# Patient Record
Sex: FEMALE | Race: OTHER | HISPANIC OR LATINO | Employment: OTHER | URBAN - METROPOLITAN AREA
[De-identification: names, ages, dates, MRNs, and addresses within clinical notes are randomized per-mention and may not be internally consistent; named-entity substitution may affect disease eponyms.]

---

## 2018-02-15 ENCOUNTER — OFFICE VISIT (OUTPATIENT)
Dept: FAMILY MEDICINE CLINIC | Facility: CLINIC | Age: 78
End: 2018-02-15
Payer: COMMERCIAL

## 2018-02-15 ENCOUNTER — APPOINTMENT (OUTPATIENT)
Dept: RADIOLOGY | Facility: CLINIC | Age: 78
End: 2018-02-15
Payer: COMMERCIAL

## 2018-02-15 VITALS
DIASTOLIC BLOOD PRESSURE: 64 MMHG | WEIGHT: 133.2 LBS | RESPIRATION RATE: 16 BRPM | OXYGEN SATURATION: 97 % | BODY MASS INDEX: 24.51 KG/M2 | HEART RATE: 74 BPM | SYSTOLIC BLOOD PRESSURE: 122 MMHG | HEIGHT: 62 IN | TEMPERATURE: 97.5 F

## 2018-02-15 DIAGNOSIS — Z79.4 TYPE 2 DIABETES MELLITUS WITH COMPLICATION, WITH LONG-TERM CURRENT USE OF INSULIN (HCC): Primary | ICD-10-CM

## 2018-02-15 DIAGNOSIS — G89.29 CHRONIC LEFT SHOULDER PAIN: ICD-10-CM

## 2018-02-15 DIAGNOSIS — F03.91 DEMENTIA WITH BEHAVIORAL DISTURBANCE, UNSPECIFIED DEMENTIA TYPE (HCC): ICD-10-CM

## 2018-02-15 DIAGNOSIS — M25.512 CHRONIC LEFT SHOULDER PAIN: ICD-10-CM

## 2018-02-15 DIAGNOSIS — Z13.220 NEED FOR LIPID SCREENING: ICD-10-CM

## 2018-02-15 DIAGNOSIS — E11.8 TYPE 2 DIABETES MELLITUS WITH COMPLICATION, WITH LONG-TERM CURRENT USE OF INSULIN (HCC): Primary | ICD-10-CM

## 2018-02-15 DIAGNOSIS — E03.9 HYPOTHYROIDISM, UNSPECIFIED TYPE: ICD-10-CM

## 2018-02-15 DIAGNOSIS — M25.512 PAIN IN JOINT OF LEFT SHOULDER: ICD-10-CM

## 2018-02-15 PROBLEM — F03.918 DEMENTIA WITH BEHAVIORAL DISTURBANCE: Status: ACTIVE | Noted: 2018-02-15

## 2018-02-15 PROCEDURE — 73030 X-RAY EXAM OF SHOULDER: CPT

## 2018-02-15 PROCEDURE — 99204 OFFICE O/P NEW MOD 45 MIN: CPT | Performed by: FAMILY MEDICINE

## 2018-02-15 RX ORDER — IBUPROFEN 600 MG/1
600 TABLET ORAL EVERY 8 HOURS PRN
Qty: 30 TABLET | Refills: 1 | Status: SHIPPED | OUTPATIENT
Start: 2018-02-15 | End: 2019-08-01 | Stop reason: HOSPADM

## 2018-02-15 RX ORDER — DONEPEZIL HYDROCHLORIDE 5 MG/1
5 TABLET, FILM COATED ORAL
COMMUNITY
End: 2018-03-19 | Stop reason: DRUGHIGH

## 2018-02-15 RX ORDER — LEVOTHYROXINE SODIUM 0.07 MG/1
75 TABLET ORAL DAILY
COMMUNITY
End: 2019-03-07 | Stop reason: SDUPTHER

## 2018-02-15 RX ORDER — LISINOPRIL 20 MG/1
20 TABLET ORAL DAILY
COMMUNITY
End: 2019-01-28 | Stop reason: SDUPTHER

## 2018-02-15 RX ORDER — INSULIN GLARGINE 100 [IU]/ML
INJECTION, SOLUTION SUBCUTANEOUS
COMMUNITY
End: 2018-02-22 | Stop reason: SDUPTHER

## 2018-02-15 RX ORDER — IBUPROFEN 400 MG/1
TABLET ORAL EVERY 6 HOURS PRN
COMMUNITY
End: 2018-02-22 | Stop reason: SDUPTHER

## 2018-02-15 RX ORDER — SIMVASTATIN 20 MG
20 TABLET ORAL
COMMUNITY
End: 2019-11-19 | Stop reason: SDUPTHER

## 2018-02-15 RX ORDER — FENOFIBRATE 160 MG/1
160 TABLET ORAL DAILY
COMMUNITY
End: 2019-08-30 | Stop reason: SDUPTHER

## 2018-02-15 RX ORDER — ECHINACEA PURPUREA EXTRACT 125 MG
1 TABLET ORAL AS NEEDED
COMMUNITY
End: 2019-06-26

## 2018-02-15 RX ORDER — LIDOCAINE 50 MG/G
1 PATCH TOPICAL DAILY
Qty: 30 PATCH | Refills: 0 | Status: SHIPPED | OUTPATIENT
Start: 2018-02-15 | End: 2019-06-26

## 2018-02-15 RX ORDER — MEMANTINE HYDROCHLORIDE 5 MG/1
5 TABLET ORAL DAILY
Qty: 30 TABLET | Refills: 2 | Status: SHIPPED | OUTPATIENT
Start: 2018-02-15 | End: 2018-05-03 | Stop reason: ALTCHOICE

## 2018-02-15 NOTE — PROGRESS NOTES
Assessment/Plan:    No problem-specific Assessment & Plan notes found for this encounter  Diagnoses and all orders for this visit:    Type 2 diabetes mellitus with complication, with long-term current use of insulin ([address] )  -     Comprehensive metabolic panel; Future  -     Hemoglobin A1c; Future  -     Lipid panel; Future  -     Microalbumin / creatinine urine ratio  -     Ambulatory referral to Endocrinology; Future    Dementia with behavioral disturbance, unspecified dementia type  -     TSH baseline; Future  -     Vitamin B12; Future  -     memantine (NAMENDA) 5 mg tablet; Take 1 tablet (5 mg total) by mouth daily  -     Ambulatory referral to Neurology; Future    Chronic left shoulder pain  -     Ambulatory referral to Physical Therapy; Future  -     XR shoulder 2+ vw left; Future  -     ibuprofen (MOTRIN) 600 mg tablet; Take 1 tablet (600 mg total) by mouth every 8 (eight) hours as needed for mild pain  -     lidocaine (LIDODERM) 5 %; Place 1 patch on the skin daily Remove & Discard patch within 12 hours or as directed by MD    Need for lipid screening    Other orders  -     insulin glargine (LANTUS) 100 units/mL subcutaneous injection; Inject under the skin daily at bedtime  -     Liraglutide (VICTOZA SC); Inject under the skin  -     Canagliflozin (INVOKANA) 300 MG TABS; Take 300 mg by mouth daily  -     donepezil (ARICEPT) 5 mg tablet; Take 5 mg by mouth daily at bedtime  -     fenofibrate (TRIGLIDE) 160 MG tablet; Take 160 mg by mouth daily  -     simvastatin (ZOCOR) 20 mg tablet; Take 20 mg by mouth daily at bedtime  -     lisinopril (ZESTRIL) 20 mg tablet; Take 20 mg by mouth daily  -     levothyroxine 75 mcg tablet; Take 75 mcg by mouth daily  -     Insulin Pen Needle (BD ULTRA-FINE PEN NEEDLES) 29G X 12 7MM MISC; by Does not apply route  -     ibuprofen (MOTRIN) 400 mg tablet;  Take by mouth every 6 (six) hours as needed for mild pain  -     sodium chloride (OCEAN) 0 65 % nasal spray; 1 spray into each nostril as needed for congestion  -     Insulin Pen Needle (BD PEN NEEDLE TOPHER U/F) 32G X 4 MM MISC; by Does not apply route  -     insulin glargine (LANTUS SOLOSTAR) injection pen 100 units/mL; Inject under the skin daily        Follow up in 1 month to address her medical problems  Subjective:      Patient ID: Sultana Garcia is a 69 y o  female  Dementia  She is here for evaluation and treatment of cognitive problems  She is accompanied by patient and granddughater  Primary caregiver is patient and gandadaughter  The family and the patient identify problems with changes in short and long term memory  Family and patient report problems with gait trouble and balance trouble  Family and patient are concerned about  medication errors, however, they are not concerned about wandering, driving, cooking and inability to maintain adequate nutrition  Medication administration: family monitors medication usage    Functional Assessment:   Activities of Daily Living (ADLs):    She is independent in the following: ambulation, bathing and hygiene, feeding, continence, grooming, toileting and dressing  Requires assistance with the following: none    Sultana Garcia is a 69 y o  female who presents for an initial evaluation of Type 2 diabetes mellitus  Current symptoms/problems include none and have been stable  Symptoms have been present for several years  Known diabetic complications: none  Cardiovascular risk factors: advanced age (older than 54 for men, 72 for women), diabetes mellitus and hypertension  Current diabetic medications include: victoza, lantus and Invokana    Shoulder Pain  Patient complains of left shoulder pain  The symptoms began several weeks ago  Aggravating factors: no known event  Pain is located between the neck and shoulder  Discomfort is described as aching, sharp/stabbing and throbbing  Symptoms are exacerbated by repetitive movements, overhead movements and lying on the shoulder  Evaluation to date: none  Therapy to date includes: OTC analgesics which are somewhat effective  The following portions of the patient's history were reviewed and updated as appropriate:   She  has a past medical history of Dementia and Diabetes mellitus ([address] )  She  does not have any pertinent problems on file  She  has a past surgical history that includes Hysterectomy and Gallbladder surgery  Her family history includes Mental illness in her father and mother; Substance Abuse in her father and mother  She  reports that she has quit smoking  She has never used smokeless tobacco  She reports that she does not drink alcohol or use drugs    Current Outpatient Prescriptions   Medication Sig Dispense Refill    Canagliflozin (INVOKANA) 300 MG TABS Take 300 mg by mouth daily      donepezil (ARICEPT) 5 mg tablet Take 5 mg by mouth daily at bedtime      fenofibrate (TRIGLIDE) 160 MG tablet Take 160 mg by mouth daily      ibuprofen (MOTRIN) 400 mg tablet Take by mouth every 6 (six) hours as needed for mild pain      insulin glargine (LANTUS SOLOSTAR) injection pen 100 units/mL Inject under the skin daily      Insulin Pen Needle (BD PEN NEEDLE TOPHER U/F) 32G X 4 MM MISC by Does not apply route      levothyroxine 75 mcg tablet Take 75 mcg by mouth daily      Liraglutide (VICTOZA SC) Inject under the skin      lisinopril (ZESTRIL) 20 mg tablet Take 20 mg by mouth daily      simvastatin (ZOCOR) 20 mg tablet Take 20 mg by mouth daily at bedtime      sodium chloride (OCEAN) 0 65 % nasal spray 1 spray into each nostril as needed for congestion      ibuprofen (MOTRIN) 600 mg tablet Take 1 tablet (600 mg total) by mouth every 8 (eight) hours as needed for mild pain 30 tablet 1    insulin glargine (LANTUS) 100 units/mL subcutaneous injection Inject under the skin daily at bedtime      Insulin Pen Needle (BD ULTRA-FINE PEN NEEDLES) 29G X 12 7MM MISC by Does not apply route      lidocaine (LIDODERM) 5 % Place 1 patch on the skin daily Remove & Discard patch within 12 hours or as directed by MD 30 patch 0    memantine (NAMENDA) 5 mg tablet Take 1 tablet (5 mg total) by mouth daily 30 tablet 2     No current facility-administered medications for this visit  No current outpatient prescriptions on file prior to visit  No current facility-administered medications on file prior to visit  She is allergic to penicillins       Review of Systems   Constitutional: Negative for activity change, appetite change, fatigue and fever  HENT: Negative for congestion and ear discharge  Respiratory: Negative for cough and shortness of breath  Cardiovascular: Negative for chest pain and palpitations  Gastrointestinal: Negative for diarrhea and nausea  Musculoskeletal: Positive for arthralgias  Negative for back pain  Skin: Negative for color change and rash  Neurological: Negative for dizziness and headaches  Psychiatric/Behavioral: Positive for confusion  Negative for agitation and behavioral problems  Objective:    Vitals:    02/15/18 1058   BP: 122/64   Pulse: 74   Resp: 16   Temp: 97 5 °F (36 4 °C)   SpO2: 97%        Physical Exam   Constitutional: She is oriented to person, place, and time  She appears well-developed and well-nourished  No distress  HENT:   Head: Normocephalic and atraumatic  Nose: Nose normal    Mouth/Throat: Oropharynx is clear and moist    Eyes: Conjunctivae are normal  Pupils are equal, round, and reactive to light  Cardiovascular: Normal rate, regular rhythm and normal heart sounds  No murmur heard  Pulmonary/Chest: Effort normal and breath sounds normal  No respiratory distress  She has no wheezes  Neurological: She is alert and oriented to person, place, and time  Skin: Skin is warm and dry  No rash noted  She is not diaphoretic  No erythema  Psychiatric: She has a normal mood and affect

## 2018-02-16 ENCOUNTER — TRANSCRIBE ORDERS (OUTPATIENT)
Dept: ADMINISTRATIVE | Facility: HOSPITAL | Age: 78
End: 2018-02-16

## 2018-02-16 ENCOUNTER — APPOINTMENT (OUTPATIENT)
Dept: LAB | Facility: CLINIC | Age: 78
End: 2018-02-16
Payer: COMMERCIAL

## 2018-02-16 DIAGNOSIS — E03.9 HYPOTHYROIDISM, UNSPECIFIED TYPE: ICD-10-CM

## 2018-02-16 DIAGNOSIS — Z79.4 TYPE 2 DIABETES MELLITUS WITH COMPLICATION, WITH LONG-TERM CURRENT USE OF INSULIN (HCC): ICD-10-CM

## 2018-02-16 DIAGNOSIS — E11.8 TYPE 2 DIABETES MELLITUS WITH COMPLICATION, WITH LONG-TERM CURRENT USE OF INSULIN (HCC): ICD-10-CM

## 2018-02-16 DIAGNOSIS — E11.8 DIABETIC COMPLICATION (HCC): Primary | ICD-10-CM

## 2018-02-16 DIAGNOSIS — R53.83 OTHER FATIGUE: Primary | ICD-10-CM

## 2018-02-16 DIAGNOSIS — E11.00 TYPE 2 DIABETES MELLITUS WITH HYPEROSMOLARITY WITHOUT NONKETOTIC HYPERGLYCEMIC-HYPEROSMOLAR COMA (NKHHC) (HCC): Primary | ICD-10-CM

## 2018-02-16 DIAGNOSIS — M25.512 PAIN IN JOINT OF LEFT SHOULDER: ICD-10-CM

## 2018-02-16 DIAGNOSIS — F03.91 DEMENTIA WITH BEHAVIORAL DISTURBANCE, UNSPECIFIED DEMENTIA TYPE (HCC): ICD-10-CM

## 2018-02-16 LAB
ALBUMIN SERPL BCP-MCNC: 3.9 G/DL (ref 3.5–5)
ALP SERPL-CCNC: 45 U/L (ref 46–116)
ALT SERPL W P-5'-P-CCNC: 19 U/L (ref 12–78)
ANION GAP SERPL CALCULATED.3IONS-SCNC: 5 MMOL/L (ref 4–13)
AST SERPL W P-5'-P-CCNC: 13 U/L (ref 5–45)
BILIRUB SERPL-MCNC: 0.54 MG/DL (ref 0.2–1)
BUN SERPL-MCNC: 24 MG/DL (ref 5–25)
CALCIUM SERPL-MCNC: 9.5 MG/DL (ref 8.3–10.1)
CHLORIDE SERPL-SCNC: 108 MMOL/L (ref 100–108)
CHOLEST SERPL-MCNC: 131 MG/DL (ref 50–200)
CO2 SERPL-SCNC: 28 MMOL/L (ref 21–32)
CREAT SERPL-MCNC: 0.99 MG/DL (ref 0.6–1.3)
CREAT UR-MCNC: 55.2 MG/DL
EST. AVERAGE GLUCOSE BLD GHB EST-MCNC: 212 MG/DL
GFR SERPL CREATININE-BSD FRML MDRD: 56 ML/MIN/1.73SQ M
GLUCOSE P FAST SERPL-MCNC: 105 MG/DL (ref 65–99)
HBA1C MFR BLD: 9 % (ref 4.2–6.3)
HDLC SERPL-MCNC: 43 MG/DL (ref 40–60)
LDLC SERPL CALC-MCNC: 67 MG/DL (ref 0–100)
MICROALBUMIN UR-MCNC: 5.3 MG/L (ref 0–20)
MICROALBUMIN/CREAT 24H UR: 10 MG/G CREATININE (ref 0–30)
POTASSIUM SERPL-SCNC: 4.4 MMOL/L (ref 3.5–5.3)
PROT SERPL-MCNC: 7.1 G/DL (ref 6.4–8.2)
SODIUM SERPL-SCNC: 141 MMOL/L (ref 136–145)
TRIGL SERPL-MCNC: 105 MG/DL
TSH SERPL DL<=0.05 MIU/L-ACNC: 3.96 UIU/ML
VIT B12 SERPL-MCNC: 352 PG/ML (ref 100–900)

## 2018-02-16 PROCEDURE — 86431 RHEUMATOID FACTOR QUANT: CPT

## 2018-02-16 PROCEDURE — 82043 UR ALBUMIN QUANTITATIVE: CPT | Performed by: FAMILY MEDICINE

## 2018-02-16 PROCEDURE — 86430 RHEUMATOID FACTOR TEST QUAL: CPT

## 2018-02-16 PROCEDURE — 80061 LIPID PANEL: CPT

## 2018-02-16 PROCEDURE — 36415 COLL VENOUS BLD VENIPUNCTURE: CPT

## 2018-02-16 PROCEDURE — 84443 ASSAY THYROID STIM HORMONE: CPT

## 2018-02-16 PROCEDURE — 82570 ASSAY OF URINE CREATININE: CPT | Performed by: FAMILY MEDICINE

## 2018-02-16 PROCEDURE — 82607 VITAMIN B-12: CPT

## 2018-02-16 PROCEDURE — 83036 HEMOGLOBIN GLYCOSYLATED A1C: CPT

## 2018-02-16 PROCEDURE — 80053 COMPREHEN METABOLIC PANEL: CPT

## 2018-02-20 DIAGNOSIS — M05.80 POLYARTHRITIS WITH POSITIVE RHEUMATOID FACTOR (HCC): Primary | ICD-10-CM

## 2018-02-20 LAB
CRYOGLOB RF SER-ACNC: ABNORMAL [IU]/ML
RHEUMATOID FACT SER QL LA: POSITIVE

## 2018-02-22 ENCOUNTER — OFFICE VISIT (OUTPATIENT)
Dept: FAMILY MEDICINE CLINIC | Facility: CLINIC | Age: 78
End: 2018-02-22
Payer: COMMERCIAL

## 2018-02-22 VITALS
BODY MASS INDEX: 24.99 KG/M2 | HEIGHT: 62 IN | SYSTOLIC BLOOD PRESSURE: 128 MMHG | OXYGEN SATURATION: 98 % | HEART RATE: 62 BPM | WEIGHT: 135.8 LBS | DIASTOLIC BLOOD PRESSURE: 62 MMHG | RESPIRATION RATE: 16 BRPM | TEMPERATURE: 96.6 F

## 2018-02-22 DIAGNOSIS — H91.93 BILATERAL HEARING LOSS, UNSPECIFIED HEARING LOSS TYPE: ICD-10-CM

## 2018-02-22 DIAGNOSIS — M05.741 RHEUMATOID ARTHRITIS INVOLVING BOTH HANDS WITH POSITIVE RHEUMATOID FACTOR (HCC): ICD-10-CM

## 2018-02-22 DIAGNOSIS — E11.9 TYPE 2 DIABETES MELLITUS WITHOUT COMPLICATION, WITH LONG-TERM CURRENT USE OF INSULIN (HCC): Primary | ICD-10-CM

## 2018-02-22 DIAGNOSIS — M05.742 RHEUMATOID ARTHRITIS INVOLVING BOTH HANDS WITH POSITIVE RHEUMATOID FACTOR (HCC): ICD-10-CM

## 2018-02-22 DIAGNOSIS — E13.9 DIABETES 1.5, MANAGED AS TYPE 1 (HCC): Primary | ICD-10-CM

## 2018-02-22 DIAGNOSIS — Z79.4 TYPE 2 DIABETES MELLITUS WITHOUT COMPLICATION, WITH LONG-TERM CURRENT USE OF INSULIN (HCC): Primary | ICD-10-CM

## 2018-02-22 PROCEDURE — 99214 OFFICE O/P EST MOD 30 MIN: CPT | Performed by: FAMILY MEDICINE

## 2018-02-22 RX ORDER — LANCETS 28 GAUGE
EACH MISCELLANEOUS
Qty: 100 EACH | Refills: 2 | Status: SHIPPED | OUTPATIENT
Start: 2018-02-22 | End: 2018-02-22 | Stop reason: ALTCHOICE

## 2018-02-22 NOTE — PROGRESS NOTES
Assessment/Plan:    No problem-specific Assessment & Plan notes found for this encounter  Diagnoses and all orders for this visit:    Type 2 diabetes mellitus without complication, with long-term current use of insulin ([facility])  -     Lancets (FREESTYLE) lancets; Use twice daily  Morning fasting and at bedtime   -     Glucometer test strips  -     Glucometer  After discussing risks and benefits of medication along with side effects with patient Lantus will be increased to 12 unit at bedtime  Continue same dose of Victoza and Invokana  She was advised to measure her fasting glucose int he AM and at bedtime  Bilateral hearing loss, unspecified hearing loss type  -     Ambulatory Referral to Otolaryngology; Future    Rheumatoid arthritis 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 ([facility] )    She would like to do some research before being referred to Rheumatology  Advised to take ibuprofen 600 mg as needed  Follow up in 1 month          Subjective:      Patient ID: Ruth Newman is a 69 y o  female  Diabetes Mellitus  Patient presents for follow up of diabetes  Current symptoms include: none  Symptoms have stabilized and been well-controlled  Patient denies foot ulcerations, hyperglycemia, hypoglycemia , increased appetite, nausea, paresthesia of the feet, polydipsia, polyuria and visual disturbances  Evaluation to date has included: hemoglobin A1C which was 9 0 done 1 month ago  Home sugars: patient does not check sugars  Current treatment: Continued insulin, Lantus 10 unit at bedtime, Victoza 1 8 mg daily and invokana 300 mg po once daily which has been somewhat effective  Hearing Loss  Norah Castro is a 69 y o  female who I am asked to see in consultation for evaluation of hearing loss  Concern regarding hearing has been present for 3 month(s)  She has not failed a prior hearing test  [facility] reports turning up the T V , poor attention, saying "huh" or "what"   There is not a history of otitis media  There is not a history of poor vision or progressive vision loss  There is not a history of abnormalities of the outer ear in the family  Rheumatoid Arthritis  Patient complains of rheumatoid arthritis  Symptoms have been present for several years  Onset was sudden  Symptoms include joint pain, joint swelling, morning stiffness and numbness of hands and are of moderate severity  Patient denies eye symptoms  Symptoms are made worse by: overuse  Symptoms are helped by arthritis medications  Associated symptoms include arthralgia and joint pain  Patient denies associated none  Overall disease activity:  ongoing  Limitation on activities include none  The following portions of the patient's history were reviewed and updated as appropriate:   She  has a past medical history of Dementia and Diabetes mellitus ([facility] )  She   Patient Active Problem List    Diagnosis Date Noted    Type 2 diabetes mellitus without complication, with long-term current use of insulin ([facility] ) 02/22/2018    Bilateral hearing loss 02/22/2018    Rheumatoid arthritis 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 ([place] [id number] ) 02/22/2018    Type 2 diabetes mellitus with complication, with long-term current use of insulin ([place] [id number] ) 02/15/2018    Dementia with behavioral disturbance 02/15/2018    Chronic left shoulder pain 02/15/2018    Need for lipid screening 02/15/2018     She  has a past surgical history that includes Hysterectomy and Gallbladder surgery  Her family history includes Mental illness in her father and mother; Substance Abuse in her father and mother  She  reports that she has quit smoking  She has never used smokeless tobacco  She reports that she does not drink alcohol or use drugs    Current Outpatient Prescriptions   Medication Sig Dispense Refill    Canagliflozin (INVOKANA) 300 MG TABS Take 300 mg by mouth daily      donepezil (ARICEPT) 5 mg tablet Take 5 mg by mouth daily at bedtime      fenofibrate (TRIGLIDE) 160 MG tablet Take 160 mg by mouth daily      ibuprofen (MOTRIN) 400 mg tablet Take by mouth every 6 (six) hours as needed for mild pain      ibuprofen (MOTRIN) 600 mg tablet Take 1 tablet (600 mg total) by mouth every 8 (eight) hours as needed for mild pain 30 tablet 1    insulin glargine (LANTUS SOLOSTAR) injection pen 100 units/mL Inject 12 Units under the skin daily       Insulin Pen Needle (BD PEN NEEDLE TOPHER U/F) 32G X 4 MM MISC by Does not apply route      levothyroxine 75 mcg tablet Take 75 mcg by mouth daily      lidocaine (LIDODERM) 5 % Place 1 patch on the skin daily Remove & Discard patch within 12 hours or as directed by MD 30 patch 0    Liraglutide (VICTOZA SC) Inject under the skin      lisinopril (ZESTRIL) 20 mg tablet Take 20 mg by mouth daily      memantine (NAMENDA) 5 mg tablet Take 1 tablet (5 mg total) by mouth daily 30 tablet 2    simvastatin (ZOCOR) 20 mg tablet Take 20 mg by mouth daily at bedtime      sodium chloride (OCEAN) 0 65 % nasal spray 1 spray into each nostril as needed for congestion      Lancets (FREESTYLE) lancets Use twice daily  Morning fasting and at bedtime  100 each 2     No current facility-administered medications for this visit        Current Outpatient Prescriptions on File Prior to Visit   Medication Sig    Canagliflozin (INVOKANA) 300 MG TABS Take 300 mg by mouth daily    donepezil (ARICEPT) 5 mg tablet Take 5 mg by mouth daily at bedtime    fenofibrate (TRIGLIDE) 160 MG tablet Take 160 mg by mouth daily    ibuprofen (MOTRIN) 400 mg tablet Take by mouth every 6 (six) hours as needed for mild pain    ibuprofen (MOTRIN) 600 mg tablet Take 1 tablet (600 mg total) by mouth every 8 (eight) hours as needed for mild pain    insulin glargine (LANTUS SOLOSTAR) injection pen 100 units/mL Inject 12 Units under the skin daily     Insulin Pen Needle (BD PEN NEEDLE TOPHER U/F) 32G X 4 MM MISC by Does not apply route    levothyroxine 75 mcg tablet Take 75 mcg by mouth daily    lidocaine (LIDODERM) 5 % Place 1 patch on the skin daily Remove & Discard patch within 12 hours or as directed by MD    Liraglutide (VICTOZA SC) Inject under the skin    lisinopril (ZESTRIL) 20 mg tablet Take 20 mg by mouth daily    memantine (NAMENDA) 5 mg tablet Take 1 tablet (5 mg total) by mouth daily    simvastatin (ZOCOR) 20 mg tablet Take 20 mg by mouth daily at bedtime    sodium chloride (OCEAN) 0 65 % nasal spray 1 spray into each nostril as needed for congestion    [DISCONTINUED] insulin glargine (LANTUS) 100 units/mL subcutaneous injection Inject under the skin daily at bedtime    [DISCONTINUED] Insulin Pen Needle (BD ULTRA-FINE PEN NEEDLES) 29G X 12 7MM MISC by Does not apply route     No current facility-administered medications on file prior to visit  She is allergic to penicillins       Review of Systems   Constitutional: Negative for activity change, appetite change, fatigue and fever  HENT: Positive for hearing loss  Negative for congestion and ear discharge  Respiratory: Negative for cough and shortness of breath  Cardiovascular: Negative for chest pain and palpitations  Gastrointestinal: Negative for diarrhea and nausea  Musculoskeletal: Positive for arthralgias and joint swelling  Negative for back pain  Skin: Negative for color change and rash  Neurological: Negative for dizziness and headaches  Psychiatric/Behavioral: Negative for agitation and behavioral problems  Objective:      /62 (BP Location: Left arm, Patient Position: Sitting, Cuff Size: Adult)   Pulse 62   Temp (!) 96 6 °F (35 9 °C) (Tympanic)   Resp 16   Ht 5' 2" (1 575 m)   Wt 61 6 kg (135 lb 12 8 oz)   SpO2 98%   BMI 24 84 kg/m²          Physical Exam   Constitutional: She is oriented to person, place, and time  She appears well-developed and well-nourished  No distress  HENT:   Head: Normocephalic and atraumatic     Nose: Nose normal  Mouth/Throat: Oropharynx is clear and moist    Eyes: Conjunctivae are normal  Pupils are equal, round, and reactive to light  Cardiovascular: Normal rate, regular rhythm and normal heart sounds  No murmur heard  Pulmonary/Chest: Effort normal and breath sounds normal  No respiratory distress  She has no wheezes  Abdominal: Soft  Bowel sounds are normal  She exhibits no distension  There is no tenderness  Musculoskeletal: She exhibits edema, tenderness and deformity  Bilateral distal nodules noted on both hands with some ulnar deviation  Tender and swollen noted  Neurological: She is alert and oriented to person, place, and time  Skin: Skin is warm and dry  No rash noted  She is not diaphoretic  No erythema  Psychiatric: She has a normal mood and affect

## 2018-02-23 RX ORDER — LANCETS
EACH MISCELLANEOUS
Qty: 60 EACH | Refills: 2 | Status: SHIPPED | OUTPATIENT
Start: 2018-02-23 | End: 2019-06-26

## 2018-03-13 ENCOUNTER — TELEPHONE (OUTPATIENT)
Dept: NEUROLOGY | Facility: CLINIC | Age: 78
End: 2018-03-13

## 2018-03-19 ENCOUNTER — TELEPHONE (OUTPATIENT)
Dept: FAMILY MEDICINE CLINIC | Facility: CLINIC | Age: 78
End: 2018-03-19

## 2018-03-19 ENCOUNTER — OFFICE VISIT (OUTPATIENT)
Dept: FAMILY MEDICINE CLINIC | Facility: CLINIC | Age: 78
End: 2018-03-19
Payer: COMMERCIAL

## 2018-03-19 ENCOUNTER — APPOINTMENT (OUTPATIENT)
Dept: RADIOLOGY | Facility: CLINIC | Age: 78
End: 2018-03-19
Payer: COMMERCIAL

## 2018-03-19 VITALS
RESPIRATION RATE: 16 BRPM | OXYGEN SATURATION: 98 % | DIASTOLIC BLOOD PRESSURE: 64 MMHG | HEIGHT: 62 IN | WEIGHT: 136.8 LBS | BODY MASS INDEX: 25.17 KG/M2 | HEART RATE: 58 BPM | TEMPERATURE: 97.8 F | SYSTOLIC BLOOD PRESSURE: 122 MMHG

## 2018-03-19 DIAGNOSIS — Z79.4 TYPE 2 DIABETES MELLITUS WITH COMPLICATION, WITH LONG-TERM CURRENT USE OF INSULIN (HCC): ICD-10-CM

## 2018-03-19 DIAGNOSIS — R41.3 MEMORY LOSS: Primary | ICD-10-CM

## 2018-03-19 DIAGNOSIS — M21.949 DEFORMITY OF HAND, UNSPECIFIED LATERALITY: ICD-10-CM

## 2018-03-19 DIAGNOSIS — E11.8 TYPE 2 DIABETES MELLITUS WITH COMPLICATION, WITH LONG-TERM CURRENT USE OF INSULIN (HCC): ICD-10-CM

## 2018-03-19 PROCEDURE — 73130 X-RAY EXAM OF HAND: CPT

## 2018-03-19 PROCEDURE — 99214 OFFICE O/P EST MOD 30 MIN: CPT | Performed by: FAMILY MEDICINE

## 2018-03-19 RX ORDER — DONEPEZIL HYDROCHLORIDE 10 MG/1
10 TABLET, FILM COATED ORAL
Qty: 30 TABLET | Refills: 2 | Status: SHIPPED | OUTPATIENT
Start: 2018-03-19 | End: 2018-05-11 | Stop reason: SDUPTHER

## 2018-03-19 RX ORDER — MEMANTINE HYDROCHLORIDE 10 MG/1
10 TABLET ORAL DAILY
Qty: 30 TABLET | Refills: 2 | Status: SHIPPED | OUTPATIENT
Start: 2018-03-19 | End: 2018-05-03 | Stop reason: ALTCHOICE

## 2018-03-19 NOTE — PROGRESS NOTES
Assessment/Plan:    No problem-specific Assessment & Plan notes found for this encounter  Diagnoses and all orders for this visit:    Memory loss  -     donepezil (ARICEPT) 10 mg tablet; Take 1 tablet (10 mg total) by mouth daily at bedtime  -     memantine (NAMENDA) 10 mg tablet; Take 1 tablet (10 mg total) by mouth daily  -     MRI brain w wo contrast; Future  -     Ambulatory Referral to [address] [place] [address] Dewayne Cooper; Future  after discussing risks and benefits of medication along with side effects namenda and Aricept increased to 10 mg po once daily at this time  Deformity of hand, unspecified laterality  -     XR hand 3+ vw left; Future  -     XR hand 3+ vw right; Future  Recommended for her to follow up with rheumatologist     Type 2 diabetes mellitus with complication, with long-term current use of insulin (HCC)  Elevated in the morning fasting  After discussing risks and benefits of medication,medication will increase lantus to 15 units at bedtime  Advise to continue measure her blood sugar in the morning fasting  Follow up in 1 month        Subjective:      Patient ID: Yuridia Avery is a 69 y o  female  Yuridia Avery is a 69 y o  female here for evaluation of memory problems  She is accompanied by significant other  Primary caregiver is patient and niece  Patient lives with their family  The family and the patient identify problems with changes in short term memory  Family and patient report problems with memory  Family and patient are concerned about  medication errors, however, they are not concerned about wandering and driving  Medication administration: family monitors medication usage  Diabetes Mellitus  Patient presents for follow up of diabetes  Current symptoms include: none  Symptoms have been well-controlled  Patient denies foot ulcerations and nausea  Evaluation to date has included: fasting blood sugar and hemoglobin A1C    Home sugars: BGs have been labile ranging between 100 and 160 mg/dl  Current treatment: Continued insulin which has been effective  Rheumatoid Arthritis  Patient complains of rheumatoid arthritis  Symptoms have been present for several weeks  Onset was gradual  Symptoms include afternoon fatigue and morning stiffness and are of moderate severity  Patient denies afternoon fatigue and eye symptoms  Symptoms are made worse by: nothing  Symptoms are helped by OTC pain medications  Associated symptoms include arthralgia  Patient denies associated none  The following portions of the patient's history were reviewed and updated as appropriate:   She  has a past medical history of Dementia and Diabetes mellitus ([facility] )  She   Patient Active Problem List    Diagnosis Date Noted    Memory loss 03/19/2018    Deformity of hand 03/19/2018    Type 2 diabetes mellitus without complication, with long-term current use of insulin ([facility] ) 02/22/2018    Bilateral hearing loss 02/22/2018    Rheumatoid arthritis 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 ([place] [id number] ) 02/22/2018    Type 2 diabetes mellitus with complication, with long-term current use of insulin ([place] [address] ) 02/15/2018    Dementia with behavioral disturbance 02/15/2018    Chronic left shoulder pain 02/15/2018    Need for lipid screening 02/15/2018     She  has a past surgical history that includes Hysterectomy and Gallbladder surgery  Her family history includes Mental illness in her father and mother; Substance Abuse in her father and mother  She  reports that she has quit smoking  She has never used smokeless tobacco  She reports that she does not drink alcohol or use drugs    Current Outpatient Prescriptions   Medication Sig Dispense Refill    Canagliflozin (INVOKANA) 300 MG TABS Take 300 mg by mouth daily      fenofibrate (TRIGLIDE) 160 MG tablet Take 160 mg by mouth daily      glucose blood test strip Use as instructed 60 each 2    ibuprofen (MOTRIN) 600 mg tablet Take 1 tablet (600 mg total) by mouth every 8 (eight) hours as needed for mild pain 30 tablet 1    insulin glargine (LANTUS SOLOSTAR) injection pen 100 units/mL Inject 15 Units under the skin daily       Insulin Pen Needle (BD PEN NEEDLE TOPHER U/F) 32G X 4 MM MISC by Does not apply route      Lancets (ONETOUCH ULTRASOFT) lancets Use as instructed 60 each 2    levothyroxine 75 mcg tablet Take 75 mcg by mouth daily      lidocaine (LIDODERM) 5 % Place 1 patch on the skin daily Remove & Discard patch within 12 hours or as directed by MD 30 patch 0    Liraglutide (VICTOZA SC) Inject under the skin      lisinopril (ZESTRIL) 20 mg tablet Take 20 mg by mouth daily      memantine (NAMENDA) 5 mg tablet Take 1 tablet (5 mg total) by mouth daily 30 tablet 2    simvastatin (ZOCOR) 20 mg tablet Take 20 mg by mouth daily at bedtime      sodium chloride (OCEAN) 0 65 % nasal spray 1 spray into each nostril as needed for congestion      donepezil (ARICEPT) 10 mg tablet Take 1 tablet (10 mg total) by mouth daily at bedtime 30 tablet 2    memantine (NAMENDA) 10 mg tablet Take 1 tablet (10 mg total) by mouth daily 30 tablet 2     No current facility-administered medications for this visit        Current Outpatient Prescriptions on File Prior to Visit   Medication Sig    Canagliflozin (INVOKANA) 300 MG TABS Take 300 mg by mouth daily    fenofibrate (TRIGLIDE) 160 MG tablet Take 160 mg by mouth daily    glucose blood test strip Use as instructed    ibuprofen (MOTRIN) 600 mg tablet Take 1 tablet (600 mg total) by mouth every 8 (eight) hours as needed for mild pain    insulin glargine (LANTUS SOLOSTAR) injection pen 100 units/mL Inject 15 Units under the skin daily     Insulin Pen Needle (BD PEN NEEDLE TOPHER U/F) 32G X 4 MM MISC by Does not apply route    Lancets (ONETOUCH ULTRASOFT) lancets Use as instructed    levothyroxine 75 mcg tablet Take 75 mcg by mouth daily    lidocaine (LIDODERM) 5 % Place 1 patch on the skin daily Remove & Discard patch within 12 hours or as directed by MD    Liraglutide (VICTOZA SC) Inject under the skin    lisinopril (ZESTRIL) 20 mg tablet Take 20 mg by mouth daily    memantine (NAMENDA) 5 mg tablet Take 1 tablet (5 mg total) by mouth daily    simvastatin (ZOCOR) 20 mg tablet Take 20 mg by mouth daily at bedtime    sodium chloride (OCEAN) 0 65 % nasal spray 1 spray into each nostril as needed for congestion    [DISCONTINUED] donepezil (ARICEPT) 5 mg tablet Take 5 mg by mouth daily at bedtime     No current facility-administered medications on file prior to visit  She is allergic to penicillins       Review of Systems   Constitutional: Negative for activity change, appetite change, fatigue and fever  HENT: Negative for congestion and ear discharge  Respiratory: Negative for cough and shortness of breath  Cardiovascular: Negative for chest pain and palpitations  Gastrointestinal: Negative for diarrhea and nausea  Musculoskeletal: Positive for arthralgias and myalgias  Negative for back pain  Skin: Negative for color change and rash  Neurological: Negative for dizziness and headaches  Psychiatric/Behavioral: Negative for agitation and behavioral problems  Objective:      /64 (BP Location: Left arm, Patient Position: Sitting, Cuff Size: Adult)   Pulse 58   Temp 97 8 °F (36 6 °C) (Tympanic)   Resp 16   Ht 5' 2" (1 575 m)   Wt 62 1 kg (136 lb 12 8 oz)   SpO2 98%   BMI 25 02 kg/m²          Physical Exam   Constitutional: She is oriented to person, place, and time  She appears well-developed and well-nourished  No distress  HENT:   Head: Normocephalic and atraumatic  Nose: Nose normal    Mouth/Throat: Oropharynx is clear and moist    Eyes: Conjunctivae are normal  Pupils are equal, round, and reactive to light  Cardiovascular: Normal rate, regular rhythm and normal heart sounds  No murmur heard  Pulmonary/Chest: Effort normal and breath sounds normal  No respiratory distress   She has no wheezes  Abdominal: Soft  Bowel sounds are normal  She exhibits no distension  There is no tenderness  Musculoskeletal: Normal range of motion  She exhibits deformity  She exhibits no edema or tenderness  Neurological: She is alert and oriented to person, place, and time  Skin: Skin is warm and dry  No rash noted  She is not diaphoretic  No erythema  Psychiatric: She has a normal mood and affect

## 2018-03-19 NOTE — TELEPHONE ENCOUNTER
Visiting nurses called and stated they do not see patients for memory that is more of a personal care that would be out of pocket  Maybe adult

## 2018-03-20 ENCOUNTER — TELEPHONE (OUTPATIENT)
Dept: FAMILY MEDICINE CLINIC | Facility: CLINIC | Age: 78
End: 2018-03-20

## 2018-03-20 LAB
LEFT EYE DIABETIC RETINOPATHY: NORMAL
RIGHT EYE DIABETIC RETINOPATHY: NORMAL

## 2018-03-20 NOTE — TELEPHONE ENCOUNTER
Can we arrange for patient to received Physical therapy at home through home health instead of her having to go somewhere thanks  I have an ordered for PT and Home health in chart

## 2018-03-20 NOTE — TELEPHONE ENCOUNTER
Please call jillian having an issue w/out patient therapy unable to do this since you have pt assigned to home health services  needs to speak w/you [phone]

## 2018-03-21 DIAGNOSIS — R41.3 MEMORY LOSS: Primary | ICD-10-CM

## 2018-03-28 ENCOUNTER — HOSPITAL ENCOUNTER (OUTPATIENT)
Dept: MRI IMAGING | Facility: HOSPITAL | Age: 78
Discharge: HOME | End: 2018-03-28
Attending: FAMILY MEDICINE
Payer: COMMERCIAL

## 2018-03-28 DIAGNOSIS — R41.3 MEMORY LOSS: ICD-10-CM

## 2018-03-28 PROCEDURE — 70553 MRI BRAIN STEM W/O & W/DYE: CPT

## 2018-03-28 PROCEDURE — A9585 GADOBUTROL INJECTION: HCPCS | Performed by: PSYCHIATRY & NEUROLOGY

## 2018-03-28 RX ADMIN — GADOBUTROL 6 ML: 604.72 INJECTION INTRAVENOUS at 18:28

## 2018-04-09 DIAGNOSIS — E11.9 TYPE 2 DIABETES MELLITUS WITHOUT COMPLICATION, UNSPECIFIED WHETHER LONG TERM INSULIN USE (HCC): Primary | ICD-10-CM

## 2018-04-30 RX ORDER — LIDOCAINE HCL 4 %
CREAM (GRAM) TOPICAL
Refills: 5 | COMMUNITY
Start: 2018-01-24 | End: 2019-07-10

## 2018-04-30 RX ORDER — BLOOD-GLUCOSE METER
EACH MISCELLANEOUS
Refills: 0 | COMMUNITY
Start: 2018-02-22 | End: 2020-11-13 | Stop reason: SDUPTHER

## 2018-05-03 ENCOUNTER — TELEPHONE (OUTPATIENT)
Dept: FAMILY MEDICINE CLINIC | Facility: CLINIC | Age: 78
End: 2018-05-03

## 2018-05-03 ENCOUNTER — OFFICE VISIT (OUTPATIENT)
Dept: NEUROLOGY | Facility: CLINIC | Age: 78
End: 2018-05-03
Payer: COMMERCIAL

## 2018-05-03 VITALS
HEIGHT: 63 IN | WEIGHT: 136 LBS | SYSTOLIC BLOOD PRESSURE: 132 MMHG | DIASTOLIC BLOOD PRESSURE: 72 MMHG | HEART RATE: 70 BPM | BODY MASS INDEX: 24.1 KG/M2

## 2018-05-03 DIAGNOSIS — F03.91 DEMENTIA WITH BEHAVIORAL DISTURBANCE, UNSPECIFIED DEMENTIA TYPE (HCC): Primary | ICD-10-CM

## 2018-05-03 DIAGNOSIS — G62.9 POLYNEUROPATHY: ICD-10-CM

## 2018-05-03 DIAGNOSIS — Z79.4 TYPE 2 DIABETES MELLITUS TREATED WITH INSULIN (HCC): Primary | ICD-10-CM

## 2018-05-03 DIAGNOSIS — E11.9 TYPE 2 DIABETES MELLITUS TREATED WITH INSULIN (HCC): Primary | ICD-10-CM

## 2018-05-03 PROBLEM — G30.9 ALZHEIMER'S DEMENTIA (HCC): Status: ACTIVE | Noted: 2018-05-03

## 2018-05-03 PROBLEM — F02.80 ALZHEIMER'S DEMENTIA (HCC): Status: ACTIVE | Noted: 2018-05-03

## 2018-05-03 PROCEDURE — 99204 OFFICE O/P NEW MOD 45 MIN: CPT | Performed by: PSYCHIATRY & NEUROLOGY

## 2018-05-03 RX ORDER — MEMANTINE HYDROCHLORIDE 10 MG/1
10 TABLET ORAL 2 TIMES DAILY
Qty: 60 TABLET | Refills: 3 | Status: SHIPPED | OUTPATIENT
Start: 2018-05-03 | End: 2018-09-25 | Stop reason: SDUPTHER

## 2018-05-03 NOTE — PROGRESS NOTES
Consultation - Neurology   Rachael Gauthier 69 y o  female MRN: [id number]  Unit/Bed#:  Encounter: [id number]      Physician Requesting Consult: No att  providers found  69year old right handed female patient was referred by Dr Francesca Cheadle with a history of dementia  HPI:  Patient is a 69-year-old right-handed very pleasant lady accompanied with her granddaughter, predominantly speaks only Congolese, and has been detected to have dementia for the last 2 years, was evaluated by Neurology in [facility] where she lived all by herself, and was started on Aricept and memantine  Patient was experiencing some behavioral dysfunction and recently has moved to live with her granddaughter who is her main caregiver  History is mostly taken from the granddaughter who notes that she is extremely forgetful, repeats herself over and over, requires constant reminders for activities of daily living such as grooming and does get agitated when constantly reminded, tends to be somnolent through the day as well as in the night, on 1 occasion was noted to be wandering, and has been relatively stable over the last 3 months ever since she has been living with her granddaughter  She did have a workup done recently at the request of her primary physician with an MRI of the brain which showed evidence of chronic atrophy an age-related T2 white matter changes  B12 level was 334, TSH was normal  Patient denies any history of head trauma, and denies any other central neurological symptoms at this time or any motor or sensory symptoms or any gait difficulty  She does not smoke or drink  In the past the patient also has been told to have diabetic neuropathy and her blood sugars are not under adequate control  She does experience tingling numbness in both her feet but denies pain  Review of Systems:  Review of Systems   Constitutional: Positive for fatigue  HENT: Positive for tinnitus and trouble swallowing      Eyes: Positive for pain and visual disturbance  Respiratory: Positive for cough  Cardiovascular: Positive for palpitations  Gastrointestinal: Positive for abdominal pain and diarrhea  Endocrine: Positive for cold intolerance  Genitourinary: Positive for enuresis, frequency and urgency  Musculoskeletal: Positive for back pain, gait problem, joint swelling and neck pain  Skin: Negative  Allergic/Immunologic: Negative  Neurological: Positive for dizziness, syncope, numbness and headaches  Hematological: Negative  Psychiatric/Behavioral: Positive for agitation, behavioral problems, confusion and sleep disturbance  Historical Information   Past Medical History:   Diagnosis Date    Dementia     Diabetes mellitus ([facility] )      Past Surgical History:   Procedure Laterality Date    GALLBLADDER SURGERY      HYSTERECTOMY       Social History   History   Smoking Status    Former Smoker   Smokeless Tobacco    Never Used     History   Alcohol Use No     History   Drug Use No       Family History:   Family History   Problem Relation Age of Onset    Substance Abuse Mother      denied    Mental illness Mother      denied    Substance Abuse Father      denied    Mental illness Father      denied       Allergies   Allergen Reactions    Penicillins Itching and Swelling       Meds:  All current active meds have been reviewed    Scheduled Meds:  PRN Meds:     Physical Exam:   Objective   Vitals: There were no vitals filed for this visit  ,Body mass index is 24 48 kg/m²  General appearance: Cooperative in no acute distress  Head & neck head is atraumatic and normocephalic  Neck is supple with full range of motion  Cardiovascular: Carotid arteries-no carotid bruits  Neurologic:   Patient is alert awake oriented, [place] cognitive assessment scale reveals a score of 21/30 , speech is fluent  No evidence of any aphasia or dysarthria    Cranial nerve examination reveals visual fields are full to threat, pupils equal and reactive, extraocular movements intact, fundi showed sharp disc margins, sensation in the V1 V2 V3 distribution is symmetric, no obvious facial asymmetry noted, tongue is midline and gag is adequate  Motor examination reveals normal tone and bulk, no evidence of any drift to the outstretched extremities, strength is 5/5 preserved bilaterally in both upper and lower extremities, deep tendon reflexes are intact, toes are downgoing  Sensory examination to pinprick light touch proprioception and vibration is preserved bilaterally, patient does not extinguish double simultaneous stimuli  Coordination no evidence of any finger-to-nose dysmetria  Gait is normal based, patient does have a sway on Romberg testing  Assessment:  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

## 2018-05-03 NOTE — TELEPHONE ENCOUNTER
Granddaughter called  Sugar levels are 183, 207, 229 was the highest  All of which are fasting  She wants to know what to do

## 2018-05-11 DIAGNOSIS — R41.3 MEMORY LOSS: ICD-10-CM

## 2018-05-12 RX ORDER — DONEPEZIL HYDROCHLORIDE 10 MG/1
10 TABLET, FILM COATED ORAL
Qty: 30 TABLET | Refills: 0 | Status: SHIPPED | OUTPATIENT
Start: 2018-05-12 | End: 2019-08-15 | Stop reason: SDUPTHER

## 2018-05-17 ENCOUNTER — TELEPHONE (OUTPATIENT)
Dept: FAMILY MEDICINE CLINIC | Facility: CLINIC | Age: 78
End: 2018-05-17

## 2018-05-17 DIAGNOSIS — Z79.4 TYPE 2 DIABETES MELLITUS TREATED WITH INSULIN (HCC): ICD-10-CM

## 2018-05-17 DIAGNOSIS — E11.9 TYPE 2 DIABETES MELLITUS TREATED WITH INSULIN (HCC): ICD-10-CM

## 2018-05-17 NOTE — TELEPHONE ENCOUNTER
Patient's granddaughter called and stated that a few weeks ago you had increased her insulin to 20 units at bedtime - now her sugar seems to be running to low - in the 60-68's  She was wondering what she should do?

## 2018-06-07 ENCOUNTER — OFFICE VISIT (OUTPATIENT)
Dept: FAMILY MEDICINE CLINIC | Facility: CLINIC | Age: 78
End: 2018-06-07
Payer: COMMERCIAL

## 2018-06-07 VITALS
OXYGEN SATURATION: 96 % | BODY MASS INDEX: 24.41 KG/M2 | DIASTOLIC BLOOD PRESSURE: 64 MMHG | SYSTOLIC BLOOD PRESSURE: 126 MMHG | WEIGHT: 137.8 LBS | HEART RATE: 70 BPM | HEIGHT: 63 IN | TEMPERATURE: 97.1 F | RESPIRATION RATE: 16 BRPM

## 2018-06-07 DIAGNOSIS — Z79.4 TYPE 2 DIABETES MELLITUS TREATED WITH INSULIN (HCC): Primary | ICD-10-CM

## 2018-06-07 DIAGNOSIS — E11.9 TYPE 2 DIABETES MELLITUS TREATED WITH INSULIN (HCC): Primary | ICD-10-CM

## 2018-06-07 DIAGNOSIS — E13.9 DIABETES 1.5, MANAGED AS TYPE 1 (HCC): ICD-10-CM

## 2018-06-07 LAB — SL AMB POCT HEMOGLOBIN AIC: 8

## 2018-06-07 PROCEDURE — 36416 COLLJ CAPILLARY BLOOD SPEC: CPT | Performed by: FAMILY MEDICINE

## 2018-06-07 PROCEDURE — 99214 OFFICE O/P EST MOD 30 MIN: CPT | Performed by: FAMILY MEDICINE

## 2018-06-07 PROCEDURE — 83036 HEMOGLOBIN GLYCOSYLATED A1C: CPT | Performed by: FAMILY MEDICINE

## 2018-06-07 RX ORDER — LANCETS 28 GAUGE
EACH MISCELLANEOUS
Qty: 100 EACH | Refills: 0 | Status: SHIPPED | OUTPATIENT
Start: 2018-06-07 | End: 2019-06-26

## 2018-06-07 NOTE — PROGRESS NOTES
Assessment/Plan:    No problem-specific Assessment & Plan notes found for this encounter  Diagnoses and all orders for this visit:    Type 2 diabetes mellitus treated with insulin ([address] )  after discussing risks and benefits of medication along with side effects will increase lantus to 17 units at night and continue other medications same dose  -     insulin glargine (LANTUS SOLOSTAR) 100 units/mL injection pen; Inject 17 Units under the skin daily      Follow up in 1-2 months        Subjective:      Patient ID: Omar Diamond is a 69 y o  female  Diabetes Mellitus  Patient presents for follow up of diabetes  Current symptoms include: none  Symptoms have been intermittent  Patient denies foot ulcerations, increased appetite, nausea, paresthesia of the feet and polydipsia  Evaluation to date has included: fasting blood sugar and hemoglobin A1C  Home sugars: BGs have been labile ranging between 130 and 180 mg/dl  Current treatment: Continued insulin which has been somewhat effective  She also needs a form for adult  to be filled out  She deneis any communicable diseases  The following portions of the patient's history were reviewed and updated as appropriate:   She  has a past medical history of Aggression; Alzheimer's dementia; Dementia; Diabetes mellitus ([address] ); High cholesterol; Hypertension; Memory loss; Migraine; Rheumatoid arthritis ([address] ); Serum lipids high; and Thyroid trouble    She   Patient Active Problem List    Diagnosis Date Noted    Type 2 diabetes mellitus treated with insulin ([address] ) 06/07/2018    Diabetes 1 5, managed as type 1 ([address] ) 06/07/2018    Alzheimer's dementia 05/03/2018    Memory loss 03/19/2018    Deformity of hand 03/19/2018    Type 2 diabetes mellitus without complication, with long-term current use of insulin ([address] ) 02/22/2018    Bilateral hearing loss 02/22/2018    Rheumatoid arthritis 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 ([address] ) 02/22/2018    Type 2 diabetes mellitus with complication, with long-term current use of insulin ([address] ) 02/15/2018    Dementia with behavioral disturbance 02/15/2018    Chronic left shoulder pain 02/15/2018    Need for lipid screening 02/15/2018     She  has a past surgical history that includes Hysterectomy; Gallbladder surgery; and Cataract extraction  Her family history includes Arthritis in her daughter; Blindness in her brother; Diabetes in her brother; HIV in her son; Mental illness in her father and mother; Substance Abuse in her father and mother  She  reports that she quit smoking about 33 years ago  She has never used smokeless tobacco  She reports that she does not drink alcohol or use drugs    Current Outpatient Prescriptions   Medication Sig Dispense Refill    Blood Glucose Monitoring Suppl (Lesly Trevizo) w/Device KIT Use as directed  0    Canagliflozin (INVOKANA) 300 MG TABS Take 300 mg by mouth daily      [facility] PAIN RELIEF 4 % CREA APPLY EVERY 6 HOURS AS NEEDED FOR SHOULDER/NECK PAIN   5    donepezil (ARICEPT) 10 mg tablet TAKE 1 TABLET (10 MG TOTAL) BY MOUTH DAILY AT BEDTIME 30 tablet 0    fenofibrate (TRIGLIDE) 160 MG tablet Take 160 mg by mouth daily      glucose blood test strip Use as instructed 60 each 2    ibuprofen (MOTRIN) 600 mg tablet Take 1 tablet (600 mg total) by mouth every 8 (eight) hours as needed for mild pain 30 tablet 1    insulin glargine (LANTUS SOLOSTAR) 100 units/mL injection pen Inject 17 Units under the skin daily 5 pen 0    Insulin Pen Needle (BD PEN NEEDLE TOPHER U/F) 32G X 4 MM MISC by Does not apply route      Lancets (ONETOUCH ULTRASOFT) lancets Use as instructed 60 each 2    levothyroxine 75 mcg tablet Take 75 mcg by mouth daily      lidocaine (LIDODERM) 5 % Place 1 patch on the skin daily Remove & Discard patch within 12 hours or as directed by MD 30 patch 0    Liraglutide (VICTOZA) 18 MG/3ML SOPN Inject 0 3 mL under the skin daily 5 pen 0    lisinopril (ZESTRIL) 20 mg tablet Take 20 mg by mouth daily      memantine (NAMENDA) 10 mg tablet Take 1 tablet (10 mg total) by mouth 2 (two) times a day 60 tablet 3    simvastatin (ZOCOR) 20 mg tablet Take 20 mg by mouth daily at bedtime      sodium chloride (OCEAN) 0 65 % nasal spray 1 spray into each nostril as needed for congestion      Lancets (FREESTYLE) lancets Test daily with makeda machine and lancets 100 each 0     No current facility-administered medications for this visit  Current Outpatient Prescriptions on File Prior to Visit   Medication Sig    Blood Glucose Monitoring Suppl (ONETOUCH VERIO) w/Device KIT Use as directed    Canagliflozin (INVOKANA) 300 MG TABS Take 300 mg by mouth daily    [facility] PAIN RELIEF 4 % CREA APPLY EVERY 6 HOURS AS NEEDED FOR SHOULDER/NECK PAIN      donepezil (ARICEPT) 10 mg tablet TAKE 1 TABLET (10 MG TOTAL) BY MOUTH DAILY AT BEDTIME    fenofibrate (TRIGLIDE) 160 MG tablet Take 160 mg by mouth daily    glucose blood test strip Use as instructed    ibuprofen (MOTRIN) 600 mg tablet Take 1 tablet (600 mg total) by mouth every 8 (eight) hours as needed for mild pain    Insulin Pen Needle (BD PEN NEEDLE MAKEDA U/F) 32G X 4 MM MISC by Does not apply route    Lancets (ONETOUCH ULTRASOFT) lancets Use as instructed    levothyroxine 75 mcg tablet Take 75 mcg by mouth daily    lidocaine (LIDODERM) 5 % Place 1 patch on the skin daily Remove & Discard patch within 12 hours or as directed by MD    Liraglutide (VICTOZA) 18 MG/3ML SOPN Inject 0 3 mL under the skin daily    lisinopril (ZESTRIL) 20 mg tablet Take 20 mg by mouth daily    memantine (NAMENDA) 10 mg tablet Take 1 tablet (10 mg total) by mouth 2 (two) times a day    simvastatin (ZOCOR) 20 mg tablet Take 20 mg by mouth daily at bedtime    sodium chloride (OCEAN) 0 65 % nasal spray 1 spray into each nostril as needed for congestion    [DISCONTINUED] insulin glargine (LANTUS SOLOSTAR) injection pen 100 units/mL Inject 0 15 mL (15 Units total) under the skin daily     No current facility-administered medications on file prior to visit  She is allergic to penicillins       Review of Systems   Constitutional: Negative for activity change, appetite change, fatigue and fever  HENT: Negative for congestion and ear discharge  Respiratory: Negative for cough and shortness of breath  Cardiovascular: Negative for chest pain and palpitations  Gastrointestinal: Negative for diarrhea and nausea  Musculoskeletal: Negative for arthralgias and back pain  Skin: Negative for color change and rash  Neurological: Negative for dizziness and headaches  Psychiatric/Behavioral: Negative for agitation and behavioral problems  Objective:      /64 (BP Location: Left arm, Patient Position: Sitting, Cuff Size: Standard)   Pulse 70   Temp (!) 97 1 °F (36 2 °C) (Tympanic)   Resp 16   Ht 5' 2 5" (1 588 m)   Wt 62 5 kg (137 lb 12 8 oz)   SpO2 96%   BMI 24 80 kg/m²          Physical Exam   Constitutional: She is oriented to person, place, and time  She appears well-developed and well-nourished  No distress  HENT:   Head: Normocephalic and atraumatic  Nose: Nose normal    Mouth/Throat: Oropharynx is clear and moist    Eyes: Conjunctivae are normal  Pupils are equal, round, and reactive to light  Cardiovascular: Normal rate, regular rhythm and normal heart sounds  No murmur heard  Pulmonary/Chest: Effort normal and breath sounds normal  No respiratory distress  She has no wheezes  Abdominal: Soft  Bowel sounds are normal  She exhibits no distension  There is no tenderness  Neurological: She is alert and oriented to person, place, and time  Skin: Skin is warm and dry  No rash noted  She is not diaphoretic  No erythema  Psychiatric: She has a normal mood and affect

## 2018-06-11 ENCOUNTER — IMMUNIZATION (OUTPATIENT)
Dept: FAMILY MEDICINE CLINIC | Facility: CLINIC | Age: 78
End: 2018-06-11
Payer: COMMERCIAL

## 2018-06-11 DIAGNOSIS — E11.9 TYPE 2 DIABETES MELLITUS TREATED WITH INSULIN (HCC): Primary | ICD-10-CM

## 2018-06-11 DIAGNOSIS — Z79.4 TYPE 2 DIABETES MELLITUS TREATED WITH INSULIN (HCC): Primary | ICD-10-CM

## 2018-06-11 DIAGNOSIS — Z11.1 SCREENING-PULMONARY TB: Primary | ICD-10-CM

## 2018-06-11 PROCEDURE — 86580 TB INTRADERMAL TEST: CPT | Performed by: FAMILY MEDICINE

## 2018-06-13 ENCOUNTER — OFFICE VISIT (OUTPATIENT)
Dept: FAMILY MEDICINE CLINIC | Facility: CLINIC | Age: 78
End: 2018-06-13
Payer: COMMERCIAL

## 2018-06-13 VITALS
SYSTOLIC BLOOD PRESSURE: 174 MMHG | HEIGHT: 63 IN | WEIGHT: 140 LBS | TEMPERATURE: 97.4 F | RESPIRATION RATE: 18 BRPM | HEART RATE: 78 BPM | DIASTOLIC BLOOD PRESSURE: 72 MMHG | OXYGEN SATURATION: 98 % | BODY MASS INDEX: 24.8 KG/M2

## 2018-06-13 DIAGNOSIS — Z79.4 TYPE 2 DIABETES MELLITUS TREATED WITH INSULIN (HCC): Primary | ICD-10-CM

## 2018-06-13 DIAGNOSIS — L29.9 PRURITIC DERMATITIS: ICD-10-CM

## 2018-06-13 DIAGNOSIS — E11.9 TYPE 2 DIABETES MELLITUS TREATED WITH INSULIN (HCC): Primary | ICD-10-CM

## 2018-06-13 PROBLEM — L30.8 PRURITIC DERMATITIS: Status: ACTIVE | Noted: 2018-06-13

## 2018-06-13 PROCEDURE — 99214 OFFICE O/P EST MOD 30 MIN: CPT | Performed by: NURSE PRACTITIONER

## 2018-06-13 RX ORDER — HYDROCORTISONE 25 MG/ML
LOTION TOPICAL 2 TIMES DAILY
Qty: 59 ML | Refills: 1 | Status: SHIPPED | OUTPATIENT
Start: 2018-06-13 | End: 2019-06-26

## 2018-06-13 RX ORDER — DIPHENHYDRAMINE HCL 25 MG
25 TABLET ORAL EVERY 8 HOURS PRN
Qty: 21 TABLET | Refills: 0 | Status: SHIPPED | OUTPATIENT
Start: 2018-06-13 | End: 2019-07-10

## 2018-06-13 NOTE — PROGRESS NOTES
Assessment/Plan:    No problem-specific Assessment & Plan notes found for this encounter  Diagnoses and all orders for this visit:    Type 2 diabetes mellitus treated with insulin ([facility])    Pruritic dermatitis  -     diphenhydrAMINE (BENADRYL) 25 mg tablet; Take 1 tablet (25 mg total) by mouth every 8 (eight) hours as needed for itching for up to 7 days  -     hydrocortisone 2 5 % lotion; Apply topically 2 (two) times a day for 14 days          Subjective:      Patient ID: Juli El is a 69 y o  female  Pt is here today accompanied by her grand daughter who will be interpreting as pt speaks [place]  Rash started 5 days ago  Granddaughter states that pt was itchy, but no rash  Itching worsened and red bumps came out yesterday  Every day, she doesn't feel well  She feels weak and tired and shaky  She now has hand tremors  For the past three months, her glucose has been not well controlled  Granddaughter states she has very high and wide swings of her readings  She eats three times a day and balances her carbs  Fasting glucose log shows readings in the 225-296 ranges  She checks her glucose once a day in the am  She checks her glucose during the shaky periods and it is around 70 at those time periods  Reporting hypoglycemia episodes every other day, during the daytime hours  Previous to this, sugars had been well controlled for at least a year  They also report a weight gain of almost 10 pounds over past 2 months, by our office scale  The following portions of the patient's history were reviewed and updated as appropriate:   She  has a past medical history of Aggression; Alzheimer's dementia; Dementia; Diabetes mellitus ([address] ); High cholesterol; Hypertension; Memory loss; Migraine; Rheumatoid arthritis ([address] ); Serum lipids high; and Thyroid trouble    She   Patient Active Problem List    Diagnosis Date Noted    Pruritic dermatitis 06/13/2018    Type 2 diabetes mellitus treated with insulin ([address] ) 06/07/2018    Diabetes 1 5, managed as type 1 ([facility] ) 06/07/2018    Alzheimer's dementia 05/03/2018    Memory loss 03/19/2018    Deformity of hand 03/19/2018    Type 2 diabetes mellitus without complication, with long-term current use of insulin ([facility] ) 02/22/2018    Bilateral hearing loss 02/22/2018    Rheumatoid arthritis 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 ([place] [id number] ) 02/22/2018    Type 2 diabetes mellitus with complication, with long-term current use of insulin ([place] [id number] ) 02/15/2018    Dementia with behavioral disturbance 02/15/2018    Chronic left shoulder pain 02/15/2018    Need for lipid screening 02/15/2018     She  has a past surgical history that includes Hysterectomy; Gallbladder surgery; and Cataract extraction  Her family history includes Arthritis in her daughter; Blindness in her brother; Diabetes in her brother; HIV in her son; Mental illness in her father and mother; Substance Abuse in her father and mother  She  reports that she quit smoking about 33 years ago  She has never used smokeless tobacco  She reports that she does not drink alcohol or use drugs    Current Outpatient Prescriptions   Medication Sig Dispense Refill    Blood Glucose Monitoring Suppl ([facility]) w/Device KIT Use as directed  0    Canagliflozin (INVOKANA) 300 MG TABS Take 300 mg by mouth daily      [facility] PAIN RELIEF 4 % CREA APPLY EVERY 6 HOURS AS NEEDED FOR SHOULDER/NECK PAIN   5    diphenhydrAMINE (BENADRYL) 25 mg tablet Take 1 tablet (25 mg total) by mouth every 8 (eight) hours as needed for itching for up to 7 days 21 tablet 0    donepezil (ARICEPT) 10 mg tablet TAKE 1 TABLET (10 MG TOTAL) BY MOUTH DAILY AT BEDTIME 30 tablet 0    fenofibrate (TRIGLIDE) 160 MG tablet Take 160 mg by mouth daily      glucose blood test strip Use as instructed 60 each 2    hydrocortisone 2 5 % lotion Apply topically 2 (two) times a day for 14 days 59 mL 1    ibuprofen (MOTRIN) 600 mg tablet Take 1 tablet (600 mg total) by mouth every 8 (eight) hours as needed for mild pain 30 tablet 1    insulin glargine (LANTUS SOLOSTAR) 100 units/mL injection pen Inject 17 Units under the skin daily 5 pen 0    Insulin Pen Needle (BD PEN NEEDLE TOPHER U/F) 32G X 4 MM MISC by Does not apply route      Lancets (FREESTYLE) lancets Test daily with topher machine and lancets 100 each 0    Lancets (ONETOUCH ULTRASOFT) lancets Use as instructed 60 each 2    levothyroxine 75 mcg tablet Take 75 mcg by mouth daily      lidocaine (LIDODERM) 5 % Place 1 patch on the skin daily Remove & Discard patch within 12 hours or as directed by MD 30 patch 0    Liraglutide (VICTOZA) 18 MG/3ML SOPN Inject 0 3 mL under the skin daily 5 pen 0    lisinopril (ZESTRIL) 20 mg tablet Take 20 mg by mouth daily      memantine (NAMENDA) 10 mg tablet Take 1 tablet (10 mg total) by mouth 2 (two) times a day 60 tablet 3    ONETOUCH DELICA LANCETS FINE MISC by Does not apply route 2 (two) times a day as needed (as needed for high blood sugar) 100 each 3    simvastatin (ZOCOR) 20 mg tablet Take 20 mg by mouth daily at bedtime      sodium chloride (OCEAN) 0 65 % nasal spray 1 spray into each nostril as needed for congestion       No current facility-administered medications for this visit  Current Outpatient Prescriptions on File Prior to Visit   Medication Sig    Blood Glucose Monitoring Suppl (ONETOUCH VERIO) w/Device KIT Use as directed    Canagliflozin (INVOKANA) 300 MG TABS Take 300 mg by mouth daily    [facility] PAIN RELIEF 4 % CREA APPLY EVERY 6 HOURS AS NEEDED FOR SHOULDER/NECK PAIN      donepezil (ARICEPT) 10 mg tablet TAKE 1 TABLET (10 MG TOTAL) BY MOUTH DAILY AT BEDTIME    fenofibrate (TRIGLIDE) 160 MG tablet Take 160 mg by mouth daily    glucose blood test strip Use as instructed    ibuprofen (MOTRIN) 600 mg tablet Take 1 tablet (600 mg total) by mouth every 8 (eight) hours as needed for mild pain    insulin glargine (LANTUS SOLOSTAR) 100 units/mL injection pen Inject 17 Units under the skin daily    Insulin Pen Needle (BD PEN NEEDLE TOPHER U/F) 32G X 4 MM MISC by Does not apply route    Lancets (FREESTYLE) lancets Test daily with topher machine and lancets    Lancets (ONETOUCH ULTRASOFT) lancets Use as instructed    levothyroxine 75 mcg tablet Take 75 mcg by mouth daily    lidocaine (LIDODERM) 5 % Place 1 patch on the skin daily Remove & Discard patch within 12 hours or as directed by MD    Liraglutide (VICTOZA) 18 MG/3ML SOPN Inject 0 3 mL under the skin daily    lisinopril (ZESTRIL) 20 mg tablet Take 20 mg by mouth daily    memantine (NAMENDA) 10 mg tablet Take 1 tablet (10 mg total) by mouth 2 (two) times a day    ONETOUCH DELICA LANCETS FINE MISC by Does not apply route 2 (two) times a day as needed (as needed for high blood sugar)    simvastatin (ZOCOR) 20 mg tablet Take 20 mg by mouth daily at bedtime    sodium chloride (OCEAN) 0 65 % nasal spray 1 spray into each nostril as needed for congestion     No current facility-administered medications on file prior to visit  She is allergic to penicillins       Review of Systems   Constitutional: Negative for fatigue and fever  HENT: Negative  Respiratory: Negative  Cardiovascular: Negative  Skin: Positive for rash  Neurological: Positive for dizziness and tremors  Hematological: Negative for adenopathy  All other systems reviewed and are negative  Objective:      BP (!) 174/72 (BP Location: Left arm, Patient Position: Sitting)   Pulse 78   Temp (!) 97 4 °F (36 3 °C)   Resp 18   Ht 5' 2 5" (1 588 m)   Wt 63 5 kg (140 lb)   SpO2 98%   BMI 25 20 kg/m²          Physical Exam   Constitutional: She is oriented to person, place, and time  She appears well-developed and well-nourished  No distress  HENT:   Head: Normocephalic and atraumatic     Right Ear: External ear normal    Left Ear: External ear normal    Nose: Nose normal    Mouth/Throat: Oropharynx is clear and moist  No oropharyngeal exudate  Eyes: Conjunctivae and EOM are normal  Pupils are equal, round, and reactive to light  Neck: Normal range of motion  Neck supple  Cardiovascular: Normal rate, regular rhythm and normal heart sounds  Exam reveals no gallop and no friction rub  No murmur heard  Pulmonary/Chest: Effort normal and breath sounds normal  No respiratory distress  She has no wheezes  She has no rales  Abdominal: Soft  Musculoskeletal: Normal range of motion  Lymphadenopathy:     She has no cervical adenopathy  Neurological: She is alert and oriented to person, place, and time  Skin: Skin is warm and dry  Rash noted  She is not diaphoretic  Scratch marks on right posterior scapula area  Erythema without any vesicles, macules, or papules on anterior chest  Scalp is normal     Psychiatric: She has a normal mood and affect  Her behavior is normal  Judgment and thought content normal    Nursing note and vitals reviewed

## 2018-06-13 NOTE — PATIENT INSTRUCTIONS
Diabetes-Give Lantus 18 units this evening  Increase by 2 units each night until fasting glucose is less than 200  Stop Victoza--we can add this back, but using both will increase risk of hypoglycemia  Monitor sugars for one week and call office next week Wednesday with readings  Pruritis- unknown cause  Sometimes itching can occur if glucose is running higher than normal  Start Diphenhydramine for itching  Advised to call office with any change in rash  Nick next week with glucose readings

## 2018-07-12 ENCOUNTER — OFFICE VISIT (OUTPATIENT)
Dept: FAMILY MEDICINE CLINIC | Facility: CLINIC | Age: 78
End: 2018-07-12
Payer: COMMERCIAL

## 2018-07-12 VITALS
OXYGEN SATURATION: 96 % | RESPIRATION RATE: 18 BRPM | WEIGHT: 137.38 LBS | HEART RATE: 64 BPM | HEIGHT: 63 IN | BODY MASS INDEX: 24.34 KG/M2 | SYSTOLIC BLOOD PRESSURE: 132 MMHG | DIASTOLIC BLOOD PRESSURE: 68 MMHG

## 2018-07-12 DIAGNOSIS — Z79.4 TYPE 2 DIABETES MELLITUS TREATED WITH INSULIN (HCC): Primary | ICD-10-CM

## 2018-07-12 DIAGNOSIS — E11.9 TYPE 2 DIABETES MELLITUS TREATED WITH INSULIN (HCC): Primary | ICD-10-CM

## 2018-07-12 DIAGNOSIS — Z79.4 TYPE 2 DIABETES MELLITUS WITH COMPLICATION, WITH LONG-TERM CURRENT USE OF INSULIN (HCC): ICD-10-CM

## 2018-07-12 DIAGNOSIS — E11.8 TYPE 2 DIABETES MELLITUS WITH COMPLICATION, WITH LONG-TERM CURRENT USE OF INSULIN (HCC): ICD-10-CM

## 2018-07-12 PROBLEM — E13.9 DIABETES 1.5, MANAGED AS TYPE 1 (HCC): Status: RESOLVED | Noted: 2018-06-07 | Resolved: 2018-07-12

## 2018-07-12 PROCEDURE — 99214 OFFICE O/P EST MOD 30 MIN: CPT | Performed by: FAMILY MEDICINE

## 2018-07-12 NOTE — PROGRESS NOTES
Assessment/Plan:    No problem-specific Assessment & Plan notes found for this encounter  Diagnoses and all orders for this visit:    Type 2 diabetes mellitus treated with insulin ([facility])  Elevated fasting AM glucose  After discussing risks and benefit of medication along with side effects including hypoglycemia will increase lantus to 25 units at bedtime and continue Invokana 300 mg po once daily  -     insulin glargine (LANTUS SOLOSTAR) 100 units/mL injection pen; Inject 25 Units under the skin daily at bedtime for 30 days Take 25 units at bedtime    Type 2 diabetes mellitus with complication, with long-term current use of insulin ([address] )  -     Diabetic foot exam; Future      Follow up in 1 month    Subjective:      Patient ID: Jovanna Paige is a 69 y o  female  Diabetes Mellitus  Patient presents for follow up of diabetes  Current symptoms include: none  Symptoms have been well-controlled  Patient denies foot ulcerations, increased appetite, nausea, paresthesia of the feet, polydipsia, polyuria, visual disturbances and vomiting  Evaluation to date has included: fasting blood sugar and hemoglobin A1C  Home sugars: BGs have been labile ranging between 260mg/dl and 130 mg/dl  Current treatment: Continued insulin, lantus 20 units at bedtime along with Invokana 300 mg po once daily which has been somewhat effective  The following portions of the patient's history were reviewed and updated as appropriate:   She  has a past medical history of Aggression; Alzheimer's dementia; Dementia; Diabetes mellitus ([address] ); High cholesterol; Hypertension; Memory loss; Migraine; Rheumatoid arthritis ([address] ); Serum lipids high; and Thyroid trouble    She   Patient Active Problem List    Diagnosis Date Noted    Pruritic dermatitis 06/13/2018    Type 2 diabetes mellitus treated with insulin ([address] ) 06/07/2018    Alzheimer's dementia 05/03/2018    Memory loss 03/19/2018    Deformity of hand 03/19/2018    Bilateral hearing loss 02/22/2018    Rheumatoid arthritis 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 ([address] ) 02/22/2018    Dementia with behavioral disturbance 02/15/2018    Chronic left shoulder pain 02/15/2018    Need for lipid screening 02/15/2018     She  has a past surgical history that includes Hysterectomy; Gallbladder surgery; and Cataract extraction  Her family history includes Arthritis in her daughter; Blindness in her brother; Diabetes in her brother; HIV in her son; Mental illness in her father and mother; Substance Abuse in her father and mother  She  reports that she quit smoking about 33 years ago  She has never used smokeless tobacco  She reports that she does not drink alcohol or use drugs    Current Outpatient Prescriptions   Medication Sig Dispense Refill    Blood Glucose Monitoring Suppl ([employer]) w/Device KIT Use as directed  0    Canagliflozin (INVOKANA) 300 MG TABS Take 300 mg by mouth daily      [facility] PAIN RELIEF 4 % CREA APPLY EVERY 6 HOURS AS NEEDED FOR SHOULDER/NECK PAIN   5    diphenhydrAMINE (BENADRYL) 25 mg tablet Take 1 tablet (25 mg total) by mouth every 8 (eight) hours as needed for itching for up to 7 days 21 tablet 0    donepezil (ARICEPT) 10 mg tablet TAKE 1 TABLET (10 MG TOTAL) BY MOUTH DAILY AT BEDTIME 30 tablet 0    fenofibrate (TRIGLIDE) 160 MG tablet Take 160 mg by mouth daily      glucose blood test strip Use as instructed 60 each 2    hydrocortisone 2 5 % lotion Apply topically 2 (two) times a day for 14 days 59 mL 1    ibuprofen (MOTRIN) 600 mg tablet Take 1 tablet (600 mg total) by mouth every 8 (eight) hours as needed for mild pain 30 tablet 1    insulin glargine (LANTUS SOLOSTAR) 100 units/mL injection pen Inject 25 Units under the skin daily at bedtime for 30 days Take 25 units at bedtime 5 pen 2    Insulin Pen Needle (BD PEN NEEDLE TOPHER U/F) 32G X 4 MM MISC by Does not apply route      Lancets (FREESTYLE) lancets Test daily with topher machine and lancets 100 each 0    Lancets (ONETOUCH ULTRASOFT) lancets Use as instructed 60 each 2    levothyroxine 75 mcg tablet Take 75 mcg by mouth daily      lidocaine (LIDODERM) 5 % Place 1 patch on the skin daily Remove & Discard patch within 12 hours or as directed by MD 30 patch 0    lisinopril (ZESTRIL) 20 mg tablet Take 20 mg by mouth daily      memantine (NAMENDA) 10 mg tablet Take 1 tablet (10 mg total) by mouth 2 (two) times a day 60 tablet 3    ONETOUCH DELICA LANCETS FINE MISC by Does not apply route 2 (two) times a day as needed (as needed for high blood sugar) 100 each 3    simvastatin (ZOCOR) 20 mg tablet Take 20 mg by mouth daily at bedtime      sodium chloride (OCEAN) 0 65 % nasal spray 1 spray into each nostril as needed for congestion       No current facility-administered medications for this visit  Current Outpatient Prescriptions on File Prior to Visit   Medication Sig    Blood Glucose Monitoring Suppl (ONETOUCH VERIO) w/Device KIT Use as directed    Canagliflozin (INVOKANA) 300 MG TABS Take 300 mg by mouth daily    [facility] PAIN RELIEF 4 % CREA APPLY EVERY 6 HOURS AS NEEDED FOR SHOULDER/NECK PAIN      diphenhydrAMINE (BENADRYL) 25 mg tablet Take 1 tablet (25 mg total) by mouth every 8 (eight) hours as needed for itching for up to 7 days    donepezil (ARICEPT) 10 mg tablet TAKE 1 TABLET (10 MG TOTAL) BY MOUTH DAILY AT BEDTIME    fenofibrate (TRIGLIDE) 160 MG tablet Take 160 mg by mouth daily    glucose blood test strip Use as instructed    hydrocortisone 2 5 % lotion Apply topically 2 (two) times a day for 14 days    ibuprofen (MOTRIN) 600 mg tablet Take 1 tablet (600 mg total) by mouth every 8 (eight) hours as needed for mild pain    Insulin Pen Needle (BD PEN NEEDLE TOPHER U/F) 32G X 4 MM MISC by Does not apply route    Lancets (FREESTYLE) lancets Test daily with topher machine and lancets    Lancets (ONETOUCH ULTRASOFT) lancets Use as instructed    levothyroxine 75 mcg tablet Take 75 mcg by mouth daily    lidocaine (LIDODERM) 5 % Place 1 patch on the skin daily Remove & Discard patch within 12 hours or as directed by MD    lisinopril (ZESTRIL) 20 mg tablet Take 20 mg by mouth daily    memantine (NAMENDA) 10 mg tablet Take 1 tablet (10 mg total) by mouth 2 (two) times a day    ONETOUCH DELICA LANCETS FINE MISC by Does not apply route 2 (two) times a day as needed (as needed for high blood sugar)    simvastatin (ZOCOR) 20 mg tablet Take 20 mg by mouth daily at bedtime    sodium chloride (OCEAN) 0 65 % nasal spray 1 spray into each nostril as needed for congestion    [DISCONTINUED] insulin glargine (LANTUS SOLOSTAR) 100 units/mL injection pen Inject 17 Units under the skin daily    [DISCONTINUED] Liraglutide (VICTOZA) 18 MG/3ML SOPN Inject 0 3 mL under the skin daily     No current facility-administered medications on file prior to visit  She is allergic to penicillins       Review of Systems   Constitutional: Negative for activity change, appetite change, fatigue and fever  HENT: Negative for congestion and ear discharge  Respiratory: Negative for cough and shortness of breath  Cardiovascular: Negative for chest pain and palpitations  Gastrointestinal: Negative for diarrhea and nausea  Musculoskeletal: Negative for arthralgias and back pain  Skin: Negative for color change and rash  Neurological: Negative for dizziness and headaches  Psychiatric/Behavioral: Negative for agitation and behavioral problems  Objective:      /68 (BP Location: Right arm, Patient Position: Sitting)   Pulse 64   Resp 18   Ht 5' 2 5" (1 588 m)   Wt 62 3 kg (137 lb 6 oz)   SpO2 96%   BMI 24 73 kg/m²          Physical Exam   Constitutional: She is oriented to person, place, and time  She appears well-developed and well-nourished  No distress  HENT:   Head: Normocephalic and atraumatic     Nose: Nose normal    Mouth/Throat: Oropharynx is clear and moist    Eyes: Conjunctivae are normal  Pupils are equal, round, and reactive to light  Cardiovascular: Normal rate, regular rhythm and normal heart sounds  No murmur heard  Pulmonary/Chest: Effort normal and breath sounds normal  No respiratory distress  She has no wheezes  Abdominal: Soft  Bowel sounds are normal  She exhibits no distension  There is no tenderness  Neurological: She is alert and oriented to person, place, and time  Skin: Skin is warm and dry  No rash noted  She is not diaphoretic  No erythema  Psychiatric: She has a normal mood and affect

## 2018-08-03 ENCOUNTER — TELEPHONE (OUTPATIENT)
Dept: FAMILY MEDICINE CLINIC | Facility: CLINIC | Age: 78
End: 2018-08-03

## 2018-08-03 NOTE — TELEPHONE ENCOUNTER
Ian Rees stated that a few weeks ago she was talking to you about paperwork that patient needed filled out and she wasn't to sure what it was for but now she does - she needs that paperwork filled out to get supplies/home health care etc  She stated that we have the paperwork?

## 2018-08-14 ENCOUNTER — OFFICE VISIT (OUTPATIENT)
Dept: FAMILY MEDICINE CLINIC | Facility: CLINIC | Age: 78
End: 2018-08-14
Payer: COMMERCIAL

## 2018-08-14 VITALS
HEART RATE: 98 BPM | HEIGHT: 62 IN | SYSTOLIC BLOOD PRESSURE: 118 MMHG | WEIGHT: 136 LBS | RESPIRATION RATE: 19 BRPM | OXYGEN SATURATION: 99 % | TEMPERATURE: 98.6 F | BODY MASS INDEX: 25.03 KG/M2 | DIASTOLIC BLOOD PRESSURE: 72 MMHG

## 2018-08-14 DIAGNOSIS — F41.9 ANXIETY: ICD-10-CM

## 2018-08-14 DIAGNOSIS — G30.8 ALZHEIMER'S DISEASE OF OTHER ONSET WITHOUT BEHAVIORAL DISTURBANCE: ICD-10-CM

## 2018-08-14 DIAGNOSIS — F02.80 ALZHEIMER'S DISEASE OF OTHER ONSET WITHOUT BEHAVIORAL DISTURBANCE: ICD-10-CM

## 2018-08-14 DIAGNOSIS — B37.9 YEAST INFECTION: ICD-10-CM

## 2018-08-14 DIAGNOSIS — Z79.4 TYPE 2 DIABETES MELLITUS TREATED WITH INSULIN (HCC): Primary | ICD-10-CM

## 2018-08-14 DIAGNOSIS — E11.9 TYPE 2 DIABETES MELLITUS TREATED WITH INSULIN (HCC): Primary | ICD-10-CM

## 2018-08-14 PROCEDURE — 99214 OFFICE O/P EST MOD 30 MIN: CPT | Performed by: FAMILY MEDICINE

## 2018-08-14 RX ORDER — FLUCONAZOLE 150 MG/1
150 TABLET ORAL DAILY
Qty: 5 TABLET | Refills: 0 | Status: SHIPPED | OUTPATIENT
Start: 2018-08-14 | End: 2018-08-19

## 2018-08-14 RX ORDER — QUETIAPINE FUMARATE 50 MG/1
50 TABLET, FILM COATED ORAL DAILY
Qty: 30 TABLET | Refills: 1 | Status: SHIPPED | OUTPATIENT
Start: 2018-08-14 | End: 2018-09-10 | Stop reason: SDUPTHER

## 2018-08-14 NOTE — PROGRESS NOTES
Assessment/Plan:    No problem-specific Assessment & Plan notes found for this encounter  Diagnoses and all orders for this visit:    Type 2 diabetes mellitus treated with insulin ([address] )  Stable control at this time continue same medication and dose  Anxiety  After discussing risks and benefits of medication along with side effects will start Seroquel at this time  -     QUEtiapine (SEROquel) 50 mg tablet; Take 1 tablet (50 mg total) by mouth daily for 30 days    Yeast infection  I discussed risks and benefits of medication along with side effects with patient  -     fluconazole (DIFLUCAN) 150 mg tablet; Take 1 tablet (150 mg total) by mouth daily for 5 doses    Alzheimer's disease of other onset without behavioral disturbance  Currently in  program is to continue the care plan  Follow-up in 1 month          Subjective:      Patient ID: Brandon Paz is a 69 y o  female  Patient is here to follow-up for several medical problems  She has a history of diabetes mellitus under control with insulin  Her blood glucose out of the past week have been controlled under 110 mg/dL  She is currently taking Lantus 25 U at bedtime  She denies any side effects or hypoglycemic attacks  Also she has a history of Alzheimer's dementia  She has been having anxiety outbursts recently  they become more prevalent after she comes home daytime program    He is also complaining of vaginal itching associated with burning  No discharge however  The following portions of the patient's history were reviewed and updated as appropriate:   She  has a past medical history of Aggression; Alzheimer's dementia; Dementia; Diabetes mellitus ([address] ); High cholesterol; Hypertension; Memory loss; Migraine; Polyneuropathy; Rheumatoid arthritis ([address] ); Serum lipids high; and Thyroid trouble    She   Patient Active Problem List    Diagnosis Date Noted    Anxiety 08/14/2018    Yeast infection 08/14/2018    Pruritic dermatitis 06/13/2018    Type 2 diabetes mellitus treated with insulin ([facility] ) 06/07/2018    Alzheimer's dementia 05/03/2018    Memory loss 03/19/2018    Deformity of hand 03/19/2018    Bilateral hearing loss 02/22/2018    Rheumatoid arthritis 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 ([facility] [facility] ) 02/22/2018    Dementia with behavioral disturbance 02/15/2018    Chronic left shoulder pain 02/15/2018    Need for lipid screening 02/15/2018     She  has a past surgical history that includes Hysterectomy; Gallbladder surgery; and Cataract extraction  Her family history includes Arthritis in her daughter; Blindness in her brother; Diabetes in her brother; HIV in her son; Mental illness in her father and mother; Substance Abuse in her father and mother  She  reports that she quit smoking about 33 years ago  She has never used smokeless tobacco  She reports that she does not drink alcohol or use drugs    Current Outpatient Prescriptions   Medication Sig Dispense Refill    Blood Glucose Monitoring Suppl (Dave Alvarado) w/Device KIT Use as directed  0    Canagliflozin (INVOKANA) 300 MG TABS Take 300 mg by mouth daily      [facility] PAIN RELIEF 4 % CREA APPLY EVERY 6 HOURS AS NEEDED FOR SHOULDER/NECK PAIN   5    donepezil (ARICEPT) 10 mg tablet TAKE 1 TABLET (10 MG TOTAL) BY MOUTH DAILY AT BEDTIME 30 tablet 0    fenofibrate (TRIGLIDE) 160 MG tablet Take 160 mg by mouth daily      glucose blood test strip Use as instructed 60 each 2    ibuprofen (MOTRIN) 600 mg tablet Take 1 tablet (600 mg total) by mouth every 8 (eight) hours as needed for mild pain 30 tablet 1    Insulin Pen Needle (BD PEN NEEDLE TOPHER U/F) 32G X 4 MM MISC by Does not apply route      Lancets (FREESTYLE) lancets Test daily with topher machine and lancets 100 each 0    Lancets (ONETOUCH ULTRASOFT) lancets Use as instructed 60 each 2    levothyroxine 75 mcg tablet Take 75 mcg by mouth daily      lidocaine (LIDODERM) 5 % Place 1 patch on the skin daily Remove & Discard patch within 12 hours or as directed by MD 30 patch 0    lisinopril (ZESTRIL) 20 mg tablet Take 20 mg by mouth daily      memantine (NAMENDA) 10 mg tablet Take 1 tablet (10 mg total) by mouth 2 (two) times a day 60 tablet 3    [address] by Does not apply route 2 (two) times a day as needed (as needed for high blood sugar) 100 each 3    simvastatin (ZOCOR) 20 mg tablet Take 20 mg by mouth daily at bedtime      sodium chloride (OCEAN) 0 65 % nasal spray 1 spray into each nostril as needed for congestion      diphenhydrAMINE (BENADRYL) 25 mg tablet Take 1 tablet (25 mg total) by mouth every 8 (eight) hours as needed for itching for up to 7 days 21 tablet 0    fluconazole (DIFLUCAN) 150 mg tablet Take 1 tablet (150 mg total) by mouth daily for 5 doses 5 tablet 0    hydrocortisone 2 5 % lotion Apply topically 2 (two) times a day for 14 days 59 mL 1    insulin glargine (LANTUS SOLOSTAR) 100 units/mL injection pen Inject 25 Units under the skin daily at bedtime for 30 days Take 25 units at bedtime 5 pen 2    QUEtiapine (SEROquel) 50 mg tablet Take 1 tablet (50 mg total) by mouth daily for 30 days 30 tablet 1     No current facility-administered medications for this visit  Current Outpatient Prescriptions on File Prior to Visit   Medication Sig    Blood Glucose Monitoring Suppl (ONETOUCH VERIO) w/Device KIT Use as directed    Canagliflozin (INVOKANA) 300 MG TABS Take 300 mg by mouth daily    [facility] PAIN RELIEF 4 % CREA APPLY EVERY 6 HOURS AS NEEDED FOR SHOULDER/NECK PAIN      donepezil (ARICEPT) 10 mg tablet TAKE 1 TABLET (10 MG TOTAL) BY MOUTH DAILY AT BEDTIME    fenofibrate (TRIGLIDE) 160 MG tablet Take 160 mg by mouth daily    glucose blood test strip Use as instructed    ibuprofen (MOTRIN) 600 mg tablet Take 1 tablet (600 mg total) by mouth every 8 (eight) hours as needed for mild pain    Insulin Pen Needle (BD PEN NEEDLE TOPHER U/F) 32G X 4 MM MISC by Does not apply route    Lancets (FREESTYLE) lancets Test daily with [employer] machine and lancets    Lancets (ONETOUCH ULTRASOFT) lancets Use as instructed    levothyroxine 75 mcg tablet Take 75 mcg by mouth daily    lidocaine (LIDODERM) 5 % Place 1 patch on the skin daily Remove & Discard patch within 12 hours or as directed by MD    lisinopril (ZESTRIL) 20 mg tablet Take 20 mg by mouth daily    memantine (NAMENDA) 10 mg tablet Take 1 tablet (10 mg total) by mouth 2 (two) times a day    ONETOUCH DELICA LANCETS FINE MISC by Does not apply route 2 (two) times a day as needed (as needed for high blood sugar)    simvastatin (ZOCOR) 20 mg tablet Take 20 mg by mouth daily at bedtime    sodium chloride (OCEAN) 0 65 % nasal spray 1 spray into each nostril as needed for congestion    diphenhydrAMINE (BENADRYL) 25 mg tablet Take 1 tablet (25 mg total) by mouth every 8 (eight) hours as needed for itching for up to 7 days    hydrocortisone 2 5 % lotion Apply topically 2 (two) times a day for 14 days    insulin glargine (LANTUS SOLOSTAR) 100 units/mL injection pen Inject 25 Units under the skin daily at bedtime for 30 days Take 25 units at bedtime     No current facility-administered medications on file prior to visit  She is allergic to penicillins       Review of Systems   Constitutional: Negative for activity change, appetite change, fatigue and fever  HENT: Negative for congestion and ear discharge  Respiratory: Negative for cough and shortness of breath  Cardiovascular: Negative for chest pain and palpitations  Gastrointestinal: Negative for diarrhea and nausea  Genitourinary: Positive for genital sores and vaginal pain  Musculoskeletal: Negative for arthralgias and back pain  Skin: Negative for color change and rash  Neurological: Negative for dizziness and headaches  Psychiatric/Behavioral: Positive for agitation, behavioral problems and confusion           Objective:      /72   Pulse 98 Temp 98 6 °F (37 °C)   Resp 19   Ht 5' 2" (1 575 m)   Wt 61 7 kg (136 lb)   SpO2 99%   BMI 24 87 kg/m²          Physical Exam   Constitutional: She is oriented to person, place, and time  She appears well-developed and well-nourished  No distress  HENT:   Head: Normocephalic and atraumatic  Nose: Nose normal    Mouth/Throat: Oropharynx is clear and moist    Eyes: Conjunctivae are normal  Pupils are equal, round, and reactive to light  Cardiovascular: Normal rate, regular rhythm and normal heart sounds  No murmur heard  Pulmonary/Chest: Effort normal and breath sounds normal  No respiratory distress  She has no wheezes  Abdominal: Soft  Bowel sounds are normal  She exhibits no distension  There is no tenderness  Neurological: She is alert and oriented to person, place, and time  Skin: Skin is warm and dry  No rash noted  She is not diaphoretic  No erythema  Psychiatric: She has a normal mood and affect

## 2018-09-10 ENCOUNTER — OFFICE VISIT (OUTPATIENT)
Dept: FAMILY MEDICINE CLINIC | Facility: CLINIC | Age: 78
End: 2018-09-10
Payer: COMMERCIAL

## 2018-09-10 VITALS
HEIGHT: 62 IN | WEIGHT: 139.4 LBS | DIASTOLIC BLOOD PRESSURE: 70 MMHG | BODY MASS INDEX: 25.65 KG/M2 | OXYGEN SATURATION: 98 % | SYSTOLIC BLOOD PRESSURE: 124 MMHG | HEART RATE: 64 BPM | TEMPERATURE: 97.4 F

## 2018-09-10 DIAGNOSIS — F41.9 ANXIETY: ICD-10-CM

## 2018-09-10 DIAGNOSIS — H61.93 EARLOBE LESION, BILATERAL: ICD-10-CM

## 2018-09-10 DIAGNOSIS — Z23 NEED FOR INFLUENZA VACCINATION: ICD-10-CM

## 2018-09-10 DIAGNOSIS — M25.552 PAIN OF BOTH HIP JOINTS: ICD-10-CM

## 2018-09-10 DIAGNOSIS — M25.551 PAIN OF BOTH HIP JOINTS: ICD-10-CM

## 2018-09-10 DIAGNOSIS — Z79.4 TYPE 2 DIABETES MELLITUS TREATED WITH INSULIN (HCC): Primary | ICD-10-CM

## 2018-09-10 DIAGNOSIS — E11.9 TYPE 2 DIABETES MELLITUS TREATED WITH INSULIN (HCC): Primary | ICD-10-CM

## 2018-09-10 DIAGNOSIS — M54.9 BACK PAIN, UNSPECIFIED BACK LOCATION, UNSPECIFIED BACK PAIN LATERALITY, UNSPECIFIED CHRONICITY: ICD-10-CM

## 2018-09-10 LAB — SL AMB POCT HEMOGLOBIN AIC: 9.9

## 2018-09-10 PROCEDURE — 3008F BODY MASS INDEX DOCD: CPT | Performed by: FAMILY MEDICINE

## 2018-09-10 PROCEDURE — 99214 OFFICE O/P EST MOD 30 MIN: CPT | Performed by: FAMILY MEDICINE

## 2018-09-10 PROCEDURE — G0008 ADMIN INFLUENZA VIRUS VAC: HCPCS

## 2018-09-10 PROCEDURE — 36416 COLLJ CAPILLARY BLOOD SPEC: CPT | Performed by: FAMILY MEDICINE

## 2018-09-10 PROCEDURE — 90662 IIV NO PRSV INCREASED AG IM: CPT

## 2018-09-10 PROCEDURE — 83036 HEMOGLOBIN GLYCOSYLATED A1C: CPT | Performed by: FAMILY MEDICINE

## 2018-09-10 RX ORDER — QUETIAPINE FUMARATE 25 MG/1
25 TABLET, FILM COATED ORAL DAILY
Qty: 30 TABLET | Refills: 2 | Status: SHIPPED | OUTPATIENT
Start: 2018-09-10 | End: 2018-12-08 | Stop reason: SDUPTHER

## 2018-09-10 NOTE — PROGRESS NOTES
Assessment/Plan:    No problem-specific Assessment & Plan notes found for this encounter  Diagnoses and all orders for this visit:    Type 2 diabetes mellitus treated with insulin ([address] )  -     POCT hemoglobin A1c, 9 9  Uncontrolled unstable  After discussing risks and benefits of medication along with side effects  She was advised to increase Lantus to 30 units at bedtime  She was also advised to measure blood glucose daily fasting  Anxiety  After discussing risks and benefits along side with medication she was advised to decrease the dose of Seroquel to 25 mg p o  Once daily   -     QUEtiapine (SEROquel) 25 mg tablet; Take 1 tablet (25 mg total) by mouth daily for 30 days    Earlobe lesion, bilateral  -     Ambulatory referral to Plastic Surgery; Future    Pain of both hip joints  -     XR hips bilateral 2 vw w pelvis if performed; Future  -     Ambulatory referral to Physical Therapy; Future    Back pain, unspecified back location, unspecified back pain laterality, unspecified chronicity  -     Ambulatory referral to Physical Therapy; Future    Need for influenza vaccination  -     influenza vaccine, [id number], high-dose, PF 0 5 mL, for patients 65 yr+ (FLUZONE HIGH-DOSE)      follow-up in 1 month    Subjective:      Patient ID: Tutu Nguyen is a 69 y o  female  Patient is here to follow-up for several medical problems  She has a history of type 2 diabetes mellitus  She said that her blood glucose levels have been ranging anywhere from 280 mg/dL to 140 mg/dL in the morning fasting  She is currently taking Lantus 25 units at bedtime  She denies any hypoglycemic symptoms  She is also here for bilateral hip pain that has been going on intermittently on and off for the past several weeks  Deneis any recent injuries howeber had a recent fall due to unstable gait  Also she is here to follow-up for anxiety symptoms which have been much improved with medications   She said that she has sleeping much more this time  She would like to decrease her medication dose at this time  Also she has noticed bilateral ear lesions that has been going on for several months  She said that it looks like a scab that she scratches and comes back and denies any pain related to it  The following portions of the patient's history were reviewed and updated as appropriate:   She  has a past medical history of Aggression; Alzheimer's dementia; Dementia; Diabetes mellitus ([facility] ); High cholesterol; Hypertension; Memory loss; Migraine; Polyneuropathy; Rheumatoid arthritis ([facility] ); Serum lipids high; and Thyroid trouble  She   Patient Active Problem List    Diagnosis Date Noted    Earlobe lesion, bilateral 09/10/2018    Pain of both hip joints 09/10/2018    Need for influenza vaccination 09/10/2018    Back pain 09/10/2018    Anxiety 08/14/2018    Yeast infection 08/14/2018    Pruritic dermatitis 06/13/2018    Type 2 diabetes mellitus treated with insulin ([facility] [address] ) 06/07/2018    Alzheimer's dementia 05/03/2018    Memory loss 03/19/2018    Deformity of hand 03/19/2018    Bilateral hearing loss 02/22/2018    Rheumatoid arthritis 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 [facility] ) 02/22/2018    Dementia with behavioral disturbance 02/15/2018    Chronic left shoulder pain 02/15/2018    Need for lipid screening 02/15/2018     She  has a past surgical history that includes Hysterectomy; Gallbladder surgery; and Cataract extraction  Her family history includes Arthritis in her daughter; Blindness in her brother; Diabetes in her brother; HIV in her son; Mental illness in her father and mother; Substance Abuse in her father and mother  She  reports that she quit smoking about 33 years ago  She has never used smokeless tobacco  She reports that she does not drink alcohol or use drugs    Current Outpatient Prescriptions   Medication Sig Dispense Refill    Blood Glucose Monitoring Suppl ([place]) w/Device KIT Use as directed  0    Canagliflozin (INVOKANA) 300 MG TABS Take 300 mg by mouth daily      [facility]S PAIN RELIEF 4 % CREA APPLY EVERY 6 HOURS AS NEEDED FOR SHOULDER/NECK PAIN   5    donepezil (ARICEPT) 10 mg tablet TAKE 1 TABLET (10 MG TOTAL) BY MOUTH DAILY AT BEDTIME 30 tablet 0    fenofibrate (TRIGLIDE) 160 MG tablet Take 160 mg by mouth daily      glucose blood test strip Use as instructed 60 each 2    insulin glargine (LANTUS SOLOSTAR) 100 units/mL injection pen Inject 25 Units under the skin daily at bedtime for 30 days Take 25 units at bedtime 5 pen 2    Insulin Pen Needle (BD PEN NEEDLE TOPHER U/F) 32G X 4 MM MISC by Does not apply route      Lancets (FREESTYLE) lancets Test daily with topher machine and lancets 100 each 0    Lancets (ONETOUCH ULTRASOFT) lancets Use as instructed 60 each 2    levothyroxine 75 mcg tablet Take 75 mcg by mouth daily      lidocaine (LIDODERM) 5 % Place 1 patch on the skin daily Remove & Discard patch within 12 hours or as directed by MD 30 patch 0    lisinopril (ZESTRIL) 20 mg tablet Take 20 mg by mouth daily      memantine (NAMENDA) 10 mg tablet Take 1 tablet (10 mg total) by mouth 2 (two) times a day 60 tablet 3    ONETOUCH DELICA LANCETS FINE MISC by Does not apply route 2 (two) times a day as needed (as needed for high blood sugar) 100 each 3    QUEtiapine (SEROquel) 25 mg tablet Take 1 tablet (25 mg total) by mouth daily for 30 days 30 tablet 2    simvastatin (ZOCOR) 20 mg tablet Take 20 mg by mouth daily at bedtime      sodium chloride (OCEAN) 0 65 % nasal spray 1 spray into each nostril as needed for congestion      diphenhydrAMINE (BENADRYL) 25 mg tablet Take 1 tablet (25 mg total) by mouth every 8 (eight) hours as needed for itching for up to 7 days 21 tablet 0    hydrocortisone 2 5 % lotion Apply topically 2 (two) times a day for 14 days 59 mL 1    ibuprofen (MOTRIN) 600 mg tablet Take 1 tablet (600 mg total) by mouth every 8 (eight) hours as needed for mild pain 30 tablet 1     No current facility-administered medications for this visit  Current Outpatient Prescriptions on File Prior to Visit   Medication Sig    Blood Glucose Monitoring Suppl (ONETOUCH VERIO) w/Device KIT Use as directed    Canagliflozin (INVOKANA) 300 MG TABS Take 300 mg by mouth daily    [facility]S PAIN RELIEF 4 % CREA APPLY EVERY 6 HOURS AS NEEDED FOR SHOULDER/NECK PAIN      donepezil (ARICEPT) 10 mg tablet TAKE 1 TABLET (10 MG TOTAL) BY MOUTH DAILY AT BEDTIME    fenofibrate (TRIGLIDE) 160 MG tablet Take 160 mg by mouth daily    glucose blood test strip Use as instructed    insulin glargine (LANTUS SOLOSTAR) 100 units/mL injection pen Inject 25 Units under the skin daily at bedtime for 30 days Take 25 units at bedtime    Insulin Pen Needle (BD PEN NEEDLE TOPHER U/F) 32G X 4 MM MISC by Does not apply route    Lancets (FREESTYLE) lancets Test daily with topher machine and lancets    Lancets (ONETOUCH ULTRASOFT) lancets Use as instructed    levothyroxine 75 mcg tablet Take 75 mcg by mouth daily    lidocaine (LIDODERM) 5 % Place 1 patch on the skin daily Remove & Discard patch within 12 hours or as directed by MD    lisinopril (ZESTRIL) 20 mg tablet Take 20 mg by mouth daily    memantine (NAMENDA) 10 mg tablet Take 1 tablet (10 mg total) by mouth 2 (two) times a day    ONETOUCH DELICA LANCETS FINE MISC by Does not apply route 2 (two) times a day as needed (as needed for high blood sugar)    simvastatin (ZOCOR) 20 mg tablet Take 20 mg by mouth daily at bedtime    sodium chloride (OCEAN) 0 65 % nasal spray 1 spray into each nostril as needed for congestion    [DISCONTINUED] QUEtiapine (SEROquel) 50 mg tablet Take 1 tablet (50 mg total) by mouth daily for 30 days    diphenhydrAMINE (BENADRYL) 25 mg tablet Take 1 tablet (25 mg total) by mouth every 8 (eight) hours as needed for itching for up to 7 days    hydrocortisone 2 5 % lotion Apply topically 2 (two) times a day for 14 days    ibuprofen (MOTRIN) 600 mg tablet Take 1 tablet (600 mg total) by mouth every 8 (eight) hours as needed for mild pain     No current facility-administered medications on file prior to visit  She is allergic to penicillins       Review of Systems   Constitutional: Negative for activity change, appetite change, fatigue and fever  HENT: Negative for congestion and ear discharge  Respiratory: Negative for cough and shortness of breath  Cardiovascular: Negative for chest pain and palpitations  Gastrointestinal: Negative for diarrhea and nausea  Musculoskeletal: Positive for arthralgias, back pain and myalgias  Skin: Positive for color change and wound  Negative for rash  Neurological: Negative for dizziness and headaches  Psychiatric/Behavioral: Negative for agitation and behavioral problems  Objective:      /70   Pulse 64   Temp (!) 97 4 °F (36 3 °C)   Ht 5' 2" (1 575 m)   Wt 63 2 kg (139 lb 6 4 oz)   SpO2 98%   BMI 25 50 kg/m²          Physical Exam   Constitutional: She is oriented to person, place, and time  She appears well-developed and well-nourished  No distress  HENT:   Head: Normocephalic and atraumatic  Nose: Nose normal    Mouth/Throat: Oropharynx is clear and moist    Eyes: Conjunctivae are normal  Pupils are equal, round, and reactive to light  Cardiovascular: Normal rate, regular rhythm and normal heart sounds  No murmur heard  Pulmonary/Chest: Effort normal and breath sounds normal  No respiratory distress  She has no wheezes  Abdominal: Soft  Bowel sounds are normal  She exhibits no distension  There is no tenderness  Neurological: She is alert and oriented to person, place, and time  Skin: Skin is warm and dry  No rash noted  She is not diaphoretic  No erythema  Bilateral ear lesion noted on top of her ears   Psychiatric: She has a normal mood and affect

## 2018-09-11 ENCOUNTER — APPOINTMENT (OUTPATIENT)
Dept: RADIOLOGY | Facility: CLINIC | Age: 78
End: 2018-09-11
Payer: COMMERCIAL

## 2018-09-11 DIAGNOSIS — Z79.4 TYPE 2 DIABETES MELLITUS TREATED WITH INSULIN (HCC): ICD-10-CM

## 2018-09-11 DIAGNOSIS — E11.9 TYPE 2 DIABETES MELLITUS TREATED WITH INSULIN (HCC): ICD-10-CM

## 2018-09-11 DIAGNOSIS — M25.551 PAIN OF BOTH HIP JOINTS: ICD-10-CM

## 2018-09-11 DIAGNOSIS — M25.552 PAIN OF BOTH HIP JOINTS: ICD-10-CM

## 2018-09-11 PROCEDURE — 73521 X-RAY EXAM HIPS BI 2 VIEWS: CPT

## 2018-09-20 ENCOUNTER — EVALUATION (OUTPATIENT)
Dept: PHYSICAL THERAPY | Facility: CLINIC | Age: 78
End: 2018-09-20
Payer: COMMERCIAL

## 2018-09-20 DIAGNOSIS — M54.9 BACK PAIN, UNSPECIFIED BACK LOCATION, UNSPECIFIED BACK PAIN LATERALITY, UNSPECIFIED CHRONICITY: ICD-10-CM

## 2018-09-20 PROCEDURE — 97110 THERAPEUTIC EXERCISES: CPT | Performed by: PHYSICAL THERAPIST

## 2018-09-20 PROCEDURE — G8990 OTHER PT/OT CURRENT STATUS: HCPCS | Performed by: PHYSICAL THERAPIST

## 2018-09-20 PROCEDURE — 97140 MANUAL THERAPY 1/> REGIONS: CPT | Performed by: PHYSICAL THERAPIST

## 2018-09-20 PROCEDURE — G8991 OTHER PT/OT GOAL STATUS: HCPCS | Performed by: PHYSICAL THERAPIST

## 2018-09-20 PROCEDURE — 97162 PT EVAL MOD COMPLEX 30 MIN: CPT | Performed by: PHYSICAL THERAPIST

## 2018-09-20 NOTE — PROGRESS NOTES
PT Evaluation     Today's date: 2018  Patient name: Jose Frias  : 1940  MRN: [id number]  Referring provider: Rosaura Cedeno MD  Dx:   Encounter Diagnosis     ICD-10-CM    1  Back pain, unspecified back location, unspecified back pain laterality, unspecified chronicity M54 9 Ambulatory referral to Physical Therapy       Start Time: 1400  Stop Time: 1500  Total time in clinic (min): 60 minutes    Assessment  Impairments: abnormal or restricted ROM, activity intolerance, impaired physical strength, lacks appropriate home exercise program, pain with function, poor posture  and poor body mechanics    Assessment details: Jose Frias is a 69 y o  female who presents with pain, decreased strength, decreased ROM and postural  dysfunction  Due to these impairments, Patient has difficulty performing a/iadls  Patient's clinical presentation is consistent with their referring diagnosis of back pain  Patient does not speak [place] and granddaughter present and interpreted session today  Patient would benefit from skilled physical therapy to address their aforementioned impairments, improve their level of function and to improve their overall quality of life  Barriers to therapy: Non English speaking  Understanding of Dx/Px/POC: fair   Prognosis: good    Goals  ST-3 WEEKS  1  Decrease pain by 2 points on VAS at its worst   2   Increase ROM left shoulder equal to right  3   Increase left shoulder abduction >3+/5   4  No tenderness to palpate left upper back musculature  LT-6 WEEKS  1  Patient to be independent with a/iadls  2  Increase functional activities for leisure and home activities to previous LOF    3  Independent with HEP and/or fitness program     Plan  Patient would benefit from: skilled physical therapy  Planned modality interventions: cryotherapy, electrical stimulation/Russian stimulation, thermotherapy: hydrocollator packs and unattended electrical stimulation  Planned therapy interventions: activity modification, body mechanics training, aquatic therapy, flexibility, functional ROM exercises, home exercise program, IADL retraining, joint mobilization, manual therapy, neuromuscular re-education, patient education, postural training, strengthening, stretching, therapeutic activities and therapeutic exercise  Frequency: 2-3x week  Duration in weeks: 12  Plan of Care beginning date: 2018  Plan of Care expiration date: 2018  Treatment plan discussed with: patient        Subjective Evaluation    History of Present Illness  Mechanism of injury: Patient has been having pain in back for the past 2 years, spine doctor said scoliosis and nothing to be done  Patient started with bilateral hip pain 2 weeks ago  X-rays to hips and diagnosed with RA  Patient c/o pain in left shoulder and neck area  Patient c/o referred pain and numbness down both UE's into hands   Referred to [facility]  Patient's granddaughter whom she lives with translated during session as patient does not speak [place]  Pain  Current pain ratin  Location: left neck   Quality: needle-like and radiating    Social Support  Steps to enter house: yes  Stairs in house: no   Lives in: multiple-level home  Lives with: adult children    Hand dominance: right    Patient Goals  Patient goals for therapy: decreased pain, increased motion and increased strength          Objective     Static Posture     Comments  Increased thoracic kyphosis, mod anterior rounded shoulders, elevated bilateral shoulders  Tenderness     Additional Tenderness Details  Tender left upper trap and SCM, c/s paraspinals  increased girth left paraspinal on left    Tight with good muscle tone in back     Active Range of Motion   Cervical/Thoracic Spine   Cervical    Flexion: WFL  Left lateral flexion: WFL  Right lateral flexion: WFL and with pain  Left rotation: WFL  Right rotation: [facility] and with pain    Lumbar   Flexion: Active lumbar flexion: tips to ankles  Extension: Active lumbar extension: -15%   Left lateral flexion: WFL  Right lateral flexion: WFL    Passive Range of Motion   Left Shoulder   Flexion: 115 degrees with pain  Abduction: 100 (in POS) degrees with pain  External rotation 90°: 40 degrees with pain  Internal rotation 90°: 60 degrees with pain    Right Shoulder   Flexion: 145 degrees   Abduction: 135 degrees   External rotation 90°: WFL  Internal rotation 90°: WFL    Additional Passive Range of Motion Details  Tight pec muscles due to posture and tense  Unable to obtain true shoulder abduction position in supine      Strength/Myotome Testing     Left Shoulder     Planes of Motion   Flexion: 4   Abduction: 3+ (with pain)   External rotation at 0°: 4   Internal rotation at 0°: 4+     Right Shoulder     Planes of Motion   Flexion: 4   Abduction: 4   External rotation at 0°: 4   Internal rotation at 0°: 4+     Left Hip   Planes of Motion   Flexion: 4  Abduction: 4  Adduction: 4+    Right Hip   Planes of Motion   Flexion: 4  Abduction: 4  Adduction: 4+    Left Knee   Flexion: 4  Extension: 4    Right Knee   Flexion: 4  Extension: 4    Additional Strength Details  Core/abdominals: 3+/5      Flowsheet Rows      Most Recent Value   PT/OT G-Codes   Current Score  34   Projected Score  50   Assessment Type  Evaluation   G code set  Other PT/OT Primary   Other PT Primary Current Status ()  CL   Other PT Primary Goal Status ()  CK          Precautions: RA, scoliosis, HTN, DM, macular degeneration  Language Barrier    Daily Treatment Diary     Modalities  9/20            MH left shoulder  HT 10'                                        Manual  9/20            Left shoulder 5'            Neck stretches 10'                                                       Exercise Diary  9/20            Ball BUE's 20x            Cane FF NV                                      pulleys nv            UBE nv            Nustep nv

## 2018-09-25 ENCOUNTER — OFFICE VISIT (OUTPATIENT)
Dept: PHYSICAL THERAPY | Facility: CLINIC | Age: 78
End: 2018-09-25
Payer: COMMERCIAL

## 2018-09-25 DIAGNOSIS — M54.9 BACK PAIN, UNSPECIFIED BACK LOCATION, UNSPECIFIED BACK PAIN LATERALITY, UNSPECIFIED CHRONICITY: Primary | ICD-10-CM

## 2018-09-25 DIAGNOSIS — F03.91 DEMENTIA WITH BEHAVIORAL DISTURBANCE, UNSPECIFIED DEMENTIA TYPE (HCC): ICD-10-CM

## 2018-09-25 PROCEDURE — 97110 THERAPEUTIC EXERCISES: CPT | Performed by: PHYSICAL THERAPIST

## 2018-09-25 PROCEDURE — 97140 MANUAL THERAPY 1/> REGIONS: CPT | Performed by: PHYSICAL THERAPIST

## 2018-09-25 RX ORDER — MEMANTINE HYDROCHLORIDE 10 MG/1
10 TABLET ORAL 2 TIMES DAILY
Qty: 60 TABLET | Refills: 3 | Status: SHIPPED | OUTPATIENT
Start: 2018-09-25 | End: 2019-11-19 | Stop reason: SDUPTHER

## 2018-09-25 NOTE — PROGRESS NOTES
Daily Note     Today's date: 2018  Patient name: Cy Oro  : 1940  MRN: [id number]  Referring provider: Khris Smith MD  Dx:   Encounter Diagnosis     ICD-10-CM    1  Back pain, unspecified back location, unspecified back pain laterality, unspecified chronicity M54 9        Start Time: 1400  Stop Time: 5235  Total time in clinic (min): 45 minutes    Subjective: Patient reports she did ok last visit  Patient reports pain is minimal today but rates her pain 5/10  Objective: See treatment diary below  Precautions: RA, scoliosis, HTN, DM, macular degeneration  Language Barrier    Daily Treatment Diary     Modalities             MH left shoulder  HT 10' HT 10'                                       Manual             Left shoulder 5' 8'           Neck stretches 10' 8'                                                      Exercise Diary             Ball BUE's 20x 20x           Cane FF NV 10x                                     Pulleys FF nv 20x           UBE nv 4'           Nustep nv 5' L5                                                                                                                                                                                              Assessment: Tolerated treatment fair  Tightness end ranges left shoulder flexion and abduction  Pain to palpate muscles of neck and shoulder bilateral  Patient would benefit from continued PT      Plan: Continue per plan of care

## 2018-09-28 ENCOUNTER — OFFICE VISIT (OUTPATIENT)
Dept: PHYSICAL THERAPY | Facility: CLINIC | Age: 78
End: 2018-09-28
Payer: COMMERCIAL

## 2018-09-28 DIAGNOSIS — M54.9 BACK PAIN, UNSPECIFIED BACK LOCATION, UNSPECIFIED BACK PAIN LATERALITY, UNSPECIFIED CHRONICITY: Primary | ICD-10-CM

## 2018-09-28 PROCEDURE — 97110 THERAPEUTIC EXERCISES: CPT | Performed by: PHYSICAL THERAPIST

## 2018-09-28 PROCEDURE — 97140 MANUAL THERAPY 1/> REGIONS: CPT | Performed by: PHYSICAL THERAPIST

## 2018-09-28 NOTE — PROGRESS NOTES
Daily Note     Today's date: 2018  Patient name: Cristian Anthony  : 1940  MRN: [id number]  Referring provider: Herb Hill MD  Dx:   Encounter Diagnosis     ICD-10-CM    1  Back pain, unspecified back location, unspecified back pain laterality, unspecified chronicity M54 9        Start Time: 1300  Stop Time: 1350  Total time in clinic (min): 50 minutes    Subjective: Patient reports pain 4/10  granddaughter states patient does not complain as much anymore  Objective: See treatment diary below  Precautions: RA, scoliosis, HTN, DM, macular degeneration  Language Barrier    Daily Treatment Diary     Modalities             left shoulder  HT 10' HT 10' 10' HT                                      Manual            Left shoulder 5' 8' 8'          Neck stretches 10' 8' 8'                                                     Exercise Diary            Ball BUE's 20x 20x 20x          Cane FF NV 10x 20x                                    Pulleys FF nv 20x 30x          UBE nv 4' 4'          Nustep nv 5' L5 5'                                                                                                                                                                                         Assessment: Tolerated treatment well  Tight end ranges left shoulder motions  Less tenderness to palpate  Patient would benefit from continued PT      Plan: Continue per plan of care

## 2018-10-02 ENCOUNTER — OFFICE VISIT (OUTPATIENT)
Dept: PHYSICAL THERAPY | Facility: CLINIC | Age: 78
End: 2018-10-02
Payer: COMMERCIAL

## 2018-10-02 DIAGNOSIS — M54.9 BACK PAIN, UNSPECIFIED BACK LOCATION, UNSPECIFIED BACK PAIN LATERALITY, UNSPECIFIED CHRONICITY: Primary | ICD-10-CM

## 2018-10-02 PROCEDURE — 97140 MANUAL THERAPY 1/> REGIONS: CPT | Performed by: PHYSICAL THERAPIST

## 2018-10-02 PROCEDURE — 97110 THERAPEUTIC EXERCISES: CPT | Performed by: PHYSICAL THERAPIST

## 2018-10-02 NOTE — PROGRESS NOTES
Daily Note     Today's date: 10/2/2018  Patient name: Ginny De Anda  : 1940  MRN: [id number]  Referring provider: Kaz Craft MD  Dx:   Encounter Diagnosis     ICD-10-CM    1  Back pain, unspecified back location, unspecified back pain laterality, unspecified chronicity M54 9        Start Time: 1330  Stop Time: 1430  Total time in clinic (min): 60 minutes    Subjective: patient via granddaughter stated she was sore and tender after last session in her left shoulder  Today pain level is 3/10  Objective: See treatment diary below  Precautions: RA, scoliosis, HTN, DM, macular degeneration  Language Barrier    Daily Treatment Diary     Modalities   102         MH left shoulder  HT 10' HT 10' 10' HT 10' HT                                     Manual   102         Left shoulder 5' 8' 8' 8'         Neck stretches 10' 8' 8' 8'                                                    Exercise Diary   102         Ball BUE's 20x 20x 20x 20x         Cane FF NV 10x 20x 20x                                   Pulleys FF nv 20x 30x 30x         UBE nv 4' 4' 4'         Nustep nv 5' L5 5' 5'         Hip adduction      20# 20x         Hip abduction    20# 20x                                                                                                                                                            Assessment: Tolerated treatment fair  Patient would benefit from continued PT tightness at end ranges left shoulder motions  Minimal tenderness bilateral traps  Ice after exercises today  Plan: Continue per plan of care

## 2018-10-04 ENCOUNTER — OFFICE VISIT (OUTPATIENT)
Dept: PHYSICAL THERAPY | Facility: CLINIC | Age: 78
End: 2018-10-04
Payer: COMMERCIAL

## 2018-10-04 DIAGNOSIS — M54.9 BACK PAIN, UNSPECIFIED BACK LOCATION, UNSPECIFIED BACK PAIN LATERALITY, UNSPECIFIED CHRONICITY: Primary | ICD-10-CM

## 2018-10-04 PROCEDURE — 97110 THERAPEUTIC EXERCISES: CPT | Performed by: PHYSICAL THERAPIST

## 2018-10-04 PROCEDURE — 97140 MANUAL THERAPY 1/> REGIONS: CPT | Performed by: PHYSICAL THERAPIST

## 2018-10-04 NOTE — PROGRESS NOTES
Daily Note     Today's date: 10/4/2018  Patient name: Rick Hernandez  : 1940  MRN: [id number]  Referring provider: Lola Zapien MD  Dx:   Encounter Diagnosis     ICD-10-CM    1  Back pain, unspecified back location, unspecified back pain laterality, unspecified chronicity M54 9        Start Time: 1130  Stop Time: 1225  Total time in clinic (min): 55 minutes    Subjective: Patient reports she was sore after last session in her chest and arm area  Patient does state the PT is helping and she is able to do more things with less pain  Granddaughter reports patient is tired all the time  She also states patient is going to go to [address] [place] [address] for the winter  Objective: See treatment diary below  Precautions: RA, scoliosis, HTN, DM, macular degeneration  Language Barrier    Daily Treatment Diary     Modalities  9/20 9/25 9/28 10/2 10/4        MH left shoulder  HT 10' HT 10' 10' HT 10' HT 10' HT                                    Manual  9/20 9/25 9/28 10/2 10        Left shoulder 5' 8' 8' 8' 8'        Neck stretches 10' 8' 8' 8' 8'                                                   Exercise Diary  9/20 9/25 9/28 10/2 10        Ball BUE's 20x 20x 20x 20x 20x        Cane FF NV 10x 20x 20x 20x                                  Pulleys FF nv 20x 30x 30x 30x        UBE nv 4' 4' 4' 4'        Nustep nv 5' L5 5' 5' 5'        Hip adduction      20# 20x 20# 30x        Hip abduction    20# 20x 20# 30x                                                                                                                                                           Assessment: Tolerated treatment fair  Patient would benefit from continued PT  Patient tender to palpate bilateral traps, tight end range all left shoulder motions  Plan: Continue per plan of care

## 2018-10-05 ENCOUNTER — APPOINTMENT (OUTPATIENT)
Dept: PHYSICAL THERAPY | Facility: CLINIC | Age: 78
End: 2018-10-05
Payer: COMMERCIAL

## 2018-10-08 DIAGNOSIS — F41.9 ANXIETY: ICD-10-CM

## 2018-10-08 RX ORDER — QUETIAPINE FUMARATE 50 MG/1
TABLET, FILM COATED ORAL
Qty: 30 TABLET | Refills: 1 | Status: SHIPPED | OUTPATIENT
Start: 2018-10-08 | End: 2019-07-10

## 2018-10-11 ENCOUNTER — OFFICE VISIT (OUTPATIENT)
Dept: PHYSICAL THERAPY | Facility: CLINIC | Age: 78
End: 2018-10-11
Payer: COMMERCIAL

## 2018-10-11 DIAGNOSIS — M54.9 BACK PAIN, UNSPECIFIED BACK LOCATION, UNSPECIFIED BACK PAIN LATERALITY, UNSPECIFIED CHRONICITY: Primary | ICD-10-CM

## 2018-10-11 PROCEDURE — 97140 MANUAL THERAPY 1/> REGIONS: CPT | Performed by: PHYSICAL THERAPIST

## 2018-10-11 PROCEDURE — 97110 THERAPEUTIC EXERCISES: CPT | Performed by: PHYSICAL THERAPIST

## 2018-10-11 NOTE — PROGRESS NOTES
Daily Note     Today's date: 10/11/2018  Patient name: Carley Gray  : 1940  MRN: [id number]  Referring provider: Kitty Burk MD  Dx:   Encounter Diagnosis     ICD-10-CM    1  Back pain, unspecified back location, unspecified back pain laterality, unspecified chronicity M54 9        Start Time: 1345  Stop Time: 1440  Total time in clinic (min): 55 minutes    Subjective: Patient reports her left arm has been hurting the past 3 days, 4/10  Patient also c/o knee pain lately too  Objective: See treatment diary below  Precautions: RA, scoliosis, HTN, DM, macular degeneration  Language Barrier    Daily Treatment Diary     Modalities  9/20 9/25 9/28 10/2 10/4 10/11       MH left shoulder  HT 10' HT 10' 10' HT 10' HT 10' HT 10' HT                                   Manual  9/20 9/25 9/28 10/2 10/4 10/11       Left shoulder 5' 8' 8' 8' 8' 8'       Neck stretches 10' 8' 8' 8' 8' 8'                                                  Exercise Diary  9/20 9/25 9/28 10/2 10/4 10/11       Ball BUE's 20x 20x 20x 20x 20x 20x       Cane FF NV 10x 20x 20x 20x 20x                                 Pulleys FF nv 20x 30x 30x 30x 30x       UBE nv 4' 4' 4' 4' 4'       Nustep nv 5' L5 5' 5' 5' 5'       Hip adduction      20# 20x 20# 30x 20# 30x       Hip abduction    20# 20x 20# 30x 20# 30x                                                                                                                                                                Assessment: Tolerated treatment fair  Patient would benefit from continued PT Patient has pain and tenderness left trap and shoulder with ROM  Tight end feel left shoulder flexion  Plan: Continue per plan of care

## 2018-10-12 DIAGNOSIS — E11.9 TYPE 2 DIABETES MELLITUS TREATED WITH INSULIN (HCC): Primary | ICD-10-CM

## 2018-10-12 DIAGNOSIS — Z79.4 TYPE 2 DIABETES MELLITUS TREATED WITH INSULIN (HCC): Primary | ICD-10-CM

## 2018-10-16 ENCOUNTER — OFFICE VISIT (OUTPATIENT)
Dept: PHYSICAL THERAPY | Facility: CLINIC | Age: 78
End: 2018-10-16
Payer: COMMERCIAL

## 2018-10-16 DIAGNOSIS — M54.9 BACK PAIN, UNSPECIFIED BACK LOCATION, UNSPECIFIED BACK PAIN LATERALITY, UNSPECIFIED CHRONICITY: Primary | ICD-10-CM

## 2018-10-16 PROCEDURE — 97110 THERAPEUTIC EXERCISES: CPT | Performed by: PHYSICAL THERAPIST

## 2018-10-16 PROCEDURE — G8992 OTHER PT/OT  D/C STATUS: HCPCS | Performed by: PHYSICAL THERAPIST

## 2018-10-16 PROCEDURE — 97140 MANUAL THERAPY 1/> REGIONS: CPT | Performed by: PHYSICAL THERAPIST

## 2018-10-16 PROCEDURE — G8991 OTHER PT/OT GOAL STATUS: HCPCS | Performed by: PHYSICAL THERAPIST

## 2018-10-16 NOTE — PROGRESS NOTES
Daily Note/ discharge    Today's date: 10/16/2018  Patient name: Tomas Roberson  : 1940  MRN: [id number]  Referring provider: Lydia Escamilla MD  Dx:   Encounter Diagnosis     ICD-10-CM    1  Back pain, unspecified back location, unspecified back pain laterality, unspecified chronicity M54 9        Start Time: 9959  Stop Time: 1710  Total time in clinic (min): 55 minutes    Subjective: Patient reports she is feeling better, PT is helping  Patient going back to [place] Thursday morning  Objective: See treatment diary below    Precautions: RA, scoliosis, HTN, DM, macular degeneration  Language Barrier    Daily Treatment Diary     Modalities  9/20 9/25 9/28 10/2 10/4 10/11 10/16       left shoulder  HT 10' HT 10' 10' HT 10' HT 10' HT 10' HT 10' HT                                  Manual  9/20 9/25 9/28 10/2 10/4 10/11 10/16      Left shoulder 5' 8' 8' 8' 8' 8' 8'      Neck stretches 10' 8' 8' 8' 8' 8' 8'                                                 Exercise Diary  9/20 9/25 9/28 10/2 10/4 10/11 10/16      Ball BUE's 20x 20x 20x 20x 20x 20x 20x      Cane FF NV 10x 20x 20x 20x 20x 20x                                Pulleys FF nv 20x 30x 30x 30x 30x 30x      UBE nv 4' 4' 4' 4' 4' 4'      Nustep nv 5' L5 5' 5' 5' 5' 5'      Hip adduction      20# 20x 20# 30x 20# 30x 20# 30x      Hip abduction    20# 20x 20# 30x 20# 30x 20# 30x                                                                                                                                                                 Assessment: Tolerated treatment well  Patient leaving for   Reviewed HEP with patient and caregiver  Patient ROM and strength in left shoulder improved, , ER 75, IR 80 degrees  Patient strength 4-/5 left shoulder  Plan: Discharge PT to HEP

## 2018-10-29 DIAGNOSIS — E11.9 TYPE 2 DIABETES MELLITUS TREATED WITH INSULIN (HCC): ICD-10-CM

## 2018-10-29 DIAGNOSIS — Z79.4 TYPE 2 DIABETES MELLITUS TREATED WITH INSULIN (HCC): ICD-10-CM

## 2018-10-29 DIAGNOSIS — E11.9 DIABETES MELLITUS WITHOUT COMPLICATION (HCC): Primary | ICD-10-CM

## 2018-12-08 DIAGNOSIS — F41.9 ANXIETY: ICD-10-CM

## 2018-12-10 RX ORDER — QUETIAPINE FUMARATE 25 MG/1
25 TABLET, FILM COATED ORAL DAILY
Qty: 30 TABLET | Refills: 2 | Status: SHIPPED | OUTPATIENT
Start: 2018-12-10 | End: 2019-03-12 | Stop reason: SDUPTHER

## 2018-12-14 DIAGNOSIS — Z79.4 TYPE 2 DIABETES MELLITUS TREATED WITH INSULIN (HCC): ICD-10-CM

## 2018-12-14 DIAGNOSIS — E11.9 TYPE 2 DIABETES MELLITUS TREATED WITH INSULIN (HCC): ICD-10-CM

## 2018-12-14 RX ORDER — INSULIN GLARGINE 100 [IU]/ML
INJECTION, SOLUTION SUBCUTANEOUS
Qty: 4 PEN | Refills: 1 | Status: SHIPPED | OUTPATIENT
Start: 2018-12-14 | End: 2019-01-28 | Stop reason: SDUPTHER

## 2019-01-04 DIAGNOSIS — E11.9 TYPE 2 DIABETES MELLITUS TREATED WITH INSULIN (HCC): ICD-10-CM

## 2019-01-04 DIAGNOSIS — Z79.4 TYPE 2 DIABETES MELLITUS TREATED WITH INSULIN (HCC): ICD-10-CM

## 2019-01-28 DIAGNOSIS — Z79.4 TYPE 2 DIABETES MELLITUS TREATED WITH INSULIN (HCC): ICD-10-CM

## 2019-01-28 DIAGNOSIS — E11.9 TYPE 2 DIABETES MELLITUS TREATED WITH INSULIN (HCC): ICD-10-CM

## 2019-01-28 RX ORDER — LISINOPRIL 20 MG/1
20 TABLET ORAL DAILY
Qty: 90 TABLET | Refills: 3 | Status: SHIPPED | OUTPATIENT
Start: 2019-01-28 | End: 2019-07-10 | Stop reason: SDUPTHER

## 2019-03-07 DIAGNOSIS — E03.9 HYPOTHYROIDISM, UNSPECIFIED TYPE: Primary | ICD-10-CM

## 2019-03-07 RX ORDER — LEVOTHYROXINE SODIUM 0.07 MG/1
75 TABLET ORAL DAILY
Qty: 90 TABLET | Refills: 1 | Status: SHIPPED | OUTPATIENT
Start: 2019-03-07 | End: 2019-05-15 | Stop reason: ALTCHOICE

## 2019-03-12 DIAGNOSIS — F41.9 ANXIETY: ICD-10-CM

## 2019-03-12 RX ORDER — QUETIAPINE FUMARATE 25 MG/1
25 TABLET, FILM COATED ORAL DAILY
Qty: 30 TABLET | Refills: 2 | Status: SHIPPED | OUTPATIENT
Start: 2019-03-12 | End: 2019-06-26

## 2019-04-20 DIAGNOSIS — E13.9 DIABETES 1.5, MANAGED AS TYPE 1 (HCC): ICD-10-CM

## 2019-05-09 DIAGNOSIS — Z79.4 TYPE 2 DIABETES MELLITUS TREATED WITH INSULIN (HCC): ICD-10-CM

## 2019-05-09 DIAGNOSIS — E11.9 TYPE 2 DIABETES MELLITUS TREATED WITH INSULIN (HCC): ICD-10-CM

## 2019-05-13 ENCOUNTER — OFFICE VISIT (OUTPATIENT)
Dept: FAMILY MEDICINE CLINIC | Facility: CLINIC | Age: 79
End: 2019-05-13
Payer: COMMERCIAL

## 2019-05-13 ENCOUNTER — APPOINTMENT (OUTPATIENT)
Dept: RADIOLOGY | Facility: CLINIC | Age: 79
End: 2019-05-13
Payer: COMMERCIAL

## 2019-05-13 VITALS
BODY MASS INDEX: 26.43 KG/M2 | HEART RATE: 69 BPM | OXYGEN SATURATION: 98 % | WEIGHT: 143.6 LBS | DIASTOLIC BLOOD PRESSURE: 62 MMHG | HEIGHT: 62 IN | SYSTOLIC BLOOD PRESSURE: 134 MMHG | TEMPERATURE: 97.2 F

## 2019-05-13 DIAGNOSIS — E11.9 TYPE 2 DIABETES MELLITUS TREATED WITH INSULIN (HCC): Primary | ICD-10-CM

## 2019-05-13 DIAGNOSIS — M25.511 ACUTE PAIN OF RIGHT SHOULDER: ICD-10-CM

## 2019-05-13 DIAGNOSIS — I10 ESSENTIAL HYPERTENSION: ICD-10-CM

## 2019-05-13 DIAGNOSIS — E03.9 HYPOTHYROIDISM, UNSPECIFIED TYPE: ICD-10-CM

## 2019-05-13 DIAGNOSIS — I47.1 SVT (SUPRAVENTRICULAR TACHYCARDIA) (HCC): ICD-10-CM

## 2019-05-13 DIAGNOSIS — Z13.220 NEED FOR LIPID SCREENING: ICD-10-CM
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
## 2019-05-13 DIAGNOSIS — Z79.4 TYPE 2 DIABETES MELLITUS TREATED WITH INSULIN (HCC): Primary | ICD-10-CM

## 2019-05-13 PROBLEM — I47.10 SVT (SUPRAVENTRICULAR TACHYCARDIA): Status: ACTIVE | Noted: 2019-05-13

## 2019-05-13 LAB — SL AMB POCT HEMOGLOBIN AIC: 8.8 (ref ?–6.5)

## 2019-05-13 PROCEDURE — 73110 X-RAY EXAM OF WRIST: CPT

## 2019-05-13 PROCEDURE — 73030 X-RAY EXAM OF SHOULDER: CPT

## 2019-05-13 PROCEDURE — 3078F DIAST BP <80 MM HG: CPT | Performed by: FAMILY MEDICINE

## 2019-05-13 PROCEDURE — 3075F SYST BP GE 130 - 139MM HG: CPT | Performed by: FAMILY MEDICINE

## 2019-05-13 PROCEDURE — 83036 HEMOGLOBIN GLYCOSYLATED A1C: CPT | Performed by: FAMILY MEDICINE

## 2019-05-13 PROCEDURE — 1101F PT FALLS ASSESS-DOCD LE1/YR: CPT | Performed by: FAMILY MEDICINE

## 2019-05-13 PROCEDURE — 3725F SCREEN DEPRESSION PERFORMED: CPT | Performed by: FAMILY MEDICINE

## 2019-05-13 PROCEDURE — 99214 OFFICE O/P EST MOD 30 MIN: CPT | Performed by: FAMILY MEDICINE

## 2019-05-13 RX ORDER — BLOOD PRESSURE TEST KIT
KIT MISCELLANEOUS DAILY
Qty: 1 EACH | Refills: 1 | Status: SHIPPED | OUTPATIENT
Start: 2019-05-13

## 2019-05-13 RX ORDER — AMLODIPINE BESYLATE 5 MG/1
5 TABLET ORAL DAILY
Qty: 30 TABLET | Refills: 1 | Status: SHIPPED | OUTPATIENT
Start: 2019-05-13 | End: 2019-06-26 | Stop reason: SDUPTHER

## 2019-05-13 RX ORDER — NAPROXEN 500 MG/1
500 TABLET ORAL 2 TIMES DAILY WITH MEALS
Qty: 60 TABLET | Refills: 1 | Status: SHIPPED | OUTPATIENT
Start: 2019-05-13 | End: 2019-06-26

## 2019-05-14 ENCOUNTER — APPOINTMENT (OUTPATIENT)
Dept: LAB | Facility: CLINIC | Age: 79
End: 2019-05-14
Payer: COMMERCIAL

## 2019-05-14 DIAGNOSIS — Z13.220 NEED FOR LIPID SCREENING: ICD-10-CM

## 2019-05-14 DIAGNOSIS — E03.9 HYPOTHYROIDISM, UNSPECIFIED TYPE: ICD-10-CM

## 2019-05-14 DIAGNOSIS — E11.9 TYPE 2 DIABETES MELLITUS TREATED WITH INSULIN (HCC): ICD-10-CM

## 2019-05-14 DIAGNOSIS — Z79.4 TYPE 2 DIABETES MELLITUS TREATED WITH INSULIN (HCC): ICD-10-CM

## 2019-05-14 LAB
ALBUMIN SERPL BCP-MCNC: 3.9 G/DL (ref 3.5–5)
ALP SERPL-CCNC: 70 U/L (ref 46–116)
ALT SERPL W P-5'-P-CCNC: 26 U/L (ref 12–78)
ANION GAP SERPL CALCULATED.3IONS-SCNC: 4 MMOL/L (ref 4–13)
AST SERPL W P-5'-P-CCNC: 14 U/L (ref 5–45)
BILIRUB SERPL-MCNC: 0.75 MG/DL (ref 0.2–1)
BUN SERPL-MCNC: 38 MG/DL (ref 5–25)
CALCIUM SERPL-MCNC: 9.5 MG/DL (ref 8.3–10.1)
CHLORIDE SERPL-SCNC: 110 MMOL/L (ref 100–108)
CHOLEST SERPL-MCNC: 103 MG/DL (ref 50–200)
CO2 SERPL-SCNC: 29 MMOL/L (ref 21–32)
CREAT SERPL-MCNC: 1.26 MG/DL (ref 0.6–1.3)
CREAT UR-MCNC: 54.3 MG/DL
GFR SERPL CREATININE-BSD FRML MDRD: 41 ML/MIN/1.73SQ M
GLUCOSE SERPL-MCNC: 90 MG/DL (ref 65–140)
HDLC SERPL-MCNC: 32 MG/DL (ref 40–60)
LDLC SERPL CALC-MCNC: 43 MG/DL (ref 0–100)
MICROALBUMIN UR-MCNC: 31.9 MG/L (ref 0–20)
MICROALBUMIN/CREAT 24H UR: 59 MG/G CREATININE (ref 0–30)
POTASSIUM SERPL-SCNC: 4.5 MMOL/L (ref 3.5–5.3)
PROT SERPL-MCNC: 7.1 G/DL (ref 6.4–8.2)
SODIUM SERPL-SCNC: 143 MMOL/L (ref 136–145)
T4 FREE SERPL-MCNC: 1.15 NG/DL (ref 0.76–1.46)
TRIGL SERPL-MCNC: 141 MG/DL
TSH SERPL DL<=0.05 MIU/L-ACNC: 9.93 UIU/ML (ref 0.36–3.74)

## 2019-05-14 PROCEDURE — 80053 COMPREHEN METABOLIC PANEL: CPT

## 2019-05-14 PROCEDURE — 80061 LIPID PANEL: CPT

## 2019-05-14 PROCEDURE — 82570 ASSAY OF URINE CREATININE: CPT | Performed by: FAMILY MEDICINE

## 2019-05-14 PROCEDURE — 84439 ASSAY OF FREE THYROXINE: CPT

## 2019-05-14 PROCEDURE — 82043 UR ALBUMIN QUANTITATIVE: CPT | Performed by: FAMILY MEDICINE

## 2019-05-14 PROCEDURE — 84443 ASSAY THYROID STIM HORMONE: CPT

## 2019-05-14 PROCEDURE — 36415 COLL VENOUS BLD VENIPUNCTURE: CPT

## 2019-05-15 DIAGNOSIS — E03.9 HYPOTHYROIDISM, UNSPECIFIED TYPE: Primary | ICD-10-CM

## 2019-05-15 PROCEDURE — 3060F POS MICROALBUMINURIA REV: CPT | Performed by: FAMILY MEDICINE

## 2019-05-15 PROCEDURE — 3045F PR MOST RECENT HEMOGLOBIN A1C LEVEL 7.0-9.0%: CPT | Performed by: FAMILY MEDICINE

## 2019-05-15 PROCEDURE — 2022F DILAT RTA XM EVC RTNOPTHY: CPT | Performed by: FAMILY MEDICINE

## 2019-05-15 RX ORDER — LEVOTHYROXINE SODIUM 88 UG/1
88 TABLET ORAL DAILY
Qty: 30 TABLET | Refills: 1 | Status: SHIPPED | OUTPATIENT
Start: 2019-05-15 | End: 2019-07-14 | Stop reason: SDUPTHER

## 2019-06-12 ENCOUNTER — CONSULT (OUTPATIENT)
Dept: CARDIOLOGY CLINIC | Facility: CLINIC | Age: 79
End: 2019-06-12
Payer: COMMERCIAL

## 2019-06-12 ENCOUNTER — OFFICE VISIT (OUTPATIENT)
Dept: URGENT CARE | Facility: CLINIC | Age: 79
End: 2019-06-12
Payer: COMMERCIAL

## 2019-06-12 ENCOUNTER — APPOINTMENT (EMERGENCY)
Dept: RADIOLOGY | Facility: HOSPITAL | Age: 79
End: 2019-06-12
Payer: COMMERCIAL

## 2019-06-12 ENCOUNTER — HOSPITAL ENCOUNTER (EMERGENCY)
Facility: HOSPITAL | Age: 79
Discharge: HOME | End: 2019-06-12
Attending: EMERGENCY MEDICINE | Admitting: EMERGENCY MEDICINE
Payer: COMMERCIAL

## 2019-06-12 VITALS
OXYGEN SATURATION: 96 % | DIASTOLIC BLOOD PRESSURE: 66 MMHG | RESPIRATION RATE: 18 BRPM | WEIGHT: 138.8 LBS | HEIGHT: 62 IN | SYSTOLIC BLOOD PRESSURE: 160 MMHG | BODY MASS INDEX: 25.54 KG/M2 | HEART RATE: 63 BPM

## 2019-06-12 VITALS
TEMPERATURE: 97.9 F | SYSTOLIC BLOOD PRESSURE: 151 MMHG | DIASTOLIC BLOOD PRESSURE: 68 MMHG | RESPIRATION RATE: 18 BRPM | OXYGEN SATURATION: 95 % | HEART RATE: 75 BPM

## 2019-06-12 VITALS
OXYGEN SATURATION: 98 % | TEMPERATURE: 97.2 F | WEIGHT: 138 LBS | DIASTOLIC BLOOD PRESSURE: 60 MMHG | HEART RATE: 67 BPM | RESPIRATION RATE: 16 BRPM | BODY MASS INDEX: 27.09 KG/M2 | HEIGHT: 60 IN | SYSTOLIC BLOOD PRESSURE: 130 MMHG

## 2019-06-12 DIAGNOSIS — I10 ESSENTIAL HYPERTENSION: ICD-10-CM

## 2019-06-12 DIAGNOSIS — M25.552 PAIN OF BOTH HIP JOINTS: Primary | ICD-10-CM

## 2019-06-12 DIAGNOSIS — Z76.89 ENCOUNTER TO ESTABLISH CARE: Primary | ICD-10-CM

## 2019-06-12 DIAGNOSIS — R60.0 LOCALIZED EDEMA: ICD-10-CM

## 2019-06-12 DIAGNOSIS — M79.605 LEFT LEG PAIN: ICD-10-CM

## 2019-06-12 DIAGNOSIS — M25.559 HIP PAIN: ICD-10-CM

## 2019-06-12 DIAGNOSIS — I47.1 SVT (SUPRAVENTRICULAR TACHYCARDIA) (HCC): ICD-10-CM

## 2019-06-12 DIAGNOSIS — R07.89 OTHER CHEST PAIN: ICD-10-CM
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
## 2019-06-12 DIAGNOSIS — M25.551 PAIN OF BOTH HIP JOINTS: Primary | ICD-10-CM

## 2019-06-12 DIAGNOSIS — M25.511 ACUTE PAIN OF RIGHT SHOULDER: ICD-10-CM

## 2019-06-12 DIAGNOSIS — S80.00XA KNEE CONTUSION: ICD-10-CM

## 2019-06-12 DIAGNOSIS — M25.511 SHOULDER PAIN, RIGHT: ICD-10-CM

## 2019-06-12 PROCEDURE — 99283 EMERGENCY DEPT VISIT LOW MDM: CPT | Performed by: EMERGENCY MEDICINE

## 2019-06-12 PROCEDURE — 99213 OFFICE O/P EST LOW 20 MIN: CPT | Performed by: PHYSICIAN ASSISTANT

## 2019-06-12 PROCEDURE — S9088 SERVICES PROVIDED IN URGENT: HCPCS | Performed by: PHYSICIAN ASSISTANT

## 2019-06-12 PROCEDURE — 73130 X-RAY EXAM OF HAND: CPT

## 2019-06-12 PROCEDURE — 73564 X-RAY EXAM KNEE 4 OR MORE: CPT

## 2019-06-12 PROCEDURE — 72100 X-RAY EXAM L-S SPINE 2/3 VWS: CPT

## 2019-06-12 PROCEDURE — 99283 EMERGENCY DEPT VISIT LOW MDM: CPT

## 2019-06-12 PROCEDURE — 73030 X-RAY EXAM OF SHOULDER: CPT

## 2019-06-12 PROCEDURE — 73502 X-RAY EXAM HIP UNI 2-3 VIEWS: CPT

## 2019-06-12 PROCEDURE — 93000 ELECTROCARDIOGRAM COMPLETE: CPT | Performed by: INTERNAL MEDICINE

## 2019-06-12 PROCEDURE — 99204 OFFICE O/P NEW MOD 45 MIN: CPT | Performed by: INTERNAL MEDICINE

## 2019-06-12 RX ORDER — ACETAMINOPHEN 325 MG/1
650 TABLET ORAL ONCE
Status: COMPLETED | OUTPATIENT
Start: 2019-06-12 | End: 2019-06-12

## 2019-06-12 RX ORDER — MEDICAL SUPPLY, MISCELLANEOUS
EACH MISCELLANEOUS DAILY
Qty: 1 EACH | Refills: 0 | Status: SHIPPED | OUTPATIENT
Start: 2019-06-12 | End: 2019-07-10

## 2019-06-12 RX ADMIN — ACETAMINOPHEN 650 MG: 325 TABLET, FILM COATED ORAL at 16:44

## 2019-06-13 ENCOUNTER — OFFICE VISIT (OUTPATIENT)
Dept: FAMILY MEDICINE CLINIC | Facility: CLINIC | Age: 79
End: 2019-06-13
Payer: COMMERCIAL

## 2019-06-13 VITALS
HEART RATE: 62 BPM | WEIGHT: 142.2 LBS | BODY MASS INDEX: 27.92 KG/M2 | HEIGHT: 60 IN | OXYGEN SATURATION: 98 % | SYSTOLIC BLOOD PRESSURE: 122 MMHG | DIASTOLIC BLOOD PRESSURE: 60 MMHG

## 2019-06-13 DIAGNOSIS — E11.9 TYPE 2 DIABETES MELLITUS TREATED WITH INSULIN (HCC): ICD-10-CM

## 2019-06-13 DIAGNOSIS — M25.511 ACUTE PAIN OF RIGHT SHOULDER: ICD-10-CM
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
## 2019-06-13 DIAGNOSIS — Z79.4 TYPE 2 DIABETES MELLITUS TREATED WITH INSULIN (HCC): ICD-10-CM

## 2019-06-13 PROCEDURE — 99214 OFFICE O/P EST MOD 30 MIN: CPT | Performed by: FAMILY MEDICINE

## 2019-06-18 ENCOUNTER — HOSPITAL ENCOUNTER (OUTPATIENT)
Dept: NON INVASIVE DIAGNOSTICS | Facility: CLINIC | Age: 79
Discharge: HOME | End: 2019-06-18
Payer: COMMERCIAL

## 2019-06-18 DIAGNOSIS — R07.89 OTHER CHEST PAIN: ICD-10-CM

## 2019-06-18 LAB
ARRHY DURING EX: NORMAL
CHEST PAIN STATEMENT: NORMAL
MAX DIASTOLIC BP: 80 MMHG
MAX HEART RATE: 97 BPM
MAX PREDICTED HEART RATE: 142 BPM
MAX. SYSTOLIC BP: 134 MMHG
PROTOCOL NAME: NORMAL
REASON FOR TERMINATION: NORMAL
TARGET HR FORMULA: NORMAL
TEST INDICATION: NORMAL
TIME IN EXERCISE PHASE: NORMAL

## 2019-06-18 PROCEDURE — 78452 HT MUSCLE IMAGE SPECT MULT: CPT

## 2019-06-18 PROCEDURE — A9502 TC99M TETROFOSMIN: HCPCS

## 2019-06-18 PROCEDURE — 78452 HT MUSCLE IMAGE SPECT MULT: CPT | Performed by: INTERNAL MEDICINE

## 2019-06-18 PROCEDURE — 93016 CV STRESS TEST SUPVJ ONLY: CPT | Performed by: INTERNAL MEDICINE

## 2019-06-18 PROCEDURE — 93017 CV STRESS TEST TRACING ONLY: CPT

## 2019-06-18 PROCEDURE — 93018 CV STRESS TEST I&R ONLY: CPT | Performed by: INTERNAL MEDICINE

## 2019-06-18 RX ADMIN — REGADENOSON 0.4 MG: 0.08 INJECTION, SOLUTION INTRAVENOUS at 14:15

## 2019-06-19 ENCOUNTER — TELEPHONE (OUTPATIENT)
Dept: CARDIOLOGY CLINIC | Facility: CLINIC | Age: 79
End: 2019-06-19

## 2019-06-26 ENCOUNTER — CONSULT (OUTPATIENT)
Dept: OBGYN CLINIC | Facility: CLINIC | Age: 79
End: 2019-06-26
Payer: COMMERCIAL

## 2019-06-26 VITALS
BODY MASS INDEX: 28.03 KG/M2 | SYSTOLIC BLOOD PRESSURE: 166 MMHG | HEIGHT: 60 IN | WEIGHT: 142.8 LBS | DIASTOLIC BLOOD PRESSURE: 71 MMHG | HEART RATE: 68 BPM

## 2019-06-26 DIAGNOSIS — S40.011A CONTUSION OF RIGHT SHOULDER, INITIAL ENCOUNTER: ICD-10-CM

## 2019-06-26 DIAGNOSIS — M75.101 TEAR OF RIGHT ROTATOR CUFF, UNSPECIFIED TEAR EXTENT, UNSPECIFIED WHETHER TRAUMATIC: ICD-10-CM

## 2019-06-26 DIAGNOSIS — M25.511 ACUTE PAIN OF RIGHT SHOULDER: ICD-10-CM

## 2019-06-26 DIAGNOSIS — I10 ESSENTIAL HYPERTENSION: ICD-10-CM

## 2019-06-26 DIAGNOSIS — M25.511 RIGHT SHOULDER PAIN, UNSPECIFIED CHRONICITY: Primary | ICD-10-CM

## 2019-06-26 PROCEDURE — 20610 DRAIN/INJ JOINT/BURSA W/O US: CPT | Performed by: ORTHOPAEDIC SURGERY

## 2019-06-26 PROCEDURE — 99203 OFFICE O/P NEW LOW 30 MIN: CPT | Performed by: ORTHOPAEDIC SURGERY

## 2019-06-26 RX ORDER — BUPIVACAINE HYDROCHLORIDE 5 MG/ML
2 INJECTION, SOLUTION EPIDURAL; INTRACAUDAL
Status: COMPLETED | OUTPATIENT
Start: 2019-06-26 | End: 2019-06-26

## 2019-06-26 RX ORDER — LIDOCAINE HYDROCHLORIDE 10 MG/ML
2 INJECTION, SOLUTION INFILTRATION; PERINEURAL
Status: COMPLETED | OUTPATIENT
Start: 2019-06-26 | End: 2019-06-26

## 2019-06-26 RX ORDER — METHYLPREDNISOLONE ACETATE 40 MG/ML
1 INJECTION, SUSPENSION INTRA-ARTICULAR; INTRALESIONAL; INTRAMUSCULAR; SOFT TISSUE
Status: COMPLETED | OUTPATIENT
Start: 2019-06-26 | End: 2019-06-26

## 2019-06-26 RX ORDER — AMLODIPINE BESYLATE 5 MG/1
TABLET ORAL
Qty: 30 TABLET | Refills: 1 | Status: SHIPPED | OUTPATIENT
Start: 2019-06-26 | End: 2019-07-02 | Stop reason: SDUPTHER

## 2019-06-26 RX ADMIN — METHYLPREDNISOLONE ACETATE 1 ML: 40 INJECTION, SUSPENSION INTRA-ARTICULAR; INTRALESIONAL; INTRAMUSCULAR; SOFT TISSUE at 14:32

## 2019-06-26 RX ADMIN — BUPIVACAINE HYDROCHLORIDE 2 ML: 5 INJECTION, SOLUTION EPIDURAL; INTRACAUDAL at 14:32

## 2019-06-26 RX ADMIN — LIDOCAINE HYDROCHLORIDE 2 ML: 10 INJECTION, SOLUTION INFILTRATION; PERINEURAL at 14:32

## 2019-06-27 ENCOUNTER — HOSPITAL ENCOUNTER (OUTPATIENT)
Dept: NON INVASIVE DIAGNOSTICS | Facility: CLINIC | Age: 79
Discharge: HOME | End: 2019-06-27
Payer: COMMERCIAL

## 2019-06-27 DIAGNOSIS — I47.1 SVT (SUPRAVENTRICULAR TACHYCARDIA) (HCC): ICD-10-CM

## 2019-06-27 DIAGNOSIS — R07.89 OTHER CHEST PAIN: ICD-10-CM

## 2019-06-27 PROCEDURE — 93306 TTE W/DOPPLER COMPLETE: CPT

## 2019-06-27 PROCEDURE — 93225 XTRNL ECG REC<48 HRS REC: CPT

## 2019-06-27 PROCEDURE — 93226 XTRNL ECG REC<48 HR SCAN A/R: CPT

## 2019-06-28 ENCOUNTER — HOSPITAL ENCOUNTER (OUTPATIENT)
Dept: MRI IMAGING | Facility: CLINIC | Age: 79
Discharge: HOME | End: 2019-06-28
Payer: COMMERCIAL

## 2019-06-28 DIAGNOSIS — M25.511 ACUTE PAIN OF RIGHT SHOULDER: ICD-10-CM

## 2019-06-28 PROCEDURE — 73221 MRI JOINT UPR EXTREM W/O DYE: CPT

## 2019-06-30 PROCEDURE — 93227 XTRNL ECG REC<48 HR R&I: CPT | Performed by: INTERNAL MEDICINE

## 2019-07-02 ENCOUNTER — TELEPHONE (OUTPATIENT)
Dept: CARDIOLOGY CLINIC | Facility: CLINIC | Age: 79
End: 2019-07-02

## 2019-07-02 ENCOUNTER — OFFICE VISIT (OUTPATIENT)
Dept: OBGYN CLINIC | Facility: CLINIC | Age: 79
End: 2019-07-02
Payer: COMMERCIAL

## 2019-07-02 VITALS
DIASTOLIC BLOOD PRESSURE: 68 MMHG | HEIGHT: 60 IN | HEART RATE: 65 BPM | BODY MASS INDEX: 27.25 KG/M2 | SYSTOLIC BLOOD PRESSURE: 166 MMHG | WEIGHT: 138.8 LBS

## 2019-07-02 DIAGNOSIS — I10 ESSENTIAL HYPERTENSION: ICD-10-CM

## 2019-07-02 DIAGNOSIS — S46.219A BICEPS TENDON TEAR: Primary | ICD-10-CM

## 2019-07-02 DIAGNOSIS — M75.101 TEAR OF RIGHT SUPRASPINATUS TENDON: ICD-10-CM

## 2019-07-02 PROCEDURE — 99213 OFFICE O/P EST LOW 20 MIN: CPT | Performed by: ORTHOPAEDIC SURGERY

## 2019-07-02 PROCEDURE — 93306 TTE W/DOPPLER COMPLETE: CPT | Performed by: INTERNAL MEDICINE

## 2019-07-02 RX ORDER — AMLODIPINE BESYLATE 5 MG/1
10 TABLET ORAL DAILY
Qty: 30 TABLET | Refills: 0 | Status: SHIPPED | OUTPATIENT
Start: 2019-07-02 | End: 2019-07-10 | Stop reason: SDUPTHER

## 2019-07-02 RX ORDER — AMLODIPINE BESYLATE 5 MG/1
5 TABLET ORAL DAILY
Qty: 30 TABLET | Refills: 1 | Status: SHIPPED | OUTPATIENT
Start: 2019-07-02 | End: 2019-07-02 | Stop reason: SDUPTHER

## 2019-07-02 NOTE — TELEPHONE ENCOUNTER
jonnie Pt saw orthopedic dr,he will be doing rotator cuff surgery in the near future  per dr Siva Mendez

## 2019-07-02 NOTE — TELEPHONE ENCOUNTER
Please let her know that I increased the amlodipine to 10mg/day and that she can pick it up at her pharmacy  Please arrange for a nurse blood pressure check within 1 week  Please have her let us know if she has any side effects  If she has any further issues, please have her schedule an appointment with me     thanks

## 2019-07-02 NOTE — TELEPHONE ENCOUNTER
Spoke with pts granddaughter Azell Buerger, she stated her understanding  Wants you to know her bp is still running high 169/62, 168/59 are a couple readings and this is the range it's been

## 2019-07-02 NOTE — PROGRESS NOTES
SUBJECTIVE  67 y o  female presents to the office today for right shoulder MRI review  Estrella Mott was seen in the office on 06/26/19 for right shoulder pain, following a fall on 06/12/19  Estrella Mott already had her right shoulder MRI scheduled prior to seeing us in the office  Estrella Mott received a subacromial right shoulder CSI at her visit on 06/26/19  Estrella Mott states that her symptoms have improved aprox  70 percent following the CSI  Translation was provided by patients granddaughter         ROS:   General: No fever, no chills, no weight loss, no weight gain  HEENT:  No loss of hearing, no nose bleeds, no sore throat  Eyes:  No eye pain, no red eyes, no visual disturbance  Respiratory:  No cough, no shortness of breath, no wheezing  Cardiovascular:  No chest pain, no palpitations, no edema  GI: No abdominal pain, no nausea, no vomiting  Endocrine: No frequent urination, no excessive thirst  Urinary:  No dysuria, no hematuria, no incontinence  Musculoskeletal: see HPI and PE  Skin:  No rash, no wounds  Neurological:  No dizziness, no headache, no numbness  Psychiatric:  No difficulty concentrating, no depression, no suicide thoughts, no anxiety  Review of all other systems is negative    PMH:  Past Medical History:   Diagnosis Date    Aggression     Alzheimer's dementia     Arthritis     Dementia     [facility]     Diabetes mellitus [facility]     High cholesterol     Hypertension     Memory loss     Migraine     Polyneuropathy     [facility]     Serum lipids high     Stomach problems     Thyroid trouble        PSH:  Past Surgical History:   Procedure Laterality Date    CATARACT EXTRACTION      GALLBLADDER SURGERY      HYSTERECTOMY         Medications:  Current Outpatient Medications   Medication Sig Dispense Refill    amLODIPine (NORVASC) 5 mg tablet TAKE 1 TABLET BY MOUTH EVERY DAY 30 tablet 1    Blood Glucose Monitoring Suppl (ONETOUCH VERIO) w/Device KIT Use as directed  0    Blood Pressure KIT by Does not apply route daily 1 each 1    Blood Pressure Monitoring (B-D ASSURE BPM/AUTO ARM CUFF) MISC by Does not apply route daily 1 each 0    Canagliflozin (INVOKANA) 300 MG TABS Take 1 tablet (300 mg total) by mouth daily 90 tablet 3    [employer] PAIN RELIEF 4 % CREA APPLY EVERY 6 HOURS AS NEEDED FOR SHOULDER/NECK PAIN   5    donepezil (ARICEPT) 10 mg tablet TAKE 1 TABLET (10 MG TOTAL) BY MOUTH DAILY AT BEDTIME 30 tablet 0    fenofibrate (TRIGLIDE) 160 MG tablet Take 160 mg by mouth daily      glucose blood (ONETOUCH VERIO) test strip TEST TWICE A  each 2    ibuprofen (MOTRIN) 600 mg tablet Take 1 tablet (600 mg total) by mouth every 8 (eight) hours as needed for mild pain 30 tablet 1    Insulin Pen Needle (BD PEN NEEDLE TOPHER U/F) 32G X 4 MM MISC by Does not apply route 2 (two) times a day 100 each 5    lisinopril (ZESTRIL) 20 mg tablet Take 1 tablet (20 mg total) by mouth daily 90 tablet 3    memantine (NAMENDA) 10 mg tablet TAKE 1 TABLET (10 MG TOTAL) BY MOUTH 2 (TWO) TIMES A DAY 60 tablet 3    ONETOUCH DELICA LANCETS FINE MISC USE 2 TIMES A DAY AS NEEDED FOR HIGH BLOOD SUGAR 100 each 0    QUEtiapine (SEROquel) 50 mg tablet TAKE 1 TABLET BY MOUTH EVERY DAY 30 tablet 1    simvastatin (ZOCOR) 20 mg tablet Take 20 mg by mouth daily at bedtime      diphenhydrAMINE (BENADRYL) 25 mg tablet Take 1 tablet (25 mg total) by mouth every 8 (eight) hours as needed for itching for up to 7 days (Patient not taking: Reported on 6/12/2019) 21 tablet 0    insulin glargine (LANTUS SOLOSTAR) 100 units/mL injection pen Inject 30 Units under the skin daily at bedtime for 30 days 5 pen 5    levothyroxine 88 mcg tablet Take 1 tablet (88 mcg total) by mouth daily for 30 days 30 tablet 1     No current facility-administered medications for this visit  Allergies:   Allergies   Allergen Reactions    Penicillins Itching and Swelling       Family History:  Family History   Problem Relation Age of Onset    Substance Abuse Mother         denied    Mental illness Mother         denied    Substance Abuse Father         denied    Mental illness Father         denied    Diabetes Brother     Blindness Brother     Arthritis Daughter     HIV Son        Social History:  Social History     Occupational History    Not on file   Tobacco Use    Smoking status: Former Smoker     Last attempt to quit:      Years since quittin 5    Smokeless tobacco: Never Used   Substance and Sexual Activity    Alcohol use: No    Drug use: No    Sexual activity: Not on file       Physical Exam:  General :  Alert, cooperative, no distress, appears stated age  Blood pressure 166/68, pulse 65, height 5' (1 524 m), weight 63 kg (138 lb 12 8 oz)  Head:  Normocephalic, without obvious abnormality, atraumatic   Eyes:  Conjunctiva/corneas clear, EOM's intact,   Ears: Both ears normal appearance, no hearing deficits  Nose: Nares normal, septum midline, no drainage    Neck: Supple,  trachea midline, no adenopathy, no tenderness, no mass   Back:   Symmetric, no curvature, ROM normal, no tenderness   Lungs:   Respirations unlabored   Chest Wall:  No tenderness or deformity   Extremities: Extremities normal, atraumatic, no cyanosis or edema      Pulses: 2+ and symmetric   Skin: Skin color, texture, turgor normal, no rashes or lesions      Neurologic: Normal           Right Shoulder Exam     Tenderness   The patient is experiencing tenderness in the biceps tendon (mild)  Range of Motion   Active abduction: 130   Forward flexion: 160     Muscle Strength   Abduction: 4/5   Internal rotation: 5/5   External rotation: 5/5     Tests   Su test: positive  Impingement: negative    Other   Erythema: absent  Sensation: normal  Pulse: present    Comments: The following imaging studies were reviewed in office today  My findings are noted    Imaging Studies: MRI right shoulder performed on 19 demonstrates a complete tear of the long head bicep's and complete supraspinatus tear with retraction     Assessment  Encounter Diagnoses   Name Primary?  Biceps tendon tear Yes    Tear of right supraspinatus tendon          Plan:  67 y o  female with a right rotator cuff tear and biceps tendon tear  Treatment options were discussed with Jamaal Haile and her granddaughter today, being a right shoulder rotator cuff repair with biceps tenodesis vs continue subacromial CSI's and PT  Lucille's granddaughter states that she needs time to talk over Lucille's treatment options with her mother  Rotator cuff repair literature was provided today   I will see her back in 1 weeks time      Scribe Attestation    I,:   Geeta Skaggs am acting as a scribe while in the presence of the attending physician :        I,:   Delmi Toledo MD personally performed the services described in this documentation    as scribed in my presence :

## 2019-07-02 NOTE — TELEPHONE ENCOUNTER
----- Message from Lul Dent MD sent at 7/1/2019  6:46 PM EDT -----  Please let the patient know that there was no significant arrhythmias noted on their holter monitor  We can discuss it further during their next appointment

## 2019-07-02 NOTE — TELEPHONE ENCOUNTER
Spoke with pts granddaughter Kenton Spears (pt speaks [place]) to let her know of the holter results, she stated her understanding-MS

## 2019-07-02 NOTE — TELEPHONE ENCOUNTER
----- Message from Nilesh Morales MD sent at 7/2/2019  4:12 PM EDT -----  Please let the patient know that there were no significant abnormalities on their echo  We can discuss further at their next appointment  Thanks

## 2019-07-03 ENCOUNTER — TELEPHONE (OUTPATIENT)
Dept: FAMILY MEDICINE CLINIC | Facility: CLINIC | Age: 79
End: 2019-07-03

## 2019-07-03 NOTE — TELEPHONE ENCOUNTER
Spoke with Hermes Velez about her grandmother and she was concerned because Dr Ora Sandoval stated she needs surgery  I explained to her there is a process and the need to meet with us for surgical clearance  If Dr Ora Sandoval thinks she is a good candidate to have this done then we would have to clear her and she may need cardiac clearance as well  Waleska understood and was appreciative  She is willing to proceed and will contact ortho with her decision

## 2019-07-10 ENCOUNTER — CLINICAL SUPPORT (OUTPATIENT)
Dept: CARDIOLOGY CLINIC | Facility: CLINIC | Age: 79
End: 2019-07-10

## 2019-07-10 ENCOUNTER — OFFICE VISIT (OUTPATIENT)
Dept: OBGYN CLINIC | Facility: CLINIC | Age: 79
End: 2019-07-10
Payer: COMMERCIAL

## 2019-07-10 ENCOUNTER — APPOINTMENT (OUTPATIENT)
Dept: LAB | Facility: HOSPITAL | Age: 79
End: 2019-07-10
Attending: ORTHOPAEDIC SURGERY
Payer: COMMERCIAL

## 2019-07-10 VITALS
OXYGEN SATURATION: 98 % | HEART RATE: 62 BPM | BODY MASS INDEX: 27.19 KG/M2 | SYSTOLIC BLOOD PRESSURE: 150 MMHG | WEIGHT: 139.2 LBS | DIASTOLIC BLOOD PRESSURE: 68 MMHG

## 2019-07-10 VITALS
DIASTOLIC BLOOD PRESSURE: 61 MMHG | HEIGHT: 60 IN | BODY MASS INDEX: 27.29 KG/M2 | HEART RATE: 71 BPM | RESPIRATION RATE: 18 BRPM | SYSTOLIC BLOOD PRESSURE: 155 MMHG | WEIGHT: 139 LBS

## 2019-07-10 DIAGNOSIS — I10 ESSENTIAL HYPERTENSION: ICD-10-CM

## 2019-07-10 DIAGNOSIS — Z01.818 ENCOUNTER FOR PREADMISSION TESTING: ICD-10-CM

## 2019-07-10 DIAGNOSIS — S46.219A BICEPS TENDON TEAR: ICD-10-CM

## 2019-07-10 DIAGNOSIS — Z79.4 TYPE 2 DIABETES MELLITUS TREATED WITH INSULIN (HCC): ICD-10-CM

## 2019-07-10 DIAGNOSIS — Z01.818 ENCOUNTER FOR PREADMISSION TESTING: Primary | ICD-10-CM

## 2019-07-10 DIAGNOSIS — I10 HYPERTENSION, UNSPECIFIED TYPE: Primary | ICD-10-CM

## 2019-07-10 DIAGNOSIS — M75.101 TEAR OF RIGHT ROTATOR CUFF, UNSPECIFIED TEAR EXTENT, UNSPECIFIED WHETHER TRAUMATIC: Primary | ICD-10-CM

## 2019-07-10 DIAGNOSIS — E11.9 TYPE 2 DIABETES MELLITUS TREATED WITH INSULIN (HCC): ICD-10-CM

## 2019-07-10 LAB
ALBUMIN SERPL BCP-MCNC: 3.9 G/DL (ref 3.5–5)
ALP SERPL-CCNC: 48 U/L (ref 46–116)
ALT SERPL W P-5'-P-CCNC: 57 U/L (ref 12–78)
ANION GAP SERPL CALCULATED.3IONS-SCNC: 9 MMOL/L (ref 4–13)
AST SERPL W P-5'-P-CCNC: 47 U/L (ref 5–45)
BASOPHILS # BLD AUTO: 0.03 THOUSANDS/ΜL (ref 0–0.1)
BASOPHILS NFR BLD AUTO: 1 % (ref 0–1)
BILIRUB SERPL-MCNC: 0.5 MG/DL (ref 0.2–1)
BUN SERPL-MCNC: 31 MG/DL (ref 5–25)
CALCIUM SERPL-MCNC: 9.5 MG/DL (ref 8.3–10.1)
CHLORIDE SERPL-SCNC: 105 MMOL/L (ref 100–108)
CO2 SERPL-SCNC: 28 MMOL/L (ref 21–32)
CREAT SERPL-MCNC: 1.31 MG/DL (ref 0.6–1.3)
EOSINOPHIL # BLD AUTO: 0.3 THOUSAND/ΜL (ref 0–0.61)
EOSINOPHIL NFR BLD AUTO: 5 % (ref 0–6)
ERYTHROCYTE [DISTWIDTH] IN BLOOD BY AUTOMATED COUNT: 13.8 % (ref 11.6–15.1)
GFR SERPL CREATININE-BSD FRML MDRD: 39 ML/MIN/1.73SQ M
GLUCOSE SERPL-MCNC: 199 MG/DL (ref 65–140)
HCT VFR BLD AUTO: 37.3 % (ref 34.8–46.1)
HGB BLD-MCNC: 11.9 G/DL (ref 11.5–15.4)
IMM GRANULOCYTES # BLD AUTO: 0.02 THOUSAND/UL (ref 0–0.2)
IMM GRANULOCYTES NFR BLD AUTO: 0 % (ref 0–2)
LYMPHOCYTES # BLD AUTO: 1.25 THOUSANDS/ΜL (ref 0.6–4.47)
LYMPHOCYTES NFR BLD AUTO: 20 % (ref 14–44)
MCH RBC QN AUTO: 27.9 PG (ref 26.8–34.3)
MCHC RBC AUTO-ENTMCNC: 31.9 G/DL (ref 31.4–37.4)
MCV RBC AUTO: 87 FL (ref 82–98)
MONOCYTES # BLD AUTO: 0.32 THOUSAND/ΜL (ref 0.17–1.22)
MONOCYTES NFR BLD AUTO: 5 % (ref 4–12)
NEUTROPHILS # BLD AUTO: 4.32 THOUSANDS/ΜL (ref 1.85–7.62)
NEUTS SEG NFR BLD AUTO: 69 % (ref 43–75)
NRBC BLD AUTO-RTO: 0 /100 WBCS
PLATELET # BLD AUTO: 181 THOUSANDS/UL (ref 149–390)
PMV BLD AUTO: 12.5 FL (ref 8.9–12.7)
POTASSIUM SERPL-SCNC: 4.3 MMOL/L (ref 3.5–5.3)
PROT SERPL-MCNC: 7.1 G/DL (ref 6.4–8.2)
RBC # BLD AUTO: 4.27 MILLION/UL (ref 3.81–5.12)
SODIUM SERPL-SCNC: 142 MMOL/L (ref 136–145)
WBC # BLD AUTO: 6.24 THOUSAND/UL (ref 4.31–10.16)

## 2019-07-10 PROCEDURE — 93005 ELECTROCARDIOGRAM TRACING: CPT

## 2019-07-10 PROCEDURE — 85025 COMPLETE CBC W/AUTO DIFF WBC: CPT

## 2019-07-10 PROCEDURE — RECHECK: Performed by: INTERNAL MEDICINE

## 2019-07-10 PROCEDURE — 36415 COLL VENOUS BLD VENIPUNCTURE: CPT

## 2019-07-10 PROCEDURE — 99214 OFFICE O/P EST MOD 30 MIN: CPT | Performed by: ORTHOPAEDIC SURGERY

## 2019-07-10 PROCEDURE — 80053 COMPREHEN METABOLIC PANEL: CPT

## 2019-07-10 RX ORDER — AMLODIPINE BESYLATE 5 MG/1
5 TABLET ORAL DAILY
Qty: 30 TABLET | Refills: 0 | Status: SHIPPED | OUTPATIENT
Start: 2019-07-10 | End: 2020-01-14 | Stop reason: SDUPTHER

## 2019-07-10 RX ORDER — LISINOPRIL 20 MG/1
20 TABLET ORAL 2 TIMES DAILY
Qty: 90 TABLET | Refills: 6 | Status: SHIPPED | OUTPATIENT
Start: 2019-07-10 | End: 2020-01-20

## 2019-07-10 NOTE — PROGRESS NOTES
Pt in for a BP check    BP- 150/68  Pulse- 62  Weight 139 2  [id number]    Amlodipine was increased from 5mg to 10mg  Granddaughter states that pt is asymptomatic

## 2019-07-10 NOTE — PROGRESS NOTES
Chief Complaint   Patient presents with    Right Shoulder - Follow-up, Pain       SUBJECTIVE  Patient is here one week after her last visit where her MRI was reviewed  At that time she wanted to discuss surgery options more with her family  She returns today and has decided she would like to proceed with rotator cuff repair  She was injuired in fall on 6/12/2019  She had cortisone steroid injection  on 06/26/2019 with some benefit but still symptomatic  MRI scan on 06/28/2019 revealed complete tear of the supraspinatus with retraction and a long head biceps tendon tear      ROS:   General: No fever, no chills, no weight loss, no weight gain  HEENT:  No loss of hearing, no nose bleeds, no sore throat  Eyes:  No eye pain, no red eyes, no visual disturbance  Respiratory:  No cough, no shortness of breath, no wheezing  Cardiovascular:  No chest pain, no palpitations, no edema  GI: No abdominal pain, no nausea, no vomiting  Endocrine: No frequent urination, no excessive thirst  Urinary:  No dysuria, no hematuria, no incontinence  Musculoskeletal: see HPI and PE  Skin:  No rash, no wounds  Neurological:  No dizziness, no headache, no numbness  Psychiatric:  No difficulty concentrating, no depression, no suicide thoughts, no anxiety  Review of all other systems is negative    PMH:  Past Medical History:   Diagnosis Date    Aggression     Alzheimer's dementia     Arthritis     Dementia     [facility] [address]     [facility] mellitus [facility] [address] [facility]     High cholesterol     Hypertension     Memory loss     Migraine     Polyneuropathy     [facility] arthritis [facility] [address] [facility]     Serum lipids high     Stomach problems     Thyroid trouble        PSH:  Past Surgical History:   Procedure Laterality Date    CATARACT EXTRACTION      GALLBLADDER SURGERY      HYSTERECTOMY         Medications:  Current Outpatient Medications   Medication Sig Dispense Refill    amLODIPine (NORVASC) 5 mg tablet Take 1 tablet (5 mg total) by mouth daily 30 tablet 0    Blood Glucose Monitoring Suppl (Judit Bañuelos) w/Device KIT Use as directed  0    Blood Pressure KIT by Does not apply route daily 1 each 1    Canagliflozin (INVOKANA) 300 MG TABS Take 1 tablet (300 mg total) by mouth daily 90 tablet 3    donepezil (ARICEPT) 10 mg tablet TAKE 1 TABLET (10 MG TOTAL) BY MOUTH DAILY AT BEDTIME 30 tablet 0    fenofibrate (TRIGLIDE) 160 MG tablet Take 160 mg by mouth daily      glucose blood (ONETOUCH VERIO) test strip TEST TWICE A  each 2    ibuprofen (MOTRIN) 600 mg tablet Take 1 tablet (600 mg total) by mouth every 8 (eight) hours as needed for mild pain 30 tablet 1    Insulin Pen Needle (BD PEN NEEDLE TOPHER U/F) 32G X 4 MM MISC by Does not apply route 2 (two) times a day 100 each 5    lisinopril (ZESTRIL) 20 mg tablet Take 1 tablet (20 mg total) by mouth 2 (two) times a day 90 tablet 6    memantine (NAMENDA) 10 mg tablet TAKE 1 TABLET (10 MG TOTAL) BY MOUTH 2 (TWO) TIMES A DAY 60 tablet 3    ONETOUCH DELICA LANCETS FINE MISC USE 2 TIMES A DAY AS NEEDED FOR HIGH BLOOD SUGAR 100 each 0    simvastatin (ZOCOR) 20 mg tablet Take 20 mg by mouth daily at bedtime      insulin glargine (LANTUS SOLOSTAR) 100 units/mL injection pen Inject 28 Units under the skin daily at bedtime for 30 days 5 pen 5    levothyroxine 88 mcg tablet TAKE 1 TABLET (88 MCG TOTAL) BY MOUTH DAILY FOR 30 DAYS 30 tablet 1    naproxen (NAPROSYN) 500 mg tablet TAKE 1 TABLET (500 MG TOTAL) BY MOUTH 2 (TWO) TIMES A DAY WITH MEALS FOR 30 DAYS 60 tablet 1     No current facility-administered medications for this visit  Allergies:   Allergies   Allergen Reactions    Penicillins Itching and Swelling       Family History:  Family History   Problem Relation Age of Onset    Substance Abuse Mother         denied    Mental illness Mother         denied    Substance Abuse Father         denied    Mental illness Father         denied    Diabetes Brother     Blindness Brother     Arthritis Daughter     HIV Son        Social History:  Social History     Occupational History    Not on file   Tobacco Use    Smoking status: Former Smoker     Last attempt to quit:      Years since quittin 5    Smokeless tobacco: Never Used   Substance and Sexual Activity    Alcohol use: No    Drug use: No    Sexual activity: Not on file       Physical Exam:  General :  Alert, cooperative, no distress, appears stated age  Blood pressure 155/61, pulse 71, resp  rate 18, height 5' (1 524 m), weight 63 kg (139 lb)  Head:  Normocephalic, without obvious abnormality, atraumatic   Eyes:  Conjunctiva/corneas clear, EOM's intact,   Ears: Both ears normal appearance, no hearing deficits  Nose: Nares normal, septum midline, no drainage    Neck: Supple,  trachea midline, no adenopathy, no tenderness, no mass   Back:   Symmetric, no curvature, ROM normal, no tenderness   Cardiac:  Pulmonary:   Regular rate and rhythm  No respiratory distress  Lung sounds clear to auscultation  Extremities: Extremities normal, atraumatic, no cyanosis or edema      Pulses: 2+ and symmetric   Skin: Skin color, texture, turgor normal, no rashes or lesions      Neurologic: Normal           Right Shoulder Exam     Tenderness   The patient is experiencing tenderness in the biceps tendon (subacromial bursa)  Range of Motion   Active abduction: 120   Forward flexion: 150     Muscle Strength   Abduction: 4/5   Internal rotation: 5/5   External rotation: 5/5     Tests   Su test: positive  Impingement: negative    Other   Erythema: absent  Sensation: normal  Pulse: present            Assessment  Encounter Diagnoses   Name Primary?     Tear of right rotator cuff, unspecified tear extent, unspecified whether traumatic Yes    Biceps tendon tear          Plan:  After discussion of the risks vs  benefits of surgical intervention vs  Non-operative treatment, the patient wishes to proceed with right shoulder arthroscopy with rotator cuff repair and possible open biceps tenodesis  The procedure details were translated by her granddaughter       Scribe Attestation    I,:   Mike Ceja MA am acting as a scribe while in the presence of the attending physician :        I,:   Claudine Houston MD personally performed the services described in this documentation    as scribed in my presence :

## 2019-07-10 NOTE — H&P (VIEW-ONLY)
Chief Complaint   Patient presents with    Right Shoulder - Follow-up, Pain       SUBJECTIVE  Patient is here one week after her last visit where her MRI was reviewed  At that time she wanted to discuss surgery options more with her family  She returns today and has decided she would like to proceed with rotator cuff repair  She was injuired in fall on 6/12/2019  She had cortisone steroid injection  on 06/26/2019 with some benefit but still symptomatic  MRI scan on 06/28/2019 revealed complete tear of the supraspinatus with retraction and a long head biceps tendon tear      ROS:   General: No fever, no chills, no weight loss, no weight gain  HEENT:  No loss of hearing, no nose bleeds, no sore throat  Eyes:  No eye pain, no red eyes, no visual disturbance  Respiratory:  No cough, no shortness of breath, no wheezing  Cardiovascular:  No chest pain, no palpitations, no edema  GI: No abdominal pain, no nausea, no vomiting  Endocrine: No frequent urination, no excessive thirst  Urinary:  No dysuria, no hematuria, no incontinence  Musculoskeletal: see HPI and PE  Skin:  No rash, no wounds  Neurological:  No dizziness, no headache, no numbness  Psychiatric:  No difficulty concentrating, no depression, no suicide thoughts, no anxiety  Review of all other systems is negative    PMH:  Past Medical History:   Diagnosis Date    Aggression     Alzheimer's dementia     Arthritis     Dementia     [facility]us [facility] [address]     [facility] mellitus [facility] [address]     High cholesterol     Hypertension     Memory loss     Migraine     Polyneuropathy     Rhe[facility]oid arthritis ([facility] [address] [facility]     Serum lipids high     Stomach problems     Thyroid trouble        PSH:  Past Surgical History:   Procedure Laterality Date    CATARACT EXTRACTION      GALLBLADDER SURGERY      HYSTERECTOMY         Medications:  Current Outpatient Medications   Medication Sig Dispense Refill    amLODIPine (NORVASC) 5 mg tablet Take 1 tablet (5 mg total) by mouth daily 30 tablet 0    Blood Glucose Monitoring Suppl (Lesly Trevizo) w/Device KIT Use as directed  0    Blood Pressure KIT by Does not apply route daily 1 each 1    Canagliflozin (INVOKANA) 300 MG TABS Take 1 tablet (300 mg total) by mouth daily 90 tablet 3    donepezil (ARICEPT) 10 mg tablet TAKE 1 TABLET (10 MG TOTAL) BY MOUTH DAILY AT BEDTIME 30 tablet 0    fenofibrate (TRIGLIDE) 160 MG tablet Take 160 mg by mouth daily      glucose blood (ONETOUCH VERIO) test strip TEST TWICE A  each 2    ibuprofen (MOTRIN) 600 mg tablet Take 1 tablet (600 mg total) by mouth every 8 (eight) hours as needed for mild pain 30 tablet 1    Insulin Pen Needle (BD PEN NEEDLE TOPHER U/F) 32G X 4 MM MISC by Does not apply route 2 (two) times a day 100 each 5    lisinopril (ZESTRIL) 20 mg tablet Take 1 tablet (20 mg total) by mouth 2 (two) times a day 90 tablet 6    memantine (NAMENDA) 10 mg tablet TAKE 1 TABLET (10 MG TOTAL) BY MOUTH 2 (TWO) TIMES A DAY 60 tablet 3    ONETOUCH DELICA LANCETS FINE MISC USE 2 TIMES A DAY AS NEEDED FOR HIGH BLOOD SUGAR 100 each 0    simvastatin (ZOCOR) 20 mg tablet Take 20 mg by mouth daily at bedtime      insulin glargine (LANTUS SOLOSTAR) 100 units/mL injection pen Inject 28 Units under the skin daily at bedtime for 30 days 5 pen 5    levothyroxine 88 mcg tablet TAKE 1 TABLET (88 MCG TOTAL) BY MOUTH DAILY FOR 30 DAYS 30 tablet 1    naproxen (NAPROSYN) 500 mg tablet TAKE 1 TABLET (500 MG TOTAL) BY MOUTH 2 (TWO) TIMES A DAY WITH MEALS FOR 30 DAYS 60 tablet 1     No current facility-administered medications for this visit  Allergies:   Allergies   Allergen Reactions    Penicillins Itching and Swelling       Family History:  Family History   Problem Relation Age of Onset    Substance Abuse Mother         denied    Mental illness Mother         denied    Substance Abuse Father         denied    Mental illness Father         denied    Diabetes Brother     Blindness Brother     Arthritis Daughter     HIV Son        Social History:  Social History     Occupational History    Not on file   Tobacco Use    Smoking status: Former Smoker     Last attempt to quit:      Years since quittin 5    Smokeless tobacco: Never Used   Substance and Sexual Activity    Alcohol use: No    Drug use: No    Sexual activity: Not on file       Physical Exam:  General :  Alert, cooperative, no distress, appears stated age  Blood pressure 155/61, pulse 71, resp  rate 18, height 5' (1 524 m), weight 63 kg (139 lb)  Head:  Normocephalic, without obvious abnormality, atraumatic   Eyes:  Conjunctiva/corneas clear, EOM's intact,   Ears: Both ears normal appearance, no hearing deficits  Nose: Nares normal, septum midline, no drainage    Neck: Supple,  trachea midline, no adenopathy, no tenderness, no mass   Back:   Symmetric, no curvature, ROM normal, no tenderness   Cardiac:  Pulmonary:   Regular rate and rhythm  No respiratory distress  Lung sounds clear to auscultation  Extremities: Extremities normal, atraumatic, no cyanosis or edema      Pulses: 2+ and symmetric   Skin: Skin color, texture, turgor normal, no rashes or lesions      Neurologic: Normal           Right Shoulder Exam     Tenderness   The patient is experiencing tenderness in the biceps tendon (subacromial bursa)  Range of Motion   Active abduction: 120   Forward flexion: 150     Muscle Strength   Abduction: 4/5   Internal rotation: 5/5   External rotation: 5/5     Tests   Su test: positive  Impingement: negative    Other   Erythema: absent  Sensation: normal  Pulse: present            Assessment  Encounter Diagnoses   Name Primary?     Tear of right rotator cuff, unspecified tear extent, unspecified whether traumatic Yes    Biceps tendon tear          Plan:  After discussion of the risks vs  benefits of surgical intervention vs  Non-operative treatment, the patient wishes to proceed with right shoulder arthroscopy with rotator cuff repair and possible open biceps tenodesis  The procedure details were translated by her granddaughter       Scribe Attestation    I,:   Bigg Wallis MA am acting as a scribe while in the presence of the attending physician :        I,:   Tatum Bryant MD personally performed the services described in this documentation    as scribed in my presence :

## 2019-07-11 DIAGNOSIS — M25.511 ACUTE PAIN OF RIGHT SHOULDER: ICD-10-CM
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
## 2019-07-11 LAB
ATRIAL RATE: 59 BPM
P AXIS: 44 DEGREES
PR INTERVAL: 164 MS
QRS AXIS: -48 DEGREES
QRSD INTERVAL: 100 MS
QT INTERVAL: 398 MS
QTC INTERVAL: 394 MS
T WAVE AXIS: 59 DEGREES
VENTRICULAR RATE: 59 BPM

## 2019-07-11 PROCEDURE — 93010 ELECTROCARDIOGRAM REPORT: CPT | Performed by: INTERNAL MEDICINE

## 2019-07-11 RX ORDER — NAPROXEN 500 MG/1
500 TABLET ORAL 2 TIMES DAILY WITH MEALS
Qty: 60 TABLET | Refills: 1 | Status: SHIPPED | OUTPATIENT
Start: 2019-07-11 | End: 2019-09-13 | Stop reason: SDUPTHER

## 2019-07-14 DIAGNOSIS — E03.9 HYPOTHYROIDISM, UNSPECIFIED TYPE: ICD-10-CM

## 2019-07-15 RX ORDER — LEVOTHYROXINE SODIUM 88 UG/1
88 TABLET ORAL DAILY
Qty: 30 TABLET | Refills: 1 | Status: SHIPPED | OUTPATIENT
Start: 2019-07-15 | End: 2019-08-07 | Stop reason: SDUPTHER

## 2019-07-18 ENCOUNTER — TELEPHONE (OUTPATIENT)
Dept: CARDIOLOGY CLINIC | Facility: CLINIC | Age: 79
End: 2019-07-18

## 2019-07-18 ENCOUNTER — OFFICE VISIT (OUTPATIENT)
Dept: FAMILY MEDICINE CLINIC | Facility: CLINIC | Age: 79
End: 2019-07-18
Payer: COMMERCIAL

## 2019-07-18 VITALS
RESPIRATION RATE: 16 BRPM | HEIGHT: 60 IN | OXYGEN SATURATION: 98 % | DIASTOLIC BLOOD PRESSURE: 72 MMHG | WEIGHT: 141.4 LBS | BODY MASS INDEX: 27.76 KG/M2 | TEMPERATURE: 97.1 F | HEART RATE: 68 BPM | SYSTOLIC BLOOD PRESSURE: 144 MMHG

## 2019-07-18 DIAGNOSIS — E28.39 MENOPAUSE OVARIAN FAILURE: ICD-10-CM

## 2019-07-18 DIAGNOSIS — R26.2 AMBULATORY DYSFUNCTION: ICD-10-CM

## 2019-07-18 DIAGNOSIS — Z23 NEED FOR SHINGLES VACCINE: ICD-10-CM

## 2019-07-18 DIAGNOSIS — M75.101 NONTRAUMATIC TEAR OF RIGHT ROTATOR CUFF, UNSPECIFIED TEAR EXTENT: ICD-10-CM

## 2019-07-18 DIAGNOSIS — E11.9 TYPE 2 DIABETES MELLITUS TREATED WITH INSULIN (HCC): ICD-10-CM

## 2019-07-18 DIAGNOSIS — Z79.4 TYPE 2 DIABETES MELLITUS TREATED WITH INSULIN (HCC): ICD-10-CM

## 2019-07-18 DIAGNOSIS — G62.89 OTHER POLYNEUROPATHY: ICD-10-CM

## 2019-07-18 DIAGNOSIS — Z00.00 MEDICARE ANNUAL WELLNESS VISIT, SUBSEQUENT: ICD-10-CM

## 2019-07-18 DIAGNOSIS — Z01.818 PRE-OP EXAMINATION: Primary | ICD-10-CM

## 2019-07-18 PROBLEM — G62.9 PERIPHERAL NEUROPATHY: Status: ACTIVE | Noted: 2019-07-18

## 2019-07-18 PROCEDURE — 1170F FXNL STATUS ASSESSED: CPT | Performed by: FAMILY MEDICINE

## 2019-07-18 PROCEDURE — G0439 PPPS, SUBSEQ VISIT: HCPCS | Performed by: FAMILY MEDICINE

## 2019-07-18 PROCEDURE — 1125F AMNT PAIN NOTED PAIN PRSNT: CPT | Performed by: FAMILY MEDICINE

## 2019-07-18 PROCEDURE — 99213 OFFICE O/P EST LOW 20 MIN: CPT | Performed by: FAMILY MEDICINE

## 2019-07-18 PROCEDURE — 1160F RVW MEDS BY RX/DR IN RCRD: CPT | Performed by: FAMILY MEDICINE

## 2019-07-18 NOTE — TELEPHONE ENCOUNTER
Please let them know that is a reasonable blood pressure  Please have her continue to monitor and let us know if anything changes  Otherwise can cancel the nurse visit if it has been well controlled   Thanks

## 2019-07-18 NOTE — TELEPHONE ENCOUNTER
Granddaughter came to [facility]   Patient was to have a repeat BP check here this week but she saw Dr George Doing today and wanted you to know her BP was 144/72  Sherre Soulier

## 2019-07-18 NOTE — PATIENT INSTRUCTIONS
Obesity   AMBULATORY CARE:   Obesity  is when your body mass index (BMI) is greater than 30  Your healthcare provider will use your height and weight to measure your BMI  The risks of obesity include  many health problems, such as injuries or physical disability  You may need tests to check for the following:  · Diabetes     · High blood pressure or high cholesterol     · Heart disease     · Gallbladder or liver disease     · Cancer of the colon, breast, prostate, liver, or kidney     · Sleep apnea     · Arthritis or gout  Seek care immediately if:   · You have a severe headache, confusion, or difficulty speaking  · You have weakness on one side of your body  · You have chest pain, sweating, or shortness of breath  Contact your healthcare provider if:   · You have symptoms of gallbladder or liver disease, such as pain in your upper abdomen  · You have knee or hip pain and discomfort while walking  · You have symptoms of diabetes, such as intense hunger and thirst, and frequent urination  · You have symptoms of sleep apnea, such as snoring or daytime sleepiness  · You have questions or concerns about your condition or care  Treatment for obesity  focuses on helping you lose weight to improve your health  Even a small decrease in BMI can reduce the risk for many health problems  Your healthcare provider will help you set a weight-loss goal   · Lifestyle changes  are the first step in treating obesity  These include making healthy food choices and getting regular physical activity  Your healthcare provider may suggest a weight-loss program that involves coaching, education, and therapy  · Medicine  may help you lose weight when it is used with a healthy diet and physical activity  · Surgery  can help you lose weight if you are very obese and have other health problems  There are several types of weight-loss surgery  Ask your healthcare provider for more information    Be successful losing weight:   · Set small, realistic goals  An example of a small goal is to walk for 20 minutes 5 days a week  Anther goal is to lose 5% of your body weight  · Tell friends, family members, and coworkers about your goals  and ask for their support  Ask a friend to lose weight with you, or join a weight-loss support group  · Identify foods or triggers that may cause you to overeat , and find ways to avoid them  Remove tempting high-calorie foods from your home and workplace  Place a bowl of fresh fruit on your kitchen counter  If stress causes you to eat, then find other ways to cope with stress  · Keep a diary to track what you eat and drink  Also write down how many minutes of physical activity you do each day  Weigh yourself once a week and record it in your diary  Eating changes: You will need to eat 500 to 1,000 fewer calories each day than you currently eat to lose 1 to 2 pounds a week  The following changes will help you cut calories:  · Eat smaller portions  Use small plates, no larger than 9 inches in diameter  Fill your plate half full of fruits and vegetables  Measure your food using measuring cups until you know what a serving size looks like  · Eat 3 meals and 1 or 2 snacks each day  Plan your meals in advance  [place] [employer] and eat at home most of the time  Eat slowly  · Eat fruits and vegetables at every meal   They are low in calories and high in fiber, which makes you feel full  Do not add butter, margarine, or cream sauce to vegetables  Use herbs to season steamed vegetables  · Eat less fat and fewer fried foods  Eat more baked or grilled chicken and fish  These protein sources are lower in calories and fat than red meat  Limit fast food  Dress your salads with olive oil and vinegar instead of bottled dressing  · Limit the amount of sugar you eat  Do not drink sugary beverages  Limit alcohol  Activity changes:  Physical activity is good for your body in many ways   It helps you burn calories and build strong muscles  It decreases stress and depression, and improves your mood  It can also help you sleep better  Talk to your healthcare provider before you begin an exercise program   · Exercise for at least 30 minutes 5 days a week  Start slowly  Set aside time each day for physical activity that you enjoy and that is convenient for you  It is best to do both weight training and an activity that increases your heart rate, such as walking, bicycling, or swimming  · Find ways to be more active  Do yard work and housecleaning  Walk up the stairs instead of using elevators  Spend your leisure time going to events that require walking, such as outdoor festivals or fairs  This extra physical activity can help you lose weight and keep it off  Follow up with your healthcare provider as directed: You may need to meet with a dietitian  Write down your questions so you remember to ask them during your visits  © 2017 [employer] [employer] Information is for End User's use only and may not be sold, redistributed or otherwise used for commercial purposes  All illustrations and images included in CareNotes® are the copyrighted property of [employer]  or [employer]  The above information is an  only  It is not intended as medical advice for individual conditions or treatments  Talk to your doctor, nurse or pharmacist before following any medical regimen to see if it is safe and effective for you  Urinary Incontinence   WHAT YOU NEED TO KNOW:   What is urinary incontinence? Urinary incontinence (UI) is when you lose control of your bladder  What causes UI? UI occurs because your bladder cannot store or empty urine properly  The following are the most common types of UI:  · Stress incontinence  is when you leak urine due to increased bladder pressure  This may happen when you cough, sneeze, or exercise       · Urge incontinence  is when you feel the need to urinate right away and leak urine accidentally  · Mixed incontinence  is when you have both stress and urge UI  What are the signs and symptoms of UI?   · You feel like your bladder does not empty completely when you urinate  · You urinate often and need to urinate immediately  · You leak urine when you sleep, or you wake up with the urge to urinate  · You leak urine when you cough, sneeze, exercise, or laugh  How is UI diagnosed? Your healthcare provider will ask how often you leak urine and whether you have stress or urge symptoms  Tell him which medicines you take, how often you urinate, and how much liquid you drink each day  You may need any of the following tests:  · Urine tests  may show infection or kidney function  · A pelvic exam  may be done to check for blockages  A pelvic exam will also show if your bladder, uterus, or other organs have moved out of place  · An x-ray, ultrasound, or CT  may show problems with parts of your urinary system  You may be given contrast liquid to help your organs show up better in the pictures  Tell the healthcare provider if you have ever had an allergic reaction to contrast liquid  Do not enter the MRI room with anything metal  Metal can cause serious injury  Tell the healthcare provider if you have any metal in or on your body  · A bladder scan  will show how much urine is left in your bladder after you urinate  You will be asked to urinate and then healthcare providers will use a small ultrasound machine to check the urine left in your bladder  · Cystometry  is used to check the function of your urinary system  Your healthcare provider checks the pressure in your bladder while filling it with fluid  Your bladder pressure may also be tested when your bladder is full and while you urinate  How is UI treated? · Medicines  can help strengthen your bladder control      · Electrical stimulation  is used to send a small amount of electrical energy to your pelvic floor muscles  This helps control your bladder function  Electrodes may be placed outside your body or in your rectum  For women, the electrodes may be placed in the vagina  · A bulking agent  may be injected into the wall of your urethra to make it thicker  This helps keep your urethra closed and decreases urine leakage  · Devices  such as a clamp, pessary, or tampon may help stop urine leaks  Ask your healthcare provider for more information about these and other devices  · Surgery  may be needed if other treatments do not work  Several types of surgery can help improve your bladder control  Ask your healthcare provider for more information about the surgery you may need  How can I manage my symptoms? · Do pelvic muscle exercises often  Your pelvic muscles help you stop urinating  Squeeze these muscles tight for 5 seconds, then relax for 5 seconds  Gradually work up to squeezing for 10 seconds  Do 3 sets of 15 repetitions a day, or as directed  This will help strengthen your pelvic muscles and improve bladder control  · A catheter  may be used to help empty your bladder  A catheter is a tiny, plastic tube that is put into your bladder to drain your urine  Your healthcare provider may tell you to use a catheter to prevent your bladder from getting too full and leaking urine  · Keep a UI record  Write down how often you leak urine and how much you leak  Make a note of what you were doing when you leaked urine  · Train your bladder  Go to the bathroom at set times, such as every 2 hours, even if you do not feel the urge to go  You can also try to hold your urine when you feel the urge to go  For example, hold your urine for 5 minutes when you feel the urge to go  As that becomes easier, hold your urine for 10 minutes  · Drink liquids as directed  Ask your healthcare provider how much liquid to drink each day and which liquids are best for you   You may need to limit the amount of liquid you drink to help control your urine leakage  Limit or do not have drinks that contain caffeine or alcohol  Do not drink any liquid right before you go to bed  · Prevent constipation  Eat a variety of high-fiber foods  Good examples are high-fiber cereals, beans, vegetables, and whole-grain breads  Prune juice may help make your bowel movement softer  Walking is the best way to trigger your intestines to have a bowel movement  · Exercise regularly and maintain a healthy weight  Ask your healthcare provider how much you should weigh and about the best exercise plan for you  Weight loss and exercise will decrease pressure on your bladder and help you control your leakage  Ask him to help you create a weight loss plan if you are overweight  When should I seek immediate care? · You have severe pain  · You are confused or cannot think clearly  When should I contact my healthcare provider? · You have a fever  · You see blood in your urine  · You have pain when you urinate  · You have new or worse pain, even after treatment  · Your mouth feels dry or you have vision changes  · Your urine is cloudy or smells bad  · You have questions or concerns about your condition or care  CARE AGREEMENT:   You have the right to help plan your care  Learn about your health condition and how it may be treated  Discuss treatment options with your caregivers to decide what care you want to receive  You always have the right to refuse treatment  The above information is an  only  It is not intended as medical advice for individual conditions or treatments  Talk to your doctor, nurse or pharmacist before following any medical regimen to see if it is safe and effective for you  © 2017 [employer] Information is for End User's use only and may not be sold, redistributed or otherwise used for commercial purposes   All illustrations and images included in CareNotes® are the copyrighted property of [employer]  or [employer]  Cigarette Smoking and Your Health   AMBULATORY CARE:   Risks to your health if you smoke:  Nicotine and other chemicals found in tobacco damage every cell in your body  Even if you are a light smoker, you have an increased risk for cancer, heart disease, and lung disease  If you are pregnant or have diabetes, smoking increases your risk for complications  Benefits to your health if you stop smoking:   · You decrease respiratory symptoms such as coughing, wheezing, and shortness of breath  · You reduce your risk for cancers of the lung, mouth, throat, kidney, bladder, pancreas, stomach, and cervix  If you already have cancer, you increase the benefits of chemotherapy  You also reduce your risk for cancer returning or a second cancer from developing  · You reduce your risk for heart disease, blood clots, heart attack, and stroke  · You reduce your risk for lung infections, and diseases such as pneumonia, asthma, chronic bronchitis, and emphysema  · Your circulation improves  More oxygen can be delivered to your body  If you have diabetes, you lower your risk for complications, such as kidney, artery, and eye diseases  You also lower your risk for nerve damage  Nerve damage can lead to amputations, poor vision, and blindness  · You improve your body's ability to heal and to fight infections  Benefits to the health of others if you stop smoking:  Tobacco is harmful to nonsmokers who breathe in your secondhand smoke  The following are ways the health of others around you may improve when you stop smoking:  · You lower the risks for lung cancer and heart disease in nonsmoking adults  · If you are pregnant, you lower the risk for miscarriage, early delivery, low birth weight, and stillbirth  You also lower your baby's risk for SIDS, obesity, developmental delay, and neurobehavioral problems, such as ADHD  · If you have children, you lower their risk for ear infections, colds, pneumonia, bronchitis, and asthma  For more information and support to stop smoking:   · Smokefree  gov  Phone: 5- 409 - 688-1335  Web Address: www smokefree  gov  Follow up with your healthcare provider as directed:  Write down your questions so you remember to ask them during your visits  © 2017 [employer] [employer] Information is for End User's use only and may not be sold, redistributed or otherwise used for commercial purposes  All illustrations and images included in CareNotes® are the copyrighted property of [employer]  or [employer]  The above information is an  only  It is not intended as medical advice for individual conditions or treatments  Talk to your doctor, nurse or pharmacist before following any medical regimen to see if it is safe and effective for you  Fall Prevention   AMBULATORY CARE:   Fall prevention  includes ways to make your home and other areas safer  It also includes ways you can move more carefully to prevent a fall  Health conditions that cause changes in your blood pressure, vision, or muscle strength and coordination may increase your risk for falls  Medicines may also increase your risk for falls if they make you dizzy, weak, or sleepy  Call 911 or have someone else call if:   · You have fallen and are unconscious  · You have fallen and cannot move part of your body  Contact your healthcare provider if:   · You have fallen and have pain or a headache  · You have questions or concerns about your condition or care  Fall prevention tips:   · Stand or sit up slowly  This may help you keep your balance and prevent falls  · Use assistive devices as directed  Your healthcare provider may suggest that you use a cane or walker to help you keep your balance  You may need to have grab bars put in your bathroom near the toilet or in the shower      · Wear shoes that fit well and have soles that   Wear shoes both inside and outside  Use slippers with good   Do not wear shoes with high heels  · Wear a personal alarm  This is a device that allows you to call 911 if you fall and need help  Ask your healthcare provider for more information  · Stay active  Exercise can help strengthen your muscles and improve your balance  Your healthcare provider may recommend water aerobics or walking  He or she may also recommend physical therapy to improve your coordination  Never start an exercise program without talking to your healthcare provider first      · Manage your medical conditions  Keep all appointments with your healthcare providers  Visit your eye doctor as directed  Home safety tips:   · Add items to prevent falls in the bathroom  Put nonslip strips on your bath or shower floor to prevent you from slipping  Use a bath mat if you do not have carpet in the bathroom  This will prevent you from falling when you step out of the bath or shower  Use a shower seat so you do not need to stand while you shower  Sit on the toilet or a chair in your bathroom to dry yourself and put on clothing  This will prevent you from losing your balance from drying or dressing yourself while you are standing  · Keep paths clear  Remove books, shoes, and other objects from walkways and stairs  Place cords for telephones and lamps out of the way so that you do not need to walk over them  Tape them down if you cannot move them  Remove small rugs  If you cannot remove a rug, secure it with double-sided tape  This will prevent you from tripping  · Install bright lights in your home  Use night lights to help light paths to the bathroom or kitchen  Always turn on the light before you start walking  · Keep items you use often on shelves within reach  Do not use a step stool to help you reach an item  · Paint or place reflective tape on the edges of your stairs    This will help you see the stairs better  Follow up with your healthcare provider as directed:  Write down your questions so you remember to ask them during your visits  © 2017 [address] [employer] Information is for End User's use only and may not be sold, redistributed or otherwise used for commercial purposes  All illustrations and images included in CareNotes® are the copyrighted property of [employer]  or [employer]  The above information is an  only  It is not intended as medical advice for individual conditions or treatments  Talk to your doctor, nurse or pharmacist before following any medical regimen to see if it is safe and effective for you  Advance Directives   WHAT YOU NEED TO KNOW:   What are advance directives? Advance directives are legal documents that state your wishes and plans for medical care  These plans are made ahead of time in case you lose your ability to make decisions for yourself  Advance directives can apply to any medical decision, such as the treatments you want, and if you want to donate organs  What are the types of advance directives? There are many types of advance directives, and each state has rules about how to use them  You may choose a combination of any of the following:  · Living will: This is a written record of the treatment you want  You can also choose which treatments you do not want, which to limit, and which to stop at a certain time  This includes surgery, medicine, IV fluid, and tube feedings  · Durable power of  for healthcare [facility]): This is a written record that states who you want to make healthcare choices for you when you are unable to make them for yourself  This person, called a proxy, is usually a family member or a friend  You may choose more than 1 proxy  · Do not resuscitate (DNR) order:  A DNR order is used in case your heart stops beating or you stop breathing   It is a request not to have certain forms of treatment, such as CPR  A DNR order may be included in other types of advance directives  · Medical directive: This covers the care that you want if you are in a coma, near death, or unable to make decisions for yourself  You can list the treatments you want for each condition  Treatment may include pain medicine, surgery, blood transfusions, dialysis, IV or tube feedings, and a ventilator (breathing machine)  · Values history: This document has questions about your views, beliefs, and how you feel and think about life  This information can help others choose the care that you would choose  Why are advance directives important? An advance directive helps you control your care  Although spoken wishes may be used, it is better to have your wishes written down  Spoken wishes can be misunderstood, or not followed  Treatments may be given even if you do not want them  An advance directive may make it easier for your family to make difficult choices about your care  How do I decide what to put in my advance directives? · Make informed decisions:  Make sure you fully understand treatments or care you may receive  Think about the benefits and problems your decisions could cause for you or your family  Talk to healthcare providers if you have concerns or questions before you write down your wishes  You may also want to talk with your Bahai or , or a   Check your state laws to make sure that what you put in your advance directive is legal      · Sign all forms:  Sign and date your advance directive when you have finished  You may also need 2 witnesses to sign the forms  Witnesses cannot be your doctor or his staff, your spouse, heirs or beneficiaries, people you owe money to, or your chosen proxy  Talk to your family, proxy, and healthcare providers about your advance directive  Give each person a copy, and keep one for yourself in a place you can get to easily   Do not keep it hidden or locked away  · Review and revise your plans: You can revise your advance directive at any time, as long as you are able to make decisions  Review your plan every year, and when there are changes in your life, or your health  When you make changes, let your family, proxy, and healthcare providers know  Give each a new copy  Where can I find more information? · [employer]  [address] , [place] [address]  Phone: 7- [phone] - [phone]  Phone: 1- 930 - [phone]  Web Address: http://www  aafp org  · [address] [facility])  [address] [address] [facility], [address]  Phone: [phone]  Phone: [phone]  Web Address: Fe kirk  CARE AGREEMENT:   You have the right to help plan your care  To help with this plan, you must learn about your health condition and treatment options  You must also learn about advance directives and how they are used  Work with your healthcare providers to decide what care will be used to treat you  You always have the right to refuse treatment  The above information is an  only  It is not intended as medical advice for individual conditions or treatments  Talk to your doctor, nurse or pharmacist before following any medical regimen to see if it is safe and effective for you  © 2017 [address] [employer] Information is for End User's use only and may not be sold, redistributed or otherwise used for commercial purposes  All illustrations and images included in CareNotes® are the copyrighted property of [employer]  or [employer]

## 2019-07-18 NOTE — PROGRESS NOTES
Assessment and Plan:     Problem List Items Addressed This Visit     None         History of Present Illness:     Patient presents for Medicare Annual Wellness visit    Patient Care Team:  Salma Duran MD as PCP - General (Family Medicine)  Luis Antonio Arguello DPM (Podiatry)     Problem List:     Patient Active Problem List   Diagnosis    Dementia with behavioral disturbance    Chronic left shoulder pain    Need for lipid screening    Bilateral hearing loss    Rheumatoid arthritis 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 ([address] )    Memory loss    Deformity of hand    Alzheimer's dementia    Type 2 diabetes mellitus treated with insulin ([address] )    Pruritic dermatitis    Anxiety    Yeast infection    Earlobe lesion, bilateral    Pain of both hip joints    Need for influenza vaccination    Back pain    Hypothyroidism    Essential hypertension    Fall    Acute pain of right shoulder    SVT (supraventricular tachycardia) (HCC)      Past Medical and Surgical History:     Past Medical History:   Diagnosis Date    Aggression     Alzheimer's dementia     Arthritis     Dementia     Diabetes insipidus ([address] )     Diabetes mellitus ([address] )     High cholesterol     Hypertension     Memory loss     Migraine     Polyneuropathy     Rheumatoid arthritis ([address] )     Serum lipids high     Stomach problems     Thyroid trouble      Past Surgical History:   Procedure Laterality Date    CATARACT EXTRACTION      GALLBLADDER SURGERY      HYSTERECTOMY        Family History:     Family History   Problem Relation Age of Onset    Substance Abuse Mother         denied    Mental illness Mother         denied    Substance Abuse Father         denied    Mental illness Father         denied    Diabetes Brother     Blindness Brother     Arthritis Daughter     HIV Son       Social History:     Social History     Tobacco Use   Smoking Status Former Smoker    Last attempt to quit: 1985    Years since quittin 5   Smokeless Tobacco Never Used     Social History     Substance and Sexual Activity   Alcohol Use No     Social History     Substance and Sexual Activity   Drug Use No      Medications and Allergies:     Current Outpatient Medications   Medication Sig Dispense Refill    amLODIPine (NORVASC) 5 mg tablet Take 1 tablet (5 mg total) by mouth daily 30 tablet 0    Blood Glucose Monitoring Suppl (ONETOUCH VERIO) w/Device KIT Use as directed  0    Blood Pressure KIT by Does not apply route daily 1 each 1    Canagliflozin (INVOKANA) 300 MG TABS Take 1 tablet (300 mg total) by mouth daily 90 tablet 3    donepezil (ARICEPT) 10 mg tablet TAKE 1 TABLET (10 MG TOTAL) BY MOUTH DAILY AT BEDTIME 30 tablet 0    fenofibrate (TRIGLIDE) 160 MG tablet Take 160 mg by mouth daily      glucose blood (ONETOUCH VERIO) test strip TEST TWICE A  each 2    ibuprofen (MOTRIN) 600 mg tablet Take 1 tablet (600 mg total) by mouth every 8 (eight) hours as needed for mild pain 30 tablet 1    insulin glargine (LANTUS SOLOSTAR) 100 units/mL injection pen Inject 30 Units under the skin daily at bedtime for 30 days 5 pen 5    Insulin Pen Needle (BD PEN NEEDLE TOPHER U/F) 32G X 4 MM MISC by Does not apply route 2 (two) times a day 100 each 5    levothyroxine 88 mcg tablet TAKE 1 TABLET (88 MCG TOTAL) BY MOUTH DAILY FOR 30 DAYS 30 tablet 1    lisinopril (ZESTRIL) 20 mg tablet Take 1 tablet (20 mg total) by mouth 2 (two) times a day 90 tablet 6    memantine (NAMENDA) 10 mg tablet TAKE 1 TABLET (10 MG TOTAL) BY MOUTH 2 (TWO) TIMES A DAY 60 tablet 3    naproxen (NAPROSYN) 500 mg tablet TAKE 1 TABLET (500 MG TOTAL) BY MOUTH 2 (TWO) TIMES A DAY WITH MEALS FOR 30 DAYS 60 tablet 1    ONETOUCH DELICA LANCETS FINE MISC USE 2 TIMES A DAY AS NEEDED FOR HIGH BLOOD SUGAR 100 each 0    simvastatin (ZOCOR) 20 mg tablet Take 20 mg by mouth daily at bedtime       No current facility-administered medications for this visit        Allergies Allergen Reactions    Penicillins Itching and Swelling      Immunizations:     Immunization History   Administered Date(s) Administered    INFLUENZA 12/07/2016, 09/10/2018    Influenza, high dose seasonal 0 5 mL 09/10/2018    Tuberculin Skin Test-PPD Intradermal 06/11/2018      Medicare Screening Tests and Risk Assessments:     Gabbie is here for her Subsequent Wellness visit  Last Medicare Wellness visit information reviewed, patient interviewed and updates made to the record today  Health Risk Assessment:  Patient rates overall health as good  Patient feels that their physical health rating is Same  Eyesight was rated as Slightly worse  Hearing was rated as Slightly worse  Patient feels that their emotional and mental health rating is Same  Pain experienced by patient in the last 7 days has been Some  Patient's pain rating has been 7/10  Patient states that she has experienced weight loss or gain in last 6 months  (Additional comments: Right Shoulder and legs)    Emotional/Mental Health:  Patient has not been feeling nervous/anxious  PHQ-9 Depression Screening:    Frequency of the following problems over the past two weeks:      1  Little interest or pleasure in doing things: 0 - not at all      2  Feeling down, depressed, or hopeless: 0 - not at all  PHQ-2 Score: 0          Broken Bones/Falls: Fall Risk Assessment:    In the past year, patient has experienced: History of falling in past year    Number of falls: 2 or more    Injured during fall: Yes     Patient feels unsteady when standing or walking     Patient is worried about falling     Bladder/Bowel:  Patient has leaked urine accidently in the last six months  Patient reports no loss of bowel control  Immunizations:  Patient has had a flu vaccination within the last year  Patient has received a pneumonia shot  Patient has not received a shingles shot  Patient has received tetanus/diphtheria shot       Home Safety:  Patient has trouble with stairs inside or outside of their home  Patient currently reports that there are no safety hazards present in home, working smoke alarms, working carbon monoxide detectors  Preventative Screenings:   No breast cancer screening performed, colon cancer screen completed, cholesterol screen completed, glaucoma eye exam completed,     Nutrition:  Current diet: Diabetic, Low Carb, No Added Salt and Limited junk food with servings of the following:    Medications:  Patient is not currently taking any over-the-counter supplements  Patient is not able to manage medications  (Additional Comments: Family manages medications)Lifestyle Choices:  Patient reports no tobacco use  Patient has smoked or used tobacco in the past   Patient has stopped her tobacco use  Tobacco use quit date: 20 years ago  Patient reports no alcohol use  Patient does not drive a vehicle  Patient wears seat belt  Current level of exercise of physical activity described by patient as: none  Activities of Daily Living:  Can get out of bed by his or her self, able to dress self, able to make own meals, unable to do own shopping, able to bathe self, can do own laundry/housekeeping, unable to manage own money and other related tasks    Previous Hospitalizations:  Hospitalization or ED visit in past 12 months  Number of hospitalizations within the last year: 1-2  Additional Comments: For falls    Advanced Directives:  Patient has decided on a power of   Patient has spoken to designated power of   Patient has not completed advanced directive          Preventative Screening/Counseling:      Cardiovascular:      General: Screening Current          Diabetes:      General: Screening Current          Colorectal Cancer:      General: Screening Not Indicated          Breast Cancer:      General: Screening Not Indicated          Cervical Cancer:      General: Screening Not Indicated          Osteoporosis:      Due for studies: DXA Axial          AAA:      General: Screening Not Indicated          Glaucoma:      General: Risks and Benefits Discussed          HIV:      General: Screening Not Indicated          Hepatitis C:      General: Screening Not Indicated        Advanced Directives:   5 wishes given       Immunizations:      Influenza: Influenza UTD This Year      Pneumococcal: Risks & Benefits Discussed      Shingrix: Shingrix Vaccine Needed Today      Zostavax: Risks & Benefits Discussed      TD: Td Vaccine UTD      TDAP: Tdap Vaccine UTD

## 2019-07-18 NOTE — PROGRESS NOTES
Assessment/Plan:    No problem-specific Assessment & Plan notes found for this encounter  Diagnoses and all orders for this visit:    Pre-op examination  Nontraumatic tear of right rotator cuff, unspecified tear extent  Cleared for surgery  Type 2 diabetes mellitus treated with insulin ([address] )  Stable controlled  She was counseled in regards to low blood glucose levels below 60 mg/dl and symptoms of hypoglycemia  -     insulin glargine (LANTUS SOLOSTAR) 100 units/mL injection pen; Inject 28 Units under the skin daily at bedtime for 30 days    Menopause ovarian failure  -     DXA bone density spine hip and pelvis; Future    Need for shingles vaccine  -     Zoster Vac Recomb Adjuvanted 50 MCG/0 5ML SUSR; Inject 1 Dose into a muscle once for 1 dose    Other polyneuropathy  -     EMG 2 limb lower extremity; Future    Medicare annual wellness visit, subsequent  See Medicare wellness note  Follow up in 1-2 months        Subjective:      Patient ID: Loan Hardwick is a 67 y o  female  Patient is here for preop clearance for surgery to be done August 1st for right shoulder rotator cuff repair with Dr Zachariah Knight  She does have pain related to it loss of motion with that as well  She has a history of type 2 diabetes mellitus her last hemoglobin A1c was 8 8  She is currently taking Lantus 28 units at bedtime she said that her blood glucose have been ranging below 100 mg/dL consistently  She denies any hypoglycemic episodes however  She denies any symptoms such as polyuria polyphagia polydipsia  She has been having bilateral leg weakness as well as pain  She has wondered if something can be done in terms of this  She is also requesting a past up ADA approved she can walk into it given ambulatory dysfunction difficulty with walking as well as her upcoming surgery of the right shoulder which is going to limit her ambulation and functioning  She is at increased risk for falls        The following portions of the patient's history were reviewed and updated as appropriate:   She  has a past medical history of Aggression, Alzheimer's dementia, Arthritis, Dementia, Diabetes insipidus ([address] ), Diabetes mellitus ([address] ), High cholesterol, Hypertension, Memory loss, Migraine, Polyneuropathy, Rheumatoid arthritis ([address] ), Serum lipids high, Stomach problems, and Thyroid trouble  She   Patient Active Problem List    Diagnosis Date Noted    Pre-op examination 07/18/2019    Nontraumatic tear of right rotator cuff 07/18/2019    Menopause ovarian failure 07/18/2019    Need for shingles vaccine 07/18/2019    Peripheral neuropathy 07/18/2019    Medicare annual wellness visit, subsequent 07/18/2019    Hypothyroidism 05/13/2019    Essential hypertension 05/13/2019    Fall 05/13/2019    Acute pain of right shoulder 05/13/2019    SVT (supraventricular tachycardia) ([facility] ) 05/13/2019    Earlobe lesion, bilateral 09/10/2018    Pain of both hip joints 09/10/2018    Need for influenza vaccination 09/10/2018    Back pain 09/10/2018    Anxiety 08/14/2018    Yeast infection 08/14/2018    Pruritic dermatitis 06/13/2018    Type 2 diabetes mellitus treated with insulin ([address] ) 06/07/2018    Alzheimer's dementia 05/03/2018    Memory loss 03/19/2018    Deformity of hand 03/19/2018    Bilateral hearing loss 02/22/2018    Rheumatoid arthritis 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 ([facility] ) 02/22/2018    Dementia with behavioral disturbance 02/15/2018    Chronic left shoulder pain 02/15/2018    Need for lipid screening 02/15/2018     She  has a past surgical history that includes Hysterectomy; Gallbladder surgery; and Cataract extraction  Her family history includes Arthritis in her daughter; Blindness in her brother; Diabetes in her brother; HIV in her son; Mental illness in her father and mother; Substance Abuse in her father and mother  She  reports that she quit smoking about 34 years ago   She has never used smokeless tobacco  She reports that she does not drink alcohol or use drugs  Current Outpatient Medications   Medication Sig Dispense Refill    amLODIPine (NORVASC) 5 mg tablet Take 1 tablet (5 mg total) by mouth daily 30 tablet 0    Blood Glucose Monitoring Suppl (ONETOUCH VERIO) w/Device KIT Use as directed  0    Blood Pressure KIT by Does not apply route daily 1 each 1    Canagliflozin (INVOKANA) 300 MG TABS Take 1 tablet (300 mg total) by mouth daily 90 tablet 3    donepezil (ARICEPT) 10 mg tablet TAKE 1 TABLET (10 MG TOTAL) BY MOUTH DAILY AT BEDTIME 30 tablet 0    fenofibrate (TRIGLIDE) 160 MG tablet Take 160 mg by mouth daily      glucose blood (ONETOUCH VERIO) test strip TEST TWICE A  each 2    ibuprofen (MOTRIN) 600 mg tablet Take 1 tablet (600 mg total) by mouth every 8 (eight) hours as needed for mild pain 30 tablet 1    insulin glargine (LANTUS SOLOSTAR) 100 units/mL injection pen Inject 28 Units under the skin daily at bedtime for 30 days 5 pen 5    Insulin Pen Needle (BD PEN NEEDLE TOPHER U/F) 32G X 4 MM MISC by Does not apply route 2 (two) times a day 100 each 5    levothyroxine 88 mcg tablet TAKE 1 TABLET (88 MCG TOTAL) BY MOUTH DAILY FOR 30 DAYS 30 tablet 1    lisinopril (ZESTRIL) 20 mg tablet Take 1 tablet (20 mg total) by mouth 2 (two) times a day 90 tablet 6    memantine (NAMENDA) 10 mg tablet TAKE 1 TABLET (10 MG TOTAL) BY MOUTH 2 (TWO) TIMES A DAY 60 tablet 3    naproxen (NAPROSYN) 500 mg tablet TAKE 1 TABLET (500 MG TOTAL) BY MOUTH 2 (TWO) TIMES A DAY WITH MEALS FOR 30 DAYS 60 tablet 1    ONETOUCH DELICA LANCETS FINE MISC USE 2 TIMES A DAY AS NEEDED FOR HIGH BLOOD SUGAR 100 each 0    simvastatin (ZOCOR) 20 mg tablet Take 20 mg by mouth daily at bedtime      Zoster Vac Recomb Adjuvanted 50 MCG/0 5ML SUSR Inject 1 Dose into a muscle once for 1 dose 1 each 1     No current facility-administered medications for this visit        Current Outpatient Medications on File Prior to Visit Medication Sig    amLODIPine (NORVASC) 5 mg tablet Take 1 tablet (5 mg total) by mouth daily    Blood Glucose Monitoring Suppl (John Jaimes) w/Device KIT Use as directed    Blood Pressure KIT by Does not apply route daily    Canagliflozin (INVOKANA) 300 MG TABS Take 1 tablet (300 mg total) by mouth daily    donepezil (ARICEPT) 10 mg tablet TAKE 1 TABLET (10 MG TOTAL) BY MOUTH DAILY AT BEDTIME    fenofibrate (TRIGLIDE) 160 MG tablet Take 160 mg by mouth daily    glucose blood (ONETOUCH VERIO) test strip TEST TWICE A DAY    ibuprofen (MOTRIN) 600 mg tablet Take 1 tablet (600 mg total) by mouth every 8 (eight) hours as needed for mild pain    Insulin Pen Needle (BD PEN NEEDLE TOPHER U/F) 32G X 4 MM MISC by Does not apply route 2 (two) times a day    levothyroxine 88 mcg tablet TAKE 1 TABLET (88 MCG TOTAL) BY MOUTH DAILY FOR 30 DAYS    lisinopril (ZESTRIL) 20 mg tablet Take 1 tablet (20 mg total) by mouth 2 (two) times a day    memantine (NAMENDA) 10 mg tablet TAKE 1 TABLET (10 MG TOTAL) BY MOUTH 2 (TWO) TIMES A DAY    naproxen (NAPROSYN) 500 mg tablet TAKE 1 TABLET (500 MG TOTAL) BY MOUTH 2 (TWO) TIMES A DAY WITH MEALS FOR 30 DAYS    ONETOUCH DELICA LANCETS FINE MISC USE 2 TIMES A DAY AS NEEDED FOR HIGH BLOOD SUGAR    simvastatin (ZOCOR) 20 mg tablet Take 20 mg by mouth daily at bedtime    [DISCONTINUED] insulin glargine (LANTUS SOLOSTAR) 100 units/mL injection pen Inject 30 Units under the skin daily at bedtime for 30 days     No current facility-administered medications on file prior to visit  She is allergic to penicillins       Review of Systems   Constitutional: Negative for activity change, appetite change, fatigue and fever  HENT: Negative for congestion and ear discharge  Respiratory: Negative for cough and shortness of breath  Cardiovascular: Negative for chest pain and palpitations  Gastrointestinal: Negative for diarrhea and nausea     Musculoskeletal: Negative for arthralgias and back pain  Skin: Negative for color change and rash  Neurological: Positive for weakness and numbness  Negative for dizziness and headaches  Psychiatric/Behavioral: Negative for agitation and behavioral problems  Objective:      /72 (BP Location: Left arm, Patient Position: Sitting, Cuff Size: Adult)   Pulse 68   Temp (!) 97 1 °F (36 2 °C) (Tympanic)   Resp 16   Ht 5' (1 524 m)   Wt 64 1 kg (141 lb 6 4 oz)   SpO2 98%   BMI 27 62 kg/m²          Physical Exam   Constitutional: She is oriented to person, place, and time  She appears well-developed and well-nourished  No distress  HENT:   Head: Normocephalic and atraumatic  Nose: Nose normal    Mouth/Throat: Oropharynx is clear and moist    Eyes: Pupils are equal, round, and reactive to light  Conjunctivae are normal    Cardiovascular: Normal rate, regular rhythm and normal heart sounds  No murmur heard  Pulmonary/Chest: Effort normal and breath sounds normal  No respiratory distress  She has no wheezes  Abdominal: Soft  Bowel sounds are normal  She exhibits no distension  There is no tenderness  Musculoskeletal: She exhibits tenderness and deformity  She exhibits no edema  Neurological: She is alert and oriented to person, place, and time  Skin: Skin is warm and dry  No rash noted  She is not diaphoretic  No erythema  Psychiatric: She has a normal mood and affect

## 2019-07-19 ENCOUNTER — PATIENT OUTREACH (OUTPATIENT)
Dept: CASE MANAGEMENT | Facility: OTHER | Age: 79
End: 2019-07-19

## 2019-07-19 ENCOUNTER — TELEPHONE (OUTPATIENT)
Dept: OBGYN CLINIC | Facility: HOSPITAL | Age: 79
End: 2019-07-19

## 2019-07-19 DIAGNOSIS — Z71.89 COMPLEX CARE COORDINATION: Primary | ICD-10-CM

## 2019-07-19 NOTE — TELEPHONE ENCOUNTER
Roseanna Favre  [phone]    Dr Irene Gerardo [facility]  [phone] ph      Needing RX in computer for [address] for patient post surgery  Please call when ready

## 2019-07-19 NOTE — TELEPHONE ENCOUNTER
Home physical therapy was requested  Patient is at increased risk for falls, has ambulatory dysfunction, becomes dizzy when walking, and has a hard time getting in/out of the car  She would benefit from home health PT after her surgery  I do not see a date scheduled for surgery yet  Do we have a date picked out for surgery? On a recent note, it says August 1st, but I do not see that on Dr Payan Flight schedule  Please let me know  Once we have a surgery date, I will put in the order for home PT to start after surgery     Thanks

## 2019-07-19 NOTE — PROGRESS NOTES
Outpatient Care Management Note:    7/19/19-  Received message from Dr David Acosta regarding pt needing a bathtub modification  Chart review done  Complex care case  Order placed for [facility]  Outreached pts granddaughter, Dave Tovar, with whom she resides, to see if there are any other needs at this time  Introduced self and OPCM and Waleska consented to future calls  Waleska informed me that she is concerned about the bathtub in their home because it is not easy for pt to get in and out of, especially since she will be having shoulder surgery on August 1st   She would like it modified somehow so that it would be a "walk-in bathtub"  Waleska expressed that she is not able to afford this if it is an out of pocket expense  I spoke to [facility] , Terry Ramirez, regarding this and gave Dave Tovra a phone number for [employer] ([phone])  This company does home modifications and may be able to help with the current situation  I inquired about any PT the pt will be receiving after surgery  Waleska reported that it is extremely difficult to get pt out of the home and into the car  I contacted [facility] and asked if they provide home PT for pts after same day surgery  Connell that they do if the pt is homebound and that an order would need to be placed by surgeon  Contacted Orthopedics and spoke to Yonas Ayoub who will let Dr Jo Meehan know and hopefully he will agree to this  Waleska also reported that pt has a difficult time ambulating because she gets dizzy a lot and is not steady on her feet  She has a difficult time toileting because she can not see well when she cleans herself  Dave Tovar reported that pt has had and eval by [facility] in the past, approx one year ago, but since then, her conditions have worsened  Attempted to contact [facility] for re-evaluation referral   There was no answer; left voicemail with my contact info  Awaiting return call        Provided my contact info and encouraged Waleska to contact me at any time with any questions or concerns  Will f/u in a few days  7/22/19-  Received return call from [place] from [facility]  Referral completed  Will f/u with Waleska in a few days

## 2019-07-22 NOTE — TELEPHONE ENCOUNTER
Patient does have a surgery date picked, the case request for the surgery wasn't in right away  I believe that the date we picked was 8/1/19 but I'm in a different office today  Once I'm back at the [facility] office I will schedule the surgery so you will be able to see it on Dr Kassie Cohen schedule

## 2019-07-23 PROBLEM — M75.101 TEAR OF RIGHT ROTATOR CUFF: Status: ACTIVE | Noted: 2019-07-23

## 2019-07-23 PROBLEM — S46.219A BICEPS TENDON TEAR: Status: ACTIVE | Noted: 2019-07-23

## 2019-07-24 ENCOUNTER — TELEPHONE (OUTPATIENT)
Dept: FAMILY MEDICINE CLINIC | Facility: CLINIC | Age: 79
End: 2019-07-24

## 2019-07-24 DIAGNOSIS — R26.2 AMBULATORY DYSFUNCTION: Primary | ICD-10-CM

## 2019-07-24 DIAGNOSIS — R09.89 POOR CIRCULATION OF EXTREMITY: ICD-10-CM

## 2019-07-24 NOTE — TELEPHONE ENCOUNTER
Physical therapy and Arterial brachial index ordered    advise to continue same medications for her diabetes though advise Francisca

## 2019-07-24 NOTE — TELEPHONE ENCOUNTER
Lucille's granddaughter called to follow up on the request for home care after her rotator cuff surgery on August 1st   Best contact # [phone]    Thank you

## 2019-07-24 NOTE — PRE-PROCEDURE INSTRUCTIONS
Pre-Surgery Instructions:   Medication Instructions    amLODIPine (NORVASC) 5 mg tablet pt can take this morning of surgery    Canagliflozin (INVOKANA) 300 MG TABS Patient was instructed by Physician and understands   donepezil (ARICEPT) 10 mg tablet Patient was instructed by Physician and understands   fenofibrate (TRIGLIDE) 160 MG tablet Patient was instructed by Physician and understands   insulin glargine (LANTUS SOLOSTAR) 100 units/mL injection pen Patient was instructed by Physician and understands   levothyroxine 88 mcg tablet pt can take this med the morning of surgery    lisinopril (ZESTRIL) 20 mg tablet Patient was instructed by Physician and understands   memantine (NAMENDA) 10 mg tablet Patient was instructed by Physician and understands   naproxen (NAPROSYN) 500 mg tablet Patient was instructed by Physician and understands      simvastatin (ZOCOR) 20 mg tablet pt can take this med the day of surgery

## 2019-07-24 NOTE — TELEPHONE ENCOUNTER
Notified Francisca and taylor  Mailed orders and she will take care of this after her upcoming surgery

## 2019-07-24 NOTE — TELEPHONE ENCOUNTER
Francisca GORE from [facility] called      She was out to see anshul    Noted abnormal findings on feet  Discoloration of toes, thin hairless skin on legs, pain when she walks up stairs  Risk factors include  Diabetes, hypertension, high cholesterol also frequent falls    She would like her to  Evaluated for peripheral braical index and have intervention  She has also had a lot of falls and would like PT for this     2 weeks ago her blood sugars were in the 60's now they are back in the 's is there anything to do for this     Any question please call her at [phone]    Call patient and daughter with treatment plan

## 2019-07-25 ENCOUNTER — PATIENT OUTREACH (OUTPATIENT)
Dept: FAMILY MEDICINE CLINIC | Facility: CLINIC | Age: 79
End: 2019-07-25

## 2019-07-25 NOTE — TELEPHONE ENCOUNTER
Please let patient know that we can give her a prescription day of the surgery  Home care will be fine in the beginning but patient would benefit and gets most out of outpatient physical therapy    She will not be starting any physical therapy until she sees us for her 1st postop visit which is when we usually give the therapy prescriptions

## 2019-07-25 NOTE — PROGRESS NOTES
Outpatient Care Management Note:    Complex care case  Chart review done  Attempted to contact pts granddaughter, Daja Mojica, to follow up on last call  No answer;  left voicemail with contact information  Awaiting return call

## 2019-07-29 ENCOUNTER — TELEPHONE (OUTPATIENT)
Dept: FAMILY MEDICINE CLINIC | Facility: CLINIC | Age: 79
End: 2019-07-29

## 2019-07-29 DIAGNOSIS — R26.2 AMBULATORY DYSFUNCTION: Primary | ICD-10-CM

## 2019-07-29 NOTE — TELEPHONE ENCOUNTER
LMOM for patient letting her know about home care and PT after the Sx   Phone room number was provider if patient has more questions

## 2019-07-29 NOTE — TELEPHONE ENCOUNTER
Grand daughter , Ara Wilks, calls  Claudia April is having surgery on Thursday and wants to set up Home care for her  Can you order? May have to addend your OV to be able to get covered  [facility]?

## 2019-07-30 ENCOUNTER — PROCEDURE VISIT (OUTPATIENT)
Dept: NEUROLOGY | Facility: CLINIC | Age: 79
End: 2019-07-30
Payer: COMMERCIAL

## 2019-07-30 DIAGNOSIS — G62.89 OTHER POLYNEUROPATHY: ICD-10-CM

## 2019-07-30 PROCEDURE — 95886 MUSC TEST DONE W/N TEST COMP: CPT | Performed by: PHYSICAL MEDICINE & REHABILITATION

## 2019-07-30 PROCEDURE — 95911 NRV CNDJ TEST 9-10 STUDIES: CPT | Performed by: PHYSICAL MEDICINE & REHABILITATION

## 2019-07-30 NOTE — PROGRESS NOTES
EMG 2 limb lower extremity     Date/Time 7/30/2019 2:50 PM     Performed by  Nolvia Avalos MD     Authorized by Monty Bansal MD      Universal Protocol Consent: Verbal consent obtained  Risks and benefits: risks, benefits and alternatives were discussed  Consent given by: patient  Patient understanding: patient states understanding of the procedure being performed  Patient consent: the patient's understanding of the procedure matches consent given  Patient identity confirmed: verbally with patient             EMG BILATERAL LOWER EXTREMITY  42-year-old female with medical history of diabetes mellitus presents with bilateral lower extremity pain and weakness  On physical examination, motor strength is 5 /5 throughout  Sensations are diminished to light touch and pinprick in the distribution  Motor and sensory conduction studies were performed on the bilateral peroneal, tibial and sural nerves  The right peroneal motor terminal latency was normal with normal action potential amplitude and a slow conduction 38 meters /sec across the ankle but a normal conduction velocity across  the fibular head  The left peroneal motor terminal latency was normal with normal motor action potential amplitude and a slow conduction velocity of 38 meters seconds the ankle and 40 meters seconds fibular head  The right tibial motor terminal latency was prolonged at 6 0 milliseconds with a normal compound motor action potential amplitude and a slow conduction velocity of 39 meters seconds across the ankle  The tibial motor terminal latency was prolonged at 6 5 milliseconds with a low compound motor action potential amplitude of 0 9 mV and a slow conduction velocity of 33 meters seconds across the ankle  The right tibial and left peroneal F-wave was absent  The right peroneal and left tibial F-waves were normal     Bilateral sural sensory action potentials were absent    Bilateral peroneal sensory action potentials were normal     The left H reflex was prolonged at 33 7 milliseconds as compared to the right H reflex was within normal limits  Concentric needle EMG was performed in various distal and proximal muscles of the lower extremities bilaterally including EDB, tibialis anterior, gastrocnemius medius, vastus lateralis, gluteus medius and the low lumbar paraspinal regions  There was no evidence of spontaneous activity seen  Moderate decreased recruitment of giant units was noted in the bilateral EDB and the left tibialis anterior The compound motor unit potentials were of normal configuration and interference patterns were full or full for effort in the remaining muscles tested  IMPRESSION: There is electrophysiologic evidence a 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

## 2019-07-31 ENCOUNTER — ANESTHESIA EVENT (OUTPATIENT)
Dept: PERIOP | Facility: HOSPITAL | Age: 79
End: 2019-07-31
Payer: COMMERCIAL

## 2019-07-31 ENCOUNTER — TELEPHONE (OUTPATIENT)
Dept: FAMILY MEDICINE CLINIC | Facility: CLINIC | Age: 79
End: 2019-07-31

## 2019-07-31 NOTE — ANESTHESIA PREPROCEDURE EVALUATION
Review of Systems/Medical History  Patient summary reviewed  Chart reviewed  No history of anesthetic complications     Cardiovascular  Hyperlipidemia, Hypertension , Dysrhythmias , history of PSVT,   Comment: EF 60%,  Pulmonary  Negative pulmonary ROS        GI/Hepatic            Endo/Other  Diabetes type 2 Insulin, History of thyroid disease , hypothyroidism,      GYN       Hematology   Musculoskeletal  Rheumatoid arthritis ,   Arthritis     Neurology    Headaches,   Comment: Alzheimers dementia Psychology   Anxiety,              Physical Exam    Airway    Mallampati score: II  TM Distance: >3 FB  Neck ROM: full     Dental   upper dentures,     Cardiovascular  Cardiovascular exam normal    Pulmonary  Pulmonary exam normal     Other Findings        Anesthesia Plan  ASA Score- 2     Anesthesia Type- general and regional with ASA Monitors  Additional Monitors:   Airway Plan: ETT  Plan Factors-    Induction- intravenous  Postoperative Plan-   Planned trial extubation    Informed Consent- Anesthetic plan and risks discussed with patient  I personally reviewed this patient with the CRNA  Discussed and agreed on the Anesthesia Plan with the CRNA  He Laguerre

## 2019-08-01 ENCOUNTER — ANESTHESIA (OUTPATIENT)
Dept: PERIOP | Facility: HOSPITAL | Age: 79
End: 2019-08-01
Payer: COMMERCIAL

## 2019-08-01 ENCOUNTER — HOSPITAL ENCOUNTER (OUTPATIENT)
Facility: HOSPITAL | Age: 79
Setting detail: OUTPATIENT SURGERY
Discharge: HOME HEALTH | End: 2019-08-01
Attending: ORTHOPAEDIC SURGERY | Admitting: ORTHOPAEDIC SURGERY
Payer: COMMERCIAL

## 2019-08-01 VITALS
HEIGHT: 60 IN | DIASTOLIC BLOOD PRESSURE: 56 MMHG | WEIGHT: 141.09 LBS | SYSTOLIC BLOOD PRESSURE: 119 MMHG | TEMPERATURE: 96.6 F | RESPIRATION RATE: 21 BRPM | HEART RATE: 67 BPM | BODY MASS INDEX: 27.7 KG/M2 | OXYGEN SATURATION: 94 %

## 2019-08-01 DIAGNOSIS — Z98.890 STATUS POST LEFT ROTATOR CUFF REPAIR: Primary | ICD-10-CM

## 2019-08-01 DIAGNOSIS — E11.9 TYPE 2 DIABETES MELLITUS TREATED WITH INSULIN (HCC): ICD-10-CM

## 2019-08-01 DIAGNOSIS — Z79.4 TYPE 2 DIABETES MELLITUS TREATED WITH INSULIN (HCC): ICD-10-CM

## 2019-08-01 DIAGNOSIS — I10 ESSENTIAL HYPERTENSION: ICD-10-CM

## 2019-08-01 DIAGNOSIS — Z98.890 STATUS POST RIGHT ROTATOR CUFF REPAIR: ICD-10-CM

## 2019-08-01 LAB
BASE EXCESS BLDA CALC-SCNC: -1 MMOL/L (ref -2–3)
CA-I BLD-SCNC: 1.3 MMOL/L (ref 1.12–1.32)
GLUCOSE SERPL-MCNC: 116 MG/DL (ref 65–140)
GLUCOSE SERPL-MCNC: 127 MG/DL (ref 65–140)
GLUCOSE SERPL-MCNC: 85 MG/DL (ref 65–140)
HCO3 BLDA-SCNC: 24.8 MMOL/L (ref 24–30)
HCT VFR BLD CALC: 35 % (ref 34.8–46.1)
HGB BLDA-MCNC: 11.9 G/DL (ref 11.5–15.4)
PCO2 BLD: 26 MMOL/L (ref 21–32)
PCO2 BLD: 45.8 MM HG (ref 42–50)
PH BLD: 7.34 [PH] (ref 7.3–7.4)
PO2 BLD: 47 MM HG (ref 35–45)
POTASSIUM BLD-SCNC: 3.4 MMOL/L (ref 3.5–5.3)
SAO2 % BLD FROM PO2: 80 % (ref 95–98)
SODIUM BLD-SCNC: 143 MMOL/L (ref 136–145)
SPECIMEN SOURCE: ABNORMAL

## 2019-08-01 PROCEDURE — 85014 HEMATOCRIT: CPT

## 2019-08-01 PROCEDURE — 29827 SHO ARTHRS SRG RT8TR CUF RPR: CPT | Performed by: PHYSICIAN ASSISTANT

## 2019-08-01 PROCEDURE — 82947 ASSAY GLUCOSE BLOOD QUANT: CPT

## 2019-08-01 PROCEDURE — 82803 BLOOD GASES ANY COMBINATION: CPT

## 2019-08-01 PROCEDURE — C9290 INJ, BUPIVACAINE LIPOSOME: HCPCS | Performed by: ANESTHESIOLOGY

## 2019-08-01 PROCEDURE — C1713 ANCHOR/SCREW BN/BN,TIS/BN: HCPCS | Performed by: ORTHOPAEDIC SURGERY

## 2019-08-01 PROCEDURE — 82330 ASSAY OF CALCIUM: CPT

## 2019-08-01 PROCEDURE — 29827 SHO ARTHRS SRG RT8TR CUF RPR: CPT | Performed by: ORTHOPAEDIC SURGERY

## 2019-08-01 PROCEDURE — 84295 ASSAY OF SERUM SODIUM: CPT

## 2019-08-01 PROCEDURE — 84132 ASSAY OF SERUM POTASSIUM: CPT

## 2019-08-01 PROCEDURE — 82948 REAGENT STRIP/BLOOD GLUCOSE: CPT

## 2019-08-01 DEVICE — SPEEDBRG IMP SYS W/BIO-COMP SWVLK
Type: IMPLANTABLE DEVICE | Site: SHOULDER | Status: FUNCTIONAL
Brand: ARTHREX®

## 2019-08-01 RX ORDER — SULFAMETHOXAZOLE AND TRIMETHOPRIM 800; 160 MG/1; MG/1
1 TABLET ORAL EVERY 12 HOURS SCHEDULED
Qty: 2 TABLET | Refills: 0 | Status: SHIPPED | OUTPATIENT
Start: 2019-08-01 | End: 2019-08-02

## 2019-08-01 RX ORDER — DEXAMETHASONE SODIUM PHOSPHATE 4 MG/ML
INJECTION, SOLUTION INTRA-ARTICULAR; INTRALESIONAL; INTRAMUSCULAR; INTRAVENOUS; SOFT TISSUE AS NEEDED
Status: DISCONTINUED | OUTPATIENT
Start: 2019-08-01 | End: 2019-08-01 | Stop reason: SURG

## 2019-08-01 RX ORDER — PROPOFOL 10 MG/ML
INJECTION, EMULSION INTRAVENOUS AS NEEDED
Status: DISCONTINUED | OUTPATIENT
Start: 2019-08-01 | End: 2019-08-01 | Stop reason: SURG

## 2019-08-01 RX ORDER — FENTANYL CITRATE/PF 50 MCG/ML
25 SYRINGE (ML) INJECTION
Status: DISCONTINUED | OUTPATIENT
Start: 2019-08-01 | End: 2019-08-01 | Stop reason: HOSPADM

## 2019-08-01 RX ORDER — BUPIVACAINE HYDROCHLORIDE 2.5 MG/ML
INJECTION, SOLUTION INFILTRATION; PERINEURAL
Status: COMPLETED | OUTPATIENT
Start: 2019-08-01 | End: 2019-08-01

## 2019-08-01 RX ORDER — FENTANYL CITRATE 50 UG/ML
INJECTION, SOLUTION INTRAMUSCULAR; INTRAVENOUS
Status: COMPLETED | OUTPATIENT
Start: 2019-08-01 | End: 2019-08-01

## 2019-08-01 RX ORDER — CLINDAMYCIN PHOSPHATE 900 MG/50ML
900 INJECTION INTRAVENOUS ONCE
Status: COMPLETED | OUTPATIENT
Start: 2019-08-01 | End: 2019-08-01

## 2019-08-01 RX ORDER — SODIUM CHLORIDE, SODIUM LACTATE, POTASSIUM CHLORIDE, CALCIUM CHLORIDE 600; 310; 30; 20 MG/100ML; MG/100ML; MG/100ML; MG/100ML
50 INJECTION, SOLUTION INTRAVENOUS CONTINUOUS
Status: DISCONTINUED | OUTPATIENT
Start: 2019-08-01 | End: 2019-08-01 | Stop reason: HOSPADM

## 2019-08-01 RX ORDER — PROPOFOL 10 MG/ML
INJECTION, EMULSION INTRAVENOUS CONTINUOUS PRN
Status: DISCONTINUED | OUTPATIENT
Start: 2019-08-01 | End: 2019-08-01 | Stop reason: SURG

## 2019-08-01 RX ORDER — LIDOCAINE HYDROCHLORIDE 10 MG/ML
INJECTION, SOLUTION INFILTRATION; PERINEURAL AS NEEDED
Status: DISCONTINUED | OUTPATIENT
Start: 2019-08-01 | End: 2019-08-01 | Stop reason: SURG

## 2019-08-01 RX ORDER — OXYCODONE HYDROCHLORIDE 5 MG/1
5 TABLET ORAL EVERY 6 HOURS PRN
Qty: 28 TABLET | Refills: 0 | Status: SHIPPED | OUTPATIENT
Start: 2019-08-01 | End: 2019-08-08

## 2019-08-01 RX ORDER — LIDOCAINE HYDROCHLORIDE 10 MG/ML
INJECTION, SOLUTION INFILTRATION; PERINEURAL
Status: COMPLETED | OUTPATIENT
Start: 2019-08-01 | End: 2019-08-01

## 2019-08-01 RX ORDER — SODIUM CHLORIDE, SODIUM LACTATE, POTASSIUM CHLORIDE, AND CALCIUM CHLORIDE .6; .31; .03; .02 G/100ML; G/100ML; G/100ML; G/100ML
IRRIGANT IRRIGATION AS NEEDED
Status: DISCONTINUED | OUTPATIENT
Start: 2019-08-01 | End: 2019-08-01 | Stop reason: HOSPADM

## 2019-08-01 RX ORDER — OXYCODONE HYDROCHLORIDE AND ACETAMINOPHEN 5; 325 MG/1; MG/1
1 TABLET ORAL EVERY 4 HOURS PRN
Status: DISCONTINUED | OUTPATIENT
Start: 2019-08-01 | End: 2019-08-01 | Stop reason: HOSPADM

## 2019-08-01 RX ORDER — SENNOSIDES 8.6 MG
650 CAPSULE ORAL EVERY 8 HOURS PRN
Qty: 30 TABLET | Refills: 0 | Status: SHIPPED | OUTPATIENT
Start: 2019-08-01 | End: 2019-10-25

## 2019-08-01 RX ORDER — NEOSTIGMINE METHYLSULFATE 1 MG/ML
INJECTION INTRAVENOUS AS NEEDED
Status: DISCONTINUED | OUTPATIENT
Start: 2019-08-01 | End: 2019-08-01 | Stop reason: SURG

## 2019-08-01 RX ORDER — METOCLOPRAMIDE HYDROCHLORIDE 5 MG/ML
10 INJECTION INTRAMUSCULAR; INTRAVENOUS ONCE AS NEEDED
Status: DISCONTINUED | OUTPATIENT
Start: 2019-08-01 | End: 2019-08-01 | Stop reason: HOSPADM

## 2019-08-01 RX ORDER — ROCURONIUM BROMIDE 10 MG/ML
INJECTION, SOLUTION INTRAVENOUS AS NEEDED
Status: DISCONTINUED | OUTPATIENT
Start: 2019-08-01 | End: 2019-08-01 | Stop reason: SURG

## 2019-08-01 RX ORDER — GLYCOPYRROLATE 0.2 MG/ML
INJECTION INTRAMUSCULAR; INTRAVENOUS AS NEEDED
Status: DISCONTINUED | OUTPATIENT
Start: 2019-08-01 | End: 2019-08-01 | Stop reason: SURG

## 2019-08-01 RX ORDER — ONDANSETRON 2 MG/ML
4 INJECTION INTRAMUSCULAR; INTRAVENOUS ONCE AS NEEDED
Status: DISCONTINUED | OUTPATIENT
Start: 2019-08-01 | End: 2019-08-01 | Stop reason: HOSPADM

## 2019-08-01 RX ORDER — MIDAZOLAM HYDROCHLORIDE 1 MG/ML
INJECTION INTRAMUSCULAR; INTRAVENOUS
Status: COMPLETED | OUTPATIENT
Start: 2019-08-01 | End: 2019-08-01

## 2019-08-01 RX ORDER — SODIUM CHLORIDE, SODIUM LACTATE, POTASSIUM CHLORIDE, CALCIUM CHLORIDE 600; 310; 30; 20 MG/100ML; MG/100ML; MG/100ML; MG/100ML
125 INJECTION, SOLUTION INTRAVENOUS CONTINUOUS
Status: DISCONTINUED | OUTPATIENT
Start: 2019-08-01 | End: 2019-08-01

## 2019-08-01 RX ORDER — EPHEDRINE SULFATE 50 MG/ML
INJECTION INTRAVENOUS AS NEEDED
Status: DISCONTINUED | OUTPATIENT
Start: 2019-08-01 | End: 2019-08-01 | Stop reason: SURG

## 2019-08-01 RX ADMIN — GLYCOPYRROLATE 0.4 MG: 0.2 INJECTION, SOLUTION INTRAMUSCULAR; INTRAVENOUS at 15:24

## 2019-08-01 RX ADMIN — FENTANYL CITRATE 25 MCG: 50 INJECTION, SOLUTION INTRAMUSCULAR; INTRAVENOUS at 14:29

## 2019-08-01 RX ADMIN — PHENYLEPHRINE HYDROCHLORIDE 30 MCG/MIN: 10 INJECTION INTRAVENOUS at 12:48

## 2019-08-01 RX ADMIN — FENTANYL CITRATE 50 MCG: 50 INJECTION, SOLUTION INTRAMUSCULAR; INTRAVENOUS at 12:26

## 2019-08-01 RX ADMIN — ROCURONIUM BROMIDE 40 MG: 10 INJECTION INTRAVENOUS at 12:27

## 2019-08-01 RX ADMIN — LIDOCAINE HYDROCHLORIDE 3 ML: 10 INJECTION, SOLUTION INFILTRATION; PERINEURAL at 11:11

## 2019-08-01 RX ADMIN — EPHEDRINE SULFATE 5 MG: 50 INJECTION, SOLUTION INTRAVENOUS at 13:14

## 2019-08-01 RX ADMIN — BUPIVACAINE HYDROCHLORIDE 15 ML: 2.5 INJECTION, SOLUTION INFILTRATION; PERINEURAL at 11:11

## 2019-08-01 RX ADMIN — ROCURONIUM BROMIDE 10 MG: 10 INJECTION INTRAVENOUS at 14:24

## 2019-08-01 RX ADMIN — SODIUM CHLORIDE, SODIUM LACTATE, POTASSIUM CHLORIDE, AND CALCIUM CHLORIDE 125 ML/HR: .6; .31; .03; .02 INJECTION, SOLUTION INTRAVENOUS at 10:31

## 2019-08-01 RX ADMIN — MIDAZOLAM HYDROCHLORIDE 2 MG: 1 INJECTION, SOLUTION INTRAMUSCULAR; INTRAVENOUS at 11:11

## 2019-08-01 RX ADMIN — DEXAMETHASONE SODIUM PHOSPHATE 4 MG: 4 INJECTION, SOLUTION INTRAMUSCULAR; INTRAVENOUS at 12:37

## 2019-08-01 RX ADMIN — EPHEDRINE SULFATE 5 MG: 50 INJECTION, SOLUTION INTRAVENOUS at 13:11

## 2019-08-01 RX ADMIN — PROPOFOL 150 MG: 10 INJECTION, EMULSION INTRAVENOUS at 12:33

## 2019-08-01 RX ADMIN — PROPOFOL 100 MCG/KG/MIN: 10 INJECTION, EMULSION INTRAVENOUS at 12:48

## 2019-08-01 RX ADMIN — LIDOCAINE HYDROCHLORIDE 50 MG: 10 INJECTION, SOLUTION INFILTRATION; PERINEURAL at 12:26

## 2019-08-01 RX ADMIN — FENTANYL CITRATE 100 MCG: 50 INJECTION, SOLUTION INTRAMUSCULAR; INTRAVENOUS at 11:11

## 2019-08-01 RX ADMIN — FENTANYL CITRATE 25 MCG: 50 INJECTION, SOLUTION INTRAMUSCULAR; INTRAVENOUS at 13:02

## 2019-08-01 RX ADMIN — CLINDAMYCIN PHOSPHATE 900 MG: 18 INJECTION, SOLUTION INTRAMUSCULAR; INTRAVENOUS at 12:39

## 2019-08-01 RX ADMIN — NEOSTIGMINE METHYLSULFATE 3 MG: 1 INJECTION, SOLUTION INTRAVENOUS at 15:24

## 2019-08-01 NOTE — INTERVAL H&P NOTE
H&P reviewed  After examining the patient I find no changes in the patients condition since the H&P had been written    BP (!) 189/78   Pulse 70   Temp 97 7 °F (36 5 °C) (Temporal)   Resp 17   Ht 5' (1 524 m)   Wt 64 kg (141 lb 1 5 oz)   SpO2 99%   BMI 27 56 kg/m²

## 2019-08-01 NOTE — OR NURSING
Intra-op input and output notes:  Input:   (6) 3000 ml bags of LR with 1% epi   (4) 3000 ml bags of LR plain  Additional 1800 ml LR plain  Total input:  31,800 ml  Output:  1 full [place] measured at 29,000 ml  Approximate 2800 ml deficit

## 2019-08-01 NOTE — OP NOTE
OPERATIVE REPORT    Patient Name: Yakelin Martinez    :  1940  MRN: [id number]  Pt Location: [facility] OR ROOM 03    Surgery Date:   2019    Surgeon(s) and Role:  Diogenes Graff MD - Primary  Santhosh López PA-C - Assisting    Preop Diagnosis:  Tear of right rotator cuff  Biceps tendon tear    Post-Op Diagnosis:  Tear of right rotator cuff  Biceps tendon tear    Procedure(s) (LRB):  SHOULDER ARTHROSCOPIC ROTATOR CUFF REPAIR (Right)    Specimen(s):  N6ne    Estimated Blood Loss:   Minimal    Drains:  N6ne    Implants:   Implant Name  Lot No  LRB No  Used   ANCHOR SUT 4 75 X 19 1MM W/CLD EYELET Roberth Couch - [id number] [address] [id number] Right [address] W/4 75 BCMPS Roman Michel [id number] [address] [id number] Right 1       Anesthesia Type:   General w/ Interscalene Block    Operative Indications:  28-year-old female status post fall onto right shoulder developed pain and weakness status post fall and lost overhead active range of motion  This did not respond to therapy or steroid injection  MRI revealed full-thickness supraspinatus tear and biceps tendon tear  We reviewed risks and benefits of rotator cuff repair and possible biceps tenodesis again with the granddaughter and patient  Granddaughter was   Perioperative course and need for rehab discussed  All questions answered patient and granddaughter indicated that they understood and wished to proceed  Operative Findings:  Complete biceps tendon tear with tendon retracted out of the joint  Complete supraspinatus tear with retraction  Tendon very degenerative and some delamination  However tendon mobile enough to cover most of greater tuberosity  Partial tearing of superior fibers subscapularis  Osteoporotic bone    Complications:   One anchor malfunction  Bone did not hold 1 anchor and it needed to be moved to a new site    Procedure and Technique:  Right shoulder marked in the preoperative area  Procedure and risks reviewed with family member/ and patient and all questions answered  Family member and patient wished to proceed  Anesthesia performed a regional block in preop area  Patient taken to the operating room  She received prophylactic IV Cleocin  She received general anesthesia  She was placed on the operating room table in the beach chair position  Right shoulder free  Head held with a well-padded head page  All pressure points checked  The right shoulder was examined under anesthesia  There was full passive range of motion  Load shift testing was negative  Sulcus sign was negative  The right shoulder and arm were prepped and draped in the usual sterile fashion  Right upper extremity positioned  with a spider forearm page  Time-out performed  Posterior portal established and arthroscope introduced into the joint  Glenoid and humeral articular surfaces had some articular cartilage thinning  No significant chondral lesions  Anterior labrum was degenerative but intact  Superior labrum reveal absence of biceps tendon with complete tear and retraction out of the joint  We decided to except the tenotomy and not search for the torn tendon  The tear point was just lateral to the anchor  The remainder of the labrum was degenerative but intact  As was the posterior labrum  Subscapularis was largely intact although there was partial-thickness tearing of the superior fibers  There was a complete tear of the supraspinatus with retraction  This tear went into the superior fibers of the infraspinatus  But the rotator cable anchor areas were intact  An anterior portal was established and full-radius resector utilized to debride synovitis and clear the area around the subscapularis    An anterior lateral portal was established and utilized to place a FiberTape suture through the superior fibers of the subscapularis with a scorpion suture Passer as we initially intended to reinforce the subscapularis insertion in this area  The FiberTape ends were then pulled out the anterior cannula  We then used a punch through the anterior cannula to make a starter hole in the superior aspect of the lesser tuberosity  However we were concerned about the quality of the bone when we made this starter hole and as we started to place an anchor and we decided that the subscapularis insertion was solid enough without reinforcement and that it was not worth the risk of anchor pullout  The anchor we started to place was removed  The primary shoulder pathology was felt to be the complete tear of the supraspinatus and it was next addressed  Scope was placed in the subacromial bursa  Lateral and posterior lateral portals were established  Bursectomy performed with resector and arthro Wand  Jignesh Grayson was used to prepare the surface of the tuberosity and remove residual soft tissue  We used a rotator cuff grasper to determine mobility of the cuff  We were able to pull it back over the tuberosity without excess tension  Therefore we decided to proceed with speed bridge double row repair  Our anterior medial anchor was placed 1st   Punch was utilized through the anterior lateral portal   We tried to re-use the anchor that was used on the lesser tuberosity but this malfunctioned and did not seat fully and had to be removed  A speed bridge kit was opened and a 4 75 SwiveLock anchor with FiberTape was placed  The FiberTape was then passed through the more anterior aspect of the supraspinatus tendon and pulled out the lateral portal   The tail was cut and the 2 FiberTape ends were pulled out the anterolateral portal  Next a punch was utilized to make a start hole for the posterior medial 4 75 SwiveLock anchor with FiberTape    The FiberTape was then again placed through the supraspinatus tendon this time more posteriorly and the ends cut and pulled out the lateral portal   Then 1 of the posterior anchor tape ends was pulled out the anterior lateral portal and 1 of the anterior anchor tape and pulled out the lateral portal   We then proceeded with clearing an area for an anterior lateral anchor  A punch was utilized and we again had concerns about bone quality  In fact after placing the fiber tapes through a 3rd SwiveLock anchor and removing the punch we obtained no fixation with the swivel lock anchor  It pulled out of bone with little resistance  Consequently we elected to place our SwiveLock anchors further lateral   I did have some trouble with visualization in this area and elected to increase the length of the lateral portal 1 cm and this allowed placement of Starr retractors and direct visualization of the greater tuberosity  A punch was utilized to make the starting hole  The swivel lock anchor was placed under direct visualization  Tapes were tensioned under direct visualization and the anchor had good purchase as it advanced  We then slightly internally rotated the shoulder exposing the 2nd set of suture tapes  They were pulled out the lateral portal and this process was repeated  Good coverage of the tuberosity and acceptable fixation achieved  Portals were closed with Vicryl and nylon stitches  Dry sterile dressing applied  Shoulder immobilizer applied  Patient tolerated the procedure well  Minimal blood loss  She went to recovery in stable condition  Georgina Joy PA-C assisted throughout the case with arm position and exposure and use of the arthroscope  I was present for the entire case          Patient Disposition:  PACU     SIGNATURE: Tatum Bryant MD  DATE: August 1, 2019  TIME: 5:49 PM      Portions of the record may have been created with voice recognition software   Occasional wrong word or "sound a like" substitutions may have occurred due to the inherent limitations of voice recognition software   Read the chart carefully and recognize, using context, where substitutions have occurred

## 2019-08-01 NOTE — INTERVAL H&P NOTE
H&P reviewed  After examining the patient I find no changes in the patients condition since the H&P had been written  I reviewed risks and benefits of procedure with patient and daughter and answered all questions  Patient and daughter understand procedure and wish to proceed   Extremity marked      Dilma Tom MD

## 2019-08-01 NOTE — ANESTHESIA PROCEDURE NOTES
Peripheral Block    Patient location during procedure: holding area  Start time: 8/1/2019 11:10 AM  Reason for block: at surgeon's request and post-op pain management  Staffing  Anesthesiologist: Alexandra Eubanks MD  Performed: anesthesiologist   Preanesthetic Checklist  Completed: patient identified, site marked, surgical consent, pre-op evaluation, timeout performed, IV checked, risks and benefits discussed and monitors and equipment checked  Peripheral Block  Patient position: supine  Prep: ChloraPrep  Patient monitoring: continuous pulse ox, frequent blood pressure checks, heart rate and cardiac monitor  Block type: interscalene  Laterality: right  Injection technique: single-shot  Procedures: ultrasound guided, Ultrasound guidance required for the procedure to increase accuracy and safety of medication placement and decrease risk of complications   and nerve stimulator  Ultrasound permanent image savedbupivacaine (MARCAINE) 0 25 % perineural infiltration, 15 mL  midazolam (VERSED) 2 mg/2 mL IV, 2 mg  fentaNYL 50 mcg/mL IV, 100 mcg  lidocaine (XYLOCAINE) 1 % infiltration, 3 mL  Needle  Needle type: Stimuplex   Needle gauge: 22 G  Needle length: 5 cm  Needle localization: ultrasound guidance and nerve stimulator  Needle insertion depth: 2 cm  Test dose: negative  Assessment  Injection assessment: incremental injection and local visualized surrounding nerve on ultrasound  Paresthesia pain: none  Heart rate change: no  Slow fractionated injection: yes  Post-procedure:  site cleaned  patient tolerated the procedure well with no immediate complications  Additional Notes  exparel 10 cc used

## 2019-08-01 NOTE — ANESTHESIA POSTPROCEDURE EVALUATION
Post-Op Assessment Note    CV Status:  Stable  Pain Score: 0    Pain management: adequate     Mental Status:  Alert and awake   Hydration Status:  Euvolemic   PONV Controlled:  Controlled   Airway Patency:  Patent and adequate   Post Op Vitals Reviewed: Yes      Staff: CRNA           /56 (08/01/19 1545)    Temp (!) 96 6 °F (35 9 °C) (08/01/19 1545)    Pulse 89 (08/01/19 1545)   Resp 18 (08/01/19 1545)    SpO2 99 % (08/01/19 1545)

## 2019-08-01 NOTE — DISCHARGE INSTRUCTIONS
Postoperative Instructions Rotator Cuff Repair    Wound care  Please leave dressing on for 3 days  You may shower in 3 days after removal of dressing  Therapy  No physical therapy will be started at this point  We will discuss this at your first postoperative follow-up  For now no active range of motion of the shoulder except pendulum exercises (dangling the arm at your side and moving the extremity in small circles clockwise and counterclockwise) Active range of motion of elbow, wrist and hand is allowed and encouraged  Again no active range of motion of the shoulder except for pendulum exercises  Wear your shoulder Immobilizer at all times except for pendulum exercises and to shower    Weight-bearing status  No weight bearing on the right upper extremity  No lifting with right upper extremity  Medications  You may start taking your home medications as regularly directed  Narcotic medication is prescribed for you  Use the narcotic medication as needed  Do not drive while taking narcotic medications  Try to reduce your use of narcotic pain medication as soon as your comfort allows  If the pain does not require narcotic medication, you may take Tylenol or ibuprofen to help if you are allowed to take these medications    Emergency instructions  Give the office a call at [phone] if you experience any of the following:  o Fever greater than 101 5  o Increased drainage  o Increased pain    Ice  You should apply a bag of  ice to the right shoulder for 30 minutes at a time approximately 4 times a day until follow up     Follow-up  A follow-up appointment should have been made while scheduling surgery  If one was not scheduled, please call [phone] and schedule an appointment in 1 week with Dr Evita Mendez

## 2019-08-02 RX ORDER — AMLODIPINE BESYLATE 5 MG/1
TABLET ORAL
Qty: 30 TABLET | Refills: 1 | Status: SHIPPED | OUTPATIENT
Start: 2019-08-02 | End: 2019-10-23 | Stop reason: SDUPTHER

## 2019-08-02 RX ORDER — INSULIN GLARGINE 100 [IU]/ML
INJECTION, SOLUTION SUBCUTANEOUS
Qty: 5 PEN | Refills: 5 | Status: SHIPPED | OUTPATIENT
Start: 2019-08-02 | End: 2019-10-23 | Stop reason: SDUPTHER

## 2019-08-05 ENCOUNTER — TELEPHONE (OUTPATIENT)
Dept: OBGYN CLINIC | Facility: HOSPITAL | Age: 79
End: 2019-08-05

## 2019-08-05 ENCOUNTER — PATIENT OUTREACH (OUTPATIENT)
Dept: FAMILY MEDICINE CLINIC | Facility: CLINIC | Age: 79
End: 2019-08-05

## 2019-08-05 NOTE — TELEPHONE ENCOUNTER
Please be advise I spoke with Solange Ang a  with [facility] who stated the pt  Primary caregiver is her grand daughter who needs help with pt  Care   is requesting a referral for a nurse to come out to the home and for a home health aid   has concerns that pt  Will not be getting the care she needs to have a safe and full recovery from surgery   Please advise

## 2019-08-05 NOTE — PROGRESS NOTES
Outpatient Care Management Note:    8/5/19-  Contacted pts granddaughter, Sam Rivera, to f/u on multiple things we were working on  Waleska reported that [facility] did come to the home last week and that she didn't qualify for any home care  But today, they called her again to ask her if they can come on Thursday again to do another assessment  Pt had rotator cuff repair on 8/1 and Sam Rivera reported that things are going "ok" but that she has to be with pt all the time due to balance issues  [facility] has still not contacted Waleska  I contacted [facility]  Spoke to [facility] who explained that they can't begin PT until after first post op appt and they also can not provide nurse or home health aide care before starting PT  Contacted orthopedic office nurse  Explained situation to her  She is going to contact Dr Hilaria Arreaga and have them put an order in for nursing before PT begins  Waleska reported on our first call that she needed her bathtub to be converted into a walk-in shower  I gave her the name and number for [facility] who could not help her unless she would pay full price which she could not afford  I suggested a shower chair glider for pt to assist in getting her in and out of tub  Waleska expressed that she can not afford same  [address] 71  to see if this was something they cover  Spoke to [address] who let me know that they do not cover shower chair glider or regular shower chair  Leopold Class again to make her aware of all above info  Will f/u on Friday to assure all things are going smoothly  8/6/19-  Received call from Meek from [facility]  They can not put order in for [facility] for nursing services in regards to her rotator cuff surgery and said primary physician order would be best   Same already in computer  Contacted [facility] to discuss case and to see if they can at least provide PT for her gait instability for now  Spoke to intake personnel and was transferred to PT supervisor  No answer, voicemail left  Awaiting return call  8/7/19, 11:00-  Received voicemail from Hunter Benítez from [facility]  Contacted her and spoke to her regarding physical therapy beginning for gait instability before rehab for rotator cuff  Previous order for [facility] was closed due to improper verbage on referral   Messaged Dr Mona Ahmadi to put referral in again with correct verbage  Awaiting return message for confirmation  8/7/19, 4:15pm-  Received message from Dr Mona Ahmadi that [facility] referral was placed  8/9/19-  Contacted pts granddaughter, Azell Buerger, to f/u  She reported that [facility] contacted her and took some info and are going to appoint a PT to start with pt soon  They are to contact her again with further info  She also reported that [facility] were to the home yesterday and are working on trying to assist with bathroom modifications and an advanced program of some sort for pt  Azell Buerger is in the process of providing paperwork to them and they are also sending her paperwork to fill out  Will f/u next week  8/15/19-  Met with pt and granddaughter, Azni Aileenbryav, in office  Pt appeared extremely well and was pleasant  Waleska reported that [facility] was to their home yesterday to assess pt  He will be going to home 2 times weekly for 4 weeks to do PT  Nurse also found no aricept in home  Waleska reported that pt is on Memantine for her memory, not aricept  According to neuro notes, pt is supposed to be on both  Spoke to Azell Buerger about this and she wanted to keep pt on Memantine only for now, as she has been doing well on it  Did offer to contact neuro regarding this and Azell Buerger reported that she doesn't think that's necessary because pt has been doing well without the Aricept  She will think about whether or not she wants to contact them regarding this  Pt was also advised by the PT to use a quad cane for safer ambulating  PCP did give rx to Azell Buerger  Contacted [facility] to see if they supply them and they do not    Contacted [facility] and they do provide them  They need paperwork faxed to [phone] (rx, demographics, insurance info)  Same faxed  Contacted Waleska to make her aware  Will f/u next week

## 2019-08-06 NOTE — TELEPHONE ENCOUNTER
Spoke to Sofia and explained per you note  She will reach out to the PCP to see if pt  Has any nursing needs from a medical standpoint       Thanks

## 2019-08-06 NOTE — TELEPHONE ENCOUNTER
Good morning,    She had rotator cuff surgery, what does she need a home health nurse for? We don't want any therapy done for shoulder, we leave dressing on until her post-op visit  She has no restrictions on walking and can use opposite hand for all hygiene purposes  If they still want home care nurse to come out I will put a prescription in just not sure what they will be doing for her and not sure if insurance will cover it    Let me know    Juan Carlos Goodwin

## 2019-08-07 DIAGNOSIS — R26.9 GAIT DISTURBANCE: ICD-10-CM

## 2019-08-07 DIAGNOSIS — R26.2 AMBULATORY DYSFUNCTION: Primary | ICD-10-CM

## 2019-08-07 DIAGNOSIS — E03.9 HYPOTHYROIDISM, UNSPECIFIED TYPE: ICD-10-CM

## 2019-08-07 RX ORDER — LEVOTHYROXINE SODIUM 88 UG/1
88 TABLET ORAL DAILY
Qty: 90 TABLET | Refills: 0 | Status: SHIPPED | OUTPATIENT
Start: 2019-08-07 | End: 2019-11-01 | Stop reason: SDUPTHER

## 2019-08-09 ENCOUNTER — OFFICE VISIT (OUTPATIENT)
Dept: OBGYN CLINIC | Facility: CLINIC | Age: 79
End: 2019-08-09

## 2019-08-09 VITALS
WEIGHT: 140.4 LBS | DIASTOLIC BLOOD PRESSURE: 62 MMHG | RESPIRATION RATE: 18 BRPM | BODY MASS INDEX: 27.56 KG/M2 | HEIGHT: 60 IN | HEART RATE: 56 BPM | SYSTOLIC BLOOD PRESSURE: 156 MMHG

## 2019-08-09 DIAGNOSIS — Z98.890 STATUS POST RIGHT ROTATOR CUFF REPAIR: Primary | ICD-10-CM

## 2019-08-09 PROCEDURE — 99024 POSTOP FOLLOW-UP VISIT: CPT | Performed by: PHYSICIAN ASSISTANT

## 2019-08-09 NOTE — PROGRESS NOTES
CHIEF COMPLAINT:   Chief Complaint   Patient presents with    Right Shoulder - Post-op, Pain         PROCEDURE: S/P right rotator cuff repair August 1, 2019      SUBJECTIVE: Gina Brittle is a 67 y o  female is here for follow up  She enters with her granddaughter for translation  She is wearing the shoulder immobilizer with the abduction pillow  Patient having difficulty sleeping and sleeping in a recliner  Denies any radiating pain down the arms  Denies any numbness or tingling  Patient has not yet started physical therapy  Granddaughter tells me that it might be difficult for her to attend outpatient physical therapy due to transportation issues    She would rather start with home therapy if possible      ROS:  Review of systems form reviewed August 9, 2019  General: no fever, no chills  Respiratory:  No coughing, shortness of breath or wheezing  Cardiovascular:  No chest pain, no palpitations  Neurological:  No headaches, no confusion  Review of all other systems is negative    MEDS:   Current Outpatient Medications   Medication Sig Dispense Refill    acetaminophen (TYLENOL) 650 mg CR tablet Take 1 tablet (650 mg total) by mouth every 8 (eight) hours as needed for mild pain 30 tablet 0    amLODIPine (NORVASC) 5 mg tablet Take 1 tablet (5 mg total) by mouth daily (Patient taking differently: Take by mouth daily ) 30 tablet 0    amLODIPine (NORVASC) 5 mg tablet TAKE 1 TABLET BY MOUTH EVERY DAY 30 tablet 1    Blood Glucose Monitoring Suppl (ONETOUCH VERIO) w/Device KIT Use as directed  0    Blood Pressure KIT by Does not apply route daily 1 each 1    Canagliflozin (INVOKANA) 300 MG TABS Take 1 tablet (300 mg total) by mouth daily 90 tablet 3    donepezil (ARICEPT) 10 mg tablet TAKE 1 TABLET (10 MG TOTAL) BY MOUTH DAILY AT BEDTIME 30 tablet 0    fenofibrate (TRIGLIDE) 160 MG tablet Take 160 mg by mouth daily      glucose blood (ONETOUCH VERIO) test strip TEST TWICE A  each 2    insulin glargine (LANTUS SOLOSTAR) 100 units/mL injection pen Inject 28 Units under the skin daily at bedtime for 30 days 5 pen 5    Insulin Pen Needle (BD PEN NEEDLE TOPHER U/F) 32G X 4 MM MISC by Does not apply route 2 (two) times a day 100 each 5    LANTUS SOLOSTAR 100 units/mL injection pen INJECT 30 UNITS UNDER THE SKIN DAILY AT BEDTIME FOR 30 DAYS 5 pen 5    levothyroxine 88 mcg tablet Take 1 tablet (88 mcg total) by mouth daily for 90 days 90 tablet 0    lisinopril (ZESTRIL) 20 mg tablet Take 1 tablet (20 mg total) by mouth 2 (two) times a day (Patient taking differently: Take by mouth 2 (two) times a day ) 90 tablet 6    memantine (NAMENDA) 10 mg tablet TAKE 1 TABLET (10 MG TOTAL) BY MOUTH 2 (TWO) TIMES A DAY 60 tablet 3    naproxen (NAPROSYN) 500 mg tablet TAKE 1 TABLET (500 MG TOTAL) BY MOUTH 2 (TWO) TIMES A DAY WITH MEALS FOR 30 DAYS 60 tablet 1    ONETOUCH DELICA LANCETS FINE MISC USE 2 TIMES A DAY AS NEEDED FOR HIGH BLOOD SUGAR 100 each 0    simvastatin (ZOCOR) 20 mg tablet Take 20 mg by mouth daily at bedtime       No current facility-administered medications for this visit  ALLERGIES: Penicillins      Vitals:    08/09/19 1315   Resp: 18   Weight: 63 7 kg (140 lb 6 4 oz)   Height: 5' (1 524 m)       PHYSICAL EXAM:    General Appearance:  Alert, cooperative, no distress, appears stated age   Lungs:   respirations unlabored   Chest Wall:  No tenderness or deformity   Heart:  Normal Heart rate noted   Extremities: Extremities normal, atraumatic, no cyanosis or edema   Pulses: 2+ and symmetric   Neurologic: Normal     ORTHO EXAM:  Examination right shoulder shows several portal sites clean dry and intact with sutures which were removed today    Slight erythema around portal sites, no active drainage, no purulent discharge, no signs of infection  Range of motion and strength of the shoulder both deferred  Patient does have full active range of motion of the elbow wrist and hand  Sensation is intact to light touch right upper extremity    ASSESSMENT/PLAN:   S/p 8 days above procedure  There are no diagnoses linked to this encounter  There are no Patient Instructions on file for this visit  DISCUSSION SUMMARY:  Patient is S/P 8 days right rotator cuff repair  Sutures removed today Steri-Strips were applied  Patient instructed to keep incision clean and dry but she is allowed to shower  A prescription for outpatient physical therapy will be given to the patient today  I also placed home health prescription in the system so she can start with home therapy for now  I stressed again to the granddaughter that the patient should have no active motion of the right shoulder whatsoever on her own  The patient is allowed to come out of the sling while at home to shower and to work on range of motion of her elbow wrist and hand and to pendulums  Sling can also come off for physical therapy other than that she must wear she shoulder immobilizer at all times including sleeping    Patient to follow up with us in five weeks for re-evaluation and to advance her therapy protocol

## 2019-08-14 ENCOUNTER — TELEPHONE (OUTPATIENT)
Dept: OBGYN CLINIC | Facility: HOSPITAL | Age: 79
End: 2019-08-14

## 2019-08-14 NOTE — TELEPHONE ENCOUNTER
Paris Blake  [phone]    Reports started care for shoulder, pendulem passive only, ROM elbow, wrist and hands  Will be visiting 3 weeks

## 2019-08-15 ENCOUNTER — OFFICE VISIT (OUTPATIENT)
Dept: FAMILY MEDICINE CLINIC | Facility: CLINIC | Age: 79
End: 2019-08-15
Payer: COMMERCIAL

## 2019-08-15 VITALS
HEART RATE: 76 BPM | DIASTOLIC BLOOD PRESSURE: 68 MMHG | RESPIRATION RATE: 18 BRPM | SYSTOLIC BLOOD PRESSURE: 132 MMHG | HEIGHT: 60 IN | WEIGHT: 139.4 LBS | OXYGEN SATURATION: 93 % | BODY MASS INDEX: 27.37 KG/M2 | TEMPERATURE: 97.7 F

## 2019-08-15 DIAGNOSIS — IMO0002 TYPE 2 DIABETES, UNCONTROLLED, WITH NEUROPATHY: Primary | ICD-10-CM

## 2019-08-15 DIAGNOSIS — Z79.4 TYPE 2 DIABETES MELLITUS TREATED WITH INSULIN (HCC): ICD-10-CM

## 2019-08-15 DIAGNOSIS — R26.81 UNSTABLE GAIT: ICD-10-CM

## 2019-08-15 DIAGNOSIS — E11.9 TYPE 2 DIABETES MELLITUS TREATED WITH INSULIN (HCC): ICD-10-CM

## 2019-08-15 DIAGNOSIS — R41.3 MEMORY LOSS: ICD-10-CM

## 2019-08-15 LAB — SL AMB POCT HEMOGLOBIN AIC: 8.2 (ref ?–6.5)

## 2019-08-15 PROCEDURE — 99213 OFFICE O/P EST LOW 20 MIN: CPT | Performed by: FAMILY MEDICINE

## 2019-08-15 PROCEDURE — 83036 HEMOGLOBIN GLYCOSYLATED A1C: CPT | Performed by: FAMILY MEDICINE

## 2019-08-15 RX ORDER — DONEPEZIL HYDROCHLORIDE 10 MG/1
10 TABLET, FILM COATED ORAL
Qty: 90 TABLET | Refills: 3 | Status: SHIPPED | OUTPATIENT
Start: 2019-08-15 | End: 2020-04-21 | Stop reason: SDUPTHER

## 2019-08-15 NOTE — TELEPHONE ENCOUNTER
I saw this note after seeing the patient:  According to Dr Daily Bunn Neurology from office visit in 2018 visit it states she should be on both Aricept and Namenda daily  Also please advise her that amlodipine and simvastatin can lead to muscle aches however she is at the maximum dose for simvastatin 20 mg as long as we do not increase the simvastatin dose she should be ok advise pt thanks

## 2019-08-15 NOTE — TELEPHONE ENCOUNTER
Janay Faye called back to let you know there was an issue with meds  For anshul - 1  There was no aricept in the house  Family unsure how long it has been since patient last took it  Also hes showing a drug interaction between Amlodipine and Simvastatin    Please review and advise at ov today

## 2019-08-15 NOTE — PROGRESS NOTES
Patient's shoes and socks removed  Right Foot/Ankle   Right Foot Inspection  Skin Exam: skin normal, skin intact, callus and callus no dry skin, no warmth, no erythema, no maceration, no abnormal color, no pre-ulcer and no ulcer                          Toe Exam: ROM and strength within normal limitsno swelling, no tenderness, erythema and  no right toe deformity  Sensory   Vibration: diminished  Proprioception: diminished   Monofilament testing: diminished  Vascular  Capillary refills: < 3 seconds  The right DP pulse is 2+  The right PT pulse is 2+  Left Foot/Ankle  Left Foot Inspection  Skin Exam: skin normal, skin intact and callusno dry skin, no warmth, no erythema, no maceration, normal color, no pre-ulcer and no ulcer                         Toe Exam: ROM and strength within normal limitsno swelling, no tenderness, no erythema and no left toe deformity                   Sensory   Vibration: diminished  Proprioception: diminished  Monofilament: diminished  Vascular  Capillary refills: < 3 seconds  The left DP pulse is 2+  The left PT pulse is 2+  Assign Risk Category:  No deformity present; Loss of protective sensation;  No weak pulses       Risk: 1

## 2019-08-15 NOTE — PROGRESS NOTES
Assessment/Plan:    No problem-specific Assessment & Plan notes found for this encounter  Diagnoses and all orders for this visit:    Type 2 diabetes mellitus treated with insulin with peripheral Neuropathy (HCC)  -     POCT hemoglobin A1c, 8 2  Improved from her last HgbA1C continue same medications and dose  Given her decreased sensation on both feet as well as calluses she needs diabetes shoes  Unstable gait  -     Cane      Follow up in 3 months    Subjective:      Patient ID: Ros Rutherford is a 67 y o  female  Patient is here to follow-up for type 2 diabetes mellitus  She says that her blood glucose have been controlled at home  She has been taking her medications daily denies any side effects from them  She denies any symptoms such as polyuria polyphagia polydipsia  She denies any hypoglycemic symptoms at this time  She is also been having unstable gait at home she had a recent EMG study done of lower extremities with demonstrated peripheral neuropathy secondary to diabetes  She is also requesting diabetic shoes given history of neuropathy as well as calluses on both feet  The following portions of the patient's history were reviewed and updated as appropriate:   She  has a past medical history of Aggression, Alzheimer's dementia, Arthritis, Dementia, Diabetes insipidus ([address] ), Diabetes mellitus ([address] ), High cholesterol, Hypertension, Memory loss, Migraine, Polyneuropathy, Rheumatoid arthritis ([address] ), Serum lipids high, Stomach problems, and Thyroid trouble    She   Patient Active Problem List    Diagnosis Date Noted    Unstable gait 08/15/2019    Tear of right rotator cuff 07/23/2019    Biceps tendon tear 07/23/2019    Pre-op examination 07/18/2019    Nontraumatic tear of right rotator cuff 07/18/2019    Menopause ovarian failure 07/18/2019    Need for shingles vaccine 07/18/2019    Peripheral neuropathy 07/18/2019    Medicare annual wellness visit, subsequent 07/18/2019    Ambulatory dysfunction 07/18/2019    Hypothyroidism 05/13/2019    Essential hypertension 05/13/2019    Fall 05/13/2019    Acute pain of right shoulder 05/13/2019    SVT (supraventricular tachy[facility]) [facility] ) 05/13/2019    Earlobe lesion, bilateral 09/10/2018    Pain of both hip joints 09/10/2018    Need for influenza vaccination 09/10/2018    Back pain 09/10/2018    Anxiety 08/14/2018    Yeast infection 08/14/2018    Pruritic dermatitis 06/13/2018    Type 2 diabetes, uncontrolled, with neuropathy ([facility] [address] ) 06/07/2018    Alzheimer's dementia 05/03/2018    Memory loss 03/19/2018    Deformity of hand 03/19/2018    Bilateral hearing loss 02/22/2018    [facility] 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 [facility] 02/22/2018    Dementia with behavioral disturbance 02/15/2018    Chronic left shoulder pain 02/15/2018    Need for lipid screening 02/15/2018     She  has a past surgical history that includes Hysterectomy; Gallbladder surgery; Cataract extraction; and pr shldr arthroscop,surg,w/rotat cuff repr (Right, 8/1/2019)  Her family history includes Arthritis in her daughter; Blindness in her brother; Diabetes in her brother; HIV in her son; Mental illness in her father and mother; Substance Abuse in her father and mother  She  reports that she quit smoking about 34 years ago  She has never used smokeless tobacco  She reports that she does not drink alcohol or use drugs    Current Outpatient Medications   Medication Sig Dispense Refill    acetaminophen (TYLENOL) 650 mg CR tablet Take 1 tablet (650 mg total) by mouth every 8 (eight) hours as needed for mild pain 30 tablet 0    amLODIPine (NORVASC) 5 mg tablet Take 1 tablet (5 mg total) by mouth daily (Patient taking differently: Take by mouth daily ) 30 tablet 0    amLODIPine (NORVASC) 5 mg tablet TAKE 1 TABLET BY MOUTH EVERY DAY 30 tablet 1    Blood Glucose Monitoring Suppl (ONETOUCH VERIO) w/Device KIT Use as directed  0    Blood Pressure KIT by Does not apply route daily 1 each 1    Canagliflozin (INVOKANA) 300 MG TABS Take 1 tablet (300 mg total) by mouth daily 90 tablet 3    donepezil (ARICEPT) 10 mg tablet TAKE 1 TABLET (10 MG TOTAL) BY MOUTH DAILY AT BEDTIME 30 tablet 0    fenofibrate (TRIGLIDE) 160 MG tablet Take 160 mg by mouth daily      glucose blood (ONETOUCH VERIO) test strip TEST TWICE A  each 2    insulin glargine (LANTUS SOLOSTAR) 100 units/mL injection pen Inject 28 Units under the skin daily at bedtime for 30 days 5 pen 5    Insulin Pen Needle (BD PEN NEEDLE TOPHER U/F) 32G X 4 MM MISC by Does not apply route 2 (two) times a day 100 each 5    LANTUS SOLOSTAR 100 units/mL injection pen INJECT 30 UNITS UNDER THE SKIN DAILY AT BEDTIME FOR 30 DAYS 5 pen 5    levothyroxine 88 mcg tablet Take 1 tablet (88 mcg total) by mouth daily for 90 days 90 tablet 0    lisinopril (ZESTRIL) 20 mg tablet Take 1 tablet (20 mg total) by mouth 2 (two) times a day (Patient taking differently: Take by mouth 2 (two) times a day ) 90 tablet 6    memantine (NAMENDA) 10 mg tablet TAKE 1 TABLET (10 MG TOTAL) BY MOUTH 2 (TWO) TIMES A DAY 60 tablet 3    naproxen (NAPROSYN) 500 mg tablet TAKE 1 TABLET (500 MG TOTAL) BY MOUTH 2 (TWO) TIMES A DAY WITH MEALS FOR 30 DAYS 60 tablet 1    ONETOUCH DELICA LANCETS FINE MISC USE 2 TIMES A DAY AS NEEDED FOR HIGH BLOOD SUGAR 100 each 0    simvastatin (ZOCOR) 20 mg tablet Take 20 mg by mouth daily at bedtime       No current facility-administered medications for this visit        Current Outpatient Medications on File Prior to Visit   Medication Sig    acetaminophen (TYLENOL) 650 mg CR tablet Take 1 tablet (650 mg total) by mouth every 8 (eight) hours as needed for mild pain    amLODIPine (NORVASC) 5 mg tablet Take 1 tablet (5 mg total) by mouth daily (Patient taking differently: Take by mouth daily )    amLODIPine (NORVASC) 5 mg tablet TAKE 1 TABLET BY MOUTH EVERY DAY    Blood Glucose Monitoring Suppl ([place]) w/Device KIT Use as directed    Blood Pressure KIT by Does not apply route daily    Canagliflozin (INVOKANA) 300 MG TABS Take 1 tablet (300 mg total) by mouth daily    donepezil (ARICEPT) 10 mg tablet TAKE 1 TABLET (10 MG TOTAL) BY MOUTH DAILY AT BEDTIME    fenofibrate (TRIGLIDE) 160 MG tablet Take 160 mg by mouth daily    glucose blood (ONETOUCH VERIO) test strip TEST TWICE A DAY    insulin glargine (LANTUS SOLOSTAR) 100 units/mL injection pen Inject 28 Units under the skin daily at bedtime for 30 days    Insulin Pen Needle (BD PEN NEEDLE TOPHER U/F) 32G X 4 MM MISC by Does not apply route 2 (two) times a day    LANTUS SOLOSTAR 100 units/mL injection pen INJECT 30 UNITS UNDER THE SKIN DAILY AT BEDTIME FOR 30 DAYS    levothyroxine 88 mcg tablet Take 1 tablet (88 mcg total) by mouth daily for 90 days    lisinopril (ZESTRIL) 20 mg tablet Take 1 tablet (20 mg total) by mouth 2 (two) times a day (Patient taking differently: Take by mouth 2 (two) times a day )    memantine (NAMENDA) 10 mg tablet TAKE 1 TABLET (10 MG TOTAL) BY MOUTH 2 (TWO) TIMES A DAY    naproxen (NAPROSYN) 500 mg tablet TAKE 1 TABLET (500 MG TOTAL) BY MOUTH 2 (TWO) TIMES A DAY WITH MEALS FOR 30 DAYS    ONETOUCH DELICA LANCETS FINE MISC USE 2 TIMES A DAY AS NEEDED FOR HIGH BLOOD SUGAR    simvastatin (ZOCOR) 20 mg tablet Take 20 mg by mouth daily at bedtime     No current facility-administered medications on file prior to visit  She is allergic to penicillins       Review of Systems   Constitutional: Negative for activity change, appetite change, fatigue and fever  HENT: Negative for congestion and ear discharge  Respiratory: Negative for cough and shortness of breath  Cardiovascular: Negative for chest pain and palpitations  Gastrointestinal: Negative for diarrhea and nausea  Musculoskeletal: Negative for arthralgias and back pain  Skin: Negative for color change and rash     Neurological: Negative for dizziness and headaches  Psychiatric/Behavioral: Negative for agitation and behavioral problems  Objective:      /68 (BP Location: Left arm, Patient Position: Sitting, Cuff Size: Standard)   Pulse 76   Temp 97 7 °F (36 5 °C) (Tympanic)   Resp 18   Ht 5' (1 524 m)   Wt 63 2 kg (139 lb 6 4 oz)   SpO2 93%   BMI 27 22 kg/m²          Physical Exam   Constitutional: She is oriented to person, place, and time  She appears well-developed and well-nourished  No distress  HENT:   Head: Normocephalic and atraumatic  Nose: Nose normal    Mouth/Throat: Oropharynx is clear and moist    Eyes: Pupils are equal, round, and reactive to light  Conjunctivae are normal    Cardiovascular: Normal rate, regular rhythm and normal heart sounds  No murmur heard  Pulmonary/Chest: Effort normal and breath sounds normal  No respiratory distress  She has no wheezes  Abdominal: Soft  Bowel sounds are normal  She exhibits no distension  There is no tenderness  Musculoskeletal: She exhibits no edema, tenderness or deformity  Bilateral calluses noted on both feet   Neurological: She is alert and oriented to person, place, and time  A sensory deficit is present  Skin: Skin is warm and dry  No rash noted  She is not diaphoretic  No erythema  Psychiatric: She has a normal mood and affect

## 2019-08-15 NOTE — TELEPHONE ENCOUNTER
[facility] called in an stated he will see patient for a few visits however she scores really well on the testing  With he fall history he did advise a cane which should help   DORA

## 2019-08-22 ENCOUNTER — PATIENT OUTREACH (OUTPATIENT)
Dept: FAMILY MEDICINE CLINIC | Facility: CLINIC | Age: 79
End: 2019-08-22

## 2019-08-22 NOTE — PROGRESS NOTES
Outpatient Care Management Note:    Complex care case  Chart review done  Attempted to contact pts granddaughter, [address], to follow up on health and outreach for any possible needed services  No answer; left voicemail with contact information  Awaiting return call

## 2019-08-29 ENCOUNTER — PATIENT OUTREACH (OUTPATIENT)
Dept: FAMILY MEDICINE CLINIC | Facility: CLINIC | Age: 79
End: 2019-08-29

## 2019-08-29 NOTE — PROGRESS NOTES
Outpatient Care Management Note:    8/29/19-  Complex care case  Chart review done  Contacted pts granddaughter, Negar Britt, to follow up on our last encounter  Waleska reported that things are going well at this time  She did receive the cane from [employer]  However, it is not functioning properly, as the knob does not lock into place as it is supposed to  Advised Waleska to contact [employer] to replace cane and she was agreeable  PT has been coming to home and at first things were not going well, as pt was nervous about getting injured  Pt has since been more trusting with therapist and Negar Bo reported that they have established a good bathing and dressing routine with pt  She also reported that she has received the paperwork from [employer] for the waiver program and she is working on filling it out and sending it back  Encouragement for continued progress provided  Will f/u in approx 2 weeks to assure pt remains on track  8/30/19-  Pts granddaughter contacted me to call [employer] regarding broken cane  Same done  Person I spoke with said Negar Bo has to take it to [facility] and they will exchange it  Contacted Waleska to make her aware  Gave her address of [facility] ([address] )  She will go there today  Informed her to please take cane and paperwork with her  She was agreeable

## 2019-08-30 DIAGNOSIS — E78.1 HYPERTRIGLYCERIDEMIA: Primary | ICD-10-CM

## 2019-08-30 RX ORDER — FENOFIBRATE 160 MG/1
160 TABLET ORAL DAILY
Qty: 30 TABLET | Refills: 5 | Status: SHIPPED | OUTPATIENT
Start: 2019-08-30 | End: 2020-02-24

## 2019-09-13 ENCOUNTER — OFFICE VISIT (OUTPATIENT)
Dept: OBGYN CLINIC | Facility: CLINIC | Age: 79
End: 2019-09-13

## 2019-09-13 VITALS
WEIGHT: 139.4 LBS | HEART RATE: 59 BPM | DIASTOLIC BLOOD PRESSURE: 63 MMHG | HEIGHT: 60 IN | BODY MASS INDEX: 27.37 KG/M2 | SYSTOLIC BLOOD PRESSURE: 168 MMHG
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
## 2019-09-13 DIAGNOSIS — M25.511 ACUTE PAIN OF RIGHT SHOULDER: ICD-10-CM

## 2019-09-13 DIAGNOSIS — Z98.890 STATUS POST RIGHT ROTATOR CUFF REPAIR: Primary | ICD-10-CM

## 2019-09-13 PROCEDURE — 99024 POSTOP FOLLOW-UP VISIT: CPT | Performed by: PHYSICIAN ASSISTANT

## 2019-09-13 RX ORDER — NAPROXEN 500 MG/1
500 TABLET ORAL 2 TIMES DAILY WITH MEALS
Qty: 60 TABLET | Refills: 1 | Status: SHIPPED | OUTPATIENT
Start: 2019-09-13 | End: 2019-11-19 | Stop reason: ALTCHOICE

## 2019-09-13 NOTE — PROGRESS NOTES
CHIEF COMPLAINT:   Chief Complaint   Patient presents with    Right Shoulder - Post-op         PROCEDURE: S/P right rotator cuff repair August 1, 2019      SUBJECTIVE:  Patient is Vietnamese-speaking and here with her granddaughter for translation  She is following up right rotator cuff repair  They tell me they did not start physical therapy yet  Patient has some stiffness noted  Pain well controlled denies any numbness or tingling upper extremity  Patient has been wearing the shoulder immobilizer        ROS:  Review of systems form reviewed September 13, 2019  General: no fever, no chills  Respiratory:  No coughing, shortness of breath or wheezing  Cardiovascular:  No chest pain, no palpitations  Neurological:  No headaches, no confusion  Review of all other systems is negative    MEDS:   Current Outpatient Medications   Medication Sig Dispense Refill    acetaminophen (TYLENOL) 650 mg CR tablet Take 1 tablet (650 mg total) by mouth every 8 (eight) hours as needed for mild pain 30 tablet 0    amLODIPine (NORVASC) 5 mg tablet Take 1 tablet (5 mg total) by mouth daily (Patient taking differently: Take by mouth daily ) 30 tablet 0    amLODIPine (NORVASC) 5 mg tablet TAKE 1 TABLET BY MOUTH EVERY DAY 30 tablet 1    Blood Glucose Monitoring Suppl (ONETOUCH VERIO) w/Device KIT Use as directed  0    Blood Pressure KIT by Does not apply route daily 1 each 1    Canagliflozin (INVOKANA) 300 MG TABS Take 1 tablet (300 mg total) by mouth daily 90 tablet 3    donepezil (ARICEPT) 10 mg tablet Take 1 tablet (10 mg total) by mouth daily at bedtime 90 tablet 3    fenofibrate (TRIGLIDE) 160 MG tablet Take 1 tablet (160 mg total) by mouth daily 30 tablet 5    glucose blood (ONETOUCH VERIO) test strip TEST TWICE A  each 2    insulin glargine (LANTUS SOLOSTAR) 100 units/mL injection pen Inject 28 Units under the skin daily at bedtime for 30 days 5 pen 5    Insulin Pen Needle (BD PEN NEEDLE TOPHER U/F) 32G X 4 MM MISC by Does not apply route 2 (two) times a day 100 each 5    levothyroxine 88 mcg tablet Take 1 tablet (88 mcg total) by mouth daily for 90 days 90 tablet 0    lisinopril (ZESTRIL) 20 mg tablet Take 1 tablet (20 mg total) by mouth 2 (two) times a day (Patient taking differently: Take by mouth 2 (two) times a day ) 90 tablet 6    memantine (NAMENDA) 10 mg tablet TAKE 1 TABLET (10 MG TOTAL) BY MOUTH 2 (TWO) TIMES A DAY 60 tablet 3    naproxen (NAPROSYN) 500 mg tablet TAKE 1 TABLET (500 MG TOTAL) BY MOUTH 2 (TWO) TIMES A DAY WITH MEALS FOR 30 DAYS 60 tablet 1    ONETOUCH DELICA LANCETS FINE MISC USE 2 TIMES A DAY AS NEEDED FOR HIGH BLOOD SUGAR 100 each 0    simvastatin (ZOCOR) 20 mg tablet Take 20 mg by mouth daily at bedtime      LANTUS SOLOSTAR 100 units/mL injection pen INJECT 30 UNITS UNDER THE SKIN DAILY AT BEDTIME FOR 30 DAYS 5 pen 5     No current facility-administered medications for this visit  ALLERGIES: Penicillins      Vitals:    09/13/19 1108   BP: 168/63   Pulse: 59   Weight: 63 2 kg (139 lb 6 4 oz)   Height: 5' (1 524 m)       PHYSICAL EXAM:    General Appearance:  Alert, cooperative, no distress, appears stated age   Lungs:   respirations unlabored   Chest Wall:  No tenderness or deformity   Heart:  Normal Heart rate noted   Extremities: Extremities normal, atraumatic, no cyanosis or edema   Pulses: 2+ and symmetric   Neurologic: Normal     ORTHO EXAM:  Right shoulder examination with well-healed incisions  No erythema, no drainage no signs of infection  Active forward elevation to 40°, passive forward elevation to 100° with pain  Passive and active external rotation to 10° with pain  Sensation is intact light touch  Patient with full active range of motion of the elbow wrist and hand      ASSESSMENT/PLAN:   S/p 6 weeks above procedure  Marvel Castellon was seen today for post-op      Diagnoses and all orders for this visit:    Status post right rotator cuff repair  -     Ambulatory referral to Physical Therapy; Future        There are no Patient Instructions on file for this visit  DISCUSSION SUMMARY:  Patient is S/P 6 weeks right rotator cuff repair  Patient was given another physical therapy prescription today  They are to do aggressive passive range of motion and start active assist as well  No strengthening until full range of motion has been achieved  Patient developing adhesive capsulitis  It was explained to the grandmother the patient must do home exercise program as well working on range of motion    Will follow up with us in six weeks for re-evaluation

## 2019-09-16 ENCOUNTER — EVALUATION (OUTPATIENT)
Dept: PHYSICAL THERAPY | Age: 79
End: 2019-09-16
Payer: COMMERCIAL

## 2019-09-16 DIAGNOSIS — Z98.890 S/P RIGHT ROTATOR CUFF REPAIR: Primary | ICD-10-CM

## 2019-09-16 PROCEDURE — 97140 MANUAL THERAPY 1/> REGIONS: CPT | Performed by: PHYSICAL THERAPIST

## 2019-09-16 PROCEDURE — 97110 THERAPEUTIC EXERCISES: CPT | Performed by: PHYSICAL THERAPIST

## 2019-09-16 PROCEDURE — 97163 PT EVAL HIGH COMPLEX 45 MIN: CPT | Performed by: PHYSICAL THERAPIST

## 2019-09-16 NOTE — PROGRESS NOTES
PT Evaluation     Today's date: 2019  Patient name: Zee Cleveland  : 1940  MRN: [id number]  Referring provider: Brianna Guthrie PA-C  Dx:   Encounter Diagnosis     ICD-10-CM    1  S/P right rotator cuff repair Z98 890        Start Time: 78  Stop Time: 383  Total time in clinic (min): 60 minutes    Assessment  Assessment details: Zee Cleveland is a 67 y o  female who presents with right shoulder pain, decreased right UE strength, decreased A/PROM and decreased joint mobility  Due to these impairments, Patient has difficulty performing a/iadls  Patient's clinical presentation is consistent with their referring diagnosis of right rotator cuff repair  Patient would benefit from skilled physical therapy to address her aforementioned impairments, improve her level of function and to improve her overall quality of life  Impairments: abnormal or restricted ROM, activity intolerance, impaired physical strength, lacks appropriate home exercise program, pain with function, scapular dyskinesis and poor posture   Functional limitations: can not raise arm, lift/push/pull, limited adls, no iadlsBarriers to therapy: Language barrier  Pt did not have ROM to right arm for 6 weeks post surgery  Understanding of Dx/Px/POC: fair   Prognosis: fair  Prognosis details: Pt did not have PT for 6 weeks and did not understand instructions for HEP issued by physician's office  Pt has capsular tightness  Pt speaks limited English  Goals  ST-3 WEEKS  1  Decrease pain to < 5/10 at worst   2   Increase ROM by > 20° in FF, ABD, ER   3   Increase UE to 3/5 from 0-90°  LT-8 WEEKS  1  Patient to be independent with a/iadls  2  Increase functional activities for leisure and home activities to previous LOF  3  Improve FOTO score by 10 points      Plan  Patient would benefit from: skilled physical therapy  Planned modality interventions: cryotherapy, electrical stimulation/Russian stimulation, thermotherapy: hydrocollator packs and unattended electrical stimulation  Planned therapy interventions: functional ROM exercises, home exercise program, joint mobilization, manual therapy, neuromuscular re-education, patient education, postural training, strengthening, stretching, therapeutic activities and therapeutic exercise  Frequency: 2x week  Duration in weeks: 12  Plan of Care beginning date: 2019  Plan of Care expiration date: 2019  Treatment plan discussed with: patient and caregiver        Subjective Evaluation    History of Present Illness  Date of onset: 2019  Mechanism of injury: Pt fell in  and had right shoulder pain  She went to ER and xrays were negative  She had persistent pain and could not lift anything and kept dropping cups  They returned to ortho and MRI was taken and revealed torn RTC and biceps  Pt underwent RTC repair 19  She was immobilized in a sling for 6 weeks and given HEP  She is now referred for out patient PT  Her granddaughter is present to translate  She reports she really did not move the arm for 6 weeks  Pain  Current pain ratin  At best pain ratin  At worst pain ratin  Location: right shoulder and arm  Quality: dull ache and sharp  Relieving factors: rest and support    Social Support  Steps to enter house: yes  Stairs in house: no   Lives with: adult granddaughter  Hand dominance: right      Diagnostic Tests  MRI studies: abnormal  Treatments  Previous treatment: immobilization  Patient Goals  Patient goals for therapy: decreased pain, increased motion, independence with ADLs/IADLs and increased strength          Objective     Observations     Right Shoulder  Positive for incision       Additional Observation Details  Open repair is closed with tenderness over the incision, 4 portals present with posterior lateral slightly puckered    Active Range of Motion   Left Shoulder   Flexion: 145 degrees   Abduction: 140 degrees     Right Shoulder   Flexion: 40 degrees   Abduction: 30 degrees     Passive Range of Motion   Left Shoulder   Flexion: 145 degrees   Abduction: 145 degrees   External rotation 90°: 90 degrees   Internal rotation 90°: 58 degrees     Right Shoulder   Flexion: 88 degrees   Abduction: 74 degrees   External rotation 45°: 30 degrees   Internal rotation 45°: 50 degrees     Right Elbow   Normal passive range of motion    Strength/Myotome Testing     Left Shoulder   Normal muscle strength    Right Shoulder     Planes of Motion   Flexion: 2   Abduction: 2   External rotation at 0°: 2   Internal rotation at 0°: 3     Isolated Muscles   Biceps: 3   Upper trapezius: 3       Flowsheet Rows      Most Recent Value   PT/OT G-Codes   Current Score  32   Projected Score  61   FOTO information reviewed  Yes   Assessment Type  Evaluation             Precautions: DM, Fall risk, language barrier      Manual  9/16            Right shoulder, elbow 15'                                                                    Exercise Diary  9/16            Ball FF,CW, CCW 30x ea            Cane FF 10x                         UBE 4'            t band rows 30x            Pulleys FF/ABD 15x ea                                                                                                                                                                                                      Modalities  9/16             NT

## 2019-09-18 ENCOUNTER — OFFICE VISIT (OUTPATIENT)
Dept: PHYSICAL THERAPY | Age: 79
End: 2019-09-18
Payer: COMMERCIAL

## 2019-09-18 ENCOUNTER — APPOINTMENT (EMERGENCY)
Dept: CT IMAGING | Facility: HOSPITAL | Age: 79
End: 2019-09-18
Payer: COMMERCIAL

## 2019-09-18 ENCOUNTER — OFFICE VISIT (OUTPATIENT)
Dept: URGENT CARE | Facility: CLINIC | Age: 79
End: 2019-09-18
Payer: COMMERCIAL

## 2019-09-18 ENCOUNTER — HOSPITAL ENCOUNTER (EMERGENCY)
Facility: HOSPITAL | Age: 79
Discharge: HOME | End: 2019-09-18
Attending: EMERGENCY MEDICINE
Payer: COMMERCIAL

## 2019-09-18 VITALS
TEMPERATURE: 98 F | OXYGEN SATURATION: 98 % | SYSTOLIC BLOOD PRESSURE: 165 MMHG | DIASTOLIC BLOOD PRESSURE: 72 MMHG | RESPIRATION RATE: 20 BRPM | HEART RATE: 70 BPM

## 2019-09-18 VITALS
DIASTOLIC BLOOD PRESSURE: 58 MMHG | SYSTOLIC BLOOD PRESSURE: 120 MMHG | TEMPERATURE: 97 F | HEART RATE: 60 BPM | OXYGEN SATURATION: 98 % | HEIGHT: 60 IN | RESPIRATION RATE: 18 BRPM | BODY MASS INDEX: 27.48 KG/M2 | WEIGHT: 140 LBS

## 2019-09-18 DIAGNOSIS — Z98.890 S/P RIGHT ROTATOR CUFF REPAIR: Primary | ICD-10-CM

## 2019-09-18 DIAGNOSIS — K62.89 PERIRECTAL BURNING: ICD-10-CM

## 2019-09-18 DIAGNOSIS — R19.7 DIARRHEA: Primary | ICD-10-CM

## 2019-09-18 DIAGNOSIS — N89.8 VAGINAL ITCHING: ICD-10-CM

## 2019-09-18 DIAGNOSIS — R10.9 ABDOMINAL PAIN: ICD-10-CM

## 2019-09-18 DIAGNOSIS — K62.89 PERIRECTAL SKIN IRRITATION: ICD-10-CM

## 2019-09-18 DIAGNOSIS — R11.0 NAUSEA: ICD-10-CM

## 2019-09-18 DIAGNOSIS — R10.9 ABDOMINAL PAIN, UNSPECIFIED ABDOMINAL LOCATION: Primary | ICD-10-CM

## 2019-09-18 LAB
ALBUMIN SERPL BCP-MCNC: 4.2 G/DL (ref 3.5–5)
ALP SERPL-CCNC: 39 U/L (ref 46–116)
ALT SERPL W P-5'-P-CCNC: 28 U/L (ref 12–78)
ANION GAP SERPL CALCULATED.3IONS-SCNC: 10 MMOL/L (ref 4–13)
AST SERPL W P-5'-P-CCNC: 28 U/L (ref 5–45)
BACTERIA UR QL AUTO: ABNORMAL /HPF
BASOPHILS # BLD AUTO: 0.04 THOUSANDS/ΜL (ref 0–0.1)
BASOPHILS NFR BLD AUTO: 1 % (ref 0–1)
BILIRUB DIRECT SERPL-MCNC: 0.14 MG/DL (ref 0–0.2)
BILIRUB SERPL-MCNC: 0.5 MG/DL (ref 0.2–1)
BILIRUB UR QL STRIP: NEGATIVE
BUN SERPL-MCNC: 17 MG/DL (ref 5–25)
CALCIUM SERPL-MCNC: 9.7 MG/DL (ref 8.3–10.1)
CHLORIDE SERPL-SCNC: 103 MMOL/L (ref 100–108)
CLARITY UR: CLEAR
CO2 SERPL-SCNC: 26 MMOL/L (ref 21–32)
COLOR UR: ABNORMAL
CREAT SERPL-MCNC: 1.06 MG/DL (ref 0.6–1.3)
EOSINOPHIL # BLD AUTO: 0.24 THOUSAND/ΜL (ref 0–0.61)
EOSINOPHIL NFR BLD AUTO: 5 % (ref 0–6)
ERYTHROCYTE [DISTWIDTH] IN BLOOD BY AUTOMATED COUNT: 13.6 % (ref 11.6–15.1)
GFR SERPL CREATININE-BSD FRML MDRD: 50 ML/MIN/1.73SQ M
GLUCOSE SERPL-MCNC: 77 MG/DL (ref 65–140)
GLUCOSE UR STRIP-MCNC: ABNORMAL MG/DL
HCT VFR BLD AUTO: 36.8 % (ref 34.8–46.1)
HGB BLD-MCNC: 11.7 G/DL (ref 11.5–15.4)
HGB UR QL STRIP.AUTO: NEGATIVE
IMM GRANULOCYTES # BLD AUTO: 0.01 THOUSAND/UL (ref 0–0.2)
IMM GRANULOCYTES NFR BLD AUTO: 0 % (ref 0–2)
KETONES UR STRIP-MCNC: NEGATIVE MG/DL
LACTATE SERPL-SCNC: 0.7 MMOL/L (ref 0.5–2)
LEUKOCYTE ESTERASE UR QL STRIP: ABNORMAL
LIPASE SERPL-CCNC: 102 U/L (ref 73–393)
LYMPHOCYTES # BLD AUTO: 1.57 THOUSANDS/ΜL (ref 0.6–4.47)
LYMPHOCYTES NFR BLD AUTO: 30 % (ref 14–44)
MCH RBC QN AUTO: 28.1 PG (ref 26.8–34.3)
MCHC RBC AUTO-ENTMCNC: 31.8 G/DL (ref 31.4–37.4)
MCV RBC AUTO: 88 FL (ref 82–98)
MONOCYTES # BLD AUTO: 0.28 THOUSAND/ΜL (ref 0.17–1.22)
MONOCYTES NFR BLD AUTO: 5 % (ref 4–12)
MUCOUS THREADS UR QL AUTO: ABNORMAL
NEUTROPHILS # BLD AUTO: 3.1 THOUSANDS/ΜL (ref 1.85–7.62)
NEUTS SEG NFR BLD AUTO: 59 % (ref 43–75)
NITRITE UR QL STRIP: NEGATIVE
NON-SQ EPI CELLS URNS QL MICRO: ABNORMAL /HPF
NRBC BLD AUTO-RTO: 0 /100 WBCS
PH UR STRIP.AUTO: 5.5 [PH]
PLATELET # BLD AUTO: 198 THOUSANDS/UL (ref 149–390)
PMV BLD AUTO: 12.8 FL (ref 8.9–12.7)
POTASSIUM SERPL-SCNC: 3.6 MMOL/L (ref 3.5–5.3)
PROT SERPL-MCNC: 7.6 G/DL (ref 6.4–8.2)
PROT UR STRIP-MCNC: NEGATIVE MG/DL
RBC # BLD AUTO: 4.17 MILLION/UL (ref 3.81–5.12)
RBC #/AREA URNS AUTO: ABNORMAL /HPF
SODIUM SERPL-SCNC: 139 MMOL/L (ref 136–145)
SP GR UR STRIP.AUTO: 1.01 (ref 1–1.03)
TROPONIN I SERPL-MCNC: <0.02 NG/ML
TSH SERPL DL<=0.05 MIU/L-ACNC: 1.64 UIU/ML (ref 0.36–3.74)
UROBILINOGEN UR QL STRIP.AUTO: 0.2 E.U./DL
WBC # BLD AUTO: 5.24 THOUSAND/UL (ref 4.31–10.16)
WBC #/AREA URNS AUTO: ABNORMAL /HPF

## 2019-09-18 PROCEDURE — 87086 URINE CULTURE/COLONY COUNT: CPT | Performed by: EMERGENCY MEDICINE

## 2019-09-18 PROCEDURE — 85025 COMPLETE CBC W/AUTO DIFF WBC: CPT | Performed by: EMERGENCY MEDICINE

## 2019-09-18 PROCEDURE — 99284 EMERGENCY DEPT VISIT MOD MDM: CPT | Performed by: EMERGENCY MEDICINE

## 2019-09-18 PROCEDURE — 99284 EMERGENCY DEPT VISIT MOD MDM: CPT

## 2019-09-18 PROCEDURE — 74176 CT ABD & PELVIS W/O CONTRAST: CPT

## 2019-09-18 PROCEDURE — 83690 ASSAY OF LIPASE: CPT | Performed by: EMERGENCY MEDICINE

## 2019-09-18 PROCEDURE — 96361 HYDRATE IV INFUSION ADD-ON: CPT

## 2019-09-18 PROCEDURE — 97140 MANUAL THERAPY 1/> REGIONS: CPT | Performed by: PHYSICAL THERAPIST

## 2019-09-18 PROCEDURE — 83605 ASSAY OF LACTIC ACID: CPT | Performed by: EMERGENCY MEDICINE

## 2019-09-18 PROCEDURE — 96374 THER/PROPH/DIAG INJ IV PUSH: CPT

## 2019-09-18 PROCEDURE — 97110 THERAPEUTIC EXERCISES: CPT | Performed by: PHYSICAL THERAPIST

## 2019-09-18 PROCEDURE — 36415 COLL VENOUS BLD VENIPUNCTURE: CPT | Performed by: EMERGENCY MEDICINE

## 2019-09-18 PROCEDURE — 84443 ASSAY THYROID STIM HORMONE: CPT | Performed by: EMERGENCY MEDICINE

## 2019-09-18 PROCEDURE — 81001 URINALYSIS AUTO W/SCOPE: CPT | Performed by: EMERGENCY MEDICINE

## 2019-09-18 PROCEDURE — 93005 ELECTROCARDIOGRAM TRACING: CPT

## 2019-09-18 PROCEDURE — 84484 ASSAY OF TROPONIN QUANT: CPT | Performed by: EMERGENCY MEDICINE

## 2019-09-18 PROCEDURE — 80076 HEPATIC FUNCTION PANEL: CPT | Performed by: EMERGENCY MEDICINE

## 2019-09-18 PROCEDURE — 80048 BASIC METABOLIC PNL TOTAL CA: CPT | Performed by: EMERGENCY MEDICINE

## 2019-09-18 RX ORDER — ONDANSETRON 2 MG/ML
4 INJECTION INTRAMUSCULAR; INTRAVENOUS ONCE
Status: COMPLETED | OUTPATIENT
Start: 2019-09-18 | End: 2019-09-18

## 2019-09-18 RX ORDER — FLUCONAZOLE 100 MG/1
200 TABLET ORAL ONCE
Status: COMPLETED | OUTPATIENT
Start: 2019-09-18 | End: 2019-09-18

## 2019-09-18 RX ORDER — DICYCLOMINE HCL 20 MG
20 TABLET ORAL ONCE
Status: COMPLETED | OUTPATIENT
Start: 2019-09-18 | End: 2019-09-18

## 2019-09-18 RX ORDER — NYSTATIN AND TRIAMCINOLONE ACETONIDE 100000; 1 [USP'U]/G; MG/G
OINTMENT TOPICAL 2 TIMES DAILY
Status: DISCONTINUED | OUTPATIENT
Start: 2019-09-18 | End: 2019-09-18

## 2019-09-18 RX ORDER — DICYCLOMINE HCL 20 MG
TABLET ORAL
Qty: 12 TABLET | Refills: 0 | Status: SHIPPED | OUTPATIENT
Start: 2019-09-18 | End: 2019-10-25

## 2019-09-18 RX ORDER — ONDANSETRON 4 MG/1
TABLET, ORALLY DISINTEGRATING ORAL
Qty: 12 TABLET | Refills: 0 | Status: SHIPPED | OUTPATIENT
Start: 2019-09-18 | End: 2019-11-19 | Stop reason: ALTCHOICE

## 2019-09-18 RX ORDER — NYSTATIN AND TRIAMCINOLONE ACETONIDE 100000; 1 [USP'U]/G; MG/G
OINTMENT TOPICAL
Qty: 60 G | Refills: 0 | Status: SHIPPED | OUTPATIENT
Start: 2019-09-18 | End: 2019-11-19 | Stop reason: ALTCHOICE

## 2019-09-18 RX ORDER — FLUCONAZOLE 200 MG/1
TABLET ORAL
Qty: 1 TABLET | Refills: 0 | Status: SHIPPED | OUTPATIENT
Start: 2019-09-18 | End: 2019-09-25

## 2019-09-18 RX ADMIN — SODIUM CHLORIDE 1000 ML: 0.9 INJECTION, SOLUTION INTRAVENOUS at 17:40

## 2019-09-18 RX ADMIN — FLUCONAZOLE 200 MG: 100 TABLET ORAL at 20:05

## 2019-09-18 RX ADMIN — NYSTATIN AND TRIAMCINOLONE ACETONIDE: 100000; 1 CREAM TOPICAL at 20:05

## 2019-09-18 RX ADMIN — DICYCLOMINE HYDROCHLORIDE 20 MG: 20 TABLET ORAL at 19:32

## 2019-09-18 RX ADMIN — ONDANSETRON 4 MG: 2 INJECTION INTRAMUSCULAR; INTRAVENOUS at 17:40

## 2019-09-18 NOTE — PROGRESS NOTES
[facility]        NAME: Durward Sandifer is a 67 y o  female  : 1940    MRN: [id number]  DATE: 2019  TIME: 1:40 PM    Assessment and Plan   Abdominal pain, unspecified abdominal location [R10 9]  1  Abdominal pain, unspecified abdominal location  Transfer to other facility         Patient Instructions     Patient Instructions   Due to abd pain, fatigue and age/comorbidities I recommend ER eval          Follow up with PCP in 3-5 days  Proceed to  ER if symptoms worsen  Chief Complaint     Chief Complaint   Patient presents with    Diarrhea     x 2 weeks    Generalized Body Aches     x 1 week with feeling weak         History of Present Illness       58-year-old female presents with her daughter for fatigue abdominal pain diarrhea for 2 weeks  She does not have blood in her stool but she has had 13-14 bowel movements per day  She feels weak and fatigued  She has pain in her legs  No blood in her stool or fever  No meds over-the-counter for this  No recent travel  Review of Systems   Review of Systems   Constitutional: Positive for fatigue  Negative for fever  HENT: Negative  Negative for congestion  Respiratory: Negative  Gastrointestinal: Positive for abdominal distention, abdominal pain and diarrhea  Negative for blood in stool, nausea and vomiting  Musculoskeletal: Positive for arthralgias           Current Medications       Current Outpatient Medications:     amLODIPine (NORVASC) 5 mg tablet, TAKE 1 TABLET BY MOUTH EVERY DAY, Disp: 30 tablet, Rfl: 1    Blood Glucose Monitoring Suppl (Michelle Brisker) w/Device KIT, Use as directed, Disp: , Rfl: 0    Canagliflozin (INVOKANA) 300 MG TABS, Take 1 tablet (300 mg total) by mouth daily, Disp: 90 tablet, Rfl: 3    donepezil (ARICEPT) 10 mg tablet, Take 1 tablet (10 mg total) by mouth daily at bedtime, Disp: 90 tablet, Rfl: 3    fenofibrate (TRIGLIDE) 160 MG tablet, Take 1 tablet (160 mg total) by mouth daily, Disp: 30 tablet, Rfl: 5    insulin glargine (LANTUS SOLOSTAR) 100 units/mL injection pen, Inject 28 Units under the skin daily at bedtime for 30 days (Patient taking differently: Inject 30 Units under the skin daily at bedtime ), Disp: 5 pen, Rfl: 5    levothyroxine 88 mcg tablet, Take 1 tablet (88 mcg total) by mouth daily for 90 days, Disp: 90 tablet, Rfl: 0    lisinopril (ZESTRIL) 20 mg tablet, Take 1 tablet (20 mg total) by mouth 2 (two) times a day (Patient taking differently: Take by mouth 2 (two) times a day ), Disp: 90 tablet, Rfl: 6    memantine (NAMENDA) 10 mg tablet, TAKE 1 TABLET (10 MG TOTAL) BY MOUTH 2 (TWO) TIMES A DAY, Disp: 60 tablet, Rfl: 3    naproxen (NAPROSYN) 500 mg tablet, TAKE 1 TABLET (500 MG TOTAL) BY MOUTH 2 (TWO) TIMES A DAY WITH MEALS FOR 30 DAYS, Disp: 60 tablet, Rfl: 1    simvastatin (ZOCOR) 20 mg tablet, Take 20 mg by mouth daily at bedtime, Disp: , Rfl:     acetaminophen (TYLENOL) 650 mg CR tablet, Take 1 tablet (650 mg total) by mouth every 8 (eight) hours as needed for mild pain, Disp: 30 tablet, Rfl: 0    amLODIPine (NORVASC) 5 mg tablet, Take 1 tablet (5 mg total) by mouth daily (Patient taking differently: Take by mouth daily ), Disp: 30 tablet, Rfl: 0    Blood Pressure KIT, by Does not apply route daily, Disp: 1 each, Rfl: 1    glucose blood (ONETOUCH VERIO) test strip, TEST TWICE A DAY, Disp: 100 each, Rfl: 2    Insulin Pen Needle (BD PEN NEEDLE TOPHER U/F) 32G X 4 MM MISC, by Does not apply route 2 (two) times a day, Disp: 100 each, Rfl: 5    LANTUS SOLOSTAR 100 units/mL injection pen, INJECT 30 UNITS UNDER THE SKIN DAILY AT BEDTIME FOR 30 DAYS, Disp: 5 pen, Rfl: 5    ONETOUCH DELICA LANCETS FINE MISC, USE 2 TIMES A DAY AS NEEDED FOR HIGH BLOOD SUGAR, Disp: 100 each, Rfl: 0    Current Allergies     Allergies as of 09/18/2019 - Reviewed 09/18/2019   Allergen Reaction Noted    Penicillins Itching and Swelling 02/15/2018            The following portions of the patient's history were reviewed and updated as appropriate: allergies, current medications, past family history, past medical history, past social history, past surgical history and problem list      Past Medical History:   Diagnosis Date    Aggression     Alzheimer's dementia     Arthritis     Dementia     Diabetes insipidus ([address]     Diabetes mellitus ([address]     High cholesterol     Hypertension     Memory loss     Migraine     Polyneuropathy     Rheumatoid arthritis ([address]     Serum lipids high     Stomach problems     Thyroid trouble        Past Surgical History:   Procedure Laterality Date    CATARACT EXTRACTION      GALLBLADDER SURGERY      HYSTERECTOMY      MS SHLDR ARTHROSCOP,SURG,W/ROTAT CUFF REPR Right 8/1/2019    Procedure: SHOULDER ARTHROSCOPIC ROTATOR CUFF REPAIR;  Surgeon: Lori Aj MD;  Location: [facility];  Service: Orthopedics       Family History   Problem Relation Age of Onset    Substance Abuse Mother         denied    Mental illness Mother         denied    Substance Abuse Father         denied    Mental illness Father         denied    Diabetes Brother     Blindness Brother     Arthritis Daughter     HIV Son          Medications have been verified  Objective   /58 (BP Location: Left arm)   Pulse 60   Temp (!) 97 °F (36 1 °C) (Temporal)   Resp 18   Ht 5' (1 524 m)   Wt 63 5 kg (140 lb)   SpO2 98%   BMI 27 34 kg/m²        Physical Exam     Physical Exam   Constitutional: She appears well-developed and well-nourished  No distress  HENT:   Mouth/Throat: Oropharynx is clear and moist    Eyes: Pupils are equal, round, and reactive to light  Conjunctivae and EOM are normal    Cardiovascular: Normal rate and regular rhythm  Pulmonary/Chest: Effort normal and breath sounds normal    Abdominal: Soft  Bowel sounds are decreased  There is tenderness in the right lower quadrant, epigastric area and left lower quadrant   There is no rigidity, no rebound, no guarding, no CVA tenderness, no tenderness at McBurney's point and negative Nick's sign

## 2019-09-18 NOTE — PROGRESS NOTES
Daily Note     Today's date: 2019  Patient name: Patty Villarreal  : 1940  MRN: [id number]  Referring provider: Maryanne Calderon PA-C  Dx:   Encounter Diagnosis     ICD-10-CM    1  S/P right rotator cuff repair Z98 890        Start Time: 1400  Stop Time: 1450  Total time in clinic (min): 50 minutes    Subjective: Pt not feeling well today  [facility] and noted pt has had loose bowels for 2 days  Pt has not been able to do HEP as she has not been feeling well  Objective: See treatment diary below      Assessment: Tolerated treatment fair  Patient did have more PROM today but does c/o pain at end range, relieve with rest       Plan: Continue per plan of care        Precautions: DM, Fall risk, language barrier      Manual             Right shoulder, elbow 15' 15                                                                   Exercise Diary             Ball FF,CW, CCW 30x ea 30x           Cane FF 10x 20x                        UBE 4' 4'           t band rows 30x 30x           Pulleys FF/ABD 15x ea 20x                                                                                                                                                                                                     Modalities             MH NT 10'

## 2019-09-18 NOTE — ED PROVIDER NOTES
History  Chief Complaint   Patient presents with    Abdominal Pain     diarrhea, chills, lower abdominal pain and weaknessx4 days  genital itching     HPI  76-year-old  female with a chief complaint of diarrhea for the past 5 days  Patient complains that she is very weak and having difficulty 76-year-old  female with a chief complaint of diarrhea for the past 5 days  Patient states that she is very weak and having difficulty ambulating  She is also complaining of lower abdominal pain and burning on urination  Patient denies any fever  Prior to Admission Medications   Prescriptions Last Dose Informant Patient Reported? Taking?    Blood Glucose Monitoring Suppl (Leatha Spencer) w/Device KIT  Child Yes No   Sig: Use as directed   Blood Pressure KIT  Child No No   Sig: by Does not apply route daily   Canagliflozin (INVOKANA) 300 MG TABS  Child No No   Sig: Take 1 tablet (300 mg total) by mouth daily   Insulin Pen Needle (BD PEN NEEDLE TOPHER U/F) 32G X 4 MM MISC  Child No No   Sig: by Does not apply route 2 (two) times a day   LANTUS SOLOSTAR 100 units/mL injection pen  Child No No   Sig: INJECT 30 UNITS UNDER THE SKIN DAILY AT BEDTIME FOR 30 DAYS   ONETOUCH DELICA LANCETS FINE MISC  Child No No   Sig: USE 2 TIMES A DAY AS NEEDED FOR HIGH BLOOD SUGAR   acetaminophen (TYLENOL) 650 mg CR tablet  Child No No   Sig: Take 1 tablet (650 mg total) by mouth every 8 (eight) hours as needed for mild pain   amLODIPine (NORVASC) 5 mg tablet  Child No No   Sig: Take 1 tablet (5 mg total) by mouth daily   Patient taking differently: Take by mouth daily    amLODIPine (NORVASC) 5 mg tablet  Child No No   Sig: TAKE 1 TABLET BY MOUTH EVERY DAY   donepezil (ARICEPT) 10 mg tablet  Child No No   Sig: Take 1 tablet (10 mg total) by mouth daily at bedtime   fenofibrate (TRIGLIDE) 160 MG tablet  Child No No   Sig: Take 1 tablet (160 mg total) by mouth daily   glucose blood (ONETOUCH VERIO) test strip  Child No No   Sig: TEST TWICE A DAY   insulin glargine (LANTUS SOLOSTAR) 100 units/mL injection pen  Child No No   Sig: Inject 28 Units under the skin daily at bedtime for 30 days   Patient taking differently: Inject 30 Units under the skin daily at bedtime    levothyroxine 88 mcg tablet  Child No No   Sig: Take 1 tablet (88 mcg total) by mouth daily for 90 days   lisinopril (ZESTRIL) 20 mg tablet  Child No No   Sig: Take 1 tablet (20 mg total) by mouth 2 (two) times a day   Patient taking differently: Take by mouth 2 (two) times a day    memantine (NAMENDA) 10 mg tablet  Child No No   Sig: TAKE 1 TABLET (10 MG TOTAL) BY MOUTH 2 (TWO) TIMES A DAY   naproxen (NAPROSYN) 500 mg tablet  Child No No   Sig: TAKE 1 TABLET (500 MG TOTAL) BY MOUTH 2 (TWO) TIMES A DAY WITH MEALS FOR 30 DAYS   simvastatin (ZOCOR) 20 mg tablet  Child Yes No   Sig: Take 20 mg by mouth daily at bedtime      Facility-Administered Medications: None       Past Medical History:   Diagnosis Date    Aggression     Alzheimer's dementia     Arthritis     Dementia     [facility]us [facility] [address] [facility]     Diabetes mellitus [facility] [address] [facility]     High cholesterol     Hypertension     Memory loss     Migraine     Polyneuropathy     Rheumatoid arthritis (HCC)     Serum lipids high     Stomach problems     Thyroid trouble        Past Surgical History:   Procedure Laterality Date    CATARACT EXTRACTION      GALLBLADDER SURGERY      HYSTERECTOMY      NM SHLDR ARTHROSCOP,SURG,W/ROTAT CUFF REPR Right 8/1/2019    Procedure: SHOULDER ARTHROSCOPIC ROTATOR CUFF REPAIR;  Surgeon: Reginald Rushing MD;  Location: [facility] [place] OR;  Service: Orthopedics       Family History   Problem Relation Age of Onset    Substance Abuse Mother         denied    Mental illness Mother         denied    Substance Abuse Father         denied    Mental illness Father         denied    Diabetes Brother     Blindness Brother     Arthritis Daughter     HIV Son      I have reviewed and agree with the history as documented  Social History     Tobacco Use    Smoking status: Former Smoker     Last attempt to quit:      Years since quittin 7    Smokeless tobacco: Never Used   Substance Use Topics    Alcohol use: No    Drug use: No        Review of Systems   Constitutional: Negative for chills and fever  HENT: Negative for congestion and rhinorrhea  Eyes: Negative for discharge and visual disturbance  Respiratory: Negative for shortness of breath and wheezing  Cardiovascular: Negative for chest pain and palpitations  Gastrointestinal: Positive for abdominal distention, abdominal pain, diarrhea and nausea  Negative for vomiting  Endocrine: Negative for polydipsia and polyuria  Genitourinary: Negative for dysuria and hematuria  Musculoskeletal: Negative for arthralgias, gait problem and neck stiffness  Skin: Negative for rash and wound  Neurological: Positive for weakness  Negative for dizziness and headaches  Psychiatric/Behavioral: Negative for confusion and suicidal ideas  Physical Exam  Physical Exam   Constitutional: She is oriented to person, place, and time  She appears well-developed and well-nourished  79-year-old  female lying on the stretcher appears somewhat dehydrated     HENT:   Head: Normocephalic and atraumatic  Mouth/Throat: Oropharynx is clear and moist    Eyes: Pupils are equal, round, and reactive to light  EOM are normal    Neck: Normal range of motion  Neck supple  Cardiovascular: Normal rate, regular rhythm and normal heart sounds  Pulmonary/Chest: Effort normal and breath sounds normal  No stridor  No respiratory distress  She has no wheezes  She has no rales  Abdominal: Bowel sounds are normal  She exhibits distension  There is tenderness  There is guarding  Genitourinary: Vaginal discharge found  Genitourinary Comments: There is a small amount of white discharge perivaginal area      Perirectal region is raw with multiple areas of excoriation and erythema consistent with the burning that the patient complains of  Musculoskeletal: Normal range of motion  Neurological: She is alert and oriented to person, place, and time  No cranial nerve deficit  She exhibits normal muscle tone  Coordination normal    Skin: Skin is warm and dry  Psychiatric: She has a normal mood and affect  Nursing note and vitals reviewed  Vital Signs  ED Triage Vitals [09/18/19 1609]   Temperature Pulse Respirations Blood Pressure SpO2   98 °F (36 7 °C) 78 18 139/64 96 %      Temp Source Heart Rate Source Patient Position - Orthostatic VS BP Location FiO2 (%)   Oral Monitor Sitting Left arm --      Pain Score       7           Vitals:    09/18/19 1609 09/18/19 1730 09/18/19 1954   BP: 139/64 160/72 165/72   Pulse: 78 63 70   Patient Position - Orthostatic VS: Sitting Lying Sitting         Visual Acuity      ED Medications  Medications   fluconazole (DIFLUCAN) tablet 200 mg (has no administration in time range)   nystatin-triamcinolone (MYCOLOG-II) cream (has no administration in time range)   sodium chloride 0 9 % bolus 1,000 mL (0 mL Intravenous Stopped 9/18/19 1849)   ondansetron (ZOFRAN) injection 4 mg (4 mg Intravenous Given 9/18/19 1740)   dicyclomine (BENTYL) tablet 20 mg (20 mg Oral Given 9/18/19 1932)       Diagnostic Studies  Results Reviewed     Procedure Component Value Units Date/Time    Urine culture [[id number]] Collected:  09/18/19 1936    Lab Status:   In process Specimen:  Urine Updated:  09/18/19 1936    Lipase [[id number]]  (Normal) Collected:  09/18/19 1737    Lab Status:  Final result Specimen:  Blood from Arm, Left Updated:  09/18/19 1905     Lipase 102 u/L     Troponin I [[id number]]     Lab Status:  No result Specimen:  Blood     Lactic acid, plasma [[id number]]  (Normal) Collected:  09/18/19 1737    Lab Status:  Final result Specimen:  Blood from Arm, Left Updated:  09/18/19 1813     LACTIC ACID 0 7 mmol/L     Narrative:       Result may be elevated if tourniquet was used during collection  Basic metabolic panel [[id number]] Collected:  09/18/19 1737    Lab Status:  Final result Specimen:  Blood from Arm, Left Updated:  09/18/19 1808     Sodium 139 mmol/L      Potassium 3 6 mmol/L      Chloride 103 mmol/L      CO2 26 mmol/L      ANION GAP 10 mmol/L      BUN 17 mg/dL      Creatinine 1 06 mg/dL      Glucose 77 mg/dL      Calcium 9 7 mg/dL      eGFR 50 ml/min/1 73sq m     Narrative:       [place] guidelines for Chronic Kidney Disease (CKD):     Stage 1 with normal or high GFR (GFR > 90 mL/min/1 73 square meters)    Stage 2 Mild CKD (GFR = 60-89 mL/min/1 73 square meters)    Stage 3A Moderate CKD (GFR = 45-59 mL/min/1 73 square meters)    Stage 3B Moderate CKD (GFR = 30-44 mL/min/1 73 square meters)    Stage 4 Severe CKD (GFR = 15-29 mL/min/1 73 square meters)    Stage 5 End Stage CKD (GFR <15 mL/min/1 73 square meters)  Note: GFR calculation is accurate only with a steady state creatinine    Hepatic function panel [[id number]]  (Abnormal) Collected:  09/18/19 1737    Lab Status:  Final result Specimen:  Blood from Arm, Left Updated:  09/18/19 1808     Total Bilirubin 0 50 mg/dL      Bilirubin, Direct 0 14 mg/dL      Alkaline Phosphatase 39 U/L      AST 28 U/L      ALT 28 U/L      Total Protein 7 6 g/dL      Albumin 4 2 g/dL     TSH [[id number]]  (Normal) Collected:  09/18/19 1737    Lab Status:  Final result Specimen:  Blood from Arm, Left Updated:  09/18/19 1808     TSH 3RD GENERATON 1 642 uIU/mL     Narrative:       Patients undergoing fluorescein dye angiography may retain small amounts of fluorescein in the body for 48-72 hours post procedure  Samples containing fluorescein can produce falsely depressed TSH values  If the patient had this procedure,a specimen should be resubmitted post fluorescein clearance        Troponin I [[id number]]  (Normal) Collected:  09/18/19 1737    Lab Status:  Final result Specimen:  Blood from Arm, Left Updated:  09/18/19 1802     Troponin I <0 02 ng/mL     Urine Microscopic [[id number]]  (Abnormal) Collected:  09/18/19 1741    Lab Status:  Final result Specimen:  Urine, Clean Catch Updated:  09/18/19 1800     RBC, UA None Seen /hpf      WBC, UA 0-1 /hpf      Epithelial Cells Occasional /hpf      Bacteria, UA None Seen /hpf      MUCUS THREADS Occasional    UA w Reflex to Microscopic w Reflex to Culture [[id number]]  (Abnormal) Collected:  09/18/19 1741    Lab Status:  Final result Specimen:  Urine, Clean Catch Updated:  09/18/19 1748     Color, UA Light Yellow     Clarity, UA Clear     Specific Gravity, UA 1 015     pH, UA 5 5     Leukocytes, UA Elevated glucose may cause decreased leukocyte values   See urine microscopic for [facility] result/     Nitrite, UA Negative     Protein, UA Negative mg/dl      Glucose, UA >=1000 (1%) mg/dl      Ketones, UA Negative mg/dl      Urobilinogen, UA 0 2 E U /dl      Bilirubin, UA Negative     Blood, UA Negative    CBC and differential [[id number]]  (Abnormal) Collected:  09/18/19 1737    Lab Status:  Final result Specimen:  Blood from Arm, Left Updated:  09/18/19 1745     WBC 5 24 Thousand/uL      RBC 4 17 Million/uL      Hemoglobin 11 7 g/dL      Hematocrit 36 8 %      MCV 88 fL      MCH 28 1 pg      MCHC 31 8 g/dL      RDW 13 6 %      MPV 12 8 fL      Platelets 255 Thousands/uL      nRBC 0 /100 WBCs      Neutrophils Relative 59 %      Immat GRANS % 0 %      Lymphocytes Relative 30 %      Monocytes Relative 5 %      Eosinophils Relative 5 %      Basophils Relative 1 %      Neutrophils Absolute 3 10 Thousands/µL      Immature Grans Absolute 0 01 Thousand/uL      Lymphocytes Absolute 1 57 Thousands/µL      Monocytes Absolute 0 28 Thousand/µL      Eosinophils Absolute 0 24 Thousand/µL      Basophils Absolute 0 04 Thousands/µL     Aeromonas/Pleisiomonas Stool Culture [[id number]]     Lab Status:  No result Specimen:  Stool     Clostridium difficile toxin by PCR [[id number]] Lab Status:  No result Specimen:  Stool from Per Rectum                  CT abdomen pelvis wo contrast   Final Result by Kevin Youssef MD (09/18 1842)      1  Examination limited by lack of intravenous and gastrointestinal contrast    2   Nonobstructing 2 mm right renal calculus  3   Mild right hydronephrosis, which appears to be due to a mild UPJ stenosis  4   No evidence of acute abnormality in the abdomen or pelvis  Workstation performed: [id number]                    Procedures  ECG 12 Lead Documentation Only  Date/Time: 9/18/2019 7:10 PM  Performed by: Xiao Olivia DO  Authorized by: Xiao Olivia DO     ECG reviewed by me, the ED Provider: yes    Patient location:  ED  Interpretation:     Interpretation: normal    Rate:     ECG rate assessment: normal    Rhythm:     Rhythm: sinus rhythm    ST segments:     ST segments:  Normal           ED Course       7:20 PM:  I re-evaluated patient patient was feeling better  Patient is complaining of being hungry  Will give patient some crackers and some juice at this time and see if she can tolerate and then we will try to ambulate the patient around the emergency department to see if her weakness has improved  I explained to her and her granddaughter and that I would give her a prescription to bring in her stool culture and also something for abdominal cramping as well as nausea  Identification of Seniors at Risk      Most Recent Value   (ISAR) Identification of Seniors at Risk   Before the illness or injury that brought you to the Emergency, did you need someone to help you on a regular basis? 0 Filed at: 09/18/2019 1612   In the last 24 hours, have you needed more help than usual?  0 Filed at: 09/18/2019 1612   Have you been hospitalized for one or more nights during the past 6 months?   0 Filed at: 09/18/2019 1612   In general, do you see well?  0 Filed at: 09/18/2019 1612   In general, do you have serious problems with your memory?  0 Filed at: 09/18/2019 1612   Do you take more than three different medications every day? 1 Filed at: 09/18/2019 1612   ISAR Score  1 Filed at: 09/18/2019 1612                          [facility]     Differential diagnosis includes:    1  Abdominal pain  2  Diarrhea  3  Rule out diverticulitis  4  Rule out infectious diarrhea  5  Rule out C diff  6  Rule out bowel obstruction  7  Gastroenteritis  8  Dehydration  9  Rule out electrolyte imbalance    Disposition  Final diagnoses:   Diarrhea   Abdominal pain   Nausea   Vaginal itching   Perirectal burning   Perirectal skin irritation     Time reflects when diagnosis was documented in both MDM as applicable and the Disposition within this note     Time User Action Codes Description Comment    9/18/2019  7:18 PM Willadean Cassette Add [R19 7] Diarrhea     9/18/2019  7:18 PM Willadean Cassette Add [R10 9] Abdominal pain     9/18/2019  7:21 PM Willadean Cassette Add [R11 0] Nausea     9/18/2019  7:51 PM Willadean Cassette Add [N89 8] Vaginal itching     9/18/2019  7:56 PM Willadean Cassette Add [K62 89] Perirectal burning     9/18/2019  7:56 PM Willadean Cassette Add [K62 89] Perirectal skin irritation       ED Disposition     ED Disposition Condition Date/Time Comment    Discharge Good Wed Sep 18, 2019  7:19 PM Stacey Suarez discharge to home/self care  Follow-up Information     Follow up With Specialties Details Why [address], [address] In 2 days  [address]  [address]      Nilson Daniel MD Gastroenterology In 1 week  [address]   [address] [address]  [phone]            Patient's Medications   Discharge Prescriptions    DICYCLOMINE (BENTYL) 20 MG TABLET    Take 1 tablet every 6 hours for abdominal cramping       Start Date: 9/18/2019 End Date: --       Order Dose: --       Quantity: 12 tablet    Refills: 0    FLUCONAZOLE (DIFLUCAN) 200 MG TABLET    Take one tab in one week if itching still persists       Start Date: 9/18/2019 End Date: 9/25/2019       Order Dose: --       Quantity: 1 tablet    Refills: 0    NYSTATIN-TRIAMCINOLONE (MYCOLOG-II) OINTMENT    Apply twice a day to rectal area       Start Date: 9/18/2019 End Date: --       Order Dose: --       Quantity: 60 g    Refills: 0    ONDANSETRON (ZOFRAN-ODT) 4 MG DISINTEGRATING TABLET    Place 1 tab (4 mg) under your tongue every 6 hours for nausea or vomiting  Start Date: 9/18/2019 End Date: --       Order Dose: --       Quantity: 12 tablet    Refills: 0     Outpatient Discharge Orders   Aeromonas/Pleisiomonas Stool Culture   Standing Status: Future Standing Exp  Date: 10/18/19     Clostridium difficile toxin by PCR   Standing Status: Future Standing Exp   Date: 10/18/19       ED Provider  Electronically Signed by           Maddison Vivar DO  09/18/19 2003

## 2019-09-18 NOTE — DISCHARGE INSTRUCTIONS
BRAT diet:  bananas, rice, applesauce, and toast   Bentyl as needed for abdominal cramping  Zofran for nausea as needed  Bring in stool cultures as soon as possible  If your urine grows out any bacteria, or your stool culture is positive, we will call you    Drink lots of fluids  Apply mycolog ointment to rectal area  If itching persists, take another Diflucan next week  Return if any problems

## 2019-09-19 ENCOUNTER — PATIENT OUTREACH (OUTPATIENT)
Dept: FAMILY MEDICINE CLINIC | Facility: CLINIC | Age: 79
End: 2019-09-19

## 2019-09-19 ENCOUNTER — APPOINTMENT (OUTPATIENT)
Dept: LAB | Facility: CLINIC | Age: 79
End: 2019-09-19
Payer: COMMERCIAL

## 2019-09-19 DIAGNOSIS — R19.7 DIARRHEA: ICD-10-CM

## 2019-09-19 LAB
ATRIAL RATE: 60 BPM
BACTERIA UR CULT: ABNORMAL
P AXIS: 70 DEGREES
PR INTERVAL: 168 MS
QRS AXIS: -24 DEGREES
QRSD INTERVAL: 94 MS
QT INTERVAL: 434 MS
QTC INTERVAL: 434 MS
T WAVE AXIS: 62 DEGREES
VENTRICULAR RATE: 60 BPM

## 2019-09-19 PROCEDURE — 87046 STOOL CULTR AEROBIC BACT EA: CPT

## 2019-09-19 PROCEDURE — 87493 C DIFF AMPLIFIED PROBE: CPT

## 2019-09-19 PROCEDURE — 93010 ELECTROCARDIOGRAM REPORT: CPT | Performed by: INTERNAL MEDICINE

## 2019-09-19 NOTE — PROGRESS NOTES
Outpatient Care Management Note:    9/19/19-  Complex care case  Alert received that pt presented to ED yesterday evening for weakness, diarrhea, and abdominal pain  Chart review done  Pt was feeling better by end of ED visit and was d/c'd home with an rx for outpatient stool cx, Bentyl, Zofran, and was recommended to follow a BRAT diet  Contacted pts granddaughter and primary caretaker with whom pt resides to f/u on health  Waleska reported that pt is still feeling very ill  She continues to have diarrhea and is feeling weak  She is tolerating BRAT diet and denies nausea  Waleska reported that pt had told her this morning that she had shivering and woke up "all wet" last night  Waleska took her temperature this morning and reported that it was normal   Waleska just dropped off a stool sample about an hour ago at the lab  Will monitor for results  I asked Azell Buerger to check pts temperature again when she gets home and she reported that she will and will let me know what it is  Will f/u     9/20/19-  Azell Buerger did not contact me yesterday after she got home  Contacted Waleska to f/u on pts condition  She reported that pt is very dizzy, weak, legs feel heavy, still having diarrhea, woke up drenched again last night  I advised Waleska to take pt back to ED and she was agreeable  Will continue to follow

## 2019-09-20 ENCOUNTER — HOSPITAL ENCOUNTER (EMERGENCY)
Facility: HOSPITAL | Age: 79
Discharge: HOME | End: 2019-09-20
Attending: EMERGENCY MEDICINE
Payer: COMMERCIAL

## 2019-09-20 VITALS
DIASTOLIC BLOOD PRESSURE: 72 MMHG | BODY MASS INDEX: 27.48 KG/M2 | HEART RATE: 73 BPM | WEIGHT: 139.99 LBS | TEMPERATURE: 98.2 F | RESPIRATION RATE: 16 BRPM | HEIGHT: 60 IN | OXYGEN SATURATION: 99 % | SYSTOLIC BLOOD PRESSURE: 169 MMHG

## 2019-09-20 DIAGNOSIS — R19.7 DIARRHEA: Primary | ICD-10-CM

## 2019-09-20 LAB
ALBUMIN SERPL BCP-MCNC: 3.9 G/DL (ref 3.5–5)
ALP SERPL-CCNC: 38 U/L (ref 46–116)
ALT SERPL W P-5'-P-CCNC: 27 U/L (ref 12–78)
ANION GAP SERPL CALCULATED.3IONS-SCNC: 7 MMOL/L (ref 4–13)
AST SERPL W P-5'-P-CCNC: 21 U/L (ref 5–45)
BACTERIA UR QL AUTO: ABNORMAL /HPF
BASOPHILS # BLD AUTO: 0.04 THOUSANDS/ΜL (ref 0–0.1)
BASOPHILS NFR BLD AUTO: 1 % (ref 0–1)
BILIRUB SERPL-MCNC: 0.5 MG/DL (ref 0.2–1)
BILIRUB UR QL STRIP: NEGATIVE
BUN SERPL-MCNC: 17 MG/DL (ref 5–25)
C DIFF TOX GENS STL QL NAA+PROBE: NORMAL
CALCIUM SERPL-MCNC: 9.8 MG/DL (ref 8.3–10.1)
CHLORIDE SERPL-SCNC: 106 MMOL/L (ref 100–108)
CLARITY UR: CLEAR
CO2 SERPL-SCNC: 27 MMOL/L (ref 21–32)
COLOR UR: YELLOW
CREAT SERPL-MCNC: 1.16 MG/DL (ref 0.6–1.3)
EOSINOPHIL # BLD AUTO: 0.11 THOUSAND/ΜL (ref 0–0.61)
EOSINOPHIL NFR BLD AUTO: 2 % (ref 0–6)
ERYTHROCYTE [DISTWIDTH] IN BLOOD BY AUTOMATED COUNT: 13.4 % (ref 11.6–15.1)
GFR SERPL CREATININE-BSD FRML MDRD: 45 ML/MIN/1.73SQ M
GLUCOSE SERPL-MCNC: 193 MG/DL (ref 65–140)
GLUCOSE UR STRIP-MCNC: ABNORMAL MG/DL
HCT VFR BLD AUTO: 33.6 % (ref 34.8–46.1)
HGB BLD-MCNC: 10.9 G/DL (ref 11.5–15.4)
HGB UR QL STRIP.AUTO: NEGATIVE
IMM GRANULOCYTES # BLD AUTO: 0.01 THOUSAND/UL (ref 0–0.2)
IMM GRANULOCYTES NFR BLD AUTO: 0 % (ref 0–2)
KETONES UR STRIP-MCNC: NEGATIVE MG/DL
LACTATE SERPL-SCNC: 1.2 MMOL/L (ref 0.5–2)
LEUKOCYTE ESTERASE UR QL STRIP: ABNORMAL
LIPASE SERPL-CCNC: 79 U/L (ref 73–393)
LYMPHOCYTES # BLD AUTO: 1.07 THOUSANDS/ΜL (ref 0.6–4.47)
LYMPHOCYTES NFR BLD AUTO: 21 % (ref 14–44)
MAGNESIUM SERPL-MCNC: 1.9 MG/DL (ref 1.6–2.6)
MCH RBC QN AUTO: 28.3 PG (ref 26.8–34.3)
MCHC RBC AUTO-ENTMCNC: 32.4 G/DL (ref 31.4–37.4)
MCV RBC AUTO: 87 FL (ref 82–98)
MONOCYTES # BLD AUTO: 0.24 THOUSAND/ΜL (ref 0.17–1.22)
MONOCYTES NFR BLD AUTO: 5 % (ref 4–12)
NEUTROPHILS # BLD AUTO: 3.54 THOUSANDS/ΜL (ref 1.85–7.62)
NEUTS SEG NFR BLD AUTO: 71 % (ref 43–75)
NITRITE UR QL STRIP: NEGATIVE
NON-SQ EPI CELLS URNS QL MICRO: ABNORMAL /HPF
NRBC BLD AUTO-RTO: 0 /100 WBCS
PH UR STRIP.AUTO: 6.5 [PH]
PLATELET # BLD AUTO: 197 THOUSANDS/UL (ref 149–390)
PMV BLD AUTO: 12.5 FL (ref 8.9–12.7)
POTASSIUM SERPL-SCNC: 3.6 MMOL/L (ref 3.5–5.3)
PROT SERPL-MCNC: 6.9 G/DL (ref 6.4–8.2)
PROT UR STRIP-MCNC: NEGATIVE MG/DL
RBC # BLD AUTO: 3.85 MILLION/UL (ref 3.81–5.12)
RBC #/AREA URNS AUTO: ABNORMAL /HPF
SODIUM SERPL-SCNC: 140 MMOL/L (ref 136–145)
SP GR UR STRIP.AUTO: 1.01 (ref 1–1.03)
UROBILINOGEN UR QL STRIP.AUTO: 0.2 E.U./DL
WBC # BLD AUTO: 5.01 THOUSAND/UL (ref 4.31–10.16)
WBC #/AREA URNS AUTO: ABNORMAL /HPF

## 2019-09-20 PROCEDURE — 80053 COMPREHEN METABOLIC PANEL: CPT | Performed by: PHYSICIAN ASSISTANT

## 2019-09-20 PROCEDURE — 81001 URINALYSIS AUTO W/SCOPE: CPT | Performed by: PHYSICIAN ASSISTANT

## 2019-09-20 PROCEDURE — 96361 HYDRATE IV INFUSION ADD-ON: CPT

## 2019-09-20 PROCEDURE — 83735 ASSAY OF MAGNESIUM: CPT | Performed by: PHYSICIAN ASSISTANT

## 2019-09-20 PROCEDURE — 99283 EMERGENCY DEPT VISIT LOW MDM: CPT | Performed by: PHYSICIAN ASSISTANT

## 2019-09-20 PROCEDURE — 99284 EMERGENCY DEPT VISIT MOD MDM: CPT

## 2019-09-20 PROCEDURE — 85025 COMPLETE CBC W/AUTO DIFF WBC: CPT | Performed by: PHYSICIAN ASSISTANT

## 2019-09-20 PROCEDURE — 96360 HYDRATION IV INFUSION INIT: CPT

## 2019-09-20 PROCEDURE — 83690 ASSAY OF LIPASE: CPT | Performed by: PHYSICIAN ASSISTANT

## 2019-09-20 PROCEDURE — 36415 COLL VENOUS BLD VENIPUNCTURE: CPT | Performed by: PHYSICIAN ASSISTANT

## 2019-09-20 PROCEDURE — 83605 ASSAY OF LACTIC ACID: CPT | Performed by: PHYSICIAN ASSISTANT

## 2019-09-20 RX ORDER — LOPERAMIDE HYDROCHLORIDE 2 MG/1
2 CAPSULE ORAL 4 TIMES DAILY PRN
Qty: 12 CAPSULE | Refills: 0 | Status: SHIPPED | OUTPATIENT
Start: 2019-09-20 | End: 2019-10-25

## 2019-09-20 RX ORDER — ACETAMINOPHEN 325 MG/1
650 TABLET ORAL ONCE
Status: COMPLETED | OUTPATIENT
Start: 2019-09-20 | End: 2019-09-20

## 2019-09-20 RX ORDER — DICYCLOMINE HCL 20 MG
20 TABLET ORAL ONCE
Status: COMPLETED | OUTPATIENT
Start: 2019-09-20 | End: 2019-09-20

## 2019-09-20 RX ADMIN — ACETAMINOPHEN 650 MG: 325 TABLET, FILM COATED ORAL at 17:55

## 2019-09-20 RX ADMIN — DICYCLOMINE HYDROCHLORIDE 20 MG: 20 TABLET ORAL at 17:55

## 2019-09-20 RX ADMIN — SODIUM CHLORIDE 1000 ML: 0.9 INJECTION, SOLUTION INTRAVENOUS at 18:01

## 2019-09-20 NOTE — ED PROVIDER NOTES
History  Chief Complaint   Patient presents with    Weakness - Generalized     pt c/o generalized weakness, pt was here the other day for diarrhea, continues to have diarrhea  Pt also c/o pins and needles feeling all over     76-year-old female here for evaluation diarrhea, fatigue and difficulty walking  States she has had about 10 episodes of diarrhea a day for the past 2 weeks, today being the 1st day where she only had 1 episode of diarrhea  She has lower abdominal pain as well  Chills but no fever  Waking up in sweats  She still tolerating p o , has intermittent nausea but no vomiting  No sick contacts  No new or unusual foods  No recent antibiotics  No recent travels to endemic areas  Denies any hematochezia or melena  She was seen here few days ago, had CT scan labs all of which came back normal   She was recommended to begin using a brat diet  She has been compliant with his diet but continues to feel very run down  She states overall the diarrhea seems to improved, however she is still very run down, now feels like her legs are very having cannot lift them, having difficulty walking  Having tingling in bilateral upper lower extremities  History provided by:  Patient   used: No    Diarrhea   Quality:  Semi-solid  Severity:  Mild  Onset quality:  Gradual  Number of episodes:  Ten a day, only 1 today so far  Duration:  2 weeks  Timing:  Constant  Progression:  Improving  Relieved by:  Nothing  Worsened by:  Nothing  Ineffective treatments:  None tried  Associated symptoms: abdominal pain    Associated symptoms: no arthralgias, no chills, no recent cough, no diaphoresis, no fever, no headaches, no myalgias, no URI and no vomiting    Risk factors: no recent antibiotic use, no sick contacts, no suspicious food intake and no travel to endemic areas        Prior to Admission Medications   Prescriptions Last Dose Informant Patient Reported? Taking?    Blood Glucose Monitoring Suppl (Jake Jaramillo) w/Device KIT  Child Yes No   Sig: Use as directed   Blood Pressure KIT  Child No No   Sig: by Does not apply route daily   Canagliflozin (INVOKANA) 300 MG TABS  Child No No   Sig: Take 1 tablet (300 mg total) by mouth daily   Insulin Pen Needle (BD PEN NEEDLE TOPHER U/F) 32G X 4 MM MISC  Child No No   Sig: by Does not apply route 2 (two) times a day   LANTUS SOLOSTAR 100 units/mL injection pen  Child No No   Sig: INJECT 30 UNITS UNDER THE SKIN DAILY AT BEDTIME FOR 30 DAYS   ONETOUCH DELICA LANCETS FINE MISC  Child No No   Sig: USE 2 TIMES A DAY AS NEEDED FOR HIGH BLOOD SUGAR   acetaminophen (TYLENOL) 650 mg CR tablet  Child No No   Sig: Take 1 tablet (650 mg total) by mouth every 8 (eight) hours as needed for mild pain   amLODIPine (NORVASC) 5 mg tablet  Child No No   Sig: Take 1 tablet (5 mg total) by mouth daily   Patient taking differently: Take by mouth daily    amLODIPine (NORVASC) 5 mg tablet  Child No No   Sig: TAKE 1 TABLET BY MOUTH EVERY DAY   dicyclomine (BENTYL) 20 mg tablet   No No   Sig: Take 1 tablet every 6 hours for abdominal cramping   donepezil (ARICEPT) 10 mg tablet  Child No No   Sig: Take 1 tablet (10 mg total) by mouth daily at bedtime   fenofibrate (TRIGLIDE) 160 MG tablet  Child No No   Sig: Take 1 tablet (160 mg total) by mouth daily   fluconazole (DIFLUCAN) 200 mg tablet   No No   Sig: Take one tab in one week if itching still persists   glucose blood (ONETOUCH VERIO) test strip  Child No No   Sig: TEST TWICE A DAY   insulin glargine (LANTUS SOLOSTAR) 100 units/mL injection pen  Child No No   Sig: Inject 28 Units under the skin daily at bedtime for 30 days   Patient taking differently: Inject 30 Units under the skin daily at bedtime    levothyroxine 88 mcg tablet  Child No No   Sig: Take 1 tablet (88 mcg total) by mouth daily for 90 days   lisinopril (ZESTRIL) 20 mg tablet  Child No No   Sig: Take 1 tablet (20 mg total) by mouth 2 (two) times a day   Patient taking differently: Take by mouth 2 (two) times a day    memantine (NAMENDA) 10 mg tablet  Child No No   Sig: TAKE 1 TABLET (10 MG TOTAL) BY MOUTH 2 (TWO) TIMES A DAY   naproxen (NAPROSYN) 500 mg tablet  Child No No   Sig: TAKE 1 TABLET (500 MG TOTAL) BY MOUTH 2 (TWO) TIMES A DAY WITH MEALS FOR 30 DAYS   nystatin-triamcinolone (MYCOLOG-II) ointment   No No   Sig: Apply twice a day to rectal area   ondansetron (ZOFRAN-ODT) 4 mg disintegrating tablet   No No   Sig: Place 1 tab (4 mg) under your tongue every 6 hours for nausea or vomiting    simvastatin (ZOCOR) 20 mg tablet  Child Yes No   Sig: Take 20 mg by mouth daily at bedtime      Facility-Administered Medications: None       Past Medical History:   Diagnosis Date    Aggression     Alzheimer's dementia     Arthritis     Dementia     Diabetes insipidus ([address]     Diabetes mellitus ([address]     High cholesterol     Hypertension     Memory loss     Migraine     Polyneuropathy     Rheumatoid arthritis (HCC)     Serum lipids high     Stomach problems     Thyroid trouble        Past Surgical History:   Procedure Laterality Date    CATARACT EXTRACTION      GALLBLADDER SURGERY      HYSTERECTOMY      [place] SHLDR ARTHROSCOP,SURG,W/ROTAT CUFF REPR Right 2019    Procedure: SHOULDER ARTHROSCOPIC ROTATOR CUFF REPAIR;  Surgeon: Gino Villagomez MD;  Location: [facility];  Service: Orthopedics       Family History   Problem Relation Age of Onset    Substance Abuse Mother         denied    Mental illness Mother         denied    Substance Abuse Father         denied    Mental illness Father         denied    Diabetes Brother     Blindness Brother     Arthritis Daughter     HIV Son      I have reviewed and agree with the history as documented  Social History     Tobacco Use    Smoking status: Former Smoker     Last attempt to quit: 1985     Years since quittin 7    Smokeless tobacco: Never Used   Substance Use Topics    Alcohol use: No    Drug use:  No Review of Systems   Constitutional: Negative for activity change, appetite change, chills, diaphoresis, fatigue, fever and unexpected weight change  HENT: Negative for congestion, rhinorrhea, sinus pressure, sore throat and trouble swallowing  Eyes: Negative for photophobia and visual disturbance  Respiratory: Negative for apnea, cough, choking, chest tightness, shortness of breath, wheezing and stridor  Cardiovascular: Negative for chest pain, palpitations and leg swelling  Gastrointestinal: Positive for abdominal pain and diarrhea  Negative for abdominal distention, blood in stool, constipation, nausea and vomiting  Genitourinary: Negative for decreased urine volume, difficulty urinating, dysuria, enuresis, flank pain, frequency, hematuria and urgency  Musculoskeletal: Negative for arthralgias, myalgias, neck pain and neck stiffness  Skin: Negative for color change, pallor, rash and wound  Allergic/Immunologic: Negative  Neurological: Negative for dizziness, tremors, syncope, weakness, light-headedness, numbness and headaches  Hematological: Negative  Psychiatric/Behavioral: Negative  All other systems reviewed and are negative  Physical Exam  Physical Exam   Constitutional: She is oriented to person, place, and time  She appears well-developed and well-nourished  Non-toxic appearance  She does not have a sickly appearance  She does not appear ill  No distress  HENT:   Head: Normocephalic and atraumatic  Eyes: Pupils are equal, round, and reactive to light  EOM and lids are normal    Neck: Normal range of motion  Neck supple  Cardiovascular: Normal rate, regular rhythm, S1 normal, S2 normal, normal heart sounds, intact distal pulses and normal pulses  Exam reveals no gallop, no distant heart sounds, no friction rub and no decreased pulses  No murmur heard  Pulses:       Radial pulses are 2+ on the right side, and 2+ on the left side     Pulmonary/Chest: Effort normal and breath sounds normal  No accessory muscle usage  No apnea, no tachypnea and no bradypnea  No respiratory distress  She has no decreased breath sounds  She has no wheezes  She has no rhonchi  She has no rales  Abdominal: Soft  Normal appearance  She exhibits no distension  There is no tenderness  There is no rigidity, no rebound and no guarding  No abdominal tenderness   Musculoskeletal: Normal range of motion  She exhibits no edema, tenderness or deformity  Neurological: She is alert and oriented to person, place, and time  No cranial nerve deficit  GCS eye subscore is 4  GCS verbal subscore is 5  GCS motor subscore is 6  Skin: Skin is warm, dry and intact  No rash noted  She is not diaphoretic  No erythema  No pallor  Psychiatric: Her speech is normal    Nursing note and vitals reviewed        Vital Signs  ED Triage Vitals   Temperature Pulse Respirations Blood Pressure SpO2   09/20/19 1703 09/20/19 1701 09/20/19 1701 09/20/19 1701 09/20/19 1701   98 2 °F (36 8 °C) 73 16 169/72 99 %      Temp Source Heart Rate Source Patient Position - Orthostatic VS BP Location FiO2 (%)   09/20/19 1701 09/20/19 1701 09/20/19 1701 09/20/19 1701 --   Oral Monitor Lying Right arm       Pain Score       --                  Vitals:    09/20/19 1701   BP: 169/72   Pulse: 73   Patient Position - Orthostatic VS: Lying         Visual Acuity      ED Medications  Medications   sodium chloride 0 9 % bolus 1,000 mL (0 mL Intravenous Stopped 9/20/19 2011)   dicyclomine (BENTYL) tablet 20 mg (20 mg Oral Given 9/20/19 1755)   acetaminophen (TYLENOL) tablet 650 mg (650 mg Oral Given 9/20/19 1755)       Diagnostic Studies  Results Reviewed     Procedure Component Value Units Date/Time    Urine Microscopic [[id number]]  (Abnormal) Collected:  09/20/19 1828    Lab Status:  Final result Specimen:  Urine, Clean Catch Updated:  09/20/19 1856     RBC, UA None Seen /hpf      WBC, UA 1-2 /hpf      Epithelial Cells Occasional /hpf Bacteria, UA Occasional /hpf     UA w Reflex to Microscopic w Reflex to Culture [[id number]]  (Abnormal) Collected:  09/20/19 1828    Lab Status:  Final result Specimen:  Urine, Clean Catch Updated:  09/20/19 1850     Color, UA Yellow     Clarity, UA Clear     Specific Gravity, UA 1 010     pH, UA 6 5     Leukocytes, UA Trace     Nitrite, UA Negative     Protein, UA Negative mg/dl      Glucose, UA >=1000 (1%) mg/dl      Ketones, UA Negative mg/dl      Urobilinogen, UA 0 2 E U /dl      Bilirubin, UA Negative     Blood, UA Negative    Lactic acid, plasma [[id number]]  (Normal) Collected:  09/20/19 1801    Lab Status:  Final result Specimen:  Blood from Arm, Left Updated:  09/20/19 1828     LACTIC ACID 1 2 mmol/L     Narrative:       Result may be elevated if tourniquet was used during collection      Comprehensive metabolic panel [[id number]]  (Abnormal) Collected:  09/20/19 1801    Lab Status:  Final result Specimen:  Blood from Arm, Left Updated:  09/20/19 1825     Sodium 140 mmol/L      Potassium 3 6 mmol/L      Chloride 106 mmol/L      CO2 27 mmol/L      ANION GAP 7 mmol/L      BUN 17 mg/dL      Creatinine 1 16 mg/dL      Glucose 193 mg/dL      Calcium 9 8 mg/dL      AST 21 U/L      ALT 27 U/L      Alkaline Phosphatase 38 U/L      Total Protein 6 9 g/dL      Albumin 3 9 g/dL      Total Bilirubin 0 50 mg/dL      eGFR 45 ml/min/1 73sq m     Narrative:       Celia guidelines for Chronic Kidney Disease (CKD):     Stage 1 with normal or high GFR (GFR > 90 mL/min/1 73 square meters)    Stage 2 Mild CKD (GFR = 60-89 mL/min/1 73 square meters)    Stage 3A Moderate CKD (GFR = 45-59 mL/min/1 73 square meters)    Stage 3B Moderate CKD (GFR = 30-44 mL/min/1 73 square meters)    Stage 4 Severe CKD (GFR = 15-29 mL/min/1 73 square meters)    Stage 5 End Stage CKD (GFR <15 mL/min/1 73 square meters)  Note: GFR calculation is accurate only with a steady state creatinine    Lipase [[id number]] (Normal) Collected:  09/20/19 1801    Lab Status:  Final result Specimen:  Blood from Arm, Left Updated:  09/20/19 1825     Lipase 79 u/L     Magnesium [[id number]]  (Normal) Collected:  09/20/19 1801    Lab Status:  Final result Specimen:  Blood from Arm, Left Updated:  09/20/19 1825     Magnesium 1 9 mg/dL     CBC and differential [[id number]]  (Abnormal) Collected:  09/20/19 1801    Lab Status:  Final result Specimen:  Blood from Arm, Left Updated:  09/20/19 1810     WBC 5 01 Thousand/uL      RBC 3 85 Million/uL      Hemoglobin 10 9 g/dL      Hematocrit 33 6 %      MCV 87 fL      MCH 28 3 pg      MCHC 32 4 g/dL      RDW 13 4 %      MPV 12 5 fL      Platelets 308 Thousands/uL      nRBC 0 /100 WBCs      Neutrophils Relative 71 %      Immat GRANS % 0 %      Lymphocytes Relative 21 %      Monocytes Relative 5 %      Eosinophils Relative 2 %      Basophils Relative 1 %      Neutrophils Absolute 3 54 Thousands/µL      Immature Grans Absolute 0 01 Thousand/uL      Lymphocytes Absolute 1 07 Thousands/µL      Monocytes Absolute 0 24 Thousand/µL      Eosinophils Absolute 0 11 Thousand/µL      Basophils Absolute 0 04 Thousands/µL                  No orders to display              Procedures  Procedures       ED Course  ED Course as of Sep 21 0001   Fri Sep 20, 2019   1929 Labs normal                                    MDM  Number of Diagnoses or Management Options  Diarrhea: new and requires workup  Diagnosis management comments: DDx including but not limited to: food poisoning, viral illness, colitis, enteritis, metabolic abnormality, IBS, IBD, ileus, bowel obstruction, appendicitis, pancreatitis, hepatitis, mesenteric adenitis, mesenteric ischemia, toxic megacolon, cholecystitis, biliary colic, pyelonephritis, UTI, renal colic  Plan:  Labs, foot hydration    She has no tenderness and normal vitals, will defer CT scan of the significant laboratory abnormalities or significant change in exam        Amount and/or Complexity of Data Reviewed  Clinical lab tests: reviewed and ordered  Tests in the radiology section of CPT®: reviewed and ordered  Independent visualization of images, tracings, or specimens: yes    Risk of Complications, Morbidity, and/or Mortality  Presenting problems: moderate  Management options: low  General comments: 16-year-old female patient here with diarrhea, diarrhea is actually improving  She was given a L of saline and is now feeling better  She is ambulating and department without difficulty  She is also eating a full meal while here  Explained to the patient and the patient daughter that at this point seems like overall clinically she is improving and I feel comfortable discharging her home for outpatient follow-up  She can take Imodium if her diarrhea starts to worsen however at this time she has only had 2 episodes this entire day  This is improved from the previous 10 a day that she was at  We discussed return parameters  Patient and daughter understand and agree this plan  Patient Progress  Patient progress: stable      Disposition  Final diagnoses:   Diarrhea     Time reflects when diagnosis was documented in both MDM as applicable and the Disposition within this note     Time User Action Codes Description Comment    9/20/2019  8:24 PM Anny Silva Add [R19 7] Diarrhea       ED Disposition     ED Disposition Condition Date/Time Comment    Discharge Stable Fri Sep 20, 2019  8:24 PM Stephen Morrow discharge to home/self care              Follow-up Information     Follow up With Specialties Details Why [address] MD Family Medicine Call  for routine follow up [address] [place] [address] 16  [phone]            Discharge Medication List as of 9/20/2019  8:26 PM      START taking these medications    Details   loperamide (IMODIUM) 2 mg capsule Take 1 capsule (2 mg total) by mouth 4 (four) times a day as needed for diarrhea, Starting Fri 9/20/2019, Print CONTINUE these medications which have NOT CHANGED    Details   acetaminophen (TYLENOL) 650 mg CR tablet Take 1 tablet (650 mg total) by mouth every 8 (eight) hours as needed for mild pain, Starting Thu 8/1/2019, Normal      !! amLODIPine (NORVASC) 5 mg tablet Take 1 tablet (5 mg total) by mouth daily, Starting Wed 7/10/2019, Normal      !! amLODIPine (NORVASC) 5 mg tablet TAKE 1 TABLET BY MOUTH EVERY DAY, Normal      Blood Glucose Monitoring Suppl (Jose Alejandro Laguna) w/Device KIT Use as directed, Starting Thu 2/22/2018, Historical Med      Blood Pressure KIT by Does not apply route daily, Starting Mon 5/13/2019, Normal      Canagliflozin (INVOKANA) 300 MG TABS Take 1 tablet (300 mg total) by mouth daily, Starting Mon 1/28/2019, Normal      dicyclomine (BENTYL) 20 mg tablet Take 1 tablet every 6 hours for abdominal cramping, Print      donepezil (ARICEPT) 10 mg tablet Take 1 tablet (10 mg total) by mouth daily at bedtime, Starting Thu 8/15/2019, Normal      fenofibrate (TRIGLIDE) 160 MG tablet Take 1 tablet (160 mg total) by mouth daily, Starting Fri 8/30/2019, Normal      fluconazole (DIFLUCAN) 200 mg tablet Take one tab in one week if itching still persists, Print      glucose blood (ONETOUCH VERIO) test strip TEST TWICE A DAY, Normal      insulin glargine (LANTUS SOLOSTAR) 100 units/mL injection pen Inject 28 Units under the skin daily at bedtime for 30 days, Starting Thu 7/18/2019, No Print      Insulin Pen Needle (BD PEN NEEDLE TOPHER U/F) 32G X 4 MM MISC by Does not apply route 2 (two) times a day, Starting Thu 5/9/2019, Normal      levothyroxine 88 mcg tablet Take 1 tablet (88 mcg total) by mouth daily for 90 days, Starting Wed 8/7/2019, Until Tue 11/5/2019, Normal      lisinopril (ZESTRIL) 20 mg tablet Take 1 tablet (20 mg total) by mouth 2 (two) times a day, Starting Wed 7/10/2019, Normal      memantine (NAMENDA) 10 mg tablet TAKE 1 TABLET (10 MG TOTAL) BY MOUTH 2 (TWO) TIMES A DAY, Starting Tue 9/25/2018, Normal      naproxen (NAPROSYN) 500 mg tablet TAKE 1 TABLET (500 MG TOTAL) BY MOUTH 2 (TWO) TIMES A DAY WITH MEALS FOR 30 DAYS, Starting Fri 9/13/2019, Until Sun 10/13/2019, Normal      nystatin-triamcinolone (MYCOLOG-II) ointment Apply twice a day to rectal area, Print      ondansetron (ZOFRAN-ODT) 4 mg disintegrating tablet Place 1 tab (4 mg) under your tongue every 6 hours for nausea or vomiting , Print      ONETOUCH DELICA LANCETS FINE MISC USE 2 TIMES A DAY AS NEEDED FOR HIGH BLOOD SUGAR, Normal      simvastatin (ZOCOR) 20 mg tablet Take 20 mg by mouth daily at bedtime, Historical Med       !! - Potential duplicate medications found  Please discuss with provider  No discharge procedures on file      ED Provider  Electronically Signed by           Jamila Conti PA-C  09/21/19 0001

## 2019-09-20 NOTE — ED NOTES
Ambulated pt around the ED with the assistance of a cane  Pt did not complain of any lightheadedness or dizziness   Pt complained of feeling a "little weak "      Eleonora Palomino  09/20/19 1952

## 2019-09-21 LAB — AEROMONAS SPEC QL CULT: NORMAL

## 2019-09-23 ENCOUNTER — PATIENT OUTREACH (OUTPATIENT)
Dept: FAMILY MEDICINE CLINIC | Facility: CLINIC | Age: 79
End: 2019-09-23

## 2019-09-23 ENCOUNTER — APPOINTMENT (OUTPATIENT)
Dept: PHYSICAL THERAPY | Age: 79
End: 2019-09-23
Payer: COMMERCIAL

## 2019-09-23 NOTE — PROGRESS NOTES
Outpatient Care Management Note:    Complex care case  Pt was seen in ED on 9/18 and 9/20 for weakness and diarrhea  Chart review done  Pt had CT and labwork and was given fluids and was d/c'd home both times  Contacted pts granddaughter, Cherie Groves, to f/u on health and ED visits  Waleska reported that pt is feeling better but she continues to have diarrhea twice a day  Weakness has subsided  We reviewed AVS and Waleska understood in entirety  Encouraged to keep her hydrated and nourished and she verbalized understanding  She has not yet scheduled a f/u appt with PCP and I encouraged her to do so  She reported that she will if diarrhea persists  Confirmed my contact info and encouraged Waleska to contact me if any concerns arise and she was appreciative  Will f/u in approx one week

## 2019-09-25 ENCOUNTER — APPOINTMENT (OUTPATIENT)
Dept: PHYSICAL THERAPY | Age: 79
End: 2019-09-25
Payer: COMMERCIAL

## 2019-09-25 ENCOUNTER — PATIENT OUTREACH (OUTPATIENT)
Dept: FAMILY MEDICINE CLINIC | Facility: CLINIC | Age: 79
End: 2019-09-25

## 2019-09-25 ENCOUNTER — TELEPHONE (OUTPATIENT)
Dept: FAMILY MEDICINE CLINIC | Facility: CLINIC | Age: 79
End: 2019-09-25

## 2019-09-25 NOTE — PROGRESS NOTES
Outpatient Care Management Note:    9/25/19-    3:10pm-  Complex care case  Pts granddaughter, Jada Barba, contacted me and reported that she had to call 911 this morning because pt was screaming, throwing herself, flailing, hitting herself, screaming and crying  She checked blood sugar and it was 49  She also reported that last week her sugars have been in the low 60's in the morning as well  Reviewed insulin dose with Waleska  Order says 28 units of Lantus qhs  She is giving pt 30 units and reported that the dose was changed by PCP  She contacted the office earlier and moved her next f/u appt to next week  Messaged PCP regarding same to perhaps adjust dose until pt is seen  Awaiting return message  4:15pm-  Return message from PCP to decrease Lantus to 25units qhs at this time  Contacted pts granddaughter, Jada Barba, to make her aware  She verbalized understanding

## 2019-09-25 NOTE — PROGRESS NOTES
Patient's caregiver (taylor) called today  Pt had a diabetic episode today where her blood glucose was 47  She reports the patient was hallucinating and running around flailing her arms overhead  The patient now has more pain in her shoulder  Advised caregiver to call her PCP to make sure the patient is stable and also suggested she call surgeon to make sure no damage occurred  Pt has not been to PT in 1 week as she cancelled her prior appt for illness as well

## 2019-09-25 NOTE — TELEPHONE ENCOUNTER
Patient's granddaughter called regarding patients blood sugar has bee running low - lowest was 49 this morning  Granddaughter gave her OJ and PB  Told her to re-check BS in about 1/2 hour and call with results  Advised her that if patient's BS does not increase then call 911  She is currently on lantus 30 units QHS

## 2019-09-30 ENCOUNTER — OFFICE VISIT (OUTPATIENT)
Dept: PHYSICAL THERAPY | Age: 79
End: 2019-09-30
Payer: COMMERCIAL

## 2019-09-30 ENCOUNTER — PATIENT OUTREACH (OUTPATIENT)
Dept: CASE MANAGEMENT | Facility: OTHER | Age: 79
End: 2019-09-30

## 2019-09-30 DIAGNOSIS — E11.9 TYPE 2 DIABETES MELLITUS TREATED WITH INSULIN (HCC): ICD-10-CM

## 2019-09-30 DIAGNOSIS — Z98.890 S/P RIGHT ROTATOR CUFF REPAIR: Primary | ICD-10-CM

## 2019-09-30 DIAGNOSIS — Z79.4 TYPE 2 DIABETES MELLITUS TREATED WITH INSULIN (HCC): ICD-10-CM

## 2019-09-30 PROCEDURE — 97110 THERAPEUTIC EXERCISES: CPT

## 2019-09-30 PROCEDURE — 97140 MANUAL THERAPY 1/> REGIONS: CPT

## 2019-09-30 NOTE — PROGRESS NOTES
Daily Note     Today's date: 2019  Patient name: Shabnam Rutherford  : 1940  MRN: [id number]  Referring provider: Des Estrada PA-C  Dx:   Encounter Diagnosis     ICD-10-CM    1  S/P right rotator cuff repair [id number]        Start Time: 1311  Stop Time: 4438  Total time in clinic (min): 60 minutes    Subjective: Patient reports 3/10 pain in her R shoulder  Objective: See treatment diary below      Assessment: Tolerated treatment fair  Some grimacing and guarding with PROM noted, R shoulder  ROM restricted t/o  Good tolerance to exercises, cues for form and carryover required  Patient would benefit from continued PT      Plan: Continue per plan of care        Precautions: DM, Fall risk, language barrier      Manual            Right shoulder, elbow 15' 15 15                                                                  Exercise Diary            Ball FF,CW, CCW 30x ea 30x 30x          Cane FF, CP 10x 20x 20x                       UBE 4' 4' 4'          t band rows (Red) 30x 30x 30x          Pulleys FF/ABD 15x ea 20x 20x                                                                                                                                                                                                    Modalities            MH NT 10' 10' in supine          CP post session   10'

## 2019-10-01 RX ORDER — LANCETS 33 GAUGE
EACH MISCELLANEOUS
Qty: 100 EACH | Refills: 3 | Status: SHIPPED | OUTPATIENT
Start: 2019-10-01 | End: 2020-11-13

## 2019-10-01 NOTE — PROGRESS NOTES
Outpatient Care Management Note:    Complex care case  Chart review done  Contacted pts granddaughter, Dwayne Newman, to f/u on blood sugars  She reported that they were much better, ranging between   She reported that pt is still having diarrhea like once daily  She denied pt having any abdominal pain, nausea, vomiting, weakness, dizziness  Encouraged her to continue BRAT diet and hydration and she verbalized understanding  She reported that pts "bottom is sore from so many days of having diarrhea "  Advised her to use barrier cream such as desitin and she reported that she has been using cream already  Did advise her that if pt becomes extremely weak, dizzy, feverish, etc, to contact me or a member of the care team and she was agreeable  Will f/u in a few days

## 2019-10-03 ENCOUNTER — APPOINTMENT (OUTPATIENT)
Dept: RADIOLOGY | Facility: CLINIC | Age: 79
End: 2019-10-03
Payer: COMMERCIAL

## 2019-10-03 ENCOUNTER — OFFICE VISIT (OUTPATIENT)
Dept: FAMILY MEDICINE CLINIC | Facility: CLINIC | Age: 79
End: 2019-10-03
Payer: COMMERCIAL

## 2019-10-03 ENCOUNTER — APPOINTMENT (OUTPATIENT)
Dept: LAB | Facility: CLINIC | Age: 79
End: 2019-10-03
Payer: COMMERCIAL

## 2019-10-03 VITALS
OXYGEN SATURATION: 98 % | WEIGHT: 138.2 LBS | RESPIRATION RATE: 16 BRPM | TEMPERATURE: 98.8 F | HEIGHT: 60 IN | SYSTOLIC BLOOD PRESSURE: 134 MMHG | DIASTOLIC BLOOD PRESSURE: 66 MMHG | HEART RATE: 76 BPM | BODY MASS INDEX: 27.13 KG/M2

## 2019-10-03 DIAGNOSIS — R19.7 DIARRHEA, UNSPECIFIED TYPE: Primary | ICD-10-CM

## 2019-10-03 DIAGNOSIS — D64.9 ANEMIA, UNSPECIFIED TYPE: ICD-10-CM

## 2019-10-03 DIAGNOSIS — R05.9 COUGH: ICD-10-CM

## 2019-10-03 DIAGNOSIS — Z79.4 TYPE 2 DIABETES MELLITUS TREATED WITH INSULIN (HCC): ICD-10-CM

## 2019-10-03 DIAGNOSIS — E11.9 TYPE 2 DIABETES MELLITUS TREATED WITH INSULIN (HCC): ICD-10-CM

## 2019-10-03 LAB
BASOPHILS # BLD AUTO: 0.03 THOUSANDS/ΜL (ref 0–0.1)
BASOPHILS NFR BLD AUTO: 1 % (ref 0–1)
EOSINOPHIL # BLD AUTO: 0.29 THOUSAND/ΜL (ref 0–0.61)
EOSINOPHIL NFR BLD AUTO: 6 % (ref 0–6)
ERYTHROCYTE [DISTWIDTH] IN BLOOD BY AUTOMATED COUNT: 13.7 % (ref 11.6–15.1)
HCT VFR BLD AUTO: 37 % (ref 34.8–46.1)
HGB BLD-MCNC: 11.3 G/DL (ref 11.5–15.4)
IMM GRANULOCYTES # BLD AUTO: 0.01 THOUSAND/UL (ref 0–0.2)
IMM GRANULOCYTES NFR BLD AUTO: 0 % (ref 0–2)
LYMPHOCYTES # BLD AUTO: 1.48 THOUSANDS/ΜL (ref 0.6–4.47)
LYMPHOCYTES NFR BLD AUTO: 29 % (ref 14–44)
MCH RBC QN AUTO: 27.6 PG (ref 26.8–34.3)
MCHC RBC AUTO-ENTMCNC: 30.5 G/DL (ref 31.4–37.4)
MCV RBC AUTO: 90 FL (ref 82–98)
MONOCYTES # BLD AUTO: 0.4 THOUSAND/ΜL (ref 0.17–1.22)
MONOCYTES NFR BLD AUTO: 8 % (ref 4–12)
NEUTROPHILS # BLD AUTO: 2.84 THOUSANDS/ΜL (ref 1.85–7.62)
NEUTS SEG NFR BLD AUTO: 56 % (ref 43–75)
NRBC BLD AUTO-RTO: 0 /100 WBCS
PLATELET # BLD AUTO: 150 THOUSANDS/UL (ref 149–390)
PMV BLD AUTO: 13.6 FL (ref 8.9–12.7)
RBC # BLD AUTO: 4.1 MILLION/UL (ref 3.81–5.12)
WBC # BLD AUTO: 5.05 THOUSAND/UL (ref 4.31–10.16)

## 2019-10-03 PROCEDURE — 85025 COMPLETE CBC W/AUTO DIFF WBC: CPT

## 2019-10-03 PROCEDURE — 99214 OFFICE O/P EST MOD 30 MIN: CPT | Performed by: FAMILY MEDICINE

## 2019-10-03 PROCEDURE — 71046 X-RAY EXAM CHEST 2 VIEWS: CPT

## 2019-10-03 PROCEDURE — 36415 COLL VENOUS BLD VENIPUNCTURE: CPT

## 2019-10-03 RX ORDER — METRONIDAZOLE 500 MG/1
500 TABLET ORAL EVERY 8 HOURS SCHEDULED
Qty: 21 TABLET | Refills: 0 | Status: SHIPPED | OUTPATIENT
Start: 2019-10-03 | End: 2019-10-10

## 2019-10-03 RX ORDER — LEVOFLOXACIN 500 MG/1
500 TABLET, FILM COATED ORAL EVERY 24 HOURS
Qty: 7 TABLET | Refills: 0 | Status: SHIPPED | OUTPATIENT
Start: 2019-10-03 | End: 2019-10-10

## 2019-10-03 RX ORDER — DEXTROMETHORPHAN HYDROBROMIDE AND PROMETHAZINE HYDROCHLORIDE 15; 6.25 MG/5ML; MG/5ML
5 SOLUTION ORAL 4 TIMES DAILY PRN
Qty: 118 ML | Refills: 1 | Status: SHIPPED | OUTPATIENT
Start: 2019-10-03 | End: 2019-11-19 | Stop reason: ALTCHOICE

## 2019-10-03 NOTE — PROGRESS NOTES
Assessment/Plan:    No problem-specific Assessment & Plan notes found for this encounter  Diagnoses and all orders for this visit:    Diarrhea, unspecified type  After discussing risks and benefits of medication along with side effects will do a trial levofloxacin plus metronidazole at this time  Stool study culture and ova/parasites  -     levofloxacin (LEVAQUIN) 500 mg tablet; Take 1 tablet (500 mg total) by mouth every 24 hours for 7 days  -     metroNIDAZOLE (FLAGYL) 500 mg tablet; Take 1 tablet (500 mg total) by mouth every 8 (eight) hours for 7 days    Type 2 diabetes mellitus treated with insulin (HCC)  Continue lantus 28 units at bedtime   -     ONETOUCH DELICA LANCETS FINE MISC; by Device route 2 (two) times a day    Anemia, unspecified type  -     CBC and differential; Future  -     Occult blood 1-3, stool; Future  -     Stool Enteric Bacterial Panel by PCR; Future  -     Ova and parasite examination; Future    Cough  -     XR chest pa & lateral; Future  -     Promethazine-DM (PHENERGAN-DM) 6 25-15 mg/5 mL oral syrup; Take 5 mL by mouth 4 (four) times a day as needed for cough      Follow up in 1 week if symptoms worsen recommend going to the ER    Subjective:      Patient ID: Aramjohn Ba is a 67 y o  female  Patient is here because she has been having diarrhea for the past 3-4 weeks  She denies any blood in the diarrhea  Multiple episodes per day 5-6  She denies any diarrhea symptoms in other family members in the home  She does not remember eating any foods that might have given her the symptoms  She has gone to the emergency room twice for her complaints at [id number] [facility]  She was tested for C diff which was negative as well as negative for Aeromonas per stool sample  Of note her CBC showed anemia new findign  She had a CT scan of the abdomen done which showed mild diverticulosis without any evidence of diverticulitis    She also has a history of type 2 diabetes mellitus her blood glucose levels have been running low recently her Lantus was recently switched and decreased to 28 units at bedtime which has been improving her glucose levels during the day  Also associated with diarrhea she has been having a cough nonproductive in nature  Of note to offer grand kids have been diagnosed with strep throat recently  The following portions of the patient's history were reviewed and updated as appropriate:   She  has a past medical history of Aggression, Alzheimer's dementia, Arthritis, Dementia, Diabetes insipidus ([address] ), Diabetes mellitus ([address] ), High cholesterol, Hypertension, Memory loss, Migraine, Polyneuropathy, Rheumatoid arthritis ([address] ), Serum lipids high, Stomach problems, and Thyroid trouble    She   Patient Active Problem List    Diagnosis Date Noted    Diarrhea 10/03/2019    Type 2 diabetes mellitus treated with insulin ([address] ) 10/03/2019    Anemia 10/03/2019    Cough 10/03/2019    Unstable gait 08/15/2019    Tear of right rotator cuff 07/23/2019    Biceps tendon tear 07/23/2019    Pre-op examination 07/18/2019    Nontraumatic tear of right rotator cuff 07/18/2019    Menopause ovarian failure 07/18/2019    Need for shingles vaccine 07/18/2019    Peripheral neuropathy 07/18/2019    Medicare annual wellness visit, subsequent 07/18/2019    Ambulatory dysfunction 07/18/2019    Hypothyroidism 05/13/2019    Essential hypertension 05/13/2019    Fall 05/13/2019    Acute pain of right shoulder 05/13/2019    SVT (supraventricular tachycardia) ([address] ) 05/13/2019    Earlobe lesion, bilateral 09/10/2018    Pain of both hip joints 09/10/2018    Need for influenza vaccination 09/10/2018    Back pain 09/10/2018    Anxiety 08/14/2018    Yeast infection 08/14/2018    Pruritic dermatitis 06/13/2018    Type 2 diabetes, uncontrolled, with neuropathy ([address] ) 06/07/2018    Alzheimer's dementia ([address] ) 05/03/2018    Memory loss 03/19/2018    Deformity of hand 03/19/2018    Bilateral hearing loss 02/22/2018    Rheumatoid arthritis 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 ([address] ) 02/22/2018    Dementia with behavioral disturbance ([address] ) 02/15/2018    Chronic left shoulder pain 02/15/2018    Need for lipid screening 02/15/2018     She  has a past surgical history that includes Hysterectomy; Gallbladder surgery; Cataract extraction; and pr shldr arthroscop,surg,w/rotat cuff repr (Right, 8/1/2019)  Her family history includes Arthritis in her daughter; Blindness in her brother; Diabetes in her brother; HIV in her son; Mental illness in her father and mother; Substance Abuse in her father and mother  She  reports that she quit smoking about 34 years ago  She has never used smokeless tobacco  She reports that she does not drink alcohol or use drugs    Current Outpatient Medications   Medication Sig Dispense Refill    acetaminophen (TYLENOL) 650 mg CR tablet Take 1 tablet (650 mg total) by mouth every 8 (eight) hours as needed for mild pain 30 tablet 0    amLODIPine (NORVASC) 5 mg tablet Take 1 tablet (5 mg total) by mouth daily (Patient taking differently: Take by mouth daily ) 30 tablet 0    amLODIPine (NORVASC) 5 mg tablet TAKE 1 TABLET BY MOUTH EVERY DAY 30 tablet 1    Blood Glucose Monitoring Suppl (ONETOUCH VERIO) w/Device KIT Use as directed  0    Blood Pressure KIT by Does not apply route daily 1 each 1    Canagliflozin (INVOKANA) 300 MG TABS Take 1 tablet (300 mg total) by mouth daily 90 tablet 3    fenofibrate (TRIGLIDE) 160 MG tablet Take 1 tablet (160 mg total) by mouth daily 30 tablet 5    glucose blood (ONETOUCH VERIO) test strip TEST TWICE A  each 2    insulin glargine (LANTUS SOLOSTAR) 100 units/mL injection pen Inject 28 Units under the skin daily at bedtime for 30 days (Patient taking differently: Inject 30 Units under the skin daily at bedtime ) 5 pen 5    Insulin Pen Needle (BD PEN NEEDLE TOPHER U/F) 32G X 4 MM MISC by Does not apply route 2 (two) times a day 100 each 5    LANTUS SOLOSTAR 100 units/mL injection pen INJECT 30 UNITS UNDER THE SKIN DAILY AT BEDTIME FOR 30 DAYS (Patient taking differently: Inject 25 Units under the skin ) 5 pen 5    levothyroxine 88 mcg tablet Take 1 tablet (88 mcg total) by mouth daily for 90 days 90 tablet 0    lisinopril (ZESTRIL) 20 mg tablet Take 1 tablet (20 mg total) by mouth 2 (two) times a day (Patient taking differently: Take by mouth 2 (two) times a day ) 90 tablet 6    loperamide (IMODIUM) 2 mg capsule Take 1 capsule (2 mg total) by mouth 4 (four) times a day as needed for diarrhea 12 capsule 0    memantine (NAMENDA) 10 mg tablet TAKE 1 TABLET (10 MG TOTAL) BY MOUTH 2 (TWO) TIMES A DAY 60 tablet 3    nystatin-triamcinolone (MYCOLOG-II) ointment Apply twice a day to rectal area 60 g 0    ONETOUCH DELICA LANCETS 30N MISC USE 2 (TWO) TIMES A DAY AS NEEDED (AS NEEDED FOR HIGH BLOOD SUGAR) 100 each 3    ONETOUCH DELICA LANCETS FINE MISC by Device route 2 (two) times a day 100 each 4    simvastatin (ZOCOR) 20 mg tablet Take 20 mg by mouth daily at bedtime      dicyclomine (BENTYL) 20 mg tablet Take 1 tablet every 6 hours for abdominal cramping 12 tablet 0    donepezil (ARICEPT) 10 mg tablet Take 1 tablet (10 mg total) by mouth daily at bedtime (Patient not taking: Reported on 10/3/2019) 90 tablet 3    levofloxacin (LEVAQUIN) 500 mg tablet Take 1 tablet (500 mg total) by mouth every 24 hours for 7 days 7 tablet 0    metroNIDAZOLE (FLAGYL) 500 mg tablet Take 1 tablet (500 mg total) by mouth every 8 (eight) hours for 7 days 21 tablet 0    naproxen (NAPROSYN) 500 mg tablet TAKE 1 TABLET (500 MG TOTAL) BY MOUTH 2 (TWO) TIMES A DAY WITH MEALS FOR 30 DAYS (Patient not taking: Reported on 10/3/2019) 60 tablet 1    ondansetron (ZOFRAN-ODT) 4 mg disintegrating tablet Place 1 tab (4 mg) under your tongue every 6 hours for nausea or vomiting   (Patient not taking: Reported on 10/3/2019) 12 tablet 0    Promethazine-DM (PHENERGAN-DM) 6 25-15 mg/5 mL oral syrup Take 5 mL by mouth 4 (four) times a day as needed for cough 118 mL 1     No current facility-administered medications for this visit        Current Outpatient Medications on File Prior to Visit   Medication Sig    acetaminophen (TYLENOL) 650 mg CR tablet Take 1 tablet (650 mg total) by mouth every 8 (eight) hours as needed for mild pain    amLODIPine (NORVASC) 5 mg tablet Take 1 tablet (5 mg total) by mouth daily (Patient taking differently: Take by mouth daily )    amLODIPine (NORVASC) 5 mg tablet TAKE 1 TABLET BY MOUTH EVERY DAY    Blood Glucose Monitoring Suppl (Jarad García) w/Device KIT Use as directed    Blood Pressure KIT by Does not apply route daily    Canagliflozin (INVOKANA) 300 MG TABS Take 1 tablet (300 mg total) by mouth daily    fenofibrate (TRIGLIDE) 160 MG tablet Take 1 tablet (160 mg total) by mouth daily    glucose blood (ONETOUCH VERIO) test strip TEST TWICE A DAY    insulin glargine (LANTUS SOLOSTAR) 100 units/mL injection pen Inject 28 Units under the skin daily at bedtime for 30 days (Patient taking differently: Inject 30 Units under the skin daily at bedtime )    Insulin Pen Needle (BD PEN NEEDLE TOPHER U/F) 32G X 4 MM MISC by Does not apply route 2 (two) times a day    LANTUS SOLOSTAR 100 units/mL injection pen INJECT 30 UNITS UNDER THE SKIN DAILY AT BEDTIME FOR 30 DAYS (Patient taking differently: Inject 25 Units under the skin )    levothyroxine 88 mcg tablet Take 1 tablet (88 mcg total) by mouth daily for 90 days    lisinopril (ZESTRIL) 20 mg tablet Take 1 tablet (20 mg total) by mouth 2 (two) times a day (Patient taking differently: Take by mouth 2 (two) times a day )    loperamide (IMODIUM) 2 mg capsule Take 1 capsule (2 mg total) by mouth 4 (four) times a day as needed for diarrhea    memantine (NAMENDA) 10 mg tablet TAKE 1 TABLET (10 MG TOTAL) BY MOUTH 2 (TWO) TIMES A DAY    nystatin-triamcinolone (MYCOLOG-II) ointment Apply twice a day to rectal area    ONETOUCH DELICA LANCETS 19Y MISC USE 2 (TWO) TIMES A DAY AS NEEDED (AS NEEDED FOR HIGH BLOOD SUGAR)    simvastatin (ZOCOR) 20 mg tablet Take 20 mg by mouth daily at bedtime    [DISCONTINUED] ONETOUCH DELICA LANCETS FINE MISC USE 2 TIMES A DAY AS NEEDED FOR HIGH BLOOD SUGAR    dicyclomine (BENTYL) 20 mg tablet Take 1 tablet every 6 hours for abdominal cramping    donepezil (ARICEPT) 10 mg tablet Take 1 tablet (10 mg total) by mouth daily at bedtime (Patient not taking: Reported on 10/3/2019)    naproxen (NAPROSYN) 500 mg tablet TAKE 1 TABLET (500 MG TOTAL) BY MOUTH 2 (TWO) TIMES A DAY WITH MEALS FOR 30 DAYS (Patient not taking: Reported on 10/3/2019)    ondansetron (ZOFRAN-ODT) 4 mg disintegrating tablet Place 1 tab (4 mg) under your tongue every 6 hours for nausea or vomiting  (Patient not taking: Reported on 10/3/2019)     No current facility-administered medications on file prior to visit  She is allergic to penicillins       Review of Systems   Respiratory: Positive for cough  Gastrointestinal: Positive for abdominal pain and diarrhea  Negative for abdominal distention, anal bleeding, blood in stool, constipation, nausea, rectal pain and vomiting  Objective:      /66 (BP Location: Left arm, Patient Position: Sitting, Cuff Size: Adult)   Pulse 76   Temp 98 8 °F (37 1 °C) (Tympanic)   Resp 16   Ht 5' (1 524 m)   Wt 62 7 kg (138 lb 3 2 oz)   SpO2 98%   BMI 26 99 kg/m²          Physical Exam   Constitutional: She is oriented to person, place, and time  She appears well-developed and well-nourished  No distress  HENT:   Head: Normocephalic and atraumatic  Nose: Nose normal    Mouth/Throat: Oropharynx is clear and moist    Eyes: Pupils are equal, round, and reactive to light  Conjunctivae are normal    Cardiovascular: Normal rate, regular rhythm and normal heart sounds  No murmur heard    Pulmonary/Chest: Effort normal and breath sounds normal  No respiratory distress  She has no wheezes  Abdominal: Soft  Bowel sounds are normal  She exhibits no distension and no mass  There is tenderness  There is no rebound and no guarding  No hernia  Neurological: She is alert and oriented to person, place, and time  Skin: Skin is warm and dry  No rash noted  She is not diaphoretic  No erythema  Psychiatric: She has a normal mood and affect

## 2019-10-04 ENCOUNTER — OFFICE VISIT (OUTPATIENT)
Dept: PHYSICAL THERAPY | Age: 79
End: 2019-10-04
Payer: COMMERCIAL

## 2019-10-04 ENCOUNTER — APPOINTMENT (OUTPATIENT)
Dept: LAB | Facility: CLINIC | Age: 79
End: 2019-10-04
Payer: COMMERCIAL

## 2019-10-04 DIAGNOSIS — Z98.890 S/P RIGHT ROTATOR CUFF REPAIR: Primary | ICD-10-CM

## 2019-10-04 DIAGNOSIS — D64.9 ANEMIA, UNSPECIFIED TYPE: ICD-10-CM

## 2019-10-04 PROCEDURE — 97140 MANUAL THERAPY 1/> REGIONS: CPT

## 2019-10-04 PROCEDURE — 87505 NFCT AGENT DETECTION GI: CPT

## 2019-10-04 PROCEDURE — 87209 SMEAR COMPLEX STAIN: CPT

## 2019-10-04 PROCEDURE — 87177 OVA AND PARASITES SMEARS: CPT

## 2019-10-04 PROCEDURE — 97110 THERAPEUTIC EXERCISES: CPT

## 2019-10-05 LAB
CAMPYLOBACTER DNA SPEC NAA+PROBE: NORMAL
SALMONELLA DNA SPEC QL NAA+PROBE: NORMAL
SHIGA TOXIN STX GENE SPEC NAA+PROBE: NORMAL
SHIGELLA DNA SPEC QL NAA+PROBE: NORMAL

## 2019-10-07 ENCOUNTER — APPOINTMENT (OUTPATIENT)
Dept: PHYSICAL THERAPY | Age: 79
End: 2019-10-07
Payer: COMMERCIAL

## 2019-10-07 ENCOUNTER — PATIENT OUTREACH (OUTPATIENT)
Dept: FAMILY MEDICINE CLINIC | Facility: CLINIC | Age: 79
End: 2019-10-07

## 2019-10-07 ENCOUNTER — TELEPHONE (OUTPATIENT)
Dept: FAMILY MEDICINE CLINIC | Facility: CLINIC | Age: 79
End: 2019-10-07

## 2019-10-07 DIAGNOSIS — R19.7 DIARRHEA, UNSPECIFIED TYPE: Primary | ICD-10-CM

## 2019-10-07 NOTE — TELEPHONE ENCOUNTER
Still has extreme diarhhea, headache, dizziness, lethargic  fbs  Was 65 this am  Giving her gatorade  What to do?

## 2019-10-07 NOTE — TELEPHONE ENCOUNTER
Recommend to decrease lantus to 22 units at bedtime  Also I put in a referral for gastroenterology for diarrhea symptoms

## 2019-10-07 NOTE — PROGRESS NOTES
Daily Note     Today's date: 10/7/2019  Patient name: Sanjiv Serra  : 1940  MRN: [id number]  Referring provider: Rebeca Dorsey PA-C  Dx:   Encounter Diagnosis     ICD-10-CM    1  S/P right rotator cuff repair Z98 890                   Subjective: ***      Objective: See treatment diary below      Assessment: Tolerated treatment {Tolerated treatment :8219508823}   Patient {assessment:0999130313}      Plan: {PLAN:9555236117}     Precautions: DM, Fall risk, language barrier      Manual   10         Right shoulder, elbow 15' 15 15 15                                                                 Exercise Diary   10         Ball FF,CW, CCW 30x ea 30x 30x 30x         Cane FF, CP 10x 20x 20x 30x                      UBE 4' 4' 4' 6'         t band rows (Red) 30x 30x 30x 30x         Pulleys FF/ABD 15x ea 20x 20x 30x                                                                                                                                                                                                   Modalities   10/4         MH NT 10' 10' in supine 10'          CP post session   10' nt

## 2019-10-08 NOTE — PROGRESS NOTES
Outpatient Care Management Note:    Complex care case  Chart review done  Contacted pts granddaughter, Ashley Fernandez, to f/u on health  She reported that pt is still having diarrhea and she feels weak and has a HA  BG 65 this morning  She contacted the office and they let her know to decrease the Lantus to 22 units qhs  PCP also making referral to GI and will contact her with appt date and time  She is taking the antibiotics that PCP rx'd  Recommended small amounts of gatorade and dry, bland foods which Waleska is already doing  We reviewed additional s/s's of dehydration to contact care team member with and Waleska understood  Goals updated  Will continue to follow

## 2019-10-09 ENCOUNTER — OFFICE VISIT (OUTPATIENT)
Dept: PHYSICAL THERAPY | Age: 79
End: 2019-10-09
Payer: COMMERCIAL

## 2019-10-09 DIAGNOSIS — Z98.890 S/P RIGHT ROTATOR CUFF REPAIR: Primary | ICD-10-CM

## 2019-10-09 LAB — O+P STL CONC: NORMAL

## 2019-10-09 PROCEDURE — 97140 MANUAL THERAPY 1/> REGIONS: CPT | Performed by: PHYSICAL THERAPIST

## 2019-10-09 PROCEDURE — 97110 THERAPEUTIC EXERCISES: CPT | Performed by: PHYSICAL THERAPIST

## 2019-10-09 NOTE — PROGRESS NOTES
Daily Note     Today's date: 10/9/2019  Patient name: Gregorio Lao  : 1940  MRN: [id number]  Referring provider: Areli Murray PA-C  Dx:   Encounter Diagnosis     ICD-10-CM    1  S/P right rotator cuff repair [id number] [id number]        Start Time: 1100  Stop Time: 1200  Total time in clinic (min): 60 minutes    Subjective: Pt reports her pain in her shoulder as 5/10  Objective: See treatment diary below      ER @45°=55  IR @45 °= 50    Assessment: Tolerated treatment fairly well  Minimal to moderate pain with PROM  Trent Kapadia Patient Due to language barrier subjective info is difficult to assess  Increased TE with good tolerance  Pt able to follow visual instructions with tactile cuing  Plan: Continue per plan of care        Precautions: DM, Fall risk, language barrier      Manual  9/16 9/18 9/30 10/4 10/9        Right shoulder, elbow 15' 15 15 15 15                                                                Exercise Diary  9/16 9/18 9/30 10/4 10/9        Ball FF,CW, CCW 30x ea 30x 30x 30x 30x ea        Cane FF, CP 10x 20x 20x 30x 30x ea                     UBE 4' 4' 4' 6'         t band rows (Red) 30x 30x 30x 30x         Pulleys FF/ABD 15x ea 20x 20x 30x         tband rtc             bicep     15#/ 30        tricep     30#/ 30                                                                                                                                                           Modalities  9/16 9/18 9/30 10/4 10/9        MH NT 10' 10' in supine 10'  10        CP post session   10' nt

## 2019-10-10 ENCOUNTER — OFFICE VISIT (OUTPATIENT)
Dept: FAMILY MEDICINE CLINIC | Facility: CLINIC | Age: 79
End: 2019-10-10
Payer: COMMERCIAL

## 2019-10-10 VITALS
DIASTOLIC BLOOD PRESSURE: 66 MMHG | HEART RATE: 88 BPM | OXYGEN SATURATION: 96 % | WEIGHT: 140.8 LBS | HEIGHT: 60 IN | TEMPERATURE: 97 F | BODY MASS INDEX: 27.64 KG/M2 | RESPIRATION RATE: 16 BRPM | SYSTOLIC BLOOD PRESSURE: 142 MMHG

## 2019-10-10 DIAGNOSIS — Z79.4 TYPE 2 DIABETES MELLITUS TREATED WITH INSULIN (HCC): ICD-10-CM

## 2019-10-10 DIAGNOSIS — R19.7 DIARRHEA, UNSPECIFIED TYPE: Primary | ICD-10-CM

## 2019-10-10 DIAGNOSIS — Z23 NEED FOR INFLUENZA VACCINATION: Primary | ICD-10-CM

## 2019-10-10 DIAGNOSIS — E11.9 TYPE 2 DIABETES MELLITUS TREATED WITH INSULIN (HCC): ICD-10-CM

## 2019-10-10 PROCEDURE — G0008 ADMIN INFLUENZA VIRUS VAC: HCPCS | Performed by: FAMILY MEDICINE

## 2019-10-10 PROCEDURE — 90682 RIV4 VACC RECOMBINANT DNA IM: CPT | Performed by: FAMILY MEDICINE

## 2019-10-10 PROCEDURE — 99213 OFFICE O/P EST LOW 20 MIN: CPT | Performed by: FAMILY MEDICINE

## 2019-10-10 NOTE — PROGRESS NOTES
Assessment/Plan:    No problem-specific Assessment & Plan notes found for this encounter  Diagnoses and all orders for this visit:    Diarrhea, unspecified type  Improving if symptoms return advise to follow up with Gastro  Type 2 diabetes mellitus treated with insulin ([facility])  Stable controlled, continue same medication and dose  Follow up in 1 month        Subjective:      Patient ID: Karly Mejia is a 67 y o  female  Patient is here to follow-up for diarrhea symptoms  She said that her symptoms have improved somewhat since her last appointment  She is having less episodes of diarrhea at this time and more formed stools  Does far all her stool studies have come back normal without any evidence of infectious nature of her diarrhea  She is also here to follow-up for type 2 diabetes mellitus  Her fasting blood glucose in the morning have been ranging below 100 mg/dL consistently she did have 2 episodes where he was 130 mg/dL over the past 2 weeks  She denies any hypoglycemic episodes she does take insulin Lantus daily  No side effects from the medication  The following portions of the patient's history were reviewed and updated as appropriate:   She  has a past medical history of Aggression, Alzheimer's dementia, Arthritis, Dementia, Diabetes insipidus ([address] ), Diabetes mellitus ([address] ), High cholesterol, Hypertension, Memory loss, Migraine, Polyneuropathy, Rheumatoid arthritis ([address] ), Serum lipids high, Stomach problems, and Thyroid trouble    She   Patient Active Problem List    Diagnosis Date Noted    Diarrhea 10/03/2019    Type 2 diabetes mellitus treated with insulin ([facility] [address] [facility] 10/03/2019    Anemia 10/03/2019    Cough 10/03/2019    Unstable gait 08/15/2019    Tear of right rotator cuff 07/23/2019    Biceps tendon tear 07/23/2019    Pre-op examination 07/18/2019    Nontraumatic tear of right rotator cuff 07/18/2019    Menopause ovarian failure 07/18/2019    Need for shingles vaccine 07/18/2019    Peripheral neuropathy 07/18/2019    Medicare annual wellness visit, subsequent 07/18/2019    Ambulatory dysfunction 07/18/2019    Hypothyroidism 05/13/2019    Essential hypertension 05/13/2019    Fall 05/13/2019    Acute pain of right shoulder 05/13/2019    SVT (supraventricular tachycardia) ([facility] [address] ) 05/13/2019    Earlobe lesion, bilateral 09/10/2018    Pain of both hip joints 09/10/2018    Need for influenza vaccination 09/10/2018    Back pain 09/10/2018    Anxiety 08/14/2018    Yeast infection 08/14/2018    Pruritic dermatitis 06/13/2018    Type 2 diabetes, uncontrolled, with neuropathy ([facility] [address] ) 06/07/2018    Alzheimer's dementia ([facility] [address] ) 05/03/2018    Memory loss 03/19/2018    Deformity of hand 03/19/2018    Bilateral hearing loss 02/22/2018    Rheumatoid arthritis 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 ([facility] [address] ) 02/22/2018    Dementia with behavioral disturbance ([facility] [address] ) 02/15/2018    Chronic left shoulder pain 02/15/2018    Need for lipid screening 02/15/2018     She  has a past surgical history that includes Hysterectomy; Gallbladder surgery; Cataract extraction; and pr shldr arthroscop,surg,w/rotat cuff repr (Right, 8/1/2019)  Her family history includes Arthritis in her daughter; Blindness in her brother; Diabetes in her brother; HIV in her son; Mental illness in her father and mother; Substance Abuse in her father and mother  She  reports that she quit smoking about 34 years ago  She has never used smokeless tobacco  She reports that she does not drink alcohol or use drugs    Current Outpatient Medications   Medication Sig Dispense Refill    acetaminophen (TYLENOL) 650 mg CR tablet Take 1 tablet (650 mg total) by mouth every 8 (eight) hours as needed for mild pain 30 tablet 0    amLODIPine (NORVASC) 5 mg tablet Take 1 tablet (5 mg total) by mouth daily (Patient taking differently: Take by mouth daily ) 30 tablet 0    amLODIPine (NORVASC) 5 mg tablet TAKE 1 TABLET BY MOUTH EVERY DAY 30 tablet 1    Blood Glucose Monitoring Suppl ([facility]) w/Device KIT Use as directed  0    Blood Pressure KIT by Does not apply route daily 1 each 1    Canagliflozin (INVOKANA) 300 MG TABS Take 1 tablet (300 mg total) by mouth daily 90 tablet 3    dicyclomine (BENTYL) 20 mg tablet Take 1 tablet every 6 hours for abdominal cramping 12 tablet 0    fenofibrate (TRIGLIDE) 160 MG tablet Take 1 tablet (160 mg total) by mouth daily 30 tablet 5    glucose blood (ONETOUCH VERIO) test strip TEST TWICE A  each 2    insulin glargine (LANTUS SOLOSTAR) 100 units/mL injection pen Inject 28 Units under the skin daily at bedtime for 30 days (Patient taking differently: Inject 30 Units under the skin daily at bedtime ) 5 pen 5    Insulin Pen Needle (BD PEN NEEDLE TPOHER U/F) 32G X 4 MM MISC by Does not apply route 2 (two) times a day 100 each 5    LANTUS SOLOSTAR 100 units/mL injection pen INJECT 30 UNITS UNDER THE SKIN DAILY AT BEDTIME FOR 30 DAYS (Patient taking differently: Inject 25 Units under the skin ) 5 pen 5    levofloxacin (LEVAQUIN) 500 mg tablet Take 1 tablet (500 mg total) by mouth every 24 hours for 7 days 7 tablet 0    levothyroxine 88 mcg tablet Take 1 tablet (88 mcg total) by mouth daily for 90 days 90 tablet 0    lisinopril (ZESTRIL) 20 mg tablet Take 1 tablet (20 mg total) by mouth 2 (two) times a day (Patient taking differently: Take by mouth 2 (two) times a day ) 90 tablet 6    loperamide (IMODIUM) 2 mg capsule Take 1 capsule (2 mg total) by mouth 4 (four) times a day as needed for diarrhea 12 capsule 0    memantine (NAMENDA) 10 mg tablet TAKE 1 TABLET (10 MG TOTAL) BY MOUTH 2 (TWO) TIMES A DAY 60 tablet 3    metroNIDAZOLE (FLAGYL) 500 mg tablet Take 1 tablet (500 mg total) by mouth every 8 (eight) hours for 7 days 21 tablet 0    nystatin-triamcinolone (MYCOLOG-II) ointment Apply twice a day to rectal area 60 g 0    ONETOUCH DELICA LANCETS 26J MISC USE 2 (TWO) TIMES A DAY AS NEEDED (AS NEEDED FOR HIGH BLOOD SUGAR) 100 each 3    ONETOUCH DELICA LANCETS FINE MISC by Device route 2 (two) times a day 100 each 4    Promethazine-DM (PHENERGAN-DM) 6 25-15 mg/5 mL oral syrup Take 5 mL by mouth 4 (four) times a day as needed for cough 118 mL 1    simvastatin (ZOCOR) 20 mg tablet Take 20 mg by mouth daily at bedtime      donepezil (ARICEPT) 10 mg tablet Take 1 tablet (10 mg total) by mouth daily at bedtime (Patient not taking: Reported on 10/3/2019) 90 tablet 3    naproxen (NAPROSYN) 500 mg tablet TAKE 1 TABLET (500 MG TOTAL) BY MOUTH 2 (TWO) TIMES A DAY WITH MEALS FOR 30 DAYS (Patient not taking: Reported on 10/10/2019) 60 tablet 1    ondansetron (ZOFRAN-ODT) 4 mg disintegrating tablet Place 1 tab (4 mg) under your tongue every 6 hours for nausea or vomiting  (Patient not taking: Reported on 10/3/2019) 12 tablet 0     No current facility-administered medications for this visit        Current Outpatient Medications on File Prior to Visit   Medication Sig    acetaminophen (TYLENOL) 650 mg CR tablet Take 1 tablet (650 mg total) by mouth every 8 (eight) hours as needed for mild pain    amLODIPine (NORVASC) 5 mg tablet Take 1 tablet (5 mg total) by mouth daily (Patient taking differently: Take by mouth daily )    amLODIPine (NORVASC) 5 mg tablet TAKE 1 TABLET BY MOUTH EVERY DAY    Blood Glucose Monitoring Suppl ([facility]) w/Device KIT Use as directed    Blood Pressure KIT by Does not apply route daily    Canagliflozin (INVOKANA) 300 MG TABS Take 1 tablet (300 mg total) by mouth daily    dicyclomine (BENTYL) 20 mg tablet Take 1 tablet every 6 hours for abdominal cramping    fenofibrate (TRIGLIDE) 160 MG tablet Take 1 tablet (160 mg total) by mouth daily    glucose blood (ONETOUCH VERIO) test strip TEST TWICE A DAY    insulin glargine (LANTUS SOLOSTAR) 100 units/mL injection pen Inject 28 Units under the skin daily at bedtime for 30 days (Patient taking differently: Inject 30 Units under the skin daily at bedtime )    Insulin Pen Needle (BD PEN NEEDLE TOPHER U/F) 32G X 4 MM MISC by Does not apply route 2 (two) times a day    LANTUS SOLOSTAR 100 units/mL injection pen INJECT 30 UNITS UNDER THE SKIN DAILY AT BEDTIME FOR 30 DAYS (Patient taking differently: Inject 25 Units under the skin )    levofloxacin (LEVAQUIN) 500 mg tablet Take 1 tablet (500 mg total) by mouth every 24 hours for 7 days    levothyroxine 88 mcg tablet Take 1 tablet (88 mcg total) by mouth daily for 90 days    lisinopril (ZESTRIL) 20 mg tablet Take 1 tablet (20 mg total) by mouth 2 (two) times a day (Patient taking differently: Take by mouth 2 (two) times a day )    loperamide (IMODIUM) 2 mg capsule Take 1 capsule (2 mg total) by mouth 4 (four) times a day as needed for diarrhea    memantine (NAMENDA) 10 mg tablet TAKE 1 TABLET (10 MG TOTAL) BY MOUTH 2 (TWO) TIMES A DAY    metroNIDAZOLE (FLAGYL) 500 mg tablet Take 1 tablet (500 mg total) by mouth every 8 (eight) hours for 7 days    nystatin-triamcinolone (MYCOLOG-II) ointment Apply twice a day to rectal area    ONETOUCH DELICA LANCETS 66A MISC USE 2 (TWO) TIMES A DAY AS NEEDED (AS NEEDED FOR HIGH BLOOD SUGAR)    ONETOUCH DELICA LANCETS FINE MISC by Device route 2 (two) times a day    Promethazine-DM (PHENERGAN-DM) 6 25-15 mg/5 mL oral syrup Take 5 mL by mouth 4 (four) times a day as needed for cough    simvastatin (ZOCOR) 20 mg tablet Take 20 mg by mouth daily at bedtime    donepezil (ARICEPT) 10 mg tablet Take 1 tablet (10 mg total) by mouth daily at bedtime (Patient not taking: Reported on 10/3/2019)    naproxen (NAPROSYN) 500 mg tablet TAKE 1 TABLET (500 MG TOTAL) BY MOUTH 2 (TWO) TIMES A DAY WITH MEALS FOR 30 DAYS (Patient not taking: Reported on 10/10/2019)    ondansetron (ZOFRAN-ODT) 4 mg disintegrating tablet Place 1 tab (4 mg) under your tongue every 6 hours for nausea or vomiting   (Patient not taking: Reported on 10/3/2019)     No current facility-administered medications on file prior to visit  She is allergic to penicillins       Review of Systems   Constitutional: Negative for activity change, appetite change, fatigue and fever  HENT: Negative for congestion and ear discharge  Respiratory: Negative for cough and shortness of breath  Cardiovascular: Negative for chest pain and palpitations  Gastrointestinal: Negative for diarrhea and nausea  Musculoskeletal: Negative for arthralgias and back pain  Skin: Negative for color change and rash  Neurological: Negative for dizziness and headaches  Psychiatric/Behavioral: Negative for agitation and behavioral problems  Objective:      /66 (BP Location: Left arm, Patient Position: Sitting, Cuff Size: Adult)   Pulse 88   Temp (!) 97 °F (36 1 °C) (Tympanic)   Resp 16   Ht 5' (1 524 m)   Wt 63 9 kg (140 lb 12 8 oz)   SpO2 96%   BMI 27 50 kg/m²          Physical Exam   Constitutional: She is oriented to person, place, and time  She appears well-developed and well-nourished  No distress  HENT:   Head: Normocephalic and atraumatic  Nose: Nose normal    Mouth/Throat: Oropharynx is clear and moist    Eyes: Pupils are equal, round, and reactive to light  Conjunctivae are normal    Cardiovascular: Normal rate, regular rhythm and normal heart sounds  No murmur heard  Pulmonary/Chest: Effort normal and breath sounds normal  No respiratory distress  She has no wheezes  Abdominal: Soft  Bowel sounds are normal  She exhibits no distension  There is no tenderness  Neurological: She is alert and oriented to person, place, and time  Skin: Skin is warm and dry  No rash noted  She is not diaphoretic  No erythema  Psychiatric: She has a normal mood and affect

## 2019-10-11 ENCOUNTER — PATIENT OUTREACH (OUTPATIENT)
Dept: FAMILY MEDICINE CLINIC | Facility: CLINIC | Age: 79
End: 2019-10-11

## 2019-10-11 NOTE — PROGRESS NOTES
Outpatient Care Management Note:    Complex care case  Chart review done  Contacted pts granddaughter, Luis Rodriguez, to f/u on health  [place] reported that pt is "a little better", still having diarrhea but only once or twice daily now  Is not as weak as a few days ago  Waleska denied the pt having abdominal pain and dizziness at this time  She was seen at PCP again this week and a referral was made to GI and that they were supposed to contact her to schedule appt  No one has called her yet  Upon further chart review, a note was created on 10/7 on the referral by central scheduling that there were no appts available in pts area and to please contact pt to schedule  Contacted central scheduling and spoke to Oleksandr Farooq  He advised me to call [phone] to schedule this for pt  Contacted pts daughter, Luis Rodriguez, and she would prefer to schedule due to time restrictions because of her children  Number provided to Luis Rodriguez  Asked her to contact me if there were any issues  Will continue to follow

## 2019-10-14 ENCOUNTER — OFFICE VISIT (OUTPATIENT)
Dept: PHYSICAL THERAPY | Age: 79
End: 2019-10-14
Payer: COMMERCIAL

## 2019-10-14 DIAGNOSIS — Z98.890 S/P RIGHT ROTATOR CUFF REPAIR: Primary | ICD-10-CM

## 2019-10-14 PROCEDURE — 97110 THERAPEUTIC EXERCISES: CPT | Performed by: PHYSICAL THERAPIST

## 2019-10-14 PROCEDURE — 97140 MANUAL THERAPY 1/> REGIONS: CPT | Performed by: PHYSICAL THERAPIST

## 2019-10-14 NOTE — PROGRESS NOTES
Daily Note     Today's date: 10/14/2019  Patient name: Ginny Friend  : 1940  MRN: [id number]  Referring provider: Hayley Teague PA-C  Dx:   Encounter Diagnosis     ICD-10-CM    1  S/P right rotator cuff repair [id number]                   Subjective: Pt reports her right arm is sore 7/10  "stiff'  Objective: See treatment diary below    [place] [address]  ER POS 55  IR POS 60  Assessment: Tolerated treatment fair  Patient ROM slowly progressing  Pt is cooperative and putting forth good effort  Slow gains due to inconsistent attendance  Plan: Continue per plan of care        Precautions: DM, Fall risk, language barrier      Manual  9/16 9/18 9/30 10/4 10/9 10/14       Right shoulder, elbow 15' 15 15 15 15 15                                                               Exercise Diary  9/16 9/18 9/30 10/4 10/9 10/14       Ball FF,CW, CCW 30x ea 30x 30x 30x 30x ea 30xea       Cane FF, CP 10x 20x 20x 30x 30x ea 30x ea                    UBE 4' 4' 4' 6' 6' 6'       t band rows (Red) 30x 30x 30x 30x 30x 30x       Pulleys FF/ABD 15x ea 20x 20x 30x 30x 30x       tband rtc     30x ea 30x ea       bicep     15#/ 30 15#/ 30       tricep     30#/ 30 30#/ 30                                                                                                                                                          Modalities  9/16 9/18 9/30 10/4 10/9 10/14       MH NT 10' 10' in supine 10'  10        CP post session   10' nt

## 2019-10-15 ENCOUNTER — PATIENT OUTREACH (OUTPATIENT)
Dept: FAMILY MEDICINE CLINIC | Facility: CLINIC | Age: 79
End: 2019-10-15

## 2019-10-15 ENCOUNTER — APPOINTMENT (OUTPATIENT)
Dept: LAB | Facility: CLINIC | Age: 79
End: 2019-10-15
Payer: COMMERCIAL

## 2019-10-15 ENCOUNTER — OFFICE VISIT (OUTPATIENT)
Dept: GASTROENTEROLOGY | Facility: CLINIC | Age: 79
End: 2019-10-15
Payer: COMMERCIAL

## 2019-10-15 VITALS
HEART RATE: 63 BPM | SYSTOLIC BLOOD PRESSURE: 136 MMHG | WEIGHT: 139 LBS | DIASTOLIC BLOOD PRESSURE: 58 MMHG | HEIGHT: 60 IN | BODY MASS INDEX: 27.29 KG/M2

## 2019-10-15 DIAGNOSIS — R05.9 COUGH: Primary | ICD-10-CM

## 2019-10-15 DIAGNOSIS — R19.7 DIARRHEA, UNSPECIFIED TYPE: ICD-10-CM

## 2019-10-15 DIAGNOSIS — R19.7 DIARRHEA, UNSPECIFIED TYPE: Primary | ICD-10-CM

## 2019-10-15 LAB — ERYTHROCYTE [SEDIMENTATION RATE] IN BLOOD: 18 MM/HOUR (ref 0–20)

## 2019-10-15 PROCEDURE — 99203 OFFICE O/P NEW LOW 30 MIN: CPT | Performed by: PHYSICIAN ASSISTANT

## 2019-10-15 PROCEDURE — 85652 RBC SED RATE AUTOMATED: CPT

## 2019-10-15 PROCEDURE — 86255 FLUORESCENT ANTIBODY SCREEN: CPT

## 2019-10-15 PROCEDURE — 83516 IMMUNOASSAY NONANTIBODY: CPT

## 2019-10-15 PROCEDURE — 82784 ASSAY IGA/IGD/IGG/IGM EACH: CPT

## 2019-10-15 PROCEDURE — 36415 COLL VENOUS BLD VENIPUNCTURE: CPT

## 2019-10-15 RX ORDER — POLYETHYLENE GLYCOL 3350 17 G/17G
POWDER, FOR SOLUTION ORAL
Qty: 238 G | Refills: 0 | Status: SHIPPED | OUTPATIENT
Start: 2019-10-15 | End: 2019-11-19 | Stop reason: ALTCHOICE

## 2019-10-15 RX ORDER — PROMETHAZINE HYDROCHLORIDE AND CODEINE PHOSPHATE 6.25; 1 MG/5ML; MG/5ML
5 SYRUP ORAL EVERY 4 HOURS PRN
Qty: 120 ML | Refills: 0 | Status: SHIPPED | OUTPATIENT
Start: 2019-10-15 | End: 2019-11-19 | Stop reason: ALTCHOICE

## 2019-10-15 RX ORDER — ALBUTEROL SULFATE 90 UG/1
2 AEROSOL, METERED RESPIRATORY (INHALATION) EVERY 6 HOURS PRN
Qty: 1 INHALER | Refills: 1 | Status: SHIPPED | OUTPATIENT
Start: 2019-10-15 | End: 2020-01-09 | Stop reason: ALTCHOICE

## 2019-10-15 RX ORDER — MONTELUKAST SODIUM 10 MG/1
10 TABLET ORAL
Qty: 30 TABLET | Refills: 1 | Status: SHIPPED | OUTPATIENT
Start: 2019-10-15 | End: 2019-11-08 | Stop reason: SDUPTHER

## 2019-10-15 NOTE — H&P (VIEW-ONLY)
[facility] - Outpatient Consultation  Smiley Mcfadden 67 y o  female MRN: [id number]  Encounter: [id number]          ASSESSMENT AND PLAN:      1  Diarrhea, unspecified type    Patient reports of daily diarrhea for over a month  Initially, she had 10 episodes a day with other symptoms which has now lessened but she is still having 2-3 episodes a day  She had negative stool cultures, stool for c diff, and O&P  She also had a CT A/P  TSH normal and HGB 11 3  Will plan for Colonoscopy to r/o UC/Crohn's and biopsy for microscopic colitis  Will check celiac serology and ESR  Suspect initially she had a viral or bacterial gastroenteritis/colitis as symptoms were very severe and 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  Recommend to begin a Probiotic like Florastor and fiber supplement like Benefiber  Follow up in 3-4 weeks  ______________________________________________________________________    HPI:  Patient is a 78-year-old female who presents to the office for a consultation regarding chronic diarrhea  Patient reports she has been having daily diarrhea for over 1 month now  She reports that initially, she began experiencing over 10 episodes of diarrhea a day, with abdominal cramping, nausea, and chills  Over time, the diarrhea has lessened in frequency to 2 to 3 times a day but is still persisting  She also reports mild abdominal cramping with the episodes  She has occasionally seen blood in the stool  She reports initially she did lose a few lbs but has since gained them back  She reports her last colonoscopy was several years ago through [facility]  She did have negative stool cultures to include a stool enteric pathogens, C diff, and ova and parasites  She also had a CT scan of the abdomen and pelvis which showed diverticulosis and 2 mm right renal calculus  She tried Pepto-Bismol and Imodium without relief        REVIEW OF SYSTEMS:    CONSTITUTIONAL: Denies any fever, chills, rigors, and weight loss  HEENT: No earache or tinnitus  Denies hearing loss or visual disturbances  CARDIOVASCULAR: No chest pain or palpitations  RESPIRATORY: Denies any cough, hemoptysis, shortness of breath or dyspnea on exertion  GASTROINTESTINAL: As noted in the History of Present Illness  GENITOURINARY: No problems with urination  Denies any hematuria or dysuria  NEUROLOGIC: No dizziness or vertigo, denies headaches  MUSCULOSKELETAL: Denies any muscle or joint pain  SKIN: Denies skin rashes or itching  ENDOCRINE: Denies excessive thirst  Denies intolerance to heat or cold  PSYCHOSOCIAL: Denies depression or anxiety  Denies any recent memory loss         Historical Information   Past Medical History:   Diagnosis Date    Aggression     [facility]     Arthritis     Dementia ([facility]     [facility]us [facility]     Diabetes mellitus [facility]     High cholesterol     Hypertension     Memory loss     Migraine     Polyneuropathy     Rheumatoid arthritis ([facility] )     Serum lipids high     Stomach problems     Thyroid trouble      Past Surgical History:   Procedure Laterality Date    CATARACT EXTRACTION      GALLBLADDER SURGERY      HYSTERECTOMY      RI SHLDR ARTHROSCOP,SURG,W/ROTAT CUFF REPR Right 2019    Procedure: SHOULDER ARTHROSCOPIC ROTATOR CUFF REPAIR;  Surgeon: Reginaldo Pederson MD;  Location: [facility];  Service: Orthopedics    ROTATOR CUFF REPAIR Right 2019     Social History   Social History     Substance and Sexual Activity   Alcohol Use No     Social History     Substance and Sexual Activity   Drug Use No     Social History     Tobacco Use   Smoking Status Former Smoker    Last attempt to quit: Norm Delacruz Years since quittin 8   Smokeless Tobacco Never Used     Family History   Problem Relation Age of Onset    Substance Abuse Mother         denied    Mental illness Mother         denied    Substance Abuse Father         denied    Mental illness Father         denied    Diabetes Brother     Blindness Brother     Arthritis Daughter     HIV Son        Meds/Allergies       Current Outpatient Medications:     acetaminophen (TYLENOL) 650 mg CR tablet    amLODIPine (NORVASC) 5 mg tablet    amLODIPine (NORVASC) 5 mg tablet    Blood Glucose Monitoring Suppl (ONETOUCH VERIO) w/Device KIT    Blood Pressure KIT    Canagliflozin (INVOKANA) 300 MG TABS    dicyclomine (BENTYL) 20 mg tablet    fenofibrate (TRIGLIDE) 160 MG tablet    glucose blood (ONETOUCH VERIO) test strip    insulin glargine (LANTUS SOLOSTAR) 100 units/mL injection pen    Insulin Pen Needle (BD PEN NEEDLE TOPHER U/F) 32G X 4 MM MISC    levothyroxine 88 mcg tablet    lisinopril (ZESTRIL) 20 mg tablet    loperamide (IMODIUM) 2 mg capsule    memantine (NAMENDA) 10 mg tablet    nystatin-triamcinolone (MYCOLOG-II) ointment    ONETOUCH DELICA LANCETS 82L MISC    ONETOUCH DELICA LANCETS FINE MISC    Promethazine-DM (PHENERGAN-DM) 6 25-15 mg/5 mL oral syrup    simvastatin (ZOCOR) 20 mg tablet    donepezil (ARICEPT) 10 mg tablet    LANTUS SOLOSTAR 100 units/mL injection pen    naproxen (NAPROSYN) 500 mg tablet    ondansetron (ZOFRAN-ODT) 4 mg disintegrating tablet    Allergies   Allergen Reactions    Penicillins Itching and Swelling           Objective     Blood pressure 136/58, pulse 63, height 5' (1 524 m), weight 63 kg (139 lb)  Body mass index is 27 15 kg/m²  PHYSICAL EXAM:      General Appearance:   Alert, cooperative, no distress   HEENT:   Normocephalic, atraumatic, anicteric      Neck:  Supple, symmetrical, trachea midline   Lungs:   Clear to auscultation bilaterally; no rales, rhonchi or wheezing; respirations unlabored    Heart[de-identified]   Regular rate and rhythm; no murmur, rub, or gallop     Abdomen:   Soft, non-tender, non-distended; normal bowel sounds; no masses, no organomegaly    Genitalia:   Deferred    Rectal:   Deferred    Extremities:  No cyanosis, clubbing or edema    Pulses:  2+ and symmetric    Skin:  No jaundice, rashes, or lesions    Lymph nodes:  No palpable cervical lymphadenopathy        Lab Results:   No visits with results within 1 Day(s) from this visit  Latest known visit with results is:   Appointment on 10/04/2019   Component Date Value    Salmonella sp PCR 10/04/2019 None Detected     Shigella sp/Enteroinvasi* 10/04/2019 None Detected     Campylobacter sp (jejuni* 10/04/2019 None Detected     Shiga toxin 1/Shiga toxi* 10/04/2019 None Detected     Ova + Parasite Exam 10/04/2019 No ova, cysts, or parasites seen     One negative specimen does not rule out the possibility of a  parasitic infection  Radiology Results:   Ct Abdomen Pelvis Wo Contrast    Result Date: 9/18/2019  Narrative: CT ABDOMEN AND PELVIS WITHOUT IV CONTRAST INDICATION:   Abd pain, gastroenteritis or colitis suspected  COMPARISON:  None  TECHNIQUE:  CT examination of the abdomen and pelvis was performed without intravenous contrast   Axial, sagittal, and coronal 2D reformatted images were created from the source data and submitted for interpretation  Radiation dose length product (DLP) for this visit:  496 mGy-cm   This examination, like all CT scans performed in the [facility], was performed utilizing techniques to minimize radiation dose exposure, including the use of iterative reconstruction and automated exposure control  Enteric contrast was not administered  The absence of intravenous and gastrointestinal contrast limits evaluation of the abdominal and pelvic viscera and a significant abnormality could go undetected  FINDINGS: ABDOMEN LOWER CHEST:  No clinically significant abnormality identified in the visualized lower chest  LIVER/BILIARY TREE:  Unremarkable  GALLBLADDER:  Status post cholecystectomy  SPLEEN:  Unremarkable   PANCREAS: Unremarkable  ADRENAL GLANDS:  Unremarkable  KIDNEYS/URETERS:  2 mm calculus in the right upper pole  No ureteral calculi  Mild right hydronephrosis and prominent extrarenal pelvis with normal caliber ureter, consistent with a mild UPJ stenosis  No left-sided hydronephrosis and no ureterectasis  STOMACH AND BOWEL:  Stomach and small intestine unremarkable  Mild sigmoid diverticulosis without evidence of diverticulitis  APPENDIX:  No findings to suggest appendicitis  ABDOMINOPELVIC CAVITY: No lymphadenopathy or mass  No ascites  No extraluminal gas  VESSELS:  Atherosclerotic changes are present  No evidence of aneurysm  PELVIS REPRODUCTIVE ORGANS:  Status post hysterectomy  URINARY BLADDER:  Unremarkable  ABDOMINAL WALL/INGUINAL REGIONS:  Unremarkable  OSSEOUS STRUCTURES:  No acute fracture or destructive osseous lesion  Impression: 1  Examination limited by lack of intravenous and gastrointestinal contrast  2   Nonobstructing 2 mm right renal calculus  3   Mild right hydronephrosis, which appears to be due to a mild UPJ stenosis  4   No evidence of acute abnormality in the abdomen or pelvis  Workstation performed: [id number]     Xr Chest Pa & Lateral    Result Date: 10/5/2019  Narrative: CHEST INDICATION:   R05: Cough  COMPARISON:  None EXAM PERFORMED/VIEWS:  XR CHEST PA & LATERAL Images: 2 FINDINGS: Cardiomediastinal silhouette appears unremarkable  No acute consolidation or congestion  No pneumothorax or pleural effusion  A rounded density seen in relation to the anterior 1st rib representing on face projection of calcified costal cartilage Osseous structures appear within normal limits for patient age       Impression: No acute consolidation or congestion Workstation performed: [id number]

## 2019-10-15 NOTE — PROGRESS NOTES
Outpatient Care Management Note:    Complex care case  Received call from pts granddaughter, Brad Schwartz  She reported that she had GI appt this morning and that they are going to do a colonoscopy next Wednesday and advised pt to start taking a probiotic and fiber supplement  Pt still having diarrhea 2-3 times daily but does not feel weak at this time  Outpatient PT going well for her, Brad Schwartz reported that pt is making a lot of progress  [place] c/o pt still having cough from a few weeks ago, not as bad, but still coughing  She said PCP was going to rx cough med, inhaler, and allergy med  Meds and previous AVS's reviewed  Promethazine DM was rx'd for cough and pt did get that med and finished it  It seemed to help her per [place]  Brad Schwartz is asking if pt still needs allergy med and inhaler for ongoing cough  Advised her that I will send a message to PCP regarding this and will let her know what he would like to do for pt  She was appreciative and agreeable  Message sent to Dr Zachary Garcia f/u     11:50am-  Received return message from PCP  He sent in Promethazine with codeine, Singulair, and Ventolin  Contacted [place] to make her aware  Instructions given on all meds  Advised of side effects of meds and reminded her of safety precautions to prevent falls while taking Promethazine with codeine  She understood and was appreciative  Advised her to contact me with any questions or concerns

## 2019-10-15 NOTE — PROGRESS NOTES
[facility] - Outpatient Consultation  Ann Kidd 67 y o  female MRN: [id number]  Encounter: [id number]          ASSESSMENT AND PLAN:      1  Diarrhea, unspecified type    Patient reports of daily diarrhea for over a month  Initially, she had 10 episodes a day with other symptoms which has now lessened but she is still having 2-3 episodes a day  She had negative stool cultures, stool for c diff, and O&P  She also had a CT A/P  TSH normal and HGB 11 3  Will plan for Colonoscopy to r/o UC/Crohn's and biopsy for microscopic colitis  Will check celiac serology and ESR  Suspect initially she had a viral or bacterial gastroenteritis/colitis as symptoms were very severe and 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  Recommend to begin a Probiotic like Florastor and fiber supplement like Benefiber  Follow up in 3-4 weeks  ______________________________________________________________________    HPI:  Patient is a 78-year-old female who presents to the office for a consultation regarding chronic diarrhea  Patient reports she has been having daily diarrhea for over 1 month now  She reports that initially, she began experiencing over 10 episodes of diarrhea a day, with abdominal cramping, nausea, and chills  Over time, the diarrhea has lessened in frequency to 2 to 3 times a day but is still persisting  She also reports mild abdominal cramping with the episodes  She has occasionally seen blood in the stool  She reports initially she did lose a few lbs but has since gained them back  She reports her last colonoscopy was several years ago through [facility]  She did have negative stool cultures to include a stool enteric pathogens, C diff, and ova and parasites  She also had a CT scan of the abdomen and pelvis which showed diverticulosis and 2 mm right renal calculus  She tried Pepto-Bismol and Imodium without relief        REVIEW OF SYSTEMS:    CONSTITUTIONAL: Denies any fever, chills, rigors, and weight loss  HEENT: No earache or tinnitus  Denies hearing loss or visual disturbances  CARDIOVASCULAR: No chest pain or palpitations  RESPIRATORY: Denies any cough, hemoptysis, shortness of breath or dyspnea on exertion  GASTROINTESTINAL: As noted in the History of Present Illness  GENITOURINARY: No problems with urination  Denies any hematuria or dysuria  NEUROLOGIC: No dizziness or vertigo, denies headaches  MUSCULOSKELETAL: Denies any muscle or joint pain  SKIN: Denies skin rashes or itching  ENDOCRINE: Denies excessive thirst  Denies intolerance to heat or cold  PSYCHOSOCIAL: Denies depression or anxiety  Denies any recent memory loss         Historical Information   Past Medical History:   Diagnosis Date    Aggression     [facility]     Arthritis     D[facility]     Diabetes insipidus [facility]     Diabetes mellitus [facility]     High cholesterol     Hypertension     Memory loss     Migraine     Polyneuropathy     Rhe[facility]oid [facility]     Serum lipids high     Stomach problems     Thyroid trouble      Past Surgical History:   Procedure Laterality Date    CATARACT EXTRACTION      GALLBLADDER SURGERY      HYSTERECTOMY      PA SHLDR ARTHROSCOP,SURG,W/ROTAT CUFF REPR Right 2019    Procedure: SHOULDER ARTHROSCOPIC ROTATOR CUFF REPAIR;  Surgeon: Odin Carrizales MD;  Location: [facility];  Service: Orthopedics    ROTATOR CUFF REPAIR Right 2019     Social History   Social History     Substance and Sexual Activity   Alcohol Use No     Social History     Substance and Sexual Activity   Drug Use No     Social History     Tobacco Use   Smoking Status Former Smoker    Last attempt to quit: Sahra Patterson Years since quittin 8   Smokeless Tobacco Never Used     Family History   Problem Relation Age of Onset    Substance Abuse Mother         denied    Mental illness Mother         denied    Substance Abuse Father         denied    Mental illness Father         denied    Diabetes Brother     Blindness Brother     Arthritis Daughter     HIV Son        Meds/Allergies       Current Outpatient Medications:     acetaminophen (TYLENOL) 650 mg CR tablet    amLODIPine (NORVASC) 5 mg tablet    amLODIPine (NORVASC) 5 mg tablet    Blood Glucose Monitoring Suppl (ONETOUCH VERIO) w/Device KIT    Blood Pressure KIT    Canagliflozin (INVOKANA) 300 MG TABS    dicyclomine (BENTYL) 20 mg tablet    fenofibrate (TRIGLIDE) 160 MG tablet    glucose blood (ONETOUCH VERIO) test strip    insulin glargine (LANTUS SOLOSTAR) 100 units/mL injection pen    Insulin Pen Needle (BD PEN NEEDLE TOPHER U/F) 32G X 4 MM MISC    levothyroxine 88 mcg tablet    lisinopril (ZESTRIL) 20 mg tablet    loperamide (IMODIUM) 2 mg capsule    memantine (NAMENDA) 10 mg tablet    nystatin-triamcinolone (MYCOLOG-II) ointment    ONETOUCH DELICA LANCETS 15J MISC    ONETOUCH DELICA LANCETS FINE MISC    Promethazine-DM (PHENERGAN-DM) 6 25-15 mg/5 mL oral syrup    simvastatin (ZOCOR) 20 mg tablet    donepezil (ARICEPT) 10 mg tablet    LANTUS SOLOSTAR 100 units/mL injection pen    naproxen (NAPROSYN) 500 mg tablet    ondansetron (ZOFRAN-ODT) 4 mg disintegrating tablet    Allergies   Allergen Reactions    Penicillins Itching and Swelling           Objective     Blood pressure 136/58, pulse 63, height 5' (1 524 m), weight 63 kg (139 lb)  Body mass index is 27 15 kg/m²  PHYSICAL EXAM:      General Appearance:   Alert, cooperative, no distress   HEENT:   Normocephalic, atraumatic, anicteric      Neck:  Supple, symmetrical, trachea midline   Lungs:   Clear to auscultation bilaterally; no rales, rhonchi or wheezing; respirations unlabored    Heart[de-identified]   Regular rate and rhythm; no murmur, rub, or gallop     Abdomen:   Soft, non-tender, non-distended; normal bowel sounds; no masses, no organomegaly    Genitalia:   Deferred    Rectal:   Deferred    Extremities:  No cyanosis, clubbing or edema    Pulses:  2+ and symmetric    Skin:  No jaundice, rashes, or lesions    Lymph nodes:  No palpable cervical lymphadenopathy        Lab Results:   No visits with results within 1 Day(s) from this visit  Latest known visit with results is:   Appointment on 10/04/2019   Component Date Value    Salmonella sp PCR 10/04/2019 None Detected     Shigella sp/Enteroinvasi* 10/04/2019 None Detected     Campylobacter sp (jejuni* 10/04/2019 None Detected     Shiga toxin 1/Shiga toxi* 10/04/2019 None Detected     Ova + Parasite Exam 10/04/2019 No ova, cysts, or parasites seen     One negative specimen does not rule out the possibility of a  parasitic infection  Radiology Results:   Ct Abdomen Pelvis Wo Contrast    Result Date: 9/18/2019  Narrative: CT ABDOMEN AND PELVIS WITHOUT IV CONTRAST INDICATION:   Abd pain, gastroenteritis or colitis suspected  COMPARISON:  None  TECHNIQUE:  CT examination of the abdomen and pelvis was performed without intravenous contrast   Axial, sagittal, and coronal 2D reformatted images were created from the source data and submitted for interpretation  Radiation dose length product (DLP) for this visit:  496 mGy-cm   This examination, like all CT scans performed in the [facility], was performed utilizing techniques to minimize radiation dose exposure, including the use of iterative reconstruction and automated exposure control  Enteric contrast was not administered  The absence of intravenous and gastrointestinal contrast limits evaluation of the abdominal and pelvic viscera and a significant abnormality could go undetected  FINDINGS: ABDOMEN LOWER CHEST:  No clinically significant abnormality identified in the visualized lower chest  LIVER/BILIARY TREE:  Unremarkable  GALLBLADDER:  Status post cholecystectomy  SPLEEN:  Unremarkable   PANCREAS: Unremarkable  ADRENAL GLANDS:  Unremarkable  KIDNEYS/URETERS:  2 mm calculus in the right upper pole  No ureteral calculi  Mild right hydronephrosis and prominent extrarenal pelvis with normal caliber ureter, consistent with a mild UPJ stenosis  No left-sided hydronephrosis and no ureterectasis  STOMACH AND BOWEL:  Stomach and small intestine unremarkable  Mild sigmoid diverticulosis without evidence of diverticulitis  APPENDIX:  No findings to suggest appendicitis  ABDOMINOPELVIC CAVITY: No lymphadenopathy or mass  No ascites  No extraluminal gas  VESSELS:  Atherosclerotic changes are present  No evidence of aneurysm  PELVIS REPRODUCTIVE ORGANS:  Status post hysterectomy  URINARY BLADDER:  Unremarkable  ABDOMINAL WALL/INGUINAL REGIONS:  Unremarkable  OSSEOUS STRUCTURES:  No acute fracture or destructive osseous lesion  Impression: 1  Examination limited by lack of intravenous and gastrointestinal contrast  2   Nonobstructing 2 mm right renal calculus  3   Mild right hydronephrosis, which appears to be due to a mild UPJ stenosis  4   No evidence of acute abnormality in the abdomen or pelvis  Workstation performed: [id number]     Xr Chest Pa & Lateral    Result Date: 10/5/2019  Narrative: CHEST INDICATION:   R05: Cough  COMPARISON:  None EXAM PERFORMED/VIEWS:  XR CHEST PA & LATERAL Images: 2 FINDINGS: Cardiomediastinal silhouette appears unremarkable  No acute consolidation or congestion  No pneumothorax or pleural effusion  A rounded density seen in relation to the anterior 1st rib representing on face projection of calcified costal cartilage Osseous structures appear within normal limits for patient age       Impression: No acute consolidation or congestion Workstation performed: [id number]

## 2019-10-16 ENCOUNTER — OFFICE VISIT (OUTPATIENT)
Dept: PHYSICAL THERAPY | Age: 79
End: 2019-10-16
Payer: COMMERCIAL

## 2019-10-16 DIAGNOSIS — Z98.890 S/P RIGHT ROTATOR CUFF REPAIR: Primary | ICD-10-CM

## 2019-10-16 LAB
ENDOMYSIUM IGA SER QL: NEGATIVE
GLIADIN PEPTIDE IGA SER-ACNC: 3 UNITS (ref 0–19)
GLIADIN PEPTIDE IGG SER-ACNC: 2 UNITS (ref 0–19)
IGA SERPL-MCNC: 194 MG/DL (ref 64–422)
TTG IGA SER-ACNC: <2 U/ML (ref 0–3)
TTG IGG SER-ACNC: <2 U/ML (ref 0–5)

## 2019-10-16 PROCEDURE — 97110 THERAPEUTIC EXERCISES: CPT | Performed by: PHYSICAL THERAPIST

## 2019-10-16 PROCEDURE — 97140 MANUAL THERAPY 1/> REGIONS: CPT | Performed by: PHYSICAL THERAPIST

## 2019-10-16 NOTE — PROGRESS NOTES
Daily Note     Today's date: 10/16/2019  Patient name: Freda Palmer  : 1940  MRN: [id number]  Referring provider: Marchelle Kehr, PA-C  Dx:   Encounter Diagnosis     ICD-10-CM    1  S/P right rotator cuff repair [id number]        Start Time:   Stop Time: 345  Total time in clinic (min): 55 minutes    Subjective: Pt reports pain "a little" 2-3/10  Objective: See treatment diary below      Assessment: Tolerated treatment fair  Patient has pain to passive stretch with FF, ABD  Minimal pain with ER/IR in scapular plane  Pt able to raise arm to 90 FF and POS with minimal shoulder hike  Plan: Continue per plan of care        Precautions: DM, Fall risk, language barrier      Manual  9/16 9/18 9/30 10/4 10/9 10/14 10/16      Right shoulder, elbow 15' 15 15 15 15 15 15                                                              Exercise Diary  9/16 9/18 9/30 10/4 10/9 10/14 10/16      Ball FF,CW, CCW 30x ea 30x 30x 30x 30x ea 30xea 30x ea      Cane FF, CP 10x 20x 20x 30x 30x ea 30x ea 30x ea      AROM FF, POS       5x ea      UBE 4' 4' 4' 6' 6' 6' 6'      t band rows (Red) 30x 30x 30x 30x 30x 30x 30x      Pulleys FF/ABD 15x ea 20x 20x 30x 30x 30x 30x      tband rtc     30x ea 30x ea 30x ea      bicep     15#/ 30 15#/ 30 15#/ 30      tricep     30#/ 30 30#/ 30 30#/ 30                                                                                                                                                         Modalities  9/16 9/18 9/30 10/4 10/9 10/14 10/16      MH NT 10' 10' in supine 10'  10 10 10      CP post session   10' nt

## 2019-10-21 ENCOUNTER — OFFICE VISIT (OUTPATIENT)
Dept: PHYSICAL THERAPY | Age: 79
End: 2019-10-21
Payer: COMMERCIAL

## 2019-10-21 ENCOUNTER — TELEPHONE (OUTPATIENT)
Dept: GASTROENTEROLOGY | Facility: CLINIC | Age: 79
End: 2019-10-21

## 2019-10-21 DIAGNOSIS — Z98.890 S/P RIGHT ROTATOR CUFF REPAIR: Primary | ICD-10-CM

## 2019-10-21 PROCEDURE — 97140 MANUAL THERAPY 1/> REGIONS: CPT

## 2019-10-21 PROCEDURE — 97110 THERAPEUTIC EXERCISES: CPT

## 2019-10-21 NOTE — TELEPHONE ENCOUNTER
----- Message from Laura Bansal PA-C sent at 10/21/2019  3:51 PM EDT -----  Please inform patient that the testing for celiac disease was negative

## 2019-10-21 NOTE — PROGRESS NOTES
Daily Note     Today's date: 10/21/2019  Patient name: Aram Ba  : 1940  MRN: [id number]  Referring provider: Loreta Walsh PA-C  Dx:   Encounter Diagnosis     ICD-10-CM    1  S/P right rotator cuff repair Z98 890        Start Time:   Stop Time:   Total time in clinic (min): 55 minutes    Subjective: Reports 5/10 pain in her R shoulder today       Objective: See treatment diary below      Assessment: Tolerated treatment well  Pain decreased to a 4/10 post session  Some guarding with PROM initially, more relaxed after a few minutes of manual PT  Patient would benefit from continued PT      Plan: Continue per plan of care        Precautions: DM, Fall risk, language barrier      Manual  9/16 9/18 9/30 10/4 10/9 10/14 10/16 10/21     Right shoulder, elbow 15' 15 15 15 15 15 15 15                                                             Exercise Diary  9/16 9/18 9/30 10/4 10/9 10/14 10/16 10/21     Ball FF,CW, CCW 30x ea 30x 30x 30x 30x ea 30xea 30x ea 30x ea     Cane FF, CP 10x 20x 20x 30x 30x ea 30x ea 30x ea 30x     AROM FF, POS       5x ea 2x5     UBE 4' 4' 4' 6' 6' 6' 6' 6'     t band rows (Red) 30x 30x 30x 30x 30x 30x 30x 30x     Pulleys FF/ABD 15x ea 20x 20x 30x 30x 30x 30x 30x     tband rtc     30x ea 30x ea 30x ea 30x ea     bicep     15#/ 30 15#/ 30 15#/ 30 15# 30x     tricep     30#/ 30 30#/ 30 30#/ 30 30# 30x                                                                                                                                                        Modalities  9/16 9/18 9/30 10/4 10/9 10/14 10/16 10/21     MH NT 10' 10' in supine 10'  10 10 10 10     CP post session   10' nt    nt

## 2019-10-22 ENCOUNTER — ANESTHESIA EVENT (OUTPATIENT)
Dept: GASTROENTEROLOGY | Facility: HOSPITAL | Age: 79
End: 2019-10-22

## 2019-10-22 RX ORDER — SODIUM CHLORIDE, SODIUM LACTATE, POTASSIUM CHLORIDE, CALCIUM CHLORIDE 600; 310; 30; 20 MG/100ML; MG/100ML; MG/100ML; MG/100ML
125 INJECTION, SOLUTION INTRAVENOUS CONTINUOUS
Status: CANCELLED | OUTPATIENT
Start: 2019-10-22

## 2019-10-23 ENCOUNTER — HOSPITAL ENCOUNTER (OUTPATIENT)
Dept: GASTROENTEROLOGY | Facility: HOSPITAL | Age: 79
Setting detail: OUTPATIENT SURGERY
Discharge: HOME | End: 2019-10-23
Attending: INTERNAL MEDICINE | Admitting: INTERNAL MEDICINE
Payer: COMMERCIAL

## 2019-10-23 ENCOUNTER — ANESTHESIA (OUTPATIENT)
Dept: GASTROENTEROLOGY | Facility: HOSPITAL | Age: 79
End: 2019-10-23

## 2019-10-23 VITALS
OXYGEN SATURATION: 99 % | TEMPERATURE: 97 F | SYSTOLIC BLOOD PRESSURE: 155 MMHG | DIASTOLIC BLOOD PRESSURE: 66 MMHG | RESPIRATION RATE: 16 BRPM | HEART RATE: 63 BPM

## 2019-10-23 DIAGNOSIS — R19.7 DIARRHEA, UNSPECIFIED TYPE: ICD-10-CM

## 2019-10-23 DIAGNOSIS — E13.9 DIABETES 1.5, MANAGED AS TYPE 1 (HCC): ICD-10-CM

## 2019-10-23 LAB — GLUCOSE SERPL-MCNC: 81 MG/DL (ref 65–140)

## 2019-10-23 PROCEDURE — 88305 TISSUE EXAM BY PATHOLOGIST: CPT | Performed by: PATHOLOGY

## 2019-10-23 PROCEDURE — 45385 COLONOSCOPY W/LESION REMOVAL: CPT | Performed by: INTERNAL MEDICINE

## 2019-10-23 PROCEDURE — 45380 COLONOSCOPY AND BIOPSY: CPT | Performed by: INTERNAL MEDICINE

## 2019-10-23 PROCEDURE — 82948 REAGENT STRIP/BLOOD GLUCOSE: CPT

## 2019-10-23 RX ORDER — SODIUM CHLORIDE, SODIUM LACTATE, POTASSIUM CHLORIDE, CALCIUM CHLORIDE 600; 310; 30; 20 MG/100ML; MG/100ML; MG/100ML; MG/100ML
INJECTION, SOLUTION INTRAVENOUS CONTINUOUS PRN
Status: DISCONTINUED | OUTPATIENT
Start: 2019-10-23 | End: 2019-10-23 | Stop reason: SURG

## 2019-10-23 RX ORDER — PROPOFOL 10 MG/ML
INJECTION, EMULSION INTRAVENOUS AS NEEDED
Status: DISCONTINUED | OUTPATIENT
Start: 2019-10-23 | End: 2019-10-23 | Stop reason: SURG

## 2019-10-23 RX ADMIN — PROPOFOL 50 MG: 10 INJECTION, EMULSION INTRAVENOUS at 07:43

## 2019-10-23 RX ADMIN — SODIUM CHLORIDE, SODIUM LACTATE, POTASSIUM CHLORIDE, AND CALCIUM CHLORIDE: .6; .31; .03; .02 INJECTION, SOLUTION INTRAVENOUS at 07:30

## 2019-10-23 RX ADMIN — PROPOFOL 100 MG: 10 INJECTION, EMULSION INTRAVENOUS at 07:40

## 2019-10-23 RX ADMIN — PROPOFOL 20 MG: 10 INJECTION, EMULSION INTRAVENOUS at 07:53

## 2019-10-23 RX ADMIN — PROPOFOL 30 MG: 10 INJECTION, EMULSION INTRAVENOUS at 07:48

## 2019-10-23 NOTE — ANESTHESIA PREPROCEDURE EVALUATION
Review of Systems/Medical History  Patient summary reviewed  Chart reviewed  No history of anesthetic complications     Cardiovascular  EKG reviewed, Negative cardio ROS Hyperlipidemia, Hypertension , Dysrhythmias , history of PSVT,   Comment: LEFT VENTRICLE:  Systolic function was normal  Ejection fraction was estimated to be 60 %  There were no regional wall motion abnormalities      LEFT ATRIUM:  The atrium was mildly dilated      MITRAL VALVE:  There was mild annular calcification  There was trace regurgitation      TRICUSPID VALVE:  There was trace regurgitation  Pulmonary artery systolic pressure was mildly increased  Estimated peak PA pressure was 38 mmHg ,  Pulmonary  Negative pulmonary ROS        GI/Hepatic  Negative GI/hepatic ROS          Negative  ROS        Endo/Other  Negative endo/other ROS Diabetes type 2 Insulin, History of thyroid disease , hypothyroidism,      GYN  Negative gynecology ROS          Hematology  Anemia ,     Musculoskeletal  Negative musculoskeletal ROS Rheumatoid arthritis ,   Arthritis     Neurology  Negative neurology ROS   Headaches,   Comment: Alzheimers dementia Psychology   Negative psychology ROS Anxiety,              Physical Exam    Airway    Mallampati score: II  TM Distance: >3 FB  Neck ROM: full     Dental   upper dentures,     Cardiovascular  Comment: Negative ROS, Cardiovascular exam normal    Pulmonary  Pulmonary exam normal     Other Findings        Anesthesia Plan  ASA Score- 2     Anesthesia Type- IV sedation with anesthesia with ASA Monitors  Additional Monitors:   Airway Plan:         Plan Factors-    Induction- intravenous  Postoperative Plan-   Planned trial extubation    Informed Consent- Anesthetic plan and risks discussed with patient  I personally reviewed this patient with the CRNA  Discussed and agreed on the Anesthesia Plan with the CRNA  Luis Price

## 2019-10-23 NOTE — ANESTHESIA POSTPROCEDURE EVALUATION
Post-Op Assessment Note    CV Status:  Stable  Pain Score: 0    Pain management: adequate     Mental Status:  Alert and awake   Hydration Status:  Stable   PONV Controlled:  None   Airway Patency:  Adequate and patent   Post Op Vitals Reviewed: Yes      Staff: Anesthesiologist, CRNA           BP  106/52   Temp      Pulse  55   Resp   18   SpO2   99%

## 2019-10-23 NOTE — INTERVAL H&P NOTE
H&P reviewed  After examining the patient I find no changes in the patients condition since the H&P had been written      Vitals:    10/23/19 0716   BP: (!) 176/70   Pulse: 69   Resp: 17   Temp: 97 5 °F (36 4 °C)   SpO2: 99%

## 2019-10-23 NOTE — DISCHARGE INSTRUCTIONS
Colonoscopia   LO QUE NECESITA SABER:   Allyson colonoscopia es un procedimiento para examinar con un endoscopio el interior de crews colon (intestino)  Abdi allyson colonoscopia, es posible que le retiren pólipos o crecimientos de tejidos  Es normal que se sienta inflamado o que tenga molestia abdominal  Usted debería estar expulsando los gases  Si tiene hemorroides o si le removieron pólipos, usted podría presentar allyson pequeña cantidad de sangrado  INSTRUCCIONES SOBRE [facility]:   Busque atención médica de inmediato si:   · Usted presenta allyson cantidad che[place] de geoffrey neri brillante en catracho evacuaciones intestinales  · Crews abdomen está keira y firme y usted siente dolor intenso  · Usted tiene dificultad repentina para respirar  Pregúntele a crews Margorie Longest vitaminas y minerales son adecuados para usted  · Usted presenta sarpullido o urticaria  · Usted tiene fiebre dentro de las 24 horas después de crews procedimiento       · Usted no ha tenido allyson evacuación intestinal después de 3 días de crews procedimiento  · Usted tiene preguntas o inquietudes acerca de crews condición o cuidado  Actividad:   · No levante nada, no se esfuerce o corra  por 3 días después de crews procedimiento  · Descanse después de crews procedimiento  A usted le cuellar administrado medicamento para relajarse  No  maneje o tome decisiones importantes hasta el día siguiente de crews procedimiento  Regrese a catracho actividades normales según le indiquen  · Alivie los gases y la incomodidad de la inflamación  acostándose en crews costado derecho con allyson almohada térmica sobre crews abdomen  Es posible que necesite caminar un poco para ayudar a eliminar los gases  Coma comidas pequeñas hasta que se alivie de la inflamación  Si a usted le removieron pólipos:  Por 7 días después de crews procedimiento:  · No  tome aspirina  · No  realice paseos largos en roz  Acuda a catracho consultas de control con crews médico según le indicaron    Anote catracho preguntas para que se acuerde de hacerlas javier catracho visitas  © 2017 [employer]  Information is for End User's use only and may not be sold, redistributed or otherwise used for commercial purposes  All illustrations and images included in CareNotes® are the copyrighted property of [employer]  or [employer]  Esta información es sólo para uso en educación  Price intención no es darle un consejo médico sobre enfermedades o tratamientos  Colsulte con price [place] farmacéutico antes de seguir cualquier régimen médico para saber si es seguro y efectivo para usted

## 2019-10-24 ENCOUNTER — EVALUATION (OUTPATIENT)
Dept: PHYSICAL THERAPY | Age: 79
End: 2019-10-24
Payer: COMMERCIAL

## 2019-10-24 DIAGNOSIS — Z98.890 S/P RIGHT ROTATOR CUFF REPAIR: Primary | ICD-10-CM

## 2019-10-24 PROCEDURE — 97110 THERAPEUTIC EXERCISES: CPT | Performed by: PHYSICAL THERAPIST

## 2019-10-24 PROCEDURE — 97140 MANUAL THERAPY 1/> REGIONS: CPT | Performed by: PHYSICAL THERAPIST

## 2019-10-24 NOTE — PROGRESS NOTES
PT Re-Evaluation     Today's date: 10/24/2019  Patient name: Oneil Mendez  : 1940  MRN: [id number]  Referring provider: Yuki Gonzalez PA-C  Dx:   Encounter Diagnosis     ICD-10-CM    1  S/P right rotator cuff repair Z98 890        Start Time: 1400  Stop Time: 1500  Total time in clinic (min): 60 minutes    Assessment  Assessment details: Oneil Mendez has had 9 sessions of physical therapy to date for right RTC repair  She does continue to pain with A/PROM but her rest pain has diminished  Her PROM has improved compared to the initial visit but does still have residual limitations as the patient did not have therapy until 6 weeks post-op and was not performing exercises at home  There is a language barrier present that does complicate treatment at times  Her strength is slowly improving  Her functional score has improved and she is using her arm for more activities  At this time, patient does still have residual deficits in ROM and strength and would benefit form continued skilled physical therapy to achieve maximum functional potential   Impairments: abnormal or restricted ROM, activity intolerance, impaired physical strength, lacks appropriate home exercise program, pain with function, scapular dyskinesis and poor posture   Functional limitations: can not raise arm, lift/push/pull, limited adls, no iadlsBarriers to therapy: Language barrier  Pt did not have ROM to right arm for 6 weeks post surgery  Understanding of Dx/Px/POC: fair   Prognosis: fair  Prognosis details: Pt did not have PT for 6 weeks and did not understand instructions for HEP issued by physician's office  Pt has capsular tightness  Pt speaks limited English  Goals  ST-3 WEEKS  1  Decrease pain to < 5/10 at worst  Achieved  2  Increase ROM by > 20° in FF, ABD, ER  Achieved  3  Increase UE to 3/5 from 0-90°  Achieved in FF, to 85° in ABD  LT-8 WEEKS  1  Patient to be independent with a/iadls   Achieved independent adls, working towards all iadls as pt still has difficulty lifitng  2  Increase functional activities for leisure and home activities to previous LOF  Working towards  3  Improve FOTO score by 10 points  Achieved  New Goal:  PROM >145 FF, ABD  AROM>120 FF    Plan  Patient would benefit from: skilled physical therapy  Planned modality interventions: cryotherapy, electrical stimulation/Russian stimulation, thermotherapy: hydrocollator packs and unattended electrical stimulation  Planned therapy interventions: functional ROM exercises, home exercise program, joint mobilization, manual therapy, neuromuscular re-education, strengthening, stretching, therapeutic activities and therapeutic exercise  Frequency: 2x week  Duration in weeks: 6  Plan of Care beginning date: 2019  Plan of Care expiration date: 2019  Treatment plan discussed with: patient and caregiver        Subjective Evaluation    History of Present Illness  Date of onset: 2019  Mechanism of injury: Pt fell in  and had right shoulder pain  She went to ER and xrays were negative  She had persistent pain and could not lift anything and kept dropping cups  They returned to ortho and MRI was taken and revealed torn RTC and biceps  Pt underwent RTC repair 19  She was immobilized in a sling for 6 weeks and given HEP  She is now referred for out patient PT  Her granddaughter is present to translate  She reports she really did not move the arm for 6 weeks  10/24/19  Pt provides limited subjective info due to language barrier  She does note the arm is "better" and she is able to use it more  Pain  Current pain ratin  At best pain ratin  At worst pain ratin  Location: right shoulder and arm  Quality: dull ache and sharp  Relieving factors: rest and support  Progression: improved    Social Support  Steps to enter house: yes  Stairs in house: no   Lives with: adult granddaughter      Hand dominance: right      Diagnostic Tests  MRI studies: abnormal  Treatments  Previous treatment: immobilization  Patient Goals  Patient goals for therapy: decreased pain, increased motion, independence with ADLs/IADLs and increased strength          Objective     Observations     Right Shoulder  Positive for incision       Additional Observation Details  Open repair is closed with tenderness over the incision, 4 portals present with posterior lateral slightly puckered    Active Range of Motion   Left Shoulder   Flexion: 145 degrees   Abduction: 140 degrees     Right Shoulder   Flexion: 95 (improved from 40) degrees   Abduction: 80 (improved from 30) degrees     Passive Range of Motion   Left Shoulder   Flexion: 145 degrees   Abduction: 145 degrees   External rotation 90°: 90 degrees   Internal rotation 90°: 58 degrees     Right Shoulder   Flexion: 140 (improved from 88) degrees with pain  Abduction: 125 (improved from 74) degrees with pain  External rotation 90°: 74 (improved from 30 POS) degrees   Internal rotation 90°: 50 degrees     Right Elbow   Normal passive range of motion    Additional Passive Range of Motion Details  Right shoulder remains tight at all end ranges with capsular feel    Strength/Myotome Testing     Left Shoulder   Normal muscle strength    Right Shoulder     Planes of Motion   Flexion: 3-   Abduction: 3-   External rotation at 0°: 3-   Internal rotation at 0°: 4     Isolated Muscles   Biceps: 3   Upper trapezius: 3       Flowsheet Rows      Most Recent Value   PT/OT G-Codes   Current Score  56   Projected Score  61             Precautions: DM, Fall risk, language barrier      Manual  9/16 9/18 9/30 10/4 10/9 10/14 10/16 10/21 10/24    Right shoulder, elbow 15' 15 15 15 15 15 15 15 15                                                            Exercise Diary  9/16 9/18 9/30 10/4 10/9 10/14 10/16 10/21 10/24    Ball FF,CW, CCW 30x ea 30x 30x 30x 30x ea 30xea 30x ea 30x ea 30x ea    Cane FF, CP 10x 20x 20x 30x 30x ea 30x ea 30x ea 30x 30x AROM FF, POS       5x ea 2x5 2x5    UBE 4' 4' 4' 6' 6' 6' 6' 6' 6'    t band rows (Red) 30x 30x 30x 30x 30x 30x 30x 30x 30x    Pulleys FF/ABD 15x ea 20x 20x 30x 30x 30x 30x 30x 30    tband rtc     30x ea 30x ea 30x ea 30x ea 30x    bicep     15#/ 30 15#/ 30 15#/ 30 15# 30x 15#/ 30    tricep     30#/ 30 30#/ 30 30#/ 30 30# 30x 30#/ 30                                                                                                                                                       Modalities  9/16 9/18 9/30 10/4 10/9 10/14 10/16 10/21 10/24    MH NT 10' 10' in supine 10'  10 10 10 10 10    CP post session   10' nt    nt

## 2019-10-25 ENCOUNTER — OFFICE VISIT (OUTPATIENT)
Dept: OBGYN CLINIC | Facility: CLINIC | Age: 79
End: 2019-10-25

## 2019-10-25 VITALS
BODY MASS INDEX: 27.01 KG/M2 | HEART RATE: 74 BPM | HEIGHT: 60 IN | DIASTOLIC BLOOD PRESSURE: 61 MMHG | SYSTOLIC BLOOD PRESSURE: 156 MMHG | WEIGHT: 137.6 LBS

## 2019-10-25 DIAGNOSIS — Z98.890 STATUS POST RIGHT ROTATOR CUFF REPAIR: Primary | ICD-10-CM

## 2019-10-25 PROCEDURE — 99024 POSTOP FOLLOW-UP VISIT: CPT | Performed by: PHYSICIAN ASSISTANT

## 2019-10-25 NOTE — PROGRESS NOTES
CHIEF COMPLAINT:   Chief Complaint   Patient presents with    Right Shoulder - Follow-up         PROCEDURE: S/P right rotator cuff repair August 1, 2019      SUBJECTIVE:  Patient here for follow-up right shoulder  She is here with her granddaughter who was helping with translation  Patient is currently in physical therapy and making some progress  Pain is well controlled  Denies numbness tingling upper extremity      ROS:  Review of systems form reviewed October 25, 2019  General: no fever, no chills  Respiratory:  No coughing, shortness of breath or wheezing  Cardiovascular:  No chest pain, no palpitations  Neurological:  No headaches, no confusion  Review of all other systems is negative    MEDS:   Current Outpatient Medications   Medication Sig Dispense Refill    albuterol (VENTOLIN HFA) 90 mcg/act inhaler Inhale 2 puffs every 6 (six) hours as needed for wheezing 1 Inhaler 1    amLODIPine (NORVASC) 5 mg tablet Take 1 tablet (5 mg total) by mouth daily (Patient taking differently: Take by mouth daily ) 30 tablet 0    Blood Glucose Monitoring Suppl (ONETOUCH VERIO) w/Device KIT Use as directed  0    Blood Pressure KIT by Does not apply route daily 1 each 1    Canagliflozin (INVOKANA) 300 MG TABS Take 1 tablet (300 mg total) by mouth daily 90 tablet 3    donepezil (ARICEPT) 10 mg tablet Take 1 tablet (10 mg total) by mouth daily at bedtime 90 tablet 3    fenofibrate (TRIGLIDE) 160 MG tablet Take 1 tablet (160 mg total) by mouth daily 30 tablet 5    glucose blood (ONETOUCH VERIO) test strip TEST TWICE A  each 2    insulin glargine (LANTUS SOLOSTAR) 100 units/mL injection pen Inject 28 Units under the skin daily at bedtime for 30 days (Patient taking differently: Inject 30 Units under the skin daily at bedtime ) 5 pen 5    Insulin Pen Needle (BD PEN NEEDLE TOPHER U/F) 32G X 4 MM MISC by Does not apply route 2 (two) times a day 100 each 5    levothyroxine 88 mcg tablet Take 1 tablet (88 mcg total) by mouth daily for 90 days 90 tablet 0    lisinopril (ZESTRIL) 20 mg tablet Take 1 tablet (20 mg total) by mouth 2 (two) times a day (Patient taking differently: Take by mouth 2 (two) times a day ) 90 tablet 6    memantine (NAMENDA) 10 mg tablet TAKE 1 TABLET (10 MG TOTAL) BY MOUTH 2 (TWO) TIMES A DAY 60 tablet 3    montelukast (SINGULAIR) 10 mg tablet Take 1 tablet (10 mg total) by mouth daily at bedtime 30 tablet 1    nystatin-triamcinolone (MYCOLOG-II) ointment Apply twice a day to rectal area 60 g 0    ondansetron (ZOFRAN-ODT) 4 mg disintegrating tablet Place 1 tab (4 mg) under your tongue every 6 hours for nausea or vomiting  12 tablet 0    ONETOUCH DELICA LANCETS 79P MISC USE 2 (TWO) TIMES A DAY AS NEEDED (AS NEEDED FOR HIGH BLOOD SUGAR) 100 each 3    ONETOUCH DELICA LANCETS FINE MISC by Device route 2 (two) times a day 100 each 4    polyethylene glycol (GLYCOLAX) powder  g MIRALAX IN 64 OUNCES OF CLEAR LIQUID, DRINK 8 OUNCES Q 30 MINUTES TILL FINISHED 238 g 0    promethazine-codeine (PHENERGAN WITH CODEINE) 6 25-10 mg/5 mL syrup Take 5 mL by mouth every 4 (four) hours as needed for cough 120 mL 0    Promethazine-DM (PHENERGAN-DM) 6 25-15 mg/5 mL oral syrup Take 5 mL by mouth 4 (four) times a day as needed for cough 118 mL 1    simvastatin (ZOCOR) 20 mg tablet Take 20 mg by mouth daily at bedtime      naproxen (NAPROSYN) 500 mg tablet TAKE 1 TABLET (500 MG TOTAL) BY MOUTH 2 (TWO) TIMES A DAY WITH MEALS FOR 30 DAYS (Patient not taking: Reported on 10/10/2019) 60 tablet 1     No current facility-administered medications for this visit          ALLERGIES: Penicillins      Vitals:    10/25/19 1354   BP: 156/61   Pulse: 74   Weight: 62 4 kg (137 lb 9 6 oz)   Height: 5' (1 524 m)       PHYSICAL EXAM:    General Appearance:  Alert, cooperative, no distress, appears stated age   Lungs:   respirations unlabored   Chest Wall:  No tenderness or deformity   Heart:  Normal Heart rate noted   Extremities: Extremities normal, atraumatic, no cyanosis or edema   Pulses: 2+ and symmetric   Neurologic: Normal     ORTHO EXAM:  Examination right shoulder with well-healed incisions  No erythema and no signs of infection  Active forward elevation to 95° and passive to 145°  External rotation to 60°  Sensation is intact to light touch  Patient with full active range of motion of the elbow, wrist and hand    ASSESSMENT/PLAN:   S/p 12 weeks above procedure  Mehdi Tellez was seen today for follow-up  Diagnoses and all orders for this visit:    Status post right rotator cuff repair  -     Ambulatory referral to Physical Therapy; Future        There are no Patient Instructions on file for this visit  DISCUSSION SUMMARY:  Patient is S/P 3 months right rotator cuff repair  Another prescription for physical therapy was given to the patient today with aggressive range of motion and strengthening  She may also resume home exercise program   She may resume all activities with no restrictions    Follow up one last time in two months for re-evaluation right shoulder

## 2019-10-30 ENCOUNTER — OFFICE VISIT (OUTPATIENT)
Dept: PHYSICAL THERAPY | Age: 79
End: 2019-10-30
Payer: COMMERCIAL

## 2019-10-30 DIAGNOSIS — Z98.890 S/P RIGHT ROTATOR CUFF REPAIR: Primary | ICD-10-CM

## 2019-10-30 PROCEDURE — 97110 THERAPEUTIC EXERCISES: CPT | Performed by: PHYSICAL THERAPIST

## 2019-10-30 PROCEDURE — 97140 MANUAL THERAPY 1/> REGIONS: CPT | Performed by: PHYSICAL THERAPIST

## 2019-10-30 NOTE — PROGRESS NOTES
Daily Note     Today's date: 10/30/2019  Patient name: Zbigniew White  : 1940  MRN: [id number]  Referring provider: Juan Carlos Kelly PA-C  Dx:   Encounter Diagnosis     ICD-10-CM    1  S/P right rotator cuff repair Z98 890        Start Time: 1300  Stop Time: 3934  Total time in clinic (min): 55 minutes    Subjective: Pt reports she has little to no pain at rest       Objective: See treatment diary below      Assessment: Tolerated treatment fair  Patient is guarded at end ranges  Difficulty explaining treatment and expected pain due to language barrier  Plan: Continue per plan of care        Precautions: DM, Fall risk, language barrier      Manual  9/16 9/18 9/30 10/4 10/9 10/14 10/16 10/21 10/24 10/30   Right shoulder, elbow 15' 15 15 15 15 15 15 15 15 15                                                           Exercise Diary  9/16 9/18 9/30 10/4 10/9 10/14 10/16 10/21 10/24 10/30   Ball FF,CW, CCW 30x ea 30x 30x 30x 30x ea 30xea 30x ea 30x ea 30x ea 30x   Cane FF, CP 10x 20x 20x 30x 30x ea 30x ea 30x ea 30x 30x 30   AROM FF, POS       5x ea 2x5 2x5    UBE 4' 4' 4' 6' 6' 6' 6' 6' 6' 6'   t band rows (Red) 30x 30x 30x 30x 30x 30x 30x 30x 30x 30x   Pulleys FF/ABD 15x ea 20x 20x 30x 30x 30x 30x 30x 30 30x   tband rtc     30x ea 30x ea 30x ea 30x ea 30x 30x   bicep     15#/ 30 15#/ 30 15#/ 30 15# 30x 15#/ 30 15#/ 30   tricep     30#/ 30 30#/ 30 30#/ 30 30# 30x 30#/ 30 30#/ 30                                                                                                                                                      Modalities  9/16 9/18 9/30 10/4 10/9 10/14 10/16 10/21 10/24 10/30   MH NT 10' 10' in supine 10'  10 10 10 10 10 10   CP post session   10' nt    nt

## 2019-11-01 DIAGNOSIS — E03.9 HYPOTHYROIDISM, UNSPECIFIED TYPE: ICD-10-CM

## 2019-11-01 RX ORDER — LEVOTHYROXINE SODIUM 88 UG/1
88 TABLET ORAL DAILY
Qty: 90 TABLET | Refills: 0 | Status: SHIPPED | OUTPATIENT
Start: 2019-11-01 | End: 2020-01-14 | Stop reason: SDUPTHER

## 2019-11-03 DIAGNOSIS — E03.9 HYPOTHYROIDISM, UNSPECIFIED TYPE: ICD-10-CM

## 2019-11-03 RX ORDER — LEVOTHYROXINE SODIUM 88 UG/1
88 TABLET ORAL DAILY
Qty: 90 TABLET | Refills: 0 | Status: SHIPPED | OUTPATIENT
Start: 2019-11-03 | End: 2019-11-19 | Stop reason: SDUPTHER

## 2019-11-04 ENCOUNTER — OFFICE VISIT (OUTPATIENT)
Dept: PHYSICAL THERAPY | Age: 79
End: 2019-11-04
Payer: COMMERCIAL

## 2019-11-04 DIAGNOSIS — Z98.890 S/P RIGHT ROTATOR CUFF REPAIR: Primary | ICD-10-CM

## 2019-11-04 PROCEDURE — 97110 THERAPEUTIC EXERCISES: CPT

## 2019-11-04 PROCEDURE — 97140 MANUAL THERAPY 1/> REGIONS: CPT

## 2019-11-04 NOTE — PROGRESS NOTES
Daily Note     Today's date: 2019  Patient name: Toñito Ramirez  : 1940  MRN: [id number]  Referring provider: Regina García PA-C  Dx:   Encounter Diagnosis     ICD-10-CM    1  S/P right rotator cuff repair Z98 890        Start Time: 1300  Stop Time: 4474  Total time in clinic (min): 55 minutes    Subjective: Reports minimal pain today  3/10 pain at R shoulder  Objective: See treatment diary below      Assessment: Tolerated treatment well  No changes in pain post session  Cues and demonstrations needed for proper technique during tband exercises  Patient would benefit from continued PT      Plan: Continue per plan of care        Precautions: DM, Fall risk, language barrier      Manual  11/4  9/30 10/4 10/9 10/14 10/16 10/21 10/24 10/30   Right shoulder, elbow 15' 15 15 15 15 15 15 15 15 15                                                           Exercise Diary  11/4  9/30 10/4 10/9 10/14 10/16 10/21 10/24 10/30   Ball FF,CW, CCW 30x 30x 30x 30x 30x ea 30xea 30x ea 30x ea 30x ea 30x   Cane FF, CP 30x 20x 20x 30x 30x ea 30x ea 30x ea 30x 30x 30   AROM FF, POS 10x      5x ea 2x5 2x5    UBE 6' 4' 4' 6' 6' 6' 6' 6' 6' 6'   t band rows (Red) 30x 30x 30x 30x 30x 30x 30x 30x 30x 30x   Pulleys FF/ABD 30x ea 20x 20x 30x 30x 30x 30x 30x 30 30x   tband rtc 30x ea    30x ea 30x ea 30x ea 30x ea 30x 30x   bicep 15# 30x    15#/ 30 15#/ 30 15#/ 30 15# 30x 15#/ 30 15#/ 30   tricep 30# 30x    30#/ 30 30#/ 30 30#/ 30 30# 30x 30#/ 30 30#/ 30                                                                                                                                                      Modalities  11/4  9/30 10/4 10/9 10/14 10/16 10/21 10/24 10/30   MH 10' 10' 10' in supine 10'  10 10 10 10 10 10   CP post session   10' nt    nt

## 2019-11-06 ENCOUNTER — APPOINTMENT (OUTPATIENT)
Dept: PHYSICAL THERAPY | Age: 79
End: 2019-11-06
Payer: COMMERCIAL

## 2019-11-07 ENCOUNTER — OFFICE VISIT (OUTPATIENT)
Dept: GASTROENTEROLOGY | Facility: CLINIC | Age: 79
End: 2019-11-07
Payer: COMMERCIAL

## 2019-11-07 VITALS
WEIGHT: 137 LBS | HEART RATE: 54 BPM | RESPIRATION RATE: 16 BRPM | HEIGHT: 60 IN | DIASTOLIC BLOOD PRESSURE: 62 MMHG | BODY MASS INDEX: 26.9 KG/M2 | SYSTOLIC BLOOD PRESSURE: 144 MMHG

## 2019-11-07 DIAGNOSIS — R19.7 DIARRHEA, UNSPECIFIED TYPE: Primary | ICD-10-CM

## 2019-11-07 PROCEDURE — 99213 OFFICE O/P EST LOW 20 MIN: CPT | Performed by: PHYSICIAN ASSISTANT

## 2019-11-07 NOTE — PROGRESS NOTES
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  She had negative stool cultures, stool for c diff, and O&P  She also had a CT A/P  TSH normal   Celiac serology negative  ESR normal   Colonoscopy was normal except for 2 small adenomatous polyps which were removed, biopsy was negative for microscopic colitis  She began the Probiotic and fiber supplement about 2 weeks ago and is seeing some improvement - she is now down to 2 soft stools a day with no further incontinent episodes  Will continue the Probiotic and fiber supplement  I suspect she will see additional benefit with this regimen  She is to call in 3 weeks for an appointment if not continuing to improve to discuss other treatments      _______________________________________________________________    SUBJECTIVE:  Patient is a 78-year-old female who presents for follow-up  Patient has been struggling with diarrhea for a couple of months  Initially she had severe symptoms consistent with an acute viral or bacterial gastroenteritis/colitis  Her diarrhea lessened but never completely dissipated however  She was seen in the office and underwent a workup  Stool cultures were negative  Celiac serology and sedimentation rate was negative  She also underwent a colonoscopy which showed no evidence of ulcerative colitis or Crohn's disease but too small adenomatous polyps were removed; biopsies taken were negative for microscopic colitis  She started the probiotic and fiber supplement about 2 weeks ago and has seen some improvement    She reports she is having 2 soft/loose bowel movements a day now and has had no further incontinent episodes  No rectal bleeding  No nausea or vomiting  REVIEW OF SYSTEMS IS OTHERWISE NEGATIVE        Historical Information   Past Medical History:   Diagnosis Date    Aggression     [facility] [address]     Arthritis     Dementia [facility] [address] [facility]     Diabetes insipidus [facility] [address]     Diabetes mellitus [facility] [address] [facility]     High cholesterol     Hypertension     Memory loss     Migraine     Polyneuropathy     Rheumatoid arthritis (HCC)     Serum lipids high     Stomach problems     Thyroid trouble      Past Surgical History:   Procedure Laterality Date    CATARACT EXTRACTION      CHOLECYSTECTOMY      GALLBLADDER SURGERY      HYSTERECTOMY      GA SHLDR ARTHROSCOP,SURG,W/ROTAT CUFF REPR Right 2019    Procedure: SHOULDER ARTHROSCOPIC ROTATOR CUFF REPAIR;  Surgeon: Kyle Taylor MD;  Location: [facility];  Service: Orthopedics    ROTATOR CUFF REPAIR Right 2019     Social History   Social History     Substance and Sexual Activity   Alcohol Use No     Social History     Substance and Sexual Activity   Drug Use No     Social History     Tobacco Use   Smoking Status Former Smoker    Last attempt to quit:     Years since quittin 8   Smokeless Tobacco Never Used     Family History   Problem Relation Age of Onset    Substance Abuse Mother         denied    Mental illness Mother         denied    Substance Abuse Father         denied    Mental illness Father         denied    Diabetes Brother     Blindness Brother     Arthritis Daughter     HIV Son        Meds/Allergies       Current Outpatient Medications:     albuterol (VENTOLIN HFA) 90 mcg/act inhaler    amLODIPine (NORVASC) 5 mg tablet    Blood Glucose Monitoring Suppl (ONETOUCH VERIO) w/Device KIT    Blood Pressure KIT    Canagliflozin (INVOKANA) 300 MG TABS    donepezil (ARICEPT) 10 mg tablet    fenofibrate (TRIGLIDE) 160 MG tablet    glucose blood (ONETOUCH VERIO) test strip    insulin glargine (LANTUS SOLOSTAR) 100 units/mL injection pen    Insulin Pen Needle (BD PEN NEEDLE TOPHER U/F) 32G X 4 MM MISC    levothyroxine 88 mcg tablet    levothyroxine 88 mcg tablet    lisinopril (ZESTRIL) 20 mg tablet    memantine (NAMENDA) 10 mg tablet    montelukast (SINGULAIR) 10 mg tablet    nystatin-triamcinolone (MYCOLOG-II) ointment    ondansetron (ZOFRAN-ODT) 4 mg disintegrating tablet    ONETOUCH DELICA LANCETS 85D MISC    ONETOUCH DELICA LANCETS FINE MISC    promethazine-codeine (PHENERGAN WITH CODEINE) 6 25-10 mg/5 mL syrup    Promethazine-DM (PHENERGAN-DM) 6 25-15 mg/5 mL oral syrup    simvastatin (ZOCOR) 20 mg tablet    naproxen (NAPROSYN) 500 mg tablet    polyethylene glycol (GLYCOLAX) powder    Allergies   Allergen Reactions    Penicillins Itching and Swelling           Objective     Blood pressure 144/62, pulse (!) 54, resp  rate 16, height 5' (1 524 m), weight 62 1 kg (137 lb)  Body mass index is 26 76 kg/m²  PHYSICAL EXAM:      General Appearance:   Alert, cooperative, no distress   HEENT:   Normocephalic, atraumatic, anicteric     Neck:  Supple, symmetrical, trachea midline   Lungs:   Clear to auscultation bilaterally; no rales, rhonchi or wheezing; respirations unlabored    Heart[de-identified]   Regular rate and rhythm; no murmur, rub, or gallop  Abdomen:   Soft, non-tender, non-distended; normal bowel sounds; no masses, no organomegaly    Genitalia:   Deferred    Rectal:   Deferred    Extremities:  No cyanosis, clubbing or edema    Pulses:  2+ and symmetric    Skin:  No jaundice, rashes, or lesions    Lymph nodes:  No palpable cervical lymphadenopathy        Lab Results:   No visits with results within 1 Day(s) from this visit     Latest known visit with results is:   Hospital Outpatient Visit on 10/23/2019   Component Date Value    POC Glucose 10/23/2019 81     Case Report 10/23/2019                      Value:Surgical Pathology Report                         Case: [id number] Authorizing Provider:  Robert Cruz MD      Collected:           10/23/2019 0747              Ordering Location:      [facility]       Received:            10/23/2019 [facility] Endoscopy                                                             Pathologist:           Radha Gonzales MD                                                        Specimens:   A) - Polyp, Colorectal, proximal transverse                                                         B) - Polyp, Colorectal, hepatic flexure                                                             C) - Terminal Ileum                                                                                 D) - Colon, ascending                                                                               E) - Colon, descending                                                                     Final Diagnosis 10/23/2019                      Value: This result contains rich text formatting which cannot be displayed here   Note 10/23/2019                      Value: This result contains rich text formatting which cannot be displayed here   Additional Information 10/23/2019                      Value: This result contains rich text formatting which cannot be displayed here  Sammie [employer] Description 10/23/2019                      Value: This result contains rich text formatting which cannot be displayed here   Clinical Information 10/23/2019                      Value:Cold snare polypectomy         Radiology Results:   No results found

## 2019-11-08 DIAGNOSIS — R05.9 COUGH: ICD-10-CM

## 2019-11-08 RX ORDER — MONTELUKAST SODIUM 10 MG/1
TABLET ORAL
Qty: 30 TABLET | Refills: 1 | Status: SHIPPED | OUTPATIENT
Start: 2019-11-08 | End: 2019-12-06 | Stop reason: SDUPTHER

## 2019-11-11 ENCOUNTER — OFFICE VISIT (OUTPATIENT)
Dept: PHYSICAL THERAPY | Age: 79
End: 2019-11-11
Payer: COMMERCIAL

## 2019-11-11 DIAGNOSIS — Z98.890 S/P RIGHT ROTATOR CUFF REPAIR: Primary | ICD-10-CM

## 2019-11-11 PROCEDURE — 97110 THERAPEUTIC EXERCISES: CPT | Performed by: PHYSICAL THERAPIST

## 2019-11-11 PROCEDURE — 97140 MANUAL THERAPY 1/> REGIONS: CPT | Performed by: PHYSICAL THERAPIST

## 2019-11-11 NOTE — PROGRESS NOTES
Daily Note     Today's date: 2019  Patient name: Mirella Melton  : 1940  MRN: [id number]  Referring provider: Nila Herr PA-C  Dx:   Encounter Diagnosis     ICD-10-CM    1  S/P right rotator cuff repair Z98 890        Start Time: 1088  Stop Time: 5039  Total time in clinic (min): 55 minutes    Subjective: Pain reported as 3/10 prior to PT  Pt does report pain will raise to 7/10 with passive stretch  Objective: See treatment diary below      Assessment: Tolerated treatment well  Patient would benefit from continued PT as pt remains tight but is making slow progress  Pt is able to perform self care activities at home without difficulty and can reach hand to lower lumbar  Plan: Continue per plan of care        Precautions: DM, Fall risk, language barrier      Manual  11/4 11/11  10/4 10/9 10/14 10/16 10/21 10/24 10/30   Right shoulder, elbow 15' 15 15 15 15 15 15 15 15 15                                                           Exercise Diary  11/4 11/11  10/4 10/9 10/14 10/16 10/21 10/24 10/30   Ball FF,CW, CCW 30x 30x 30x 30x 30x ea 30xea 30x ea 30x ea 30x ea 30x   Cane FF, CP 30x 20x 20x 30x 30x ea 30x ea 30x ea 30x 30x 30   AROM FF, POS 10x      5x ea 2x5 2x5    UBE 6' 4' 4' 6' 6' 6' 6' 6' 6' 6'   t band rows (Red) 30x 30x 30x 30x 30x 30x 30x 30x 30x 30x   Pulleys FF/ABD 30x ea 20x 20x 30x 30x 30x 30x 30x 30 30x   tband rtc 30x ea    30x ea 30x ea 30x ea 30x ea 30x 30x   bicep 15# 30x 15#/ 30   15#/ 30 15#/ 30 15#/ 30 15# 30x 15#/ 30 15#/ 30   tricep 30# 30x 30#/ 30   30#/ 30 30#/ 30 30#/ 30 30# 30x 30#/ 30 30#/ 30                                                                                                                                                      Modalities  11/4 11/11  10/4 10/9 10/14 10/16 10/21 10/24 10/30   MH 10' 10' 10' in supine 10'  10 10 10 10 10 10   CP post session   10' nt    nt

## 2019-11-13 ENCOUNTER — OFFICE VISIT (OUTPATIENT)
Dept: PHYSICAL THERAPY | Age: 79
End: 2019-11-13
Payer: COMMERCIAL

## 2019-11-13 ENCOUNTER — PATIENT OUTREACH (OUTPATIENT)
Dept: FAMILY MEDICINE CLINIC | Facility: CLINIC | Age: 79
End: 2019-11-13

## 2019-11-13 DIAGNOSIS — Z98.890 S/P RIGHT ROTATOR CUFF REPAIR: Primary | ICD-10-CM

## 2019-11-13 PROCEDURE — 97140 MANUAL THERAPY 1/> REGIONS: CPT | Performed by: PHYSICAL THERAPIST

## 2019-11-13 PROCEDURE — 97110 THERAPEUTIC EXERCISES: CPT | Performed by: PHYSICAL THERAPIST

## 2019-11-13 NOTE — PROGRESS NOTES
Daily Note     Today's date: 2019  Patient name: Toñito Ramirez  : 1940  MRN: [id number]  Referring provider: Regina García PA-C  Dx:   Encounter Diagnosis     ICD-10-CM    1  S/P right rotator cuff repair Z98 890        Start Time: 1400  Stop Time: 0588  Total time in clinic (min): 58 minutes    Subjective: Pt c/o pain with passive motion at end range when tissue stretch appled  Objective: See treatment diary below      Assessment: Tolerated treatment fair  Patient remains guarded to PROM and self limiting progression of ROM  Language barrier inhibits proper instruction and understanding  Plan: Continue per plan of care        Precautions: DM, Fall risk, language barrier      Manual  11/4 11/11 11/13  10/9 10/14 10/16 10/21 10/24 10/30   Right shoulder, elbow 15' 15 15 15 15 15 15 15 15 15                                                           Exercise Diary  11/4 11/11 11/13  10/9 10/14 10/16 10/21 10/24 10/30   Ball FF,CW, CCW 30x 30x 30x 30x 30x ea 30xea 30x ea 30x ea 30x ea 30x   Cane FF, CP 30x 20x 30x 30x 30x ea 30x ea 30x ea 30x 30x 30   AROM FF, POS 10x 10 10    5x ea 2x5 2x5    UBE 6' 6' 6' 6' 6' 6' 6' 6' 6' 6'   t band rows (Red) 30x 30x 30x 30x 30x 30x 30x 30x 30x 30x   Pulleys FF/ABD 30x ea 20x 30x 30x 30x 30x 30x 30x 30 30x   tband rtc 30x ea  30x  30x ea 30x ea 30x ea 30x ea 30x 30x   bicep 15# 30x 15#/ 30 15#/ 30  15#/ 30 15#/ 30 15#/ 30 15# 30x 15#/ 30 15#/ 30   tricep 30# 30x 30#/ 30 30#/ 30  30#/ 30 30#/ 30 30#/ 30 30# 30x 30#/ 30 30#/ 30   [facility] stacking   4x                                                                                                                                                Modalities  11/4 11/11 11/13  10/9 10/14 10/16 10/21 10/24 10/30   MH 10' 10' 10'  10'  10 10 10 10 10 10   CP post session   10' nt    nt

## 2019-11-13 NOTE — PROGRESS NOTES
Outpatient Care Management Note:    11/13/19-  Complex care case  Chart review done  Contacted pts granddaughter and primary caretaker, Brit Peralta, to f/u on health and outreach for any needed services  Waleska reported that pt had her colonoscopy on 10/23 and 2 polyps were found, removed and sent to the lab for testing  Probiotics and fiber were recommended and she started both  Her stools are now soft and she is having bowel movements approx twice daily  She followed up with GI on 11/7 and is to continue on probiotics and fiber and contact them in 3 weeks if diarrhea starts again  She has no nausea or vomiting  Waleska denied pt having cough as she did on our last call  Waleska also reported that pt accidentally spilled hot tea onto her foot  She had a thick sock on, so it protected the area  There are no open areas, no blisters  Her left great toe is slightly erythematous  Reviewed s/s's of infection to area and asked Waleska to contact me if any were to occur  Stressed importance of monitoring area due to pts diabetes hx  She understood and was agreeable  Blood sugars doing well on 22 units of Lantus qhs  Today's result was 88mg/dL  Will f/u in a few days to check status of left great toe     11/14/19-  Contacted Waleska regarding f/u on pts toe  She reported that there is no erythema at all any more and still no blisters or open areas to entire foot  Again reviewed importance of daily foot checks with Waleska and she verbalized understanding  Will f/u in a few weeks

## 2019-11-18 ENCOUNTER — PATIENT OUTREACH (OUTPATIENT)
Dept: FAMILY MEDICINE CLINIC | Facility: CLINIC | Age: 79
End: 2019-11-18

## 2019-11-18 NOTE — PROGRESS NOTES
Outpatient Care Management Note:    11/18/19-  Complex care case  Pts granddaughter, Gerardo Humphries, contacted me and reported that she called the pharmacy today and they told her there were no more refills left on simvastatin and memantine  Let her know I will send message to PCP regarding this and get back to her  She also reported that pt started coughing yesterday and is bringing up phlegm (she doesn't know what color)  Gerardo Humphries has had same for a few weeks, so she is assuming that's why  Has appt with PCP tomorrrow and will address issue then  Message sent to PCP regarding refills  Awaiting reply  11/19/19-  Pts granddaughter, Gerardo Humphries, contacted me again stating that her namenda and simvastatin rx's weren't at the pharmacy yet  The other meds were, but not those two  Contacted office and spoke to Cristal  Apparently some of the meds had a confirmed receipt from pharmacy at [place] [id number], and those two meds didn't have confirmed receipts until 12:23pm   Contacted pharmacy and was on hold for 16 minutes before someone answered  Both rx's are there now  Contacted Waleska to let her know  She was appreciative

## 2019-11-19 ENCOUNTER — OFFICE VISIT (OUTPATIENT)
Dept: FAMILY MEDICINE CLINIC | Facility: CLINIC | Age: 79
End: 2019-11-19
Payer: COMMERCIAL

## 2019-11-19 ENCOUNTER — OFFICE VISIT (OUTPATIENT)
Dept: PHYSICAL THERAPY | Age: 79
End: 2019-11-19
Payer: COMMERCIAL

## 2019-11-19 VITALS
HEART RATE: 78 BPM | TEMPERATURE: 98.3 F | HEIGHT: 60 IN | WEIGHT: 136.4 LBS | SYSTOLIC BLOOD PRESSURE: 138 MMHG | BODY MASS INDEX: 26.78 KG/M2 | DIASTOLIC BLOOD PRESSURE: 62 MMHG | OXYGEN SATURATION: 96 %

## 2019-11-19 DIAGNOSIS — Z98.890 S/P RIGHT ROTATOR CUFF REPAIR: Primary | ICD-10-CM

## 2019-11-19 DIAGNOSIS — R05.8 PRODUCTIVE COUGH: ICD-10-CM

## 2019-11-19 DIAGNOSIS — E11.9 TYPE 2 DIABETES MELLITUS TREATED WITH INSULIN (HCC): Primary | ICD-10-CM

## 2019-11-19 DIAGNOSIS — F03.91 DEMENTIA WITH BEHAVIORAL DISTURBANCE, UNSPECIFIED DEMENTIA TYPE (HCC): ICD-10-CM

## 2019-11-19 DIAGNOSIS — Z79.4 TYPE 2 DIABETES MELLITUS TREATED WITH INSULIN (HCC): Primary | ICD-10-CM

## 2019-11-19 DIAGNOSIS — R05.9 COUGH: ICD-10-CM

## 2019-11-19 DIAGNOSIS — E78.2 MIXED HYPERLIPIDEMIA: Primary | ICD-10-CM

## 2019-11-19 PROCEDURE — 97140 MANUAL THERAPY 1/> REGIONS: CPT | Performed by: PHYSICAL THERAPIST

## 2019-11-19 PROCEDURE — 1160F RVW MEDS BY RX/DR IN RCRD: CPT | Performed by: FAMILY MEDICINE

## 2019-11-19 PROCEDURE — 97110 THERAPEUTIC EXERCISES: CPT | Performed by: PHYSICAL THERAPIST

## 2019-11-19 PROCEDURE — 99214 OFFICE O/P EST MOD 30 MIN: CPT | Performed by: FAMILY MEDICINE

## 2019-11-19 RX ORDER — DEXTROMETHORPHAN HYDROBROMIDE AND PROMETHAZINE HYDROCHLORIDE 15; 6.25 MG/5ML; MG/5ML
5 SOLUTION ORAL 4 TIMES DAILY PRN
Qty: 118 ML | Refills: 1 | Status: SHIPPED | OUTPATIENT
Start: 2019-11-19 | End: 2020-11-13

## 2019-11-19 RX ORDER — MEMANTINE HYDROCHLORIDE 10 MG/1
10 TABLET ORAL 2 TIMES DAILY
Qty: 180 TABLET | Refills: 2 | Status: SHIPPED | OUTPATIENT
Start: 2019-11-19 | End: 2020-11-13 | Stop reason: SDUPTHER

## 2019-11-19 RX ORDER — AZITHROMYCIN 250 MG/1
250 TABLET, FILM COATED ORAL EVERY 24 HOURS
Qty: 5 TABLET | Refills: 0 | Status: SHIPPED | OUTPATIENT
Start: 2019-11-19 | End: 2019-11-24

## 2019-11-19 RX ORDER — LORATADINE 10 MG/1
10 TABLET ORAL DAILY
Qty: 10 TABLET | Refills: 1 | Status: SHIPPED | OUTPATIENT
Start: 2019-11-19 | End: 2020-11-13

## 2019-11-19 RX ORDER — FLUTICASONE PROPIONATE 50 MCG
1 SPRAY, SUSPENSION (ML) NASAL DAILY
Qty: 1 BOTTLE | Refills: 1 | Status: SHIPPED | OUTPATIENT
Start: 2019-11-19 | End: 2019-12-11 | Stop reason: SDUPTHER

## 2019-11-19 RX ORDER — SIMVASTATIN 20 MG
20 TABLET ORAL
Qty: 90 TABLET | Refills: 2 | Status: SHIPPED | OUTPATIENT
Start: 2019-11-19 | End: 2020-04-21 | Stop reason: SDUPTHER

## 2019-11-19 NOTE — PROGRESS NOTES
Daily Note     Today's date: 2019  Patient name: Claudell Sayres  : 1940  MRN: [id number]  Referring provider: Blanca Hernández PA-C  Dx:   Encounter Diagnosis     ICD-10-CM    1  S/P right rotator cuff repair Z98 890        Start Time:   Stop Time:   Total time in clinic (min): 58 minutes    Subjective: Pt reports pain 2-3/10  She continues to use it for adls and light home activities  She reports only "a little" pain  Objective: See treatment diary below      Assessment: Tolerated treatment well  Patient would benefit from continued PT to maximize strength and ROM  Plan: Continue per plan of care        Precautions: DM, Fall risk, language barrier      Manual  11/4 11/11 11/13 11/19  10/14 10/16 10/21 10/24 10/30   Right shoulder, elbow 15' 15 15 15 15 15 15 15 15 15                                                           Exercise Diary  11/4 11/11 11/13 11/19  10/14 10/16 10/21 10/24 10/30   Ball FF,CW, CCW 30x 30x 30x 30x 30x ea 30xea 30x ea 30x ea 30x ea 30x   Cane FF, CP 30x 20x 30x 30x 30x ea 30x ea 30x ea 30x 30x 30   AROM FF, POS 10x 10 10 15x ea   5x ea 2x5 2x5    UBE 6' 6' 6' 6' 6' 6' 6' 6' 6' 6'   t band rows (Red) 30x 30x 30x 30x 30x 30x 30x 30x 30x 30x   Pulleys FF/ABD 30x ea 20x 30x 30x 30x 30x 30x 30x 30 30x   tband rtc 30x ea  30x 30x 30x ea 30x ea 30x ea 30x ea 30x 30x   bicep 15# 30x 15#/ 30 15#/ 30 15#/ 30 15#/ 30 15#/ 30 15#/ 30 15# 30x 15#/ 30 15#/ 30   tricep 30# 30x 30#/ 30 30#/ 30 30#/ 30 30#/ 30 30#/ 30 30#/ 30 30# 30x 30#/ 30 30#/ 30   [facility] stacking   4x 4x                                                                                                                                               Modalities  11/4 11/11 11/13 11/19  10/14 10/16 10/21 10/24 10/30   MH 10' 10' 10'  10'  10 10 10 10 10 10   CP post session   10' nt    nt

## 2019-11-19 NOTE — PROGRESS NOTES
Assessment/Plan:    No problem-specific Assessment & Plan notes found for this encounter  Diagnoses and all orders for this visit:    Type 2 diabetes mellitus treated with insulin ([address] )  Given low blood glucose levels in the morning and after discussing risks and benefits will decrease lantus to 20 units at bedtime    -     insulin glargine (LANTUS SOLOSTAR) 100 units/mL injection pen; Inject 20 Units under the skin daily at bedtime  -     Comprehensive metabolic panel; Future  -     Hemoglobin A1C; Future  -     Microalbumin / creatinine urine ratio    Productive cough  -     XR chest pa & lateral; Future  -     azithromycin (ZITHROMAX) 250 mg tablet; Take 1 tablet (250 mg total) by mouth every 24 hours for 5 days  -     fluticasone (FLONASE) 50 mcg/act nasal spray; 1 spray into each nostril daily  -     loratadine (CLARITIN) 10 mg tablet; Take 1 tablet (10 mg total) by mouth daily    Cough  -     Promethazine-DM (PHENERGAN-DM) 6 25-15 mg/5 mL oral syrup; Take 5 mL by mouth 4 (four) times a day as needed for cough      Follow up in 1 month or as needed    Subjective:      Patient ID: Loraine Hernández is a 67 y o  female  Diabetes Mellitus  Patient presents for follow up of diabetes  Current symptoms include: hypoglycemia   Symptoms have been well-controlled  Patient denies foot ulcerations, hyperglycemia, paresthesia of the feet, polydipsia, polyuria, visual disturbances and vomiting  Evaluation to date has included: fasting blood sugar and hemoglobin A1C  Home sugars: BGs consistently in an acceptable range although he has been having low blood glucose levels below 70 mg/dl, symptomatic hypoglycemia does not occur  Current treatment: Currently on insulin 22 units at bedtime which has been effective  Cough  Patient complains of productive cough  Symptoms began a few days ago  Symptoms have been unchanged since that time  The cough is productive and is aggravated by nothing   Associated symptoms include: change in voice, chest pain, chills and fever  Patient does not have a history of smoking  Patient has not had a previous chest x-ray  The following portions of the patient's history were reviewed and updated as appropriate: She  has a past medical history of Aggression, Alzheimer's dementia ([address] ), Arthritis, Dementia ([address] ), Diabetes insipidus ([address] ), Diabetes mellitus ([address] ), High cholesterol, Hypertension, Memory loss, Migraine, Polyneuropathy, Rheumatoid arthritis ([address] ), Serum lipids high, Stomach problems, and Thyroid trouble    She   Patient Active Problem List    Diagnosis Date Noted    Productive cough 11/19/2019    Diarrhea 10/03/2019    Type 2 diabetes mellitus treated with insulin ([address] ) 10/03/2019    Anemia 10/03/2019    Cough 10/03/2019    Unstable gait 08/15/2019    Tear of right rotator cuff 07/23/2019    Biceps tendon tear 07/23/2019    Pre-op examination 07/18/2019    Nontraumatic tear of right rotator cuff 07/18/2019    Menopause ovarian failure 07/18/2019    Need for shingles vaccine 07/18/2019    Peripheral neuropathy 07/18/2019    Medicare annual wellness visit, subsequent 07/18/2019    Ambulatory dysfunction 07/18/2019    Hypothyroidism 05/13/2019    Essential hypertension 05/13/2019    Fall 05/13/2019    Acute pain of right shoulder 05/13/2019    SVT (supraventricular tachycardia) ([facility] ) 05/13/2019    Earlobe lesion, bilateral 09/10/2018    Pain of both hip joints 09/10/2018    Need for influenza vaccination 09/10/2018    Back pain 09/10/2018    Anxiety 08/14/2018    Yeast infection 08/14/2018    Pruritic dermatitis 06/13/2018    Type 2 diabetes, uncontrolled, with neuropathy ([address] ) 06/07/2018    Alzheimer's dementia ([facility] ) 05/03/2018    Memory loss 03/19/2018    Deformity of hand 03/19/2018    Bilateral hearing loss 02/22/2018    Rheumatoid arthritis 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 ([address] ) 02/22/2018    Dementia with behavioral disturbance ([facility] [address] ) 02/15/2018    Chronic left shoulder pain 02/15/2018    Need for lipid screening 02/15/2018     She  has a past surgical history that includes Hysterectomy; Gallbladder surgery; Cataract extraction; pr shldr arthroscop,surg,w/rotat cuff repr (Right, 8/1/2019); Rotator cuff repair (Right, 08/01/2019); and Cholecystectomy  Her family history includes Arthritis in her daughter; Blindness in her brother; Diabetes in her brother; HIV in her son; Mental illness in her father and mother; Substance Abuse in her father and mother  She  reports that she quit smoking about 34 years ago  She has never used smokeless tobacco  She reports that she does not drink alcohol or use drugs    Current Outpatient Medications   Medication Sig Dispense Refill    amLODIPine (NORVASC) 5 mg tablet Take 1 tablet (5 mg total) by mouth daily (Patient taking differently: Take by mouth daily ) 30 tablet 0    Blood Glucose Monitoring Suppl (ONETOUCH VERIO) w/Device KIT Use as directed  0    Blood Pressure KIT by Does not apply route daily 1 each 1    Canagliflozin (INVOKANA) 300 MG TABS Take 1 tablet (300 mg total) by mouth daily 90 tablet 3    donepezil (ARICEPT) 10 mg tablet Take 1 tablet (10 mg total) by mouth daily at bedtime 90 tablet 3    fenofibrate (TRIGLIDE) 160 MG tablet Take 1 tablet (160 mg total) by mouth daily 30 tablet 5    glucose blood (ONETOUCH VERIO) test strip TEST TWICE A  each 2    insulin glargine (LANTUS SOLOSTAR) 100 units/mL injection pen Inject 20 Units under the skin daily at bedtime 5 pen 5    Insulin Pen Needle (BD PEN NEEDLE TOPHER U/F) 32G X 4 MM MISC by Does not apply route 2 (two) times a day 100 each 5    levothyroxine 88 mcg tablet TAKE 1 TABLET (88 MCG TOTAL) BY MOUTH DAILY FOR 90 DAYS 90 tablet 0    lisinopril (ZESTRIL) 20 mg tablet Take 1 tablet (20 mg total) by mouth 2 (two) times a day (Patient taking differently: Take by mouth 2 (two) times a day ) 90 tablet 6    memantine (NAMENDA) 10 mg tablet TAKE 1 TABLET (10 MG TOTAL) BY MOUTH 2 (TWO) TIMES A DAY 60 tablet 3    montelukast (SINGULAIR) 10 mg tablet TAKE 1 TABLET BY MOUTH DAILY AT BEDTIME 30 tablet 1    ONETOUCH DELICA LANCETS 24L MISC USE 2 (TWO) TIMES A DAY AS NEEDED (AS NEEDED FOR HIGH BLOOD SUGAR) 100 each 3    ONETOUCH DELICA LANCETS FINE MISC by Device route 2 (two) times a day 100 each 4    simvastatin (ZOCOR) 20 mg tablet Take 20 mg by mouth daily at bedtime      albuterol (VENTOLIN HFA) 90 mcg/act inhaler Inhale 2 puffs every 6 (six) hours as needed for wheezing (Patient not taking: Reported on 11/19/2019) 1 Inhaler 1    azithromycin (ZITHROMAX) 250 mg tablet Take 1 tablet (250 mg total) by mouth every 24 hours for 5 days 5 tablet 0    fluticasone (FLONASE) 50 mcg/act nasal spray 1 spray into each nostril daily 1 Bottle 1    loratadine (CLARITIN) 10 mg tablet Take 1 tablet (10 mg total) by mouth daily 10 tablet 1    Promethazine-DM (PHENERGAN-DM) 6 25-15 mg/5 mL oral syrup Take 5 mL by mouth 4 (four) times a day as needed for cough 118 mL 1     No current facility-administered medications for this visit        Current Outpatient Medications on File Prior to Visit   Medication Sig    amLODIPine (NORVASC) 5 mg tablet Take 1 tablet (5 mg total) by mouth daily (Patient taking differently: Take by mouth daily )    Blood Glucose Monitoring Suppl (Vineet Dahl) w/Device KIT Use as directed    Blood Pressure KIT by Does not apply route daily    Canagliflozin (INVOKANA) 300 MG TABS Take 1 tablet (300 mg total) by mouth daily    donepezil (ARICEPT) 10 mg tablet Take 1 tablet (10 mg total) by mouth daily at bedtime    fenofibrate (TRIGLIDE) 160 MG tablet Take 1 tablet (160 mg total) by mouth daily    glucose blood (ONETOUCH VERIO) test strip TEST TWICE A DAY    Insulin Pen Needle (BD PEN NEEDLE TOPHER U/F) 32G X 4 MM MISC by Does not apply route 2 (two) times a day    levothyroxine 88 mcg tablet TAKE 1 TABLET (88 MCG TOTAL) BY MOUTH DAILY FOR 90 DAYS    lisinopril (ZESTRIL) 20 mg tablet Take 1 tablet (20 mg total) by mouth 2 (two) times a day (Patient taking differently: Take by mouth 2 (two) times a day )    memantine (NAMENDA) 10 mg tablet TAKE 1 TABLET (10 MG TOTAL) BY MOUTH 2 (TWO) TIMES A DAY    montelukast (SINGULAIR) 10 mg tablet TAKE 1 TABLET BY MOUTH DAILY AT BEDTIME    ONETOUCH DELICA LANCETS 11V MISC USE 2 (TWO) TIMES A DAY AS NEEDED (AS NEEDED FOR HIGH BLOOD SUGAR)    ONETOUCH DELICA LANCETS FINE MISC by Device route 2 (two) times a day    simvastatin (ZOCOR) 20 mg tablet Take 20 mg by mouth daily at bedtime    [DISCONTINUED] insulin glargine (LANTUS SOLOSTAR) 100 units/mL injection pen Inject 28 Units under the skin daily at bedtime for 30 days (Patient taking differently: Inject 30 Units under the skin daily at bedtime )    albuterol (VENTOLIN HFA) 90 mcg/act inhaler Inhale 2 puffs every 6 (six) hours as needed for wheezing (Patient not taking: Reported on 11/19/2019)    [DISCONTINUED] levothyroxine 88 mcg tablet TAKE 1 TABLET (88 MCG TOTAL) BY MOUTH DAILY FOR 90 DAYS (Patient not taking: Reported on 11/19/2019)    [DISCONTINUED] naproxen (NAPROSYN) 500 mg tablet TAKE 1 TABLET (500 MG TOTAL) BY MOUTH 2 (TWO) TIMES A DAY WITH MEALS FOR 30 DAYS (Patient not taking: Reported on 10/10/2019)    [DISCONTINUED] nystatin-triamcinolone (MYCOLOG-II) ointment Apply twice a day to rectal area (Patient not taking: Reported on 11/19/2019)    [DISCONTINUED] ondansetron (ZOFRAN-ODT) 4 mg disintegrating tablet Place 1 tab (4 mg) under your tongue every 6 hours for nausea or vomiting   (Patient not taking: Reported on 11/19/2019)    [DISCONTINUED] polyethylene glycol (GLYCOLAX) powder  g MIRALAX IN 64 OUNCES OF CLEAR LIQUID, DRINK 8 OUNCES Q 30 MINUTES TILL FINISHED (Patient not taking: Reported on 11/7/2019)    [DISCONTINUED] promethazine-codeine (PHENERGAN WITH CODEINE) 6 25-10 mg/5 mL syrup Take 5 mL by mouth every 4 (four) hours as needed for cough (Patient not taking: Reported on 11/19/2019)    [DISCONTINUED] Promethazine-DM (PHENERGAN-DM) 6 25-15 mg/5 mL oral syrup Take 5 mL by mouth 4 (four) times a day as needed for cough (Patient not taking: Reported on 11/19/2019)     No current facility-administered medications on file prior to visit  She is allergic to penicillins       Review of Systems   Constitutional: Negative for activity change, appetite change, fatigue and fever  HENT: Negative for congestion and ear discharge  Respiratory: Positive for cough and shortness of breath  Cardiovascular: Negative for chest pain and palpitations  Gastrointestinal: Negative for diarrhea and nausea  Musculoskeletal: Negative for arthralgias and back pain  Skin: Negative for color change and rash  Neurological: Negative for dizziness and headaches  Psychiatric/Behavioral: Negative for agitation and behavioral problems  Objective:      /62   Pulse 78   Temp 98 3 °F (36 8 °C) (Tympanic)   Ht 5' (1 524 m)   Wt 61 9 kg (136 lb 6 4 oz)   SpO2 96%   BMI 26 64 kg/m²          Physical Exam   Constitutional: She is oriented to person, place, and time  She appears well-developed and well-nourished  No distress  HENT:   Head: Normocephalic and atraumatic  Nose: Nose normal    Mouth/Throat: Oropharynx is clear and moist    Eyes: Pupils are equal, round, and reactive to light  Conjunctivae are normal    Cardiovascular: Normal rate, regular rhythm and normal heart sounds  No murmur heard  Pulmonary/Chest: Effort normal and breath sounds normal  No respiratory distress  She has no wheezes  Abdominal: Soft  Bowel sounds are normal  She exhibits no distension  There is no tenderness  Neurological: She is alert and oriented to person, place, and time  Skin: Skin is warm and dry  No rash noted  She is not diaphoretic  No erythema  Psychiatric: She has a normal mood and affect

## 2019-11-27 ENCOUNTER — OFFICE VISIT (OUTPATIENT)
Dept: PHYSICAL THERAPY | Age: 79
End: 2019-11-27
Payer: COMMERCIAL

## 2019-11-27 DIAGNOSIS — Z98.890 S/P RIGHT ROTATOR CUFF REPAIR: Primary | ICD-10-CM

## 2019-11-27 PROCEDURE — 97140 MANUAL THERAPY 1/> REGIONS: CPT | Performed by: PHYSICAL THERAPIST

## 2019-11-27 PROCEDURE — 97110 THERAPEUTIC EXERCISES: CPT | Performed by: PHYSICAL THERAPIST

## 2019-11-27 NOTE — PROGRESS NOTES
Daily Note     Today's date: 2019  Patient name: Grzegorz Berry  : 1940  MRN: [id number]  Referring provider: Rahul Coon PA-C  Dx:   Encounter Diagnosis     ICD-10-CM    1  S/P right rotator cuff repair Z98 890        Start Time:   Stop Time: 46  Total time in clinic (min): 48 minutes    Subjective: Patient notes some soreness today  Objective: See treatment diary below      Assessment: Tolerated treatment well  Patient demonstrated fatigue post treatment, exhibited good technique with therapeutic exercises and would benefit from continued PT, limited ROM to left shoulder especially with flexion and internal rotation      Plan: Progress treatment as tolerated         Precautions: DM, Fall risk, language barrier      Manual  11/4 11/11 11/13 11/19 11/27 10/14 10/16 10/21 10/24 10/30   Right shoulder, elbow 15' 15 15 15 15 15 15 15 15 15                                                           Exercise Diary  11/4 11/11 11/13 11/19 11/27 10/14 10/16 10/21 10/24 10/30   Ball FF,CW, CCW 30x 30x 30x 30x 30x ea 30xea 30x ea 30x ea 30x ea 30x   Cane FF, CP 30x 20x 30x 30x 30x ea 30x ea 30x ea 30x 30x 30   AROM FF, POS 10x 10 10 15x ea 15x ea  5x ea 2x5 2x5    UBE 6' 6' 6' 6' 6' 6' 6' 6' 6' 6'   t band rows (Red) 30x 30x 30x 30x 30x 30x 30x 30x 30x 30x   Pulleys FF/ABD 30x ea 20x 30x 30x 30x 30x 30x 30x 30 30x   tband rtc 30x ea  30x 30x 30x ea 30x ea 30x ea 30x ea 30x 30x   bicep 15# 30x 15#/ 30 15#/ 30 15#/ 30 15#/ 30 15#/ 30 15#/ 30 15# 30x 15#/ 30 15#/ 30   tricep 30# 30x 30#/ 30 30#/ 30 30#/ 30 30#/ 30 30#/ 30 30#/ 30 30# 30x 30#/ 30 30#/ 30   [facility] stacking   4x 4x 4x                                                                                                                                              Modalities  11/4 11/11 11/13 11/19 11/27 10/14 10/16 10/21 10/24 10/30   MH 10' 10' 10'  10'  10 10 10 10 10 10   CP post session   10' nt    nt

## 2019-11-29 ENCOUNTER — OFFICE VISIT (OUTPATIENT)
Dept: PHYSICAL THERAPY | Age: 79
End: 2019-11-29
Payer: COMMERCIAL

## 2019-11-29 DIAGNOSIS — Z98.890 S/P RIGHT ROTATOR CUFF REPAIR: Primary | ICD-10-CM

## 2019-11-29 PROCEDURE — 97140 MANUAL THERAPY 1/> REGIONS: CPT

## 2019-11-29 PROCEDURE — 97110 THERAPEUTIC EXERCISES: CPT

## 2019-11-29 NOTE — PROGRESS NOTES
Daily Note     Today's date: 2019  Patient name: Gay Henson  : 1940  MRN: [id number]  Referring provider: Thanh Cain PA-C  Dx:   Encounter Diagnosis     ICD-10-CM    1  S/P right rotator cuff repair Z98 890        Start Time: 5818  Stop Time: 1200  Total time in clinic (min): 55 minutes    Subjective: Patient reports some soreness today, but no real pain  Objective: See treatment diary below    Assessment: Tolerated treatment well,end range tightness all planes  Fatigues with right shoulder AROM  Patient demonstrated fatigue post treatment and would benefit from continued PT      Plan: Continue per plan of care        Precautions: DM, Fall risk, language barrier      Manual  11/4 11/11 11/13 11/19 11/27 11/29  10/21 10/24 10/30   Right shoulder, elbow 15' 15 15 15 15 15 15 15 15 15                                                           Exercise Diary  11/4 11/11 11/13 11/19 11/27 11/29  10/21 10/24 10/30   Ball FF,CW, CCW 30x 30x 30x 30x 30x ea 30xea 30x ea 30x ea 30x ea 30x   Cane FF, CP 30x 20x 30x 30x 30x ea 30x ea 30x ea 30x 30x 30   AROM FF, POS 10x 10 10 15x ea 15x ea 15x ea 5x ea 2x5 2x5    UBE 6' 6' 6' 6' 6' 6' 6' 6' 6' 6'   t band rows (Red) 30x 30x 30x 30x 30x 30x 30x 30x 30x 30x   Pulleys FF/ABD 30x ea 20x 30x 30x 30x 30x 30x 30x 30 30x   tband rtc 30x ea  30x 30x 30x ea 30x ea 30x ea 30x ea 30x 30x   bicep 15# 30x 15#/ 30 15#/ 30 15#/ 30 15#/ 30 15#/ 30 15#/ 30 15# 30x 15#/ 30 15#/ 30   tricep 30# 30x 30#/ 30 30#/ 30 30#/ 30 30#/ 30 30#/ 30 30#/ 30 30# 30x 30#/ 30 30#/ 30   [facility] stacking   4x 4x 4x 4x                                                                                                                                             Modalities  11/4 11/11 11/13 11/19 11/27 11/29  10/21 10/24 10/30   MH 10' 10' 10'  10'  10 10 10 10 10 10   CP post session   10' nt    nt

## 2019-12-06 DIAGNOSIS — R05.9 COUGH: ICD-10-CM

## 2019-12-06 RX ORDER — MONTELUKAST SODIUM 10 MG/1
TABLET ORAL
Qty: 30 TABLET | Refills: 1 | Status: SHIPPED | OUTPATIENT
Start: 2019-12-06 | End: 2020-11-13

## 2019-12-06 RX ORDER — MONTELUKAST SODIUM 10 MG/1
10 TABLET ORAL
Qty: 90 TABLET | Refills: 1 | Status: SHIPPED | OUTPATIENT
Start: 2019-12-06 | End: 2020-06-15

## 2019-12-09 ENCOUNTER — APPOINTMENT (OUTPATIENT)
Dept: PHYSICAL THERAPY | Age: 79
End: 2019-12-09
Payer: COMMERCIAL

## 2019-12-11 ENCOUNTER — OFFICE VISIT (OUTPATIENT)
Dept: PHYSICAL THERAPY | Age: 79
End: 2019-12-11
Payer: COMMERCIAL

## 2019-12-11 DIAGNOSIS — R05.8 PRODUCTIVE COUGH: ICD-10-CM

## 2019-12-11 DIAGNOSIS — Z98.890 S/P RIGHT ROTATOR CUFF REPAIR: Primary | ICD-10-CM

## 2019-12-11 PROCEDURE — 97110 THERAPEUTIC EXERCISES: CPT

## 2019-12-11 PROCEDURE — 97140 MANUAL THERAPY 1/> REGIONS: CPT

## 2019-12-11 RX ORDER — FLUTICASONE PROPIONATE 50 MCG
SPRAY, SUSPENSION (ML) NASAL
Qty: 16 ML | Refills: 1 | Status: SHIPPED | OUTPATIENT
Start: 2019-12-11 | End: 2020-01-07

## 2019-12-11 NOTE — PROGRESS NOTES
Daily Note     Today's date: 2019  Patient name: Skylar Martinez  : 1940  MRN: [id number]  Referring provider: Kiara Osborne PA-C  Dx:   Encounter Diagnosis     ICD-10-CM    1  S/P right rotator cuff repair Z98 890        Start Time: 1110  Stop Time: 9711  Total time in clinic (min): 55 minutes    Subjective: Patient reports sore today in her  Right shoulder  Objective: See treatment diary below      Assessment: Tolerated treatment well, pain with end range all planes iliana ER/FF  Patient exhibited good technique with therapeutic exercises and would benefit from continued PT      Plan: Continue per plan of care        Precautions: DM, Fall risk, language barrier      Manual  11/4 11/11 11/13 11/19 11/27 11/29 12/11 10/21 10/24 10/30   Right shoulder, elbow 15' 15 15 15 15 15 15 15 15 15                                                           Exercise Diary  11/4 11/11 11/13 11/19 11/27 11/29 12/11  10/24 10/30   Ball FF,CW, CCW 30x 30x 30x 30x 30x ea 30xea 30x ea 30x ea 30x ea 30x   Cane FF, CP 30x 20x 30x 30x 30x ea 30x ea 30x ea 30x 30x 30   AROM FF, POS 10x 10 10 15x ea 15x ea 15x ea 15x ea 2x5 2x5    UBE 6' 6' 6' 6' 6' 6' 6' 6' 6' 6'   t band rows (Red) 30x 30x 30x 30x 30x 30x 30x 30x 30x 30x   Pulleys FF/ABD 30x ea 20x 30x 30x 30x 30x 30x 30x 30 30x   tband rtc 30x ea  30x 30x 30x ea 30x ea 30x ea 30x ea 30x 30x   bicep 15# 30x 15#/ 30 15#/ 30 15#/ 30 15#/ 30 15#/ 30 15#/ 30 15# 30x 15#/ 30 15#/ 30   tricep 30# 30x 30#/ 30 30#/ 30 30#/ 30 30#/ 30 30#/ 30 30#/ 30 30# 30x 30#/ 30 30#/ 30   [facility] stacking   4x 4x 4x 4x 4x                                                                                                                                            Modalities  11/4 11/11 11/13 11/19 11/27 11/29 12/11  10/24 10/30   MH 10' 10' 10'  10'  10 10 10 10 10 10   CP post session   10' nt    nt

## 2019-12-13 ENCOUNTER — OFFICE VISIT (OUTPATIENT)
Dept: PHYSICAL THERAPY | Age: 79
End: 2019-12-13
Payer: COMMERCIAL

## 2019-12-13 DIAGNOSIS — Z98.890 S/P RIGHT ROTATOR CUFF REPAIR: Primary | ICD-10-CM

## 2019-12-13 PROCEDURE — 97110 THERAPEUTIC EXERCISES: CPT

## 2019-12-13 NOTE — PROGRESS NOTES
Daily Note     Today's date: 2019  Patient name: Earnstine Boast  : 1940  MRN: [id number]  Referring provider: Sandy Singleton PA-C  Dx:   Encounter Diagnosis     ICD-10-CM    1  S/P right rotator cuff repair Z98 890        Start Time: 1430  Stop Time: 8276  Total time in clinic (min): 60 minutes    Subjective: Minimal discomfort reported at R shoulder today      Objective: See treatment diary below      Assessment: Tolerated treatment well  No changes in soreness post session  Some cues for proper technique for shoulder tband exercises  Slow, steady gains in ROM  Patient exhibited good technique with therapeutic exercises and would benefit from continued PT      Plan: Continue per plan of care        Precautions: DM, Fall risk, language barrier      Manual  11/4 11/11 11/13 11/19 11/27 11/29 12/11 12/13  10/30   Right shoulder, elbow 15' 15 15 15 15 15 15 15 15 15                                                           Exercise Diary  11/4 11/11 11/13 11/19 11/27 11/29 12/11 12/13  10/30   Ball FF,CW, CCW 30x 30x 30x 30x 30x ea 30xea 30x ea 30x ea 30x ea 30x   Cane FF, CP 30x 20x 30x 30x 30x ea 30x ea 30x ea 30x ea 30x 30   AROM FF, POS 10x 10 10 15x ea 15x ea 15x ea 15x ea 15x ea 2x5    UBE 6' 6' 6' 6' 6' 6' 6' 6' 6' 6'   t band rows (Red) 30x 30x 30x 30x 30x 30x 30x 30x 30x 30x   Pulleys FF/ABD 30x ea 20x 30x 30x 30x 30x 30x 30x 30 30x   tband rtc 30x ea  30x 30x 30x ea 30x ea 30x ea 30x ea 30x 30x   bicep 15# 30x 15#/ 30 15#/ 30 15#/ 30 15#/ 30 15#/ 30 15#/ 30 15# 30x 15#/ 30 15#/ 30   tricep 30# 30x 30#/ 30 30#/ 30 30#/ 30 30#/ 30 30#/ 30 30#/ 30 30# 30x 30#/ 30 30#/ 30   [facility] stacking   4x 4x 4x 4x 4x 4x                                                                                                                                           Modalities  11/4 11/11 11/13 11/19 11/27 11/29 12/11 12/13  10/30   MH 10' 10' 10'  10'  10 10 10 10 10 10   CP post session   10' nt

## 2019-12-18 ENCOUNTER — EVALUATION (OUTPATIENT)
Dept: PHYSICAL THERAPY | Age: 79
End: 2019-12-18
Payer: COMMERCIAL

## 2019-12-18 DIAGNOSIS — Z98.890 S/P RIGHT ROTATOR CUFF REPAIR: Primary | ICD-10-CM

## 2019-12-18 PROCEDURE — 97110 THERAPEUTIC EXERCISES: CPT | Performed by: PHYSICAL THERAPIST

## 2019-12-18 PROCEDURE — 97140 MANUAL THERAPY 1/> REGIONS: CPT | Performed by: PHYSICAL THERAPIST

## 2019-12-18 NOTE — PROGRESS NOTES
PT Re-Evaluation     Today's date: 2019  Patient name: Oneil Mendez  : 1940  MRN: [id number]  Referring provider: Yuki Gonzalez PA-C  Dx:   Encounter Diagnosis     ICD-10-CM    1  S/P right rotator cuff repair Z98 890        Start Time:   Stop Time:   Total time in clinic (min): 60 minutes    Assessment  Assessment details: Oneil Mendez has had 19 sessions of physical therapy to date for right RTC repair  She does continue to pain which has recently worsened that the pt attributes to cold weather  Her PROM has improved compared to the initial visit but has minimally changed compared to prior assessment and continues to have a tight capsular end feel  The patient's granddaughter was present today and reports her grandmother is still not using her arm  The strength of the right arm at 90° FF and POS is very good and IR/ER at side is remarkably improved and good overall  She remains limited overhead due to tightness  The patient was encouraged again today to use the arm and perform HEP stretches multiple times per day  At this time her ROM remains unchanged and is limited by tightness  We will continue skilled PT with aggressive PROM and mobs until her follow-up with the surgeon and await his recommendation  Impairments: abnormal or restricted ROM, activity intolerance, impaired physical strength, lacks appropriate home exercise program, pain with function, scapular dyskinesis and poor posture   Functional limitations: can not raise arm, lift/push/pull, limited adls, no iadlsBarriers to therapy: Language barrier  Pt did not have ROM to right arm for 6 weeks post surgery  Pt is still not using her right arm despite encouragment from PT  Understanding of Dx/Px/POC: fair   Prognosis: fair  Prognosis details: Pt did not have PT for 6 weeks and did not understand instructions for HEP issued by physician's office  Pt has capsular tightness  Pt speaks limited English  Goals  ST-3 WEEKS  1  Decrease pain to < 5/10 at worst  Achieved  2  Increase ROM by > 20° in FF, ABD, ER  Achieved  3  Increase UE to 3/5 from 0-90°  Achieved     LT-8 WEEKS  1  Patient to be independent with a/iadls  Achieved independent adls, iadls not achieved  2  Increase functional activities for leisure and home activities to previous LOF  Not met as pt still reluctant to use arm  3  Improve FOTO score by 10 points  Achieved  New Goal:  PROM >145 FF, ABD  AROM>120 FF  Pt able to use right hand to perform self cleansing    Plan  Patient would benefit from: skilled physical therapy  Planned modality interventions: cryotherapy, electrical stimulation/Russian stimulation, thermotherapy: hydrocollator packs and unattended electrical stimulation  Planned therapy interventions: functional ROM exercises, home exercise program, joint mobilization, manual therapy, neuromuscular re-education, strengthening, stretching, therapeutic activities and therapeutic exercise  Frequency: 2x week  Duration in weeks: 6  Plan of Care beginning date: 2019  Plan of Care expiration date: 2019  Treatment plan discussed with: patient and caregiver        Subjective Evaluation    History of Present Illness  Date of onset: 2019  Mechanism of injury: Pt fell in  and had right shoulder pain  She went to ER and xrays were negative  She had persistent pain and could not lift anything and kept dropping cups  They returned to ortho and MRI was taken and revealed torn RTC and biceps  Pt underwent RTC repair 19  She was immobilized in a sling for 6 weeks and given HEP  She is now referred for out patient PT  Her granddaughter is present to translate  She reports she really did not move the arm for 6 weeks  10/24/19  Pt provides limited subjective info due to language barrier  She does note the arm is "better" and she is able to use it more  19  Pt reports her arm is more sore recently   She feels it is related to the cold weather  Pt is frustrated with pain she is still having  Per her granddaughter who is present for translation, she has not been using her arm at home  Pain  Current pain ratin (pain worsened from 2/10)  At best pain ratin (worsened from 2/10)  At worst pain ratin  Location: right shoulder and arm  Quality: dull ache and sharp  Relieving factors: rest and support  Progression: improved    Social Support  Steps to enter house: yes  Stairs in house: no   Lives with: adult granddaughter  Hand dominance: right      Diagnostic Tests  MRI studies: abnormal  Treatments  Previous treatment: immobilization  Patient Goals  Patient goals for therapy: decreased pain, increased motion, independence with ADLs/IADLs and increased strength          Objective     Observations     Right Shoulder  Positive for incision       Additional Observation Details  Open repair is closed with tenderness over the incision, 4 portals present with posterior lateral slightly puckered    Active Range of Motion   Left Shoulder   Flexion: 145 degrees   Abduction: 140 degrees     Right Shoulder   Flexion: 95 (improved from 40) degrees   Abduction: 90 (improved from 30) degrees     Passive Range of Motion   Left Shoulder   Flexion: 145 degrees   Abduction: 145 degrees   External rotation 90°: 90 degrees   Internal rotation 90°: 58 degrees     Right Shoulder   Flexion: 130 (improved from 88 but declined from 140 in past month) degrees with pain  Abduction: 125 (improved from 74 but no imrpovement since last assessment) degrees with pain  External rotation 90°: 77 (improved from 30 POS) degrees   Internal rotation 90°: 60 (improved from 50) degrees     Right Elbow   Normal passive range of motion    Additional Passive Range of Motion Details  Right shoulder remains tight at all end ranges with capsular feel    Strength/Myotome Testing     Left Shoulder   Normal muscle strength    Right Shoulder     Planes of Motion   Flexion: 4+ (tested st 90)   Abduction: 4 (tested at 90)   External rotation at 0°: 4   Internal rotation at 0°: 4+     Isolated Muscles   Biceps: 4     Additional Strength Details  Pt has good strength within her available ROM but AROM >90° is limited by tightness             Precautions: DM, Fall risk, language barrier      Manual  11/4 11/11 11/13 11/19 11/27 11/29 12/11 12/13 12/18    Right shoulder, elbow 15' 15 15 15 15 15 15 15 15 15                                                           Exercise Diary  11/4 11/11 11/13 11/19 11/27 11/29 12/11 12/13 12/18    Ball FF,CW, CCW 30x 30x 30x 30x 30x ea 30xea 30x ea 30x ea 30x ea 30x   Cane FF, CP 30x 20x 30x 30x 30x ea 30x ea 30x ea 30x ea 30x 30   AROM FF, POS 10x 10 10 15x ea 15x ea 15x ea 15x ea 15x ea 2x5    UBE 6' 6' 6' 6' 6' 6' 6' 6' 6' 6'   t band rows (Red) 30x 30x 30x 30x 30x 30x 30x 30x 30x 30x   Pulleys FF/ABD 30x ea 20x 30x 30x 30x 30x 30x 30x 30 30x   tband rtc 30x ea  30x 30x 30x ea 30x ea 30x ea 30x ea 30x 30x   bicep 15# 30x 15#/ 30 15#/ 30 15#/ 30 15#/ 30 15#/ 30 15#/ 30 15# 30x 15#/ 30 15#/ 30   tricep 30# 30x 30#/ 30 30#/ 30 30#/ 30 30#/ 30 30#/ 30 30#/ 30 30# 30x 30#/ 30 30#/ 30   [facility] stacking   4x 4x 4x 4x 4x 4x                                                         HEP         review                                                                     HEP- cane FF stretch, towel IR stretch    Modalities  11/4 11/11 11/13 11/19 11/27 11/29 12/11 12/13 12/18     10' 10' 10'  10'  10 10 10 10 10 10   CP post session   10' nt

## 2019-12-19 ENCOUNTER — PATIENT OUTREACH (OUTPATIENT)
Dept: FAMILY MEDICINE CLINIC | Facility: CLINIC | Age: 79
End: 2019-12-19

## 2019-12-19 ENCOUNTER — APPOINTMENT (OUTPATIENT)
Dept: LAB | Facility: HOSPITAL | Age: 79
End: 2019-12-19
Attending: FAMILY MEDICINE
Payer: COMMERCIAL

## 2019-12-19 DIAGNOSIS — Z79.4 TYPE 2 DIABETES MELLITUS TREATED WITH INSULIN (HCC): ICD-10-CM

## 2019-12-19 DIAGNOSIS — E11.9 TYPE 2 DIABETES MELLITUS TREATED WITH INSULIN (HCC): ICD-10-CM

## 2019-12-19 LAB
ALBUMIN SERPL BCP-MCNC: 4 G/DL (ref 3.5–5)
ALP SERPL-CCNC: 51 U/L (ref 46–116)
ALT SERPL W P-5'-P-CCNC: 22 U/L (ref 12–78)
ANION GAP SERPL CALCULATED.3IONS-SCNC: 9 MMOL/L (ref 4–13)
AST SERPL W P-5'-P-CCNC: 19 U/L (ref 5–45)
BILIRUB SERPL-MCNC: 0.6 MG/DL (ref 0.2–1)
BUN SERPL-MCNC: 24 MG/DL (ref 5–25)
CALCIUM SERPL-MCNC: 9.7 MG/DL (ref 8.3–10.1)
CHLORIDE SERPL-SCNC: 107 MMOL/L (ref 100–108)
CO2 SERPL-SCNC: 28 MMOL/L (ref 21–32)
CREAT SERPL-MCNC: 0.95 MG/DL (ref 0.6–1.3)
CREAT UR-MCNC: 34 MG/DL
EST. AVERAGE GLUCOSE BLD GHB EST-MCNC: 163 MG/DL
GFR SERPL CREATININE-BSD FRML MDRD: 58 ML/MIN/1.73SQ M
GLUCOSE P FAST SERPL-MCNC: 105 MG/DL (ref 65–99)
HBA1C MFR BLD: 7.3 % (ref 4.2–6.3)
MICROALBUMIN UR-MCNC: 84.5 MG/L (ref 0–20)
MICROALBUMIN/CREAT 24H UR: 249 MG/G CREATININE (ref 0–30)
POTASSIUM SERPL-SCNC: 4.2 MMOL/L (ref 3.5–5.3)
PROT SERPL-MCNC: 7.3 G/DL (ref 6.4–8.2)
SODIUM SERPL-SCNC: 144 MMOL/L (ref 136–145)

## 2019-12-19 PROCEDURE — 36415 COLL VENOUS BLD VENIPUNCTURE: CPT

## 2019-12-19 PROCEDURE — 82043 UR ALBUMIN QUANTITATIVE: CPT | Performed by: FAMILY MEDICINE

## 2019-12-19 PROCEDURE — 82570 ASSAY OF URINE CREATININE: CPT | Performed by: FAMILY MEDICINE

## 2019-12-19 PROCEDURE — 83036 HEMOGLOBIN GLYCOSYLATED A1C: CPT

## 2019-12-19 PROCEDURE — 80053 COMPREHEN METABOLIC PANEL: CPT

## 2019-12-19 NOTE — PROGRESS NOTES
Outpatient Care Management Note:    12/19/19-  Chart review done  Attempted to contact pts granddaughter, Luca Luna, to follow up on health and outreach for any possible needed services  No answer; voicemail left with my contact information  Awaiting return call  12/20/19-  Received VM from Luca Luna  Attempted to contact her with no answer  Left VM with my contact info

## 2019-12-20 ENCOUNTER — OFFICE VISIT (OUTPATIENT)
Dept: PHYSICAL THERAPY | Age: 79
End: 2019-12-20
Payer: COMMERCIAL

## 2019-12-20 DIAGNOSIS — Z98.890 S/P RIGHT ROTATOR CUFF REPAIR: Primary | ICD-10-CM

## 2019-12-20 PROCEDURE — 97140 MANUAL THERAPY 1/> REGIONS: CPT | Performed by: PHYSICAL THERAPIST

## 2019-12-20 PROCEDURE — 97110 THERAPEUTIC EXERCISES: CPT | Performed by: PHYSICAL THERAPIST

## 2019-12-20 NOTE — PROGRESS NOTES
Daily Note     Today's date: 2019  Patient name: Hank Kaminski  : 1940  MRN: [id number]  Referring provider: Christy Clark PA-C  Dx:   Encounter Diagnosis     ICD-10-CM    1  S/P right rotator cuff repair Z98 890        Start Time: 1430  Stop Time: 1076  Total time in clinic (min): 60 minutes    Subjective: Pt reports her pain prior to PT today "a little"  Objective: See treatment diary below      Assessment: Tolerated treatment well  Patient does have pain at end ranges with capsular end feel  Completes program without difficulty  Good AROM below 90°  Plan: Continue per plan of care        Precautions: DM, Fall risk, language barrier      Manual     Right shoulder, elbow 15' 15 15 15 15 15 15 15 15 15                                                           Exercise Diary     Ball FF,CW, CCW 30x 30x 30x 30x 30x ea 30xea 30x ea 30x ea 30x ea 30x   Cane FF, CP 30x 20x 30x 30x 30x ea 30x ea 30x ea 30x ea 30x 30   AROM FF, POS 10x 10 10 15x ea 15x ea 15x ea 15x ea 15x ea 2x5 15x   UBE 6' 6' 6' 6' 6' 6' 6' 6' 6' 6'   t band rows (Red) 30x 30x 30x 30x 30x 30x 30x 30x 30x 30x   Pulleys FF/ABD 30x ea 20x 30x 30x 30x 30x 30x 30x 30 30x   tband rtc 30x ea  30x 30x 30x ea 30x ea 30x ea 30x ea 30x 30x   bicep 15# 30x 15#/ 30 15#/ 30 15#/ 30 15#/ 30 15#/ 30 15#/ 30 15# 30x 15#/ 30 15#/ 30   tricep 30# 30x 30#/ 30 30#/ 30 30#/ 30 30#/ 30 30#/ 30 30#/ 30 30# 30x 30#/ 30 30#/ 30   [facility] stacking   4x 4x 4x 4x 4x 4x  4x                                                       HEP         review                                                                     HEP- cane FF stretch, towel IR stretch    Modalities      10' 10' 10'  10'  10 10 10 10 10 10   CP post session   10' nt

## 2019-12-23 ENCOUNTER — OFFICE VISIT (OUTPATIENT)
Dept: PHYSICAL THERAPY | Age: 79
End: 2019-12-23
Payer: COMMERCIAL

## 2019-12-23 DIAGNOSIS — Z98.890 S/P RIGHT ROTATOR CUFF REPAIR: Primary | ICD-10-CM

## 2019-12-23 PROCEDURE — 97110 THERAPEUTIC EXERCISES: CPT

## 2019-12-23 PROCEDURE — 97140 MANUAL THERAPY 1/> REGIONS: CPT

## 2019-12-23 NOTE — PROGRESS NOTES
Daily Note     Today's date: 2019  Patient name: Aram Ba  : 1940  MRN: [id number]  Referring provider: Loreta Walsh PA-C  Dx:   Encounter Diagnosis     ICD-10-CM    1  S/P right rotator cuff repair Z98 890        Start Time:   Stop Time: 131  Total time in clinic (min): 60 minutes    Subjective: Patient reports the same  Objective: See treatment diary below      Assessment: Tolerated treatment well, end range tight all planes  Patient exhibited good technique with therapeutic exercises and would benefit from continued PT      Plan: Continue per plan of care        Precautions: DM, Fall risk, language barrier      Manual     Right shoulder, elbow 15' 15 15 15 15 15 15 15 15 15                                                           Exercise Diary     Ball FF,CW, CCW 30x 30x 30x 30x 30x ea 30xea 30x ea 30x ea 30x ea 30x   Cane FF, CP 30x 20x 30x 30x 30x ea 30x ea 30x ea 30x ea 30x 30   AROM FF, POS 10x 10 10 15x ea 15x ea 15x ea 15x ea 15x ea 2x5 15x   UBE 6' 6' 6' 6' 6' 6' 6' 6' 6' 6'   t band row 30x  blue 30x 30x 30x 30x 30x 30x 30x 30x 30x   Pulleys FF/ABD 30x ea 20x 30x 30x 30x 30x 30x 30x 30 30x   tband rtc 30x ea  30x 30x 30x ea 30x ea 30x ea 30x ea 30x 30x   bicep 20# 30x 15#/ 30 15#/ 30 15#/ 30 15#/ 30 15#/ 30 15#/ 30 15# 30x 15#/ 30 15#/ 30   tricep 35# 30x 30#/ 30 30#/ 30 30#/ 30 30#/ 30 30#/ 30 30#/ 30 30# 30x 30#/ 30 30#/ 30   [facility] stacking   4x 4x 4x 4x 4x 4x  4x                                                       HEP         review                                                                     HEP- cane FF stretch, towel IR stretch    Modalities     MH 10' 10' 10'  10'  10 10 10 10 10 10   CP post session 10  10' nt

## 2019-12-26 ENCOUNTER — OFFICE VISIT (OUTPATIENT)
Dept: PHYSICAL THERAPY | Age: 79
End: 2019-12-26
Payer: COMMERCIAL

## 2019-12-26 DIAGNOSIS — Z98.890 S/P RIGHT ROTATOR CUFF REPAIR: Primary | ICD-10-CM

## 2019-12-26 PROCEDURE — 97110 THERAPEUTIC EXERCISES: CPT | Performed by: PHYSICAL THERAPIST

## 2019-12-26 PROCEDURE — 97140 MANUAL THERAPY 1/> REGIONS: CPT | Performed by: PHYSICAL THERAPIST

## 2019-12-26 NOTE — PROGRESS NOTES
Daily Note     Today's date: 2019  Patient name: Grzegorz Berry  : 1940  MRN: [id number]  Referring provider: Rahul Coon PA-C  Dx:   Encounter Diagnosis     ICD-10-CM    1  S/P right rotator cuff repair Z98 890        Start Time: 1300  Stop Time: 1400  Total time in clinic (min): 60 minutes    Subjective: Patient states her shoulder is "a little better"  Difficulty lifting her arm  Pain 3/10  Patient states she is seeing the doctor tomorrow and "may need another surgery", patient unable to explain why she says that due to language barrier  Patient states she is doing cooking, cleaning, dressing, showering, at home with no problem  She can't lye on left side due to pain  Objective: See treatment diary below      Assessment: Tolerated treatment fair  Patient would benefit from continued PT      Plan: Continue per plan of care  Progress treament per protocol        Precautions: DM, Fall risk, language barrier      Manual     Right shoulder, elbow 15' 15 15 15 15 15 15 15 15 15                                                           Exercise Diary     Ball FF,CW, CCW 30x 30x 30x 30x 30x ea 30xea 30x ea 30x ea 30x ea 30x   Cane FF, CP 30x 30x 30x 30x 30x ea 30x ea 30x ea 30x ea 30x 30   AROM FF, POS 10x 10 10 15x ea 15x ea 15x ea 15x ea 15x ea 2x5 15x   UBE 6' 6' 6' 6' 6' 6' 6' 6' 6' 6'   t band row 30x  blue 30x 30x 30x 30x 30x 30x 30x 30x 30x   Pulleys FF/ABD 30x ea 20x 30x 30x 30x 30x 30x 30x 30 30x   tband rtc 30x ea  30x 30x 30x ea 30x ea 30x ea 30x ea 30x 30x   bicep 20# 30x 15#/ 30 15#/ 30 15#/ 30 15#/ 30 15#/ 30 15#/ 30 15# 30x 15#/ 30 15#/ 30   tricep 35# 30x 30#/ 30 30#/ 30 30#/ 30 30#/ 30 30#/ 30 30#/ 30 30# 30x 30#/ 30 30#/ 30   [facility] stacking  nt 4x 4x 4x 4x 4x 4x  4x                                                       HEP         review HEP- cane FF stretch, towel IR stretch    Modalities  12/23 12/26 11/19 11/27 11/29 12/11 12/13 12/18 12/20   MH 10' 10' 10'  10'  10 10 10 10 10 10   CP post session 10 nt 10' nt

## 2019-12-27 ENCOUNTER — OFFICE VISIT (OUTPATIENT)
Dept: OBGYN CLINIC | Facility: CLINIC | Age: 79
End: 2019-12-27
Payer: COMMERCIAL

## 2019-12-27 VITALS
HEIGHT: 60 IN | DIASTOLIC BLOOD PRESSURE: 76 MMHG | BODY MASS INDEX: 26.7 KG/M2 | WEIGHT: 136 LBS | SYSTOLIC BLOOD PRESSURE: 138 MMHG | HEART RATE: 59 BPM

## 2019-12-27 DIAGNOSIS — Z98.890 STATUS POST RIGHT ROTATOR CUFF REPAIR: Primary | ICD-10-CM

## 2019-12-27 PROCEDURE — 99213 OFFICE O/P EST LOW 20 MIN: CPT | Performed by: ORTHOPAEDIC SURGERY

## 2019-12-27 NOTE — PROGRESS NOTES
CHIEF COMPLAINT:   Chief Complaint   Patient presents with    Right Shoulder - Follow-up, Pain         PROCEDURE: S/P right rotator cuff repair August 1, 2019      SUBJECTIVE:  Patient here with her granddaughter for translation     She states she still has intermittent pain over the anterior aspect of her shoulder  Has slightly decreased range of motion as well  She is in physical therapy transition to home exercise program   Denies any numbness or tingling upper extremity  She states pain is well controlled      ROS:  Review of systems form reviewed December 27, 2019  General: no fever, no chills  Respiratory:  Positive for cough, shortness of breath or wheezing  Cardiovascular:  No chest pain, no palpitations  Neurological:  No headaches, no confusion  Review of all other systems is negative    MEDS:   Current Outpatient Medications   Medication Sig Dispense Refill    amLODIPine (NORVASC) 5 mg tablet Take 1 tablet (5 mg total) by mouth daily (Patient taking differently: Take by mouth daily ) 30 tablet 0    Blood Glucose Monitoring Suppl (ONETOUCH VERIO) w/Device KIT Use as directed  0    Blood Pressure KIT by Does not apply route daily 1 each 1    Canagliflozin (INVOKANA) 300 MG TABS Take 1 tablet (300 mg total) by mouth daily 90 tablet 3    donepezil (ARICEPT) 10 mg tablet Take 1 tablet (10 mg total) by mouth daily at bedtime 90 tablet 3    fenofibrate (TRIGLIDE) 160 MG tablet Take 1 tablet (160 mg total) by mouth daily 30 tablet 5    fluticasone (FLONASE) 50 mcg/act nasal spray SPRAY 1 SPRAY INTO EACH NOSTRIL EVERY DAY 16 mL 1    glucose blood (ONETOUCH VERIO) test strip TEST TWICE A  each 2    Insulin Pen Needle (BD PEN NEEDLE TOPHER U/F) 32G X 4 MM MISC by Does not apply route 2 (two) times a day 100 each 5    levothyroxine 88 mcg tablet TAKE 1 TABLET (88 MCG TOTAL) BY MOUTH DAILY FOR 90 DAYS 90 tablet 0    lisinopril (ZESTRIL) 20 mg tablet Take 1 tablet (20 mg total) by mouth 2 (two) times a day (Patient taking differently: Take by mouth 2 (two) times a day ) 90 tablet 6    loratadine (CLARITIN) 10 mg tablet Take 1 tablet (10 mg total) by mouth daily 10 tablet 1    memantine (NAMENDA) 10 mg tablet Take 1 tablet (10 mg total) by mouth 2 (two) times a day 180 tablet 2    montelukast (SINGULAIR) 10 mg tablet Take 1 tablet (10 mg total) by mouth daily at bedtime 90 tablet 1    montelukast (SINGULAIR) 10 mg tablet TAKE 1 TABLET BY MOUTH DAILY AT BEDTIME 30 tablet 1    ONETOUCH DELICA LANCETS 69D MISC USE 2 (TWO) TIMES A DAY AS NEEDED (AS NEEDED FOR HIGH BLOOD SUGAR) 100 each 3    ONETOUCH DELICA LANCETS FINE MISC by Device route 2 (two) times a day 100 each 4    Promethazine-DM (PHENERGAN-DM) 6 25-15 mg/5 mL oral syrup Take 5 mL by mouth 4 (four) times a day as needed for cough 118 mL 1    simvastatin (ZOCOR) 20 mg tablet Take 1 tablet (20 mg total) by mouth daily at bedtime 90 tablet 2    albuterol (VENTOLIN HFA) 90 mcg/act inhaler Inhale 2 puffs every 6 (six) hours as needed for wheezing (Patient not taking: Reported on 11/19/2019) 1 Inhaler 1    insulin glargine (LANTUS SOLOSTAR) 100 units/mL injection pen Inject 20 Units under the skin daily at bedtime 5 pen 5     No current facility-administered medications for this visit  ALLERGIES: Penicillins      Vitals:    12/27/19 1259   BP: 138/76   Pulse: 59   Weight: 61 7 kg (136 lb)   Height: 5' (1 524 m)       PHYSICAL EXAM:    General Appearance:  Alert, cooperative, no distress, appears stated age   Lungs:   respirations unlabored   Chest Wall:  No tenderness or deformity   Heart:  Normal Heart rate noted   Extremities: Extremities normal, atraumatic, no cyanosis or edema   Pulses: 2+ and symmetric   Neurologic: Normal     ORTHO EXAM:  Right shoulder examination shows well-healed incisions    No erythema and no signs of infection noted  Active forward elevation to 150° passively to 170  Passive and active external rotation to 60°  Sensation is intact light touch right upper extremity  Full active range of motion of the right elbow, wrist and hand    ASSESSMENT/PLAN:   S/p 5 months above procedure  Patrick Crain was seen today for follow-up and pain  Diagnoses and all orders for this visit:    Status post right rotator cuff repair        There are no Patient Instructions on file for this visit  DISCUSSION SUMMARY:  Patient is S/P 5 months right rotator cuff repair  Patient will continue with physical therapy and transition to home exercise program   She will continue working on strengthening at home  She understands she may not ever achieve full range of motion when compared to the opposite side  She may resume all activities with no restrictions    Will follow up with us as needed    Scribe Attestation    I,:   Scotty Thapa PA-C am acting as a scribe while in the presence of the attending physician :        I,:   Mick Maddox MD personally performed the services described in this documentation    as scribed in my presence :

## 2019-12-30 ENCOUNTER — APPOINTMENT (OUTPATIENT)
Dept: PHYSICAL THERAPY | Age: 79
End: 2019-12-30
Payer: COMMERCIAL

## 2019-12-30 NOTE — PROGRESS NOTES
Daily Note     Today's date: 2019  Patient name: Xuan Castillo  : 1940  MRN: [id number]  Referring provider: Carlie Kim PA-C  Dx:   Encounter Diagnosis     ICD-10-CM    1  S/P right rotator cuff repair Z98 890                   Subjective: ***      Objective: See treatment diary below      Assessment: Tolerated treatment {Tolerated treatment :1942487372}   Patient {assessment:2551198602}      Plan: {PLAN:[id number]}     Precautions: DM, Fall risk, language barrier      Manual     Right shoulder, elbow 15' 15 15 15 15 15 15 15 15 15                                                           Exercise Diary     Ball FF,CW, CCW 30x 30x 30x 30x 30x ea 30xea 30x ea 30x ea 30x ea 30x   Cane FF, CP 30x 30x 30x 30x 30x ea 30x ea 30x ea 30x ea 30x 30   AROM FF, POS 10x 10 10 15x ea 15x ea 15x ea 15x ea 15x ea 2x5 15x   UBE 6' 6' 6' 6' 6' 6' 6' 6' 6' 6'   t band row 30x  blue 30x 30x 30x 30x 30x 30x 30x 30x 30x   Pulleys FF/ABD 30x ea 20x 30x 30x 30x 30x 30x 30x 30 30x   tband rtc 30x ea  30x 30x 30x ea 30x ea 30x ea 30x ea 30x 30x   bicep 20# 30x 15#/ 30 15#/ 30 15#/ 30 15#/ 30 15#/ 30 15#/ 30 15# 30x 15#/ 30 15#/ 30   tricep 35# 30x 30#/ 30 30#/ 30 30#/ 30 30#/ 30 30#/ 30 30#/ 30 30# 30x 30#/ 30 30#/ 30   [facility] stacking  nt 4x 4x 4x 4x 4x 4x  4x                                                       HEP         review                                                                     HEP- cane FF stretch, towel IR stretch    Modalities  12/23 12/26  11   MH 10' 10' 10'  10'  10 10 10 10 10 10   CP post session 10 nt 10' nt

## 2020-01-07 DIAGNOSIS — R05.8 PRODUCTIVE COUGH: ICD-10-CM

## 2020-01-07 RX ORDER — FLUTICASONE PROPIONATE 50 MCG
SPRAY, SUSPENSION (ML) NASAL
Qty: 16 ML | Refills: 0 | Status: SHIPPED | OUTPATIENT
Start: 2020-01-07 | End: 2020-01-21 | Stop reason: ALTCHOICE

## 2020-01-09 ENCOUNTER — OFFICE VISIT (OUTPATIENT)
Dept: GASTROENTEROLOGY | Facility: CLINIC | Age: 80
End: 2020-01-09
Payer: COMMERCIAL

## 2020-01-09 VITALS
HEART RATE: 64 BPM | BODY MASS INDEX: 26.7 KG/M2 | DIASTOLIC BLOOD PRESSURE: 70 MMHG | SYSTOLIC BLOOD PRESSURE: 132 MMHG | WEIGHT: 136 LBS | RESPIRATION RATE: 16 BRPM | HEIGHT: 60 IN

## 2020-01-09 DIAGNOSIS — R19.7 DIARRHEA, UNSPECIFIED TYPE: ICD-10-CM

## 2020-01-09 DIAGNOSIS — R15.9 INCONTINENCE OF FECES, UNSPECIFIED FECAL INCONTINENCE TYPE: Primary | ICD-10-CM

## 2020-01-09 PROCEDURE — 99213 OFFICE O/P EST LOW 20 MIN: CPT | Performed by: PHYSICIAN ASSISTANT

## 2020-01-09 RX ORDER — PEN NEEDLE, DIABETIC 32GX 5/32"
1 NEEDLE, DISPOSABLE MISCELLANEOUS 4 TIMES DAILY
Qty: 120 EACH | Refills: 2 | Status: SHIPPED | OUTPATIENT
Start: 2020-01-09 | End: 2020-03-13 | Stop reason: SDUPTHER

## 2020-01-09 RX ORDER — CHOLESTYRAMINE 4 G/9G
1 POWDER, FOR SUSPENSION ORAL 2 TIMES DAILY WITH MEALS
Qty: 60 PACKET | Refills: 2 | Status: SHIPPED | OUTPATIENT
Start: 2020-01-09 | End: 2020-04-04

## 2020-01-09 NOTE — PROGRESS NOTES
[facility] - Outpatient Follow-up Note  Radha Baron 67 y o  female MRN: [id number]  Encounter: [id number]          ASSESSMENT AND PLAN:      1  Diarrhea, unspecified type    Patient initially had a viral or bacterial gastroenteritis/colitis with very severe diarrhea/abdominal pain/nausea and 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  She had negative stool cultures, stool for c diff, and O&P   She also had a CT A/P  TSH normal   Celiac serology negative  ESR normal   Colonoscopy was normal except for 2 small adenomatous polyps which were removed, biopsy was negative for microscopic colitis  She is continuing to have 3 episodes of diarrhea a day and occcasional incontinent episodes despite the probiotic and fiber supplement  No weight loss or rectal bleeding  Will add Cholestyramine 1 packet twice a day (instructed to take other meds > than 1 hour prior to this one)  If worsening symptoms, weight loss, etc develops, would consider pill cam to rule out SB Crohn's  Follow up in 3-4 weeks  ___________________________________________________________    SUBJECTIVE:  Patient is a 71-year-old female who presents for follow-up  Patient has been struggling with diarrhea for several months  Initially she had severe symptoms consistent with an acute viral or bacterial gastroenteritis/colitis  Her diarrhea lessened but never completely dissipated however  She was seen in the office and underwent a workup  Stool cultures were negative  Celiac serology and sedimentation rate was negative  She also underwent a colonoscopy which showed no evidence of ulcerative colitis or Crohn's disease but too small adenomatous polyps were removed; biopsies taken were negative for microscopic colitis  She was started on a probiotic and fiber supplement but reports continued diarrhea with cramping up to 3 times a day  No rectal bleeding or melena  No weight loss        REVIEW OF SYSTEMS IS OTHERWISE NEGATIVE        Historical Information   Past Medical History:   Diagnosis Date    Aggression     Alzheimer's dementia ([address] )     Arthritis     Dementia [facility]     Diabetes insipidus ([address]     Diabetes mellitus ([facility]     High cholesterol     Hypertension     Memory loss     Migraine     Polyneuropathy     Rheumatoid arthritis (HCC)     Serum lipids high     Stomach problems     Thyroid trouble      Past Surgical History:   Procedure Laterality Date    CATARACT EXTRACTION      CHOLECYSTECTOMY      GALLBLADDER SURGERY      HYSTERECTOMY      NV SHLDR ARTHROSCOP,SURG,W/ROTAT CUFF REPR Right 2019    Procedure: SHOULDER ARTHROSCOPIC ROTATOR CUFF REPAIR;  Surgeon: Felisa Chavez MD;  Location: [facility] OR;  Service: Orthopedics    ROTATOR CUFF REPAIR Right 2019     Social History   Social History     Substance and Sexual Activity   Alcohol Use No     Social History     Substance and Sexual Activity   Drug Use No     Social History     Tobacco Use   Smoking Status Former Smoker    Last attempt to quit:     Years since quittin 0   Smokeless Tobacco Never Used     Family History   Problem Relation Age of Onset    Substance Abuse Mother         denied    Mental illness Mother         denied    Substance Abuse Father         denied    Mental illness Father         denied    Diabetes Brother     Blindness Brother     Arthritis Daughter     HIV Son        Meds/Allergies       Current Outpatient Medications:     amLODIPine (NORVASC) 5 mg tablet    Blood Glucose Monitoring Suppl (ONETOUCH VERIO) w/Device KIT    Blood Pressure KIT    Canagliflozin (INVOKANA) 300 MG TABS    donepezil (ARICEPT) 10 mg tablet    fenofibrate (TRIGLIDE) 160 MG tablet    fluticasone (FLONASE) 50 mcg/act nasal spray    glucose blood (ONETOUCH VERIO) test strip    insulin glargine (LANTUS SOLOSTAR) 100 units/mL injection pen    Insulin Pen Needle (BD PEN NEEDLE TOPHER U/F) 32G X 4 MM MISC    levothyroxine 88 mcg tablet    lisinopril (ZESTRIL) 20 mg tablet    loratadine (CLARITIN) 10 mg tablet    memantine (NAMENDA) 10 mg tablet    montelukast (SINGULAIR) 10 mg tablet    montelukast (SINGULAIR) 10 mg tablet    ONETOUCH DELICA LANCETS 31V MISC    ONETOUCH DELICA LANCETS FINE MISC    Promethazine-DM (PHENERGAN-DM) 6 25-15 mg/5 mL oral syrup    simvastatin (ZOCOR) 20 mg tablet    cholestyramine (QUESTRAN) 4 g packet    Incontinence Supply Disposable (COMFORT SHIELD ADULT DIAPERS) MISC    Allergies   Allergen Reactions    Penicillins Itching and Swelling           Objective     Blood pressure 132/70, pulse 64, resp  rate 16, height 5' (1 524 m), weight 61 7 kg (136 lb)  Body mass index is 26 56 kg/m²  PHYSICAL EXAM:      General Appearance:   Alert, cooperative, no distress   HEENT:   Normocephalic, atraumatic, anicteric   Neck:  Supple, symmetrical, trachea midline   Lungs:   Clear to auscultation bilaterally; no rales, rhonchi or wheezing; respirations unlabored    Heart[de-identified]   Regular rate and rhythm; no murmur, rub, or gallop  Abdomen:   Soft, non-tender, non-distended; normal bowel sounds; no masses, no organomegaly    Genitalia:   Deferred    Rectal:   Deferred    Extremities:  No cyanosis, clubbing or edema    Pulses:  2+ and symmetric    Skin:  No jaundice, rashes, or lesions    Lymph nodes:  No palpable cervical lymphadenopathy        Lab Results:   No visits with results within 1 Day(s) from this visit     Latest known visit with results is:   Appointment on 12/19/2019   Component Date Value    Sodium 12/19/2019 144     Potassium 12/19/2019 4 2     Chloride 12/19/2019 107     CO2 12/19/2019 28     ANION GAP 12/19/2019 9     BUN 12/19/2019 24     Creatinine 12/19/2019 0 95     Glucose, Fasting 12/19/2019 105*    Calcium 12/19/2019 9 7     AST 12/19/2019 19     ALT 12/19/2019 22     Alkaline Phosphatase 12/19/2019 51     Total Protein 12/19/2019 7 3     Albumin 12/19/2019 4 0     Total Bilirubin 12/19/2019 0 60     eGFR 12/19/2019 58     Hemoglobin A1C 12/19/2019 7 3*    EAG 12/19/2019 163          Radiology Results:   No results found

## 2020-01-14 ENCOUNTER — PATIENT OUTREACH (OUTPATIENT)
Dept: FAMILY MEDICINE CLINIC | Facility: CLINIC | Age: 80
End: 2020-01-14

## 2020-01-14 DIAGNOSIS — I10 ESSENTIAL HYPERTENSION: ICD-10-CM

## 2020-01-14 DIAGNOSIS — E03.9 HYPOTHYROIDISM, UNSPECIFIED TYPE: ICD-10-CM

## 2020-01-14 DIAGNOSIS — Z79.4 TYPE 2 DIABETES MELLITUS TREATED WITH INSULIN (HCC): ICD-10-CM

## 2020-01-14 DIAGNOSIS — E11.9 TYPE 2 DIABETES MELLITUS TREATED WITH INSULIN (HCC): ICD-10-CM

## 2020-01-14 RX ORDER — AMLODIPINE BESYLATE 5 MG/1
5 TABLET ORAL DAILY
Qty: 90 TABLET | Refills: 1 | Status: SHIPPED | OUTPATIENT
Start: 2020-01-14 | End: 2020-04-21 | Stop reason: SDUPTHER

## 2020-01-14 RX ORDER — LEVOTHYROXINE SODIUM 88 UG/1
88 TABLET ORAL DAILY
Qty: 90 TABLET | Refills: 0 | Status: SHIPPED | OUTPATIENT
Start: 2020-01-14 | End: 2020-07-10

## 2020-01-14 NOTE — TELEPHONE ENCOUNTER
----- Message from Laith Ugarte, RN sent at 1/14/2020 11:21 AM EST -----  Regarding: refills  Neha Fine,   Sorry to bug you! Pts granddaughter, Pauline Palmer, is asking me to ask you gals for refills of her amlodipine, insulin needles, and levothyroxine  I tried to get this message to go to the entire clinical pool but can't find it when I search for [place] [facility] Pool so I apologize! Thanks and let me know if you have any questions!   Have a good one!    ~Nasra

## 2020-01-14 NOTE — PROGRESS NOTES
Outpatient Care Management Note:    1/14/20-  Complex care case  Chart review done  Contacted pts granddaughter, Azael Donald  She reported pt is doing "ok", still having up to 3 episodes of diarrhea daily  Was just seen by PA at gastroenterologist's office and placed on Cholestyramine 1 packet twice daily  Waleska understands that pt has to take this med more than one hour after taking her other meds  She denied pt having any weight loss, bloody stools, n/v  She does have cramping at times  Waleska reported that she made GI aware of this  Waleska reported blood sugars are  on 18 units of insulin at hs  Azael Donald continues to provide pt with a diabetic diet to the best of her ability  Stressed importance of continuing same and she verbalized understanding  Azael Donald has noticed that pt has been sleeping a lot more lately  She reported that she goes through phases like this and hopes it will soon subside  Inquired about depression, and Waleska doesn't feel pt is depressed, just bored  We spoke of different activities for pt and she said she tries to involve her as much as possible  Also spoke about senior programs in the area but Azael Donald said that the [employer] have already looked into [place] speaking groups and there are none in the area  I did research this and was unable to find anything  She did say that pt talks to her family and friends on the phone a lot and she enjoys that  Waleska did let me know that pt needs refills on levothyroxine, amlodipine, and insulin needles  Message sent to Rob at PCP office regarding same  Upcoming appointments reviewed and stressed importance of attending them  Waleska denied having any transportation issues  Continues to have my contact information and encouraged her to contact myself or PCP at any time with any problems, issues, or concerns  Will follow up in a few weeks to make sure patient remains on track  1/15/20-  Chart reviewed  Refills sent by PCP staff  Contacted Waleska to let her know same  She was appreciative

## 2020-01-19 DIAGNOSIS — Z79.4 TYPE 2 DIABETES MELLITUS TREATED WITH INSULIN (HCC): ICD-10-CM

## 2020-01-19 DIAGNOSIS — E11.9 TYPE 2 DIABETES MELLITUS TREATED WITH INSULIN (HCC): ICD-10-CM

## 2020-01-20 RX ORDER — LISINOPRIL 20 MG/1
20 TABLET ORAL 2 TIMES DAILY
Qty: 180 TABLET | Refills: 2 | Status: SHIPPED | OUTPATIENT
Start: 2020-01-20 | End: 2020-04-21 | Stop reason: SDUPTHER

## 2020-01-21 ENCOUNTER — OFFICE VISIT (OUTPATIENT)
Dept: FAMILY MEDICINE CLINIC | Facility: CLINIC | Age: 80
End: 2020-01-21
Payer: COMMERCIAL

## 2020-01-21 VITALS
OXYGEN SATURATION: 98 % | SYSTOLIC BLOOD PRESSURE: 128 MMHG | BODY MASS INDEX: 26.62 KG/M2 | WEIGHT: 135.6 LBS | TEMPERATURE: 98.7 F | DIASTOLIC BLOOD PRESSURE: 74 MMHG | HEIGHT: 60 IN | HEART RATE: 82 BPM

## 2020-01-21 DIAGNOSIS — E11.9 TYPE 2 DIABETES MELLITUS TREATED WITH INSULIN (HCC): Primary | ICD-10-CM

## 2020-01-21 DIAGNOSIS — Z79.4 TYPE 2 DIABETES MELLITUS TREATED WITH INSULIN (HCC): Primary | ICD-10-CM

## 2020-01-21 DIAGNOSIS — D64.9 ANEMIA, UNSPECIFIED TYPE: ICD-10-CM

## 2020-01-21 DIAGNOSIS — R19.7 DIARRHEA, UNSPECIFIED TYPE: ICD-10-CM

## 2020-01-21 DIAGNOSIS — H91.93 BILATERAL HEARING LOSS, UNSPECIFIED HEARING LOSS TYPE: ICD-10-CM

## 2020-01-21 DIAGNOSIS — H61.21 EXCESSIVE EAR WAX, RIGHT: ICD-10-CM

## 2020-01-21 PROBLEM — R05.9 COUGH: Status: RESOLVED | Noted: 2019-10-03 | Resolved: 2020-01-21

## 2020-01-21 PROBLEM — H61.93: Status: RESOLVED | Noted: 2018-09-10 | Resolved: 2020-01-21

## 2020-01-21 PROBLEM — M54.9 BACK PAIN: Status: RESOLVED | Noted: 2018-09-10 | Resolved: 2020-01-21

## 2020-01-21 PROBLEM — B37.9 YEAST INFECTION: Status: RESOLVED | Noted: 2018-08-14 | Resolved: 2020-01-21

## 2020-01-21 PROBLEM — R05.8 PRODUCTIVE COUGH: Status: RESOLVED | Noted: 2019-11-19 | Resolved: 2020-01-21

## 2020-01-21 PROCEDURE — 69210 REMOVE IMPACTED EAR WAX UNI: CPT | Performed by: FAMILY MEDICINE

## 2020-01-21 PROCEDURE — 99214 OFFICE O/P EST MOD 30 MIN: CPT | Performed by: FAMILY MEDICINE

## 2020-01-21 RX ORDER — DICYCLOMINE HYDROCHLORIDE 10 MG/1
10 CAPSULE ORAL
Qty: 60 CAPSULE | Refills: 0 | Status: SHIPPED | OUTPATIENT
Start: 2020-01-21 | End: 2020-02-11

## 2020-01-21 NOTE — PROGRESS NOTES
Assessment/Plan:    No problem-specific Assessment & Plan notes found for this encounter  Diagnoses and all orders for this visit:    Type 2 diabetes mellitus treated with insulin ([address] )  Stable controlled  -     insulin glargine (LANTUS SOLOSTAR) 100 units/mL injection pen; Inject 16 Units under the skin daily at bedtime    Diarrhea, unspecified type  recommended a trial of Bentyl and Xifaxin and follow up with Gastro  -     dicyclomine (BENTYL) 10 mg capsule; Take 1 capsule (10 mg total) by mouth 3 (three) times a day before meals for 20 days  -     rifaximin (XIFAXAN) 550 mg tablet; Take 1 tablet (550 mg total) by mouth every 8 (eight) hours for 14 days    Anemia, unspecified type  -     CBC and differential; Future    Bilateral hearing loss, unspecified hearing loss type  -     Ambulatory Referral to Otolaryngology; Future    Excessive ear wax, right  -     carbamide peroxide (DEBROX) 6 5 % otic solution; Administer 5 drops to the right ear 2 (two) times a day      Follow up in 3 months or as needed    Subjective:      Patient ID: Ann Kidd is a 67 y o  female  Diabetes Mellitus  Patient presents for follow up of diabetes  Current symptoms include: none  Symptoms have been well-controlled  Patient denies foot ulcerations, hyperglycemia, increased appetite, nausea, paresthesia of the feet, polydipsia and polyuria  Evaluation to date has included: fasting blood sugar and hemoglobin A1C  Home sugars: BGs consistently in an acceptable range  Current treatment: Continued insulin which has been effective  Diarrhea  Patient complains of diarrhea  Onset of diarrhea was several weeks ago  Diarrhea is occurring approximately 4 times per day  Patient describes diarrhea as watery  Diarrhea has been associated with abdominal pain described as aching and crampy  Patient denies blood in stool, fever  Previous visits for diarrhea: multiple, this is a longstanding diagnosis   Last seen a few days ago by gastroenterologist office who started her on cholestyramine  Evaluation to date: CBC, colonoscopy, stool cultures and stool for ova and parasites  Treatment to date: cholestyramine and imodium  Also family members are complaining of hearing loss associated with right ear wax  The following portions of the patient's history were reviewed and updated as appropriate:   She  has a past medical history of Aggression, Alzheimer's dementia ([address] ), Arthritis, Dementia ([address] ), Diabetes insipidus ([address] ), Diabetes mellitus ([address] ), High cholesterol, Hypertension, Memory loss, Migraine, Polyneuropathy, Rheumatoid arthritis ([address] ), Serum lipids high, Stomach problems, and Thyroid trouble    She   Patient Active Problem List    Diagnosis Date Noted    Excessive ear wax, right 01/21/2020    Diarrhea 10/03/2019    Type 2 diabetes mellitus treated with insulin ([address] ) 10/03/2019    Anemia 10/03/2019    Unstable gait 08/15/2019    Tear of right rotator cuff 07/23/2019    Biceps tendon tear 07/23/2019    Pre-op examination 07/18/2019    Nontraumatic tear of right rotator cuff 07/18/2019    Menopause ovarian failure 07/18/2019    Need for shingles vaccine 07/18/2019    Peripheral neuropathy 07/18/2019    Medicare annual wellness visit, subsequent 07/18/2019    Ambulatory dysfunction 07/18/2019    Hypothyroidism 05/13/2019    Essential hypertension 05/13/2019    Fall 05/13/2019    Acute pain of right shoulder 05/13/2019    SVT (supraventricular tachycardia) [facility] ) 05/13/2019    Pain of both hip joints 09/10/2018    Need for influenza vaccination 09/10/2018    Anxiety 08/14/2018    Pruritic dermatitis 06/13/2018    Type 2 diabetes, uncontrolled, with neuropathy ([address] ) 06/07/2018    Alzheimer's dementia ([facility] ) 05/03/2018    Memory loss 03/19/2018    Deformity of hand 03/19/2018    Bilateral hearing loss 02/22/2018    Rheumatoid arthritis 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 ([address] ) 02/22/2018    Dementia with behavioral disturbance ([facility] ) 02/15/2018    Chronic left shoulder pain 02/15/2018    Need for lipid screening 02/15/2018     She  has a past surgical history that includes Hysterectomy; Gallbladder surgery; Cataract extraction; pr shldr arthroscop,surg,w/rotat cuff repr (Right, 8/1/2019); Rotator cuff repair (Right, 08/01/2019); and Cholecystectomy  Her family history includes Arthritis in her daughter; Blindness in her brother; Diabetes in her brother; HIV in her son; Mental illness in her father and mother; Substance Abuse in her father and mother  She  reports that she quit smoking about 35 years ago  She has never used smokeless tobacco  She reports that she does not drink alcohol or use drugs    Current Outpatient Medications   Medication Sig Dispense Refill    amLODIPine (NORVASC) 5 mg tablet Take 1 tablet (5 mg total) by mouth daily 90 tablet 1    Blood Glucose Monitoring Suppl ([place]) w/Device KIT Use as directed  0    Blood Pressure KIT by Does not apply route daily 1 each 1    Canagliflozin (INVOKANA) 300 MG TABS Take 1 tablet (300 mg total) by mouth daily 90 tablet 3    cholestyramine (QUESTRAN) 4 g packet Take 1 packet (4 g total) by mouth 2 (two) times a day with meals Take other medications greater than 1 hour prior to this medication 60 packet 2    donepezil (ARICEPT) 10 mg tablet Take 1 tablet (10 mg total) by mouth daily at bedtime 90 tablet 3    fenofibrate (TRIGLIDE) 160 MG tablet Take 1 tablet (160 mg total) by mouth daily 30 tablet 5    glucose blood (ONETOUCH VERIO) test strip TEST TWICE A  each 2    Incontinence Supply Disposable (COMFORT SHIELD ADULT DIAPERS) MISC 1 each by Does not apply route 4 (four) times a day 120 each 2    insulin glargine (LANTUS SOLOSTAR) 100 units/mL injection pen Inject 16 Units under the skin daily at bedtime 5 pen 5    Insulin Pen Needle (BD PEN NEEDLE TOPHER U/F) 32G X 4 MM MISC by Does not apply route 2 (two) times a day 100 each 5    levothyroxine 88 mcg tablet Take 1 tablet (88 mcg total) by mouth daily 90 tablet 0    lisinopril (ZESTRIL) 20 mg tablet Take 1 tablet (20 mg total) by mouth 2 (two) times a day 180 tablet 2    loratadine (CLARITIN) 10 mg tablet Take 1 tablet (10 mg total) by mouth daily 10 tablet 1    memantine (NAMENDA) 10 mg tablet Take 1 tablet (10 mg total) by mouth 2 (two) times a day 180 tablet 2    ONETOUCH DELICA LANCETS 98L MISC USE 2 (TWO) TIMES A DAY AS NEEDED (AS NEEDED FOR HIGH BLOOD SUGAR) 100 each 3    ONETOUCH DELICA LANCETS FINE MISC by Device route 2 (two) times a day 100 each 4    simvastatin (ZOCOR) 20 mg tablet Take 1 tablet (20 mg total) by mouth daily at bedtime 90 tablet 2    carbamide peroxide (DEBROX) 6 5 % otic solution Administer 5 drops to the right ear 2 (two) times a day 15 mL 1    dicyclomine (BENTYL) 10 mg capsule Take 1 capsule (10 mg total) by mouth 3 (three) times a day before meals for 20 days 60 capsule 0    montelukast (SINGULAIR) 10 mg tablet Take 1 tablet (10 mg total) by mouth daily at bedtime (Patient not taking: Reported on 1/21/2020) 90 tablet 1    montelukast (SINGULAIR) 10 mg tablet TAKE 1 TABLET BY MOUTH DAILY AT BEDTIME (Patient not taking: Reported on 1/21/2020) 30 tablet 1    Promethazine-DM (PHENERGAN-DM) 6 25-15 mg/5 mL oral syrup Take 5 mL by mouth 4 (four) times a day as needed for cough (Patient not taking: Reported on 1/21/2020) 118 mL 1    rifaximin (XIFAXAN) 550 mg tablet Take 1 tablet (550 mg total) by mouth every 8 (eight) hours for 14 days 42 tablet 0     No current facility-administered medications for this visit        Current Outpatient Medications on File Prior to Visit   Medication Sig    amLODIPine (NORVASC) 5 mg tablet Take 1 tablet (5 mg total) by mouth daily    Blood Glucose Monitoring Suppl (ONETOUCH VERIO) w/Device KIT Use as directed    Blood Pressure KIT by Does not apply route daily    Canagliflozin (INVOKANA) 300 MG TABS Take 1 tablet (300 mg total) by mouth daily    cholestyramine (QUESTRAN) 4 g packet Take 1 packet (4 g total) by mouth 2 (two) times a day with meals Take other medications greater than 1 hour prior to this medication    donepezil (ARICEPT) 10 mg tablet Take 1 tablet (10 mg total) by mouth daily at bedtime    fenofibrate (TRIGLIDE) 160 MG tablet Take 1 tablet (160 mg total) by mouth daily    glucose blood (ONETOUCH VERIO) test strip TEST TWICE A DAY    Incontinence Supply Disposable (COMFORT SHIELD ADULT DIAPERS) MISC 1 each by Does not apply route 4 (four) times a day    Insulin Pen Needle (BD PEN NEEDLE TOPHER U/F) 32G X 4 MM MISC by Does not apply route 2 (two) times a day    levothyroxine 88 mcg tablet Take 1 tablet (88 mcg total) by mouth daily    lisinopril (ZESTRIL) 20 mg tablet Take 1 tablet (20 mg total) by mouth 2 (two) times a day    loratadine (CLARITIN) 10 mg tablet Take 1 tablet (10 mg total) by mouth daily    memantine (NAMENDA) 10 mg tablet Take 1 tablet (10 mg total) by mouth 2 (two) times a day    ONETOUCH DELICA LANCETS 45K MISC USE 2 (TWO) TIMES A DAY AS NEEDED (AS NEEDED FOR HIGH BLOOD SUGAR)    ONETOUCH DELICA LANCETS FINE MISC by Device route 2 (two) times a day    simvastatin (ZOCOR) 20 mg tablet Take 1 tablet (20 mg total) by mouth daily at bedtime    [DISCONTINUED] insulin glargine (LANTUS SOLOSTAR) 100 units/mL injection pen Inject 20 Units under the skin daily at bedtime    montelukast (SINGULAIR) 10 mg tablet Take 1 tablet (10 mg total) by mouth daily at bedtime (Patient not taking: Reported on 1/21/2020)    montelukast (SINGULAIR) 10 mg tablet TAKE 1 TABLET BY MOUTH DAILY AT BEDTIME (Patient not taking: Reported on 1/21/2020)    Promethazine-DM (PHENERGAN-DM) 6 25-15 mg/5 mL oral syrup Take 5 mL by mouth 4 (four) times a day as needed for cough (Patient not taking: Reported on 1/21/2020)    [DISCONTINUED] fluticasone (FLONASE) 50 mcg/act nasal spray SPRAY 1 SPRAY INTO [facility] NOSTRIL EVERY DAY (Patient not taking: Reported on 1/21/2020)     No current facility-administered medications on file prior to visit  She is allergic to penicillins       Review of Systems   Constitutional: Negative for activity change, appetite change, fatigue and fever  HENT: Positive for hearing loss  Negative for congestion and ear discharge  Respiratory: Negative for cough and shortness of breath  Cardiovascular: Negative for chest pain and palpitations  Gastrointestinal: Positive for diarrhea  Negative for nausea  Musculoskeletal: Negative for arthralgias and back pain  Skin: Negative for color change and rash  Neurological: Negative for dizziness and headaches  Psychiatric/Behavioral: Negative for agitation and behavioral problems  Objective:      /74   Pulse 82   Temp 98 7 °F (37 1 °C) (Tympanic)   Ht 5' (1 524 m)   Wt 61 5 kg (135 lb 9 6 oz)   SpO2 98%   BMI 26 48 kg/m²          Physical Exam   Constitutional: She is oriented to person, place, and time  She appears well-developed and well-nourished  No distress  HENT:   Head: Normocephalic and atraumatic  Nose: Nose normal    Mouth/Throat: Oropharynx is clear and moist    Right ear wax   Eyes: Pupils are equal, round, and reactive to light  Conjunctivae are normal    Cardiovascular: Normal rate, regular rhythm and normal heart sounds  No murmur heard  Pulmonary/Chest: Effort normal and breath sounds normal  No respiratory distress  She has no wheezes  Abdominal: Soft  Bowel sounds are normal  She exhibits no distension  There is no tenderness  Neurological: She is alert and oriented to person, place, and time  Skin: Skin is warm and dry  No rash noted  She is not diaphoretic  No erythema  Psychiatric: She has a normal mood and affect         Ear cerumen removal  Date/Time: 1/21/2020 12:29 PM  Performed by: Linda Collins MD  Authorized by: Linda Collins MD     Patient location:  Bedside  Other Assisting Provider: No    Consent:     Consent obtained:  Verbal and written    Consent given by:  Patient    Risks discussed:  Bleeding, dizziness, infection, incomplete removal, pain and TM perforation    Alternatives discussed:  No treatment  Universal protocol:     Patient identity confirmed:  Verbally with patient  Procedure details:     Local anesthetic:  None    Location:  R ear    Procedure type: curette      Approach:  External  Sedation:     Sedation type: Anxiolysis  Post-procedure details:     Complication:  None    Hearing quality:  Normal    Patient tolerance of procedure:   Tolerated well, no immediate complications

## 2020-01-30 ENCOUNTER — OFFICE VISIT (OUTPATIENT)
Dept: GASTROENTEROLOGY | Facility: CLINIC | Age: 80
End: 2020-01-30
Payer: COMMERCIAL

## 2020-01-30 VITALS
DIASTOLIC BLOOD PRESSURE: 68 MMHG | HEART RATE: 56 BPM | SYSTOLIC BLOOD PRESSURE: 130 MMHG | BODY MASS INDEX: 26.76 KG/M2 | WEIGHT: 137 LBS

## 2020-01-30 DIAGNOSIS — R19.7 DIARRHEA, UNSPECIFIED TYPE: Primary | ICD-10-CM

## 2020-01-30 PROCEDURE — 3075F SYST BP GE 130 - 139MM HG: CPT | Performed by: PHYSICIAN ASSISTANT

## 2020-01-30 PROCEDURE — 99213 OFFICE O/P EST LOW 20 MIN: CPT | Performed by: PHYSICIAN ASSISTANT

## 2020-01-30 PROCEDURE — 3078F DIAST BP <80 MM HG: CPT | Performed by: PHYSICIAN ASSISTANT

## 2020-01-30 NOTE — PROGRESS NOTES
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  She had negative stool cultures, stool for c diff, and O&P   She also had a CT A/P  TSH normal   Celiac serology negative  ESR normal   Colonoscopy was normal except for 2 small adenomatous polyps which were removed, biopsy was negative for microscopic colitis      She is currently on a probiotic, fiber supplement, Dicyclomine as needed for abdominal cramps, and Questran 1 packet BID with significant improvement  She reports her stools are mostly formed now and her abdominal cramping is minimal   Her appetite is good  Patient and daughter wish to follow up as needed for now as she is doing much better  She was instructed to decrease or stop the Questran if she starts to tend towards constipation  ______________________________________________________________________    SUBJECTIVE:  Patient is a 67-year-old female who presents for follow-up   Patient has been struggling with diarrhea for several months   Initially, she had severe symptoms consistent with an acute viral or bacterial gastroenteritis/colitis   Her diarrhea lessened but never completely dissipated however   She was seen in the office and underwent a workup   Stool cultures were negative   Celiac serology and sedimentation rate was negative   She also underwent a colonoscopy which showed no evidence of ulcerative colitis or Crohn's disease but too small adenomatous polyps were removed; biopsies taken were negative for microscopic colitis  She is now on a regimen with a probiotic, fiber supplement, Bentyl, and Questran BID and overall doing very well    She reports her stools are now mostly formed now  Her abdominal cramping is much less  Her appetite is good  She denies any rectal bleeding or melena  Her weight is stable  REVIEW OF SYSTEMS IS OTHERWISE NEGATIVE        Historical Information   Past Medical History:   Diagnosis Date    Aggression     [facility] [address] [facility]     Arthritis     Dementia [facility] [address] [facility]     Diabetes insipidus [facility] [address] [facility]     Diabetes mellitus ([facility] [address] [facility]     High cholesterol     Hypertension     Memory loss     Migraine     Polyneuropathy     Rheumatoid arthritis (HCC)     Serum lipids high     Stomach problems     Thyroid trouble      Past Surgical History:   Procedure Laterality Date    CATARACT EXTRACTION      CHOLECYSTECTOMY      GALLBLADDER SURGERY      HYSTERECTOMY      [place] SHLDR ARTHROSCOP,SURG,W/ROTAT CUFF REPR Right 2019    Procedure: SHOULDER ARTHROSCOPIC ROTATOR CUFF REPAIR;  Surgeon: Jolynn Steen MD;  Location: [facility];  Service: Orthopedics    ROTATOR CUFF REPAIR Right 2019     Social History   Social History     Substance and Sexual Activity   Alcohol Use No     Social History     Substance and Sexual Activity   Drug Use No     Social History     Tobacco Use   Smoking Status Former Smoker    Last attempt to quit: 1985    Years since quittin 1   Smokeless Tobacco Never Used     Family History   Problem Relation Age of Onset    Substance Abuse Mother         denied    Mental illness Mother         denied    Substance Abuse Father         denied    Mental illness Father         denied    Diabetes Brother     Blindness Brother     Arthritis Daughter     HIV Son        Meds/Allergies       Current Outpatient Medications:     amLODIPine (NORVASC) 5 mg tablet    Blood Glucose Monitoring Suppl (ONETOUCH VERIO) w/Device KIT    Blood Pressure KIT    Canagliflozin (INVOKANA) 300 MG TABS    carbamide peroxide (DEBROX) 6 5 % otic solution    cholestyramine (QUESTRAN) 4 g packet    dicyclomine (BENTYL) 10 mg capsule    donepezil (ARICEPT) 10 mg tablet    fenofibrate (TRIGLIDE) 160 MG tablet    glucose blood (ONETOUCH VERIO) test strip    Incontinence Supply Disposable (COMFORT SHIELD ADULT DIAPERS) MISC    insulin glargine (LANTUS SOLOSTAR) 100 units/mL injection pen    Insulin Pen Needle (BD PEN NEEDLE TOPHER U/F) 32G X 4 MM MISC    levothyroxine 88 mcg tablet    lisinopril (ZESTRIL) 20 mg tablet    loratadine (CLARITIN) 10 mg tablet    memantine (NAMENDA) 10 mg tablet    montelukast (SINGULAIR) 10 mg tablet    montelukast (SINGULAIR) 10 mg tablet    ONETOUCH DELICA LANCETS 65U MISC    ONETOUCH DELICA LANCETS FINE MISC    Promethazine-DM (PHENERGAN-DM) 6 25-15 mg/5 mL oral syrup    rifaximin (XIFAXAN) 550 mg tablet    simvastatin (ZOCOR) 20 mg tablet    Allergies   Allergen Reactions    Penicillins Itching and Swelling           Objective     Blood pressure 130/68, pulse 56, weight 62 1 kg (137 lb)  Body mass index is 26 76 kg/m²  PHYSICAL EXAM:      General Appearance:   Alert, cooperative, no distress   HEENT:   Normocephalic, atraumatic, anicteric   Neck:  Supple, symmetrical, trachea midline   Lungs:   Clear to auscultation bilaterally; no rales, rhonchi or wheezing; respirations unlabored    Heart[de-identified]   Regular rate and rhythm; no murmur, rub, or gallop  Abdomen:   Soft, non-tender, non-distended; normal bowel sounds; no masses, no organomegaly    Genitalia:   Deferred    Rectal:   Deferred    Extremities:  No cyanosis, clubbing or edema    Pulses:  2+ and symmetric    Skin:  No jaundice, rashes, or lesions    Lymph nodes:  No palpable cervical lymphadenopathy        Lab Results:   No visits with results within 1 Day(s) from this visit     Latest known visit with results is:   Appointment on 12/19/2019   Component Date Value    Sodium 12/19/2019 144     Potassium 12/19/2019 4 2     Chloride 12/19/2019 107     CO2 12/19/2019 28     ANION GAP 12/19/2019 9     BUN 12/19/2019 24     Creatinine 12/19/2019 0 95     Glucose, Fasting 12/19/2019 105*    Calcium 12/19/2019 9 7     AST 12/19/2019 19     ALT 12/19/2019 22     Alkaline Phosphatase 12/19/2019 51     Total Protein 12/19/2019 7 3     Albumin 12/19/2019 4 0     Total Bilirubin 12/19/2019 0 60     eGFR 12/19/2019 58     Hemoglobin A1C 12/19/2019 7 3*    EAG 12/19/2019 163          Radiology Results:   No results found

## 2020-02-11 DIAGNOSIS — R19.7 DIARRHEA, UNSPECIFIED TYPE: ICD-10-CM

## 2020-02-11 RX ORDER — DICYCLOMINE HYDROCHLORIDE 10 MG/1
10 CAPSULE ORAL
Qty: 60 CAPSULE | Refills: 2 | Status: SHIPPED | OUTPATIENT
Start: 2020-02-11 | End: 2020-07-06

## 2020-02-13 LAB
LEFT EYE DIABETIC RETINOPATHY: NORMAL
RIGHT EYE DIABETIC RETINOPATHY: NORMAL

## 2020-02-15 DIAGNOSIS — Z79.4 TYPE 2 DIABETES MELLITUS TREATED WITH INSULIN (HCC): ICD-10-CM

## 2020-02-15 DIAGNOSIS — E11.9 TYPE 2 DIABETES MELLITUS TREATED WITH INSULIN (HCC): ICD-10-CM

## 2020-02-16 RX ORDER — CANAGLIFLOZIN 300 MG/1
TABLET, FILM COATED ORAL
Qty: 90 TABLET | Refills: 3 | Status: SHIPPED | OUTPATIENT
Start: 2020-02-16 | End: 2020-11-13 | Stop reason: SDUPTHER

## 2020-02-20 ENCOUNTER — PATIENT OUTREACH (OUTPATIENT)
Dept: FAMILY MEDICINE CLINIC | Facility: CLINIC | Age: 80
End: 2020-02-20

## 2020-02-20 NOTE — PROGRESS NOTES
Outpatient Care Management Note:    Complex care case  Chart review done  Contacted pts granddaughter, Marshall Funk  She reported pt is doing well at this time  Diarrhea has finally subsided and pt has minimal cramping  No weight loss per Waleska  Appetite remains good, hydration adequate  FBS's remain stable  Attending all f/u appts  No recent falls/injuries  Waleska denied having any concerns or needs at this time  Reviewed upcoming appts with Waleska and there are no transportation issues  Goals of care plan complete and pt is stable  Discussed case closure with Waleska due to this and she was in agreement  Confirmed my contact info and encouraged her to contact be with any further needs  She was appreciative and agreeable

## 2020-02-24 DIAGNOSIS — E78.1 HYPERTRIGLYCERIDEMIA: ICD-10-CM

## 2020-02-24 RX ORDER — FENOFIBRATE 160 MG/1
TABLET ORAL
Qty: 90 TABLET | Refills: 1 | Status: SHIPPED | OUTPATIENT
Start: 2020-02-24 | End: 2020-08-24

## 2020-03-02 ENCOUNTER — TELEPHONE (OUTPATIENT)
Dept: FAMILY MEDICINE CLINIC | Facility: CLINIC | Age: 80
End: 2020-03-02

## 2020-03-02 NOTE — TELEPHONE ENCOUNTER
luis varghese stating pt needs a script for  Washable incoontinence pads for the bed as well as a script for adult pull ups   Fax the scripts to [phone] attn Jose F Thomas    Thanks paulo

## 2020-03-11 DIAGNOSIS — R32 URINARY INCONTINENCE, UNSPECIFIED TYPE: Primary | ICD-10-CM

## 2020-03-13 DIAGNOSIS — R15.9 INCONTINENCE OF FECES, UNSPECIFIED FECAL INCONTINENCE TYPE: ICD-10-CM

## 2020-03-13 DIAGNOSIS — R32 URINARY INCONTINENCE, UNSPECIFIED TYPE: ICD-10-CM

## 2020-03-13 RX ORDER — PEN NEEDLE, DIABETIC 32GX 5/32"
1 NEEDLE, DISPOSABLE MISCELLANEOUS 4 TIMES DAILY
Qty: 120 EACH | Refills: 11 | Status: SHIPPED | OUTPATIENT
Start: 2020-03-13 | End: 2020-11-13 | Stop reason: SDUPTHER

## 2020-03-22 DIAGNOSIS — E13.9 DIABETES 1.5, MANAGED AS TYPE 1 (HCC): ICD-10-CM

## 2020-04-02 DIAGNOSIS — R32 URINARY INCONTINENCE, UNSPECIFIED TYPE: ICD-10-CM

## 2020-04-04 DIAGNOSIS — R19.7 DIARRHEA, UNSPECIFIED TYPE: ICD-10-CM

## 2020-04-04 RX ORDER — CHOLESTYRAMINE 4 G/9G
POWDER, FOR SUSPENSION ORAL
Qty: 180 PACKET | Refills: 0 | Status: SHIPPED | OUTPATIENT
Start: 2020-04-04 | End: 2020-11-13

## 2020-04-21 ENCOUNTER — TELEMEDICINE (OUTPATIENT)
Dept: FAMILY MEDICINE CLINIC | Facility: CLINIC | Age: 80
End: 2020-04-21
Payer: COMMERCIAL

## 2020-04-21 VITALS — WEIGHT: 129 LBS | BODY MASS INDEX: 25.32 KG/M2 | HEIGHT: 60 IN

## 2020-04-21 DIAGNOSIS — E11.9 TYPE 2 DIABETES MELLITUS TREATED WITH INSULIN (HCC): ICD-10-CM

## 2020-04-21 DIAGNOSIS — I10 ESSENTIAL HYPERTENSION: ICD-10-CM

## 2020-04-21 DIAGNOSIS — G47.62 LEG CRAMPS, SLEEP RELATED: Primary | ICD-10-CM

## 2020-04-21 DIAGNOSIS — Z79.4 TYPE 2 DIABETES MELLITUS TREATED WITH INSULIN (HCC): ICD-10-CM

## 2020-04-21 DIAGNOSIS — E78.2 MIXED HYPERLIPIDEMIA: ICD-10-CM

## 2020-04-21 DIAGNOSIS — R41.3 MEMORY LOSS: ICD-10-CM

## 2020-04-21 PROBLEM — Z01.818 PRE-OP EXAMINATION: Status: RESOLVED | Noted: 2019-07-18 | Resolved: 2020-04-21

## 2020-04-21 PROBLEM — H61.21 EXCESSIVE EAR WAX, RIGHT: Status: RESOLVED | Noted: 2020-01-21 | Resolved: 2020-04-21

## 2020-04-21 PROBLEM — G25.81 RESTLESS LEG SYNDROME: Status: ACTIVE | Noted: 2020-04-21

## 2020-04-21 PROCEDURE — 99214 OFFICE O/P EST MOD 30 MIN: CPT | Performed by: FAMILY MEDICINE

## 2020-04-21 RX ORDER — GABAPENTIN 100 MG/1
100 CAPSULE ORAL
Qty: 30 CAPSULE | Refills: 2 | Status: SHIPPED | OUTPATIENT
Start: 2020-04-21 | End: 2020-04-28

## 2020-04-21 RX ORDER — DONEPEZIL HYDROCHLORIDE 10 MG/1
10 TABLET, FILM COATED ORAL
Qty: 90 TABLET | Refills: 3 | Status: SHIPPED | OUTPATIENT
Start: 2020-04-21 | End: 2020-11-13

## 2020-04-21 RX ORDER — AMLODIPINE BESYLATE 5 MG/1
5 TABLET ORAL DAILY
Qty: 90 TABLET | Refills: 1 | Status: SHIPPED | OUTPATIENT
Start: 2020-04-21 | End: 2020-11-13

## 2020-04-21 RX ORDER — LISINOPRIL 40 MG/1
40 TABLET ORAL DAILY
Qty: 180 TABLET | Refills: 2 | Status: SHIPPED | OUTPATIENT
Start: 2020-04-21 | End: 2020-11-13 | Stop reason: SDUPTHER

## 2020-04-21 RX ORDER — SIMVASTATIN 20 MG
20 TABLET ORAL
Qty: 90 TABLET | Refills: 2 | Status: SHIPPED | OUTPATIENT
Start: 2020-04-21 | End: 2020-11-13

## 2020-04-24 DIAGNOSIS — R26.81 UNSTABLE GAIT: Primary | ICD-10-CM

## 2020-04-24 DIAGNOSIS — R26.2 AMBULATORY DYSFUNCTION: ICD-10-CM

## 2020-04-28 DIAGNOSIS — G47.62 LEG CRAMPS, SLEEP RELATED: ICD-10-CM

## 2020-04-28 RX ORDER — GABAPENTIN 100 MG/1
CAPSULE ORAL
Qty: 90 CAPSULE | Refills: 1 | Status: SHIPPED | OUTPATIENT
Start: 2020-04-28 | End: 2020-07-28 | Stop reason: SDUPTHER

## 2020-05-15 DIAGNOSIS — M25.511 ACUTE PAIN OF RIGHT SHOULDER: ICD-10-CM
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
## 2020-05-15 RX ORDER — NAPROXEN 500 MG/1
500 TABLET ORAL 2 TIMES DAILY WITH MEALS
Qty: 60 TABLET | Refills: 1 | Status: SHIPPED | OUTPATIENT
Start: 2020-05-15 | End: 2020-07-14

## 2020-05-21 ENCOUNTER — APPOINTMENT (OUTPATIENT)
Dept: LAB | Facility: CLINIC | Age: 80
End: 2020-05-21
Payer: COMMERCIAL

## 2020-05-21 DIAGNOSIS — E78.2 MIXED HYPERLIPIDEMIA: ICD-10-CM

## 2020-05-21 DIAGNOSIS — Z79.4 TYPE 2 DIABETES MELLITUS TREATED WITH INSULIN (HCC): ICD-10-CM

## 2020-05-21 DIAGNOSIS — D64.9 ANEMIA, UNSPECIFIED TYPE: ICD-10-CM

## 2020-05-21 DIAGNOSIS — E11.9 TYPE 2 DIABETES MELLITUS TREATED WITH INSULIN (HCC): ICD-10-CM

## 2020-05-21 LAB
ALBUMIN SERPL BCP-MCNC: 4.1 G/DL (ref 3.5–5)
ALP SERPL-CCNC: 43 U/L (ref 46–116)
ALT SERPL W P-5'-P-CCNC: 23 U/L (ref 12–78)
ANION GAP SERPL CALCULATED.3IONS-SCNC: 4 MMOL/L (ref 4–13)
AST SERPL W P-5'-P-CCNC: 13 U/L (ref 5–45)
BASOPHILS # BLD AUTO: 0.05 THOUSANDS/ΜL (ref 0–0.1)
BASOPHILS NFR BLD AUTO: 1 % (ref 0–1)
BILIRUB SERPL-MCNC: 0.41 MG/DL (ref 0.2–1)
BUN SERPL-MCNC: 26 MG/DL (ref 5–25)
CALCIUM ALBUM COR SERPL-MCNC: 10.4 MG/DL (ref 8.3–10.1)
CALCIUM SERPL-MCNC: 10.5 MG/DL (ref 8.3–10.1)
CHLORIDE SERPL-SCNC: 111 MMOL/L (ref 100–108)
CHOLEST SERPL-MCNC: 162 MG/DL (ref 50–200)
CO2 SERPL-SCNC: 28 MMOL/L (ref 21–32)
CREAT SERPL-MCNC: 1.22 MG/DL (ref 0.6–1.3)
CREAT UR-MCNC: 52.7 MG/DL
EOSINOPHIL # BLD AUTO: 0.24 THOUSAND/ΜL (ref 0–0.61)
EOSINOPHIL NFR BLD AUTO: 6 % (ref 0–6)
ERYTHROCYTE [DISTWIDTH] IN BLOOD BY AUTOMATED COUNT: 13.2 % (ref 11.6–15.1)
EST. AVERAGE GLUCOSE BLD GHB EST-MCNC: 183 MG/DL
GFR SERPL CREATININE-BSD FRML MDRD: 43 ML/MIN/1.73SQ M
GLUCOSE P FAST SERPL-MCNC: 98 MG/DL (ref 65–99)
HBA1C MFR BLD: 8 %
HCT VFR BLD AUTO: 37.1 % (ref 34.8–46.1)
HDLC SERPL-MCNC: 50 MG/DL
HGB BLD-MCNC: 11.4 G/DL (ref 11.5–15.4)
IMM GRANULOCYTES # BLD AUTO: 0.01 THOUSAND/UL (ref 0–0.2)
IMM GRANULOCYTES NFR BLD AUTO: 0 % (ref 0–2)
LDLC SERPL CALC-MCNC: 92 MG/DL (ref 0–100)
LYMPHOCYTES # BLD AUTO: 1.18 THOUSANDS/ΜL (ref 0.6–4.47)
LYMPHOCYTES NFR BLD AUTO: 27 % (ref 14–44)
MCH RBC QN AUTO: 27.8 PG (ref 26.8–34.3)
MCHC RBC AUTO-ENTMCNC: 30.7 G/DL (ref 31.4–37.4)
MCV RBC AUTO: 91 FL (ref 82–98)
MICROALBUMIN UR-MCNC: 56.8 MG/L (ref 0–20)
MICROALBUMIN/CREAT 24H UR: 108 MG/G CREATININE (ref 0–30)
MONOCYTES # BLD AUTO: 0.26 THOUSAND/ΜL (ref 0.17–1.22)
MONOCYTES NFR BLD AUTO: 6 % (ref 4–12)
NEUTROPHILS # BLD AUTO: 2.64 THOUSANDS/ΜL (ref 1.85–7.62)
NEUTS SEG NFR BLD AUTO: 60 % (ref 43–75)
NONHDLC SERPL-MCNC: 112 MG/DL
NRBC BLD AUTO-RTO: 0 /100 WBCS
PLATELET # BLD AUTO: 172 THOUSANDS/UL (ref 149–390)
PMV BLD AUTO: 13.4 FL (ref 8.9–12.7)
POTASSIUM SERPL-SCNC: 4.3 MMOL/L (ref 3.5–5.3)
PROT SERPL-MCNC: 7.3 G/DL (ref 6.4–8.2)
RBC # BLD AUTO: 4.1 MILLION/UL (ref 3.81–5.12)
SODIUM SERPL-SCNC: 143 MMOL/L (ref 136–145)
TRIGL SERPL-MCNC: 98 MG/DL
WBC # BLD AUTO: 4.38 THOUSAND/UL (ref 4.31–10.16)

## 2020-05-21 PROCEDURE — 82043 UR ALBUMIN QUANTITATIVE: CPT | Performed by: FAMILY MEDICINE

## 2020-05-21 PROCEDURE — 80061 LIPID PANEL: CPT

## 2020-05-21 PROCEDURE — 83036 HEMOGLOBIN GLYCOSYLATED A1C: CPT

## 2020-05-21 PROCEDURE — 85025 COMPLETE CBC W/AUTO DIFF WBC: CPT

## 2020-05-21 PROCEDURE — 82570 ASSAY OF URINE CREATININE: CPT | Performed by: FAMILY MEDICINE

## 2020-05-21 PROCEDURE — 80053 COMPREHEN METABOLIC PANEL: CPT

## 2020-05-21 PROCEDURE — 36415 COLL VENOUS BLD VENIPUNCTURE: CPT

## 2020-05-27 ENCOUNTER — TELEMEDICINE (OUTPATIENT)
Dept: FAMILY MEDICINE CLINIC | Facility: CLINIC | Age: 80
End: 2020-05-27
Payer: COMMERCIAL

## 2020-05-27 DIAGNOSIS — E11.9 TYPE 2 DIABETES MELLITUS TREATED WITH INSULIN (HCC): Primary | ICD-10-CM

## 2020-05-27 DIAGNOSIS — E83.52 HYPERCALCEMIA: ICD-10-CM

## 2020-05-27 DIAGNOSIS — Z79.4 TYPE 2 DIABETES MELLITUS TREATED WITH INSULIN (HCC): Primary | ICD-10-CM

## 2020-05-27 DIAGNOSIS — Z20.828 EXPOSURE TO SARS-ASSOCIATED CORONAVIRUS: ICD-10-CM

## 2020-05-27 PROBLEM — R19.7 DIARRHEA: Status: RESOLVED | Noted: 2019-10-03 | Resolved: 2020-05-27

## 2020-05-27 PROBLEM — L29.9 PRURITIC DERMATITIS: Status: RESOLVED | Noted: 2018-06-13 | Resolved: 2020-05-27

## 2020-05-27 PROBLEM — M21.949 DEFORMITY OF HAND: Status: RESOLVED | Noted: 2018-03-19 | Resolved: 2020-05-27

## 2020-05-27 PROBLEM — L30.8 PRURITIC DERMATITIS: Status: RESOLVED | Noted: 2018-06-13 | Resolved: 2020-05-27

## 2020-05-27 PROBLEM — M25.511 ACUTE PAIN OF RIGHT SHOULDER: Status: RESOLVED | Noted: 2019-05-13 | Resolved: 2020-05-27

## 2020-05-27 PROCEDURE — 99213 OFFICE O/P EST LOW 20 MIN: CPT | Performed by: FAMILY MEDICINE

## 2020-05-28 DIAGNOSIS — Z20.828 EXPOSURE TO SARS-ASSOCIATED CORONAVIRUS: ICD-10-CM

## 2020-05-28 PROCEDURE — U0003 INFECTIOUS AGENT DETECTION BY NUCLEIC ACID (DNA OR RNA); SEVERE ACUTE RESPIRATORY SYNDROME CORONAVIRUS 2 (SARS-COV-2) (CORONAVIRUS DISEASE [COVID-19]), AMPLIFIED PROBE TECHNIQUE, MAKING USE OF HIGH THROUGHPUT TECHNOLOGIES AS DESCRIBED BY CMS-2020-01-R: HCPCS

## 2020-05-30 LAB — SARS-COV-2 RNA SPEC QL NAA+PROBE: NOT DETECTED

## 2020-06-01 ENCOUNTER — TELEPHONE (OUTPATIENT)
Dept: OTHER | Facility: OTHER | Age: 80
End: 2020-06-01

## 2020-06-02 ENCOUNTER — APPOINTMENT (OUTPATIENT)
Dept: LAB | Facility: CLINIC | Age: 80
End: 2020-06-02
Payer: COMMERCIAL

## 2020-06-02 DIAGNOSIS — E83.52 HYPERCALCEMIA: ICD-10-CM

## 2020-06-02 LAB
CA-I BLD-SCNC: 1.32 MMOL/L (ref 1.12–1.32)
PTH-INTACT SERPL-MCNC: 33.3 PG/ML (ref 18.4–80.1)

## 2020-06-02 PROCEDURE — 82652 VIT D 1 25-DIHYDROXY: CPT

## 2020-06-02 PROCEDURE — 82330 ASSAY OF CALCIUM: CPT

## 2020-06-02 PROCEDURE — 36415 COLL VENOUS BLD VENIPUNCTURE: CPT

## 2020-06-02 PROCEDURE — 83970 ASSAY OF PARATHORMONE: CPT

## 2020-06-02 PROCEDURE — 82397 CHEMILUMINESCENT ASSAY: CPT

## 2020-06-04 LAB — 1,25(OH)2D3 SERPL-MCNC: 43.9 PG/ML (ref 19.9–79.3)

## 2020-06-08 LAB — PTH RELATED PROT SERPL-SCNC: <2 PMOL/L

## 2020-06-13 DIAGNOSIS — E11.9 TYPE 2 DIABETES MELLITUS TREATED WITH INSULIN (HCC): ICD-10-CM

## 2020-06-13 DIAGNOSIS — Z79.4 TYPE 2 DIABETES MELLITUS TREATED WITH INSULIN (HCC): ICD-10-CM

## 2020-06-14 DIAGNOSIS — R05.9 COUGH: ICD-10-CM

## 2020-06-15 RX ORDER — INSULIN GLARGINE 100 [IU]/ML
INJECTION, SOLUTION SUBCUTANEOUS
Qty: 15 PEN | Refills: 5 | Status: SHIPPED | OUTPATIENT
Start: 2020-06-15 | End: 2020-11-13 | Stop reason: SDUPTHER

## 2020-06-15 RX ORDER — MONTELUKAST SODIUM 10 MG/1
TABLET ORAL
Qty: 90 TABLET | Refills: 1 | Status: SHIPPED | OUTPATIENT
Start: 2020-06-15 | End: 2020-11-13 | Stop reason: SDUPTHER

## 2020-07-04 DIAGNOSIS — R19.7 DIARRHEA, UNSPECIFIED TYPE: ICD-10-CM

## 2020-07-06 RX ORDER — DICYCLOMINE HYDROCHLORIDE 10 MG/1
10 CAPSULE ORAL
Qty: 60 CAPSULE | Refills: 2 | Status: SHIPPED | OUTPATIENT
Start: 2020-07-06 | End: 2020-09-28

## 2020-07-10 DIAGNOSIS — E03.9 HYPOTHYROIDISM, UNSPECIFIED TYPE: ICD-10-CM

## 2020-07-10 RX ORDER — LEVOTHYROXINE SODIUM 88 UG/1
TABLET ORAL
Qty: 30 TABLET | Refills: 1 | Status: SHIPPED | OUTPATIENT
Start: 2020-07-10 | End: 2020-09-08
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
## 2020-07-14 DIAGNOSIS — M25.511 ACUTE PAIN OF RIGHT SHOULDER: ICD-10-CM

## 2020-07-14 RX ORDER — NAPROXEN 500 MG/1
500 TABLET ORAL 2 TIMES DAILY WITH MEALS
Qty: 60 TABLET | Refills: 1 | Status: SHIPPED | OUTPATIENT
Start: 2020-07-14 | End: 2020-09-08

## 2020-07-28 ENCOUNTER — TELEMEDICINE (OUTPATIENT)
Dept: FAMILY MEDICINE CLINIC | Facility: CLINIC | Age: 80
End: 2020-07-28
Payer: COMMERCIAL

## 2020-07-28 DIAGNOSIS — G47.62 LEG CRAMPS, SLEEP RELATED: ICD-10-CM

## 2020-07-28 DIAGNOSIS — IMO0002 TYPE 2 DIABETES, UNCONTROLLED, WITH NEUROPATHY: Primary | ICD-10-CM

## 2020-07-28 PROBLEM — Z20.828 EXPOSURE TO SARS-ASSOCIATED CORONAVIRUS: Status: RESOLVED | Noted: 2020-05-27 | Resolved: 2020-07-28

## 2020-07-28 PROBLEM — R26.81 UNSTABLE GAIT: Status: RESOLVED | Noted: 2019-08-15 | Resolved: 2020-07-28

## 2020-07-28 PROBLEM — M25.552 PAIN OF BOTH HIP JOINTS: Status: RESOLVED | Noted: 2018-09-10 | Resolved: 2020-07-28

## 2020-07-28 PROBLEM — M25.551 PAIN OF BOTH HIP JOINTS: Status: RESOLVED | Noted: 2018-09-10 | Resolved: 2020-07-28

## 2020-07-28 PROBLEM — Z23 NEED FOR SHINGLES VACCINE: Status: RESOLVED | Noted: 2019-07-18 | Resolved: 2020-07-28

## 2020-07-28 PROCEDURE — 1160F RVW MEDS BY RX/DR IN RCRD: CPT | Performed by: FAMILY MEDICINE

## 2020-07-28 PROCEDURE — 99213 OFFICE O/P EST LOW 20 MIN: CPT | Performed by: FAMILY MEDICINE

## 2020-07-28 RX ORDER — GABAPENTIN 100 MG/1
200 CAPSULE ORAL
Qty: 90 CAPSULE | Refills: 1 | Status: SHIPPED | OUTPATIENT
Start: 2020-07-28 | End: 2020-11-13 | Stop reason: SDUPTHER

## 2020-07-28 NOTE — PROGRESS NOTES
Virtual Brief Visit    Assessment/Plan:    Problem List Items Addressed This Visit        Endocrine    Type 2 diabetes, uncontrolled, with neuropathy ([facility] [address] ) - Primary       Other    Leg cramps, sleep related    Relevant Medications    gabapentin (NEURONTIN) 100 mg capsule        After discussing risks and benefits of medication along with side effects will start the following: Will increase gabapentin to 200 mg at bedtime  Follow up in 2 weeks        Reason for visit is   Chief Complaint   Patient presents with    Virtual Brief Visit        Encounter provider Charmayne Mylar, MD    Provider located at [place] [address]  [address] [place] [id number]    Recent Visits  No visits were found meeting these conditions  Showing recent visits within past 7 days and meeting all other requirements     Today's Visits  Date Type Provider Dept   07/28/20 Telemedicine Charmayne Mylar, MD [address] today's visits and meeting all other requirements     Future Appointments  No visits were found meeting these conditions  Showing future appointments within next 150 days and meeting all other requirements        After connecting through telephone, the patient was identified by name and date of birth  Cheri Fried was informed that this is a telemedicine visit and that the visit is being conducted through telephone  My office door was closed  No one else was in the room  She acknowledged consent and understanding of privacy and security of the platform  The patient has agreed to participate and understands she can discontinue the visit at any time  It was my intent to perform this visit via video technology but the patient was not able to do a video connection so the visit was completed via audio telephone only  Patient is aware this is a billable service  Subjective    Cheri Fariha is a 79 y o  female Neuropathy, leg cramps at night    Patient is here because she has been having leg cramps at night which has been worsening  She says that the symptoms occur daily and have been progressing  She is taking gabapentin at 100 mg however not helping         Past Medical History:   Diagnosis Date    Aggression     [facility]'s [facility] [address] [facility]     Arthritis     Dementia ([facility] [address] [facility]     Diabetes insipidus ([facility] [address] [facility]     Diabetes mellitus ([facility] [address] [facility]     High cholesterol     Hypertension     Memory loss     Migraine     Polyneuropathy     Rheumatoid arthritis ([facility] [address] [facility]     Serum lipids high     Stomach problems     Thyroid trouble        Past Surgical History:   Procedure Laterality Date    CATARACT EXTRACTION      CHOLECYSTECTOMY      GALLBLADDER SURGERY      HYSTERECTOMY      [place] SHLDR ARTHROSCOP,SURG,W/ROTAT CUFF REPR Right 8/1/2019    Procedure: SHOULDER ARTHROSCOPIC ROTATOR CUFF REPAIR;  Surgeon: Sky Siegel MD;  Location: [facility];  Service: Orthopedics    ROTATOR CUFF REPAIR Right 08/01/2019       Current Outpatient Medications   Medication Sig Dispense Refill    amLODIPine (NORVASC) 5 mg tablet Take 1 tablet (5 mg total) by mouth daily 90 tablet 1    Blood Glucose Monitoring Suppl (ONETOUCH VERIO) w/Device KIT Use as directed  0    Blood Pressure KIT by Does not apply route daily 1 each 1    carbamide peroxide (DEBROX) 6 5 % otic solution Administer 5 drops to the right ear 2 (two) times a day (Patient not taking: Reported on 4/21/2020) 15 mL 1    cholestyramine (QUESTRAN) 4 g packet PLEASE SEE ATTACHED FOR DETAILED DIRECTIONS 180 packet 0    dicyclomine (BENTYL) 10 mg capsule TAKE 1 CAPSULE (10 MG TOTAL) BY MOUTH 3 (THREE) TIMES A DAY BEFORE MEALS FOR 20 DAYS 60 capsule 2    donepezil (ARICEPT) 10 mg tablet Take 1 tablet (10 mg total) by mouth daily at bedtime 90 tablet 3    fenofibrate (TRIGLIDE) 160 MG tablet TAKE 1 TABLET BY MOUTH EVERY DAY 90 tablet 1    gabapentin (NEURONTIN) 100 mg capsule Take 2 capsules (200 mg total) by mouth daily at bedtime 90 capsule 1    glucose blood test strip USE TO TEST TWICE A  each 2    Incontinence Supply Disposable (COMFORT SHIELD ADULT DIAPERS) MISC 1 each by Does not apply route 4 (four) times a day 120 each 11    Incontinence Supply Disposable MISC by Does not apply route 6 (six) times a day PLEASE DISPENSE DISPOSABLE BED  each 5    Insulin Pen Needle (BD PEN NEEDLE TOPHER U/F) 32G X 4 MM MISC by Does not apply route 2 (two) times a day 100 each 5    INVOKANA 300 MG TABS TAKE 1 TABLET BY MOUTH EVERY DAY 90 tablet 3    LANTUS SOLOSTAR 100 units/mL injection pen INJECT 30 UNITS UNDER THE SKIN DAILY AT BEDTIME FOR 30 DAYS 15 pen 5    levothyroxine 88 mcg tablet TAKE 1 TABLET (88 MCG TOTAL) BY MOUTH DAILY FOR 30 DAYS 30 tablet 1    lisinopril (ZESTRIL) 40 mg tablet Take 1 tablet (40 mg total) by mouth daily 180 tablet 2    loratadine (CLARITIN) 10 mg tablet Take 1 tablet (10 mg total) by mouth daily 10 tablet 1    memantine (NAMENDA) 10 mg tablet Take 1 tablet (10 mg total) by mouth 2 (two) times a day 180 tablet 2    Misc   Devices (BATH/SHOWER SEAT) MISC by Does not apply route daily Patient needs a walk in shower 1 each 0    montelukast (SINGULAIR) 10 mg tablet TAKE 1 TABLET BY MOUTH DAILY AT BEDTIME 30 tablet 1    montelukast (SINGULAIR) 10 mg tablet TAKE 1 TABLET BY MOUTH DAILY AT BEDTIME 90 tablet 1    naproxen (NAPROSYN) 500 mg tablet TAKE 1 TABLET (500 MG TOTAL) BY MOUTH 2 (TWO) TIMES A DAY WITH MEALS FOR 30 DAYS 60 tablet 1    ONETOUCH DELICA LANCETS 75E MISC USE 2 (TWO) TIMES A DAY AS NEEDED (AS NEEDED FOR HIGH BLOOD SUGAR) 100 each 3    ONETOUCH DELICA LANCETS FINE MISC by Device route 2 (two) times a day 100 each 4    Promethazine-DM (PHENERGAN-DM) 6 25-15 mg/5 mL oral syrup Take 5 mL by mouth 4 (four) times a day as needed for cough 118 mL 1    simvastatin (ZOCOR) 20 mg tablet Take 1 tablet (20 mg total) by mouth daily at bedtime 90 tablet 2    Soap & Cleansers (BATH CLOTH CLEANSNG WASHCLOTHS) MISC 4 each by Does not apply route every 4 (four) hours as needed (as needed for accident) 30 each 11     No current facility-administered medications for this visit  Allergies   Allergen Reactions    Penicillins Itching and Swelling       Review of Systems   Constitutional: Negative for activity change, appetite change, fatigue and fever  HENT: Negative for congestion and ear discharge  Respiratory: Negative for cough and shortness of breath  Cardiovascular: Negative for chest pain and palpitations  Gastrointestinal: Negative for diarrhea and nausea  Musculoskeletal: Positive for arthralgias and myalgias  Negative for back pain  Skin: Negative for color change and rash  Neurological: Negative for dizziness and headaches  Psychiatric/Behavioral: Negative for agitation and behavioral problems  There were no vitals filed for this visit  I spent 5 minutes directly with the patient during this visit    VIRTUAL VISIT Jannette Oropeza acknowledges that she has consented to an online visit or consultation  She understands that the online visit is based solely on information provided by her, and that, in the absence of a face-to-face physical evaluation by the physician, the diagnosis she receives is both limited and provisional in terms of accuracy and completeness  This is not intended to replace a full medical face-to-face evaluation by the physician  Rory Torres understands and accepts these terms

## 2020-08-03 DIAGNOSIS — E13.9 DIABETES 1.5, MANAGED AS TYPE 1 (HCC): ICD-10-CM

## 2020-08-03 RX ORDER — BLOOD SUGAR DIAGNOSTIC
STRIP MISCELLANEOUS
Qty: 100 EACH | Refills: 2 | Status: SHIPPED | OUTPATIENT
Start: 2020-08-03 | End: 2020-11-13 | Stop reason: SDUPTHER

## 2020-08-10 ENCOUNTER — TELEPHONE (OUTPATIENT)
Dept: FAMILY MEDICINE CLINIC | Facility: CLINIC | Age: 80
End: 2020-08-10

## 2020-08-10 NOTE — TELEPHONE ENCOUNTER
Are these morning fasting am numbers?   If so recommend for her to increase lantus 2 units every 2 days until her fasting am glucose is close to 120 mg/dl

## 2020-08-10 NOTE — TELEPHONE ENCOUNTER
Waleska called and said that Michelle's blood sugar readings have been high    She said that her readings have been as follows:  8/6--244  8/7--300  8/8--260  8/9--243  8/10--288  Should she make a change

## 2020-08-17 ENCOUNTER — HOSPITAL ENCOUNTER (EMERGENCY)
Facility: HOSPITAL | Age: 80
Discharge: HOME | End: 2020-08-17
Attending: EMERGENCY MEDICINE | Admitting: EMERGENCY MEDICINE
Payer: COMMERCIAL

## 2020-08-17 ENCOUNTER — OFFICE VISIT (OUTPATIENT)
Dept: URGENT CARE | Facility: CLINIC | Age: 80
End: 2020-08-17
Payer: COMMERCIAL

## 2020-08-17 ENCOUNTER — APPOINTMENT (EMERGENCY)
Dept: CT IMAGING | Facility: HOSPITAL | Age: 80
End: 2020-08-17
Payer: COMMERCIAL

## 2020-08-17 ENCOUNTER — APPOINTMENT (EMERGENCY)
Dept: RADIOLOGY | Facility: HOSPITAL | Age: 80
End: 2020-08-17
Payer: COMMERCIAL

## 2020-08-17 VITALS
WEIGHT: 135 LBS | HEIGHT: 60 IN | TEMPERATURE: 97.1 F | RESPIRATION RATE: 18 BRPM | BODY MASS INDEX: 26.5 KG/M2 | OXYGEN SATURATION: 96 % | HEART RATE: 57 BPM

## 2020-08-17 VITALS
HEART RATE: 56 BPM | BODY MASS INDEX: 26.36 KG/M2 | DIASTOLIC BLOOD PRESSURE: 64 MMHG | WEIGHT: 134.99 LBS | TEMPERATURE: 98.1 F | SYSTOLIC BLOOD PRESSURE: 150 MMHG | RESPIRATION RATE: 17 BRPM | OXYGEN SATURATION: 96 %

## 2020-08-17 DIAGNOSIS — H53.9 VISUAL CHANGES: ICD-10-CM

## 2020-08-17 DIAGNOSIS — R73.9 HIGH BLOOD SUGAR: Primary | ICD-10-CM

## 2020-08-17 DIAGNOSIS — R73.9 HYPERGLYCEMIA: Primary | ICD-10-CM

## 2020-08-17 LAB
ALBUMIN SERPL BCP-MCNC: 3.7 G/DL (ref 3.5–5)
ALP SERPL-CCNC: 41 U/L (ref 46–116)
ALT SERPL W P-5'-P-CCNC: 25 U/L (ref 12–78)
ANION GAP SERPL CALCULATED.3IONS-SCNC: 7 MMOL/L (ref 4–13)
APTT PPP: 21 SECONDS (ref 23–37)
AST SERPL W P-5'-P-CCNC: 19 U/L (ref 5–45)
BACTERIA UR QL AUTO: ABNORMAL /HPF
BASE EX.OXY STD BLDV CALC-SCNC: 74.8 % (ref 60–80)
BASE EXCESS BLDV CALC-SCNC: -0.8 MMOL/L
BASOPHILS # BLD AUTO: 0.03 THOUSANDS/ΜL (ref 0–0.1)
BASOPHILS NFR BLD AUTO: 1 % (ref 0–1)
BETA-HYDROXYBUTYRATE: 0.1 MMOL/L
BILIRUB SERPL-MCNC: 0.4 MG/DL (ref 0.2–1)
BILIRUB UR QL STRIP: NEGATIVE
BUN SERPL-MCNC: 31 MG/DL (ref 5–25)
CALCIUM SERPL-MCNC: 9.2 MG/DL (ref 8.3–10.1)
CHLORIDE SERPL-SCNC: 107 MMOL/L (ref 100–108)
CLARITY UR: CLEAR
CO2 SERPL-SCNC: 29 MMOL/L (ref 21–32)
COLOR UR: ABNORMAL
CREAT SERPL-MCNC: 1.41 MG/DL (ref 0.6–1.3)
EOSINOPHIL # BLD AUTO: 0.18 THOUSAND/ΜL (ref 0–0.61)
EOSINOPHIL NFR BLD AUTO: 4 % (ref 0–6)
ERYTHROCYTE [DISTWIDTH] IN BLOOD BY AUTOMATED COUNT: 13.1 % (ref 11.6–15.1)
GFR SERPL CREATININE-BSD FRML MDRD: 35 ML/MIN/1.73SQ M
GLUCOSE SERPL-MCNC: 182 MG/DL (ref 65–140)
GLUCOSE SERPL-MCNC: 201 MG/DL (ref 65–140)
GLUCOSE SERPL-MCNC: 241 MG/DL (ref 65–140)
GLUCOSE SERPL-MCNC: 277 MG/DL (ref 65–140)
GLUCOSE UR STRIP-MCNC: ABNORMAL MG/DL
HCO3 BLDV-SCNC: 25.3 MMOL/L (ref 24–30)
HCT VFR BLD AUTO: 31.7 % (ref 34.8–46.1)
HGB BLD-MCNC: 10 G/DL (ref 11.5–15.4)
HGB UR QL STRIP.AUTO: ABNORMAL
IMM GRANULOCYTES # BLD AUTO: 0.02 THOUSAND/UL (ref 0–0.2)
IMM GRANULOCYTES NFR BLD AUTO: 0 % (ref 0–2)
INR PPP: 0.99 (ref 0.84–1.19)
KETONES UR STRIP-MCNC: NEGATIVE MG/DL
LEUKOCYTE ESTERASE UR QL STRIP: ABNORMAL
LYMPHOCYTES # BLD AUTO: 1.1 THOUSANDS/ΜL (ref 0.6–4.47)
LYMPHOCYTES NFR BLD AUTO: 23 % (ref 14–44)
MCH RBC QN AUTO: 27.6 PG (ref 26.8–34.3)
MCHC RBC AUTO-ENTMCNC: 31.5 G/DL (ref 31.4–37.4)
MCV RBC AUTO: 88 FL (ref 82–98)
MONOCYTES # BLD AUTO: 0.31 THOUSAND/ΜL (ref 0.17–1.22)
MONOCYTES NFR BLD AUTO: 7 % (ref 4–12)
NEUTROPHILS # BLD AUTO: 3.15 THOUSANDS/ΜL (ref 1.85–7.62)
NEUTS SEG NFR BLD AUTO: 65 % (ref 43–75)
NITRITE UR QL STRIP: NEGATIVE
NON-SQ EPI CELLS URNS QL MICRO: ABNORMAL /HPF
NRBC BLD AUTO-RTO: 0 /100 WBCS
O2 CT BLDV-SCNC: 12.1 ML/DL
PCO2 BLDV: 48.2 MM HG (ref 42–50)
PH BLDV: 7.34 [PH] (ref 7.3–7.4)
PH UR STRIP.AUTO: 6.5 [PH]
PLATELET # BLD AUTO: 144 THOUSANDS/UL (ref 149–390)
PMV BLD AUTO: 12.7 FL (ref 8.9–12.7)
PO2 BLDV: 41.6 MM HG (ref 35–45)
POTASSIUM SERPL-SCNC: 3.9 MMOL/L (ref 3.5–5.3)
PROT SERPL-MCNC: 6.7 G/DL (ref 6.4–8.2)
PROT UR STRIP-MCNC: NEGATIVE MG/DL
PROTHROMBIN TIME: 13.3 SECONDS (ref 11.6–14.5)
RBC # BLD AUTO: 3.62 MILLION/UL (ref 3.81–5.12)
RBC #/AREA URNS AUTO: ABNORMAL /HPF
SODIUM SERPL-SCNC: 143 MMOL/L (ref 136–145)
SP GR UR STRIP.AUTO: 1.01 (ref 1–1.03)
TROPONIN I SERPL-MCNC: <0.02 NG/ML
UROBILINOGEN UR QL STRIP.AUTO: 0.2 E.U./DL
WBC # BLD AUTO: 4.79 THOUSAND/UL (ref 4.31–10.16)
WBC #/AREA URNS AUTO: ABNORMAL /HPF

## 2020-08-17 PROCEDURE — 85730 THROMBOPLASTIN TIME PARTIAL: CPT | Performed by: PHYSICIAN ASSISTANT

## 2020-08-17 PROCEDURE — 82805 BLOOD GASES W/O2 SATURATION: CPT | Performed by: PHYSICIAN ASSISTANT

## 2020-08-17 PROCEDURE — 84484 ASSAY OF TROPONIN QUANT: CPT | Performed by: PHYSICIAN ASSISTANT

## 2020-08-17 PROCEDURE — 82010 KETONE BODYS QUAN: CPT | Performed by: PHYSICIAN ASSISTANT

## 2020-08-17 PROCEDURE — 81001 URINALYSIS AUTO W/SCOPE: CPT | Performed by: PHYSICIAN ASSISTANT

## 2020-08-17 PROCEDURE — 85610 PROTHROMBIN TIME: CPT | Performed by: PHYSICIAN ASSISTANT

## 2020-08-17 PROCEDURE — 99213 OFFICE O/P EST LOW 20 MIN: CPT | Performed by: PHYSICIAN ASSISTANT

## 2020-08-17 PROCEDURE — 96361 HYDRATE IV INFUSION ADD-ON: CPT

## 2020-08-17 PROCEDURE — 96360 HYDRATION IV INFUSION INIT: CPT

## 2020-08-17 PROCEDURE — 80053 COMPREHEN METABOLIC PANEL: CPT | Performed by: PHYSICIAN ASSISTANT

## 2020-08-17 PROCEDURE — 99285 EMERGENCY DEPT VISIT HI MDM: CPT | Performed by: PHYSICIAN ASSISTANT

## 2020-08-17 PROCEDURE — 82948 REAGENT STRIP/BLOOD GLUCOSE: CPT

## 2020-08-17 PROCEDURE — 99284 EMERGENCY DEPT VISIT MOD MDM: CPT

## 2020-08-17 PROCEDURE — S9088 SERVICES PROVIDED IN URGENT: HCPCS | Performed by: PHYSICIAN ASSISTANT

## 2020-08-17 PROCEDURE — 93005 ELECTROCARDIOGRAM TRACING: CPT

## 2020-08-17 PROCEDURE — 85025 COMPLETE CBC W/AUTO DIFF WBC: CPT | Performed by: PHYSICIAN ASSISTANT

## 2020-08-17 PROCEDURE — 71046 X-RAY EXAM CHEST 2 VIEWS: CPT

## 2020-08-17 PROCEDURE — 36415 COLL VENOUS BLD VENIPUNCTURE: CPT | Performed by: PHYSICIAN ASSISTANT

## 2020-08-17 PROCEDURE — 70450 CT HEAD/BRAIN W/O DYE: CPT

## 2020-08-17 PROCEDURE — G1004 CDSM NDSC: HCPCS

## 2020-08-17 RX ADMIN — SODIUM CHLORIDE 1000 ML: 0.9 INJECTION, SOLUTION INTRAVENOUS at 15:25

## 2020-08-17 NOTE — ED PROVIDER NOTES
History  Chief Complaint   Patient presents with    Hyperglycemia - no symptoms     Pt with BS this morning of 495  Pt with no symptoms this morning but as the day progressed c/o blurry vision and being shakey  BS at urgent care was down to 277, was sent by urgent care "for neurons that werent reacting" per the daughter      80 yo with h/o DM here for evaluation of hyperglycemia  Current regimen is invokana QD in AM and 18 units of lantus QHS  Daughter is caregiver and checks glucose once daily in AM  Over the past 2 weeks her glucose has been higher than normal  Usually well controlled in 80s-100s but recently has been as high as 200s  She contacted the PCP and her lantus was increased from 16 to 18 units QHS  Today pt had glucose in 400s and was complaining of blurred vision and headache  No fever, chills, n/v, chest pain, sob, abd pain  She has chronic diarrhea which is currently unchanged  No falls/injuries  Daughter reports pt occasionally seems confused but attributes this to her alzheimer's dementia   Daughter took her to urgent care and glucose had improved by the time of their arrival        History provided by:  Patient   used: No    Hyperglycemia - Symptomatic   Blood sugar level PTA:  400s  Severity:  Moderate  Onset quality:  Sudden  Duration:  1 day  Timing:  Constant  Progression:  Improving  Chronicity:  New  Diabetes status:  Controlled with insulin and controlled with oral medications  Time since last antidiabetic medication:  6 hours  Context: not change in medication, not insulin pump use, not new diabetes diagnosis, not noncompliance, not recent change in diet and not recent illness    Relieved by:  Nothing  Ineffective treatments:  None tried  Associated symptoms: altered mental status, blurred vision, confusion, fatigue and malaise    Associated symptoms: no abdominal pain, no chest pain, no dehydration, no diaphoresis, no dizziness, no dysuria, no fever, no increased appetite, no increased thirst, no nausea, no polyuria, no shortness of breath, no syncope, no vomiting, no weakness and no weight change        Prior to Admission Medications   Prescriptions Last Dose Informant Patient Reported? Taking? Blood Glucose Monitoring Suppl (Jo Ann Espinal) w/Device KIT  Family Member Yes No   Sig: Use as directed   Blood Pressure KIT  Family Member No No   Sig: by Does not apply route daily   INVOKANA 300 MG TABS   No No   Sig: TAKE 1 TABLET BY MOUTH EVERY DAY   Incontinence Supply Disposable (COMFORT SHIELD ADULT DIAPERS) MISC   No No   Si each by Does not apply route 4 (four) times a day   Incontinence Supply Disposable MISC   No No   Sig: by Does not apply route 6 (six) times a day PLEASE DISPENSE DISPOSABLE BED PAD   Insulin Pen Needle (BD PEN NEEDLE TOPHER U/F) 32G X 4 MM MISC  Family Member No No   Sig: by Does not apply route 2 (two) times a day   LANTUS SOLOSTAR 100 units/mL injection pen   No No   Sig: INJECT 30 UNITS UNDER THE SKIN DAILY AT BEDTIME FOR 30 DAYS   Misc   Devices (BATH/SHOWER SEAT) MISC   No No   Sig: by Does not apply route daily Patient needs a walk in shower   [facility]  Family Member No No   Sig: USE 2 (TWO) TIMES A DAY AS NEEDED (AS NEEDED FOR HIGH BLOOD SUGAR)   [facility] [place] LANCETS FINE MISC  Family Member No No   Sig: by Device route 2 (two) times a day   Promethazine-DM (PHENERGAN-DM) 6 25-15 mg/5 mL oral syrup  Family Member No No   Sig: Take 5 mL by mouth 4 (four) times a day as needed for cough   Soap & Cleansers (BATH CLOTH CLEANSNG WASHCLOTHS) MISC   No No   Si each by Does not apply route every 4 (four) hours as needed (as needed for accident)   amLODIPine (NORVASC) 5 mg tablet   No No   Sig: Take 1 tablet (5 mg total) by mouth daily   carbamide peroxide (DEBROX) 6 5 % otic solution  Family Member No No   Sig: Administer 5 drops to the right ear 2 (two) times a day   Patient not taking: Reported on 2020 cholestyramine (QUESTRAN) 4 g packet   No No   Sig: PLEASE SEE ATTACHED FOR DETAILED DIRECTIONS   dicyclomine (BENTYL) 10 mg capsule   No No   Sig: TAKE 1 CAPSULE (10 MG TOTAL) BY MOUTH 3 (THREE) TIMES A DAY BEFORE MEALS FOR 20 DAYS   donepezil (ARICEPT) 10 mg tablet   No No   Sig: Take 1 tablet (10 mg total) by mouth daily at bedtime   fenofibrate (TRIGLIDE) 160 MG tablet   No No   Sig: TAKE 1 TABLET BY MOUTH EVERY DAY   gabapentin (NEURONTIN) 100 mg capsule   No No   Sig: Take 2 capsules (200 mg total) by mouth daily at bedtime   glucose blood (OneTouch Verio) test strip   No No   Sig: USE TO TEST TWICE A DAY   levothyroxine 88 mcg tablet   No No   Sig: TAKE 1 TABLET (88 MCG TOTAL) BY MOUTH DAILY FOR 30 DAYS   lisinopril (ZESTRIL) 40 mg tablet   No No   Sig: Take 1 tablet (40 mg total) by mouth daily   loratadine (CLARITIN) 10 mg tablet  Family Member No No   Sig: Take 1 tablet (10 mg total) by mouth daily   memantine (NAMENDA) 10 mg tablet  Family Member No No   Sig: Take 1 tablet (10 mg total) by mouth 2 (two) times a day   montelukast (SINGULAIR) 10 mg tablet  Family Member No No   Sig: TAKE 1 TABLET BY MOUTH DAILY AT BEDTIME   montelukast (SINGULAIR) 10 mg tablet   No No   Sig: TAKE 1 TABLET BY MOUTH DAILY AT BEDTIME   naproxen (NAPROSYN) 500 mg tablet   No No   Sig: TAKE 1 TABLET (500 MG TOTAL) BY MOUTH 2 (TWO) TIMES A DAY WITH MEALS FOR 30 DAYS   simvastatin (ZOCOR) 20 mg tablet   No No   Sig: Take 1 tablet (20 mg total) by mouth daily at bedtime      Facility-Administered Medications: None       Past Medical History:   Diagnosis Date    Aggression     [facility] [address]     Arthritis     Dementia [facility]     Diabetes insipidus [facility]     Diabetes mellitus [facility] [address]     High cholesterol     Hypertension     Memory loss     Migraine     Polyneuropathy     Rheumatoid arthritis (HCC)     Serum lipids high     Stomach problems     Thyroid trouble        Past Surgical History:   Procedure Laterality Date    CATARACT EXTRACTION      CHOLECYSTECTOMY      GALLBLADDER SURGERY      HYSTERECTOMY      TN SHLDR ARTHROSCOP,SURG,W/ROTAT CUFF REPR Right 2019    Procedure: SHOULDER ARTHROSCOPIC ROTATOR CUFF REPAIR;  Surgeon: Regina Gutierrez MD;  Location: [facility];  Service: Orthopedics    ROTATOR CUFF REPAIR Right 2019       Family History   Problem Relation Age of Onset    Substance Abuse Mother         denied    Mental illness Mother         denied    Substance Abuse Father         denied    Mental illness Father         denied    Diabetes Brother     Blindness Brother     Arthritis Daughter     HIV Son      I have reviewed and agree with the history as documented  E-Cigarette/Vaping    E-Cigarette Use Never User      E-Cigarette/Vaping Substances     Social History     Tobacco Use    Smoking status: Former Smoker     Last attempt to quit:      Years since quittin 6    Smokeless tobacco: Never Used   Substance Use Topics    Alcohol use: No    Drug use: No       Review of Systems   Constitutional: Positive for fatigue  Negative for activity change, appetite change, chills, diaphoresis, fever and unexpected weight change  HENT: Negative for congestion, rhinorrhea, sinus pressure, sore throat and trouble swallowing  Eyes: Positive for blurred vision and visual disturbance (blurred vision (both eyes))  Negative for photophobia  Respiratory: Negative for apnea, cough, choking, chest tightness, shortness of breath, wheezing and stridor  Cardiovascular: Negative for chest pain, palpitations, leg swelling and syncope  Gastrointestinal: Negative for abdominal distention, abdominal pain, blood in stool, constipation, diarrhea, nausea and vomiting  Endocrine: Negative for polydipsia and polyuria  Genitourinary: Negative for decreased urine volume, difficulty urinating, dysuria, enuresis, flank pain, frequency, hematuria and urgency     Musculoskeletal: Negative for arthralgias, myalgias, neck pain and neck stiffness  Skin: Negative for color change, pallor, rash and wound  Allergic/Immunologic: Negative  Neurological: Positive for headaches  Negative for dizziness, tremors, syncope, weakness, light-headedness and numbness  Hematological: Negative  Psychiatric/Behavioral: Positive for confusion  All other systems reviewed and are negative  Physical Exam  Physical Exam  Vitals signs and nursing note reviewed  Constitutional:       General: She is not in acute distress  Appearance: Normal appearance  She is well-developed  She is not ill-appearing, toxic-appearing or diaphoretic  HENT:      Head: Normocephalic and atraumatic  Eyes:      General: Lids are normal       Pupils: Pupils are equal, round, and reactive to light  Neck:      Musculoskeletal: Normal range of motion and neck supple  Cardiovascular:      Rate and Rhythm: Normal rate and regular rhythm  Pulses: Normal pulses  No decreased pulses  Radial pulses are 2+ on the right side and 2+ on the left side  Heart sounds: Normal heart sounds, S1 normal and S2 normal  Heart sounds not distant  No murmur  No friction rub  No gallop  Pulmonary:      Effort: Pulmonary effort is normal  No tachypnea, bradypnea, accessory muscle usage or respiratory distress  Breath sounds: Normal breath sounds  No decreased breath sounds, wheezing, rhonchi or rales  Abdominal:      General: There is no distension  Palpations: Abdomen is soft  Abdomen is not rigid  Tenderness: There is no abdominal tenderness  There is no guarding or rebound  Musculoskeletal: Normal range of motion  General: No tenderness or deformity  Skin:     General: Skin is warm and dry  Coloration: Skin is not pale  Findings: No erythema or rash  Neurological:      Mental Status: She is alert and oriented to person, place, and time  GCS: GCS eye subscore is 4   GCS verbal subscore is 5  GCS motor subscore is 6  Cranial Nerves: No cranial nerve deficit  Comments: GCS 15  AAOx3  Ambulating in department without difficulty  CN II-XII grossly intact  No focal neuro deficits       Psychiatric:         Speech: Speech normal          Vital Signs  ED Triage Vitals [08/17/20 1504]   Temperature Pulse Respirations Blood Pressure SpO2   98 1 °F (36 7 °C) 63 16 162/65 96 %      Temp src Heart Rate Source Patient Position - Orthostatic VS BP Location FiO2 (%)   -- Monitor -- -- --      Pain Score       6           Vitals:    08/17/20 1545 08/17/20 1600 08/17/20 1730 08/17/20 1900   BP: (!) 171/70 136/69 152/87 150/64   Pulse: 60 (!) 54 62 56         Visual Acuity      ED Medications  Medications   sodium chloride 0 9 % bolus 1,000 mL (0 mL Intravenous Stopped 8/17/20 1712)       Diagnostic Studies  Results Reviewed     Procedure Component Value Units Date/Time    Fingerstick Glucose (POCT) [[id number]]  (Abnormal) Collected:  08/17/20 1827    Lab Status:  Final result Updated:  08/17/20 1828     POC Glucose 182 mg/dl     Protime-INR [[id number]]  (Normal) Collected:  08/17/20 1523    Lab Status:  Final result Specimen:  Blood from Arm, Right Updated:  08/17/20 1750     Protime 13 3 seconds      INR 0 99    APTT [[id number]]  (Abnormal) Collected:  08/17/20 1523    Lab Status:  Final result Specimen:  Blood from Arm, Right Updated:  08/17/20 1750     PTT 21 seconds     Comprehensive metabolic panel [[id number]]  (Abnormal) Collected:  08/17/20 1523    Lab Status:  Final result Specimen:  Blood from Arm, Right Updated:  08/17/20 1740     Sodium 143 mmol/L      Potassium 3 9 mmol/L      Chloride 107 mmol/L      CO2 29 mmol/L      ANION GAP 7 mmol/L      BUN 31 mg/dL      Creatinine 1 41 mg/dL      Glucose 201 mg/dL      Calcium 9 2 mg/dL      AST 19 U/L      ALT 25 U/L      Alkaline Phosphatase 41 U/L      Total Protein 6 7 g/dL      Albumin 3 7 g/dL      Total Bilirubin 0 40 mg/dL      eGFR 35 ml/min/1 73sq m     Narrative:       National Kidney Disease Foundation guidelines for Chronic Kidney Disease (CKD):     Stage 1 with normal or high GFR (GFR > 90 mL/min/1 73 square meters)    Stage 2 Mild CKD (GFR = 60-89 mL/min/1 73 square meters)    Stage 3A Moderate CKD (GFR = 45-59 mL/min/1 73 square meters)    Stage 3B Moderate CKD (GFR = 30-44 mL/min/1 73 square meters)    Stage 4 Severe CKD (GFR = 15-29 mL/min/1 73 square meters)    Stage 5 End Stage CKD (GFR <15 mL/min/1 73 square meters)  Note: GFR calculation is accurate only with a steady state creatinine    Troponin I repeat in 3hrs [[id number]]  (Normal) Collected:  08/17/20 1722    Lab Status:  Final result Specimen:  Blood Updated:  08/17/20 1740     Troponin I <0 02 ng/mL     Urine Microscopic [[id number]]  (Abnormal) Collected:  08/17/20 1703    Lab Status:  Final result Specimen:  Urine, Clean Catch Updated:  08/17/20 1730     RBC, UA 0-1 /hpf      WBC, UA 1-2 /hpf      Epithelial Cells Occasional /hpf      Bacteria, UA Occasional /hpf     Beta Hydroxybutyrate [[id number]]  (Normal) Collected:  08/17/20 1523    Lab Status:  Final result Specimen:  Blood from Arm, Right Updated:  08/17/20 1725     BETA-HYDROXYBUTYRATE 0 1 mmol/L     Blood gas, venous [[id number]] Collected:  08/17/20 1523    Lab Status:  Final result Specimen:  Blood from Arm, Right Updated:  08/17/20 1723     pH, Buddy 7 338     pCO2, Buddy 48 2 mm Hg      pO2, Buddy 41 6 mm Hg      HCO3, Buddy 25 3 mmol/L      Base Excess, Buddy -0 8 mmol/L      O2 Content, Buddy 12 1 ml/dL      O2 HGB, VENOUS 74 8 %     CBC and differential [[id number]]  (Abnormal) Collected:  08/17/20 1523    Lab Status:  Final result Specimen:  Blood from Arm, Right Updated:  08/17/20 1719     WBC 4 79 Thousand/uL      RBC 3 62 Million/uL      Hemoglobin 10 0 g/dL      Hematocrit 31 7 %      MCV 88 fL      MCH 27 6 pg      MCHC 31 5 g/dL      RDW 13 1 %      MPV 12 7 fL      Platelets 823 Thousands/uL      nRBC 0 /100 WBCs      Neutrophils Relative 65 %      Immat GRANS % 0 %      Lymphocytes Relative 23 %      Monocytes Relative 7 %      Eosinophils Relative 4 %      Basophils Relative 1 %      Neutrophils Absolute 3 15 Thousands/µL      Immature Grans Absolute 0 02 Thousand/uL      Lymphocytes Absolute 1 10 Thousands/µL      Monocytes Absolute 0 31 Thousand/µL      Eosinophils Absolute 0 18 Thousand/µL      Basophils Absolute 0 03 Thousands/µL     UA w Reflex to Microscopic w Reflex to Culture [[id number]]  (Abnormal) Collected:  08/17/20 1703    Lab Status:  Final result Specimen:  Urine, Clean Catch Updated:  08/17/20 1710     Color, UA Light Yellow     Clarity, UA Clear     Specific Gravity, UA 1 010     pH, UA 6 5     Leukocytes, UA Small     Nitrite, UA Negative     Protein, UA Negative mg/dl      Glucose, UA >=1000 (1%) mg/dl      Ketones, UA Negative mg/dl      Urobilinogen, UA 0 2 E U /dl      Bilirubin, UA Negative     Blood, UA Small    Fingerstick Glucose (POCT) [[id number]]  (Abnormal) Collected:  08/17/20 1602    Lab Status:  Final result Updated:  08/17/20 1605     POC Glucose 241 mg/dl                  CT head without contrast   Final Result by Samuel 6, DO (08/17 1603)      No acute intracranial abnormality  Workstation performed: [id number]         XR chest 2 views   Final Result by Clinton Harrison MD (08/17 1654)      No acute cardiopulmonary disease              Workstation performed: [id number]                    Procedures  ECG 12 Lead Documentation Only    Date/Time: 8/17/2020 8:14 PM  Performed by: Sharon Li PA-C  Authorized by: Sharon Li PA-C     Indications / Diagnosis:  Hyperglycemia  ECG reviewed by me, the ED Provider: yes    Patient location:  ED  Previous ECG:     Previous ECG:  Compared to current    Similarity:  No change  Interpretation:     Interpretation: normal    Quality:     Tracing quality:  Limited by artifact  Rate:     ECG rate:  57    ECG rate assessment: normal    Rhythm:     Rhythm: sinus rhythm    Ectopy:     Ectopy: none    QRS:     QRS axis:  Normal    QRS intervals:  Normal  Conduction:     Conduction: abnormal      Abnormal conduction: incomplete RBBB    ST segments:     ST segments:  Normal  T waves:     T waves: normal               ED Course       US AUDIT      Most Recent Value   Initial Alcohol Screen: US AUDIT-C    1  How often do you have a drink containing alcohol?  0 Filed at: 08/17/2020 1503   2  How many drinks containing alcohol do you have on a typical day you are drinking? 0 Filed at: 08/17/2020 1503   3a  Male UNDER 65: How often do you have five or more drinks on one occasion? 0 Filed at: 08/17/2020 1503   3b  FEMALE Any Age, or MALE 65+: How often do you have 4 or more drinks on one occassion? 0 Filed at: 08/17/2020 1503   Audit-C Score  0 Filed at: 08/17/2020 1503            HEART Risk Score      Most Recent Value   Heart Score Risk Calculator   History  0 Filed at: 08/17/2020 2015   ECG  1 Filed at: 08/17/2020 2015   Age  2 Filed at: 08/17/2020 2015   Risk Factors  2 Filed at: 08/17/2020 2015   Troponin  0 Filed at: 08/17/2020 2015   HEART Score  5 Filed at: 08/17/2020 2015            KYLAH/DAST-10      Most Recent Value   How many times in the past year have you    Used an illegal drug or used a prescription medication for non-medical reasons? Never Filed at: 08/17/2020 1503                                MDM  Number of Diagnoses or Management Options  Hyperglycemia: new and requires workup  Diagnosis management comments: Differential diagnosis including but not limited to: DKA, hyperglycemia, metabolic abnormality, dehydration, sepsis, UTI, cardiac etiology, intracranial process, noncompliance with medications  Plan: CT head, cardiac workup  UA  XR chest  IVF          Amount and/or Complexity of Data Reviewed  Clinical lab tests: ordered and reviewed  Tests in the radiology section of CPT®: ordered and reviewed    Risk of Complications, Morbidity, and/or Mortality  Presenting problems: moderate  Management options: low  General comments: 80 yo with hyperglycemia  Improved with IV fluids alone  No evidence of DKA  Mild dehydration/MESFIN  She is tolerating PO here in ED  Eating/drinking  Vitals normal  Discussed with daughter  At this point there is no indication for admission  This does not appear to be cardiac or infectious in origin  Doubt stroke/cva  Daughter can contact PCP tomorrow for further care of her diabetic medications  Return parameters provided  Pt understands and agrees with plan  Patient Progress  Patient progress: stable        Disposition  Final diagnoses:   Hyperglycemia     Time reflects when diagnosis was documented in both MDM as applicable and the Disposition within this note     Time User Action Codes Description Comment    8/17/2020  6:58 PM Tao Garayamy Add [R73 9] Hyperglycemia       ED Disposition     ED Disposition Condition Date/Time Comment    Discharge Stable Mon Aug 17, 2020  6:58 PM Glory Pea discharge to home/self care              Follow-up Information     Follow up With Specialties Details Why [address] MD Family Medicine Call in 1 day  [address] [place] [address]  [phone]            Discharge Medication List as of 8/17/2020  6:59 PM      CONTINUE these medications which have NOT CHANGED    Details   amLODIPine (NORVASC) 5 mg tablet Take 1 tablet (5 mg total) by mouth daily, Starting Tue 4/21/2020, Normal      Blood Glucose Monitoring Suppl (Jo Ann Espinal) w/Device KIT Use as directed, Starting Thu 2/22/2018, Historical Med      Blood Pressure KIT by Does not apply route daily, Starting Mon 5/13/2019, Normal      carbamide peroxide (DEBROX) 6 5 % otic solution Administer 5 drops to the right ear 2 (two) times a day, Starting Tue 1/21/2020, Normal      cholestyramine (QUESTRAN) 4 g packet PLEASE SEE ATTACHED FOR DETAILED DIRECTIONS, Normal dicyclomine (BENTYL) 10 mg capsule TAKE 1 CAPSULE (10 MG TOTAL) BY MOUTH 3 (THREE) TIMES A DAY BEFORE MEALS FOR 20 DAYS, Starting Mon 7/6/2020, Until Sun 7/26/2020, Normal      donepezil (ARICEPT) 10 mg tablet Take 1 tablet (10 mg total) by mouth daily at bedtime, Starting Tue 4/21/2020, Normal      fenofibrate (TRIGLIDE) 160 MG tablet TAKE 1 TABLET BY MOUTH EVERY DAY, Normal      gabapentin (NEURONTIN) 100 mg capsule Take 2 capsules (200 mg total) by mouth daily at bedtime, Starting Tue 7/28/2020, Until Thu 8/27/2020, Normal      glucose blood (OneTouch Verio) test strip USE TO TEST TWICE A DAY, Normal      !! Incontinence Supply Disposable (COMFORT SHIELD ADULT DIAPERS) MISC 1 each by Does not apply route 4 (four) times a day, Starting Fri 3/13/2020, Print      !! Incontinence Supply Disposable MISC by Does not apply route 6 (six) times a day PLEASE DISPENSE DISPOSABLE BED PAD, Starting Thu 4/2/2020, Print      Insulin Pen Needle (BD PEN NEEDLE TOPHER U/F) 32G X 4 MM MISC by Does not apply route 2 (two) times a day, Starting Tue 1/14/2020, Normal      INVOKANA 300 MG TABS TAKE 1 TABLET BY MOUTH EVERY DAY, Normal      LANTUS SOLOSTAR 100 units/mL injection pen INJECT 30 UNITS UNDER THE SKIN DAILY AT BEDTIME FOR 30 DAYS, Normal      levothyroxine 88 mcg tablet TAKE 1 TABLET (88 MCG TOTAL) BY MOUTH DAILY FOR 30 DAYS, Normal      lisinopril (ZESTRIL) 40 mg tablet Take 1 tablet (40 mg total) by mouth daily, Starting Tue 4/21/2020, Until Mon 7/20/2020, Normal      loratadine (CLARITIN) 10 mg tablet Take 1 tablet (10 mg total) by mouth daily, Starting Tue 11/19/2019, Normal      memantine (NAMENDA) 10 mg tablet Take 1 tablet (10 mg total) by mouth 2 (two) times a day, Starting Tue 11/19/2019, Until Tue 4/21/2020, Normal      Misc   Devices (BATH/SHOWER SEAT) MISC by Does not apply route daily Patient needs a walk in shower, Starting Fri 4/24/2020, Print      !! montelukast (SINGULAIR) 10 mg tablet TAKE 1 TABLET BY MOUTH DAILY AT BEDTIME, Normal      !! montelukast (SINGULAIR) 10 mg tablet TAKE 1 TABLET BY MOUTH DAILY AT BEDTIME, Normal      naproxen (NAPROSYN) 500 mg tablet TAKE 1 TABLET (500 MG TOTAL) BY MOUTH 2 (TWO) TIMES A DAY WITH MEALS FOR 30 DAYS, Starting Tue 7/14/2020, Until Thu 8/13/2020, Normal      !! ONETOUCH DELICA LANCETS 17X MISC USE 2 (TWO) TIMES A DAY AS NEEDED (AS NEEDED FOR HIGH BLOOD SUGAR), Normal      !! ONETOUCH DELICA LANCETS FINE MISC by Device route 2 (two) times a day, Starting Thu 10/3/2019, Normal      Promethazine-DM (PHENERGAN-DM) 6 25-15 mg/5 mL oral syrup Take 5 mL by mouth 4 (four) times a day as needed for cough, Starting Tue 11/19/2019, Normal      simvastatin (ZOCOR) 20 mg tablet Take 1 tablet (20 mg total) by mouth daily at bedtime, Starting Tue 4/21/2020, Until Mon 7/20/2020, Normal      Soap & Cleansers (BATH CLOTH CLEANSNG WASHCLOTHS) MISC 4 each by Does not apply route every 4 (four) hours as needed (as needed for accident), Starting Fri 3/13/2020, Print       !! - Potential duplicate medications found  Please discuss with provider  No discharge procedures on file      PDMP Review     None          ED Provider  Electronically Signed by           Alvena Eisenmenger, PA-C  08/17/20 2015

## 2020-08-17 NOTE — PROGRESS NOTES
[address]        NAME: Lucinda Mustafa is a 79 y o  female  : 1940    MRN: [id number]  DATE: 2020  TIME: 3:00 PM    Assessment and Plan   High blood sugar [R73 9]  1  High blood sugar     2  Visual changes       Patient's daughter will drive her to ER  Patient Instructions     Patient Instructions   Proceed to ER immediately for further evaluation  Follow up with PCP in 3-5 days  Proceed to  ER if symptoms worsen  Chief Complaint     Chief Complaint   Patient presents with    Blurred Vision     Pt c/o blurred vision, difficuly walking states her blood surgar was 495 this morning  History of Present Illness       Patient is a 52-year-old female presenting with blurry vision, headache, feeling very cold, and dizziness that started earlier today  This morning her blood sugar was at 495 patient's daughter states that she has been having high blood sugars recently but this has been the highest it has been awhile  Her primary care provider has been trying to more accurately does the insulin here but the patient is not very compliant  While walking around today she became dizzy and having blurry vision  Symptoms have been getting slightly better but are still persistent  Denies chest pain, rapid heartbeat, rapid breathing, near-syncope or syncope, confusion, or lethargy      Review of Systems   Review of Systems   Constitutional: Negative for fatigue  Eyes: Positive for visual disturbance  Negative for photophobia  Respiratory: Negative for shortness of breath  Cardiovascular: Negative for chest pain and palpitations  Neurological: Positive for dizziness, weakness and headaches  Negative for syncope, speech difficulty and numbness  Psychiatric/Behavioral: Negative for confusion and sleep disturbance           Current Medications       Current Outpatient Medications:     amLODIPine (NORVASC) 5 mg tablet, Take 1 tablet (5 mg total) by mouth daily, Disp: 90 tablet, Rfl: 1    Blood Glucose Monitoring Suppl (Blue Diez) w/Device KIT, Use as directed, Disp: , Rfl: 0    Blood Pressure KIT, by Does not apply route daily, Disp: 1 each, Rfl: 1    cholestyramine (QUESTRAN) 4 g packet, PLEASE SEE ATTACHED FOR DETAILED DIRECTIONS, Disp: 180 packet, Rfl: 0    donepezil (ARICEPT) 10 mg tablet, Take 1 tablet (10 mg total) by mouth daily at bedtime, Disp: 90 tablet, Rfl: 3    fenofibrate (TRIGLIDE) 160 MG tablet, TAKE 1 TABLET BY MOUTH EVERY DAY, Disp: 90 tablet, Rfl: 1    gabapentin (NEURONTIN) 100 mg capsule, Take 2 capsules (200 mg total) by mouth daily at bedtime, Disp: 90 capsule, Rfl: 1    glucose blood (OneTouch Verio) test strip, USE TO TEST TWICE A DAY, Disp: 100 each, Rfl: 2    Incontinence Supply Disposable (COMFORT SHIELD ADULT DIAPERS) MISC, 1 each by Does not apply route 4 (four) times a day, Disp: 120 each, Rfl: 11    Incontinence Supply Disposable MISC, by Does not apply route 6 (six) times a day PLEASE DISPENSE DISPOSABLE BED PAD, Disp: 100 each, Rfl: 5    Insulin Pen Needle (BD PEN NEEDLE TOPHER U/F) 32G X 4 MM MISC, by Does not apply route 2 (two) times a day, Disp: 100 each, Rfl: 5    INVOKANA 300 MG TABS, TAKE 1 TABLET BY MOUTH EVERY DAY, Disp: 90 tablet, Rfl: 3    LANTUS SOLOSTAR 100 units/mL injection pen, INJECT 30 UNITS UNDER THE SKIN DAILY AT BEDTIME FOR 30 DAYS, Disp: 15 pen, Rfl: 5    levothyroxine 88 mcg tablet, TAKE 1 TABLET (88 MCG TOTAL) BY MOUTH DAILY FOR 30 DAYS, Disp: 30 tablet, Rfl: 1    loratadine (CLARITIN) 10 mg tablet, Take 1 tablet (10 mg total) by mouth daily, Disp: 10 tablet, Rfl: 1    Misc   Devices (BATH/SHOWER SEAT) MISC, by Does not apply route daily Patient needs a walk in shower, Disp: 1 each, Rfl: 0    montelukast (SINGULAIR) 10 mg tablet, TAKE 1 TABLET BY MOUTH DAILY AT BEDTIME, Disp: 30 tablet, Rfl: 1    montelukast (SINGULAIR) 10 mg tablet, TAKE 1 TABLET BY MOUTH DAILY AT BEDTIME, Disp: 90 tablet, Rfl: 1   ONETOUCH DELICA LANCETS 97V MISC, USE 2 (TWO) TIMES A DAY AS NEEDED (AS NEEDED FOR HIGH BLOOD SUGAR), Disp: 100 each, Rfl: 3    ONETOUCH DELICA LANCETS FINE MISC, by Device route 2 (two) times a day, Disp: 100 each, Rfl: 4    Promethazine-DM (PHENERGAN-DM) 6 25-15 mg/5 mL oral syrup, Take 5 mL by mouth 4 (four) times a day as needed for cough, Disp: 118 mL, Rfl: 1    Soap & Cleansers (BATH CLOTH CLEANSNG WASHCLOTHS) MISC, 4 each by Does not apply route every 4 (four) hours as needed (as needed for accident), Disp: 30 each, Rfl: 11    carbamide peroxide (DEBROX) 6 5 % otic solution, Administer 5 drops to the right ear 2 (two) times a day (Patient not taking: Reported on 4/21/2020), Disp: 15 mL, Rfl: 1    dicyclomine (BENTYL) 10 mg capsule, TAKE 1 CAPSULE (10 MG TOTAL) BY MOUTH 3 (THREE) TIMES A DAY BEFORE MEALS FOR 20 DAYS, Disp: 60 capsule, Rfl: 2    lisinopril (ZESTRIL) 40 mg tablet, Take 1 tablet (40 mg total) by mouth daily, Disp: 180 tablet, Rfl: 2    memantine (NAMENDA) 10 mg tablet, Take 1 tablet (10 mg total) by mouth 2 (two) times a day, Disp: 180 tablet, Rfl: 2    naproxen (NAPROSYN) 500 mg tablet, TAKE 1 TABLET (500 MG TOTAL) BY MOUTH 2 (TWO) TIMES A DAY WITH MEALS FOR 30 DAYS, Disp: 60 tablet, Rfl: 1    simvastatin (ZOCOR) 20 mg tablet, Take 1 tablet (20 mg total) by mouth daily at bedtime, Disp: 90 tablet, Rfl: 2    Current Allergies     Allergies as of 08/17/2020 - Reviewed 08/17/2020   Allergen Reaction Noted    Penicillins Itching and Swelling 02/15/2018            The following portions of the patient's history were reviewed and updated as appropriate: allergies, current medications, past family history, past medical history, past social history, past surgical history and problem list      Past Medical History:   Diagnosis Date    Aggression     [facility]'s [facility] [address] [facility]     Arthritis     Dementia ([address]     Diabetes insipidus ([facility]     Diabetes mellitus ([facility] )     High cholesterol  Hypertension     Memory loss     Migraine     Polyneuropathy     Rheumatoid arthritis ([address]     Serum lipids high     Stomach problems     Thyroid trouble        Past Surgical History:   Procedure Laterality Date    CATARACT EXTRACTION      CHOLECYSTECTOMY      GALLBLADDER SURGERY      HYSTERECTOMY      ND SHLDR ARTHROSCOP,SURG,W/ROTAT CUFF REPR Right 8/1/2019    Procedure: SHOULDER ARTHROSCOPIC ROTATOR CUFF REPAIR;  Surgeon: Mart Andres MD;  [facility];  Service: Orthopedics    ROTATOR CUFF REPAIR Right 08/01/2019       Family History   Problem Relation Age of Onset    Substance Abuse Mother         denied    Mental illness Mother         denied    Substance Abuse Father         denied    Mental illness Father         denied    Diabetes Brother     Blindness Brother     Arthritis Daughter     HIV Son          Medications have been verified  Objective   Pulse 57   Temp (!) 97 1 °F (36 2 °C) (Tympanic)   Resp 18   Ht 5' (1 524 m)   Wt 61 2 kg (135 lb)   SpO2 96%   BMI 26 37 kg/m²        Physical Exam     Physical Exam  Constitutional:       Appearance: She is ill-appearing  Eyes:      Extraocular Movements: Extraocular movements intact  Conjunctiva/sclera: Conjunctivae normal       Pupils: Pupils are equal, round, and reactive to light  Cardiovascular:      Rate and Rhythm: Normal rate and regular rhythm  Heart sounds: Normal heart sounds  Pulmonary:      Effort: Pulmonary effort is normal    Skin:     General: Skin is warm and dry  Neurological:      Mental Status: She is alert and oriented to person, place, and time     Psychiatric:      Comments: Appears fatigued

## 2020-08-18 LAB
ATRIAL RATE: 51 BPM
ATRIAL RATE: 57 BPM
P AXIS: 36 DEGREES
P AXIS: 52 DEGREES
PR INTERVAL: 188 MS
PR INTERVAL: 190 MS
QRS AXIS: -47 DEGREES
QRS AXIS: -47 DEGREES
QRSD INTERVAL: 106 MS
QRSD INTERVAL: 110 MS
QT INTERVAL: 432 MS
QT INTERVAL: 454 MS
QTC INTERVAL: 418 MS
QTC INTERVAL: 420 MS
T WAVE AXIS: 30 DEGREES
T WAVE AXIS: 54 DEGREES
VENTRICULAR RATE: 51 BPM
VENTRICULAR RATE: 57 BPM

## 2020-08-18 PROCEDURE — 93010 ELECTROCARDIOGRAM REPORT: CPT | Performed by: INTERNAL MEDICINE

## 2020-08-22 DIAGNOSIS — E78.1 HYPERTRIGLYCERIDEMIA: ICD-10-CM

## 2020-08-24 RX ORDER — FENOFIBRATE 160 MG/1
TABLET ORAL
Qty: 90 TABLET | Refills: 1 | Status: SHIPPED | OUTPATIENT
Start: 2020-08-24 | End: 2020-11-13 | Stop reason: SDUPTHER

## 2020-09-08 DIAGNOSIS — M25.511 ACUTE PAIN OF RIGHT SHOULDER: ICD-10-CM

## 2020-09-08 DIAGNOSIS — E03.9 HYPOTHYROIDISM, UNSPECIFIED TYPE: ICD-10-CM
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
## 2020-09-08 RX ORDER — LEVOTHYROXINE SODIUM 88 UG/1
TABLET ORAL
Qty: 30 TABLET | Refills: 1 | Status: SHIPPED | OUTPATIENT
Start: 2020-09-08 | End: 2020-11-13 | Stop reason: SDUPTHER

## 2020-09-08 RX ORDER — NAPROXEN 500 MG/1
500 TABLET ORAL 2 TIMES DAILY WITH MEALS
Qty: 60 TABLET | Refills: 1 | Status: SHIPPED | OUTPATIENT
Start: 2020-09-08 | End: 2020-11-13

## 2020-09-28 DIAGNOSIS — R19.7 DIARRHEA, UNSPECIFIED TYPE: ICD-10-CM

## 2020-09-28 RX ORDER — DICYCLOMINE HYDROCHLORIDE 10 MG/1
10 CAPSULE ORAL
Qty: 60 CAPSULE | Refills: 2 | Status: SHIPPED | OUTPATIENT
Start: 2020-09-28 | End: 2020-11-13

## 2020-11-13 ENCOUNTER — OFFICE VISIT (OUTPATIENT)
Dept: FAMILY MEDICINE CLINIC | Facility: CLINIC | Age: 80
End: 2020-11-13
Payer: COMMERCIAL

## 2020-11-13 VITALS
BODY MASS INDEX: 26.31 KG/M2 | WEIGHT: 134 LBS | HEART RATE: 64 BPM | DIASTOLIC BLOOD PRESSURE: 54 MMHG | OXYGEN SATURATION: 98 % | HEIGHT: 60 IN | SYSTOLIC BLOOD PRESSURE: 128 MMHG | TEMPERATURE: 96.5 F

## 2020-11-13 DIAGNOSIS — R09.81 NASAL CONGESTION: ICD-10-CM

## 2020-11-13 DIAGNOSIS — E78.1 HYPERTRIGLYCERIDEMIA: ICD-10-CM

## 2020-11-13 DIAGNOSIS — D64.9 ANEMIA, UNSPECIFIED TYPE: ICD-10-CM

## 2020-11-13 DIAGNOSIS — R05.9 COUGH: ICD-10-CM

## 2020-11-13 DIAGNOSIS — E03.9 HYPOTHYROIDISM, UNSPECIFIED TYPE: ICD-10-CM

## 2020-11-13 DIAGNOSIS — IMO0002 TYPE 2 DIABETES, UNCONTROLLED, WITH NEUROPATHY: ICD-10-CM

## 2020-11-13 DIAGNOSIS — E13.9 DIABETES 1.5, MANAGED AS TYPE 1 (HCC): ICD-10-CM

## 2020-11-13 DIAGNOSIS — R41.3 MEMORY LOSS: ICD-10-CM

## 2020-11-13 DIAGNOSIS — G47.62 LEG CRAMPS, SLEEP RELATED: ICD-10-CM

## 2020-11-13 DIAGNOSIS — E11.9 TYPE 2 DIABETES MELLITUS TREATED WITH INSULIN (HCC): ICD-10-CM

## 2020-11-13 DIAGNOSIS — Z79.4 TYPE 2 DIABETES MELLITUS TREATED WITH INSULIN (HCC): Primary | ICD-10-CM

## 2020-11-13 DIAGNOSIS — R32 URINARY INCONTINENCE, UNSPECIFIED TYPE: ICD-10-CM

## 2020-11-13 DIAGNOSIS — R15.9 INCONTINENCE OF FECES, UNSPECIFIED FECAL INCONTINENCE TYPE: ICD-10-CM

## 2020-11-13 DIAGNOSIS — Z23 NEEDS FLU SHOT: ICD-10-CM

## 2020-11-13 DIAGNOSIS — Z79.4 TYPE 2 DIABETES MELLITUS TREATED WITH INSULIN (HCC): ICD-10-CM

## 2020-11-13 DIAGNOSIS — E11.9 TYPE 2 DIABETES MELLITUS TREATED WITH INSULIN (HCC): Primary | ICD-10-CM

## 2020-11-13 DIAGNOSIS — IMO0002 TYPE 2 DIABETES, UNCONTROLLED, WITH NEUROPATHY: Primary | ICD-10-CM

## 2020-11-13 DIAGNOSIS — Z00.00 MEDICARE ANNUAL WELLNESS VISIT, SUBSEQUENT: ICD-10-CM

## 2020-11-13 DIAGNOSIS — F03.91 DEMENTIA WITH BEHAVIORAL DISTURBANCE, UNSPECIFIED DEMENTIA TYPE (HCC): ICD-10-CM

## 2020-11-13 DIAGNOSIS — E78.2 MIXED HYPERLIPIDEMIA: ICD-10-CM

## 2020-11-13 LAB — SL AMB POCT HEMOGLOBIN AIC: 9 (ref ?–6.5)

## 2020-11-13 PROCEDURE — 4040F PNEUMOC VAC/ADMIN/RCVD: CPT

## 2020-11-13 PROCEDURE — 1036F TOBACCO NON-USER: CPT

## 2020-11-13 PROCEDURE — 1125F AMNT PAIN NOTED PAIN PRSNT: CPT

## 2020-11-13 PROCEDURE — 1160F RVW MEDS BY RX/DR IN RCRD: CPT

## 2020-11-13 PROCEDURE — 3074F SYST BP LT 130 MM HG: CPT

## 2020-11-13 PROCEDURE — 1170F FXNL STATUS ASSESSED: CPT

## 2020-11-13 PROCEDURE — G0009 ADMIN PNEUMOCOCCAL VACCINE: HCPCS

## 2020-11-13 PROCEDURE — 90670 PCV13 VACCINE IM: CPT

## 2020-11-13 PROCEDURE — 90662 IIV NO PRSV INCREASED AG IM: CPT

## 2020-11-13 PROCEDURE — 3725F SCREEN DEPRESSION PERFORMED: CPT

## 2020-11-13 PROCEDURE — 99214 OFFICE O/P EST MOD 30 MIN: CPT

## 2020-11-13 PROCEDURE — 83036 HEMOGLOBIN GLYCOSYLATED A1C: CPT

## 2020-11-13 PROCEDURE — G0439 PPPS, SUBSEQ VISIT: HCPCS

## 2020-11-13 PROCEDURE — 3078F DIAST BP <80 MM HG: CPT

## 2020-11-13 PROCEDURE — G0008 ADMIN INFLUENZA VIRUS VAC: HCPCS

## 2020-11-13 RX ORDER — LEVOTHYROXINE SODIUM 88 UG/1
88 TABLET ORAL DAILY
Qty: 90 TABLET | Refills: 1 | Status: SHIPPED | OUTPATIENT
Start: 2020-11-13 | End: 2020-11-23 | Stop reason: SDUPTHER

## 2020-11-13 RX ORDER — PEN NEEDLE, DIABETIC 32GX 5/32"
1 NEEDLE, DISPOSABLE MISCELLANEOUS 4 TIMES DAILY
Qty: 120 EACH | Refills: 11 | Status: SHIPPED | OUTPATIENT
Start: 2020-11-13 | End: 2020-12-23

## 2020-11-13 RX ORDER — INSULIN GLARGINE 100 [IU]/ML
INJECTION, SOLUTION SUBCUTANEOUS
Qty: 15 PEN | Refills: 5 | Status: SHIPPED | OUTPATIENT
Start: 2020-11-13 | End: 2021-01-25 | Stop reason: SDUPTHER

## 2020-11-13 RX ORDER — FLUTICASONE PROPIONATE 50 MCG
1 SPRAY, SUSPENSION (ML) NASAL DAILY
Qty: 1 BOTTLE | Refills: 1 | Status: SHIPPED | OUTPATIENT
Start: 2020-11-13 | End: 2021-01-18

## 2020-11-13 RX ORDER — MEMANTINE HYDROCHLORIDE 10 MG/1
10 TABLET ORAL 2 TIMES DAILY
Qty: 180 TABLET | Refills: 1 | Status: SHIPPED | OUTPATIENT
Start: 2020-11-13 | End: 2021-11-01 | Stop reason: SDUPTHER

## 2020-11-13 RX ORDER — DONEPEZIL HYDROCHLORIDE 10 MG/1
10 TABLET, FILM COATED ORAL
Qty: 90 TABLET | Refills: 1 | Status: SHIPPED | OUTPATIENT
Start: 2020-11-13 | End: 2021-07-26

## 2020-11-13 RX ORDER — BLOOD-GLUCOSE METER
EACH MISCELLANEOUS
Qty: 1 KIT | Refills: 0 | Status: SHIPPED | OUTPATIENT
Start: 2020-11-13 | End: 2022-06-15

## 2020-11-13 RX ORDER — FLASH GLUCOSE SENSOR
1 KIT MISCELLANEOUS DAILY
Qty: 1 EACH | Refills: 3 | Status: SHIPPED | OUTPATIENT
Start: 2020-11-13 | End: 2021-01-20 | Stop reason: SDUPTHER

## 2020-11-13 RX ORDER — FENOFIBRATE 160 MG/1
160 TABLET ORAL DAILY
Qty: 90 TABLET | Refills: 1 | Status: SHIPPED | OUTPATIENT
Start: 2020-11-13 | End: 2021-05-18

## 2020-11-13 RX ORDER — FLASH GLUCOSE SENSOR
1 KIT MISCELLANEOUS
Qty: 6 EACH | Refills: 3 | Status: SHIPPED | OUTPATIENT
Start: 2020-11-13 | End: 2021-09-03

## 2020-11-13 RX ORDER — BLOOD SUGAR DIAGNOSTIC
STRIP MISCELLANEOUS
Qty: 100 EACH | Refills: 2 | Status: SHIPPED | OUTPATIENT
Start: 2020-11-13 | End: 2021-03-19

## 2020-11-13 RX ORDER — CANAGLIFLOZIN 300 MG/1
300 TABLET, FILM COATED ORAL DAILY
Qty: 90 TABLET | Refills: 1 | Status: SHIPPED | OUTPATIENT
Start: 2020-11-13 | End: 2021-04-27

## 2020-11-13 RX ORDER — LANCETS 33 GAUGE
EACH MISCELLANEOUS
Qty: 100 EACH | Refills: 3 | Status: SHIPPED | OUTPATIENT
Start: 2020-11-13 | End: 2021-07-26

## 2020-11-13 RX ORDER — LISINOPRIL 40 MG/1
40 TABLET ORAL DAILY
Qty: 90 TABLET | Refills: 1 | Status: SHIPPED | OUTPATIENT
Start: 2020-11-13 | End: 2021-08-11

## 2020-11-13 RX ORDER — GABAPENTIN 100 MG/1
200 CAPSULE ORAL
Qty: 180 CAPSULE | Refills: 1 | Status: SHIPPED | OUTPATIENT
Start: 2020-11-13 | End: 2021-06-03

## 2020-11-13 RX ORDER — MONTELUKAST SODIUM 10 MG/1
10 TABLET ORAL
Qty: 90 TABLET | Refills: 1 | Status: SHIPPED | OUTPATIENT
Start: 2020-11-13 | End: 2021-07-06

## 2020-11-13 RX ORDER — DONEPEZIL HYDROCHLORIDE 10 MG/1
10 TABLET, FILM COATED ORAL
COMMUNITY
End: 2020-11-13 | Stop reason: SDUPTHER

## 2020-11-13 RX ORDER — FLASH GLUCOSE SCANNING READER
1 EACH MISCELLANEOUS ONCE
Qty: 1 DEVICE | Refills: 0 | Status: SHIPPED | OUTPATIENT
Start: 2020-11-13 | End: 2020-11-13

## 2020-11-13 RX ORDER — INSULIN GLARGINE 100 [IU]/ML
10 INJECTION, SOLUTION SUBCUTANEOUS
Qty: 15 PEN | Refills: 5 | Status: SHIPPED | OUTPATIENT
Start: 2020-11-13 | End: 2020-11-13 | Stop reason: SDUPTHER

## 2020-11-13 RX ORDER — FACIAL-BODY WIPES
EACH TOPICAL
Qty: 180 EACH | Refills: 5 | Status: SHIPPED | OUTPATIENT
Start: 2020-11-13 | End: 2020-12-23

## 2020-11-20 ENCOUNTER — LAB (OUTPATIENT)
Dept: LAB | Facility: CLINIC | Age: 80
End: 2020-11-20
Payer: COMMERCIAL

## 2020-11-20 DIAGNOSIS — IMO0002 TYPE 2 DIABETES, UNCONTROLLED, WITH NEUROPATHY: ICD-10-CM

## 2020-11-20 DIAGNOSIS — E03.9 HYPOTHYROIDISM, UNSPECIFIED TYPE: ICD-10-CM

## 2020-11-20 DIAGNOSIS — D64.9 ANEMIA, UNSPECIFIED TYPE: ICD-10-CM

## 2020-11-20 DIAGNOSIS — E78.2 MIXED HYPERLIPIDEMIA: ICD-10-CM

## 2020-11-20 LAB
ALBUMIN SERPL BCP-MCNC: 4.2 G/DL (ref 3.5–5)
ALP SERPL-CCNC: 42 U/L (ref 46–116)
ALT SERPL W P-5'-P-CCNC: 23 U/L (ref 12–78)
ANION GAP SERPL CALCULATED.3IONS-SCNC: 3 MMOL/L (ref 4–13)
AST SERPL W P-5'-P-CCNC: 14 U/L (ref 5–45)
BACTERIA UR QL AUTO: ABNORMAL /HPF
BASOPHILS # BLD AUTO: 0.04 THOUSANDS/ΜL (ref 0–0.1)
BASOPHILS NFR BLD AUTO: 1 % (ref 0–1)
BILIRUB SERPL-MCNC: 0.29 MG/DL (ref 0.2–1)
BILIRUB UR QL STRIP: NEGATIVE
BUN SERPL-MCNC: 38 MG/DL (ref 5–25)
CALCIUM SERPL-MCNC: 10.7 MG/DL (ref 8.3–10.1)
CHLORIDE SERPL-SCNC: 112 MMOL/L (ref 100–108)
CHOLEST SERPL-MCNC: 149 MG/DL (ref 50–200)
CLARITY UR: CLEAR
CO2 SERPL-SCNC: 29 MMOL/L (ref 21–32)
COLOR UR: YELLOW
CREAT SERPL-MCNC: 1.6 MG/DL (ref 0.6–1.3)
CREAT UR-MCNC: 51.9 MG/DL
EOSINOPHIL # BLD AUTO: 0.27 THOUSAND/ΜL (ref 0–0.61)
EOSINOPHIL NFR BLD AUTO: 6 % (ref 0–6)
ERYTHROCYTE [DISTWIDTH] IN BLOOD BY AUTOMATED COUNT: 13.2 % (ref 11.6–15.1)
FOLATE SERPL-MCNC: 12.1 NG/ML (ref 3.1–17.5)
GFR SERPL CREATININE-BSD FRML MDRD: 30 ML/MIN/1.73SQ M
GLUCOSE SERPL-MCNC: 104 MG/DL (ref 65–140)
GLUCOSE UR STRIP-MCNC: ABNORMAL MG/DL
HCT VFR BLD AUTO: 36.9 % (ref 34.8–46.1)
HDLC SERPL-MCNC: 57 MG/DL
HGB BLD-MCNC: 11.2 G/DL (ref 11.5–15.4)
HGB UR QL STRIP.AUTO: NEGATIVE
HYALINE CASTS #/AREA URNS LPF: ABNORMAL /LPF
IMM GRANULOCYTES # BLD AUTO: 0.01 THOUSAND/UL (ref 0–0.2)
IMM GRANULOCYTES NFR BLD AUTO: 0 % (ref 0–2)
IRON SERPL-MCNC: 65 UG/DL (ref 50–170)
KETONES UR STRIP-MCNC: NEGATIVE MG/DL
LDLC SERPL CALC-MCNC: 76 MG/DL (ref 0–100)
LEUKOCYTE ESTERASE UR QL STRIP: ABNORMAL
LYMPHOCYTES # BLD AUTO: 1.38 THOUSANDS/ΜL (ref 0.6–4.47)
LYMPHOCYTES NFR BLD AUTO: 30 % (ref 14–44)
MCH RBC QN AUTO: 27.3 PG (ref 26.8–34.3)
MCHC RBC AUTO-ENTMCNC: 30.4 G/DL (ref 31.4–37.4)
MCV RBC AUTO: 90 FL (ref 82–98)
MICROALBUMIN UR-MCNC: 106 MG/L (ref 0–20)
MICROALBUMIN/CREAT 24H UR: 204 MG/G CREATININE (ref 0–30)
MONOCYTES # BLD AUTO: 0.29 THOUSAND/ΜL (ref 0.17–1.22)
MONOCYTES NFR BLD AUTO: 6 % (ref 4–12)
NEUTROPHILS # BLD AUTO: 2.65 THOUSANDS/ΜL (ref 1.85–7.62)
NEUTS SEG NFR BLD AUTO: 57 % (ref 43–75)
NITRITE UR QL STRIP: NEGATIVE
NON-SQ EPI CELLS URNS QL MICRO: ABNORMAL /HPF
NONHDLC SERPL-MCNC: 92 MG/DL
NRBC BLD AUTO-RTO: 0 /100 WBCS
PH UR STRIP.AUTO: 6.5 [PH]
PLATELET # BLD AUTO: 171 THOUSANDS/UL (ref 149–390)
PMV BLD AUTO: 13.3 FL (ref 8.9–12.7)
POTASSIUM SERPL-SCNC: 4.8 MMOL/L (ref 3.5–5.3)
PROT SERPL-MCNC: 7.4 G/DL (ref 6.4–8.2)
PROT UR STRIP-MCNC: ABNORMAL MG/DL
RBC # BLD AUTO: 4.11 MILLION/UL (ref 3.81–5.12)
RBC #/AREA URNS AUTO: ABNORMAL /HPF
SODIUM SERPL-SCNC: 144 MMOL/L (ref 136–145)
SP GR UR STRIP.AUTO: 1.02 (ref 1–1.03)
T4 FREE SERPL-MCNC: 1.12 NG/DL (ref 0.76–1.46)
TRIGL SERPL-MCNC: 78 MG/DL
TSH SERPL DL<=0.05 MIU/L-ACNC: 3.94 UIU/ML (ref 0.36–3.74)
UROBILINOGEN UR QL STRIP.AUTO: 0.2 E.U./DL
VIT B12 SERPL-MCNC: 312 PG/ML (ref 100–900)
WBC # BLD AUTO: 4.64 THOUSAND/UL (ref 4.31–10.16)
WBC #/AREA URNS AUTO: ABNORMAL /HPF

## 2020-11-20 PROCEDURE — 82043 UR ALBUMIN QUANTITATIVE: CPT | Performed by: FAMILY MEDICINE

## 2020-11-20 PROCEDURE — 36415 COLL VENOUS BLD VENIPUNCTURE: CPT

## 2020-11-20 PROCEDURE — 80061 LIPID PANEL: CPT

## 2020-11-20 PROCEDURE — 82607 VITAMIN B-12: CPT

## 2020-11-20 PROCEDURE — 82746 ASSAY OF FOLIC ACID SERUM: CPT

## 2020-11-20 PROCEDURE — 84439 ASSAY OF FREE THYROXINE: CPT

## 2020-11-20 PROCEDURE — 81001 URINALYSIS AUTO W/SCOPE: CPT | Performed by: FAMILY MEDICINE

## 2020-11-20 PROCEDURE — 84443 ASSAY THYROID STIM HORMONE: CPT

## 2020-11-20 PROCEDURE — 83540 ASSAY OF IRON: CPT

## 2020-11-20 PROCEDURE — 82570 ASSAY OF URINE CREATININE: CPT | Performed by: FAMILY MEDICINE

## 2020-11-20 PROCEDURE — 85025 COMPLETE CBC W/AUTO DIFF WBC: CPT

## 2020-11-20 PROCEDURE — 80053 COMPREHEN METABOLIC PANEL: CPT

## 2020-11-23 DIAGNOSIS — IMO0002 TYPE 2 DIABETES, UNCONTROLLED, WITH NEUROPATHY: Primary | ICD-10-CM

## 2020-11-23 DIAGNOSIS — E03.9 HYPOTHYROIDISM, UNSPECIFIED TYPE: ICD-10-CM

## 2020-11-23 DIAGNOSIS — E83.52 HYPERCALCEMIA: ICD-10-CM

## 2020-11-23 DIAGNOSIS — Z79.4 TYPE 2 DIABETES MELLITUS TREATED WITH INSULIN (HCC): ICD-10-CM

## 2020-11-23 DIAGNOSIS — E11.9 TYPE 2 DIABETES MELLITUS TREATED WITH INSULIN (HCC): ICD-10-CM

## 2020-11-23 RX ORDER — LEVOTHYROXINE SODIUM 0.1 MG/1
100 TABLET ORAL DAILY
Qty: 30 TABLET | Refills: 2 | Status: SHIPPED | OUTPATIENT
Start: 2020-11-23 | End: 2021-01-25 | Stop reason: SDUPTHER

## 2020-11-23 RX ORDER — PEN NEEDLE, DIABETIC 32GX 5/32"
NEEDLE, DISPOSABLE MISCELLANEOUS 2 TIMES DAILY
Qty: 100 EACH | Refills: 5 | Status: SHIPPED | OUTPATIENT
Start: 2020-11-23 | End: 2021-01-25 | Stop reason: SDUPTHER

## 2020-12-23 ENCOUNTER — TELEPHONE (OUTPATIENT)
Dept: FAMILY MEDICINE CLINIC | Facility: CLINIC | Age: 80
End: 2020-12-23

## 2020-12-23 ENCOUNTER — OFFICE VISIT (OUTPATIENT)
Dept: FAMILY MEDICINE CLINIC | Facility: CLINIC | Age: 80
End: 2020-12-23
Payer: COMMERCIAL

## 2020-12-23 VITALS
SYSTOLIC BLOOD PRESSURE: 144 MMHG | TEMPERATURE: 96.8 F | HEIGHT: 60 IN | HEART RATE: 70 BPM | OXYGEN SATURATION: 93 % | DIASTOLIC BLOOD PRESSURE: 58 MMHG | BODY MASS INDEX: 26.46 KG/M2 | WEIGHT: 134.8 LBS

## 2020-12-23 DIAGNOSIS — R32 URINARY INCONTINENCE, UNSPECIFIED TYPE: ICD-10-CM

## 2020-12-23 DIAGNOSIS — D64.9 ANEMIA, UNSPECIFIED TYPE: ICD-10-CM

## 2020-12-23 DIAGNOSIS — E11.9 TYPE 2 DIABETES MELLITUS TREATED WITH INSULIN (HCC): Primary | ICD-10-CM

## 2020-12-23 DIAGNOSIS — Z79.4 TYPE 2 DIABETES MELLITUS TREATED WITH INSULIN (HCC): Primary | ICD-10-CM

## 2020-12-23 PROCEDURE — 1036F TOBACCO NON-USER: CPT | Performed by: FAMILY MEDICINE

## 2020-12-23 PROCEDURE — 3078F DIAST BP <80 MM HG: CPT | Performed by: FAMILY MEDICINE

## 2020-12-23 PROCEDURE — 3077F SYST BP >= 140 MM HG: CPT | Performed by: FAMILY MEDICINE

## 2020-12-23 PROCEDURE — 99213 OFFICE O/P EST LOW 20 MIN: CPT | Performed by: FAMILY MEDICINE

## 2020-12-23 PROCEDURE — 1160F RVW MEDS BY RX/DR IN RCRD: CPT | Performed by: FAMILY MEDICINE

## 2020-12-23 RX ORDER — PEN NEEDLE, DIABETIC 32GX 5/32"
NEEDLE, DISPOSABLE MISCELLANEOUS
Qty: 200 EACH | Refills: 3 | Status: SHIPPED | OUTPATIENT
Start: 2020-12-23 | End: 2020-12-23 | Stop reason: SDUPTHER

## 2020-12-23 RX ORDER — SIMVASTATIN 20 MG
20 TABLET ORAL DAILY
COMMUNITY
Start: 2020-12-14 | End: 2022-04-15

## 2020-12-23 RX ORDER — FACIAL-BODY WIPES
EACH TOPICAL
Qty: 300 EACH | Refills: 3 | Status: SHIPPED | OUTPATIENT
Start: 2020-12-23 | End: 2021-01-25

## 2020-12-23 RX ORDER — PEN NEEDLE, DIABETIC 32GX 5/32"
NEEDLE, DISPOSABLE MISCELLANEOUS
Qty: 200 EACH | Refills: 3 | Status: SHIPPED | OUTPATIENT
Start: 2020-12-23 | End: 2021-01-25

## 2020-12-23 RX ORDER — FACIAL-BODY WIPES
EACH TOPICAL
Qty: 300 EACH | Refills: 3 | Status: SHIPPED | OUTPATIENT
Start: 2020-12-23 | End: 2020-12-23 | Stop reason: SDUPTHER

## 2021-01-04 ENCOUNTER — TELEPHONE (OUTPATIENT)
Dept: FAMILY MEDICINE CLINIC | Facility: CLINIC | Age: 81
End: 2021-01-04

## 2021-01-04 DIAGNOSIS — R43.0 ANOSMIA: ICD-10-CM

## 2021-01-04 DIAGNOSIS — R43.0 ANOSMIA: Primary | ICD-10-CM

## 2021-01-04 PROCEDURE — U0003 INFECTIOUS AGENT DETECTION BY NUCLEIC ACID (DNA OR RNA); SEVERE ACUTE RESPIRATORY SYNDROME CORONAVIRUS 2 (SARS-COV-2) (CORONAVIRUS DISEASE [COVID-19]), AMPLIFIED PROBE TECHNIQUE, MAKING USE OF HIGH THROUGHPUT TECHNOLOGIES AS DESCRIBED BY CMS-2020-01-R: HCPCS | Performed by: FAMILY MEDICINE

## 2021-01-04 NOTE — TELEPHONE ENCOUNTER
I put a Covid test order in chart advise pt to go to the [facility] for testing

## 2021-01-04 NOTE — TELEPHONE ENCOUNTER
Granddaughter called today and stated that patient started in with loss of taste and smell - no covid contacts that she knows of - please advise

## 2021-01-05 LAB — SARS-COV-2 RNA SPEC QL NAA+PROBE: NOT DETECTED

## 2021-01-17 DIAGNOSIS — R09.81 NASAL CONGESTION: ICD-10-CM

## 2021-01-18 RX ORDER — FLUTICASONE PROPIONATE 50 MCG
SPRAY, SUSPENSION (ML) NASAL
Qty: 16 ML | Refills: 1 | Status: SHIPPED | OUTPATIENT
Start: 2021-01-18 | End: 2021-04-27

## 2021-01-20 ENCOUNTER — OFFICE VISIT (OUTPATIENT)
Dept: NEUROLOGY | Facility: CLINIC | Age: 81
End: 2021-01-20
Payer: COMMERCIAL

## 2021-01-20 VITALS
HEART RATE: 61 BPM | BODY MASS INDEX: 27.48 KG/M2 | HEIGHT: 60 IN | WEIGHT: 140 LBS | SYSTOLIC BLOOD PRESSURE: 150 MMHG | DIASTOLIC BLOOD PRESSURE: 70 MMHG

## 2021-01-20 DIAGNOSIS — F41.9 ANXIETY: ICD-10-CM

## 2021-01-20 DIAGNOSIS — F02.80 LATE ONSET ALZHEIMER'S DISEASE WITHOUT BEHAVIORAL DISTURBANCE (HCC): ICD-10-CM

## 2021-01-20 DIAGNOSIS — G30.1 LATE ONSET ALZHEIMER'S DISEASE WITHOUT BEHAVIORAL DISTURBANCE (HCC): ICD-10-CM

## 2021-01-20 DIAGNOSIS — R41.3 MEMORY LOSS: Primary | ICD-10-CM

## 2021-01-20 PROCEDURE — 3077F SYST BP >= 140 MM HG: CPT | Performed by: PSYCHIATRY & NEUROLOGY

## 2021-01-20 PROCEDURE — 3078F DIAST BP <80 MM HG: CPT | Performed by: PSYCHIATRY & NEUROLOGY

## 2021-01-20 PROCEDURE — 99214 OFFICE O/P EST MOD 30 MIN: CPT | Performed by: PSYCHIATRY & NEUROLOGY

## 2021-01-20 RX ORDER — SERTRALINE HYDROCHLORIDE 25 MG/1
25 TABLET, FILM COATED ORAL DAILY
Qty: 30 TABLET | Refills: 3 | Status: SHIPPED | OUTPATIENT
Start: 2021-01-20 | End: 2021-03-04 | Stop reason: SDUPTHER

## 2021-01-20 RX ORDER — MAGNESIUM 30 MG
30 TABLET ORAL 2 TIMES DAILY
COMMUNITY
End: 2021-07-12

## 2021-01-20 RX ORDER — RIBOFLAVIN (VITAMIN B2) 100 MG
100 TABLET ORAL DAILY
COMMUNITY
End: 2021-07-12

## 2021-01-20 NOTE — PROGRESS NOTES
Progress Note - Neurology   Jose Moura 79 y o  female MRN: [id number]  Unit/Bed#:  Encounter: [id number]      Subjective:   Patient is here for a follow-up visit for dementia, and was last seen by us over 2 years ago, has been maintained on Aricept and Namenda followed up with her PCP on a regular basis however since September of last year the granddaughter who is her main caregiver has noticed further decline as far as her cognitive functioning is concerned  She requires more assistance with her ADLs, has been wearing depends, has difficulty with voiding, does tend to step out of the house if not supervised  There have been no significant stressors since her recent decline, although she does tend to appear somewhat more anxious at times  No recent infections, head trauma, or other precipitating factors known  Patient also is on multivitamins and has been sleep well  ROS:   Review of Systems   Constitutional: Negative for appetite change, fatigue and fever  HENT: Positive for congestion and hearing loss  Negative for drooling, ear pain, tinnitus, trouble swallowing and voice change  Eyes: Positive for pain and visual disturbance (Blurry vision)  Negative for photophobia  Respiratory: Positive for cough  Negative for chest tightness and shortness of breath  Cardiovascular: Negative for chest pain, palpitations and leg swelling  Gastrointestinal: Positive for diarrhea  Negative for abdominal pain, constipation, nausea and vomiting  Endocrine: Negative for cold intolerance and heat intolerance  Genitourinary: Positive for urgency  Negative for difficulty urinating and frequency  Musculoskeletal: Positive for back pain (Upper back), gait problem (Balance), myalgias and neck pain  Negative for joint swelling  Skin: Negative for rash  Neurological: Positive for weakness, numbness (And tingling on both legs) and headaches   Negative for dizziness, tremors, seizures, syncope, facial asymmetry, speech difficulty and light-headedness  Psychiatric/Behavioral: Positive for confusion, decreased concentration, dysphoric mood and sleep disturbance  Negative for agitation and behavioral problems  The patient is nervous/anxious  The patient is not hyperactive  MA review of systems was reviewed by myself  Vitals:   Vitals:    01/20/21 1356   BP: 150/70   BP Location: Left arm   Patient Position: Sitting   Cuff Size: Adult   Pulse: 61   Weight: 63 5 kg (140 lb)   Height: 5' (1 524 m)   ,Body mass index is 27 34 kg/m²      MEDS:      Current Outpatient Medications:     Ascorbic Acid (vitamin C) 100 MG tablet, Take 100 mg by mouth daily, Disp: , Rfl:     Blood Glucose Monitoring Suppl (OneTouch Verio) w/Device KIT, Use as directed, Disp: 1 kit, Rfl: 0    Blood Pressure KIT, by Does not apply route daily, Disp: 1 each, Rfl: 1    Canagliflozin (Invokana) 300 MG TABS, Take 1 tablet (300 mg total) by mouth daily, Disp: 90 tablet, Rfl: 1    Continuous Blood Gluc Sensor (AuterraStyle Aruna 14 Day Sensor) MISC, Use 1 each every 14 (fourteen) days, Disp: 6 each, Rfl: 3    donepezil (ARICEPT) 10 mg tablet, Take 1 tablet (10 mg total) by mouth daily at bedtime, Disp: 90 tablet, Rfl: 1    fenofibrate (TRIGLIDE) 160 MG tablet, Take 1 tablet (160 mg total) by mouth daily, Disp: 90 tablet, Rfl: 1    fluticasone (FLONASE) 50 mcg/act nasal spray, SPRAY 1 SPRAY INTO EACH NOSTRIL EVERY DAY, Disp: 16 mL, Rfl: 1    gabapentin (NEURONTIN) 100 mg capsule, Take 2 capsules (200 mg total) by mouth daily at bedtime, Disp: 180 capsule, Rfl: 1    glucose blood (OneTouch Verio) test strip, USE TO TEST TID, Disp: 100 each, Rfl: 2    insulin glargine (Lantus SoloStar) 100 units/mL injection pen, Take 10 units in the am and 20 units in the pm, Disp: 15 pen, Rfl: 5    Insulin Pen Needle (BD Pen Needle Elisabeth U/F) 32G X 4 MM MISC, Use 2 (two) times a day, Disp: 100 each, Rfl: 5    levothyroxine 100 mcg tablet, Take 1 tablet (100 mcg total) by mouth daily, Disp: 30 tablet, Rfl: 2    lisinopril (ZESTRIL) 40 mg tablet, Take 1 tablet (40 mg total) by mouth daily, Disp: 90 tablet, Rfl: 1    magnesium 30 MG tablet, Take 30 mg by mouth 2 (two) times a day, Disp: , Rfl:     memantine (NAMENDA) 10 mg tablet, Take 1 tablet (10 mg total) by mouth 2 (two) times a day, Disp: 180 tablet, Rfl: 1    montelukast (SINGULAIR) 10 mg tablet, Take 1 tablet (10 mg total) by mouth daily at bedtime, Disp: 90 tablet, Rfl: 1    OneTouch Delica Lancets 66R MISC, TEST BS TID, Disp: 100 each, Rfl: 3    simvastatin (ZOCOR) 20 mg tablet, Take 20 mg by mouth daily , Disp: , Rfl:     Incontinence Supply Disposable (Comfort Shield Adult Diapers) MISC, Change 4-6 times a day as needed (Patient not taking: Reported on 1/20/2021), Disp: 200 each, Rfl: 3    Incontinence Supply Disposable (Moist Wipes Flushable) MISC, Use 6 (six) times a day 4-6 times daily as needed (Patient not taking: Reported on 1/20/2021), Disp: 300 each, Rfl: 3    sertraline (ZOLOFT) 25 mg tablet, Take 1 tablet (25 mg total) by mouth daily, Disp: 30 tablet, Rfl: 3  :    Physical Exam:  General appearance: alert, appears stated age and cooperative  Head: Normocephalic, without obvious abnormality, atraumatic    On neurological examination patient is alert awake oriented, she knows what part of the day it is, Matt cognitive assessment reveals a score of 15/30 as compared to 23/30 2 years ago, she could obey simple commands, her speech is fluent although she is predominantly [place] speaking, no abnormal deficits were noted on cranial nerve exam, no evidence of any drift of focal weakness was noted on motor and sensory exam, deep tendon reflex are 1+, no evidence of any dysmetria and her gait is normal based  Lab Results: I have personally reviewed pertinent reports  Imaging Studies: I have personally reviewed pertinent reports  Assessment:  1   Late onset Alzheimer's dementia without behavioral dysfunction  2  Anxiety disorder  Plan:  After lengthy discussion with the granddaughter, certain new caregiver strategies were discussed with her to help her cope, in the meanwhile will also consider starting her on a small dose of sertraline at 25 mg daily since underlying anxiety and mood could also appear to make her dementia symptoms worse  Although over the last 2 years the patient's Toombs score has dropped down from 23 to 15 which could also be a natural progression of the illness  Patient's daughter was explained the same  She is advised to continue present medications at this time  She will return back to see me in 4 months  Mental stimulating exercises were discussed  1/20/2021,3:03 PM    Dictation voice to text software has been used in the creation of this document  Please consider this in light of any contextual or grammatical errors

## 2021-01-25 ENCOUNTER — TELEMEDICINE (OUTPATIENT)
Dept: FAMILY MEDICINE CLINIC | Facility: CLINIC | Age: 81
End: 2021-01-25
Payer: COMMERCIAL

## 2021-01-25 DIAGNOSIS — M53.3 COCCYALGIA: ICD-10-CM

## 2021-01-25 DIAGNOSIS — K62.5 BRIGHT RED BLOOD PER RECTUM: ICD-10-CM

## 2021-01-25 DIAGNOSIS — E03.9 HYPOTHYROIDISM, UNSPECIFIED TYPE: ICD-10-CM

## 2021-01-25 DIAGNOSIS — Z79.4 TYPE 2 DIABETES MELLITUS TREATED WITH INSULIN (HCC): Primary | ICD-10-CM

## 2021-01-25 DIAGNOSIS — D64.9 ANEMIA, UNSPECIFIED TYPE: ICD-10-CM

## 2021-01-25 DIAGNOSIS — E11.9 TYPE 2 DIABETES MELLITUS TREATED WITH INSULIN (HCC): Primary | ICD-10-CM

## 2021-01-25 PROCEDURE — 1160F RVW MEDS BY RX/DR IN RCRD: CPT | Performed by: FAMILY MEDICINE

## 2021-01-25 PROCEDURE — 1036F TOBACCO NON-USER: CPT | Performed by: FAMILY MEDICINE

## 2021-01-25 PROCEDURE — 99214 OFFICE O/P EST MOD 30 MIN: CPT | Performed by: FAMILY MEDICINE

## 2021-01-25 RX ORDER — PEN NEEDLE, DIABETIC 32GX 5/32"
NEEDLE, DISPOSABLE MISCELLANEOUS 2 TIMES DAILY
Qty: 100 EACH | Refills: 5 | Status: SHIPPED | OUTPATIENT
Start: 2021-01-25

## 2021-01-25 RX ORDER — LEVOTHYROXINE SODIUM 0.1 MG/1
100 TABLET ORAL DAILY
Qty: 30 TABLET | Refills: 2 | Status: SHIPPED | OUTPATIENT
Start: 2021-01-25 | End: 2021-04-27

## 2021-01-25 RX ORDER — INSULIN GLARGINE 100 [IU]/ML
INJECTION, SOLUTION SUBCUTANEOUS
Qty: 15 PEN | Refills: 5 | Status: SHIPPED | OUTPATIENT
Start: 2021-01-25 | End: 2021-09-02

## 2021-01-25 RX ORDER — ACETAMINOPHEN AND CODEINE PHOSPHATE 60; 300 MG/1; MG/1
1 TABLET ORAL EVERY 4 HOURS PRN
Qty: 30 TABLET | Refills: 0 | Status: SHIPPED | OUTPATIENT
Start: 2021-01-25 | End: 2021-07-14 | Stop reason: HOSPADM

## 2021-01-25 NOTE — PROGRESS NOTES
Virtual Brief Visit    Assessment/Plan:    Problem List Items Addressed This Visit        Digestive    Bright red blood per rectum    Relevant Orders    CBC and differential    Ambulatory referral to Gastroenterology       Endocrine    Hypothyroidism    Relevant Medications    levothyroxine 100 mcg tablet    Type 2 diabetes mellitus treated with insulin (HCC) - Primary    Relevant Medications    insulin glargine (Lantus SoloStar) 100 units/mL injection pen    Insulin Pen Needle (BD Pen Needle Elisabeth U/F) 32G X 4 MM MISC    Other Relevant Orders    Comprehensive metabolic panel    Hemoglobin A1C       Other    Anemia    Relevant Orders    CBC and differential    Coccyalgia    Relevant Medications    acetaminophen-codeine (TYLENOL #4) 300-60 MG per tablet        Follow up in 3 months or as needed        Reason for visit is   Chief Complaint   Patient presents with    Diabetes    Virtual Brief Visit        Encounter provider Emely Rush MD    Provider located at [place] [address] [place] [id number]    Recent Visits  No visits were found meeting these conditions  Showing recent visits within past 7 days and meeting all other requirements     Today's Visits  Date Type Provider Dept   01/25/21 Telemedicine Emely Rush MD [address] today's visits and meeting all other requirements     Future Appointments  No visits were found meeting these conditions  Showing future appointments within next 150 days and meeting all other requirements        After connecting through telephone, the patient was identified by name and date of birth  Shyanne Stafford was informed that this is a telemedicine visit and that the visit is being conducted through telephone  My office door was closed  No one else was in the room  She acknowledged consent and understanding of privacy and security of the platform   The patient has agreed to participate and understands she can discontinue the visit at any time  It was my intent to perform this visit via video technology but the patient was not able to do a video connection so the visit was completed via audio telephone only  Patient is aware this is a billable service  Farhan Beltran is a 79 y o  female Type 2 diabetes Mellitus, Anemia and Bright red blood per rectum  Diabetes Mellitus  Patient presents for follow up of diabetes  Current symptoms include: none  Symptoms have been well-controlled  Patient denies foot ulcerations and hyperglycemia  Evaluation to date has included: fasting blood sugar and hemoglobin A1C  Home sugars: BGs consistently in an acceptable range  Current treatment: Continued insulin which has been effective  Also she has been having bright red blood per rectum associated with anemia  In additon she fell recently and his her coccyx has been having pain since         Past Medical History:   Diagnosis Date    Aggression     [facility]'s [facility] [facility]     Arthritis     Dementia ([facility]     Diabetes insipidus ([facility]     Diabetes mellitus ([address]     High cholesterol     Hypertension     Memory loss     Migraine     Polyneuropathy     Rheumatoid arthritis [facility] [facility]     Serum lipids high     Stomach problems     Thyroid trouble        Past Surgical History:   Procedure Laterality Date    CATARACT EXTRACTION      CHOLECYSTECTOMY      GALLBLADDER SURGERY      HYSTERECTOMY      [facility] SHLDR ARTHROSCOP,SURG,W/ROTAT CUFF REPR Right 8/1/2019    Procedure: SHOULDER ARTHROSCOPIC ROTATOR CUFF REPAIR;  Surgeon: Miguel Pickard MD;  Location: [facility];  Service: Orthopedics    ROTATOR CUFF REPAIR Right 08/01/2019       Current Outpatient Medications   Medication Sig Dispense Refill    acetaminophen-codeine (TYLENOL #4) 300-60 MG per tablet Take 1 tablet by mouth every 4 (four) hours as needed for moderate pain 30 tablet 0    Ascorbic Acid (vitamin C) 100 MG tablet Take 100 mg by mouth daily      Blood Glucose Monitoring Suppl (OneTouch Verio) w/Device KIT Use as directed 1 kit 0    Blood Pressure KIT by Does not apply route daily 1 each 1    Canagliflozin (Invokana) 300 MG TABS Take 1 tablet (300 mg total) by mouth daily 90 tablet 1    Continuous Blood Gluc Sensor (FreeStyle Aruna 14 Day Sensor) MISC Use 1 each every 14 (fourteen) days 6 each 3    donepezil (ARICEPT) 10 mg tablet Take 1 tablet (10 mg total) by mouth daily at bedtime 90 tablet 1    fenofibrate (TRIGLIDE) 160 MG tablet Take 1 tablet (160 mg total) by mouth daily 90 tablet 1    fluticasone (FLONASE) 50 mcg/act nasal spray SPRAY 1 SPRAY INTO EACH NOSTRIL EVERY DAY 16 mL 1    gabapentin (NEURONTIN) 100 mg capsule Take 2 capsules (200 mg total) by mouth daily at bedtime 180 capsule 1    glucose blood (OneTouch Verio) test strip USE TO TEST  each 2    insulin glargine (Lantus SoloStar) 100 units/mL injection pen Take 10 units in the am and 20 units in the pm 15 pen 5    Insulin Pen Needle (BD Pen Needle Elisabeth U/F) 32G X 4 MM MISC Use 2 (two) times a day 100 each 5    levothyroxine 100 mcg tablet Take 1 tablet (100 mcg total) by mouth daily 30 tablet 2    lisinopril (ZESTRIL) 40 mg tablet Take 1 tablet (40 mg total) by mouth daily 90 tablet 1    magnesium 30 MG tablet Take 30 mg by mouth 2 (two) times a day      memantine (NAMENDA) 10 mg tablet Take 1 tablet (10 mg total) by mouth 2 (two) times a day 180 tablet 1    montelukast (SINGULAIR) 10 mg tablet Take 1 tablet (10 mg total) by mouth daily at bedtime 90 tablet 1    OneTouch Delica Lancets 25X MISC TEST BS  each 3    sertraline (ZOLOFT) 25 mg tablet Take 1 tablet (25 mg total) by mouth daily 30 tablet 3    simvastatin (ZOCOR) 20 mg tablet Take 20 mg by mouth daily        No current facility-administered medications for this visit           Allergies   Allergen Reactions    Penicillins Itching and Swelling       Review of Systems   Constitutional: Negative for activity change, appetite change, fatigue and fever  HENT: Negative for congestion and ear discharge  Respiratory: Negative for cough and shortness of breath  Cardiovascular: Negative for chest pain and palpitations  Gastrointestinal: Positive for blood in stool  Negative for diarrhea and nausea  Musculoskeletal: Positive for arthralgias  Negative for back pain  Skin: Negative for color change and rash  Neurological: Negative for dizziness and headaches  Psychiatric/Behavioral: Negative for agitation and behavioral problems  There were no vitals filed for this visit  I spent 11 minutes directly with the patient during this visit    VIRTUAL VISIT [address] [place] [address] acknowledges that she has consented to an online visit or consultation  She understands that the online visit is based solely on information provided by her, and that, in the absence of a face-to-face physical evaluation by the physician, the diagnosis she receives is both limited and provisional in terms of accuracy and completeness  This is not intended to replace a full medical face-to-face evaluation by the physician  Grzegorz Berry understands and accepts these terms

## 2021-01-26 DIAGNOSIS — R32 URINARY INCONTINENCE, UNSPECIFIED TYPE: Primary | ICD-10-CM

## 2021-01-26 DIAGNOSIS — R32 URINARY INCONTINENCE, UNSPECIFIED TYPE: ICD-10-CM

## 2021-03-04 ENCOUNTER — OFFICE VISIT (OUTPATIENT)
Dept: FAMILY MEDICINE CLINIC | Facility: CLINIC | Age: 81
End: 2021-03-04
Payer: COMMERCIAL

## 2021-03-04 ENCOUNTER — TELEPHONE (OUTPATIENT)
Dept: FAMILY MEDICINE CLINIC | Facility: CLINIC | Age: 81
End: 2021-03-04

## 2021-03-04 VITALS
HEIGHT: 60 IN | HEART RATE: 70 BPM | WEIGHT: 133.4 LBS | OXYGEN SATURATION: 97 % | RESPIRATION RATE: 18 BRPM | TEMPERATURE: 97.7 F | SYSTOLIC BLOOD PRESSURE: 146 MMHG | BODY MASS INDEX: 26.19 KG/M2 | DIASTOLIC BLOOD PRESSURE: 72 MMHG

## 2021-03-04 DIAGNOSIS — F41.9 ANXIETY: ICD-10-CM

## 2021-03-04 DIAGNOSIS — K64.4: ICD-10-CM

## 2021-03-04 DIAGNOSIS — R35.0 FREQUENCY OF URINATION: ICD-10-CM

## 2021-03-04 DIAGNOSIS — IMO0002 TYPE 2 DIABETES, UNCONTROLLED, WITH NEUROPATHY: Primary | ICD-10-CM

## 2021-03-04 LAB
SL AMB  POCT GLUCOSE, UA: 500
SL AMB LEUKOCYTE ESTERASE,UA: ABNORMAL
SL AMB POCT BILIRUBIN,UA: ABNORMAL
SL AMB POCT BLOOD,UA: ABNORMAL
SL AMB POCT CLARITY,UA: CLEAR
SL AMB POCT COLOR,UA: YELLOW
SL AMB POCT HEMOGLOBIN AIC: 8.2 (ref ?–6.5)
SL AMB POCT KETONES,UA: ABNORMAL
SL AMB POCT NITRITE,UA: ABNORMAL
SL AMB POCT PH,UA: 5
SL AMB POCT SPECIFIC GRAVITY,UA: 1.01
SL AMB POCT URINE PROTEIN: 5
SL AMB POCT UROBILINOGEN: 0.2

## 2021-03-04 PROCEDURE — 81003 URINALYSIS AUTO W/O SCOPE: CPT | Performed by: FAMILY MEDICINE

## 2021-03-04 PROCEDURE — 87086 URINE CULTURE/COLONY COUNT: CPT | Performed by: FAMILY MEDICINE

## 2021-03-04 PROCEDURE — 99214 OFFICE O/P EST MOD 30 MIN: CPT | Performed by: FAMILY MEDICINE

## 2021-03-04 PROCEDURE — 1160F RVW MEDS BY RX/DR IN RCRD: CPT | Performed by: FAMILY MEDICINE

## 2021-03-04 PROCEDURE — 83036 HEMOGLOBIN GLYCOSYLATED A1C: CPT | Performed by: FAMILY MEDICINE

## 2021-03-04 RX ORDER — SULFAMETHOXAZOLE AND TRIMETHOPRIM 800; 160 MG/1; MG/1
1 TABLET ORAL EVERY 12 HOURS SCHEDULED
Qty: 10 TABLET | Refills: 0 | Status: SHIPPED | OUTPATIENT
Start: 2021-03-04 | End: 2021-03-09

## 2021-03-04 RX ORDER — SERTRALINE HYDROCHLORIDE 25 MG/1
25 TABLET, FILM COATED ORAL DAILY
Qty: 90 TABLET | Refills: 3 | Status: SHIPPED | OUTPATIENT
Start: 2021-03-04 | End: 2022-01-25

## 2021-03-04 RX ORDER — SERTRALINE HYDROCHLORIDE 25 MG/1
25 TABLET, FILM COATED ORAL DAILY
Qty: 90 TABLET | Refills: 3 | Status: CANCELLED | OUTPATIENT
Start: 2021-03-04

## 2021-03-04 RX ORDER — HYDROCORTISONE 25 MG/G
CREAM TOPICAL 2 TIMES DAILY
Qty: 1 TUBE | Refills: 2 | Status: SHIPPED | OUTPATIENT
Start: 2021-03-04 | End: 2021-09-03

## 2021-03-04 NOTE — TELEPHONE ENCOUNTER
The Nifedipine 0 3% lidocaine 5% rectal ointment failed in transmission to the pharmacy    Please re-send

## 2021-03-04 NOTE — PROGRESS NOTES
Assessment/Plan:    No problem-specific Assessment & Plan notes found for this encounter  Diagnoses and all orders for this visit:    Type 2 diabetes, uncontrolled, with neuropathy ([address] )  -     POCT hemoglobin A1c, 8 2  Stable for patient's age    Anxiety  -     sertraline (ZOLOFT) 25 mg tablet; Take 1 tablet (25 mg total) by mouth daily    Frequency of urination  -     POCT urine dip auto non-scope  -     sulfamethoxazole-trimethoprim (BACTRIM DS) 800-160 mg per tablet; Take 1 tablet by mouth every 12 (twelve) hours for 5 days  -     Urine culture; Future  -     Urine culture    Ulcerated external hemorrhoids  -     NIFEdipine 0 3%-lidocaine 5% rectal ointment; Apply 1 application topically every 4 (four) hours as needed for discomfort or pain Apply a small amount to anal fissure  -     hydrocortisone (ANUSOL-HC) 2 5 % rectal cream; Apply topically 2 (two) times a day  F/U with Gastro if symptoms continue    Other orders  -     Cancel: sertraline (ZOLOFT) 25 mg tablet; Take 1 tablet (25 mg total) by mouth daily  -     Cancel: sertraline (ZOLOFT) 25 mg tablet; Take 1 tablet (25 mg total) by mouth daily      Follow up in 3 months or as needed    Subjective:      Patient ID: Shyanne Stafford is a 79 y o  female  Diabetes Mellitus  Patient presents for follow up of diabetes  Current symptoms include: none  Symptoms have been well-controlled  Patient denies foot ulcerations, hyperglycemia, increased appetite, nausea, paresthesia of the feet, polydipsia and polyuria  Evaluation to date has included: fasting blood sugar and hemoglobin A1C  Home sugars: BGs consistently in an acceptable range  Current treatment: Continued insulin which has been effective and Continued metformin which has been effective  Also has been having increased urinary frequency and urgency  In addition has been having bright red blood per rectum, associated with rectal burning sensation        The following portions of the patient's history were reviewed and updated as appropriate: She  has a past medical history of Aggression, Alzheimer's dementia ([facility] ), Arthritis, Dementia ([facility] ), Diabetes insipidus ([facility] ), Diabetes mellitus ([facility] ), High cholesterol, Hypertension, Memory loss, Migraine, Polyneuropathy, Rheumatoid arthritis ([facility] ), Serum lipids high, Stomach problems, and Thyroid trouble  She   Patient Active Problem List    Diagnosis Date Noted    Frequency of urination 03/04/2021    Ulcerated external hemorrhoids 03/04/2021    Bright red blood per rectum 01/25/2021    Coccyalgia 01/25/2021    Leg cramps, sleep related 07/28/2020    Hypercalcemia 05/27/2020    Restless leg syndrome 04/21/2020    Mixed hyperlipidemia 04/21/2020    Type 2 diabetes mellitus treated with insulin ([facility] ) 10/03/2019    Anemia 10/03/2019    Tear of right rotator cuff 07/23/2019    Biceps tendon tear 07/23/2019    Nontraumatic tear of right rotator cuff 07/18/2019    Menopause ovarian failure 07/18/2019    Peripheral neuropathy 07/18/2019    Medicare annual wellness visit, subsequent 07/18/2019    Ambulatory dysfunction 07/18/2019    Hypothyroidism 05/13/2019    Essential hypertension 05/13/2019    Fall 05/13/2019    SVT (supraventricular tachycardia) ([facility] ) 05/13/2019    Need for influenza vaccination 09/10/2018    Anxiety 08/14/2018    Type 2 diabetes, uncontrolled, with neuropathy ([facility] ) 06/07/2018    Alzheimer's dementia ([facility] ) 05/03/2018    Memory loss 03/19/2018    Bilateral hearing loss 02/22/2018    Rheumatoid arthritis 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 ([facility] ) 02/22/2018    Dementia with behavioral disturbance ([facility] ) 02/15/2018    Chronic left shoulder pain 02/15/2018    Need for lipid screening 02/15/2018     She  has a past surgical history that includes Hysterectomy; Gallbladder surgery; Cataract extraction; pr shldr arthroscop,surg,w/rotat cuff repr (Right, 8/1/2019);  Rotator cuff repair (Right, 08/01/2019); and Cholecystectomy  Her family history includes Arthritis in her daughter; Blindness in her brother; Diabetes in her brother; HIV in her son; Mental illness in her father and mother; Substance Abuse in her father and mother  She  reports that she quit smoking about 36 years ago  She has never used smokeless tobacco  She reports that she does not drink alcohol or use drugs    Current Outpatient Medications   Medication Sig Dispense Refill    Ascorbic Acid (vitamin C) 100 MG tablet Take 100 mg by mouth daily      Blood Glucose Monitoring Suppl (OneTouch Verio) w/Device KIT Use as directed 1 kit 0    Blood Pressure KIT by Does not apply route daily 1 each 1    Canagliflozin (Invokana) 300 MG TABS Take 1 tablet (300 mg total) by mouth daily 90 tablet 1    Continuous Blood Gluc Sensor (FreeStyle Aruna 14 Day Sensor) MISC Use 1 each every 14 (fourteen) days 6 each 3    donepezil (ARICEPT) 10 mg tablet Take 1 tablet (10 mg total) by mouth daily at bedtime 90 tablet 1    fenofibrate (TRIGLIDE) 160 MG tablet Take 1 tablet (160 mg total) by mouth daily 90 tablet 1    fluticasone (FLONASE) 50 mcg/act nasal spray SPRAY 1 SPRAY INTO EACH NOSTRIL EVERY DAY 16 mL 1    gabapentin (NEURONTIN) 100 mg capsule Take 2 capsules (200 mg total) by mouth daily at bedtime 180 capsule 1    glucose blood (OneTouch Verio) test strip USE TO TEST  each 2    Incontinence Supplies MISC Use 6 (six) times a day Wipes 200 each 6    Incontinence Supplies MISC Use 4 (four) times a day Comfort shield Adult diapers 200 each 6    Incontinence Supply Disposable MISC Use 6 (six) times a day Dispense disposable bed pad 200 each 6    insulin glargine (Lantus SoloStar) 100 units/mL injection pen Take 10 units in the am and 20 units in the pm 15 pen 5    Insulin Pen Needle (BD Pen Needle Elisabeth U/F) 32G X 4 MM MISC Use 2 (two) times a day 100 each 5    levothyroxine 100 mcg tablet Take 1 tablet (100 mcg total) by mouth daily 30 tablet 2    lisinopril (ZESTRIL) 40 mg tablet Take 1 tablet (40 mg total) by mouth daily 90 tablet 1    memantine (NAMENDA) 10 mg tablet Take 1 tablet (10 mg total) by mouth 2 (two) times a day 180 tablet 1    montelukast (SINGULAIR) 10 mg tablet Take 1 tablet (10 mg total) by mouth daily at bedtime 90 tablet 1    OneTouch Delica Lancets 18W MISC TEST BS  each 3    sertraline (ZOLOFT) 25 mg tablet Take 1 tablet (25 mg total) by mouth daily 90 tablet 3    acetaminophen-codeine (TYLENOL #4) 300-60 MG per tablet Take 1 tablet by mouth every 4 (four) hours as needed for moderate pain (Patient not taking: Reported on 3/4/2021) 30 tablet 0    hydrocortisone (ANUSOL-HC) 2 5 % rectal cream Apply topically 2 (two) times a day 1 Tube 2    magnesium 30 MG tablet Take 30 mg by mouth 2 (two) times a day      NIFEdipine 0 3%-lidocaine 5% rectal ointment Apply 1 application topically every 4 (four) hours as needed for discomfort or pain Apply a small amount to anal fissure 2 oz 1    simvastatin (ZOCOR) 20 mg tablet Take 20 mg by mouth daily       sulfamethoxazole-trimethoprim (BACTRIM DS) 800-160 mg per tablet Take 1 tablet by mouth every 12 (twelve) hours for 5 days 10 tablet 0     No current facility-administered medications for this visit        Current Outpatient Medications on File Prior to Visit   Medication Sig    Ascorbic Acid (vitamin C) 100 MG tablet Take 100 mg by mouth daily    Blood Glucose Monitoring Suppl (OneTouch Verio) w/Device KIT Use as directed    Blood Pressure KIT by Does not apply route daily    Canagliflozin (Invokana) 300 MG TABS Take 1 tablet (300 mg total) by mouth daily    Continuous Blood Gluc Sensor (FreeStyle Aruna 14 Day Sensor) MISC Use 1 each every 14 (fourteen) days    donepezil (ARICEPT) 10 mg tablet Take 1 tablet (10 mg total) by mouth daily at bedtime    fenofibrate (TRIGLIDE) 160 MG tablet Take 1 tablet (160 mg total) by mouth daily    fluticasone (FLONASE) 50 mcg/act nasal spray SPRAY 1 SPRAY INTO EACH NOSTRIL EVERY DAY    gabapentin (NEURONTIN) 100 mg capsule Take 2 capsules (200 mg total) by mouth daily at bedtime    glucose blood (OneTouch Verio) test strip USE TO TEST TID    Incontinence Supplies MISC Use 6 (six) times a day Wipes    Incontinence Supplies MISC Use 4 (four) times a day Comfort shield Adult diapers    Incontinence Supply Disposable MISC Use 6 (six) times a day Dispense disposable bed pad    insulin glargine (Lantus SoloStar) 100 units/mL injection pen Take 10 units in the am and 20 units in the pm    Insulin Pen Needle (BD Pen Needle Elisabeth U/F) 32G X 4 MM MISC Use 2 (two) times a day    levothyroxine 100 mcg tablet Take 1 tablet (100 mcg total) by mouth daily    lisinopril (ZESTRIL) 40 mg tablet Take 1 tablet (40 mg total) by mouth daily    memantine (NAMENDA) 10 mg tablet Take 1 tablet (10 mg total) by mouth 2 (two) times a day    montelukast (SINGULAIR) 10 mg tablet Take 1 tablet (10 mg total) by mouth daily at bedtime    OneTouch Delica Lancets 46V MISC TEST BS TID    [DISCONTINUED] sertraline (ZOLOFT) 25 mg tablet Take 1 tablet (25 mg total) by mouth daily    acetaminophen-codeine (TYLENOL #4) 300-60 MG per tablet Take 1 tablet by mouth every 4 (four) hours as needed for moderate pain (Patient not taking: Reported on 3/4/2021)    magnesium 30 MG tablet Take 30 mg by mouth 2 (two) times a day    simvastatin (ZOCOR) 20 mg tablet Take 20 mg by mouth daily      No current facility-administered medications on file prior to visit  She is allergic to penicillins       Review of Systems   Constitutional: Negative for activity change, appetite change, fatigue and fever  HENT: Negative for congestion and ear discharge  Respiratory: Negative for cough and shortness of breath  Cardiovascular: Negative for chest pain and palpitations  Gastrointestinal: Positive for anal bleeding, blood in stool and rectal pain  Negative for diarrhea and nausea  Genitourinary: Positive for difficulty urinating, dysuria and urgency  Musculoskeletal: Negative for arthralgias and back pain  Skin: Negative for color change and rash  Neurological: Negative for dizziness and headaches  Psychiatric/Behavioral: Negative for agitation and behavioral problems  Objective:      /72 (BP Location: Left arm, Patient Position: Sitting, Cuff Size: Standard)   Pulse 70   Temp 97 7 °F (36 5 °C)   Resp 18   Ht 5' (1 524 m)   Wt 60 5 kg (133 lb 6 4 oz)   SpO2 97%   BMI 26 05 kg/m²          Physical Exam  Constitutional:       General: She is not in acute distress  Appearance: She is well-developed  She is not diaphoretic  HENT:      Head: Normocephalic and atraumatic  Nose: Nose normal    Eyes:      Conjunctiva/sclera: Conjunctivae normal       Pupils: Pupils are equal, round, and reactive to light  Cardiovascular:      Rate and Rhythm: Normal rate and regular rhythm  Heart sounds: Normal heart sounds  No murmur  Pulmonary:      Effort: Pulmonary effort is normal  No respiratory distress  Breath sounds: Normal breath sounds  No wheezing  Abdominal:      General: Bowel sounds are normal  There is no distension  Palpations: Abdomen is soft  Tenderness: There is no abdominal tenderness  Genitourinary:     Comments: External hemorrhoids noted with anal fissures  Skin:     General: Skin is warm and dry  Findings: No erythema or rash  Neurological:      Mental Status: She is alert and oriented to person, place, and time

## 2021-03-05 LAB — BACTERIA UR CULT: ABNORMAL

## 2021-03-19 DIAGNOSIS — E13.9 DIABETES 1.5, MANAGED AS TYPE 1 (HCC): ICD-10-CM

## 2021-03-19 RX ORDER — BLOOD SUGAR DIAGNOSTIC
STRIP MISCELLANEOUS
Qty: 100 EACH | Refills: 2 | Status: SHIPPED | OUTPATIENT
Start: 2021-03-19 | End: 2021-06-29

## 2021-04-26 ENCOUNTER — TELEPHONE (OUTPATIENT)
Dept: FAMILY MEDICINE CLINIC | Facility: CLINIC | Age: 81
End: 2021-04-26

## 2021-04-26 DIAGNOSIS — Z79.4 TYPE 2 DIABETES MELLITUS TREATED WITH INSULIN (HCC): ICD-10-CM

## 2021-04-26 DIAGNOSIS — R09.81 NASAL CONGESTION: ICD-10-CM

## 2021-04-26 DIAGNOSIS — E11.9 TYPE 2 DIABETES MELLITUS TREATED WITH INSULIN (HCC): ICD-10-CM

## 2021-04-26 DIAGNOSIS — E03.9 HYPOTHYROIDISM, UNSPECIFIED TYPE: ICD-10-CM

## 2021-04-26 NOTE — TELEPHONE ENCOUNTER
Jeffrey said she faxed over a form for incontinence supplies to be filled out  The form was just faxed today  It requires doctor's signature, DX code, and number of refills  Fax number is on the form to fax back   Thank you

## 2021-04-27 RX ORDER — FLUTICASONE PROPIONATE 50 MCG
SPRAY, SUSPENSION (ML) NASAL
Qty: 16 ML | Refills: 1 | Status: SHIPPED | OUTPATIENT
Start: 2021-04-27 | End: 2021-05-19 | Stop reason: SDUPTHER

## 2021-04-27 RX ORDER — CANAGLIFLOZIN 300 MG/1
TABLET, FILM COATED ORAL
Qty: 90 TABLET | Refills: 1 | Status: SHIPPED | OUTPATIENT
Start: 2021-04-27 | End: 2022-01-13

## 2021-04-27 RX ORDER — LEVOTHYROXINE SODIUM 0.1 MG/1
TABLET ORAL
Qty: 30 TABLET | Refills: 2 | Status: SHIPPED | OUTPATIENT
Start: 2021-04-27 | End: 2021-07-06

## 2021-05-07 DIAGNOSIS — E03.9 HYPOTHYROIDISM, UNSPECIFIED TYPE: ICD-10-CM

## 2021-05-07 RX ORDER — LEVOTHYROXINE SODIUM 88 UG/1
TABLET ORAL
Qty: 90 TABLET | Refills: 1 | OUTPATIENT
Start: 2021-05-07

## 2021-05-18 DIAGNOSIS — E78.1 HYPERTRIGLYCERIDEMIA: ICD-10-CM

## 2021-05-18 RX ORDER — FENOFIBRATE 160 MG/1
TABLET ORAL
Qty: 90 TABLET | Refills: 1 | Status: SHIPPED | OUTPATIENT
Start: 2021-05-18 | End: 2021-11-10

## 2021-05-19 ENCOUNTER — TELEMEDICINE (OUTPATIENT)
Dept: FAMILY MEDICINE CLINIC | Facility: CLINIC | Age: 81
End: 2021-05-19
Payer: COMMERCIAL

## 2021-05-19 DIAGNOSIS — R51.9 NONINTRACTABLE HEADACHE, UNSPECIFIED CHRONICITY PATTERN, UNSPECIFIED HEADACHE TYPE: ICD-10-CM

## 2021-05-19 DIAGNOSIS — J01.00 ACUTE NON-RECURRENT MAXILLARY SINUSITIS: Primary | ICD-10-CM

## 2021-05-19 DIAGNOSIS — R09.81 NASAL CONGESTION: ICD-10-CM

## 2021-05-19 DIAGNOSIS — J01.00 ACUTE NON-RECURRENT MAXILLARY SINUSITIS: ICD-10-CM

## 2021-05-19 PROCEDURE — U0003 INFECTIOUS AGENT DETECTION BY NUCLEIC ACID (DNA OR RNA); SEVERE ACUTE RESPIRATORY SYNDROME CORONAVIRUS 2 (SARS-COV-2) (CORONAVIRUS DISEASE [COVID-19]), AMPLIFIED PROBE TECHNIQUE, MAKING USE OF HIGH THROUGHPUT TECHNOLOGIES AS DESCRIBED BY CMS-2020-01-R: HCPCS | Performed by: FAMILY MEDICINE

## 2021-05-19 PROCEDURE — U0005 INFEC AGEN DETEC AMPLI PROBE: HCPCS | Performed by: FAMILY MEDICINE

## 2021-05-19 PROCEDURE — G2012 BRIEF CHECK IN BY MD/QHP: HCPCS | Performed by: FAMILY MEDICINE

## 2021-05-19 RX ORDER — LEVOFLOXACIN 500 MG/1
500 TABLET, FILM COATED ORAL EVERY 24 HOURS
Qty: 5 TABLET | Refills: 0 | Status: SHIPPED | OUTPATIENT
Start: 2021-05-19 | End: 2021-05-24

## 2021-05-19 RX ORDER — FLUTICASONE PROPIONATE 50 MCG
2 SPRAY, SUSPENSION (ML) NASAL DAILY
Qty: 16 ML | Refills: 1 | Status: SHIPPED | OUTPATIENT
Start: 2021-05-19 | End: 2021-07-19

## 2021-05-19 NOTE — PROGRESS NOTES
Virtual Brief Visit    Assessment/Plan:    Problem List Items Addressed This Visit        Respiratory    Acute non-recurrent maxillary sinusitis - Primary    Relevant Medications    levofloxacin (LEVAQUIN) 500 mg tablet    loratadine-pseudoephedrine (CLARITIN-D 12-HOUR) 5-120 mg per tablet    Other Relevant Orders    Novel Coronavirus (Covid-19),PCR [facility] - Collected at [facility] or [facility]      Other Visit Diagnoses     Nonintractable headache, unspecified chronicity pattern, unspecified headache type        Relevant Orders    Novel Coronavirus (Covid-19),PCR [facility] - Collected at [facility] or [facility]    Nasal congestion        Relevant Medications    fluticasone (FLONASE) 50 mcg/act nasal spray        Follow up as needed        Reason for visit is   Chief Complaint   Patient presents with    Virtual Brief Visit        Encounter provider Sandra Preciado MD    Provider located at [place] [address]  [address] [place] [id number]    Recent Visits  No visits were found meeting these conditions  Showing recent visits within past 7 days and meeting all other requirements     Today's Visits  Date Type Provider Dept   05/19/21 Telemedicine Sandra Preciado MD [address] today's visits and meeting all other requirements     Future Appointments  No visits were found meeting these conditions  Showing future appointments within next 150 days and meeting all other requirements        After connecting through telephone, the patient was identified by name and date of birth  Vj Amezcua was informed that this is a telemedicine visit and that the visit is being conducted through telephone  My office door was closed  No one else was in the room  She acknowledged consent and understanding of privacy and security of the platform  The patient has agreed to participate and understands she can discontinue the visit at any time      Patient is aware this is a billable service  Subjective    Gunnar Lundberg is a 79 y o  female Sinusitis/Headache  Patient has been having sinus pressure associated with drainage and headaches  She deneis any fever, cough or SOB  She has not received her Covid-19 vaccine yet         Past Medical History:   Diagnosis Date    Aggression     [facility]'s [facility] )     Arthritis     Dementia ([facility] )     Diabetes insipidus ([facility] )     Diabetes mellitus ([facility] )     High cholesterol     Hypertension     Memory loss     Migraine     Polyneuropathy     Rheumatoid arthritis ([facility] )     Serum lipids high     Stomach problems     Thyroid trouble        Past Surgical History:   Procedure Laterality Date    CATARACT EXTRACTION      CHOLECYSTECTOMY      GALLBLADDER SURGERY      HYSTERECTOMY      [place] SHLDR ARTHROSCOP,SURG,W/ROTAT CUFF REPR Right 8/1/2019    Procedure: SHOULDER ARTHROSCOPIC ROTATOR CUFF REPAIR;  Surgeon: Millie Bagley MD;  Location: [facility];  Service: Orthopedics    ROTATOR CUFF REPAIR Right 08/01/2019       Current Outpatient Medications   Medication Sig Dispense Refill    acetaminophen-codeine (TYLENOL #4) 300-60 MG per tablet Take 1 tablet by mouth every 4 (four) hours as needed for moderate pain (Patient not taking: Reported on 3/4/2021) 30 tablet 0    Ascorbic Acid (vitamin C) 100 MG tablet Take 100 mg by mouth daily      Blood Glucose Monitoring Suppl (OneTouch Verio) w/Device KIT Use as directed 1 kit 0    Blood Pressure KIT by Does not apply route daily 1 each 1    Continuous Blood Gluc Sensor (FreeStyle Aruna 14 Day Sensor) MISC Use 1 each every 14 (fourteen) days 6 each 3    donepezil (ARICEPT) 10 mg tablet Take 1 tablet (10 mg total) by mouth daily at bedtime 90 tablet 1    fenofibrate (TRIGLIDE) 160 MG tablet TAKE 1 TABLET BY MOUTH EVERY DAY 90 tablet 1    fluticasone (FLONASE) 50 mcg/act nasal spray 2 sprays into each nostril daily 16 mL 1    gabapentin (NEURONTIN) 100 mg capsule Take 2 capsules (200 mg total) by mouth daily at bedtime 180 capsule 1    glucose blood (OneTouch Verio) test strip TEST 3 TIMES A  each 2    hydrocortisone (ANUSOL-HC) 2 5 % rectal cream Apply topically 2 (two) times a day 1 Tube 2    Incontinence Supplies MISC Use 6 (six) times a day Wipes 200 each 6    Incontinence Supplies MISC Use 4 (four) times a day Comfort shield Adult diapers 200 each 6    Incontinence Supply Disposable MISC Use 6 (six) times a day Dispense disposable bed pad 200 each 6    insulin glargine (Lantus SoloStar) 100 units/mL injection pen Take 10 units in the am and 20 units in the pm 15 pen 5    Insulin Pen Needle (BD Pen Needle Elisabeth U/F) 32G X 4 MM MISC Use 2 (two) times a day 100 each 5    Invokana 300 MG TABS TAKE 1 TABLET BY MOUTH EVERY DAY 90 tablet 1    levofloxacin (LEVAQUIN) 500 mg tablet Take 1 tablet (500 mg total) by mouth every 24 hours for 5 days 5 tablet 0    levothyroxine 100 mcg tablet TAKE 1 TABLET BY MOUTH EVERY DAY 30 tablet 2    lisinopril (ZESTRIL) 40 mg tablet Take 1 tablet (40 mg total) by mouth daily 90 tablet 1    loratadine-pseudoephedrine (CLARITIN-D 12-HOUR) 5-120 mg per tablet Take 1 tablet by mouth 2 (two) times a day for 5 days 10 tablet 0    magnesium 30 MG tablet Take 30 mg by mouth 2 (two) times a day      memantine (NAMENDA) 10 mg tablet Take 1 tablet (10 mg total) by mouth 2 (two) times a day 180 tablet 1    montelukast (SINGULAIR) 10 mg tablet Take 1 tablet (10 mg total) by mouth daily at bedtime 90 tablet 1    NIFEdipine 0 3%-lidocaine 5% rectal ointment Apply 1 application topically every 4 (four) hours as needed for discomfort or pain Apply a small amount to anal fissure 2 oz 1    OneTouch Delica Lancets 14I MISC TEST BS  each 3    sertraline (ZOLOFT) 25 mg tablet Take 1 tablet (25 mg total) by mouth daily 90 tablet 3    simvastatin (ZOCOR) 20 mg tablet Take 20 mg by mouth daily        No current facility-administered medications for this visit  Allergies   Allergen Reactions    Penicillins Itching and Swelling       Review of Systems   Constitutional: Negative for activity change, appetite change, fatigue and fever  HENT: Positive for congestion, postnasal drip, rhinorrhea, sinus pressure and sinus pain  Negative for ear discharge  Respiratory: Negative for cough and shortness of breath  Cardiovascular: Negative for chest pain and palpitations  Gastrointestinal: Negative for diarrhea and nausea  Musculoskeletal: Negative for arthralgias and back pain  Skin: Negative for color change and rash  Neurological: Negative for dizziness and headaches  Psychiatric/Behavioral: Negative for agitation and behavioral problems  There were no vitals filed for this visit  I spent 4 minutes directly with the patient during this visit    VIRTUAL VISIT [address] [place] [address] acknowledges that she has consented to an online visit or consultation  She understands that the online visit is based solely on information provided by her, and that, in the absence of a face-to-face physical evaluation by the physician, the diagnosis she receives is both limited and provisional in terms of accuracy and completeness  This is not intended to replace a full medical face-to-face evaluation by the physician  Wilder Allen understands and accepts these terms

## 2021-05-20 LAB — SARS-COV-2 RNA RESP QL NAA+PROBE: NEGATIVE

## 2021-06-02 DIAGNOSIS — G47.62 LEG CRAMPS, SLEEP RELATED: ICD-10-CM

## 2021-06-03 RX ORDER — GABAPENTIN 100 MG/1
200 CAPSULE ORAL
Qty: 180 CAPSULE | Refills: 1 | Status: SHIPPED | OUTPATIENT
Start: 2021-06-03 | End: 2021-08-19 | Stop reason: SDUPTHER

## 2021-06-04 ENCOUNTER — IMMUNIZATIONS (OUTPATIENT)
Dept: FAMILY MEDICINE CLINIC | Facility: HOSPITAL | Age: 81
End: 2021-06-04

## 2021-06-04 DIAGNOSIS — Z23 ENCOUNTER FOR IMMUNIZATION: Primary | ICD-10-CM

## 2021-06-04 PROCEDURE — 0001A SARS-COV-2 / COVID-19 MRNA VACCINE (PFIZER-BIONTECH) 30 MCG: CPT

## 2021-06-04 PROCEDURE — 91300 SARS-COV-2 / COVID-19 MRNA VACCINE (PFIZER-BIONTECH) 30 MCG: CPT

## 2021-06-29 DIAGNOSIS — E13.9 DIABETES 1.5, MANAGED AS TYPE 1 (HCC): ICD-10-CM

## 2021-06-29 RX ORDER — BLOOD SUGAR DIAGNOSTIC
STRIP MISCELLANEOUS
Qty: 100 STRIP | Refills: 2 | Status: SHIPPED | OUTPATIENT
Start: 2021-06-29 | End: 2021-12-23

## 2021-07-02 ENCOUNTER — IMMUNIZATIONS (OUTPATIENT)
Dept: FAMILY MEDICINE CLINIC | Facility: HOSPITAL | Age: 81
End: 2021-07-02

## 2021-07-02 DIAGNOSIS — Z23 ENCOUNTER FOR IMMUNIZATION: Primary | ICD-10-CM

## 2021-07-02 PROCEDURE — 91300 SARS-COV-2 / COVID-19 MRNA VACCINE (PFIZER-BIONTECH) 30 MCG: CPT

## 2021-07-02 PROCEDURE — 0002A SARS-COV-2 / COVID-19 MRNA VACCINE (PFIZER-BIONTECH) 30 MCG: CPT

## 2021-07-05 DIAGNOSIS — E03.9 HYPOTHYROIDISM, UNSPECIFIED TYPE: ICD-10-CM

## 2021-07-06 DIAGNOSIS — R05.9 COUGH: ICD-10-CM

## 2021-07-06 RX ORDER — MONTELUKAST SODIUM 10 MG/1
TABLET ORAL
Qty: 90 TABLET | Refills: 1 | Status: SHIPPED | OUTPATIENT
Start: 2021-07-06 | End: 2021-11-29

## 2021-07-06 RX ORDER — LEVOTHYROXINE SODIUM 0.1 MG/1
TABLET ORAL
Qty: 30 TABLET | Refills: 2 | Status: SHIPPED | OUTPATIENT
Start: 2021-07-06 | End: 2021-10-25

## 2021-07-11 PROCEDURE — 99285 EMERGENCY DEPT VISIT HI MDM: CPT

## 2021-07-12 ENCOUNTER — HOSPITAL ENCOUNTER (INPATIENT)
Facility: HOSPITAL | Age: 81
LOS: 1 days | Discharge: HOME | DRG: 373 | End: 2021-07-14
Attending: EMERGENCY MEDICINE | Admitting: FAMILY MEDICINE
Payer: COMMERCIAL

## 2021-07-12 ENCOUNTER — APPOINTMENT (EMERGENCY)
Dept: CT IMAGING | Facility: HOSPITAL | Age: 81
DRG: 373 | End: 2021-07-12
Payer: COMMERCIAL

## 2021-07-12 DIAGNOSIS — I10 ESSENTIAL HYPERTENSION: ICD-10-CM

## 2021-07-12 DIAGNOSIS — N32.89 BLADDER DISTENSION: ICD-10-CM

## 2021-07-12 DIAGNOSIS — K52.9 PROCTOCOLITIS: Primary | ICD-10-CM

## 2021-07-12 DIAGNOSIS — R19.7 DIARRHEA: ICD-10-CM

## 2021-07-12 PROBLEM — N18.30 ANEMIA IN STAGE 3 CHRONIC KIDNEY DISEASE (HCC): Status: ACTIVE | Noted: 2019-10-03

## 2021-07-12 PROBLEM — D63.1 ANEMIA IN STAGE 3 CHRONIC KIDNEY DISEASE (HCC): Status: ACTIVE | Noted: 2019-10-03

## 2021-07-12 PROBLEM — N18.30 CKD (CHRONIC KIDNEY DISEASE) STAGE 3, GFR 30-59 ML/MIN (HCC): Status: ACTIVE | Noted: 2021-07-12

## 2021-07-12 LAB
ALBUMIN SERPL BCP-MCNC: 3.7 G/DL (ref 3.5–5)
ALP SERPL-CCNC: 34 U/L (ref 46–116)
ALT SERPL W P-5'-P-CCNC: 30 U/L (ref 12–78)
ANION GAP SERPL CALCULATED.3IONS-SCNC: 5 MMOL/L (ref 4–13)
AST SERPL W P-5'-P-CCNC: 27 U/L (ref 5–45)
BACTERIA UR QL AUTO: ABNORMAL /HPF
BASOPHILS # BLD AUTO: 0.03 THOUSANDS/ΜL (ref 0–0.1)
BASOPHILS NFR BLD AUTO: 1 % (ref 0–1)
BILIRUB SERPL-MCNC: 0.46 MG/DL (ref 0.2–1)
BILIRUB UR QL STRIP: NEGATIVE
BUN SERPL-MCNC: 23 MG/DL (ref 5–25)
C DIFF TOX A+B STL QL IA: NEGATIVE
C DIFF TOX B TCDB STL QL NAA+PROBE: POSITIVE
CALCIUM SERPL-MCNC: 9.5 MG/DL (ref 8.3–10.1)
CAMPYLOBACTER DNA SPEC NAA+PROBE: NORMAL
CHLORIDE SERPL-SCNC: 106 MMOL/L (ref 100–108)
CLARITY UR: CLEAR
CO2 SERPL-SCNC: 27 MMOL/L (ref 21–32)
COLOR UR: COLORLESS
CREAT SERPL-MCNC: 1.36 MG/DL (ref 0.6–1.3)
EOSINOPHIL # BLD AUTO: 0.19 THOUSAND/ΜL (ref 0–0.61)
EOSINOPHIL NFR BLD AUTO: 5 % (ref 0–6)
ERYTHROCYTE [DISTWIDTH] IN BLOOD BY AUTOMATED COUNT: 13.2 % (ref 11.6–15.1)
EST. AVERAGE GLUCOSE BLD GHB EST-MCNC: 171 MG/DL
GFR SERPL CREATININE-BSD FRML MDRD: 37 ML/MIN/1.73SQ M
GLUCOSE SERPL-MCNC: 134 MG/DL (ref 65–140)
GLUCOSE SERPL-MCNC: 148 MG/DL (ref 65–140)
GLUCOSE SERPL-MCNC: 275 MG/DL (ref 65–140)
GLUCOSE SERPL-MCNC: 83 MG/DL (ref 65–140)
GLUCOSE UR STRIP-MCNC: ABNORMAL MG/DL
HBA1C MFR BLD: 7.6 %
HCT VFR BLD AUTO: 31.1 % (ref 34.8–46.1)
HGB BLD-MCNC: 9.8 G/DL (ref 11.5–15.4)
HGB UR QL STRIP.AUTO: ABNORMAL
IMM GRANULOCYTES # BLD AUTO: 0.02 THOUSAND/UL (ref 0–0.2)
IMM GRANULOCYTES NFR BLD AUTO: 1 % (ref 0–2)
KETONES UR STRIP-MCNC: NEGATIVE MG/DL
LACTATE SERPL-SCNC: 1.1 MMOL/L (ref 0.5–2)
LEUKOCYTE ESTERASE UR QL STRIP: ABNORMAL
LIPASE SERPL-CCNC: 130 U/L (ref 73–393)
LYMPHOCYTES # BLD AUTO: 1.08 THOUSANDS/ΜL (ref 0.6–4.47)
LYMPHOCYTES NFR BLD AUTO: 27 % (ref 14–44)
MCH RBC QN AUTO: 27.5 PG (ref 26.8–34.3)
MCHC RBC AUTO-ENTMCNC: 31.5 G/DL (ref 31.4–37.4)
MCV RBC AUTO: 87 FL (ref 82–98)
MONOCYTES # BLD AUTO: 0.29 THOUSAND/ΜL (ref 0.17–1.22)
MONOCYTES NFR BLD AUTO: 7 % (ref 4–12)
NEUTROPHILS # BLD AUTO: 2.36 THOUSANDS/ΜL (ref 1.85–7.62)
NEUTS SEG NFR BLD AUTO: 59 % (ref 43–75)
NITRITE UR QL STRIP: NEGATIVE
NON-SQ EPI CELLS URNS QL MICRO: ABNORMAL /HPF
NRBC BLD AUTO-RTO: 0 /100 WBCS
PH UR STRIP.AUTO: 7 [PH]
PLATELET # BLD AUTO: 165 THOUSANDS/UL (ref 149–390)
PLATELET # BLD AUTO: 171 THOUSANDS/UL (ref 149–390)
PMV BLD AUTO: 12.8 FL (ref 8.9–12.7)
PMV BLD AUTO: 13.1 FL (ref 8.9–12.7)
POTASSIUM SERPL-SCNC: 4.6 MMOL/L (ref 3.5–5.3)
PROT SERPL-MCNC: 7.1 G/DL (ref 6.4–8.2)
PROT UR STRIP-MCNC: NEGATIVE MG/DL
RBC # BLD AUTO: 3.56 MILLION/UL (ref 3.81–5.12)
RBC #/AREA URNS AUTO: ABNORMAL /HPF
SALMONELLA DNA SPEC QL NAA+PROBE: NORMAL
SHIGA TOXIN STX GENE SPEC NAA+PROBE: NORMAL
SHIGELLA DNA SPEC QL NAA+PROBE: NORMAL
SODIUM SERPL-SCNC: 138 MMOL/L (ref 136–145)
SP GR UR STRIP.AUTO: 1.01 (ref 1–1.03)
TSH SERPL DL<=0.05 MIU/L-ACNC: 0.53 UIU/ML (ref 0.36–3.74)
UROBILINOGEN UR QL STRIP.AUTO: 0.2 E.U./DL
WBC # BLD AUTO: 3.97 THOUSAND/UL (ref 4.31–10.16)
WBC #/AREA URNS AUTO: ABNORMAL /HPF

## 2021-07-12 PROCEDURE — 74177 CT ABD & PELVIS W/CONTRAST: CPT

## 2021-07-12 PROCEDURE — 83690 ASSAY OF LIPASE: CPT | Performed by: EMERGENCY MEDICINE

## 2021-07-12 PROCEDURE — 83036 HEMOGLOBIN GLYCOSYLATED A1C: CPT | Performed by: PHYSICIAN ASSISTANT

## 2021-07-12 PROCEDURE — 87493 C DIFF AMPLIFIED PROBE: CPT | Performed by: PHYSICIAN ASSISTANT

## 2021-07-12 PROCEDURE — 80053 COMPREHEN METABOLIC PANEL: CPT | Performed by: EMERGENCY MEDICINE

## 2021-07-12 PROCEDURE — 99285 EMERGENCY DEPT VISIT HI MDM: CPT | Performed by: PHYSICIAN ASSISTANT

## 2021-07-12 PROCEDURE — 83605 ASSAY OF LACTIC ACID: CPT | Performed by: PHYSICIAN ASSISTANT

## 2021-07-12 PROCEDURE — 99220 PR INITIAL OBSERVATION CARE/DAY 70 MINUTES: CPT | Performed by: INTERNAL MEDICINE

## 2021-07-12 PROCEDURE — 96360 HYDRATION IV INFUSION INIT: CPT

## 2021-07-12 PROCEDURE — 36415 COLL VENOUS BLD VENIPUNCTURE: CPT

## 2021-07-12 PROCEDURE — NC001 PR NO CHARGE: Performed by: PHYSICIAN ASSISTANT

## 2021-07-12 PROCEDURE — 82948 REAGENT STRIP/BLOOD GLUCOSE: CPT

## 2021-07-12 PROCEDURE — 81001 URINALYSIS AUTO W/SCOPE: CPT | Performed by: EMERGENCY MEDICINE

## 2021-07-12 PROCEDURE — 85025 COMPLETE CBC W/AUTO DIFF WBC: CPT | Performed by: EMERGENCY MEDICINE

## 2021-07-12 PROCEDURE — 85049 AUTOMATED PLATELET COUNT: CPT | Performed by: PHYSICIAN ASSISTANT

## 2021-07-12 PROCEDURE — 99215 OFFICE O/P EST HI 40 MIN: CPT | Performed by: INTERNAL MEDICINE

## 2021-07-12 PROCEDURE — 84443 ASSAY THYROID STIM HORMONE: CPT | Performed by: PHYSICIAN ASSISTANT

## 2021-07-12 PROCEDURE — 87505 NFCT AGENT DETECTION GI: CPT | Performed by: PHYSICIAN ASSISTANT

## 2021-07-12 RX ORDER — INSULIN GLARGINE 100 [IU]/ML
10 INJECTION, SOLUTION SUBCUTANEOUS EVERY MORNING
Status: DISCONTINUED | OUTPATIENT
Start: 2021-07-12 | End: 2021-07-14 | Stop reason: HOSPADM

## 2021-07-12 RX ORDER — INSULIN GLARGINE 100 [IU]/ML
20 INJECTION, SOLUTION SUBCUTANEOUS
Status: DISCONTINUED | OUTPATIENT
Start: 2021-07-12 | End: 2021-07-14 | Stop reason: HOSPADM

## 2021-07-12 RX ORDER — HEPARIN SODIUM 5000 [USP'U]/ML
5000 INJECTION, SOLUTION INTRAVENOUS; SUBCUTANEOUS EVERY 8 HOURS SCHEDULED
Status: DISCONTINUED | OUTPATIENT
Start: 2021-07-12 | End: 2021-07-14 | Stop reason: HOSPADM

## 2021-07-12 RX ORDER — FLUTICASONE PROPIONATE 50 MCG
2 SPRAY, SUSPENSION (ML) NASAL DAILY
Status: DISCONTINUED | OUTPATIENT
Start: 2021-07-12 | End: 2021-07-14 | Stop reason: HOSPADM

## 2021-07-12 RX ORDER — ACETAMINOPHEN 325 MG/1
650 TABLET ORAL EVERY 6 HOURS PRN
Status: DISCONTINUED | OUTPATIENT
Start: 2021-07-12 | End: 2021-07-14 | Stop reason: HOSPADM

## 2021-07-12 RX ORDER — ONDANSETRON 2 MG/ML
4 INJECTION INTRAMUSCULAR; INTRAVENOUS EVERY 6 HOURS PRN
Status: DISCONTINUED | OUTPATIENT
Start: 2021-07-12 | End: 2021-07-14 | Stop reason: HOSPADM

## 2021-07-12 RX ORDER — SODIUM CHLORIDE 9 MG/ML
50 INJECTION, SOLUTION INTRAVENOUS CONTINUOUS
Status: DISCONTINUED | OUTPATIENT
Start: 2021-07-12 | End: 2021-07-13

## 2021-07-12 RX ORDER — GABAPENTIN 100 MG/1
200 CAPSULE ORAL
Status: DISCONTINUED | OUTPATIENT
Start: 2021-07-12 | End: 2021-07-14 | Stop reason: HOSPADM

## 2021-07-12 RX ORDER — MEMANTINE HYDROCHLORIDE 10 MG/1
10 TABLET ORAL 2 TIMES DAILY
Status: DISCONTINUED | OUTPATIENT
Start: 2021-07-12 | End: 2021-07-14 | Stop reason: HOSPADM

## 2021-07-12 RX ORDER — MAGNESIUM HYDROXIDE/ALUMINUM HYDROXICE/SIMETHICONE 120; 1200; 1200 MG/30ML; MG/30ML; MG/30ML
30 SUSPENSION ORAL EVERY 6 HOURS PRN
Status: DISCONTINUED | OUTPATIENT
Start: 2021-07-12 | End: 2021-07-14 | Stop reason: HOSPADM

## 2021-07-12 RX ORDER — LEVOTHYROXINE SODIUM 0.1 MG/1
100 TABLET ORAL DAILY
Status: DISCONTINUED | OUTPATIENT
Start: 2021-07-12 | End: 2021-07-14 | Stop reason: HOSPADM

## 2021-07-12 RX ORDER — DONEPEZIL HYDROCHLORIDE 5 MG/1
10 TABLET, FILM COATED ORAL
Status: DISCONTINUED | OUTPATIENT
Start: 2021-07-12 | End: 2021-07-14 | Stop reason: HOSPADM

## 2021-07-12 RX ORDER — PRAVASTATIN SODIUM 40 MG
40 TABLET ORAL
Status: DISCONTINUED | OUTPATIENT
Start: 2021-07-12 | End: 2021-07-14 | Stop reason: HOSPADM

## 2021-07-12 RX ORDER — SERTRALINE HYDROCHLORIDE 25 MG/1
25 TABLET, FILM COATED ORAL DAILY
Status: DISCONTINUED | OUTPATIENT
Start: 2021-07-12 | End: 2021-07-14 | Stop reason: HOSPADM

## 2021-07-12 RX ORDER — MONTELUKAST SODIUM 10 MG/1
10 TABLET ORAL
Status: DISCONTINUED | OUTPATIENT
Start: 2021-07-12 | End: 2021-07-14 | Stop reason: HOSPADM

## 2021-07-12 RX ORDER — LISINOPRIL 20 MG/1
40 TABLET ORAL DAILY
Status: DISCONTINUED | OUTPATIENT
Start: 2021-07-12 | End: 2021-07-14 | Stop reason: HOSPADM

## 2021-07-12 RX ORDER — SACCHAROMYCES BOULARDII 250 MG
250 CAPSULE ORAL 2 TIMES DAILY
Status: DISCONTINUED | OUTPATIENT
Start: 2021-07-12 | End: 2021-07-14 | Stop reason: HOSPADM

## 2021-07-12 RX ADMIN — INSULIN GLARGINE 10 UNITS: 100 INJECTION, SOLUTION SUBCUTANEOUS at 08:30

## 2021-07-12 RX ADMIN — GABAPENTIN 200 MG: 100 CAPSULE ORAL at 22:09

## 2021-07-12 RX ADMIN — MONTELUKAST SODIUM 10 MG: 10 TABLET, FILM COATED ORAL at 22:09

## 2021-07-12 RX ADMIN — FLUTICASONE PROPIONATE 2 SPRAY: 50 SPRAY, METERED NASAL at 08:30

## 2021-07-12 RX ADMIN — DONEPEZIL HYDROCHLORIDE 10 MG: 5 TABLET ORAL at 22:09

## 2021-07-12 RX ADMIN — SODIUM CHLORIDE 1000 ML: 0.9 INJECTION, SOLUTION INTRAVENOUS at 00:56

## 2021-07-12 RX ADMIN — LEVOTHYROXINE SODIUM 100 MCG: 100 TABLET ORAL at 08:27

## 2021-07-12 RX ADMIN — Medication 125 MG: at 23:02

## 2021-07-12 RX ADMIN — Medication 250 MG: at 17:18

## 2021-07-12 RX ADMIN — SODIUM CHLORIDE 50 ML/HR: 0.9 INJECTION, SOLUTION INTRAVENOUS at 05:29

## 2021-07-12 RX ADMIN — LISINOPRIL 40 MG: 20 TABLET ORAL at 08:28

## 2021-07-12 RX ADMIN — HEPARIN SODIUM 5000 UNITS: 5000 INJECTION INTRAVENOUS; SUBCUTANEOUS at 14:57

## 2021-07-12 RX ADMIN — MEMANTINE 10 MG: 10 TABLET ORAL at 17:18

## 2021-07-12 RX ADMIN — IOHEXOL 100 ML: 350 INJECTION, SOLUTION INTRAVENOUS at 02:08

## 2021-07-12 RX ADMIN — Medication 250 MG: at 14:57

## 2021-07-12 RX ADMIN — MEMANTINE 10 MG: 10 TABLET ORAL at 08:28

## 2021-07-12 RX ADMIN — INSULIN GLARGINE 20 UNITS: 100 INJECTION, SOLUTION SUBCUTANEOUS at 22:08

## 2021-07-12 RX ADMIN — SERTRALINE 25 MG: 25 TABLET, FILM COATED ORAL at 08:28

## 2021-07-12 RX ADMIN — PRAVASTATIN SODIUM 40 MG: 40 TABLET ORAL at 17:18

## 2021-07-12 NOTE — ASSESSMENT & PLAN NOTE
· Daughter reports nausea, diarrhea and decreased PO intake x 4 days with lower abdominal pain  Chronic chills  Denies fever   Denies vomiting  · CT abd/pelvis revealed nonspecific proctocolitis  · Enteric panel and C diff pending  · IV hydration  · Consult GI

## 2021-07-12 NOTE — ASSESSMENT & PLAN NOTE
Lab Results   Component Value Date    EGFR 37 07/12/2021    EGFR 30 11/20/2020    EGFR 35 08/17/2020    CREATININE 1 36 (H) 07/12/2021    CREATININE 1 60 (H) 11/20/2020    CREATININE 1 41 (H) 08/17/2020     · Hb stable at 9 8   MCV 87  · CBC tomorrow am

## 2021-07-12 NOTE — H&P
[id number] 1940, [de-identified] y o  female MRN: [id number]  Unit/Bed#: FT 04 Encounter: [id number]  Primary Care Provider: Erasmo Bean MD   Date and time admitted to hospital: 7/12/2021 12:11 AM    * Diarrhea  Assessment & Plan  · Daughter reports nausea, diarrhea and decreased PO intake x 4 days with lower abdominal pain  Chronic chills  Denies fever  Denies vomiting  · CT abd/pelvis revealed nonspecific proctocolitis  · Enteric panel and C diff pending  · IV hydration  · Consult GI    Type 2 diabetes, uncontrolled, with neuropathy (HCC)  Assessment & Plan  Lab Results   Component Value Date    HGBA1C 8 2 (A) 03/04/2021       No results for input(s): POCGLU in the last 72 hours  Blood Sugar Average: Last 72 hrs:     · Hold home dose Invokana while inpt  · Continue home dose Lantus 10 units q am and 20 units at HS  · FSBS AC & HS  · Diabetic diet    CKD (chronic kidney disease) stage 3, GFR 30-59 ml/min [facility])  Assessment & Plan  Lab Results   Component Value Date    EGFR 37 07/12/2021    EGFR 30 11/20/2020    EGFR 35 08/17/2020    CREATININE 1 36 (H) 07/12/2021    CREATININE 1 60 (H) 11/20/2020    CREATININE 1 41 (H) 08/17/2020     · Creatinine 1 36 on admission which appears to be at baseline  · Avoid hypotension and nephrotoxic agents  · IVF hydration  · BMP tomorrow am    Essential hypertension  Assessment & Plan  · BP adequately controlled on current regimen  · Continue home dose Lisinopril  · Monitor BP per unit protocol    Anemia in stage 3 chronic kidney disease [facility])  Assessment & Plan  Lab Results   Component Value Date    EGFR 37 07/12/2021    EGFR 30 11/20/2020    EGFR 35 08/17/2020    CREATININE 1 36 (H) 07/12/2021    CREATININE 1 60 (H) 11/20/2020    CREATININE 1 41 (H) 08/17/2020     · Hb stable at 9 8   MCV 87  · CBC tomorrow am    Hypothyroidism  Assessment & Plan  · Continue home dose Levothyroxine    Alzheimer's dementia ([facility] [address] )  Assessment & Plan  · Continue home dose Aricept and Namenda    Peripheral neuropathy  Assessment & Plan  · Continue home dose Neurontin    Mixed hyperlipidemia  Assessment & Plan  · Continue home dose Zocor (equivalent)  · Home dose Fenofibrate on hold 2/2 GFR      VTE Prophylaxis: Heparin  / sequential compression device   Code Status: Level 1 Full Code  POLST: POLST form is not discussed and not completed at this time  Discussion with family: Daughter     Anticipated Length of Stay:  Patient will be admitted on an Observation basis with an anticipated length of stay of  Less than 2 midnights  Justification for Hospital Stay: See AP above    Total Time for Visit, including Counseling / Coordination of Care: 45 minutes  Greater than 50% of this total time spent on direct patient counseling and coordination of care  Chief Complaint:   diarrhea    History of Present Illness:    Ko Perez is a [de-identified] y o  female who presents with diarrhea  Daughter reports nausea, diarrhea and decreased PO intake x 4 days with lower abdominal pain  Chronic chills  Denies fever  Denies vomiting  Review of Systems:    Review of Systems   Constitutional: Positive for appetite change (decreased) and chills  Negative for fever  HENT: Negative for congestion, ear pain, sinus pressure and sore throat  Eyes: Negative for visual disturbance  Respiratory: Negative for cough, shortness of breath and wheezing  Cardiovascular: Negative for chest pain, palpitations and leg swelling  Gastrointestinal: Positive for abdominal pain, diarrhea and nausea  Negative for constipation and vomiting  Genitourinary: Negative for difficulty urinating, dysuria, frequency and urgency  Musculoskeletal: Negative for neck pain and neck stiffness  Neurological: Negative for dizziness, syncope, light-headedness and headaches  All other systems reviewed and are negative        Past Medical and Surgical History:     Past Medical History:   Diagnosis Date    Aggression  Alzheimer's dementia ([facility] [address] )     Arthritis     Dementia ([facility] [address] )     Diabetes insipidus ([facility] [address] [facility]     Diabetes mellitus ([facility] [address] )     High cholesterol     Hypertension     Memory loss     Migraine     Polyneuropathy     Rheumatoid arthritis ([facility] [address] )     Serum lipids high     Stomach problems     Thyroid trouble        Past Surgical History:   Procedure Laterality Date    CATARACT EXTRACTION      CHOLECYSTECTOMY      GALLBLADDER SURGERY      HYSTERECTOMY      [place] SHLDR ARTHROSCOP,SURG,W/ROTAT CUFF REPR Right 8/1/2019    Procedure: SHOULDER ARTHROSCOPIC ROTATOR CUFF REPAIR;  Surgeon: Claudine Houston MD;  Location: [facility];  Service: Orthopedics    ROTATOR CUFF REPAIR Right 08/01/2019       Meds/Allergies:    Prior to Admission medications    Medication Sig Start Date End Date Taking?  Authorizing Provider   acetaminophen-codeine (TYLENOL #4) 300-60 MG per tablet Take 1 tablet by mouth every 4 (four) hours as needed for moderate pain  Patient not taking: Reported on 3/4/2021 1/25/21   Erasmo Bean MD   Blood Glucose Monitoring Suppl (OneTouch Verio) w/Device KIT Use as directed 11/13/20   Erasmo Bean MD   Blood Pressure KIT by Does not apply route daily 5/13/19   Erasmo Bean MD   Continuous Blood Gluc Sensor (FreeStyle Aruna 14 Day Sensor) MISC Use 1 each every 14 (fourteen) days 11/13/20   Erasmo Bean MD   donepezil (ARICEPT) 10 mg tablet Take 1 tablet (10 mg total) by mouth daily at bedtime 11/13/20   Erasmo Bean MD   fenofibrate (TRIGLIDE) 160 MG tablet TAKE 1 TABLET BY MOUTH EVERY DAY 5/18/21   Erasmo Bean MD   fluticasone [facility]) 50 mcg/act nasal spray 2 sprays into each nostril daily 5/19/21   Erasmo Bean MD   gabapentin (NEURONTIN) 100 mg capsule TAKE 2 CAPSULES (200 MG TOTAL) BY MOUTH DAILY AT BEDTIME 6/3/21 7/3/21  Erasmo Bean MD   glucose blood (OneTouch Verio) test strip USE TO TEST 3 TIMES A DAY 6/29/21   Erasmo Bean MD   hydrocortisone (ANUSOL-HC) 2 5 % rectal cream Apply topically 2 (two) times a day 3/4/21   Abdi Ray MD   Incontinence Supplies MISC Use 6 (six) times a day Wipes 1/26/21   Abdi Ray MD   Incontinence Supplies MISC Use 4 (four) times a day Comfort shield Adult diapers 1/26/21   Abdi Ray MD   Incontinence Supply Disposable MISC Use 6 (six) times a day Dispense disposable bed pad 1/26/21   Abdi Ray MD   insulin glargine (Lantus SoloStar) 100 units/mL injection pen Take 10 units in the am and 20 units in the pm 1/25/21   Abdi Ray MD   Insulin Pen Needle (BD Pen Needle Elisabeth U/F) 32G X 4 MM MISC Use 2 (two) times a day 1/25/21   Abdi Ray MD   Invokana 300 MG TABS TAKE 1 TABLET BY MOUTH EVERY DAY 4/27/21   Ezekiel Kemp MD   levothyroxine 100 mcg tablet TAKE 1 TABLET BY MOUTH EVERY DAY 7/6/21   Abdi Ray MD   lisinopril (ZESTRIL) 40 mg tablet Take 1 tablet (40 mg total) by mouth daily 11/13/20 3/4/21  Abdi Ray MD   memantine [facility]) 10 mg tablet Take 1 tablet (10 mg total) by mouth 2 (two) times a day 11/13/20 3/4/21  Abdi Ray MD   montelukast (SINGULAIR) 10 mg tablet TAKE 1 TABLET BY MOUTH DAILY AT BEDTIME 7/6/21   Abdi Ray MD   NIFEdipine 0 3%-lidocaine 5% rectal ointment Apply 1 application topically every 4 (four) hours as needed for discomfort or pain Apply a small amount to anal fissure 3/4/21   Adbi Ray MD   OneTouch Delica Lancets 13W MISC TEST BS TID 11/13/20   Abdi Ray MD   sertraline (ZOLOFT) 25 mg tablet Take 1 tablet (25 mg total) by mouth daily 3/4/21   Abdi Ray MD   simvastatin (ZOCOR) 20 mg tablet Take 20 mg by mouth daily  12/14/20   Maria De Jesus Hernandez MD   Ascorbic Acid (vitamin C) 100 MG tablet Take 100 mg by mouth daily  7/12/21  Historical Provider, MD   magnesium 30 MG tablet Take 30 mg by mouth 2 (two) times a day  7/12/21  Historical Provider, MD     I have reviewed home medications with patient family member  Allergies:    Allergies   Allergen Reactions    Penicillins Itching and Swelling       Social History:     Marital Status:    Patient Pre-hospital Living Situation: Lives w daughter  Patient Pre-hospital Level of Mobility: Ambulatory  Patient Pre-hospital Diet Restrictions: None  Substance Use History:   Social History     Substance and Sexual Activity   Alcohol Use No     Social History     Tobacco Use   Smoking Status Former Smoker    Quit date:     Years since quittin 5   Smokeless Tobacco Never Used     Social History     Substance and Sexual Activity   Drug Use No       Family History:    Family History   Problem Relation Age of Onset    Substance Abuse Mother         denied    Mental illness Mother         denied    Substance Abuse Father         denied    Mental illness Father         denied    Diabetes Brother     Blindness Brother     Arthritis Daughter     HIV Son        Physical Exam:     Vitals:   Blood Pressure: 142/77 (21)  Pulse: (!) 50 (21)  Temperature: 99 °F (37 2 °C) (21)  Temp Source: Temporal (21)  Respirations: 16 (21)  Weight - Scale: 60 3 kg (133 lb) (21)  SpO2: 95 % (21)    Physical Exam  Vitals reviewed  Constitutional:       General: She is not in acute distress  HENT:      Head: Normocephalic and atraumatic  Mouth/Throat:      Comments: deferred  Eyes:      Extraocular Movements: Extraocular movements intact  Cardiovascular:      Rate and Rhythm: Regular rhythm  Bradycardia present  Pulses: Normal pulses  Heart sounds: Normal heart sounds  Pulmonary:      Effort: Pulmonary effort is normal       Breath sounds: Normal breath sounds  Abdominal:      Palpations: Abdomen is soft  Tenderness: There is no abdominal tenderness  There is no guarding or rebound  Musculoskeletal:         General: Normal range of motion  Cervical back: Normal range of motion and neck supple  Skin:     General: Skin is warm and dry     Neurological:      General: No focal deficit present  Mental Status: She is alert  Mental status is at baseline  Psychiatric:         Mood and Affect: Mood normal          Behavior: Behavior normal          Additional Data:     Lab Results: I have personally reviewed pertinent reports  Results from last 7 days   Lab Units 07/12/21  0052   WBC Thousand/uL 3 97*   HEMOGLOBIN g/dL 9 8*   HEMATOCRIT % 31 1*   PLATELETS Thousands/uL 165   NEUTROS PCT % 59   LYMPHS PCT % 27   MONOS PCT % 7   EOS PCT % 5     Results from last 7 days   Lab Units 07/12/21  0052   SODIUM mmol/L 138   POTASSIUM mmol/L 4 6   CHLORIDE mmol/L 106   CO2 mmol/L 27   BUN mg/dL 23   CREATININE mg/dL 1 36*   ANION GAP mmol/L 5   CALCIUM mg/dL 9 5   ALBUMIN g/dL 3 7   TOTAL BILIRUBIN mg/dL 0 46   ALK PHOS U/L 34*   ALT U/L 30   AST U/L 27   GLUCOSE RANDOM mg/dL 275*                 Results from last 7 days   Lab Units 07/12/21  0055   LACTIC ACID mmol/L 1 1       Imaging: I have personally reviewed pertinent reports  CT abdomen pelvis with contrast   Final Result by Marcela Rocha MD (07/12 5539)      1  Mild wall thickening and hyperemia of fluid containing sigmoid colon and rectum with adjacent fatty haziness suggestive of nonspecific proctocolitis  2   Markedly distended urinary bladder  There is fullness of the right greater than left renal collecting systems likely secondary to bladder distention  The study was marked in [facility] for immediate notification  Workstation performed: [id number]             EKG, Pathology, and Other Studies Reviewed on Admission:   · EKG: NA    Allscripts / Epic Records Reviewed: Yes     ** Please Note: This note has been constructed using a voice recognition system   ** check A1c  sliding scale  on tujeo 40u at bedtime; will start lantus 32u

## 2021-07-12 NOTE — ASSESSMENT & PLAN NOTE
Lab Results   Component Value Date    HGBA1C 8 2 (A) 03/04/2021       No results for input(s): POCGLU in the last 72 hours      Blood Sugar Average: Last 72 hrs:     · Hold home dose Invokana while inpt  · Continue home dose Lantus 10 units q am and 20 units at HS  · FSBS AC & HS  · Diabetic diet

## 2021-07-12 NOTE — ASSESSMENT & PLAN NOTE
Lab Results   Component Value Date    EGFR 37 07/12/2021    EGFR 30 11/20/2020    EGFR 35 08/17/2020    CREATININE 1 36 (H) 07/12/2021    CREATININE 1 60 (H) 11/20/2020    CREATININE 1 41 (H) 08/17/2020     · Creatinine 1 36 on admission which appears to be at baseline  · Avoid hypotension and nephrotoxic agents  · IVF hydration  · BMP tomorrow am

## 2021-07-12 NOTE — CONSULTS
Consultation - [address] [facility]  Nunu Khanna [de-identified] y o  female MRN: [id number]  Unit/Bed#: FT 04 Encounter: [id number]      [address]    Reason for Consult / Principal Problem: Diarrhea, colitis    HPI: Nunu Khanna is a [de-identified]y o  year old female  With a PMH of dementia, DM, IBS who presented to the hospital with complaints of diarrhea  Patient is Swedish speaking and history of mainly obtained via the patient's daughter by phone  Patient has a long history of chronic diarrhea with about 3 bowel movements a day  She had a prior work up in 2019 and treated for IBS  Colonoscopy in October of 2019 by Dr Taryn Mas was normal except for 2 small adenomatous polyps which were removed and biopsies was negative for microscopic colitis  She also had negative celiac serology and an normal ESR checked in 2019  She reports an acute change about 4 to 5 days ago when she developed the onset of severe diarrhea with numerous episodes of diarrhea throughout the day  She also reports of lower abdominal cramping  She has some nausea as well but no vomiting  She also reports some rectal bleeding noted on the toilet tissue when she wipes  Her daughter reports since she has been here her mother has had about 8 episodes of diarrhea  She denies any recent antibiotics  No prior history of c diff colitis  CT A/P on admission showed mild wall thickening of the sigmoid colon and rectum suggestive of a proctocolitis  REVIEW OF SYSTEMS:    CONSTITUTIONAL: Denies any fever, chills, or rigors  HEENT: No earache or tinnitus  Denies hearing loss or visual disturbances  CARDIOVASCULAR: No chest pain or palpitations  RESPIRATORY: Denies any cough, hemoptysis, shortness of breath or dyspnea on exertion  GASTROINTESTINAL: As noted in the History of Present Illness  GENITOURINARY: No problems with urination  Denies any hematuria or dysuria  NEUROLOGIC: No dizziness or vertigo, denies headaches     MUSCULOSKELETAL: Denies any muscle or joint pain  SKIN: Denies skin rashes or itching  ENDOCRINE: Denies excessive thirst  Denies intolerance to heat or cold  PSYCHOSOCIAL: Denies depression or anxiety  Denies any recent memory loss       Historical Information   Past Medical History:   Diagnosis Date    Aggression     [facility] [address] [facility]     Arthritis     Dementia [facility] [address] [facility]     Diabetes insipidus ([facility] [address] [facility]     Diabetes mellitus ([facility] [address] [facility]     High cholesterol     Hypertension     Memory loss     Migraine     Polyneuropathy     Rheumatoid arthritis (HCC)     Serum lipids high     Stomach problems     Thyroid trouble      Past Surgical History:   Procedure Laterality Date    CATARACT EXTRACTION      CHOLECYSTECTOMY      GALLBLADDER SURGERY      HYSTERECTOMY      NE SHLDR ARTHROSCOP,SURG,W/ROTAT CUFF REPR Right 2019    Procedure: SHOULDER ARTHROSCOPIC ROTATOR CUFF REPAIR;  Surgeon: Shun Heaton MD;  Location[facility] OR;  Service: Orthopedics    ROTATOR CUFF REPAIR Right 2019     Social History   Social History     Substance and Sexual Activity   Alcohol Use No     Social History     Substance and Sexual Activity   Drug Use No     Social History     Tobacco Use   Smoking Status Former Smoker    Quit date:     Years since quittin 5   Smokeless Tobacco Never Used     Family History   Problem Relation Age of Onset    Substance Abuse Mother         denied    Mental illness Mother         denied    Substance Abuse Father         denied    Mental illness Father         denied    Diabetes Brother     Blindness Brother     Arthritis Daughter    Deadector Sol HIV Son        Meds/Allergies     (Not in a hospital admission)    Current Facility-Administered Medications   Medication Dose Route Frequency    acetaminophen (TYLENOL) tablet 650 mg  650 mg Oral Q6H PRN    aluminum-magnesium hydroxide-simethicone (MYLANTA) oral suspension 30 mL  30 mL Oral Q6H PRN    donepezil (ARICEPT) tablet 10 mg  10 mg Oral HS    fluticasone (FLONASE) 50 mcg/act nasal spray 2 spray  2 spray Nasal Daily    gabapentin (NEURONTIN) capsule 200 mg  200 mg Oral HS    heparin (porcine) subcutaneous injection 5,000 Units  5,000 Units Subcutaneous Q8H [address]    insulin glargine (LANTUS) subcutaneous injection 10 Units 0 1 mL  10 Units Subcutaneous QAM    insulin glargine (LANTUS) subcutaneous injection 20 Units 0 2 mL  20 Units Subcutaneous HS    insulin lispro (HumaLOG) 100 units/mL subcutaneous injection 1-5 Units  1-5 Units Subcutaneous TID AC    insulin lispro (HumaLOG) 100 units/mL subcutaneous injection 1-5 Units  1-5 Units Subcutaneous HS    levothyroxine tablet 100 mcg  100 mcg Oral Daily    lisinopril (ZESTRIL) tablet 40 mg  40 mg Oral Daily    memantine (NAMENDA) tablet 10 mg  10 mg Oral BID    montelukast (SINGULAIR) tablet 10 mg  10 mg Oral HS    ondansetron (ZOFRAN) injection 4 mg  4 mg Intravenous Q6H PRN    pravastatin (PRAVACHOL) tablet 40 mg  40 mg Oral Daily With Dinner    sertraline (ZOLOFT) tablet 25 mg  25 mg Oral Daily    sodium chloride 0 9 % infusion  50 mL/hr Intravenous Continuous       Allergies   Allergen Reactions    Penicillins Itching and Swelling       Objective   Blood pressure 142/77, pulse (!) 50, temperature 99 °F (37 2 °C), temperature source Temporal, resp  rate 16, weight 60 3 kg (133 lb), SpO2 95 %      Intake/Output Summary (Last 24 hours) at 7/12/2021 1117  Last data filed at 7/12/2021 0500  Gross per 24 hour   Intake 1000 ml   Output 500 ml   Net 500 ml         PHYSICAL EXAM:      General Appearance:   Alert, cooperative, no distress, appears stated age    HEENT:   Normocephalic, atraumatic, anicteric      Neck:  Supple, symmetrical, trachea midline, no adenopathy;    thyroid: no enlargement/tenderness/nodules; no carotid  bruit or JVD    Lungs:   Clear to auscultation bilaterally; no rales, rhonchi or wheezing; respirations unlabored    Heart[de-identified]   S1 and S2 normal; regular rate and rhythm; no murmur, rub, or gallop     Abdomen:   Soft, non-tender, non-distended; normal bowel sounds; no masses, no organomegaly    Genitalia:   Deferred    Rectal:   Deferred    Extremities:  No cyanosis, clubbing or edema    Pulses:  2+ and symmetric all extremities    Skin:  Skin color, texture, turgor normal, no rashes or lesions    Lymph nodes:  No palpable cervical, axillary or inguinal lymphadenopathy        Lab Results:   Admission on 07/12/2021   Component Date Value    WBC 07/12/2021 3 97*    RBC 07/12/2021 3 56*    Hemoglobin 07/12/2021 9 8*    Hematocrit 07/12/2021 31 1*    MCV 07/12/2021 87     MCH 07/12/2021 27 5     MCHC 07/12/2021 31 5     RDW 07/12/2021 13 2     MPV 07/12/2021 12 8*    Platelets 07/08/7847 165     nRBC 07/12/2021 0     Neutrophils Relative 07/12/2021 59     Immat GRANS % 07/12/2021 1     Lymphocytes Relative 07/12/2021 27     Monocytes Relative 07/12/2021 7     Eosinophils Relative 07/12/2021 5     Basophils Relative 07/12/2021 1     Neutrophils Absolute 07/12/2021 2 36     Immature Grans Absolute 07/12/2021 0 02     Lymphocytes Absolute 07/12/2021 1 08     Monocytes Absolute 07/12/2021 0 29     Eosinophils Absolute 07/12/2021 0 19     Basophils Absolute 07/12/2021 0 03     Sodium 07/12/2021 138     Potassium 07/12/2021 4 6     Chloride 07/12/2021 106     CO2 07/12/2021 27     ANION GAP 07/12/2021 5     BUN 07/12/2021 23     Creatinine 07/12/2021 1 36*    Glucose 07/12/2021 275*    Calcium 07/12/2021 9 5     AST 07/12/2021 27     ALT 07/12/2021 30     Alkaline Phosphatase 07/12/2021 34*    Total Protein 07/12/2021 7 1     Albumin 07/12/2021 3 7     Total Bilirubin 07/12/2021 0 46     eGFR 07/12/2021 37     Lipase 07/12/2021 130     Color, UA 07/12/2021 Colorless     Clarity, UA 07/12/2021 Clear     Specific Gravity, UA 07/12/2021 1 010     pH, UA 07/12/2021 7 0     Leukocytes, UA 07/12/2021 Trace*    Nitrite, UA 07/12/2021 Negative     Protein, UA 07/12/2021 Negative     Glucose, UA 07/12/2021 >=1000 (1%)*    Ketones, UA 07/12/2021 Negative     Urobilinogen, UA 07/12/2021 0 2     Bilirubin, UA 07/12/2021 Negative     Blood, UA 07/12/2021 Small*    LACTIC ACID 07/12/2021 1 1     RBC, UA 07/12/2021 1-2     WBC, UA 07/12/2021 4-10*    Epithelial Cells 07/12/2021 Occasional     Bacteria, UA 07/12/2021 None Seen     TSH 3RD GENERATON 07/12/2021 0 525     Platelets 28/96/7504 171     MPV 07/12/2021 13 1*    POC Glucose 07/12/2021 83        Imaging Studies: I have personally reviewed pertinent imaging studies  ASSESSMENT & PLAN:    Acute severe diarrhea x 5 days  Proctocolitis on CT scan    - Patient developed the acute onset of severe diarrhea x 5 days most suspicious of an infectious etiology  - CT A/P on admission showed mild wall thickening of the sigmoid colon and rectum suggestive of a proctocolitis  - Monitor patient and labs  - Serial abdominal exams, supportive care, IVFs, Florastor   - Check c diff PCR, stool for enteric pathogens, giardia, and O&P   - Further recommendations pending results of the stool cultures  The patient will be seen and evaluated by Dr Je Lee PA-C  7/12/2021,11:17 AM

## 2021-07-12 NOTE — ASSESSMENT & PLAN NOTE
· BP adequately controlled on current regimen  · Continue home dose Lisinopril  · Monitor BP per unit protocol

## 2021-07-12 NOTE — ED PROVIDER NOTES
History  Chief Complaint   Patient presents with    Diarrhea     Patient reports diarrhea for the last 4 days with abdominal pain     Cy Laguerre is an [de-identified]year old female with past medical history including diabetes, hypertension, hyperlipidemia arriving to the emergency department accompanied by granddaughter for evaluation with chief complaint of diarrhea and abdominal pain  The patient is Tajik speaking only with history obtained by patient's granddaughter who provides translation  She states that the patient has been previously diagnosed with irritable bowel syndrome secondary to ongoing diarrhea, though over the past 4 days, the patient has had significant increase in stool frequency, finding that the patient has near sudden onset of diarrhea after eating  Patient's granddaughter states that she had noticed bright red streaks of blood on the toilet tissue when wiping the patient though patient has a history of hemorrhoids  She denies any episodes of melena  She states that the patient has been able to tolerate p o  intake without issue  Granddaughter reports that there is associated lower abdominal pain, described as sharp, and bloating  She denies any episodes of vomiting, though the patient admits to nausea  Denies fever though admits to chills  Granddaughter denies decreased urination, or urinary symptoms to include dysuria, urgency, frequency, malodorous urine, flank pain  Denies recent antibiotic use, recent illness or sick contact  Past surgical history notable for cholecystectomy and hysterectomy  No further complaints at this time  Prior to Admission Medications   Prescriptions Last Dose Informant Patient Reported? Taking?    Ascorbic Acid (vitamin C) 100 MG tablet   Yes No   Sig: Take 100 mg by mouth daily   Blood Glucose Monitoring Suppl (OneTouch Verio) w/Device KIT   No No   Sig: Use as directed   Blood Pressure KIT  Family Member No No   Sig: by Does not apply route daily Continuous Blood Gluc Sensor (FreeStyle Aruna 14 Day Sensor) [facility]   No No   Sig: Use 1 each every 14 (fourteen) days   Incontinence Supplies MISC   No No   Sig: Use 6 (six) times a day Wipes   Incontinence Supplies MISC   No No   Sig: Use 4 (four) times a day Comfort shield Adult diapers   Incontinence Supply Disposable MISC   No No   Sig: Use 6 (six) times a day Dispense disposable bed pad   Insulin Pen Needle (BD Pen Needle Elisabeth U/F) 32G X 4 MM MISC   No No   Sig: Use 2 (two) times a day   Invokana 300 MG TABS   No No   Sig: TAKE 1 TABLET BY MOUTH EVERY DAY   NIFEdipine 0 3%-lidocaine 5% rectal ointment   No No   Sig: Apply 1 application topically every 4 (four) hours as needed for discomfort or pain Apply a small amount to anal fissure   Oneuch Delica Lancets 32F MISC   No No   Sig: TEST BS TID   acetaminophen-codeine (TYLENOL #4) 300-60 MG per tablet   No No   Sig: Take 1 tablet by mouth every 4 (four) hours as needed for moderate pain   Patient not taking: Reported on 3/4/2021   donepezil (ARICEPT) 10 mg tablet   No No   Sig: Take 1 tablet (10 mg total) by mouth daily at bedtime   fenofibrate (TRIGLIDE) 160 MG tablet   No No   Sig: TAKE 1 TABLET BY MOUTH EVERY DAY   fluticasone (FLONASE) 50 mcg/act nasal spray   No No   Si sprays into each nostril daily   gabapentin (NEURONTIN) 100 mg capsule   No No   Sig: TAKE 2 CAPSULES (200 MG TOTAL) BY MOUTH DAILY AT BEDTIME   glucose blood (OneTouch Verio) test strip   No No   Sig: USE TO TEST 3 TIMES A DAY   hydrocortisone (ANUSOL-HC) 2 5 % rectal cream   No No   Sig: Apply topically 2 (two) times a day   insulin glargine (Lantus SoloStar) 100 units/mL injection pen   No No   Sig: Take 10 units in the am and 20 units in the pm   levothyroxine 100 mcg tablet   No No   Sig: TAKE 1 TABLET BY MOUTH EVERY DAY   lisinopril (ZESTRIL) 40 mg tablet   No No   Sig: Take 1 tablet (40 mg total) by mouth daily   magnesium 30 MG tablet   Yes No   Sig: Take 30 mg by mouth 2 (two) times a day   memantine (NAMENDA) 10 mg tablet   No No   Sig: Take 1 tablet (10 mg total) by mouth 2 (two) times a day   montelukast (SINGULAIR) 10 mg tablet   No No   Sig: TAKE 1 TABLET BY MOUTH DAILY AT BEDTIME   sertraline (ZOLOFT) 25 mg tablet   No No   Sig: Take 1 tablet (25 mg total) by mouth daily   simvastatin (ZOCOR) 20 mg tablet   Yes No   Sig: Take 20 mg by mouth daily       Facility-Administered Medications: None       Past Medical History:   Diagnosis Date    Aggression     Alzheimer's dementia [facility] [address] [facility]     Arthritis     Dementia [facility] [address] [facility]     Diabetes insipidus ([facility] [address] [facility]     Diabetes mellitus ([facility] [address] [facility]     High cholesterol     Hypertension     Memory loss     Migraine     Polyneuropathy     Rheumatoid arthritis (HCC)     Serum lipids high     Stomach problems     Thyroid trouble        Past Surgical History:   Procedure Laterality Date    CATARACT EXTRACTION      CHOLECYSTECTOMY      GALLBLADDER SURGERY      HYSTERECTOMY      [place] SHLDR ARTHROSCOP,SURG,W/ROTAT CUFF REPR Right 2019    Procedure: SHOULDER ARTHROSCOPIC ROTATOR CUFF REPAIR;  Surgeon: Deshawn Wiseman MD;  [facility]: [facility];  Service: Orthopedics    ROTATOR CUFF REPAIR Right 2019       Family History   Problem Relation Age of Onset    Substance Abuse Mother         denied    Mental illness Mother         denied    Substance Abuse Father         denied    Mental illness Father         denied    Diabetes Brother     Blindness Brother     Arthritis Daughter     HIV Son      I have reviewed and agree with the history as documented      E-Cigarette/Vaping    E-Cigarette Use Never User      E-Cigarette/Vaping Substances     Social History     Tobacco Use    Smoking status: Former Smoker     Quit date:      Years since quittin 5    Smokeless tobacco: Never Used   Vaping Use    Vaping Use: Never used   Substance Use Topics    Alcohol use: No    Drug use: No       Review of Systems   Constitutional: Positive for chills  Negative for fever  Respiratory: Negative for shortness of breath  Cardiovascular: Negative for chest pain  Gastrointestinal: Positive for abdominal distention, abdominal pain, diarrhea and nausea  Negative for vomiting  All other systems reviewed and are negative  Physical Exam  Physical Exam  Vitals and nursing note reviewed  Constitutional:       General: She is not in acute distress  Appearance: Normal appearance  She is well-developed  She is not ill-appearing, toxic-appearing or diaphoretic  HENT:      Head: Normocephalic and atraumatic  Eyes:      Conjunctiva/sclera: Conjunctivae normal       Pupils: Pupils are equal, round, and reactive to light  Cardiovascular:      Rate and Rhythm: Normal rate and regular rhythm  Heart sounds: Normal heart sounds  Pulmonary:      Effort: Pulmonary effort is normal  No respiratory distress  Breath sounds: Normal breath sounds  No wheezing  Abdominal:      General: Bowel sounds are normal       Palpations: Abdomen is soft  Tenderness: There is abdominal tenderness  There is no guarding or rebound  Comments: Abdomen soft, mildly distended  Generalized tenderness to palpation  No peritoneal signs  Musculoskeletal:         General: No tenderness  Normal range of motion  Cervical back: Normal range of motion and neck supple  Skin:     General: Skin is warm and dry  Capillary Refill: Capillary refill takes less than 2 seconds  Neurological:      Mental Status: She is alert  Motor: Motor function is intact  No weakness           Vital Signs  ED Triage Vitals [07/11/21 2252]   Temperature Pulse Respirations Blood Pressure SpO2   99 °F (37 2 °C) 65 18 (!) 220/84 97 %      Temp Source Heart Rate Source Patient Position - Orthostatic VS BP Location FiO2 (%)   Temporal Monitor Sitting Left arm --      Pain Score       --           Vitals:    07/11/21 2252   BP: (!) 220/84   Pulse: 65   Patient Position - Orthostatic VS: Sitting         Visual Acuity      ED Medications  Medications - No data to display    Diagnostic Studies  Results Reviewed     Procedure Component Value Units Date/Time    CBC and differential [[id number]]     Lab Status: No result Specimen: Blood     Comprehensive metabolic panel [[id number]]     Lab Status: No result Specimen: Blood     Lipase [[id number]]     Lab Status: No result Specimen: Blood     UA (URINE) with reflex to Scope [[id number]]     Lab Status: No result Specimen: Urine                  No orders to display              Procedures  Procedures         ED Course                                           MDM  Number of Diagnoses or Management Options  Bladder distension  Diarrhea: new and requires workup  Proctocolitis: new and requires workup  Diagnosis management comments: This is an [de-identified]year old female arriving to the emergency department by granddaughter for evaluation and treatment of abdominal pain and diarrhea  Granddaughter provides full HPI as the patient is Syriac-speaking only  Granddaughter states that the patient has been experiencing diarrhea over the past 4 days with numerous episodes of nonbloody stools  She has also been complaining of lower abdominal pain  The patient has maintained her appetite however granddaughter does express concern that the patient experiences diarrhea shortly after eating  Granddaughter denies any fevers, patient is reporting sensation of chills  Granddaughter offers no associated urinary symptoms or complaints      Differential diagnosis includes but not limited to:  Esophagitis, gastritis, peptic ulcer disease, pancreatitis, acute cholecystitis, obstruction, enteritis, colitis, diverticulitis, cystitis, urinary tract infection, dehydration, electrolyte derangement, anemia, MESFIN, ARF    Initial ED plan:  Labs, imaging, stool studies, hydration, pain control and reassessment    Final ED Assessment:  Vital signs stable on hospital arrival, patient afebrile and nontoxic in appearance  Examination as documented above  All labs and imaging independently reviewed with imaging interpreted by the radiologist   Dipika Patterson demonstrates anemia of 9 8 which is slightly below the patient's baseline of 10-11  Renal function stable  Urinalysis demonstrates trace leucocytes  Will add on urine culture as the patient is not experiencing urinary symptoms at this time  CT abdomen/pelvis reports proctocolitis and bladder distension  The patient had received hydration in the emergency department which was well tolerated  Stool studies ordered to evaluate for infectious source of the patient's diarrhea  The patient and granddaughter had been advised of lab and imaging findings with recommendation for hospital admission to continue hydration and close medical management which patient and granddaughter are understanding and agreeable with  The case was discussed with CARMEN Whittington  Patient accepted to Medicine Service  The patient had remained hemodynamically stable while being observed in the emergency department, without new or worsening symptoms  Bridging orders placed  Amount and/or Complexity of Data Reviewed  Clinical lab tests: ordered and reviewed  Tests in the radiology section of CPT®: ordered and reviewed  Obtain history from someone other than the patient: yes (Granddaughter)  Review and summarize past medical records: yes  Discuss the patient with other providers: yes  Independent visualization of images, tracings, or specimens: yes    Risk of Complications, Morbidity, and/or Mortality  Presenting problems: moderate  Diagnostic procedures: moderate  Management options: moderate    Patient Progress  Patient progress: stable      Disposition  Final diagnoses:   Proctocolitis   Diarrhea   Bladder distension - Per CT "Markedly distended urinary bladder    There is fullness of the right greater than left renal collecting systems likely secondary to bladder distention "     Time reflects when diagnosis was documented in both MDM as applicable and the Disposition within this note     Time User Action Codes Description Comment    7/12/2021  4:30 AM Ayde Mews Add [K52 9] Proctocolitis     7/12/2021  4:32 AM Ayde Mews Add [R19 7] Diarrhea     7/12/2021  4:32 AM Ayde Mews Add [N32 89] Bladder distension     7/12/2021  4:32 AM Ayde Mews Modify [N32 89] Bladder distension Per CT "Markedly distended urinary bladder  There is fullness of the right greater than left renal collecting systems likely secondary to bladder distention "    7/14/2021 10:49 AM Harish Thurmont Add [I10] Essential hypertension       ED Disposition     ED Disposition Condition Date/Time Comment    Admit Stable Mon Jul 12, 2021  4:30 AM Case was discussed with Chalo Johnson and the patient's admission status was agreed to be Admission Status: observation status to the service of Dr Geri Willoughby   Follow-up Information     Follow up With Specialties Details Why [address] Forte MD Family Medicine Follow up in 1 week(s)  [address] [place] [address],[address] [place] [address] 16  [phone]            Patient's Medications   Discharge Prescriptions    No medications on file     No discharge procedures on file      PDMP Review     None          ED Provider  Electronically Signed by           Woody Merlin, PA-C  07/17/21 0275

## 2021-07-13 LAB
GLUCOSE SERPL-MCNC: 123 MG/DL (ref 65–140)
GLUCOSE SERPL-MCNC: 171 MG/DL (ref 65–140)
GLUCOSE SERPL-MCNC: 190 MG/DL (ref 65–140)
GLUCOSE SERPL-MCNC: 49 MG/DL (ref 65–140)
GLUCOSE SERPL-MCNC: 54 MG/DL (ref 65–140)

## 2021-07-13 PROCEDURE — 97163 PT EVAL HIGH COMPLEX 45 MIN: CPT

## 2021-07-13 PROCEDURE — 82948 REAGENT STRIP/BLOOD GLUCOSE: CPT

## 2021-07-13 PROCEDURE — 99232 SBSQ HOSP IP/OBS MODERATE 35: CPT | Performed by: INTERNAL MEDICINE

## 2021-07-13 PROCEDURE — 99232 SBSQ HOSP IP/OBS MODERATE 35: CPT | Performed by: FAMILY MEDICINE

## 2021-07-13 PROCEDURE — 97167 OT EVAL HIGH COMPLEX 60 MIN: CPT

## 2021-07-13 PROCEDURE — NC001 PR NO CHARGE: Performed by: PHYSICIAN ASSISTANT

## 2021-07-13 RX ORDER — LABETALOL 20 MG/4 ML (5 MG/ML) INTRAVENOUS SYRINGE
10 EVERY 4 HOURS PRN
Status: DISCONTINUED | OUTPATIENT
Start: 2021-07-13 | End: 2021-07-14 | Stop reason: HOSPADM

## 2021-07-13 RX ADMIN — INSULIN LISPRO 1 UNITS: 100 INJECTION, SOLUTION INTRAVENOUS; SUBCUTANEOUS at 13:19

## 2021-07-13 RX ADMIN — MEMANTINE 10 MG: 10 TABLET ORAL at 17:28

## 2021-07-13 RX ADMIN — Medication 250 MG: at 09:35

## 2021-07-13 RX ADMIN — HEPARIN SODIUM 5000 UNITS: 5000 INJECTION INTRAVENOUS; SUBCUTANEOUS at 22:31

## 2021-07-13 RX ADMIN — INSULIN GLARGINE 20 UNITS: 100 INJECTION, SOLUTION SUBCUTANEOUS at 22:33

## 2021-07-13 RX ADMIN — HEPARIN SODIUM 5000 UNITS: 5000 INJECTION INTRAVENOUS; SUBCUTANEOUS at 05:54

## 2021-07-13 RX ADMIN — GABAPENTIN 200 MG: 100 CAPSULE ORAL at 22:31

## 2021-07-13 RX ADMIN — LEVOTHYROXINE SODIUM 100 MCG: 100 TABLET ORAL at 05:54

## 2021-07-13 RX ADMIN — FLUTICASONE PROPIONATE 2 SPRAY: 50 SPRAY, METERED NASAL at 09:37

## 2021-07-13 RX ADMIN — Medication 250 MG: at 17:28

## 2021-07-13 RX ADMIN — LISINOPRIL 40 MG: 20 TABLET ORAL at 09:35

## 2021-07-13 RX ADMIN — SERTRALINE 25 MG: 25 TABLET, FILM COATED ORAL at 09:35

## 2021-07-13 RX ADMIN — LABETALOL 20 MG/4 ML (5 MG/ML) INTRAVENOUS SYRINGE 10 MG: at 22:33

## 2021-07-13 RX ADMIN — MEMANTINE 10 MG: 10 TABLET ORAL at 09:35

## 2021-07-13 RX ADMIN — INSULIN LISPRO 1 UNITS: 100 INJECTION, SOLUTION INTRAVENOUS; SUBCUTANEOUS at 22:32

## 2021-07-13 RX ADMIN — INSULIN GLARGINE 10 UNITS: 100 INJECTION, SOLUTION SUBCUTANEOUS at 09:36

## 2021-07-13 RX ADMIN — DONEPEZIL HYDROCHLORIDE 10 MG: 5 TABLET ORAL at 22:31

## 2021-07-13 RX ADMIN — HEPARIN SODIUM 5000 UNITS: 5000 INJECTION INTRAVENOUS; SUBCUTANEOUS at 13:25

## 2021-07-13 RX ADMIN — PRAVASTATIN SODIUM 40 MG: 40 TABLET ORAL at 17:28

## 2021-07-13 RX ADMIN — Medication 125 MG: at 17:28

## 2021-07-13 RX ADMIN — Medication 125 MG: at 05:54

## 2021-07-13 RX ADMIN — MONTELUKAST SODIUM 10 MG: 10 TABLET, FILM COATED ORAL at 22:31

## 2021-07-13 RX ADMIN — Medication 125 MG: at 13:18

## 2021-07-13 NOTE — PLAN OF CARE
Problem: PHYSICAL THERAPY ADULT  Goal: Performs mobility at highest level of function for planned discharge setting  See evaluation for individualized goals  Description: Treatment/Interventions: Functional transfer training, LE strengthening/ROM, Therapeutic exercise, Endurance training, Patient/family training, Equipment eval/education, Bed mobility, Gait training, Spoke to nursing, OT  Equipment Recommended: Barbra Barajas (RW)       See flowsheet documentation for full assessment, interventions and recommendations  7/13/2021 0905 by Isidoro Corcoran, PT  Note: Prognosis: Good  Problem List: Decreased strength, Decreased endurance, Impaired balance, Decreased mobility, Decreased cognition, Impaired sensation, Pain  Assessment: Pt is [de-identified] y o  female seen for PT evaluation on 7/13/2021 s/p admit to [facility] on 7/12/2021 w/ Diarrhea  PT consulted to assess pt's functional mobility and d/c needs  Order placed for PT eval and tx, w/ up w/ A order  Performed at least 2 patient identifiers during session: Name and wristband  Comorbidities affecting pt's physical performance at time of assessment include:  has a past medical history of Aggression, Alzheimer's dementia ([address] ), Arthritis, Dementia ([address] ), Diabetes insipidus ([address] ), Diabetes mellitus ([address] ), High cholesterol, Hypertension, Memory loss, Migraine, Polyneuropathy, Rheumatoid arthritis ([address] ), Serum lipids high, Stomach problems, and Thyroid trouble  PTA, pt was independent w/ all functional mobility w/ cane, ambulates household distances, has 0 JEFFREY, lives w/ daughter and granddaughter in 2 level home (1st floor set up) and has 24/7 "aide" support  Pt questionable historianHILDA to follow up  Personal factors affecting pt at time of IE include: ambulating w/ assistive device, inability to navigate community distances and preferred language not [place] (language barrier)   Please find objective findings from PT assessment regarding body systems outlined above with impairments and limitations including weakness, impaired balance, decreased endurance, gait deviations, pain, decreased activity tolerance, altered sensation, fall risk and decreased cognition, as well as mobility assessment  The following objective measures performed on IE also reveal limitations: Barthel Index: 40/100 and Modified [place]: 3 (moderate disability)  Pt's clinical presentation is currently unstable/unpredictable seen in pt's presentation of abnormal lab value(s) and ongoing medical assessment  Pt to benefit from continued PT tx to address deficits as defined above and maximize level of functional independent mobility and consistency  From PT/mobility standpoint, recommendation at time of d/c would be home with home health rehabilitation pending progress in order to facilitate return to PLOF  Barriers to Discharge: None        PT Discharge Recommendation: Home with home health rehabilitation     PT - OK to Discharge: Yes    See flowsheet documentation for full assessment  7/13/2021 0905 by Raymundo Mckenna PT  Note: Prognosis: Good  Problem List: Decreased strength, Decreased endurance, Impaired balance, Decreased mobility, Decreased cognition, Impaired sensation, Pain  Assessment: Pt is [de-identified] y o  female seen for PT evaluation on 7/13/2021 s/p admit to [facility] on 7/12/2021 w/ Diarrhea  PT consulted to assess pt's functional mobility and d/c needs  Order placed for PT eval and tx, w/ up w/ A order  Performed at least 2 patient identifiers during session: Name and wristband  Comorbidities affecting pt's physical performance at time of assessment include:  has a past medical history of Aggression, Alzheimer's dementia ([address] ), Arthritis, Dementia ([address] ), Diabetes insipidus ([address] ), Diabetes mellitus ([address] ), High cholesterol, Hypertension, Memory loss, Migraine, Polyneuropathy, Rheumatoid arthritis ([address] ), Serum lipids high, Stomach problems, and Thyroid trouble   PTA, pt was independent w/ all functional mobility w/ cane, ambulates household distances, has 0 JEFFREY, lives w/ daughter and granddaughter in 2 level home (1st floor set up) and has 24/7 "aide" support  Pt questionable historian, HILDA to follow up  Personal factors affecting pt at time of IE include: ambulating w/ assistive device, inability to navigate community distances and preferred language not [place] (language barrier)  Please find objective findings from PT assessment regarding body systems outlined above with impairments and limitations including weakness, impaired balance, decreased endurance, gait deviations, pain, decreased activity tolerance, altered sensation, fall risk and decreased cognition, as well as mobility assessment  The following objective measures performed on IE also reveal limitations: Barthel Index: 40/100 and Modified [place]: 3 (moderate disability)  Pt's clinical presentation is currently unstable/unpredictable seen in pt's presentation of abnormal lab value(s) and ongoing medical assessment  Pt to benefit from continued PT tx to address deficits as defined above and maximize level of functional independent mobility and consistency  From PT/mobility standpoint, recommendation at time of d/c would be home with home health rehabilitation pending progress in order to facilitate return to PLOF  Barriers to Discharge: None        PT Discharge Recommendation: Home with home health rehabilitation     PT - OK to Discharge: Yes    See flowsheet documentation for full assessment

## 2021-07-13 NOTE — CASE MANAGEMENT
Hx obtained from granddaughter Daja Mojica via [phone]  She informs CM that [place] lives with her in a 2 story house, but her bedroom/bathroom are on the first floor  There are 3 JEFFREY and pt can do those steps, but a family member stays close by to prevent falling  Pt's daughter-Peggy  is her private aide through the waiver program and assists with ADL's  He has a cane, but refuses to use it  She has gone to OP/PT through [facility], but was never an in-patient  No VNA services  Denies substance abuse  She has depression that is treated with medication by a psychiatrist   Her PCP is Dr Maryann Shabazz  She quit smoking 30 years ago  She uses [facility] and has no problem with co-pays  She does not have a POA or Advanced Directive  CM supplied French version for review  Pt does not work or drive  She is transported to all appointments and will be transported home when medically cleared by a family member  CM discussed d/c needs including VNA, but Daja Mojica feels that pt has adequate support in the home with the assistance from pt's dgt Peggy  CM department will continue to follow through hospitalization  CM reviewed discharge planning process including the following: identifying help at home, patient preference for discharge planning needs, pharmacy preference, and availability of treatment team to discuss questions or concerns patient and/or family may have regarding understanding medications and recognizing signs and symptoms once discharged  CM also encouraged patient to follow up with all recommended appointments after discharge  Patient advised of importance for patient and family to participate in managing patients medical well being

## 2021-07-13 NOTE — PROGRESS NOTES
[address] [facility]  Progress Note Jannette Florentino 1940, [de-identified] y o  female MRN: [id number]  Unit/Bed#: -Rodriguez Encounter: [id number]  Primary Care Provider: Jo Vo MD   Date and time admitted to hospital: 7/12/2021 12:11 AM    * Diarrhea  Assessment & Plan  Daughter reports nausea, diarrhea and decreased PO intake x 4 days with lower abdominal pain  Chronic chills  Denies fever  Denies vomiting    · CT abd/pelvis revealed nonspecific proctocolitis  · C diff toxin positive  · GI on board, recommendation for 10 days oral vancomycin  · Continue with probiotic  · Monitor stool output  CKD (chronic kidney disease) stage 3, GFR 30-59 ml/min [facility])  Assessment & Plan  Lab Results   Component Value Date    EGFR 37 07/12/2021    EGFR 30 11/20/2020    EGFR 35 08/17/2020    CREATININE 1 36 (H) 07/12/2021    CREATININE 1 60 (H) 11/20/2020    CREATININE 1 41 (H) 08/17/2020     · Creatinine 1 36 on admission which appears to be at baseline  · Status post IV fluids for 24 hours, will discontinue today  · Recheck BMP in a m       Anemia in stage 3 chronic kidney disease [facility])  Assessment & Plan  Lab Results   Component Value Date    EGFR 37 07/12/2021    EGFR 30 11/20/2020    EGFR 35 08/17/2020    CREATININE 1 36 (H) 07/12/2021    CREATININE 1 60 (H) 11/20/2020    CREATININE 1 41 (H) 08/17/2020     · Hb stable at 9 8   MCV 87  · CBC tomorrow am    Type 2 diabetes, uncontrolled, with neuropathy [facility])  Assessment & Plan  Lab Results   Component Value Date    HGBA1C 7 6 (H) 07/12/2021       Recent Labs     07/12/21  2048 07/13/21  0731 07/13/21  0940 07/13/21  1122   POCGLU 148* 54* 49* 171*       Blood Sugar Average: Last 72 hrs:  (P) 106 5     · Hold home dose Invokana while inpt  · Continue home dose Lantus 10 units q am and 20 units at HS  · FSBS AC & HS  · Diabetic diet    Essential hypertension  Assessment & Plan  · BP adequately controlled on current regimen  · Continue home dose Lisinopril      Mixed hyperlipidemia  Assessment & Plan  · Continue statin  · Home dose Fenofibrate on hold  GFR    Hypothyroidism  Assessment & Plan  · Continue home dose Levothyroxine    Peripheral neuropathy  Assessment & Plan  · Continue home dose Neurontin    Alzheimer's dementia ([address] )  Assessment & Plan  · Continue home dose Aricept and Namenda      VTE Pharmacologic Prophylaxis:   Pharmacologic: Heparin  Mechanical VTE Prophylaxis in Place: No    Patient Centered Rounds: I have performed bedside rounds with nursing staff today  Discussions with Specialists or Other Care Team Provider: gi    Education and Discussions with Family / Patient: dw grand daughter Israel Guaman at bedside    Time Spent for Care: 30 minutes  More than 50% of total time spent on counseling and coordination of care as described above  Current Length of Stay: 0 day(s)    Current Patient Status: Observation   Certification Statement: The patient will continue to require additional inpatient hospital stay due to c/w monitor labs/ stool output    Discharge Plan: likely tomorrow    Code Status: Level 1 - Full Code      Subjective: Only 1 episode of diarrhea overnight  Patient reports abdominal discomfort improving  Will discontinue IV fluids  Patient states so far being able to tolerate diet with some mild nausea but no episodes of vomiting  No fevers/chills  Objective:     Vitals:   Temp (24hrs), Av 5 °F (36 9 °C), Min:98 5 °F (36 9 °C), Max:98 5 °F (36 9 °C)    Temp:  [98 5 °F (36 9 °C)] 98 5 °F (36 9 °C)  HR:  [58-65] 65  Resp:  [18] 18  BP: (170-198)/(55-92) 170/55  SpO2:  [98 %-99 %] 99 %  Body mass index is 25 97 kg/m²  Input and Output Summary (last 24 hours): Intake/Output Summary (Last 24 hours) at 2021 1424  Last data filed at 2021 1247  Gross per 24 hour   Intake --   Output 300 ml   Net -300 ml       Physical Exam:     Physical Exam  Constitutional:       General: She is not in acute distress       Appearance: She is normal weight  She is not toxic-appearing  HENT:      Mouth/Throat:      Mouth: Mucous membranes are moist       Pharynx: Oropharynx is clear  Cardiovascular:      Rate and Rhythm: Normal rate and regular rhythm  Pulses: Normal pulses  Pulmonary:      Effort: Pulmonary effort is normal  No respiratory distress  Breath sounds: Normal breath sounds  No wheezing or rales  Abdominal:      General: Abdomen is flat  Bowel sounds are normal       Palpations: Abdomen is soft  Neurological:      General: No focal deficit present  Mental Status: She is alert and oriented to person, place, and time  Additional Data:     Labs:    Results from last 7 days   Lab Units 07/12/21  0529 07/12/21  0052   WBC Thousand/uL  --  3 97*   HEMOGLOBIN g/dL  --  9 8*   HEMATOCRIT %  --  31 1*   PLATELETS Thousands/uL 171 165   NEUTROS PCT %  --  59   LYMPHS PCT %  --  27   MONOS PCT %  --  7   EOS PCT %  --  5     Results from last 7 days   Lab Units 07/12/21  0052   SODIUM mmol/L 138   POTASSIUM mmol/L 4 6   CHLORIDE mmol/L 106   CO2 mmol/L 27   BUN mg/dL 23   CREATININE mg/dL 1 36*   ANION GAP mmol/L 5   CALCIUM mg/dL 9 5   ALBUMIN g/dL 3 7   TOTAL BILIRUBIN mg/dL 0 46   ALK PHOS U/L 34*   ALT U/L 30   AST U/L 27   GLUCOSE RANDOM mg/dL 275*         Results from last 7 days   Lab Units 07/13/21  1122 07/13/21  0940 07/13/21  0731 07/12/21  2048 07/12/21  1154 07/12/21  0823   POC GLUCOSE mg/dl 171* 49* 54* 148* 134 83     Results from last 7 days   Lab Units 07/12/21  0529   HEMOGLOBIN A1C % 7 6*     Results from last 7 days   Lab Units 07/12/21  0055   LACTIC ACID mmol/L 1 1       * I Have Reviewed All Lab Data Listed Above  * Additional Pertinent Lab Tests Reviewed:  All Labs Within Last 24 Hours Reviewed      Recent Cultures (last 7 days):     Results from last 7 days   Lab Units 07/12/21  0450   C DIFF TOXIN B BY PCR  Positive*       Last 24 Hours Medication List:   Current Facility-Administered Medications   Medication Dose Route Frequency Provider Last Rate    acetaminophen  650 mg Oral Q6H PRN [place] Neither, PA-C      aluminum-magnesium hydroxide-simethicone  30 mL Oral Q6H PRN [place] Neither, PA-C      donepezil  10 mg Oral HS [place] Neither, PA-C      fluticasone  2 spray Nasal Daily [place] [facility], [place]      gabapentin  200 mg Oral HS [place] Neither, PA-C      heparin (porcine)  5,000 Units Subcutaneous [facility] Queenie Neither, [place]      insulin glargine  10 Units Subcutaneous QAM [place] Neither, [place]      insulin glargine  20 Units Subcutaneous HS [place] Neither, [place]      insulin lispro  1-5 Units Subcutaneous TID AC [place] Neither, PA-C      insulin lispro  1-5 Units Subcutaneous HS [place] Neither, PA-C      levothyroxine  100 mcg Oral Daily Queen[place] Neither, [place]      lisinopril  40 mg Oral Daily [place] [facility], [place]      memantine  10 mg Oral BID [facility], [place]      montelukast  10 mg Oral HS [facility], PA-ANTONI      ondansetron  4 mg Intravenous Q6H PRN [place] Neither, PA-ANTONI      pravastatin  40 mg Oral Daily With [place], PA-C      saccharomyces boulardii  250 mg Oral BID Bretta Done, PA-ANTONI      sertraline  25 mg Oral Daily [facility], [place]      vancomycin  125 mg Oral Q6H [address] Amie Gallo MD          Today, Patient Was Seen By: GERRY Maldonado      ** Please Note: Dictation voice to text software may have been used in the creation of this document   **

## 2021-07-13 NOTE — ASSESSMENT & PLAN NOTE
Daughter reports nausea, diarrhea and decreased PO intake x 4 days with lower abdominal pain  Chronic chills  Denies fever  Denies vomiting    · CT abd/pelvis revealed nonspecific proctocolitis  · C diff toxin positive  · GI on board, recommendation for 10 days oral vancomycin  · Continue with probiotic  · Monitor stool output

## 2021-07-13 NOTE — UTILIZATION REVIEW
Initial Clinical Review    OBS order 7/12 0430 converted to IP on 7/13 @ 1426 for abd pain and proctocolitis  Level of Care Med Surg    Estimated length of stay More than 2 Midnights    Certification I certify that inpatient services are medically necessary for this patient for a duration of greater than two midnights  See H&P and MD Progress Notes for additional information about the patient's course of treatment  07/13/21 1427  Inpatient Admission Once      07/13/21 1426   07/12/21 0431  Place in Observation Once      07/12/21 0430         ED Arrival Information     Expected Arrival Acuity    - 7/11/2021 22:46 Emergent         Means of arrival Escorted by Service Admission type    [address] [facility] Emergency         Arrival complaint    diarrhea        Chief Complaint   Patient presents with    Diarrhea     Patient reports diarrhea for the last 4 days with abdominal pain       Initial Presentation: [de-identified] yo female to ED from home w/ diarrhea , dec po intake x4 days w/ lower abd pain   Chronic chills  CT abd/pelvis revealed nonspecific proctocolitis  Admitted OBS status plan for IVF , GI consult , f/u enteric panel and C-diff   HTN cont lisinopril and monitor   Creat 1 36 appears at baseline  7/12 GI Consult   acute onset of diffused abdominal pain and watery diarrhea  8-10 episodes of watery diarrhea  Small amount of fresh blood was seen during wiping, but no blood in the stool  Slightly decreased WBC  CT showed mild wall thickening of sigmoid colon and rectum indicating proctocolitis  Check c diff PCR, stool for enteric pathogens, giardia, and O&P,Serial abdominal exams, supportive care, IVFs, Florastor  7/13 IM Note   diarrhea and decreased PO intake x 4 days with lower abdominal pain  GI rec po vanco for 10 days    Monitor stool output   DC IVF and recheck BMP in am   Cont to monitor labs and stool output     7/13 GI Note   patient had only 1 BM overnight    Patient reports only a little abdominal discomfort  c-diff colitis monitor cbc and bmp , supportive care , serial exams , diet as glenn , po vanco x10 days  ED Triage Vitals   Temperature Pulse Respirations Blood Pressure SpO2   07/11/21 2252 07/11/21 2252 07/11/21 2252 07/11/21 2252 07/11/21 2252   99 °F (37 2 °C) 65 18 (!) 220/84 97 %      Temp Source Heart Rate Source Patient Position - Orthostatic VS BP Location FiO2 (%)   07/11/21 2252 07/11/21 2252 07/11/21 2252 07/11/21 2252 --   Temporal Monitor Sitting Left arm       Pain Score       07/12/21 2213       No Pain          Wt Readings from Last 1 Encounters:   07/11/21 60 3 kg (133 lb)     Additional Vital Signs:   07/14/21 0700  98 5 °F (36 9 °C)  65  18  160/55  100  99 %  --  Lying   07/14/21 0002  --  --  --  162/60  --  --  --  --   07/13/21 2210  --  --  --  184/58Abnormal   --  --  --  --   07/13/21 2201  98 8 °F (37 1 °C)  68  16  189/89Abnormal   --  99 %  --  --   07/13/21 1549  98 5 °F (36 9 °C)  65  18  161/77  --  99 %           07/12/21 2300  98 5 °F (36 9 °C)  62  --  176/92Abnormal   --  --  None (Room air)  --   07/12/21 1458  --  58  18  198/72Abnormal   --  98 %  None (Room air)  Lying   07/12/21 0730  --  60  16  182/70Abnormal   --  98 %  --  Lying   07/12/21 0400  --  50Abnormal   16  142/77  105  95 %  --  Lying   07/12/21 0342  --  55  16  180/71Abnormal   --  97 %  None (Room air)  Sitting   07/12/21 0215  --  85  16  214/88Abnormal   127  98 %           Pertinent Labs/Diagnostic Test Results:   7/12 CT abd   1  Mild wall thickening and hyperemia of fluid containing sigmoid colon and rectum with adjacent fatty haziness suggestive of nonspecific proctocolitis        2   Markedly distended urinary bladder    There is fullness of the right greater than left renal collecting systems likely secondary to bladder distention       Results from last 7 days   Lab Units 07/14/21  0626 07/12/21  0529 07/12/21  0052   WBC Thousand/uL 4 13*  --  3 97*   HEMOGLOBIN g/dL 10 5*  --  9 8*   HEMATOCRIT % 33 9*  --  31 1*   PLATELETS Thousands/uL 192 171 165   NEUTROS ABS Thousands/µL 2 20  --  2 36     Results from last 7 days   Lab Units 07/14/21  0626 07/12/21  0052   SODIUM mmol/L 142 138   POTASSIUM mmol/L 4 2 4 6   CHLORIDE mmol/L 106 106   CO2 mmol/L 29 27   ANION GAP mmol/L 7 5   BUN mg/dL 26* 23   CREATININE mg/dL 1 22 1 36*   EGFR ml/min/1 73sq m 42 37   CALCIUM mg/dL 9 5 9 5     Results from last 7 days   Lab Units 07/12/21  0052   AST U/L 27   ALT U/L 30   ALK PHOS U/L 34*   TOTAL PROTEIN g/dL 7 1   ALBUMIN g/dL 3 7   TOTAL BILIRUBIN mg/dL 0 46     Results from last 7 days   Lab Units 07/14/21  0746 07/13/21  2038 07/13/21  1522 07/13/21  1122 07/13/21  0940 07/13/21  0731 07/12/21  2048 07/12/21  1154 07/12/21  0823   POC GLUCOSE mg/dl 117 190* 123 171* 49* 54* 148* 134 83     Results from last 7 days   Lab Units 07/14/21  0626 07/12/21  0052   GLUCOSE RANDOM mg/dL 139 275*     Results from last 7 days   Lab Units 07/12/21  0529   HEMOGLOBIN A1C % 7 6*   EAG mg/dl 171     BETA-HYDROXYBUTYRATE   Date Value Ref Range Status   08/17/2020 0 1 <0 6 mmol/L Final       Results from last 7 days   Lab Units 07/12/21  0529   TSH 3RD GENERATON uIU/mL 0 525     Results from last 7 days   Lab Units 07/12/21  0055   LACTIC ACID mmol/L 1 1     Results from last 7 days   Lab Units 07/12/21  0052   LIPASE u/L 130     Results from last 7 days   Lab Units 07/12/21  0246   CLARITY UA  Clear   COLOR UA  Colorless   SPEC GRAV UA  1 010   PH UA  7 0   GLUCOSE UA mg/dl >=1000 (1%)*   KETONES UA mg/dl Negative   BLOOD UA  Small*   PROTEIN UA mg/dl Negative   NITRITE UA  Negative   BILIRUBIN UA  Negative   UROBILINOGEN UA E U /dl 0 2   LEUKOCYTES UA  Trace*   WBC UA /hpf 4-10*   RBC UA /hpf 1-2   BACTERIA UA /hpf None Seen   EPITHELIAL CELLS WET PREP /hpf Occasional     Results from last 7 days   Lab Units 07/12/21  0450   C DIFF TOXIN B BY PCR  Positive*     Results from last 7 days   Lab Units 07/12/21  0450   SALMONELLA SP PCR  None Detected   SHIGELLA SP/ENTEROINVASIVE E  COLI (EIEC)  None Detected   CAMPYLOBACTER SP (JEJUNI AND COLI)  None Detected   SHIGA TOXIN 1/SHIGA TOXIN 2  None Detected     ED Treatment:   Medication Administration from 07/11/2021 2246 to 07/12/2021 2030       Date/Time Order Dose Route Action     07/12/2021 0056 sodium chloride 0 9 % bolus 1,000 mL 1,000 mL Intravenous New Bag     07/12/2021 0830 fluticasone (FLONASE) 50 mcg/act nasal spray 2 spray 2 spray Nasal Given     07/12/2021 0830 insulin glargine (LANTUS) subcutaneous injection 10 Units 0 1 mL 10 Units Subcutaneous Given     07/12/2021 0827 levothyroxine tablet 100 mcg 100 mcg Oral Given     07/12/2021 0828 lisinopril (ZESTRIL) tablet 40 mg 40 mg Oral Given     07/12/2021 1718 memantine (NAMENDA) tablet 10 mg 10 mg Oral Given     07/12/2021 0828 memantine (NAMENDA) tablet 10 mg 10 mg Oral Given     07/12/2021 0828 sertraline (ZOLOFT) tablet 25 mg 25 mg Oral Given     07/12/2021 1718 pravastatin (PRAVACHOL) tablet 40 mg 40 mg Oral Given     07/12/2021 0529 sodium chloride 0 9 % infusion 50 mL/hr Intravenous New Bag     07/12/2021 1457 heparin (porcine) subcutaneous injection 5,000 Units 5,000 Units Subcutaneous Given     07/12/2021 1718 saccharomyces boulardii (FLORASTOR) capsule 250 mg 250 mg Oral Given     07/12/2021 1457 saccharomyces boulardii (FLORASTOR) capsule 250 mg 250 mg Oral Given        Past Medical History:   Diagnosis Date    Aggression     [facility] dementia [facility]     Arthritis     Dementia ([facility]     Diabetes insipidus ([facility]     Diabetes mellitus ([facility]     High cholesterol     Hypertension     Memory loss     Migraine     Polyneuropathy     Rheumatoid arthritis ([facility]     Serum lipids high     Stomach problems     Thyroid trouble      Present on Admission:   Diarrhea   Alzheimer's dementia ([facility]   Type 2 diabetes, uncontrolled, with neuropathy ([facility] )   Hypothyroidism   Essential hypertension   Peripheral neuropathy   Mixed hyperlipidemia   CKD (chronic kidney disease) stage 3, GFR 30-59 ml/min (HCC)   Anemia in stage 3 chronic kidney disease (HCC)      Admitting Diagnosis: Diarrhea [R19 7]  Proctocolitis [K52 9]  Bladder distension [N32 89]  Age/Sex: [de-identified] y o  female  Admission Orders:  Scheduled Medications:  amLODIPine, 5 mg, Oral, Daily  donepezil, 10 mg, Oral, HS  fluticasone, 2 spray, Nasal, Daily  gabapentin, 200 mg, Oral, HS  heparin (porcine), 5,000 Units, Subcutaneous, Q8H [facility]  insulin glargine, 10 Units, Subcutaneous, QAM  insulin glargine, 20 Units, Subcutaneous, HS  insulin lispro, 1-5 Units, Subcutaneous, TID AC  insulin lispro, 1-5 Units, Subcutaneous, HS  levothyroxine, 100 mcg, Oral, Daily  lisinopril, 40 mg, Oral, Daily  memantine, 10 mg, Oral, BID  montelukast, 10 mg, Oral, HS  pravastatin, 40 mg, Oral, Daily With Dinner  saccharomyces boulardii, 250 mg, Oral, BID  sertraline, 25 mg, Oral, Daily  vancomycin, 125 mg, Oral, Q6H [facility]      Continuous IV Infusions:  sodium chloride, 50 mL/hr, Intravenous, Continuous      PRN Meds:  acetaminophen, 650 mg, Oral, Q6H PRN  aluminum-magnesium hydroxide-simethicone, 30 mL, Oral, Q6H PRN  ondansetron, 4 mg, Intravenous, Q6H PRN    Fingerstick ac and hs     IP CONSULT TO GASTROENTEROLOGY  IP CONSULT TO CASE MANAGEMENT    Network Utilization Review Department  ATTENTION: Please call with any questions or concerns to [phone] and carefully listen to the prompts so that you are directed to the right person  All voicemails are confidential   Marval Shaper all requests for admission clinical reviews, approved or denied determinations and any other requests to dedicated fax number below belonging to the campus where the patient is receiving treatment   List of dedicated fax numbers for the Facilities:  FACILITY NAME UR FAX NUMBER   ADMISSION DENIALS (Administrative/Medical Necessity) [phone]   [address] (Maternity/NICU/Pediatrics) [address] [facility],[address] Floor [address] [place] [address] [facility] [address] [id number] [place] [address] Radha Garner [facility] [address] [place] [address] [phone]

## 2021-07-13 NOTE — ASSESSMENT & PLAN NOTE
Lab Results   Component Value Date    HGBA1C 7 6 (H) 07/12/2021       Recent Labs     07/12/21 2048 07/13/21  0731 07/13/21  0940 07/13/21  1122   POCGLU 148* 54* 49* 171*       Blood Sugar Average: Last 72 hrs:  (P) 106 5     · Hold home dose Invokana while inpt  · Continue home dose Lantus 10 units q am and 20 units at HS  · FSBS AC & HS  · Diabetic diet

## 2021-07-13 NOTE — PROGRESS NOTES
Progress Note  Lori Cristi [de-identified] y o  female MRN: [id number]  Unit/Bed#: -[facility] Encounter: [id number]    Subjective:  Per patient's nurse, patient had only 1 BM overnight  Patient reports only a little abdominal discomfort  No fevers  Objective:     Vitals:   Vitals:    07/13/21 0733   BP: 170/55   Pulse: 65   Resp: 18   Temp: 98 5 °F (36 9 °C)   SpO2: 99%   ,Body mass index is 25 97 kg/m²      No intake or output data in the 24 hours ending 07/13/21 0909    Physical Exam:     General Appearance: Alert, appears stated age and cooperative  Lungs: Clear to auscultation bilaterally, no rales or rhonchi, no labored breathing/accessory muscle use  Heart: Regular rate and rhythm, S1, S2 normal, no murmur, click, rub or gallop  Abdomen: Soft, non-tender, non-distended; bowel sounds normal; no masses or no organomegaly  Extremities: No cyanosis, edema    Invasive Devices     Peripheral Intravenous Line            Peripheral IV 07/12/21 Right Antecubital 1 day                Lab Results:  Admission on 07/12/2021   Component Date Value    WBC 07/12/2021 3 97*    RBC 07/12/2021 3 56*    Hemoglobin 07/12/2021 9 8*    Hematocrit 07/12/2021 31 1*    MCV 07/12/2021 87     MCH 07/12/2021 27 5     MCHC 07/12/2021 31 5     RDW 07/12/2021 13 2     MPV 07/12/2021 12 8*    Platelets 12/14/3438 165     nRBC 07/12/2021 0     Neutrophils Relative 07/12/2021 59     Immat GRANS % 07/12/2021 1     Lymphocytes Relative 07/12/2021 27     Monocytes Relative 07/12/2021 7     Eosinophils Relative 07/12/2021 5     Basophils Relative 07/12/2021 1     Neutrophils Absolute 07/12/2021 2 36     Immature Grans Absolute 07/12/2021 0 02     Lymphocytes Absolute 07/12/2021 1 08     Monocytes Absolute 07/12/2021 0 29     Eosinophils Absolute 07/12/2021 0 19     Basophils Absolute 07/12/2021 0 03     Sodium 07/12/2021 138     Potassium 07/12/2021 4 6     Chloride 07/12/2021 106     CO2 07/12/2021 27     ANION GAP 07/12/2021 5     BUN 07/12/2021 23     Creatinine 07/12/2021 1 36*    Glucose 07/12/2021 275*    Calcium 07/12/2021 9 5     AST 07/12/2021 27     ALT 07/12/2021 30     Alkaline Phosphatase 07/12/2021 34*    Total Protein 07/12/2021 7 1     Albumin 07/12/2021 3 7     Total Bilirubin 07/12/2021 0 46     eGFR 07/12/2021 37     Lipase 07/12/2021 130     Color, UA 07/12/2021 Colorless     Clarity, UA 07/12/2021 Clear     Specific Gravity, UA 07/12/2021 1 010     pH, UA 07/12/2021 7 0     Leukocytes, UA 07/12/2021 Trace*    Nitrite, UA 07/12/2021 Negative     Protein, UA 07/12/2021 Negative     Glucose, UA 07/12/2021 >=1000 (1%)*    Ketones, UA 07/12/2021 Negative     Urobilinogen, UA 07/12/2021 0 2     Bilirubin, UA 07/12/2021 Negative     Blood, UA 07/12/2021 Small*    Salmonella sp PCR 07/12/2021 None Detected     Shigella sp/Enteroinvasi* 07/12/2021 None Detected     Campylobacter sp (jejuni* 07/12/2021 None Detected     Shiga toxin 1/Shiga toxi* 07/12/2021 None Detected      C difficile toxin by PC* 07/12/2021 Positive*    C difficile Toxins A+B, * 07/12/2021 Negative     LACTIC ACID 07/12/2021 1 1     RBC, UA 07/12/2021 1-2     WBC, UA 07/12/2021 4-10*    Epithelial Cells 07/12/2021 Occasional     Bacteria, UA 07/12/2021 None Seen     Hemoglobin A1C 07/12/2021 7 6*    EAG 07/12/2021 171     TSH 3RD GENERATON 07/12/2021 0 525     Platelets 07/92/4910 171     MPV 07/12/2021 13 1*    POC Glucose 07/12/2021 83     POC Glucose 07/12/2021 134     POC Glucose 07/12/2021 148*    POC Glucose 07/13/2021 54*       Imaging Studies:   I have personally reviewed pertinent imaging studies  CT abdomen pelvis with contrast    Result Date: 7/12/2021  Narrative: CT ABDOMEN AND PELVIS WITH IV CONTRAST INDICATION:   diarrhea, llq pain  COMPARISON:  9/18/2019  TECHNIQUE:  CT examination of the abdomen and pelvis was performed   Axial, sagittal, and coronal 2D reformatted images were created from the source data and submitted for interpretation  Radiation dose length product (DLP) for this visit:  619 mGy-cm   This examination, like all CT scans performed in the [facility], was performed utilizing techniques to minimize radiation dose exposure, including the use of iterative reconstruction and automated exposure control  IV Contrast:  100 mL of iohexol (OMNIPAQUE) Enteric Contrast:  Enteric contrast was not administered  FINDINGS: ABDOMEN LOWER CHEST:  Atelectatic changes are noted at the lung bases  LIVER/BILIARY TREE:  Unremarkable  GALLBLADDER:  Gallbladder is surgically absent  SPLEEN:  Unremarkable  PANCREAS:  Moderate atrophy  No acute findings  ADRENAL GLANDS:  Unremarkable  KIDNEYS/URETERS:  Fullness of the renal collecting systems, right greater than left  No hydronephrosis  One or more sharply circumscribed subcentimeter renal hypodensities are present, too small to accurately characterize, and statistically most likely  benign findings  According to recent literature (Radiology 2019) no further workup of these findings is recommended  STOMACH AND BOWEL:  There is mild wall thickening and hyperemia of fluid containing sigmoid colon and rectum with surrounding fatty haziness  Diverticulosis coli  APPENDIX:  No findings to suggest appendicitis  ABDOMINOPELVIC CAVITY:  No ascites  No pneumoperitoneum  No lymphadenopathy  VESSELS:  Atherosclerotic changes are present  No evidence of aneurysm  PELVIS REPRODUCTIVE ORGANS:  Surgical changes of prior hysterectomy  URINARY BLADDER:  Markedly distended  ABDOMINAL WALL/INGUINAL REGIONS:  Scarring in the ventral wall  OSSEOUS STRUCTURES:  No acute fracture or destructive osseous lesion  Impression: 1  Mild wall thickening and hyperemia of fluid containing sigmoid colon and rectum with adjacent fatty haziness suggestive of nonspecific proctocolitis  2   Markedly distended urinary bladder    There is fullness of the right greater than left renal collecting systems likely secondary to bladder distention  The study was marked in [facility] for immediate notification  Workstation performed: [id number]         Assessment & Plan    C diff colitis    - Patient presented with acute diarrhea x 5 days with CT scan suggestive of a mild proctocolitis  - C diff PCR is positive  - Monitor CBC and BMP  Awaiting am labs today- monitor Cr and WBC count  - Supportive care, serial abdominal exams, etc   - Diet as tolerated  - Vancocin 125mg po QID x 10 days for treatment of c diff  The patient will be seen by Dr Anabel Gee PA-C  7/13/2021,9:09 AM

## 2021-07-13 NOTE — PHYSICAL THERAPY NOTE
Physical Therapy Evaluation     Patient's Name: Memo Payne    Admitting Diagnosis  Diarrhea [R19 7]  Proctocolitis [K52 9]  Bladder distension [N32 89]    Problem List  Patient Active Problem List   Diagnosis    Dementia with behavioral disturbance ([facility])    Chronic left shoulder pain    Need for lipid screening    Bilateral hearing loss    Rheumatoid arthritis 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 ([facility]    Memory loss    [facility]    Type 2 diabetes, uncontrolled, with neuropathy ([facility]    Anxiety    Need for influenza vaccination    Hypothyroidism    Essential hypertension    Fall    SVT (supraventricular tachycardia) ([facility])    Nontraumatic tear of right rotator cuff    Menopause ovarian failure    Peripheral neuropathy    Medicare annual wellness visit, subsequent    Ambulatory dysfunction    Tear of right rotator cuff    Biceps tendon tear    Diarrhea    Type 2 diabetes mellitus treated with insulin ([facility])    Anemia in stage 3 chronic kidney disease ([facility])    Restless leg syndrome    Mixed hyperlipidemia    Hypercalcemia    Leg cramps, sleep related    Bright red blood per rectum    Coccyalgia    Frequency of urination    Ulcerated external hemorrhoids    Acute non-recurrent maxillary sinusitis    CKD (chronic kidney disease) stage 3, GFR 30-59 ml/min ([facility])       Past Medical History  Past Medical History:   Diagnosis Date    Aggression     Alzheimer's dementia [facility]     Arthritis     Dementia ([facility]     Diabetes insipidus ([facility]     Diabetes mellitus ([facility]     High cholesterol     Hypertension     Memory loss     Migraine     Polyneuropathy     Rheumatoid arthritis ([facility]     Serum lipids high     Stomach problems     Thyroid trouble        Past Surgical History  Past Surgical History:   Procedure Laterality Date    CATARACT EXTRACTION      CHOLECYSTECTOMY      GALLBLADDER SURGERY      HYSTERECTOMY      NE SHLDR ARTHROSCOP,SURG,W/ROTAT CUFF REPR Right 8/1/2019    Procedure: SHOULDER ARTHROSCOPIC ROTATOR CUFF REPAIR;  Surgeon: Macie Bashir MD;  Location: [facility];  Service: Orthopedics    ROTATOR CUFF REPAIR Right 08/01/2019 07/13/21 0815   PT Last Visit   PT Visit Date 07/13/21   Note Type   Note type Evaluation   Pain Assessment   Pain Assessment Tool 0-10   Pain Score 7   Pain Location/Orientation Location: Abdomen;Orientation: Lower  (only when sitting up  no pain at rest/lying down)   Hospital Pain Intervention(s)   (RN notified of pt's pain report)   Home Living   Type of [address] [place] [address] Two level;Performs ADLs on one level; Able to live on main level with bedroom/bathroom  (1st floor set up  0 JEFFREY)   Bathroom Shower/Tub Tub/shower unit   Home Equipment Cane  (cane used at baseline all the time)   Prior Function   Level of [place] Needs assistance with ADLs and functional mobility; Needs assistance with IADLs   Lives With Daughter;Family  (& granddaughter)   Receives Help From Family   ADL Assistance Needs assistance   IADLs Needs assistance   Falls in the last 6 months 0  (pt denies any falls)   Comments pt reports she has aide there "everyday all day"  CM to follow up, pt questionable historian  Restrictions/Precautions   Weight Bearing Precautions Per Order No   Other Precautions Contact/isolation; Chair Alarm; Bed Alarm; Fall Risk;Cognition  ((+) cdiff  [place]ian speaking)   General   Family/Caregiver Present No   Cognition   Overall Cognitive Status Impaired  (questionable)   Arousal/Participation Alert   Orientation Level Oriented to person;Oriented to place;Oriented to situation  ("2021", does not know month/date)   Following Commands Follows multistep commands with increased time or repetition   Comments pt agreeable to PT evaluation   pt primarily [place]er speaking, translation assisted by OT Halima SONI Assessment   RUE Assessment   (defer to OT primo for comments)   LUE Assessment LUE Assessment   (defer to OT primo for comments)   RLE Assessment   RLE Assessment WFL  (assessed c functional mobility: 3+/5)   LLE Assessment   LLE Assessment WFL  (assessed c functional mobility: 3+/5)   Coordination   Movements are Fluid and Coordinated 1  (no ataxia observed)   Sensation X  (diminished light touch B LEs distal to B knees)   Light Touch   RLE Light Touch Impaired   LLE Light Touch Impaired   Bed Mobility   Supine to Sit 5  Supervision   Additional items Assist x 1;HOB elevated; Increased time required;Verbal cues   Additional Comments pt denies any dizziness upon seated at EOB   Transfers   Sit to Stand 5  Supervision   Additional items Assist x 1; Increased time required;Verbal cues   Stand to Sit 5  Supervision   Additional items Assist x 1; Increased time required;Verbal cues   Ambulation/Elevation   Gait pattern Decreased foot clearance; Short stride; Step to;Narrow MEHRDAD  (no LOB)   Gait Assistance 5  Supervision  (occassional CGA)   Additional items Assist x 1;Verbal cues   Assistive Device Rolling walker   Distance 15' x2   Stair Management Assistance Not tested   Balance   Static Sitting Good   Dynamic Sitting Fair +   Static Standing Fair   Dynamic Standing Fair -   Ambulatory Fair -   Endurance Deficit   Endurance Deficit Yes   Activity Tolerance   Activity Tolerance Patient limited by fatigue   Medical Staff Made Aware OT Romie Riley   Nurse Made Aware MYESHA Munoz verbalized pt appropriate to see, made aware of session outcome/recs  Assessment   Prognosis Good   Problem List Decreased strength;Decreased endurance; Impaired balance;Decreased mobility; Decreased cognition; Impaired sensation;Pain   Assessment Pt is [de-identified] y o  female seen for PT evaluation on 7/13/2021 s/p admit to [facility] on 7/12/2021 w/ Diarrhea  PT consulted to assess pt's functional mobility and d/c needs  Order placed for PT eval and tx, w/ up w/ A order   Performed at least 2 patient identifiers during session: Name and wristband  Comorbidities affecting pt's physical performance at time of assessment include:  has a past medical history of Aggression, Alzheimer's dementia ([facility] ), Arthritis, Dementia ([facility] Diabetes insipidus ([facility] Diabetes mellitus ([facility] ), High cholesterol, Hypertension, Memory loss, Migraine, Polyneuropathy, Rheumatoid arthritis ([facility] ), Serum lipids high, Stomach problems, and Thyroid trouble  PTA, pt was independent w/ all functional mobility w/ cane, ambulates household distances, has 0 JEFFREY, lives w/ daughter and granddaughter in 2 level home (1st floor set up) and has 24/7 "aide" support  Pt questionable historian, HILDA to follow up  Personal factors affecting pt at time of IE include: ambulating w/ assistive device, inability to navigate community distances and preferred language not [place] (language barrier)  Please find objective findings from PT assessment regarding body systems outlined above with impairments and limitations including weakness, impaired balance, decreased endurance, gait deviations, pain, decreased activity tolerance, altered sensation, fall risk and decreased cognition, as well as mobility assessment  The following objective measures performed on IE also reveal limitations: Barthel Index: 40/100 and Modified [place]: 3 (moderate disability)  Pt's clinical presentation is currently unstable/unpredictable seen in pt's presentation of abnormal lab value(s) and ongoing medical assessment  Pt to benefit from continued PT tx to address deficits as defined above and maximize level of functional independent mobility and consistency  From PT/mobility standpoint, recommendation at time of d/c would be home with home health rehabilitation pending progress in order to facilitate return to PLOF     Barriers to Discharge None   Goals   Patient Goals go back home   STG Expiration Date 07/23/21   Short Term Goal #1 In 7-10 days: Increase bilateral LE strength 1/2 grade to facilitate independent mobility, Perform all bed mobility tasks modified independent to decrease caregiver burden, Perform all transfers modified independent to improve independence, Ambulate > 50 ft  x 2 with least restrictive assistive device with distant S w/o LOB and w/ normalized gait pattern 100% of the time, Increase all balance 1/2 grade to decrease risk for falls, Improve Barthel Index score to 55 or greater to facilitate independence and PT provider will perform functional balance assessment to determine fall risk   PT Treatment Day 0   Plan   Treatment/Interventions Functional transfer training;LE strengthening/ROM; Therapeutic exercise; Endurance training;Patient/family training;Equipment eval/education; Bed mobility;Gait training;Spoke to nursing;OT   PT Frequency   (3-5x/wk)   Recommendation   PT Discharge Recommendation Home with home health rehabilitation   Equipment Recommended Walker  (RW)   Walker Package Recommended Wheeled walker   Change/add to [employer]?  No   PT - OK to Discharge Yes   AM-PAC Basic Mobility Inpatient   Turning in Bed Without Bedrails 3   Lying on Back to Sitting on Edge of Flat Bed 3   Moving Bed to Chair 3   Standing Up From Chair 3   Walk in Room 3   Climb 3-5 Stairs 3   Basic Mobility Inpatient Raw Score 18   Basic Mobility Standardized Score 41 05   Modified Union Scale   Modified Union Scale 3   Barthel Index   Feeding 10   Bathing 0   Grooming Score 0   Dressing Score 5   Bladder Score 5   Bowels Score 5   Toilet Use Score 5   Transfers (Bed/Chair) Score 10   Mobility (Level Surface) Score 0   Stairs Score 0   Barthel Index Score 40         Ernestina Talbot, PT, DPT

## 2021-07-13 NOTE — OCCUPATIONAL THERAPY NOTE
Occupational Therapy Evaluation     Patient Name: Alma Merino  PBYYC'U Date: 7/13/2021  Problem List  Principal Problem:    Diarrhea  Active Problems:    Alzheimer's dementia ([facility] )    Type 2 diabetes, uncontrolled, with neuropathy ([facility] )    Hypothyroidism    Essential hypertension    Peripheral neuropathy    Anemia in stage 3 chronic kidney disease (HCC)    Mixed hyperlipidemia    CKD (chronic kidney disease) stage 3, GFR 30-59 ml/min ([facility])    Past Medical History  Past Medical History:   Diagnosis Date    Aggression     Alzheimer's dementia ([facility] )     Arthritis     Dementia ([facility] )     Diabetes insipidus ([facility] )     Diabetes mellitus ([facility] )     High cholesterol     Hypertension     Memory loss     Migraine     Polyneuropathy     Rheumatoid arthritis ([facility] )     Serum lipids high     Stomach problems     Thyroid trouble      Past Surgical History  Past Surgical History:   Procedure Laterality Date    CATARACT EXTRACTION      CHOLECYSTECTOMY      GALLBLADDER SURGERY      HYSTERECTOMY      ND SHLDR ARTHROSCOP,SURG,W/ROTAT CUFF REPR Right 8/1/2019    Procedure: SHOULDER ARTHROSCOPIC ROTATOR CUFF REPAIR;  Surgeon: Shun Heaton MD;  Location: [facility];  Service: Orthopedics    ROTATOR CUFF REPAIR Right 08/01/2019 07/13/21 0809   OT Last Visit   OT Visit Date 07/13/21   Note Type   Note type Evaluation   Restrictions/Precautions   Weight Bearing Precautions Per Order No   Other Precautions Contact/isolation; Chair Alarm; Bed Alarm; Fall Risk  ((+) cdiff)   Pain Assessment   Pain Assessment Tool 0-10   Pain Score 7   Pain Location/Orientation Location: Abdomen;Orientation: Lower  (only when sitting up  no pain at rest/lying down)   Home Living   Type of [address] [place] [address] Two level;Performs ADLs on one level; Able to live on main level with bedroom/bathroom  (1st floor set up    0 JEFFREY)   Bathroom Shower/Tub Tub/shower unit   Home Equipment Cane  (cane used at baseline all the time)   Prior Function   Level of [place] Needs assistance with ADLs and functional mobility; Needs assistance with IADLs   Lives With Daughter;Family  (& granddaughter)   Receives Help From Family   ADL Assistance Needs assistance   IADLs Needs assistance   Falls in the last 6 months 0  (pt denies any falls)   Comments pt reports she has aide there "everyday all day"  CM to follow up, pt questionable historian   Lifestyle   Autonomy Pt requires assist with ADLs and IADLs, and uses SPC Mod I for functional mobility   Reciprocal Relationships Pt has supportive family   Service to Others Pt is retired   Intrinsic Gratification Pt did not report   Psychosocial   Psychosocial (WDL) WDL   ADL   Eating Assistance 7  [facility] 5  [address] [place] [address] Dr 4  Minimal Assistance   LB [address] 3  Moderate Assistance   [address] [facility] 4  [address] [facility] 3  Moderate [address]  4  Minimal Assistance   Additional Comments Assist levels as outlined above are based on functional assessment of performance skills and deficits  Bed Mobility   Supine to Sit 5  Supervision   Additional items Assist x 1;HOB elevated; Increased time required;Verbal cues   Sit to Supine 5  Supervision   Additional items Assist x 1;HOB elevated; Increased time required   Additional Comments Pt supine in bed at start of session and agreeable to OT  Pt returned to supine in bed at end of session with all needs met, call bell within reach, bed alarm active  Transfers   Sit to Stand 5  Supervision   Additional items Assist x 1; Increased time required;Verbal cues   Stand to Sit 5  Supervision   Additional items Assist x 1; Increased time required;Verbal cues   Additional Comments Pt used RW for UE support   Functional Mobility   Functional Mobility 4  Minimal assistance  (CGA)   Additional Comments CGA x 1 using RW <> bathroom   Balance   Static Sitting Good Dynamic Sitting Fair +   Static Standing Fair   Dynamic Standing Fair -   Ambulatory Fair -   Activity Tolerance   Activity Tolerance Patient limited by fatigue   Medical Staff Made Aware care coordination with PT Ottoniel Patiño   Nurse Made Aware MYESHA Rice verbalized pt appropriate for therapy   RUE Assessment   RUE Assessment X   RUE Overall AROM   R Shoulder Flexion WFL - hx of rotator cuff repair   R Elbow Flexion WFL   R Wrist Flexion WFL   R Mass Grasp WFL   RUE Strength   RUE Overall Strength Deficits   R Shoulder Flexion 4-/5   R Elbow Extension 4-/5   LUE Assessment   LUE Assessment X   LUE Overall AROM   L Shoulder Flexion WFL - however reports limited by arthritis   L Elbow Flexion WFL   L Wrist Flexion WFL   L Mass Grasp WFL   LUE Strength   LUE Overall Strength Deficits   L Shoulder Flexion 4-/5   L Elbow Extension 4-/5   Hand Function   Gross Motor Coordination Functional   Fine Motor Coordination Functional   Sensation   Light Touch Partial deficits in the RLE;Partial deficits in the LLE   Vision-Basic Assessment   Patient Visual Report   (reports her vision is "bad")   Vision - Complex Assessment   Acuity   (unable to read clock)   Cognition   Overall Cognitive Status Impaired  (questionable)   Arousal/Participation Alert; Cooperative   Attention Within functional limits   Orientation Level Oriented to person;Oriented to place;Oriented to situation  ("2021", does not know month/date)   Memory Other (Comment)  (will continue to assess; baseline dementia)   Following Commands Follows multistep commands with increased time or repetition   Comments pt able to identify self by full name and birthdate  Pt primarily Pakistani speaking   Assessment   Limitation Decreased ADL status; Decreased UE strength;Decreased Safe judgement during ADL;Decreased endurance;Decreased self-care trans;Decreased high-level ADLs   Assessment Pt is a [de-identified] y o  female seen for OT evaluation (time 1304-3399) s/p admit to [facility] on 7/12/2021 w/ Diarrhea  Comorbidities affecting pt's functional performance at time of assessment include: CKD, and  has a past medical history of Aggression, Alzheimer's dementia ([facility] ), Arthritis, Dementia ([facility] ), Diabetes insipidus ([facility] ), Diabetes mellitus ([facility] ), High cholesterol, Hypertension, Memory loss, Migraine, Polyneuropathy, Rheumatoid arthritis ([facility] ), Serum lipids high, Stomach problems, and Thyroid trouble    Personal factors affecting pt at time of IE include:difficulty performing ADLS, difficulty performing IADLS , limited insight into deficits and health management   Prior to admission, Pt requires assist with ADLs and IADLs, and uses SPC Mod I for functional mobility  Upon evaluation: Based on functional assessment of performance skills and deficits, pt requires supervision with grooming, Min A for UB ADLs, Mod A for LB ADLs, Min A for toileting, supervision for supine<>sit transfer and STS transfer with RW, and CGA x 1 using RW for functional mobility <> bathroom which may indicate the following deficits impacting occupational performance: higher level cognitive skills, judgment, executive function, self-awareness, muscle power and strength, muscle endurance and physical endurance/activity tolerance, balance  Pt to benefit from continued skilled OT tx while in the hospital to address deficits as defined above and maximize level of functional independence w ADL's and functional mobility  Occupational Performance areas to address include: bathing, showering, toileting and hygiene, dressing, functional mobility, personal hygiene and grooming, health management, physical activity, interest exploration and family participation  From OT standpoint, recommendation at time of d/c would be home with family support and home OT  Goals   Patient Goals go home   Plan   Treatment Interventions ADL retraining;Functional transfer training;UE strengthening/ROM; Endurance training;Patient/family training;Equipment evaluation/education; Compensatory technique education;Continued evaluation; Energy conservation; Activityengagement   Goal Expiration Date 07/23/21   OT Frequency 2-3x/wk   Recommendation   OT Discharge Recommendation Home with home health rehabilitation   OT - OK to Discharge   (once medically cleared)   AM-PAC Daily Activity Inpatient   Lower Body Dressing 2   Bathing 2   Toileting 3   Upper Body Dressing 3   Grooming 4   Eating 4   Daily Activity Raw Score 18   Daily Activity Standardized Score (Calc for Raw Score >=11) 38 66   AM-PAC Applied Cognition Inpatient   Following a Speech/Presentation 3   Understanding Ordinary Conversation 3   Taking Medications 2   Remembering Where Things Are Placed or Put Away 2   Remembering List of 4-5 Errands 2   Taking Care of Complicated Tasks 2   Applied Cognition Raw Score 14   Applied Cognition Standardized Score 32 02   Barthel Index   Feeding 10   Bathing 0   Grooming Score 0   Dressing Score 5   Bladder Score 10   Bowels Score 10   Toilet Use Score 5   Transfers (Bed/Chair) Score 10   Mobility (Level Surface) Score 0   Stairs Score 0   Barthel Index Score 50   Modified [place] Scale   Modified Arlene Scale 3     Goals to be met within 10 days:    Pt will complete grooming/oral hygiene tasks Mod I while standing at sink    Pt will complete UB bathing/dressing Mod I while seated    Pt will complete LB bathing/dressing with supervision after set-up while standing at sink     Pt will improve functional activity tolerance while standing at sink to 15+ minutes in order to increase safety and independence during functional transfers and ADL tasks  Pt will improve dynamic sitting/standing balance to good to increase safety and independence during functional transfers and ADL and decrease risk for falls      Pt will increase independence during STS transfers with least restrictive AD Mod I     Pt will complete transfer to standard toilet without use of grab bars Mod I     Pt will complete toileting tasks (clothing management up/down, hygiene) Mod I     Pt will attend to continued cognitive assessment 100% of the time in order to provide most appropriate recommendations for d/c plans  Pt will identify and implement at least 3 healthy coping strategies to increase participation in meaningful activities and decrease risk for readmission      Cheryle Ram, OTR/L

## 2021-07-13 NOTE — ASSESSMENT & PLAN NOTE
Lab Results   Component Value Date    EGFR 37 07/12/2021    EGFR 30 11/20/2020    EGFR 35 08/17/2020    CREATININE 1 36 (H) 07/12/2021    CREATININE 1 60 (H) 11/20/2020    CREATININE 1 41 (H) 08/17/2020     · Creatinine 1 36 on admission which appears to be at baseline  · Status post IV fluids for 24 hours, will discontinue today  · Recheck BMP in kathleen Cardenas

## 2021-07-13 NOTE — PLAN OF CARE
Problem: OCCUPATIONAL THERAPY ADULT  Goal: Performs self-care activities at highest level of function for planned discharge setting  See evaluation for individualized goals  Description: Treatment Interventions: ADL retraining, Functional transfer training, UE strengthening/ROM, Endurance training, Patient/family training, Equipment evaluation/education, Compensatory technique education, Continued evaluation, Energy conservation, Activityengagement          See flowsheet documentation for full assessment, interventions and recommendations  Note: Limitation: Decreased ADL status, Decreased UE strength, Decreased Safe judgement during ADL, Decreased endurance, Decreased self-care trans, Decreased high-level ADLs     Assessment: Pt is a [de-identified] y o  female seen for OT evaluation (time 9132-6519) s/p admit to [facility] on 7/12/2021 w/ Diarrhea  Comorbidities affecting pt's functional performance at time of assessment include: CKD, and  has a past medical history of Aggression, Alzheimer's dementia ([facility] ), Arthritis, Dementia ([facility] ), Diabetes insipidus ([facility] [facility] ), Diabetes mellitus ([facility] ), High cholesterol, Hypertension, Memory loss, Migraine, Polyneuropathy, Rheumatoid arthritis ([facility] ), Serum lipids high, Stomach problems, and Thyroid trouble    Personal factors affecting pt at time of IE include:difficulty performing ADLS, difficulty performing IADLS , limited insight into deficits and health management   Prior to admission, Pt requires assist with ADLs and IADLs, and uses SPC Mod I for functional mobility   Upon evaluation: Based on functional assessment of performance skills and deficits, pt requires supervision with grooming, Min A for UB ADLs, Mod A for LB ADLs, Min A for toileting, supervision for supine<>sit transfer and STS transfer with RW, and CGA x 1 using RW for functional mobility <> bathroom which may indicate the following deficits impacting occupational performance: higher level cognitive skills, judgment, executive function, self-awareness, muscle power and strength, muscle endurance and physical endurance/activity tolerance, balance  Pt to benefit from continued skilled OT tx while in the hospital to address deficits as defined above and maximize level of functional independence w ADL's and functional mobility  Occupational Performance areas to address include: bathing, showering, toileting and hygiene, dressing, functional mobility, personal hygiene and grooming, health management, physical activity, interest exploration and family participation  From OT standpoint, recommendation at time of d/c would be home with family support and home OT       OT Discharge Recommendation: Home with home health rehabilitation  OT - OK to Discharge:  (once medically cleared)

## 2021-07-14 ENCOUNTER — TRANSITIONAL CARE MANAGEMENT (OUTPATIENT)
Dept: FAMILY MEDICINE CLINIC | Facility: CLINIC | Age: 81
End: 2021-07-14

## 2021-07-14 VITALS
WEIGHT: 133 LBS | OXYGEN SATURATION: 99 % | BODY MASS INDEX: 26.11 KG/M2 | TEMPERATURE: 98.5 F | SYSTOLIC BLOOD PRESSURE: 160 MMHG | DIASTOLIC BLOOD PRESSURE: 55 MMHG | HEIGHT: 60 IN | HEART RATE: 65 BPM | RESPIRATION RATE: 18 BRPM

## 2021-07-14 LAB
ANION GAP SERPL CALCULATED.3IONS-SCNC: 7 MMOL/L (ref 4–13)
BASOPHILS # BLD AUTO: 0.04 THOUSANDS/ΜL (ref 0–0.1)
BASOPHILS NFR BLD AUTO: 1 % (ref 0–1)
BUN SERPL-MCNC: 26 MG/DL (ref 5–25)
CALCIUM SERPL-MCNC: 9.5 MG/DL (ref 8.3–10.1)
CHLORIDE SERPL-SCNC: 106 MMOL/L (ref 100–108)
CO2 SERPL-SCNC: 29 MMOL/L (ref 21–32)
CREAT SERPL-MCNC: 1.22 MG/DL (ref 0.6–1.3)
EOSINOPHIL # BLD AUTO: 0.25 THOUSAND/ΜL (ref 0–0.61)
EOSINOPHIL NFR BLD AUTO: 6 % (ref 0–6)
ERYTHROCYTE [DISTWIDTH] IN BLOOD BY AUTOMATED COUNT: 13.3 % (ref 11.6–15.1)
GFR SERPL CREATININE-BSD FRML MDRD: 42 ML/MIN/1.73SQ M
GLUCOSE SERPL-MCNC: 117 MG/DL (ref 65–140)
GLUCOSE SERPL-MCNC: 139 MG/DL (ref 65–140)
GLUCOSE SERPL-MCNC: 156 MG/DL (ref 65–140)
HCT VFR BLD AUTO: 33.9 % (ref 34.8–46.1)
HGB BLD-MCNC: 10.5 G/DL (ref 11.5–15.4)
IMM GRANULOCYTES # BLD AUTO: 0.02 THOUSAND/UL (ref 0–0.2)
IMM GRANULOCYTES NFR BLD AUTO: 1 % (ref 0–2)
LYMPHOCYTES # BLD AUTO: 1.36 THOUSANDS/ΜL (ref 0.6–4.47)
LYMPHOCYTES NFR BLD AUTO: 33 % (ref 14–44)
MCH RBC QN AUTO: 27.3 PG (ref 26.8–34.3)
MCHC RBC AUTO-ENTMCNC: 31 G/DL (ref 31.4–37.4)
MCV RBC AUTO: 88 FL (ref 82–98)
MONOCYTES # BLD AUTO: 0.26 THOUSAND/ΜL (ref 0.17–1.22)
MONOCYTES NFR BLD AUTO: 6 % (ref 4–12)
NEUTROPHILS # BLD AUTO: 2.2 THOUSANDS/ΜL (ref 1.85–7.62)
NEUTS SEG NFR BLD AUTO: 53 % (ref 43–75)
NRBC BLD AUTO-RTO: 0 /100 WBCS
PLATELET # BLD AUTO: 192 THOUSANDS/UL (ref 149–390)
PMV BLD AUTO: 12.4 FL (ref 8.9–12.7)
POTASSIUM SERPL-SCNC: 4.2 MMOL/L (ref 3.5–5.3)
RBC # BLD AUTO: 3.85 MILLION/UL (ref 3.81–5.12)
SODIUM SERPL-SCNC: 142 MMOL/L (ref 136–145)
WBC # BLD AUTO: 4.13 THOUSAND/UL (ref 4.31–10.16)

## 2021-07-14 PROCEDURE — 99239 HOSP IP/OBS DSCHRG MGMT >30: CPT | Performed by: FAMILY MEDICINE

## 2021-07-14 PROCEDURE — 85025 COMPLETE CBC W/AUTO DIFF WBC: CPT | Performed by: FAMILY MEDICINE

## 2021-07-14 PROCEDURE — 82948 REAGENT STRIP/BLOOD GLUCOSE: CPT

## 2021-07-14 PROCEDURE — 80048 BASIC METABOLIC PNL TOTAL CA: CPT | Performed by: FAMILY MEDICINE

## 2021-07-14 RX ORDER — SACCHAROMYCES BOULARDII 250 MG
250 CAPSULE ORAL 2 TIMES DAILY
Qty: 16 CAPSULE | Refills: 0 | Status: SHIPPED | OUTPATIENT
Start: 2021-07-14 | End: 2021-07-22

## 2021-07-14 RX ORDER — AMLODIPINE BESYLATE 5 MG/1
5 TABLET ORAL DAILY
Status: DISCONTINUED | OUTPATIENT
Start: 2021-07-14 | End: 2021-07-14 | Stop reason: HOSPADM

## 2021-07-14 RX ORDER — VANCOMYCIN HYDROCHLORIDE 125 MG/1
125 CAPSULE ORAL 4 TIMES DAILY
Qty: 32 CAPSULE | Refills: 0 | Status: SHIPPED | OUTPATIENT
Start: 2021-07-14 | End: 2021-07-22

## 2021-07-14 RX ORDER — AMLODIPINE BESYLATE 5 MG/1
5 TABLET ORAL DAILY
Qty: 30 TABLET | Refills: 0 | Status: SHIPPED | OUTPATIENT
Start: 2021-07-15 | End: 2022-04-15

## 2021-07-14 RX ADMIN — Medication 125 MG: at 06:20

## 2021-07-14 RX ADMIN — LEVOTHYROXINE SODIUM 100 MCG: 100 TABLET ORAL at 06:15

## 2021-07-14 RX ADMIN — Medication 125 MG: at 00:08

## 2021-07-14 RX ADMIN — Medication 250 MG: at 08:27

## 2021-07-14 RX ADMIN — HEPARIN SODIUM 5000 UNITS: 5000 INJECTION INTRAVENOUS; SUBCUTANEOUS at 06:20

## 2021-07-14 RX ADMIN — MEMANTINE 10 MG: 10 TABLET ORAL at 08:27

## 2021-07-14 RX ADMIN — FLUTICASONE PROPIONATE 2 SPRAY: 50 SPRAY, METERED NASAL at 08:28

## 2021-07-14 RX ADMIN — LISINOPRIL 40 MG: 20 TABLET ORAL at 08:27

## 2021-07-14 RX ADMIN — SERTRALINE 25 MG: 25 TABLET, FILM COATED ORAL at 08:27

## 2021-07-14 RX ADMIN — INSULIN GLARGINE 10 UNITS: 100 INJECTION, SOLUTION SUBCUTANEOUS at 08:27

## 2021-07-14 RX ADMIN — AMLODIPINE BESYLATE 5 MG: 5 TABLET ORAL at 08:26

## 2021-07-14 NOTE — UTILIZATION REVIEW
Inpatient Admission Authorization Request   NOTIFICATION OF INPATIENT ADMISSION/INPATIENT AUTHORIZATION REQUEST   SERVICING FACILITY:   [address] [place] [address]  Tax ID: [id number]  NPI: [id number]  Place of Service: Inpatient [address] [facility]  [address]  Place of Service Code: 24     ATTENDING PROVIDER:  Attending Name and NPI#: Ced Ahuja [[id number]]  Address: [address] [place] [address]  Phone: [phone]     UTILIZATION REVIEW CONTACT:  Kendrick Quiroz Utilization   Network Utilization Review Department  Phone: [phone]  Fax [phone]  Email: [email or web address]     PHYSICIAN ADVISORY SERVICES:  FOR WQAK-BA-MHRL REVIEW - MEDICAL NECESSITY DENIAL  Phone: [phone]  Fax: [phone]  Email: [email or web address]     TYPE OF REQUEST:  Inpatient Status     ADMISSION INFORMATION:  ADMISSION DATE/TIME: 7/13/21  2:26 PM  PATIENT DIAGNOSIS CODE/DESCRIPTION:  Diarrhea [R19 7]  Proctocolitis [K52 9]  Bladder distension [N32 89]  DISCHARGE DATE/TIME: No discharge date for patient encounter  DISCHARGE DISPOSITION (IF DISCHARGED): Home/Self Care     IMPORTANT INFORMATION:  Please contact the Kendrick Quiroz directly with any questions or concerns regarding this request  Department voicemails are confidential     Send requests for admission clinical reviews, concurrent reviews, approvals, and administrative denials due to lack of clinical to fax [phone]

## 2021-07-14 NOTE — ASSESSMENT & PLAN NOTE
Lab Results   Component Value Date    EGFR 42 07/14/2021    EGFR 37 07/12/2021    EGFR 30 11/20/2020    CREATININE 1 22 07/14/2021    CREATININE 1 36 (H) 07/12/2021    CREATININE 1 60 (H) 11/20/2020     · Hb stable at 10 5 today

## 2021-07-14 NOTE — ASSESSMENT & PLAN NOTE
Daughter reports nausea, diarrhea and decreased PO intake x 4 days with lower abdominal pain  Chronic chills  Denies fever  Denies vomiting    · CT abd/pelvis revealed nonspecific proctocolitis  · C diff toxin positive  · GI on board, recommendation for 10 days oral vancomycin/probiotic    She had a total of 3 bowel movements yesterday  No abdominal discomfort  No fevers/chills    Seen by PT OT and they determine patient safe to return home with home PT

## 2021-07-14 NOTE — ASSESSMENT & PLAN NOTE
Lab Results   Component Value Date    EGFR 42 07/14/2021    EGFR 37 07/12/2021    EGFR 30 11/20/2020    CREATININE 1 22 07/14/2021    CREATININE 1 36 (H) 07/12/2021    CREATININE 1 60 (H) 11/20/2020     · Creatinine 1 36 on admission which appears to be at baseline  · Status post IV fluids for 24 hours, was discontinued yesterday  · Creatinine 1 2 today

## 2021-07-14 NOTE — ASSESSMENT & PLAN NOTE
Lab Results   Component Value Date    HGBA1C 7 6 (H) 07/12/2021       Recent Labs     07/13/21  1522 07/13/21 2038 07/14/21  0746 07/14/21  1100   POCGLU 123 190* 117 156*       Blood Sugar Average: Last 72 hrs:  (P) 122 5     · Resume [employer]  · Continue home dose Lantus 10 units q am and 20 units at HS  · Diabetic diet

## 2021-07-15 NOTE — UTILIZATION REVIEW
Notification of Discharge   This is a Notification of Discharge from our facility [address]  Please be advised that this patient has been discharge from our facility  Below you will find the admission and discharge date and time including the patients disposition  UTILIZATION REVIEW CONTACT:  Meri Pablo  Utilization   Network Utilization Review Department  Phone: [phone] x carefully listen to the prompts  All voicemails are confidential   Email: [email or web address]     PHYSICIAN ADVISORY SERVICES:  FOR AXZI-VF-HVME REVIEW - MEDICAL NECESSITY DENIAL  Phone: [phone]  Fax: [phone]  Email: [email or web address]     PRESENTATION DATE: 7/12/2021 12:11 AM  OBERVATION ADMISSION DATE: 07/12/2021  INPATIENT ADMISSION DATE: 7/13/21  2:26 PM   DISCHARGE DATE: 7/14/2021  1:38 PM  DISPOSITION: Home with [place] with Home Health Care      IMPORTANT INFORMATION:  Send all requests for admission clinical reviews, approved or denied determinations and any other requests to dedicated fax number below belonging to the campus where the patient is receiving treatment   List of dedicated fax numbers:  [address] [place] [address] DENIALS (Administrative/Medical Necessity) [phone]   [address] (Maternity/NICU/Pediatrics) [phone]   [facility] Arita [phone]   [facility] [phone]   [facility] [phone]   S[facility] Ac[facility] [address] [facility] [phone]   [facility]  [phone]   [address] [place] [address], [address]  [address] [facility][note 3] [address] [facility] [phone]

## 2021-07-18 DIAGNOSIS — R09.81 NASAL CONGESTION: ICD-10-CM

## 2021-07-19 RX ORDER — FLUTICASONE PROPIONATE 50 MCG
SPRAY, SUSPENSION (ML) NASAL
Qty: 16 ML | Refills: 1 | Status: SHIPPED | OUTPATIENT
Start: 2021-07-19 | End: 2021-09-27

## 2021-07-20 ENCOUNTER — OFFICE VISIT (OUTPATIENT)
Dept: FAMILY MEDICINE CLINIC | Facility: CLINIC | Age: 81
End: 2021-07-20
Payer: COMMERCIAL

## 2021-07-20 VITALS — BODY MASS INDEX: 25.97 KG/M2 | HEIGHT: 60 IN

## 2021-07-20 DIAGNOSIS — A04.72 CLOSTRIDIUM DIFFICILE DIARRHEA: Primary | ICD-10-CM

## 2021-07-20 PROCEDURE — 1111F DSCHRG MED/CURRENT MED MERGE: CPT | Performed by: FAMILY MEDICINE

## 2021-07-20 PROCEDURE — 99213 OFFICE O/P EST LOW 20 MIN: CPT | Performed by: FAMILY MEDICINE

## 2021-07-20 PROCEDURE — 1036F TOBACCO NON-USER: CPT | Performed by: FAMILY MEDICINE

## 2021-07-20 PROCEDURE — 1160F RVW MEDS BY RX/DR IN RCRD: CPT | Performed by: FAMILY MEDICINE

## 2021-07-20 NOTE — PROGRESS NOTES
Virtual Brief Visit    Verification of patient location:    Patient is currently located in the state of [place]  Patient is currently located in a state in which I am licensed    Assessment/Plan:    Problem List Items Addressed This Visit        Digestive    Clostridium difficile diarrhea - Primary        Symptoms improved advise to finish vancomycin and continue probiotics  Follow up as needed          Reason for visit is   Chief Complaint   Patient presents with   [address] Virtual Brief Visit        Encounter provider Sola Haywood MD    Provider located at [place] [id number]  [address] [place] [id number]    Recent Visits  No visits were found meeting these conditions  Showing recent visits within past 7 days and meeting all other requirements  Today's Visits  Date Type Provider Dept   07/20/21 Office Visit Sola Haywood MD [address] today's visits and meeting all other requirements  Future Appointments  No visits were found meeting these conditions  Showing future appointments within next 150 days and meeting all other requirements       After connecting through telephone, the patient was identified by name and date of birth  Dixie Lewis was informed that this is a telemedicine visit and that the visit is being conducted through telephone  My office door was closed  No one else was in the room  She acknowledged consent and understanding of privacy and security of the platform  The patient has agreed to participate and understands she can discontinue the visit at any time  Patient is aware this is a billable service  Subjective    Dixie Lewis is a [de-identified] y o  female C diff colitis diarrhea    TCM 07/12 to 07/14 at [address] is a [de-identified] y o  female patient who originally presented to the hospital on 7/12/2021 due to complaints of nausea/diarrhea/reduced oral intake for 4 days      · CT abdomen pelvis revealed nonspecific proctocolitis  · C diff toxin was positive  Patient was started on oral vancomycin 125 q 6 hours with which her stool output has reduced  She was discharged home on Vancomycin  Last day of Vanco tomorrow  · Her appetite has improved and she is tolerating diet without any episodes of diarrhea, nausea, vomiting or abdominal discomfort    · Still taking Probiotics daily       Past Medical History:   Diagnosis Date    Aggression     [facility]'s dementia [facility]     Arthritis     Dementia [facility]     Diabetes insipidus ([facility]     Diabetes mellitus ([facility]     High cholesterol     Hypertension     Memory loss     Migraine     Polyneuropathy     Rheumatoid arthritis ([facility]     Serum lipids high     Stomach problems     Thyroid trouble        Past Surgical History:   Procedure Laterality Date    CATARACT EXTRACTION      CHOLECYSTECTOMY      GALLBLADDER SURGERY      HYSTERECTOMY      [place] SHLDR ARTHROSCOP,SURG,W/ROTAT CUFF REPR Right 8/1/2019    Procedure: SHOULDER ARTHROSCOPIC ROTATOR CUFF REPAIR;  Surgeon: Tatum Bryant MD;  Location: [facility];  Service: Orthopedics    ROTATOR CUFF REPAIR Right 08/01/2019       Current Outpatient Medications   Medication Sig Dispense Refill    amLODIPine (NORVASC) 5 mg tablet Take 1 tablet (5 mg total) by mouth daily 30 tablet 0    Blood Glucose Monitoring Suppl (OneTouch Verio) w/Device KIT Use as directed 1 kit 0    Blood Pressure KIT by Does not apply route daily 1 each 1    Continuous Blood Gluc Sensor (FreeStyle Aruna 14 Day Sensor) MISC Use 1 each every 14 (fourteen) days 6 each 3    donepezil (ARICEPT) 10 mg tablet Take 1 tablet (10 mg total) by mouth daily at bedtime 90 tablet 1    fenofibrate (TRIGLIDE) 160 MG tablet TAKE 1 TABLET BY MOUTH EVERY DAY 90 tablet 1    fluticasone (FLONASE) 50 mcg/act nasal spray SPRAY 2 SPRAYS INTO EACH NOSTRIL EVERY DAY 16 mL 1    gabapentin (NEURONTIN) 100 mg capsule TAKE 2 CAPSULES (200 MG TOTAL) BY MOUTH DAILY AT BEDTIME 180 capsule 1    glucose blood (OneTouch Verio) test strip USE TO TEST 3 TIMES A  strip 2    Incontinence Supplies MISC Use 6 (six) times a day Wipes 200 each 6    Incontinence Supplies MISC Use 4 (four) times a day Comfort shield Adult diapers 200 each 6    Incontinence Supply Disposable MISC Use 6 (six) times a day Dispense disposable bed pad 200 each 6    insulin glargine (Lantus SoloStar) 100 units/mL injection pen Take 10 units in the am and 20 units in the pm 15 pen 5    Insulin Pen Needle (BD Pen Needle Elisabeth U/F) 32G X 4 MM MISC Use 2 (two) times a day 100 each 5    Invokana 300 MG TABS TAKE 1 TABLET BY MOUTH EVERY DAY 90 tablet 1    levothyroxine 100 mcg tablet TAKE 1 TABLET BY MOUTH EVERY DAY 30 tablet 2    lisinopril (ZESTRIL) 40 mg tablet Take 1 tablet (40 mg total) by mouth daily 90 tablet 1    montelukast (SINGULAIR) 10 mg tablet TAKE 1 TABLET BY MOUTH DAILY AT BEDTIME 90 tablet 1    OneTouch Delica Lancets 97Z MISC TEST BS  each 3    saccharomyces boulardii (FLORASTOR) 250 mg capsule Take 1 capsule (250 mg total) by mouth 2 (two) times a day for 8 days 16 capsule 0    sertraline (ZOLOFT) 25 mg tablet Take 1 tablet (25 mg total) by mouth daily 90 tablet 3    simvastatin (ZOCOR) 20 mg tablet Take 20 mg by mouth daily       vancomycin (VANCOCIN) 125 MG capsule Take 1 capsule (125 mg total) by mouth 4 (four) times a day for 8 days 32 capsule 0    hydrocortisone (ANUSOL-HC) 2 5 % rectal cream Apply topically 2 (two) times a day (Patient not taking: Reported on 7/20/2021) 1 Tube 2    memantine (NAMENDA) 10 mg tablet Take 1 tablet (10 mg total) by mouth 2 (two) times a day 180 tablet 1    NIFEdipine 0 3%-lidocaine 5% rectal ointment Apply 1 application topically every 4 (four) hours as needed for discomfort or pain Apply a small amount to anal fissure (Patient not taking: Reported on 7/20/2021) 2 oz 1     No current facility-administered medications for this visit  Allergies   Allergen Reactions    Penicillins Itching and Swelling       Review of Systems   Constitutional: Negative for activity change, appetite change, fatigue and fever  HENT: Negative for congestion and ear discharge  Respiratory: Negative for cough and shortness of breath  Cardiovascular: Negative for chest pain and palpitations  Gastrointestinal: Positive for diarrhea  Negative for nausea  Musculoskeletal: Negative for arthralgias and back pain  Skin: Negative for color change and rash  Neurological: Negative for dizziness and headaches  Psychiatric/Behavioral: Negative for agitation and behavioral problems  Vitals:    07/20/21 1457   Height: 5' (1 524 m)         I spent 8 minutes directly with the patient during this visit    [address] verbally agrees to participate in [employer]  Pt is aware that [employer] could be limited without vital signs or the ability to perform a full hands-on physical Ivan Escalante understands she or the provider may request at any time to terminate the video visit and request the patient to seek care or treatment in person

## 2021-07-24 DIAGNOSIS — Z79.4 TYPE 2 DIABETES MELLITUS TREATED WITH INSULIN (HCC): ICD-10-CM

## 2021-07-24 DIAGNOSIS — E11.9 TYPE 2 DIABETES MELLITUS TREATED WITH INSULIN (HCC): ICD-10-CM

## 2021-07-26 DIAGNOSIS — F03.91 DEMENTIA WITH BEHAVIORAL DISTURBANCE, UNSPECIFIED DEMENTIA TYPE (HCC): ICD-10-CM

## 2021-07-26 RX ORDER — DONEPEZIL HYDROCHLORIDE 10 MG/1
TABLET, FILM COATED ORAL
Qty: 90 TABLET | Refills: 1 | Status: SHIPPED | OUTPATIENT
Start: 2021-07-26 | End: 2021-08-10 | Stop reason: SDUPTHER

## 2021-07-26 RX ORDER — LANCETS 33 GAUGE
EACH MISCELLANEOUS
Qty: 100 EACH | Refills: 3 | Status: SHIPPED | OUTPATIENT
Start: 2021-07-26 | End: 2021-11-29

## 2021-08-02 ENCOUNTER — HOSPITAL ENCOUNTER (EMERGENCY)
Facility: HOSPITAL | Age: 81
Discharge: HOME | End: 2021-08-02
Attending: EMERGENCY MEDICINE | Admitting: EMERGENCY MEDICINE
Payer: COMMERCIAL

## 2021-08-02 ENCOUNTER — APPOINTMENT (OUTPATIENT)
Dept: LAB | Facility: CLINIC | Age: 81
End: 2021-08-02
Payer: COMMERCIAL

## 2021-08-02 ENCOUNTER — APPOINTMENT (EMERGENCY)
Dept: CT IMAGING | Facility: HOSPITAL | Age: 81
End: 2021-08-02
Payer: COMMERCIAL

## 2021-08-02 ENCOUNTER — OFFICE VISIT (OUTPATIENT)
Dept: URGENT CARE | Facility: CLINIC | Age: 81
End: 2021-08-02
Payer: COMMERCIAL

## 2021-08-02 VITALS
HEART RATE: 49 BPM | TEMPERATURE: 97.7 F | RESPIRATION RATE: 18 BRPM | OXYGEN SATURATION: 98 % | DIASTOLIC BLOOD PRESSURE: 76 MMHG | SYSTOLIC BLOOD PRESSURE: 175 MMHG

## 2021-08-02 VITALS
DIASTOLIC BLOOD PRESSURE: 62 MMHG | HEART RATE: 59 BPM | RESPIRATION RATE: 18 BRPM | WEIGHT: 133 LBS | OXYGEN SATURATION: 99 % | TEMPERATURE: 97.5 F | SYSTOLIC BLOOD PRESSURE: 148 MMHG | HEIGHT: 60 IN | BODY MASS INDEX: 26.11 KG/M2

## 2021-08-02 DIAGNOSIS — K62.5 BRIGHT RED BLOOD PER RECTUM: ICD-10-CM

## 2021-08-02 DIAGNOSIS — Z79.4 TYPE 2 DIABETES MELLITUS TREATED WITH INSULIN (HCC): ICD-10-CM

## 2021-08-02 DIAGNOSIS — D64.9 ANEMIA, UNSPECIFIED TYPE: ICD-10-CM

## 2021-08-02 DIAGNOSIS — E11.9 TYPE 2 DIABETES MELLITUS TREATED WITH INSULIN (HCC): ICD-10-CM

## 2021-08-02 DIAGNOSIS — M43.6 TORTICOLLIS: Primary | ICD-10-CM

## 2021-08-02 DIAGNOSIS — R51.9 NEW ONSET HEADACHE: Primary | ICD-10-CM

## 2021-08-02 DIAGNOSIS — E03.9 HYPOTHYROIDISM, UNSPECIFIED TYPE: ICD-10-CM

## 2021-08-02 LAB
ALBUMIN SERPL BCP-MCNC: 3.9 G/DL (ref 3.5–5)
ALBUMIN SERPL BCP-MCNC: 4 G/DL (ref 3.5–5)
ALP SERPL-CCNC: 34 U/L (ref 46–116)
ALP SERPL-CCNC: 36 U/L (ref 46–116)
ALT SERPL W P-5'-P-CCNC: 20 U/L (ref 12–78)
ALT SERPL W P-5'-P-CCNC: 24 U/L (ref 12–78)
ANION GAP SERPL CALCULATED.3IONS-SCNC: 6 MMOL/L (ref 4–13)
ANION GAP SERPL CALCULATED.3IONS-SCNC: 7 MMOL/L (ref 4–13)
AST SERPL W P-5'-P-CCNC: 15 U/L (ref 5–45)
AST SERPL W P-5'-P-CCNC: 16 U/L (ref 5–45)
BASOPHILS # BLD AUTO: 0.02 THOUSANDS/ΜL (ref 0–0.1)
BASOPHILS # BLD AUTO: 0.05 THOUSANDS/ΜL (ref 0–0.1)
BASOPHILS NFR BLD AUTO: 1 % (ref 0–1)
BASOPHILS NFR BLD AUTO: 1 % (ref 0–1)
BILIRUB SERPL-MCNC: 0.31 MG/DL (ref 0.2–1)
BILIRUB SERPL-MCNC: 0.35 MG/DL (ref 0.2–1)
BUN SERPL-MCNC: 27 MG/DL (ref 5–25)
BUN SERPL-MCNC: 29 MG/DL (ref 5–25)
CALCIUM SERPL-MCNC: 9.6 MG/DL (ref 8.3–10.1)
CALCIUM SERPL-MCNC: 9.8 MG/DL (ref 8.3–10.1)
CHLORIDE SERPL-SCNC: 105 MMOL/L (ref 100–108)
CHLORIDE SERPL-SCNC: 110 MMOL/L (ref 100–108)
CO2 SERPL-SCNC: 26 MMOL/L (ref 21–32)
CO2 SERPL-SCNC: 28 MMOL/L (ref 21–32)
CREAT SERPL-MCNC: 1.1 MG/DL (ref 0.6–1.3)
CREAT SERPL-MCNC: 1.18 MG/DL (ref 0.6–1.3)
EOSINOPHIL # BLD AUTO: 0.18 THOUSAND/ΜL (ref 0–0.61)
EOSINOPHIL # BLD AUTO: 0.26 THOUSAND/ΜL (ref 0–0.61)
EOSINOPHIL NFR BLD AUTO: 4 % (ref 0–6)
EOSINOPHIL NFR BLD AUTO: 5 % (ref 0–6)
ERYTHROCYTE [DISTWIDTH] IN BLOOD BY AUTOMATED COUNT: 13.5 % (ref 11.6–15.1)
ERYTHROCYTE [DISTWIDTH] IN BLOOD BY AUTOMATED COUNT: 13.6 % (ref 11.6–15.1)
EST. AVERAGE GLUCOSE BLD GHB EST-MCNC: 166 MG/DL
GFR SERPL CREATININE-BSD FRML MDRD: 44 ML/MIN/1.73SQ M
GFR SERPL CREATININE-BSD FRML MDRD: 48 ML/MIN/1.73SQ M
GLUCOSE P FAST SERPL-MCNC: 58 MG/DL (ref 65–99)
GLUCOSE SERPL-MCNC: 212 MG/DL (ref 65–140)
HBA1C MFR BLD: 7.4 %
HCT VFR BLD AUTO: 33.1 % (ref 34.8–46.1)
HCT VFR BLD AUTO: 35.9 % (ref 34.8–46.1)
HGB BLD-MCNC: 10.4 G/DL (ref 11.5–15.4)
HGB BLD-MCNC: 11.2 G/DL (ref 11.5–15.4)
IMM GRANULOCYTES # BLD AUTO: 0.01 THOUSAND/UL (ref 0–0.2)
IMM GRANULOCYTES # BLD AUTO: 0.02 THOUSAND/UL (ref 0–0.2)
IMM GRANULOCYTES NFR BLD AUTO: 0 % (ref 0–2)
IMM GRANULOCYTES NFR BLD AUTO: 1 % (ref 0–2)
LYMPHOCYTES # BLD AUTO: 0.99 THOUSANDS/ΜL (ref 0.6–4.47)
LYMPHOCYTES # BLD AUTO: 1.3 THOUSANDS/ΜL (ref 0.6–4.47)
LYMPHOCYTES NFR BLD AUTO: 23 % (ref 14–44)
LYMPHOCYTES NFR BLD AUTO: 27 % (ref 14–44)
MCH RBC QN AUTO: 27.5 PG (ref 26.8–34.3)
MCH RBC QN AUTO: 27.7 PG (ref 26.8–34.3)
MCHC RBC AUTO-ENTMCNC: 31.2 G/DL (ref 31.4–37.4)
MCHC RBC AUTO-ENTMCNC: 31.4 G/DL (ref 31.4–37.4)
MCV RBC AUTO: 88 FL (ref 82–98)
MCV RBC AUTO: 89 FL (ref 82–98)
MONOCYTES # BLD AUTO: 0.28 THOUSAND/ΜL (ref 0.17–1.22)
MONOCYTES # BLD AUTO: 0.29 THOUSAND/ΜL (ref 0.17–1.22)
MONOCYTES NFR BLD AUTO: 6 % (ref 4–12)
MONOCYTES NFR BLD AUTO: 7 % (ref 4–12)
NEUTROPHILS # BLD AUTO: 2.78 THOUSANDS/ΜL (ref 1.85–7.62)
NEUTROPHILS # BLD AUTO: 2.91 THOUSANDS/ΜL (ref 1.85–7.62)
NEUTS SEG NFR BLD AUTO: 61 % (ref 43–75)
NEUTS SEG NFR BLD AUTO: 64 % (ref 43–75)
NRBC BLD AUTO-RTO: 0 /100 WBCS
NRBC BLD AUTO-RTO: 0 /100 WBCS
PLATELET # BLD AUTO: 184 THOUSANDS/UL (ref 149–390)
PLATELET # BLD AUTO: 192 THOUSANDS/UL (ref 149–390)
PMV BLD AUTO: 12.9 FL (ref 8.9–12.7)
PMV BLD AUTO: 13.7 FL (ref 8.9–12.7)
POTASSIUM SERPL-SCNC: 3.9 MMOL/L (ref 3.5–5.3)
POTASSIUM SERPL-SCNC: 4.2 MMOL/L (ref 3.5–5.3)
PROT SERPL-MCNC: 7.3 G/DL (ref 6.4–8.2)
PROT SERPL-MCNC: 7.5 G/DL (ref 6.4–8.2)
RBC # BLD AUTO: 3.78 MILLION/UL (ref 3.81–5.12)
RBC # BLD AUTO: 4.04 MILLION/UL (ref 3.81–5.12)
SODIUM SERPL-SCNC: 140 MMOL/L (ref 136–145)
SODIUM SERPL-SCNC: 142 MMOL/L (ref 136–145)
TROPONIN I SERPL-MCNC: <0.02 NG/ML
TSH SERPL DL<=0.05 MIU/L-ACNC: 0.82 UIU/ML (ref 0.36–3.74)
WBC # BLD AUTO: 4.27 THOUSAND/UL (ref 4.31–10.16)
WBC # BLD AUTO: 4.82 THOUSAND/UL (ref 4.31–10.16)

## 2021-08-02 PROCEDURE — 93005 ELECTROCARDIOGRAM TRACING: CPT

## 2021-08-02 PROCEDURE — 80053 COMPREHEN METABOLIC PANEL: CPT | Performed by: EMERGENCY MEDICINE

## 2021-08-02 PROCEDURE — 84443 ASSAY THYROID STIM HORMONE: CPT

## 2021-08-02 PROCEDURE — 99285 EMERGENCY DEPT VISIT HI MDM: CPT | Performed by: EMERGENCY MEDICINE

## 2021-08-02 PROCEDURE — 80053 COMPREHEN METABOLIC PANEL: CPT

## 2021-08-02 PROCEDURE — 85025 COMPLETE CBC W/AUTO DIFF WBC: CPT | Performed by: EMERGENCY MEDICINE

## 2021-08-02 PROCEDURE — 99214 OFFICE O/P EST MOD 30 MIN: CPT | Performed by: PHYSICIAN ASSISTANT

## 2021-08-02 PROCEDURE — 83036 HEMOGLOBIN GLYCOSYLATED A1C: CPT

## 2021-08-02 PROCEDURE — 84484 ASSAY OF TROPONIN QUANT: CPT | Performed by: EMERGENCY MEDICINE

## 2021-08-02 PROCEDURE — 85025 COMPLETE CBC W/AUTO DIFF WBC: CPT

## 2021-08-02 PROCEDURE — S9088 SERVICES PROVIDED IN URGENT: HCPCS | Performed by: PHYSICIAN ASSISTANT

## 2021-08-02 PROCEDURE — 70496 CT ANGIOGRAPHY HEAD: CPT

## 2021-08-02 PROCEDURE — 36415 COLL VENOUS BLD VENIPUNCTURE: CPT

## 2021-08-02 PROCEDURE — 70498 CT ANGIOGRAPHY NECK: CPT

## 2021-08-02 PROCEDURE — 99284 EMERGENCY DEPT VISIT MOD MDM: CPT

## 2021-08-02 RX ORDER — DIAZEPAM 5 MG/1
5 TABLET ORAL ONCE
Status: COMPLETED | OUTPATIENT
Start: 2021-08-02 | End: 2021-08-02

## 2021-08-02 RX ORDER — LIDOCAINE 50 MG/G
1 PATCH TOPICAL ONCE
Status: DISCONTINUED | OUTPATIENT
Start: 2021-08-02 | End: 2021-08-02 | Stop reason: HOSPADM

## 2021-08-02 RX ORDER — ACETAMINOPHEN 325 MG/1
975 TABLET ORAL ONCE
Status: COMPLETED | OUTPATIENT
Start: 2021-08-02 | End: 2021-08-02

## 2021-08-02 RX ORDER — DIAZEPAM 5 MG/1
5 TABLET ORAL 2 TIMES DAILY
Qty: 5 TABLET | Refills: 0 | Status: SHIPPED | OUTPATIENT
Start: 2021-08-02 | End: 2021-08-19 | Stop reason: ALTCHOICE

## 2021-08-02 RX ADMIN — DIAZEPAM 5 MG: 5 TABLET ORAL at 12:28

## 2021-08-02 RX ADMIN — LIDOCAINE 5% 1 PATCH: 700 PATCH TOPICAL at 12:28

## 2021-08-02 RX ADMIN — ACETAMINOPHEN 975 MG: 325 TABLET, FILM COATED ORAL at 12:28

## 2021-08-02 RX ADMIN — IOHEXOL 85 ML: 350 INJECTION, SOLUTION INTRAVENOUS at 13:18

## 2021-08-02 NOTE — PROGRESS NOTES
[address]        NAME: Richie Garcia is a [de-identified] y o  female  : 1940    MRN: [id number]  DATE: 2021  TIME: 10:11 AM    Assessment and Plan   New onset headache [R51 9]  1  New onset headache  Transfer to other facility       Sent pt to the Ed for concern for temporal arteritis   Pt and granddaughter are planning to go to [facility] ED    Patient Instructions   Patient Instructions   Head over to the hospital           Follow up with PCP in 3-5 days  Proceed to  ER if symptoms worsen  Chief Complaint     Chief Complaint   Patient presents with    Earache     R ear pain with radiation down R neck  also with some congestion and R sided headache since Sat  History of Present Illness       The pt is a 20-year-old female presenting with right neck and face pain x 3 days  This morning she woke up with a severe headache  The pt's grandaughter states that the pt never get's headaches  The does have an occasional cough but denies congestion, runny nose  No fevers  Her family all recently had COVID but the pt was in her room  Granddaughter states that her grandma does not normally complain unless her symptoms are severe  Pt denies chest pain, back pain or SOB  Review of Systems   Review of Systems   Constitutional: Negative for activity change, appetite change, chills, fatigue and fever  HENT: Positive for ear pain (Right)  Negative for congestion, rhinorrhea, sinus pressure, sinus pain and sore throat  Respiratory: Negative for cough, chest tightness and shortness of breath  Cardiovascular: Negative for chest pain and palpitations  Gastrointestinal: Negative for diarrhea, nausea and vomiting  Musculoskeletal: Positive for neck pain  Negative for arthralgias and myalgias  Skin: Negative for color change and pallor  Neurological: Positive for headaches (right sided )           Current Medications       Current Outpatient Medications:     amLODIPine (NORVASC) 5 mg tablet, Take 1 tablet (5 mg total) by mouth daily, Disp: 30 tablet, Rfl: 0    Blood Glucose Monitoring Suppl (OneTouch Verio) w/Device KIT, Use as directed, Disp: 1 kit, Rfl: 0    Blood Pressure KIT, by Does not apply route daily, Disp: 1 each, Rfl: 1    Continuous Blood Gluc Sensor (FreeStyle Aruna 14 Day Sensor) MISC, Use 1 each every 14 (fourteen) days, Disp: 6 each, Rfl: 3    donepezil (ARICEPT) 10 mg tablet, TAKE 1 TABLET BY MOUTH DAILY AT BEDTIME, Disp: 90 tablet, Rfl: 1    fenofibrate (TRIGLIDE) 160 MG tablet, TAKE 1 TABLET BY MOUTH EVERY DAY, Disp: 90 tablet, Rfl: 1    fluticasone (FLONASE) 50 mcg/act nasal spray, SPRAY 2 SPRAYS INTO EACH NOSTRIL EVERY DAY, Disp: 16 mL, Rfl: 1    glucose blood (OneTouch Verio) test strip, USE TO TEST 3 TIMES A DAY, Disp: 100 strip, Rfl: 2    Incontinence Supplies MISC, Use 6 (six) times a day Wipes, Disp: 200 each, Rfl: 6    Incontinence Supplies MISC, Use 4 (four) times a day Comfort shield Adult diapers, Disp: 200 each, Rfl: 6    Incontinence Supply Disposable MISC, Use 6 (six) times a day Dispense disposable bed pad, Disp: 200 each, Rfl: 6    insulin glargine (Lantus SoloStar) 100 units/mL injection pen, Take 10 units in the am and 20 units in the pm, Disp: 15 pen, Rfl: 5    Insulin Pen Needle (BD Pen Needle Elisabeth U/F) 32G X 4 MM MISC, Use 2 (two) times a day, Disp: 100 each, Rfl: 5    Invokana 300 MG TABS, TAKE 1 TABLET BY MOUTH EVERY DAY, Disp: 90 tablet, Rfl: 1    levothyroxine 100 mcg tablet, TAKE 1 TABLET BY MOUTH EVERY DAY, Disp: 30 tablet, Rfl: 2    montelukast (SINGULAIR) 10 mg tablet, TAKE 1 TABLET BY MOUTH DAILY AT BEDTIME, Disp: 90 tablet, Rfl: 1    OneTouch Delica Lancets 76N MISC, TEST BLOOD SUGAR 3 TIMES A DAY, Disp: 100 each, Rfl: 3    sertraline (ZOLOFT) 25 mg tablet, Take 1 tablet (25 mg total) by mouth daily, Disp: 90 tablet, Rfl: 3    simvastatin (ZOCOR) 20 mg tablet, Take 20 mg by mouth daily , Disp: , Rfl:     gabapentin (NEURONTIN) 100 mg capsule, TAKE 2 CAPSULES (200 MG TOTAL) BY MOUTH DAILY AT BEDTIME, Disp: 180 capsule, Rfl: 1    hydrocortisone (ANUSOL-HC) 2 5 % rectal cream, Apply topically 2 (two) times a day (Patient not taking: Reported on 8/2/2021), Disp: 1 Tube, Rfl: 2    lisinopril (ZESTRIL) 40 mg tablet, Take 1 tablet (40 mg total) by mouth daily, Disp: 90 tablet, Rfl: 1    memantine (NAMENDA) 10 mg tablet, Take 1 tablet (10 mg total) by mouth 2 (two) times a day, Disp: 180 tablet, Rfl: 1    NIFEdipine 0 3%-lidocaine 5% rectal ointment, Apply 1 application topically every 4 (four) hours as needed for discomfort or pain Apply a small amount to anal fissure (Patient not taking: Reported on 7/20/2021), Disp: 2 oz, Rfl: 1    Current Allergies     Allergies as of 08/02/2021 - Reviewed 08/02/2021   Allergen Reaction Noted    Penicillins Itching and Swelling 02/15/2018            The following portions of the patient's history were reviewed and updated as appropriate: allergies, current medications, past family history, past medical history, past social history, past surgical history and problem list      Past Medical History:   Diagnosis Date    Aggression     Alzheimer's dementia ([address] )     Arthritis     Dementia ([address]     Diabetes insipidus ([address]     Diabetes mellitus ([address]     High cholesterol     Hypertension     Memory loss     Migraine     Polyneuropathy     Rheumatoid arthritis ([address]     Serum lipids high     Stomach problems     Thyroid trouble        Past Surgical History:   Procedure Laterality Date    CATARACT EXTRACTION      CHOLECYSTECTOMY      GALLBLADDER SURGERY      HYSTERECTOMY      [place] SHLDR ARTHROSCOP,SURG,W/ROTAT CUFF REPR Right 8/1/2019    Procedure: SHOULDER ARTHROSCOPIC ROTATOR CUFF REPAIR;  Surgeon: Loree Little MD;  Location: [facility];  Service: Orthopedics    ROTATOR CUFF REPAIR Right 08/01/2019       Family History   Problem Relation Age of Onset    Substance Abuse Mother denied    Mental illness Mother         denied    Substance Abuse Father         denied    Mental illness Father         denied    Diabetes Brother     Blindness Brother     Arthritis Daughter     HIV Son          Medications have been verified  Objective   /62 (BP Location: Left arm, Patient Position: Sitting)   Pulse 59   Temp 97 5 °F (36 4 °C) (Temporal)   Resp 18   Ht 5' (1 524 m)   Wt 60 3 kg (133 lb)   SpO2 99%   BMI 25 97 kg/m²        Physical Exam     Physical Exam  Vitals reviewed  Constitutional:       General: She is not in acute distress  Appearance: Normal appearance  She is normal weight  She is not ill-appearing, toxic-appearing or diaphoretic  HENT:      Head: Normocephalic and atraumatic  Right Ear: Tympanic membrane, ear canal and external ear normal  There is no impacted cerumen  Left Ear: Tympanic membrane, ear canal and external ear normal  There is no impacted cerumen  Ears:      Comments: Some wax in b/l ear canals     Eyes:      General:         Right eye: No discharge  Left eye: No discharge  Extraocular Movements: Extraocular movements intact  Conjunctiva/sclera: Conjunctivae normal       Pupils: Pupils are equal, round, and reactive to light  Comments: Pinpoint pupils      Cardiovascular:      Rate and Rhythm: Normal rate and regular rhythm  Heart sounds: Normal heart sounds  No murmur heard  No friction rub  No gallop  Pulmonary:      Effort: Pulmonary effort is normal  No respiratory distress  Breath sounds: Normal breath sounds  No stridor  No wheezing, rhonchi or rales  Chest:      Chest wall: No tenderness  Musculoskeletal:         General: Tenderness present  Normal range of motion  Comments: Extreme tenderness over right temporal artery   Pt is wincing in pain   Pain to palpation of right SCM as well   After palpation the pt was wincing in pain for the rest of the visit      Skin: General: Skin is warm and dry  Capillary Refill: Capillary refill takes less than 2 seconds  Neurological:      Mental Status: She is alert

## 2021-08-02 NOTE — ED PROVIDER NOTES
History  Chief Complaint   Patient presents with    Headache     headache since saturday, sharp pain that comes and goes  no relief with tylenol  no hx of migraines     Patient is an 59-year-old female past medical history of Alzheimer's dementia, diabetes, hypertension, hyperlipidemia, CKD stage 3, hypothyroid, migraines, SVT presenting with headache  Patient notes right-sided headache which began to her neck and has begun to radiate up into her head over the past 2 days  States it is intermittent sharp and that she occasionally feels electric type pains to the outside of her head  She states that she woke up with it 2 days ago and that has been gradually worsening and is worse with right rotation of her neck  She states that there is a constant pain there in addition to the intermittent sharp pains  She denies any relief with Tylenol and states that this is not like her prior migraines  She denies any falls or head injuries or neck injuries  She denies any fevers, vision changes, nausea vomiting, chest pain, shortness breath, rashes, dizziness, cough, respiratory symptoms  Granddaughter who she has been exposed to was recently positive for COVID-19 but she is vaccinated not having any other symptoms  Prior to Admission Medications   Prescriptions Last Dose Informant Patient Reported? Taking?    Blood Glucose Monitoring Suppl (OneTouch Verio) w/Device KIT   No No   Sig: Use as directed   Blood Pressure KIT  Family Member No No   Sig: by Does not apply route daily   Continuous Blood Gluc Sensor (FreeStyle Aruna 14 Day Sensor) MISC   No No   Sig: Use 1 each every 14 (fourteen) days   Incontinence Supplies MISC   No No   Sig: Use 6 (six) times a day Wipes   Incontinence Supplies MISC   No No   Sig: Use 4 (four) times a day Comfort shield Adult diapers   Incontinence Supply Disposable MISC   No No   Sig: Use 6 (six) times a day Dispense disposable bed pad   Insulin Pen Needle (BD Pen Needle Elisabeth U/F) 32G X 4 MM MISC   No No   Sig: Use 2 (two) times a day   Invokana 300 MG TABS   No No   Sig: TAKE 1 TABLET BY MOUTH EVERY DAY   NIFEdipine 0 3%-lidocaine 5% rectal ointment   No No   Sig: Apply 1 application topically every 4 (four) hours as needed for discomfort or pain Apply a small amount to anal fissure   Patient not taking: Reported on 4/39/7803   OneTouch Delica Lancets 74N MISC   No No   Sig: TEST BLOOD SUGAR 3 TIMES A DAY   amLODIPine (NORVASC) 5 mg tablet   No No   Sig: Take 1 tablet (5 mg total) by mouth daily   donepezil (ARICEPT) 10 mg tablet   No No   Sig: TAKE 1 TABLET BY MOUTH DAILY AT BEDTIME   fenofibrate (TRIGLIDE) 160 MG tablet   No No   Sig: TAKE 1 TABLET BY MOUTH EVERY DAY   fluticasone (FLONASE) 50 mcg/act nasal spray   No No   Sig: SPRAY 2 SPRAYS INTO EACH NOSTRIL EVERY DAY   gabapentin (NEURONTIN) 100 mg capsule   No No   Sig: TAKE 2 CAPSULES (200 MG TOTAL) BY MOUTH DAILY AT BEDTIME   glucose blood (OneTouch Verio) test strip   No No   Sig: USE TO TEST 3 TIMES A DAY   hydrocortisone (ANUSOL-HC) 2 5 % rectal cream   No No   Sig: Apply topically 2 (two) times a day   Patient not taking: Reported on 8/2/2021   insulin glargine (Lantus SoloStar) 100 units/mL injection pen   No No   Sig: Take 10 units in the am and 20 units in the pm   levothyroxine 100 mcg tablet   No No   Sig: TAKE 1 TABLET BY MOUTH EVERY DAY   lisinopril (ZESTRIL) 40 mg tablet   No No   Sig: Take 1 tablet (40 mg total) by mouth daily   memantine (NAMENDA) 10 mg tablet   No No   Sig: Take 1 tablet (10 mg total) by mouth 2 (two) times a day   montelukast (SINGULAIR) 10 mg tablet   No No   Sig: TAKE 1 TABLET BY MOUTH DAILY AT BEDTIME   sertraline (ZOLOFT) 25 mg tablet   No No   Sig: Take 1 tablet (25 mg total) by mouth daily   simvastatin (ZOCOR) 20 mg tablet   Yes No   Sig: Take 20 mg by mouth daily       Facility-Administered Medications: None       Past Medical History:   Diagnosis Date    Aggression     [facility]'s dementia [facility]     Arthritis     Dementia [facility]     Diabetes insipidus [facility]     Diabetes mellitus [facility]     High cholesterol     Hypertension     Memory loss     Migraine     Polyneuropathy     Rheumatoid arthritis ([facility]     Serum lipids high     Stomach problems     Thyroid trouble        Past Surgical History:   Procedure Laterality Date    CATARACT EXTRACTION      CHOLECYSTECTOMY      GALLBLADDER SURGERY      HYSTERECTOMY      MS SHLDR ARTHROSCOP,SURG,W/ROTAT CUFF REPR Right 2019    Procedure: SHOULDER ARTHROSCOPIC ROTATOR CUFF REPAIR;  Surgeon: Je Whitaker MD;  Location: [facility];  Service: Orthopedics    ROTATOR CUFF REPAIR Right 2019       Family History   Problem Relation Age of Onset    Substance Abuse Mother         denied    Mental illness Mother         denied    Substance Abuse Father         denied    Mental illness Father         denied    Diabetes Brother     Blindness Brother     Arthritis Daughter     HIV Son      I have reviewed and agree with the history as documented  E-Cigarette/Vaping    E-Cigarette Use Never User      E-Cigarette/Vaping Substances    Nicotine No     THC No     CBD No     Flavoring No     Other No     Unknown No      Social History     Tobacco Use    Smoking status: Former Smoker     Quit date:      Years since quittin 6    Smokeless tobacco: Never Used   Vaping Use    Vaping Use: Never used   Substance Use Topics    Alcohol use: Never    Drug use: No       Review of Systems   All other systems reviewed and are negative  Physical Exam  Physical Exam  Vitals reviewed  Constitutional:       General: She is not in acute distress  Appearance: Normal appearance  She is not ill-appearing  HENT:      Head:      Comments: No tenderness over the temporal artery     Mouth/Throat:      Mouth: Mucous membranes are moist    Eyes:      Extraocular Movements: Extraocular movements intact        Conjunctiva/sclera: Conjunctivae normal       Pupils: Pupils are equal, round, and reactive to light  Neck:      Comments: Right-sided tenderness along the right paravertebral musculature and sternocleidomastoid with no midline tenderness  Cardiovascular:      Rate and Rhythm: Normal rate and regular rhythm  Pulses: Normal pulses  Heart sounds: Normal heart sounds  Pulmonary:      Effort: Pulmonary effort is normal       Breath sounds: Normal breath sounds  Abdominal:      General: Abdomen is flat  Palpations: Abdomen is soft  Tenderness: There is no abdominal tenderness  Musculoskeletal:         General: No swelling  Normal range of motion  Cervical back: Normal range of motion and neck supple  Tenderness present  No rigidity  Skin:     General: Skin is warm and dry  Neurological:      General: No focal deficit present  Mental Status: She is alert  Cranial Nerves: No cranial nerve deficit  Sensory: No sensory deficit  Motor: No weakness        Coordination: Coordination normal    Psychiatric:         Mood and Affect: Mood normal          Vital Signs  ED Triage Vitals   Temperature Pulse Respirations Blood Pressure SpO2   08/02/21 1104 08/02/21 1104 08/02/21 1104 08/02/21 1104 08/02/21 1104   97 7 °F (36 5 °C) 68 15 (!) 187/86 99 %      Temp Source Heart Rate Source Patient Position - Orthostatic VS BP Location FiO2 (%)   08/02/21 1104 08/02/21 1104 08/02/21 1441 08/02/21 1104 --   Oral Monitor Lying Left arm       Pain Score       08/02/21 1104       7           Vitals:    08/02/21 1245 08/02/21 1300 08/02/21 1400 08/02/21 1441   BP: (!) 184/72 167/75 133/72 (!) 175/76   Pulse: 61 56 (!) 49 (!) 49   Patient Position - Orthostatic VS:    Lying         Visual Acuity  Visual Acuity      Most Recent Value   L Pupil Size (mm)  2   R Pupil Size (mm)  2          ED Medications  Medications   acetaminophen (TYLENOL) tablet 975 mg (975 mg Oral Given 8/2/21 1228)   diazepam (VALIUM) tablet 5 mg (5 mg Oral Given 8/2/21 1228)   iohexol (OMNIPAQUE) 350 MG/ML injection (SINGLE-DOSE) 85 mL (85 mL Intravenous Given 8/2/21 1318)       Diagnostic Studies  Results Reviewed     Procedure Component Value Units Date/Time    Comprehensive metabolic panel [[id number]]  (Abnormal) Collected: 08/02/21 1237    Lab Status: Final result Specimen: Blood from Arm, Left Updated: 08/02/21 1306     Sodium 140 mmol/L      Potassium 4 2 mmol/L      Chloride 105 mmol/L      CO2 28 mmol/L      ANION GAP 7 mmol/L      BUN 27 mg/dL      Creatinine 1 18 mg/dL      Glucose 212 mg/dL      Calcium 9 6 mg/dL      AST 16 U/L      ALT 20 U/L      Alkaline Phosphatase 36 U/L      Total Protein 7 3 g/dL      Albumin 3 9 g/dL      Total Bilirubin 0 31 mg/dL      eGFR 44 ml/min/1 73sq m     Narrative:      [place] guidelines for Chronic Kidney Disease (CKD):     Stage 1 with normal or high GFR (GFR > 90 mL/min/1 73 square meters)    Stage 2 Mild CKD (GFR = 60-89 mL/min/1 73 square meters)    Stage 3A Moderate CKD (GFR = 45-59 mL/min/1 73 square meters)    Stage 3B Moderate CKD (GFR = 30-44 mL/min/1 73 square meters)    Stage 4 Severe CKD (GFR = 15-29 mL/min/1 73 square meters)    Stage 5 End Stage CKD (GFR <15 mL/min/1 73 square meters)  Note: GFR calculation is accurate only with a steady state creatinine    Troponin I [[id number]]  (Normal) Collected: 08/02/21 1237    Lab Status: Final result Specimen: Blood from Arm, Left Updated: 08/02/21 1305     Troponin I <0 02 ng/mL     CBC and differential [[id number]]  (Abnormal) Collected: 08/02/21 1237    Lab Status: Final result Specimen: Blood from Arm, Left Updated: 08/02/21 1248     WBC 4 27 Thousand/uL      RBC 3 78 Million/uL      Hemoglobin 10 4 g/dL      Hematocrit 33 1 %      MCV 88 fL      MCH 27 5 pg      MCHC 31 4 g/dL      RDW 13 6 %      MPV 12 9 fL      Platelets 441 Thousands/uL      nRBC 0 /100 WBCs      Neutrophils Relative 64 %      Immat GRANS % 1 %      Lymphocytes Relative 23 %      Monocytes Relative 7 %      Eosinophils Relative 4 %      Basophils Relative 1 %      Neutrophils Absolute 2 78 Thousands/µL      Immature Grans Absolute 0 02 Thousand/uL      Lymphocytes Absolute 0 99 Thousands/µL      Monocytes Absolute 0 28 Thousand/µL      Eosinophils Absolute 0 18 Thousand/µL      Basophils Absolute 0 02 Thousands/µL                  CTA head and neck with and without contrast   Final Result by Temo Ramirez MD (08/02 1404)      70% stenosis proximal right internal carotid artery  No significant stenosis of the cervical vertebral or left carotid arteries  No significant intracranial stenosis, vessel occlusion or aneurysm  The study was marked in [facility] for immediate notification  Workstation performed: [id number]                    Procedures  Procedures         ED Course  ED Course as of Aug 03 1412   Mon Aug 02, 2021   1423 Imaging unremarkable with the exception of 70% stenosis of the right carotid artery, do not feel that this is related to patient's pain however will give some follow-up with vascular surgery as well as primary care for headache and neck pain  She notes improvement of her pain after diazepam, Tylenol  Will discharge with short course of diazepam for torticollis and have discussed return precautions worse or change pain, behavior changes, chest pain, shortness of breath, vomiting, passing out and granddaughter states she understands  SBIRT 22yo+      Most Recent Value   SBIRT (22 yo +)   In order to provide better care to our patients, we are screening all of our patients for alcohol and drug use  Would it be okay to ask you these screening questions? Yes Filed at: 08/02/2021 1206   Initial Alcohol Screen: US AUDIT-C    1  How often do you have a drink containing alcohol?  0 Filed at: 08/02/2021 1203   2  How many drinks containing alcohol do you have on a typical day you are drinking? 0 Filed at: 08/02/2021 1209   3b  FEMALE Any Age, or MALE 65+: How often do you have 4 or more drinks on one occassion? 0 Filed at: 08/02/2021 1209   Audit-C Score  0 Filed at: 08/02/2021 1209   KYLAH: How many times in the past year have you    Used an illegal drug or used a prescription medication for non-medical reasons? Never Filed at: 08/02/2021 1209                    [facility]  Number of Diagnoses or Management Options  Torticollis  Diagnosis management comments: Patient is an 43-year-old female past medical history dementia, diabetes, hypertension, hyperlipidemia, CKD stage 3, SVT, hypothyroid, migraine presenting with neck and headache  Patient is well-appearing bedside stable vitals and in no acute distress  She is mentating appropriately with no gross abnormalities on neurologic exam has full range of motion of her neck though painful with right rotation, no midline tenderness and no other significant physical exam findings  Due to patient's age and comorbidities will obtain CT CTA of the head neck to rule out vascular pathology, soft tissue pathology, bony pathology, intracranial pathology as cause of patient's pain as well as cardiac workup to rule out ACS as potential cause of patient's neck pain  Feel that patient is likely suffering torticollis and will give muscle relaxer, pain control and reassess  Disposition  Final diagnoses:   Torticollis     Time reflects when diagnosis was documented in both MDM as applicable and the Disposition within this note     Time User Action Codes Description Comment    8/2/2021  2:24 PM Patrizia Robertson Add [M43 6] Torticollis       ED Disposition     ED Disposition Condition Date/Time Comment    Discharge Stable Mon Aug 2, 2021  2:24 PM Venice Carias discharge to home/self care              Follow-up Information     Follow up With Specialties Details Why Katia Forte MD Family Medicine In 1 week  [address] [address]  [phone]      Con Guadalupe MD Vascular Surgery, Radiology Schedule an appointment as soon as possible for a visit   [address]  [phone]            Discharge Medication List as of 8/2/2021  2:26 PM      START taking these medications    Details   diazepam (VALIUM) 5 mg tablet Take 1 tablet (5 mg total) by mouth 2 (two) times a day for 5 doses, Starting Mon 8/2/2021, Until Thu 8/5/2021, Print         CONTINUE these medications which have NOT CHANGED    Details   amLODIPine (NORVASC) 5 mg tablet Take 1 tablet (5 mg total) by mouth daily, Starting Thu 7/15/2021, Until Sat 8/14/2021, Normal      Blood Glucose Monitoring Suppl (OneTouch Verio) w/Device KIT Use as directed, Normal      Blood Pressure KIT by Does not apply route daily, Starting Mon 5/13/2019, Normal      Continuous Blood Gluc Sensor (Faraday BicyclesStyle Aruna 14 Day Sensor) MISC Use 1 each every 14 (fourteen) days, Starting Fri 11/13/2020, Normal      donepezil (ARICEPT) 10 mg tablet TAKE 1 TABLET BY MOUTH DAILY AT BEDTIME, Normal      fenofibrate (TRIGLIDE) 160 MG tablet TAKE 1 TABLET BY MOUTH EVERY DAY, Normal      fluticasone (FLONASE) 50 mcg/act nasal spray SPRAY 2 SPRAYS INTO EACH NOSTRIL EVERY DAY, Normal      gabapentin (NEURONTIN) 100 mg capsule TAKE 2 CAPSULES (200 MG TOTAL) BY MOUTH DAILY AT BEDTIME, Starting Thu 6/3/2021, Until Tue 7/20/2021, Normal      glucose blood (OneTouch Verio) test strip USE TO TEST 3 TIMES A DAY, Normal      hydrocortisone (ANUSOL-HC) 2 5 % rectal cream Apply topically 2 (two) times a day, Starting Thu 3/4/2021, Normal      !! Incontinence Supplies MISC Use 6 (six) times a day Wipes, Starting Tue 1/26/2021, Print      !!  Incontinence Supplies MISC Use 4 (four) times a day Comfort shield Adult diapers, Starting Tue 1/26/2021, Print      Incontinence Supply Disposable MISC Use 6 (six) times a day Dispense disposable bed pad, Starting Tue 1/26/2021, Print      insulin glargine (Lantus SoloStar) 100 units/mL injection pen Take 10 units in the am and 20 units in the pm, Normal      Insulin Pen Needle (BD Pen Needle Elisabeth U/F) 32G X 4 MM MISC Use 2 (two) times a day, Starting Mon 1/25/2021, Normal      Invokana 300 MG TABS TAKE 1 TABLET BY MOUTH EVERY DAY, Normal      levothyroxine 100 mcg tablet TAKE 1 TABLET BY MOUTH EVERY DAY, Normal      lisinopril (ZESTRIL) 40 mg tablet Take 1 tablet (40 mg total) by mouth daily, Starting Fri 11/13/2020, Until Tue 7/20/2021, Normal      memantine (NAMENDA) 10 mg tablet Take 1 tablet (10 mg total) by mouth 2 (two) times a day, Starting Fri 11/13/2020, Until Thu 3/4/2021, Normal      montelukast (SINGULAIR) 10 mg tablet TAKE 1 TABLET BY MOUTH DAILY AT BEDTIME, Normal      NIFEdipine 0 3%-lidocaine 5% rectal ointment Apply 1 application topically every 4 (four) hours as needed for discomfort or pain Apply a small amount to anal fissure, Starting Thu 3/4/2021, Normal      OneTouch Delica Lancets 75I MISC TEST BLOOD SUGAR 3 TIMES A DAY, Normal      sertraline (ZOLOFT) 25 mg tablet Take 1 tablet (25 mg total) by mouth daily, Starting Thu 3/4/2021, Normal      simvastatin (ZOCOR) 20 mg tablet Take 20 mg by mouth daily , Starting Mon 12/14/2020, Historical Med       !! - Potential duplicate medications found  Please discuss with provider  No discharge procedures on file      PDMP Review     None          ED Provider  Electronically Signed by           Daphne Giordano DO  08/03/21 6102

## 2021-08-02 NOTE — CASE MANAGEMENT
CM responded to Code R alert      RECENT ADMISSION: 7/12/2021 - 7/14/2021 (2 days)  [address]    AGE:81  SEX: female  SD:[id number]  OUTCOME: Home with [facility]  RESOURCES ON D/C (previous admission):   Patient has insurance Emliy Green with [facility]  Patient has a PCP Spencer Martinez MD      CURRENT F/U APPTS:   7/20/2021  [facility]    REASON FOR READMISSION:  8/2/2021  [facility] [place]  Sent pt to the Ed for concern for temporal arteritis   Pt and granddaughter are planning to go to [place] [facility]   Her family all recently had COVID but pt stayed in her room  INTERVENTIONS: home with [facility]

## 2021-08-04 LAB
ATRIAL RATE: 62 BPM
P AXIS: 46 DEGREES
PR INTERVAL: 188 MS
QRS AXIS: -36 DEGREES
QRSD INTERVAL: 106 MS
QT INTERVAL: 430 MS
QTC INTERVAL: 436 MS
T WAVE AXIS: 46 DEGREES
VENTRICULAR RATE: 62 BPM

## 2021-08-04 PROCEDURE — 93010 ELECTROCARDIOGRAM REPORT: CPT | Performed by: INTERNAL MEDICINE

## 2021-08-10 ENCOUNTER — OFFICE VISIT (OUTPATIENT)
Dept: FAMILY MEDICINE CLINIC | Facility: CLINIC | Age: 81
End: 2021-08-10
Payer: COMMERCIAL

## 2021-08-10 VITALS
SYSTOLIC BLOOD PRESSURE: 156 MMHG | WEIGHT: 134 LBS | BODY MASS INDEX: 26.31 KG/M2 | HEIGHT: 60 IN | DIASTOLIC BLOOD PRESSURE: 72 MMHG | TEMPERATURE: 96.7 F

## 2021-08-10 DIAGNOSIS — F03.91 DEMENTIA WITH BEHAVIORAL DISTURBANCE, UNSPECIFIED DEMENTIA TYPE (HCC): ICD-10-CM

## 2021-08-10 DIAGNOSIS — B02.9 HERPES ZOSTER WITHOUT COMPLICATION: Primary | ICD-10-CM

## 2021-08-10 DIAGNOSIS — E11.9 TYPE 2 DIABETES MELLITUS TREATED WITH INSULIN (HCC): Primary | ICD-10-CM

## 2021-08-10 DIAGNOSIS — Z79.4 TYPE 2 DIABETES MELLITUS TREATED WITH INSULIN (HCC): Primary | ICD-10-CM

## 2021-08-10 PROCEDURE — 99214 OFFICE O/P EST MOD 30 MIN: CPT | Performed by: FAMILY MEDICINE

## 2021-08-10 PROCEDURE — 1111F DSCHRG MED/CURRENT MED MERGE: CPT | Performed by: FAMILY MEDICINE

## 2021-08-10 RX ORDER — VALACYCLOVIR HYDROCHLORIDE 1 G/1
1000 TABLET, FILM COATED ORAL 2 TIMES DAILY
Qty: 14 TABLET | Refills: 0 | Status: SHIPPED | OUTPATIENT
Start: 2021-08-10 | End: 2021-08-19 | Stop reason: SDUPTHER

## 2021-08-10 RX ORDER — ACETAMINOPHEN AND CODEINE PHOSPHATE 60; 300 MG/1; MG/1
1 TABLET ORAL EVERY 4 HOURS PRN
Qty: 30 TABLET | Refills: 0 | Status: SHIPPED | OUTPATIENT
Start: 2021-08-10 | End: 2021-08-19 | Stop reason: ALTCHOICE

## 2021-08-10 RX ORDER — CAPSAICIN 0.07 G/100G
CREAM TOPICAL 3 TIMES DAILY
Qty: 28.3 G | Refills: 2 | Status: SHIPPED | OUTPATIENT
Start: 2021-08-10 | End: 2021-08-11 | Stop reason: SDUPTHER

## 2021-08-10 RX ORDER — GABAPENTIN 100 MG/1
100 CAPSULE ORAL 3 TIMES DAILY
Qty: 90 CAPSULE | Refills: 2 | Status: SHIPPED | OUTPATIENT
Start: 2021-08-10 | End: 2021-08-12 | Stop reason: SDUPTHER

## 2021-08-10 RX ORDER — DONEPEZIL HYDROCHLORIDE 10 MG/1
10 TABLET, FILM COATED ORAL
Qty: 90 TABLET | Refills: 1 | Status: SHIPPED | OUTPATIENT
Start: 2021-08-10 | End: 2021-11-01 | Stop reason: ALTCHOICE

## 2021-08-10 NOTE — PROGRESS NOTES
Assessment/Plan:    No problem-specific Assessment & Plan notes found for this encounter  Diagnoses and all orders for this visit:    Herpes zoster without complication  After discussing risks and benefits of medication along with side effects will start the following:  -     gabapentin (NEURONTIN) 100 mg capsule; Take 1 capsule (100 mg total) by mouth 3 (three) times a day  -     valACYclovir (VALTREX) 1,000 mg tablet; Take 1 tablet (1,000 mg total) by mouth 2 (two) times a day for 7 days  -     capsicum (ZOSTRIX) 0 075 % topical cream; Apply topically 3 (three) times a day  -     acetaminophen-codeine (TYLENOL #4) 300-60 MG per tablet; Take 1 tablet by mouth every 4 (four) hours as needed for moderate pain  advised to keep lesions covered until they are crusted  Dementia with behavioral disturbance, unspecified dementia type (HCC)  -     donepezil (ARICEPT) 10 mg tablet; Take 1 tablet (10 mg total) by mouth daily at bedtime      Follow up as needed    Subjective:      Patient ID: Shabnam Rutherford is a [de-identified] y o  female  Patient is here due to neck pain as well as headaches that has been bothering her for the past 1 week  She denies any injuries  She went to the ER recently for similar complaints and given valium which has not been helping  The area is very painful, itchy and now has a rash  The following portions of the patient's history were reviewed and updated as appropriate: Pa  She  has a past medical history of Aggression, Alzheimer's dementia ([address] ), Arthritis, Dementia ([address] ), Diabetes insipidus ([address] ), Diabetes mellitus ([address] ), High cholesterol, Hypertension, Memory loss, Migraine, Polyneuropathy, Rheumatoid arthritis ([address] ), Serum lipids high, Stomach problems, and Thyroid trouble    She   Patient Active Problem List    Diagnosis Date Noted    Herpes zoster without complication 94/85/0008    Clostridium difficile diarrhea 07/20/2021    CKD (chronic kidney disease) stage 3, GFR 30-59 ml/min ([facility]) 07/12/2021    Acute non-recurrent maxillary sinusitis 05/19/2021    Frequency of urination 03/04/2021    Ulcerated external hemorrhoids 03/04/2021    Bright red blood per rectum 01/25/2021    Coccyalgia 01/25/2021    Leg cramps, sleep related 07/28/2020    Hypercalcemia 05/27/2020    Restless leg syndrome 04/21/2020    Mixed hyperlipidemia 04/21/2020    Diarrhea 10/03/2019    Type 2 diabetes mellitus [facility] with insulin ([facility] ) 10/03/2019    Anemia in stage 3 chronic kidney disease ([facility] ) 10/03/2019    Tear of right rotator cuff 07/23/2019    Biceps tendon tear 07/23/2019    Nontraumatic tear of right rotator cuff 07/18/2019    Menopause ovarian failure 07/18/2019    Peripheral neuropathy 07/18/2019    Medicare annual wellness visit, subsequent 07/18/2019    Ambulatory dysfunction 07/18/2019    Hypothyroidism 05/13/2019    Essential hypertension 05/13/2019    Fall 05/13/2019    SVT (supraventricular tachycardia) ([facility] ) 05/13/2019    Need for influenza vaccination 09/10/2018    Anxiety 08/14/2018    Type 2 diabetes, uncontrolled, with neuropathy ([facility] ) 06/07/2018    Alzheimer's dementia ([facility] ) 05/03/2018    Memory loss 03/19/2018    Bilateral hearing loss 02/22/2018    Rheumatoid arthritis 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 ([facility] ) 02/22/2018    Dementia with behavioral disturbance ([facility] ) 02/15/2018    Chronic left shoulder pain 02/15/2018    Need for lipid screening 02/15/2018     She  has a past surgical history that includes Hysterectomy; Gallbladder surgery; Cataract extraction; pr shldr arthroscop,surg,w/rotat cuff repr (Right, 8/1/2019); Rotator cuff repair (Right, 08/01/2019); and Cholecystectomy  Her family history includes Arthritis in her daughter; Blindness in her brother; Diabetes in her brother; HIV in her son; Mental illness in her father and mother; Substance Abuse in her father and mother  She  reports that she quit smoking about 36 years ago   She has never used smokeless tobacco  She reports that she does not drink alcohol and does not use drugs    Current Outpatient Medications   Medication Sig Dispense Refill    amLODIPine (NORVASC) 5 mg tablet Take 1 tablet (5 mg total) by mouth daily 30 tablet 0    Blood Glucose Monitoring Suppl (OneTouch Verio) w/Device KIT Use as directed 1 kit 0    Blood Pressure KIT by Does not apply route daily 1 each 1    Continuous Blood Gluc Sensor (FreeStyle Aruna 14 Day Sensor) MISC Use 1 each every 14 (fourteen) days 6 each 3    diazepam (VALIUM) 5 mg tablet Take 1 tablet (5 mg total) by mouth 2 (two) times a day for 5 doses 5 tablet 0    donepezil (ARICEPT) 10 mg tablet Take 1 tablet (10 mg total) by mouth daily at bedtime 90 tablet 1    fenofibrate (TRIGLIDE) 160 MG tablet TAKE 1 TABLET BY MOUTH EVERY DAY 90 tablet 1    fluticasone (FLONASE) 50 mcg/act nasal spray SPRAY 2 SPRAYS INTO EACH NOSTRIL EVERY DAY 16 mL 1    gabapentin (NEURONTIN) 100 mg capsule TAKE 2 CAPSULES (200 MG TOTAL) BY MOUTH DAILY AT BEDTIME 180 capsule 1    glucose blood (OneTouch Verio) test strip USE TO TEST 3 TIMES A  strip 2    hydrocortisone (ANUSOL-HC) 2 5 % rectal cream Apply topically 2 (two) times a day 1 Tube 2    Incontinence Supplies MISC Use 6 (six) times a day Wipes 200 each 6    Incontinence Supplies MISC Use 4 (four) times a day Comfort shield Adult diapers 200 each 6    Incontinence Supply Disposable MISC Use 6 (six) times a day Dispense disposable bed pad 200 each 6    insulin glargine (Lantus SoloStar) 100 units/mL injection pen Take 10 units in the am and 20 units in the pm 15 pen 5    Insulin Pen Needle (BD Pen Needle Elisabeth U/F) 32G X 4 MM MISC Use 2 (two) times a day 100 each 5    Invokana 300 MG TABS TAKE 1 TABLET BY MOUTH EVERY DAY 90 tablet 1    levothyroxine 100 mcg tablet TAKE 1 TABLET BY MOUTH EVERY DAY 30 tablet 2    montelukast (SINGULAIR) 10 mg tablet TAKE 1 TABLET BY MOUTH DAILY AT BEDTIME 90 tablet 1    NIFEdipine 0 3%-lidocaine 5% rectal ointment Apply 1 application topically every 4 (four) hours as needed for discomfort or pain Apply a small amount to anal fissure 2 oz 1    OneTouch Delica Lancets 72O MISC TEST BLOOD SUGAR 3 TIMES A  each 3    sertraline (ZOLOFT) 25 mg tablet Take 1 tablet (25 mg total) by mouth daily 90 tablet 3    simvastatin (ZOCOR) 20 mg tablet Take 20 mg by mouth daily       acetaminophen-codeine (TYLENOL #4) 300-60 MG per tablet Take 1 tablet by mouth every 4 (four) hours as needed for moderate pain 30 tablet 0    capsicum (ZOSTRIX) 0 075 % topical cream Apply topically 3 (three) times a day 28 3 g 2    gabapentin (NEURONTIN) 100 mg capsule Take 1 capsule (100 mg total) by mouth 3 (three) times a day 90 capsule 2    lisinopril (ZESTRIL) 40 mg tablet Take 1 tablet (40 mg total) by mouth daily 90 tablet 1    memantine (NAMENDA) 10 mg tablet Take 1 tablet (10 mg total) by mouth 2 (two) times a day 180 tablet 1    valACYclovir (VALTREX) 1,000 mg tablet Take 1 tablet (1,000 mg total) by mouth 2 (two) times a day for 7 days 14 tablet 0     No current facility-administered medications for this visit       Current Outpatient Medications on File Prior to Visit   Medication Sig    amLODIPine (NORVASC) 5 mg tablet Take 1 tablet (5 mg total) by mouth daily    Blood Glucose Monitoring Suppl (OneTouch Verio) w/Device KIT Use as directed    Blood Pressure KIT by Does not apply route daily    Continuous Blood Gluc Sensor (FreeStyle Aruna 14 Day Sensor) MISC Use 1 each every 14 (fourteen) days    diazepam (VALIUM) 5 mg tablet Take 1 tablet (5 mg total) by mouth 2 (two) times a day for 5 doses    fenofibrate (TRIGLIDE) 160 MG tablet TAKE 1 TABLET BY MOUTH EVERY DAY    fluticasone (FLONASE) 50 mcg/act nasal spray SPRAY 2 SPRAYS INTO EACH NOSTRIL EVERY DAY    gabapentin (NEURONTIN) 100 mg capsule TAKE 2 CAPSULES (200 MG TOTAL) BY MOUTH DAILY AT BEDTIME    glucose blood (OneTouch Verio) test strip USE TO TEST 3 TIMES A DAY    hydrocortisone (ANUSOL-HC) 2 5 % rectal cream Apply topically 2 (two) times a day    Incontinence Supplies MISC Use 6 (six) times a day Wipes    Incontinence Supplies MISC Use 4 (four) times a day Comfort shield Adult diapers    Incontinence Supply Disposable MISC Use 6 (six) times a day Dispense disposable bed pad    insulin glargine (Lantus SoloStar) 100 units/mL injection pen Take 10 units in the am and 20 units in the pm    Insulin Pen Needle (BD Pen Needle Elisabeth U/F) 32G X 4 MM MISC Use 2 (two) times a day    Invokana 300 MG TABS TAKE 1 TABLET BY MOUTH EVERY DAY    levothyroxine 100 mcg tablet TAKE 1 TABLET BY MOUTH EVERY DAY    montelukast (SINGULAIR) 10 mg tablet TAKE 1 TABLET BY MOUTH DAILY AT BEDTIME    NIFEdipine 0 3%-lidocaine 5% rectal ointment Apply 1 application topically every 4 (four) hours as needed for discomfort or pain Apply a small amount to anal fissure    OneTouch Delica Lancets 81Q MISC TEST BLOOD SUGAR 3 TIMES A DAY    sertraline (ZOLOFT) 25 mg tablet Take 1 tablet (25 mg total) by mouth daily    simvastatin (ZOCOR) 20 mg tablet Take 20 mg by mouth daily     [DISCONTINUED] donepezil (ARICEPT) 10 mg tablet TAKE 1 TABLET BY MOUTH DAILY AT BEDTIME    lisinopril (ZESTRIL) 40 mg tablet Take 1 tablet (40 mg total) by mouth daily    memantine (NAMENDA) 10 mg tablet Take 1 tablet (10 mg total) by mouth 2 (two) times a day     No current facility-administered medications on file prior to visit  She is allergic to penicillins       Review of Systems   Constitutional: Negative for activity change, appetite change, fatigue and fever  HENT: Negative for congestion and ear discharge  Respiratory: Negative for cough and shortness of breath  Cardiovascular: Negative for chest pain and palpitations  Gastrointestinal: Negative for diarrhea and nausea  Musculoskeletal: Positive for myalgias and neck pain   Negative for arthralgias and back pain    Skin: Positive for rash  Negative for color change  Neurological: Positive for headaches  Negative for dizziness  Psychiatric/Behavioral: Negative for agitation and behavioral problems  Objective:      /72   Temp (!) 96 7 °F (35 9 °C)   Ht 5' (1 524 m)   Wt 60 8 kg (134 lb)   BMI 26 17 kg/m²          Physical Exam  Constitutional:       General: She is not in acute distress  Appearance: She is well-developed  She is not diaphoretic  HENT:      Head: Normocephalic and atraumatic  Nose: Nose normal    Eyes:      Conjunctiva/sclera: Conjunctivae normal       Pupils: Pupils are equal, round, and reactive to light  Cardiovascular:      Rate and Rhythm: Normal rate and regular rhythm  Heart sounds: Normal heart sounds  No murmur heard  Pulmonary:      Effort: Pulmonary effort is normal  No respiratory distress  Breath sounds: Normal breath sounds  No wheezing  Abdominal:      General: Bowel sounds are normal  There is no distension  Palpations: Abdomen is soft  Tenderness: There is no abdominal tenderness  Skin:     General: Skin is warm and dry  Findings: Erythema and rash present  Comments: Vesicular rash noted on her neck and right side of her face and left upper back, itchy and painful   Neurological:      Mental Status: She is alert and oriented to person, place, and time

## 2021-08-11 DIAGNOSIS — E11.9 TYPE 2 DIABETES MELLITUS TREATED WITH INSULIN (HCC): ICD-10-CM

## 2021-08-11 DIAGNOSIS — B02.9 HERPES ZOSTER WITHOUT COMPLICATION: ICD-10-CM

## 2021-08-11 DIAGNOSIS — Z79.4 TYPE 2 DIABETES MELLITUS TREATED WITH INSULIN (HCC): ICD-10-CM

## 2021-08-11 RX ORDER — CAPSAICIN 0.07 G/100G
CREAM TOPICAL 3 TIMES DAILY
Qty: 57 G | Refills: 2 | Status: SHIPPED | OUTPATIENT
Start: 2021-08-11 | End: 2022-04-15

## 2021-08-11 RX ORDER — LISINOPRIL 40 MG/1
TABLET ORAL
Qty: 90 TABLET | Refills: 1 | Status: SHIPPED | OUTPATIENT
Start: 2021-08-11 | End: 2021-08-26 | Stop reason: SDUPTHER

## 2021-08-12 DIAGNOSIS — B02.9 HERPES ZOSTER WITHOUT COMPLICATION: ICD-10-CM

## 2021-08-12 RX ORDER — GABAPENTIN 100 MG/1
100 CAPSULE ORAL 3 TIMES DAILY
Qty: 90 CAPSULE | Refills: 2 | Status: SHIPPED | OUTPATIENT
Start: 2021-08-12 | End: 2021-08-19 | Stop reason: ALTCHOICE

## 2021-08-19 ENCOUNTER — OFFICE VISIT (OUTPATIENT)
Dept: FAMILY MEDICINE CLINIC | Facility: CLINIC | Age: 81
End: 2021-08-19
Payer: COMMERCIAL

## 2021-08-19 VITALS
HEIGHT: 60 IN | SYSTOLIC BLOOD PRESSURE: 138 MMHG | HEART RATE: 82 BPM | WEIGHT: 133.8 LBS | BODY MASS INDEX: 26.27 KG/M2 | DIASTOLIC BLOOD PRESSURE: 80 MMHG | OXYGEN SATURATION: 94 % | TEMPERATURE: 97.7 F

## 2021-08-19 DIAGNOSIS — B02.29 POST HERPETIC NEURALGIA: Primary | ICD-10-CM

## 2021-08-19 DIAGNOSIS — G47.62 LEG CRAMPS, SLEEP RELATED: ICD-10-CM

## 2021-08-19 DIAGNOSIS — B02.9 HERPES ZOSTER WITHOUT COMPLICATION: ICD-10-CM

## 2021-08-19 PROCEDURE — 99214 OFFICE O/P EST MOD 30 MIN: CPT | Performed by: FAMILY MEDICINE

## 2021-08-19 RX ORDER — CAPSAICIN 0.07 G/100G
CREAM TOPICAL 3 TIMES DAILY
Qty: 28.3 G | Refills: 2 | Status: SHIPPED | OUTPATIENT
Start: 2021-08-19 | End: 2021-09-03 | Stop reason: SDUPTHER

## 2021-08-19 RX ORDER — AMITRIPTYLINE HYDROCHLORIDE 10 MG/1
10 TABLET, FILM COATED ORAL
Qty: 30 TABLET | Refills: 2 | Status: SHIPPED | OUTPATIENT
Start: 2021-08-19 | End: 2021-09-03

## 2021-08-19 RX ORDER — VALACYCLOVIR HYDROCHLORIDE 1 G/1
1000 TABLET, FILM COATED ORAL 2 TIMES DAILY
Qty: 10 TABLET | Refills: 0 | Status: SHIPPED | OUTPATIENT
Start: 2021-08-19 | End: 2022-04-15

## 2021-08-19 RX ORDER — GABAPENTIN 100 MG/1
200 CAPSULE ORAL 2 TIMES DAILY
Qty: 120 CAPSULE | Refills: 3
Start: 2021-08-19 | End: 2022-04-04 | Stop reason: SDUPTHER

## 2021-08-19 NOTE — PROGRESS NOTES
Assessment/Plan:    No problem-specific Assessment & Plan notes found for this encounter  Diagnoses and all orders for this visit:    Post herpetic neuralgia  After discussing risks and benefits of medication along with side effects will start the following:  -     amitriptyline (ELAVIL) 10 mg tablet; Take 1 tablet (10 mg total) by mouth daily at bedtime  -     capsicum (ZOSTRIX) 0 075 % topical cream; Apply topically 3 (three) times a day Apply to lesions scalp and neck, back    Leg cramps, sleep related  -     gabapentin (NEURONTIN) 100 mg capsule; Take 2 capsules (200 mg total) by mouth 2 (two) times a day    Herpes zoster without complication  -     valACYclovir (VALTREX) 1,000 mg tablet; Take 1 tablet (1,000 mg total) by mouth 2 (two) times a day for 5 days  -     capsicum (ZOSTRIX) 0 075 % topical cream; Apply topically 3 (three) times a day Apply to lesions scalp and neck, back      Follow up in 2 months or as needed    Subjective:      Patient ID: Cy Laguerre is a [de-identified] y o  female  Patient is here to follow up for Post herpetic neuralgia  She has been having pain and discomfort on her scalp and the right side of her neck  The pain is tingling and sharp at times  It comes and goes  She has been taking gabapentin 200 mg bid which helps  Took Tylenol with codeine with made her sleepy and confused  The following portions of the patient's history were reviewed and updated as appropriate:   She  has a past medical history of Aggression, Alzheimer's dementia ([address] ), Arthritis, Dementia ([address] ), Diabetes insipidus ([address] ), Diabetes mellitus ([address] ), High cholesterol, Hypertension, Memory loss, Migraine, Polyneuropathy, Rheumatoid arthritis ([address] ), Serum lipids high, Stomach problems, and Thyroid trouble    She   Patient Active Problem List    Diagnosis Date Noted    Post herpetic neuralgia 08/19/2021    Herpes zoster without complication 01/18/2589    Clostridium difficile diarrhea 07/20/2021    CKD (chronic kidney disease) stage 3, GFR 30-59 ml/min ([facility]) 07/12/2021    Acute non-recurrent maxillary sinusitis 05/19/2021    Frequency of urination 03/04/2021    Ulcerated external hemorrhoids 03/04/2021    Bright red blood per rectum 01/25/2021    Coccyalgia 01/25/2021    Leg cramps, sleep related 07/28/2020    Hypercalcemia 05/27/2020    Restless leg syndrome 04/21/2020    Mixed hyperlipidemia 04/21/2020    Diarrhea 10/03/2019    Type 2 diabetes mellitus [facility] with insulin ([facility] ) 10/03/2019    Anemia in stage 3 chronic kidney disease ([facility] ) 10/03/2019    Tear of right rotator cuff 07/23/2019    Biceps tendon tear 07/23/2019    Nontraumatic tear of right rotator cuff 07/18/2019    Menopause ovarian failure 07/18/2019    Peripheral neuropathy 07/18/2019    Medicare annual wellness visit, subsequent 07/18/2019    Ambulatory dysfunction 07/18/2019    Hypothyroidism 05/13/2019    Essential hypertension 05/13/2019    Fall 05/13/2019    SVT (supraventricular tachycardia) ([facility] ) 05/13/2019    Need for influenza vaccination 09/10/2018    Anxiety 08/14/2018    Type 2 diabetes, uncontrolled, with neuropathy ([facility] ) 06/07/2018    Alzheimer's dementia ([facility] ) 05/03/2018    Memory loss 03/19/2018    Bilateral hearing loss 02/22/2018    Rheumatoid arthritis 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 ([facility] ) 02/22/2018    Dementia with behavioral disturbance ([facility] ) 02/15/2018    Chronic left shoulder pain 02/15/2018    Need for lipid screening 02/15/2018     She  has a past surgical history that includes Hysterectomy; Gallbladder surgery; Cataract extraction; pr shldr arthroscop,surg,w/rotat cuff repr (Right, 8/1/2019); Rotator cuff repair (Right, 08/01/2019); and Cholecystectomy  Her family history includes Arthritis in her daughter; Blindness in her brother; Diabetes in her brother; HIV in her son; Mental illness in her father and mother; Substance Abuse in her father and mother    She reports that she quit smoking about 36 years ago  Her smoking use included cigarettes  She has a 7 50 pack-year smoking history  She has never used smokeless tobacco  She reports that she does not drink alcohol and does not use drugs    Current Outpatient Medications   Medication Sig Dispense Refill    amLODIPine (NORVASC) 5 mg tablet Take 1 tablet (5 mg total) by mouth daily 30 tablet 0    Blood Glucose Monitoring Suppl (OneTouch Verio) w/Device KIT Use as directed 1 kit 0    Blood Pressure KIT by Does not apply route daily 1 each 1    capsicum (ZOSTRIX) 0 075 % topical cream Apply topically 3 (three) times a day 57 g 2    Continuous Blood Gluc Sensor (FreeStyle Aruna 14 Day Sensor) MISC Use 1 each every 14 (fourteen) days 6 each 3    donepezil (ARICEPT) 10 mg tablet Take 1 tablet (10 mg total) by mouth daily at bedtime 90 tablet 1    fenofibrate (TRIGLIDE) 160 MG tablet TAKE 1 TABLET BY MOUTH EVERY DAY 90 tablet 1    fluticasone (FLONASE) 50 mcg/act nasal spray SPRAY 2 SPRAYS INTO EACH NOSTRIL EVERY DAY 16 mL 1    glucose blood (OneTouch Verio) test strip USE TO TEST 3 TIMES A  strip 2    hydrocortisone (ANUSOL-HC) 2 5 % rectal cream Apply topically 2 (two) times a day 1 Tube 2    Incontinence Supplies MISC Use 6 (six) times a day Wipes 200 each 6    Incontinence Supplies MISC Use 4 (four) times a day Comfort shield Adult diapers 200 each 6    Incontinence Supply Disposable MISC Use 6 (six) times a day Dispense disposable bed pad 200 each 6    insulin glargine (Lantus SoloStar) 100 units/mL injection pen Take 10 units in the am and 20 units in the pm 15 pen 5    Insulin Pen Needle (BD Pen Needle Elisabeth U/F) 32G X 4 MM MISC Use 2 (two) times a day 100 each 5    Insulin Pen Needle 32G X 4 MM MISC Use daily 90 each 1    Invokana 300 MG TABS TAKE 1 TABLET BY MOUTH EVERY DAY 90 tablet 1    levothyroxine 100 mcg tablet TAKE 1 TABLET BY MOUTH EVERY DAY 30 tablet 2    lisinopril (ZESTRIL) 40 mg tablet TAKE 1 TABLET BY MOUTH EVERY DAY 90 tablet 1    memantine (NAMENDA) 10 mg tablet Take 1 tablet (10 mg total) by mouth 2 (two) times a day 180 tablet 1    NIFEdipine 0 3%-lidocaine 5% rectal ointment Apply 1 application topically every 4 (four) hours as needed for discomfort or pain Apply a small amount to anal fissure 2 oz 1    OneTouch Delica Lancets 06Q MISC TEST BLOOD SUGAR 3 TIMES A  each 3    sertraline (ZOLOFT) 25 mg tablet Take 1 tablet (25 mg total) by mouth daily 90 tablet 3    simvastatin (ZOCOR) 20 mg tablet Take 20 mg by mouth daily       amitriptyline (ELAVIL) 10 mg tablet Take 1 tablet (10 mg total) by mouth daily at bedtime 30 tablet 2    capsicum (ZOSTRIX) 0 075 % topical cream Apply topically 3 (three) times a day Apply to lesions scalp and neck, back 28 3 g 2    gabapentin (NEURONTIN) 100 mg capsule Take 2 capsules (200 mg total) by mouth 2 (two) times a day 120 capsule 3    montelukast (SINGULAIR) 10 mg tablet TAKE 1 TABLET BY MOUTH DAILY AT BEDTIME (Patient not taking: Reported on 8/19/2021) 90 tablet 1    valACYclovir (VALTREX) 1,000 mg tablet Take 1 tablet (1,000 mg total) by mouth 2 (two) times a day for 5 days 10 tablet 0     No current facility-administered medications for this visit       Current Outpatient Medications on File Prior to Visit   Medication Sig    amLODIPine (NORVASC) 5 mg tablet Take 1 tablet (5 mg total) by mouth daily    Blood Glucose Monitoring Suppl (OneTouch Verio) w/Device KIT Use as directed    Blood Pressure KIT by Does not apply route daily    capsicum (ZOSTRIX) 0 075 % topical cream Apply topically 3 (three) times a day    Continuous Blood Gluc Sensor (FreeStyle Aruna 14 Day Sensor) MISC Use 1 each every 14 (fourteen) days    donepezil (ARICEPT) 10 mg tablet Take 1 tablet (10 mg total) by mouth daily at bedtime    fenofibrate (TRIGLIDE) 160 MG tablet TAKE 1 TABLET BY MOUTH EVERY DAY    fluticasone (FLONASE) 50 mcg/act nasal spray SPRAY 2 SPRAYS INTO EACH NOSTRIL EVERY DAY    glucose blood (OneTouch Verio) test strip USE TO TEST 3 TIMES A DAY    hydrocortisone (ANUSOL-HC) 2 5 % rectal cream Apply topically 2 (two) times a day    Incontinence Supplies MISC Use 6 (six) times a day Wipes    Incontinence Supplies MISC Use 4 (four) times a day Comfort shield Adult diapers    Incontinence Supply Disposable MISC Use 6 (six) times a day Dispense disposable bed pad    insulin glargine (Lantus SoloStar) 100 units/mL injection pen Take 10 units in the am and 20 units in the pm    Insulin Pen Needle (BD Pen Needle Elisabeth U/F) 32G X 4 MM MISC Use 2 (two) times a day    Insulin Pen Needle 32G X 4 MM MISC Use daily    Invokana 300 MG TABS TAKE 1 TABLET BY MOUTH EVERY DAY    levothyroxine 100 mcg tablet TAKE 1 TABLET BY MOUTH EVERY DAY    lisinopril (ZESTRIL) 40 mg tablet TAKE 1 TABLET BY MOUTH EVERY DAY    memantine (NAMENDA) 10 mg tablet Take 1 tablet (10 mg total) by mouth 2 (two) times a day    NIFEdipine 0 3%-lidocaine 5% rectal ointment Apply 1 application topically every 4 (four) hours as needed for discomfort or pain Apply a small amount to anal fissure    OneTouch Delica Lancets 25P MISC TEST BLOOD SUGAR 3 TIMES A DAY    sertraline (ZOLOFT) 25 mg tablet Take 1 tablet (25 mg total) by mouth daily    simvastatin (ZOCOR) 20 mg tablet Take 20 mg by mouth daily     [DISCONTINUED] acetaminophen-codeine (TYLENOL #4) 300-60 MG per tablet Take 1 tablet by mouth every 4 (four) hours as needed for moderate pain    [DISCONTINUED] gabapentin (NEURONTIN) 100 mg capsule Take 1 capsule (100 mg total) by mouth 3 (three) times a day In addition to 200 mg at bedtime for a daily total of 500 mg while having shingles    montelukast (SINGULAIR) 10 mg tablet TAKE 1 TABLET BY MOUTH DAILY AT BEDTIME (Patient not taking: Reported on 8/19/2021)    [DISCONTINUED] diazepam (VALIUM) 5 mg tablet Take 1 tablet (5 mg total) by mouth 2 (two) times a day for 5 doses (Patient not taking: Reported on 8/19/2021)    [DISCONTINUED] gabapentin (NEURONTIN) 100 mg capsule TAKE 2 CAPSULES (200 MG TOTAL) BY MOUTH DAILY AT BEDTIME    [DISCONTINUED] valACYclovir (VALTREX) 1,000 mg tablet Take 1 tablet (1,000 mg total) by mouth 2 (two) times a day for 7 days     No current facility-administered medications on file prior to visit  She is allergic to penicillins       Review of Systems   Constitutional: Negative for activity change, appetite change, fatigue and fever  HENT: Negative for congestion and ear discharge  Respiratory: Negative for cough and shortness of breath  Cardiovascular: Negative for chest pain and palpitations  Gastrointestinal: Negative for diarrhea and nausea  Musculoskeletal: Positive for arthralgias, myalgias and neck pain  Negative for back pain  Skin: Negative for color change and rash  Neurological: Negative for dizziness and headaches  Psychiatric/Behavioral: Negative for agitation and behavioral problems  Objective:      /80   Pulse 82   Temp 97 7 °F (36 5 °C) (Temporal)   Ht 5' (1 524 m)   Wt 60 7 kg (133 lb 12 8 oz)   SpO2 94%   BMI 26 13 kg/m²          Physical Exam  Constitutional:       General: She is not in acute distress  Appearance: She is well-developed  She is not diaphoretic  HENT:      Head: Normocephalic and atraumatic  Nose: Nose normal    Eyes:      Conjunctiva/sclera: Conjunctivae normal       Pupils: Pupils are equal, round, and reactive to light  Cardiovascular:      Rate and Rhythm: Normal rate and regular rhythm  Heart sounds: Normal heart sounds  No murmur heard  Pulmonary:      Effort: Pulmonary effort is normal  No respiratory distress  Breath sounds: Normal breath sounds  No wheezing  Abdominal:      General: Bowel sounds are normal  There is no distension  Palpations: Abdomen is soft  Tenderness: There is no abdominal tenderness     Skin:     General: Skin is warm and dry  Findings: No erythema or rash  Comments: Crusted lesions noted on scalp and neck   Neurological:      Mental Status: She is alert and oriented to person, place, and time

## 2021-08-26 DIAGNOSIS — Z79.4 TYPE 2 DIABETES MELLITUS TREATED WITH INSULIN (HCC): ICD-10-CM

## 2021-08-26 DIAGNOSIS — E11.9 TYPE 2 DIABETES MELLITUS TREATED WITH INSULIN (HCC): ICD-10-CM

## 2021-08-26 RX ORDER — LISINOPRIL 40 MG/1
40 TABLET ORAL DAILY
Qty: 90 TABLET | Refills: 3 | Status: SHIPPED | OUTPATIENT
Start: 2021-08-26

## 2021-08-28 DIAGNOSIS — E03.9 HYPOTHYROIDISM, UNSPECIFIED TYPE: ICD-10-CM

## 2021-08-30 RX ORDER — LEVOTHYROXINE SODIUM 88 UG/1
TABLET ORAL
Qty: 90 TABLET | Refills: 1 | Status: SHIPPED | OUTPATIENT
Start: 2021-08-30 | End: 2021-09-03 | Stop reason: DRUGHIGH

## 2021-09-02 DIAGNOSIS — Z79.4 TYPE 2 DIABETES MELLITUS TREATED WITH INSULIN (HCC): ICD-10-CM

## 2021-09-02 DIAGNOSIS — E11.9 TYPE 2 DIABETES MELLITUS TREATED WITH INSULIN (HCC): ICD-10-CM

## 2021-09-02 RX ORDER — INSULIN GLARGINE 100 [IU]/ML
INJECTION, SOLUTION SUBCUTANEOUS
Qty: 15 ML | Refills: 8 | Status: SHIPPED | OUTPATIENT
Start: 2021-09-02 | End: 2021-09-03

## 2021-09-02 NOTE — PROGRESS NOTES
New Patient Progress Note      Chief Complaint   Patient presents with    Thyroid Problem      Referred by Dr Christopher Gutierrez MD       History of Present Illness:   Sarmad Recinos is a [de-identified] y o  female with  HTN, HLD, T2DM, dementia, and hypothyroidism presenting to the office today for a consultation  Patient is accompanied by her granddaughter, Daja Mojica  We offered iPad  services  Patient declines stating she would rather [place] provide translation  Daja Mojica provides patient's history and believes this referral is to discuss patient's blood sugars rather than her thyroid  Patient is well controlled with recent HgA1C of 7 4%  The family has had several health issues in the last few months that have required the patient to be quarantined inside the house  Initially, Daja Mojica was diagnosed with Covid-19  Patient then contracted CDiff in July followed by a case of Shingles  This has led to less consistent meal times as well as less control of the family over what the patient eats  However, patient's sugars have remained relatively consistent between  in the morning  She is not checking her sugar at night, which is disconcerting as she is taking 20 units of lantus prior to bed and she is asymptomatic of hypoglycemia  Current regimen:   Lantus 10 units in the morning and 20 units at bedtime  Invokana 300 mg    Type: T2DM    Onset: Unknown    Blood Sugars: fasting sugars daily: 8/30-9/3  112, 138, 102, 103, 91    Granddaughter reports as low as 58- approximately once weekly  Meter: One Touch Verio    Will eat a snack at 10:30 prior to nightime insulin  Exercise: limited    Influenza vaccine: UTD    Pneumovax: UTD    Covid-19: UTD    Ophthalmology: UTD- macular degeneration +    Podiatry/Foot care: UTD; + neuropathy    Family history of diabetes: Unsure of family history; +in her brother    For hypothyroidism, patient is taking 100 mcg of LT4 daily   Levothyroxine is taken with all other medications and breakfast  Does not want to get up in the morning so often isn't taking this until the afternoon  TFT reflect inconsistency in taking her medication  Patient denies constipation, weight gain, and hair loss  She does experience cold intolerance and fatigue  For hyperlipidemia, she is taking 20 mg of simvastatin and 160 mg of fenofibrate daily  She denies myalgias  For hypertension, she is taking 40 mg of lisinopril on 5 mg of amlodipine daily  She denies headache, cough, and pedal edema    Patient Active Problem List   Diagnosis    D[facility] with [facility] disturbance [facility])    Chronic left shoulder pain    Need for lipid screening    Bilateral hearing loss    Rheumatoid arthritis 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 [facility]    Memory loss    Alzheimer's dementia [facility]    Type 2 diabetes, uncontrolled, with neuropathy ([facility]    Anxiety    Need for influenza vaccination    Hypothyroidism    Essential hypertension    Fall    SVT (supraventricular tachycardia) ([facility])    Nontraumatic tear of right rotator cuff    Menopause ovarian failure    Peripheral neuropathy    Medicare annual wellness visit, subsequent    Ambulatory dysfunction    Tear of right rotator cuff    Biceps tendon tear    Diarrhea    Type 2 diabetes mellitus treated with insulin ([facility])    Anemia in stage 3 chronic kidney disease ([facility])    Restless leg syndrome    Mixed hyperlipidemia    Hypercalcemia    Leg cramps, sleep related    Bright red blood per rectum    Coccyalgia    Frequency of urination    Ulcerated external hemorrhoids    Acute non-recurrent maxillary sinusitis    CKD (chronic kidney disease) stage 3, GFR 30-59 ml/min ([facility])    Clostridium difficile diarrhea    Herpes zoster without complication    Post herpetic neuralgia      Past Medical History:   Diagnosis Date    Aggression     Alzheimer's dementia [facility]     Arthritis     Dementia [facility]     Diabetes insipidus ([facility] )  [facility] mellitus [facility]     High cholesterol     Hypertension     Memory loss     Migraine     Polyneuropathy     Rheumatoid arthritis ([facility]     Serum lipids high     Stomach problems     Thyroid trouble       Past Surgical History:   Procedure Laterality Date    CATARACT EXTRACTION      CHOLECYSTECTOMY      GALLBLADDER SURGERY      HYSTERECTOMY      OH SHLDR ARTHROSCOP,SURG,W/ROTAT CUFF REPR Right 2019    Procedure: SHOULDER ARTHROSCOPIC ROTATOR CUFF REPAIR;  Surgeon: Asim Owen MD;  Location: [facility];  Service: Orthopedics    ROTATOR CUFF REPAIR Right 2019      Family History   Problem Relation Age of Onset    Substance Abuse Mother         denied    Mental illness Mother         denied    Substance Abuse Father         denied    Mental illness Father         denied    Diabetes Brother     Blindness Brother     Arthritis Daughter     HIV Son      Social History     Tobacco Use    Smoking status: Former Smoker     Packs/day: 0 25     Years: 30 00     Pack years: 7 50     Types: Cigarettes     Quit date:      Years since quittin 6    Smokeless tobacco: Never Used   Substance Use Topics    Alcohol use: Never     Allergies   Allergen Reactions    Penicillins Itching and Swelling         Current Outpatient Medications:     Blood Glucose Monitoring Suppl (OneTouch Verio) w/Device KIT, Use as directed, Disp: 1 kit, Rfl: 0    Blood Pressure KIT, by Does not apply route daily, Disp: 1 each, Rfl: 1    donepezil (ARICEPT) 10 mg tablet, Take 1 tablet (10 mg total) by mouth daily at bedtime, Disp: 90 tablet, Rfl: 1    fenofibrate (TRIGLIDE) 160 MG tablet, TAKE 1 TABLET BY MOUTH EVERY DAY, Disp: 90 tablet, Rfl: 1    fluticasone (FLONASE) 50 mcg/act nasal spray, SPRAY 2 SPRAYS INTO EACH NOSTRIL EVERY DAY, Disp: 16 mL, Rfl: 1    gabapentin (NEURONTIN) 100 mg capsule, Take 2 capsules (200 mg total) by mouth 2 (two) times a day, Disp: 120 capsule, Rfl: 3    glucose blood (OneTouch Verio) test strip, USE TO TEST 3 TIMES A DAY, Disp: 100 strip, Rfl: 2    insulin glargine (Lantus SoloStar) 100 units/mL injection pen, Inject 10 units in the morning and 10 units at night   Always check blood sugar before administering , Disp: 15 mL, Rfl: 8    Insulin Pen Needle (BD Pen Needle Elisabeth U/F) 32G X 4 MM MISC, Use 2 (two) times a day, Disp: 100 each, Rfl: 5    Insulin Pen Needle 32G X 4 MM MISC, Use daily, Disp: 90 each, Rfl: 1    Invokana 300 MG TABS, TAKE 1 TABLET BY MOUTH EVERY DAY, Disp: 90 tablet, Rfl: 1    levothyroxine 100 mcg tablet, TAKE 1 TABLET BY MOUTH EVERY DAY, Disp: 30 tablet, Rfl: 2    lisinopril (ZESTRIL) 40 mg tablet, Take 1 tablet (40 mg total) by mouth daily, Disp: 90 tablet, Rfl: 3    montelukast (SINGULAIR) 10 mg tablet, TAKE 1 TABLET BY MOUTH DAILY AT BEDTIME, Disp: 90 tablet, Rfl: 1    sertraline (ZOLOFT) 25 mg tablet, Take 1 tablet (25 mg total) by mouth daily, Disp: 90 tablet, Rfl: 3    simvastatin (ZOCOR) 20 mg tablet, Take 20 mg by mouth daily , Disp: , Rfl:     amLODIPine (NORVASC) 5 mg tablet, Take 1 tablet (5 mg total) by mouth daily, Disp: 30 tablet, Rfl: 0    capsicum (ZOSTRIX) 0 075 % topical cream, Apply topically 3 (three) times a day, Disp: 57 g, Rfl: 2    Incontinence Supplies MISC, Use 6 (six) times a day Wipes, Disp: 200 each, Rfl: 6    Incontinence Supplies MISC, Use 4 (four) times a day Comfort shield Adult diapers, Disp: 200 each, Rfl: 6    Incontinence Supply Disposable MISC, Use 6 (six) times a day Dispense disposable bed pad, Disp: 200 each, Rfl: 6    memantine (NAMENDA) 10 mg tablet, Take 1 tablet (10 mg total) by mouth 2 (two) times a day, Disp: 180 tablet, Rfl: 1    NIFEdipine 0 3%-lidocaine 5% rectal ointment, Apply 1 application topically every 4 (four) hours as needed for discomfort or pain Apply a small amount to anal fissure, Disp: 2 oz, Rfl: 1    OneTouch Delica Lancets 91W MISC, TEST BLOOD SUGAR 3 TIMES A DAY, Disp: 100 each, Rfl: 3    valACYclovir (VALTREX) 1,000 mg tablet, Take 1 tablet (1,000 mg total) by mouth 2 (two) times a day for 5 days, Disp: 10 tablet, Rfl: 0    Review of Systems   Constitutional: Negative for activity change, appetite change, fatigue and unexpected weight change  HENT: Negative for dental problem, sore throat, trouble swallowing and voice change  Eyes: Negative for visual disturbance  Respiratory: Negative for cough, chest tightness and shortness of breath  Cardiovascular: Negative for chest pain, palpitations and leg swelling  Gastrointestinal: Positive for diarrhea  Negative for constipation, nausea and vomiting  Endocrine: Positive for cold intolerance  Negative for heat intolerance, polydipsia, polyphagia and polyuria  Genitourinary: Negative for frequency  Musculoskeletal: Positive for arthralgias and gait problem  Negative for back pain, joint swelling and myalgias  Skin: Negative for wound  Allergic/Immunologic: Positive for environmental allergies  Negative for food allergies  Neurological: Positive for weakness and numbness  Negative for dizziness, light-headedness and headaches  Hematological: Does not bruise/bleed easily  Psychiatric/Behavioral: Positive for confusion, decreased concentration, dysphoric mood and sleep disturbance (insomnia)  The patient is not nervous/anxious  Physical Exam:  Body mass index is 25 74 kg/m²  /72 (BP Location: Left arm, Patient Position: Sitting, Cuff Size: Adult)   Pulse 62   Temp (!) 97 2 °F (36 2 °C) (Tympanic)   Ht 5' (1 524 m)   Wt 59 8 kg (131 lb 12 8 oz)   SpO2 98%   BMI 25 74 kg/m²    Wt Readings from Last 3 Encounters:   09/03/21 59 8 kg (131 lb 12 8 oz)   08/19/21 60 7 kg (133 lb 12 8 oz)   08/10/21 60 8 kg (134 lb)       Physical Exam  Vitals reviewed  Constitutional:       General: She is not in acute distress  Appearance: She is well-developed  She is not ill-appearing     HENT:      Head: Normocephalic and atraumatic  Comments: Mask in place  Eyes:      Pupils: Pupils are equal, round, and reactive to light  Neck:      Thyroid: No thyromegaly  Cardiovascular:      Rate and Rhythm: Normal rate and regular rhythm  Pulses: Normal pulses  Heart sounds: Normal heart sounds  Pulmonary:      Effort: Pulmonary effort is normal       Breath sounds: Normal breath sounds  Abdominal:      General: Bowel sounds are normal       Palpations: Abdomen is soft  Musculoskeletal:      Cervical back: Normal range of motion and neck supple  Right lower leg: No edema  Left lower leg: No edema  Lymphadenopathy:      Cervical: No cervical adenopathy  Skin:     General: Skin is warm and dry  Capillary Refill: Capillary refill takes less than 2 seconds  Neurological:      Mental Status: She is alert and oriented to person, place, and time        Gait: Gait normal    Psychiatric:         Mood and Affect: Mood normal            Labs:   Lab Results   Component Value Date    HGBA1C 7 4 (H) 08/02/2021    HGBA1C 7 6 (H) 07/12/2021    HGBA1C 8 2 (A) 03/04/2021     Lab Results   Component Value Date    CREATININE 1 18 08/02/2021    CREATININE 1 10 08/02/2021    CREATININE 1 22 07/14/2021    BUN 27 (H) 08/02/2021    K 4 2 08/02/2021     08/02/2021    CO2 28 08/02/2021     eGFR   Date Value Ref Range Status   08/02/2021 44 ml/min/1 73sq m Final     Lab Results   Component Value Date    HDL 57 11/20/2020    TRIG 78 11/20/2020     Lab Results   Component Value Date    ALT 20 08/02/2021    AST 16 08/02/2021    ALKPHOS 36 (L) 08/02/2021     Lab Results   Component Value Date    AGD4SKWRAGBL 0 816 08/02/2021    VRW7FCMBNJUX 0 525 07/12/2021    GGQ0NBVRWGYU 3 940 (H) 11/20/2020     Lab Results   Component Value Date    FREET4 1 12 11/20/2020       Impression & Plan:    Problem List Items Addressed This Visit        Endocrine    Type 2 diabetes, uncontrolled, with neuropathy ([address] ) Relevant Medications    insulin glargine (Lantus SoloStar) 100 units/mL injection pen    Hypothyroidism       Continue current dose of levothyroxine  Patient's TSH will likely continue to be inconsistent given how she takes the medication  I do understand that it will be very challenging for the family to have the patient comply with recommended administration  Reviewed pathophysiology of hypothyroidism  Reviewed signs and symptoms of hypo and hyperthyroidism  Continue monitoring through PCP  Type 2 diabetes mellitus treated with insulin (HCC)      Overall, patient is well controlled  However, I am concerned about her episodes of hypoglycemia especially as she is asymptomatic of them  Recommend reducing Lantus to 10 units twice daily  Encouraged patient to eat multiple small meals throughout the day  Counseled on the importance of testing blood sugar prior to administering insulin  Counseled on the best way to perform fingersticks  Counseled on the dangers of hypoglycemia including falls with injury, seizure, coma, and death  Patient can follow-up with her PCP  Lab Results   Component Value Date    HGBA1C 7 4 (H) 08/02/2021            Relevant Medications    insulin glargine (Lantus SoloStar) 100 units/mL injection pen       Cardiovascular and Mediastinum    Essential hypertension - Primary       /72  Continue current regimen  Other    Mixed hyperlipidemia       Continue statin  Hypercalcemia       Patient has had sporadic episodes of mild hypercalcemia  Recommended that the patient remain well hydrated  No further workup warranted at this time  No orders of the defined types were placed in this encounter  Patient Instructions   Inject 10 units in the morning and 10 units at night  Always check blood sugar before administering  Eat small frequent meals throughout the day  Continue with night time snack         Discussed with the patient and all questioned fully answered  She will call me if any problems arise  Follow-up with PCP       Counseled patient on diagnostic results, prognosis, risk and benefit of treatment options, instruction for management, importance of treatment compliance, Risk  factor reduction and impressions    KRISTA Roa

## 2021-09-03 ENCOUNTER — CONSULT (OUTPATIENT)
Dept: ENDOCRINOLOGY | Facility: CLINIC | Age: 81
End: 2021-09-03
Payer: COMMERCIAL

## 2021-09-03 VITALS
TEMPERATURE: 97.2 F | HEART RATE: 62 BPM | SYSTOLIC BLOOD PRESSURE: 158 MMHG | DIASTOLIC BLOOD PRESSURE: 72 MMHG | BODY MASS INDEX: 25.87 KG/M2 | OXYGEN SATURATION: 98 % | WEIGHT: 131.8 LBS | HEIGHT: 60 IN

## 2021-09-03 DIAGNOSIS — E11.9 TYPE 2 DIABETES MELLITUS TREATED WITH INSULIN (HCC): ICD-10-CM

## 2021-09-03 DIAGNOSIS — I10 ESSENTIAL HYPERTENSION: Primary | ICD-10-CM

## 2021-09-03 DIAGNOSIS — IMO0002 TYPE 2 DIABETES, UNCONTROLLED, WITH NEUROPATHY: ICD-10-CM

## 2021-09-03 DIAGNOSIS — E78.2 MIXED HYPERLIPIDEMIA: ICD-10-CM

## 2021-09-03 DIAGNOSIS — E03.9 HYPOTHYROIDISM, UNSPECIFIED TYPE: ICD-10-CM

## 2021-09-03 DIAGNOSIS — Z79.4 TYPE 2 DIABETES MELLITUS TREATED WITH INSULIN (HCC): ICD-10-CM

## 2021-09-03 DIAGNOSIS — E83.52 HYPERCALCEMIA: ICD-10-CM

## 2021-09-03 PROCEDURE — 1036F TOBACCO NON-USER: CPT | Performed by: NURSE PRACTITIONER

## 2021-09-03 PROCEDURE — 1160F RVW MEDS BY RX/DR IN RCRD: CPT | Performed by: NURSE PRACTITIONER

## 2021-09-03 PROCEDURE — 99204 OFFICE O/P NEW MOD 45 MIN: CPT | Performed by: NURSE PRACTITIONER

## 2021-09-03 RX ORDER — INSULIN GLARGINE 100 [IU]/ML
INJECTION, SOLUTION SUBCUTANEOUS
Qty: 15 ML | Refills: 8
Start: 2021-09-03 | End: 2021-12-14

## 2021-09-03 NOTE — ASSESSMENT & PLAN NOTE
Continue current dose of levothyroxine  Patient's TSH will likely continue to be inconsistent given how she takes the medication  I do understand that it will be very challenging for the family to have the patient comply with recommended administration  Reviewed pathophysiology of hypothyroidism  Reviewed signs and symptoms of hypo and hyperthyroidism  Continue monitoring through PCP

## 2021-09-03 NOTE — PATIENT INSTRUCTIONS
Inject 10 units in the morning and 10 units at night  Always check blood sugar before administering  Eat small frequent meals throughout the day  Continue with night time snack

## 2021-09-03 NOTE — ASSESSMENT & PLAN NOTE
Patient has had sporadic episodes of mild hypercalcemia  Recommended that the patient remain well hydrated  No further workup warranted at this time

## 2021-09-03 NOTE — ASSESSMENT & PLAN NOTE
Overall, patient is well controlled  However, I am concerned about her episodes of hypoglycemia especially as she is asymptomatic of them  Recommend reducing Lantus to 10 units twice daily  Encouraged patient to eat multiple small meals throughout the day  Counseled on the importance of testing blood sugar prior to administering insulin  Counseled on the best way to perform fingersticks  Counseled on the dangers of hypoglycemia including falls with injury, seizure, coma, and death  Patient can follow-up with her PCP    Lab Results   Component Value Date    HGBA1C 7 4 (H) 08/02/2021

## 2021-09-22 ENCOUNTER — OFFICE VISIT (OUTPATIENT)
Dept: VASCULAR SURGERY | Facility: CLINIC | Age: 81
End: 2021-09-22
Payer: COMMERCIAL

## 2021-09-22 VITALS
DIASTOLIC BLOOD PRESSURE: 62 MMHG | TEMPERATURE: 98.2 F | BODY MASS INDEX: 24.55 KG/M2 | SYSTOLIC BLOOD PRESSURE: 134 MMHG | WEIGHT: 130 LBS | HEART RATE: 61 BPM | HEIGHT: 61 IN

## 2021-09-22 DIAGNOSIS — R51.9 NONINTRACTABLE HEADACHE, UNSPECIFIED CHRONICITY PATTERN, UNSPECIFIED HEADACHE TYPE: ICD-10-CM

## 2021-09-22 DIAGNOSIS — F03.91 DEMENTIA WITH BEHAVIORAL DISTURBANCE, UNSPECIFIED DEMENTIA TYPE (HCC): ICD-10-CM

## 2021-09-22 DIAGNOSIS — I65.23 ASYMPTOMATIC BILATERAL CAROTID ARTERY STENOSIS: Primary | ICD-10-CM

## 2021-09-22 DIAGNOSIS — G30.1 LATE ONSET ALZHEIMER'S DEMENTIA WITHOUT BEHAVIORAL DISTURBANCE (HCC): ICD-10-CM

## 2021-09-22 DIAGNOSIS — I65.23 OCCLUSION AND STENOSIS OF BILATERAL CAROTID ARTERIES: ICD-10-CM

## 2021-09-22 DIAGNOSIS — F02.80 LATE ONSET ALZHEIMER'S DEMENTIA WITHOUT BEHAVIORAL DISTURBANCE (HCC): ICD-10-CM

## 2021-09-22 DIAGNOSIS — N18.30 STAGE 3 CHRONIC KIDNEY DISEASE, UNSPECIFIED WHETHER STAGE 3A OR 3B CKD (HCC): ICD-10-CM

## 2021-09-22 PROCEDURE — 1036F TOBACCO NON-USER: CPT | Performed by: SURGERY

## 2021-09-22 PROCEDURE — 99205 OFFICE O/P NEW HI 60 MIN: CPT | Performed by: SURGERY

## 2021-09-22 PROCEDURE — 1160F RVW MEDS BY RX/DR IN RCRD: CPT | Performed by: SURGERY

## 2021-09-22 NOTE — PATIENT INSTRUCTIONS
1) Carotid stenosis  -the cat scan you had showed that there are some blockages in the carotid arteries in the neck  -we are going to get an ultrasound to look at this as well  -because this is not causing symptoms and you have other medical issues going on, I do not recommend an operation to fix this  -I do recommend monitoring with ultrasound on a 6 month basis    2) Medications  -please start taking aspirin 81mg once daily  -please continue your zocor    3) Headaches  -I referred you to neurology for evaluation of your headaches    Enfermedad de la arteria carótida   CUIDADO AMBULATORIO:   La enfermedad de las arterias carótidas (CAD, en inglés) significa que los principales vasos sanguíneos del zacarias se estrechan o se obstruyen  Estos 2 grandes vasos sanguíneos se denominan arterias carótidas  Suministran geoffrey al cerebro  Los vasos sanguíneos estrechos u obstruidos aumentan el riesgo de sufrir un derrame cerebral  La CAD también se denomina estenosis de la arteria carótida  [place]dale a alguien que llame al Ailyn Escalante de emergencias local (911 en [place]) si:  · Usted tiene alguno de los siguientes signos de derrame cerebral:      ? Adormecimiento o caída de un lado de crews trent    ? Debilidad en un Ricardo Balboa o allyson pierna    ? Confusión o debilidad para hablar    ? Mareos o dolor de erendira intenso, o pérdida de la visión  · Tiene alguno de los siguientes signos de un ataque cardíaco:      ? Estrujamiento, presión o tensión en crews pecho    ? Usted también podría presentar alguno de los siguientes:     § Malestar o dolor en crews espalda, zacarias, mandíbula, abdomen, o brazo    § Falta de [employer] o vómitos    § Desvanecimiento o sudor frío repentino    Llame a crews médico si:  · Usted tiene preguntas o inquietudes acerca de crews condición o cuidado  Los signos y síntomas comunes son: La CAD se desarrolla lentamente   Es posible que no presente signos ni síntomas hasta que sufra un mini-derrame cerebral o un ataque isquémico transitorio (AIT)  Un AIT es allyson falta temporal de flujo sanguíneo al cerebro  Un AIT desaparece rápidamente y no provoca daño permanente  Un AIT podría ser Benjamín señal de alerta de que está por tener un derrame cerebral  Si usted tiene síntomas de allyson AIT o derrame cerebral, busque atención médica inmediatamente  Signos de alerta de un derrame cerebral: Las palabras E O  C B H T  (B E  F A S T , por catracho siglas en inglés) pueden ayudarlo a recordar y reconocer los signos de advertencia de un derrame cerebral   · E = Equilibrio: Pérdida repentina del equilibrio    · O = Ojos: Pérdida de la vista en selina o ambos ojos    · C = Trent: La trent se  de un lado    · B = [place]: Un brazo se  cuando levanta ambos brazos    · H = Habla: Dificultad para hablar o suena diferente    · T = Tiempo: Es hora de conseguir ayuda inmediatamente     El tratamiento depende de lo estrechas que se hayan vuelto catracho arterias  El tratamiento también depende de catracho síntomas y de crews estado de kecia general  El objetivo del tratamiento es disminuir crews riesgo de presentar un derrame cerebral  Es posible que usted necesite alguno de los siguientes:  · Medicamentos:     ? La aspirina, u otro anticoagulante, pueden recomendarse  Estos medicamentos ayudan a prevenir la formación de coágulos sanguíneos en las arterias  Si crews médico le pide que se tome allyson aspirina al día, no tome en crews lugar acetaminofeno o ibuprofeno  ? Los medicamentos para el colesterol ayudan a bajar los niveles de [place]  ? Medicamentos para la presión arterial reducen o ayudan a controlar la presión arterial     · Procedimientos pueden ayudar a abrir las arterias obstruidas  ? La endarterectomía carotídea (CEA, en inglés) se utiliza para cortar y eliminar la placa acumulada en las arterias      ? La angioplastia carotídea y la colocación de stent (RAOUL, en [facility] [place] se [place] para empujar la placa contra la pared arterial con un ejercicios que funcione mejor para usted  · Limite el consumo de alcohol  El alcohol puede aumentar crews presión arterial y los niveles de triglicéridos  Un trago equivale a 12 onzas de cerveza, 5 onzas de vino o 1 onza y ½ de licor  Acuda a la consulta de control con crews médico según las indicaciones: Anote catracho preguntas para que se acuerde de hacerlas javier catracho visitas  © Copyright [employer] 2021 Information is for End User's use only and may not be sold, redistributed or otherwise used for commercial purposes  All illustrations and images included in CareNotes® are the copyrighted property of A D A M , [employer]  or [address] [place] [address] es sólo para uso en educación  Crews intención no es darle un consejo médico sobre enfermedades o tratamientos  Colsulte con crews Zelpha Roch farmacéutico antes de seguir cualquier régimen médico para saber si es seguro y efectivo para usted

## 2021-09-22 NOTE — PROGRESS NOTES
Assessment/Plan:    Pt is an [de-identified] yo F w/ hemorrhoids, hx of C Diff, DM, hypothyroidism, SVTs, HTN, peripheral neuropathy, Alzheimer's, RA, CKD, anemia, HLD, headache, anxiety, presents to discuss finding of asymptomatic bilateral carotid stenosis    Asymptomatic bilateral carotid artery stenosis  -     VAS carotid complete study; Future  -     Ambulatory referral to Vascular Surgery  -reviewed CT head: neg for acute or chronic CVA  -no MRI performed  -reviewed CTA neck which shows heavy calcific disease at the B carotid bifurcations; B stenosis, R>L, difficult to tell exact degree but probably about 70% on the right  -will get carotid DU for baseline and to provide a second form of evaluation given large calcific plaque burden  -given that patient is asymptomatic and not a good candidate from a baseline mental status perspective, I have recommended surveillance rather than surgical management; we have optimized her from a medical perspective  -will plan to continue DU on a 6 month basis    Dementia with behavioral disturbance, unspecified dementia type ([address] )  Late onset Alzheimer's dementia without behavioral disturbance ([address] )  -waxing/waning mental status    Stage 3 chronic kidney disease, unspecified whether stage 3a or 3b CKD ([address] )  -last Cr: 1 18   GFR: 44    Nonintractable headache, unspecified chronicity pattern, unspecified headache type  -     Ambulatory referral to Neurology; Future  -not improving or controlled  -referred to neurology for evaluation    Medications  -will add ASA 81mg once daily for best medical management of carotid disease  -continue statin    Subjective:      Patient ID: Lasha Cruz is a [de-identified] y o  female  Patient is here to review CTA  head and neck done on 8/2/21  Patient was in the ER 8/2/21 for dizziness and headaches, which the granddaughter states lasted two days  Patients granddaughter states that she has confusion due to alzheimer and is a poor historian   Patient is a former smoker and is taking simvastatin  HPI:    Patient presents to evaluate for carotid disease  Patient denies hx of CVA/TIA  Denies amaurosis, facial droop, garbled speech, unilateral weakness/numbness  Patient complains of R side headache for 1 5 months  This was severe and led her to go to the ER for evaluation  She still has the headaches and itching but headache is less severe  She also complains of neck pain  She has SOB with exertion  Denies CP  Patient has dementia  Accompanied by her daughter who assists with history and speaks [place]  Interpretor used to speak with patient directly    Prior smoker, quit 22 years ago  Takes Zocor  No blood thinners  The following portions of the patient's history were reviewed and updated as appropriate: allergies, current medications, past family history, past medical history, past social history, past surgical history and problem list     Review of Systems   Unable to perform ROS: Dementia   Constitutional: Negative  HENT: Positive for hearing loss  Neck pain   Eyes: Negative  Negative for visual disturbance  Respiratory: Positive for shortness of breath (with activity)  Cardiovascular: Negative  Negative for chest pain  Gastrointestinal: Negative  Endocrine: Negative  Genitourinary: Negative  Musculoskeletal: Negative  Skin: Negative  Allergic/Immunologic: Negative  Neurological: Positive for dizziness and headaches  Negative for facial asymmetry, speech difficulty, weakness and numbness  Hematological: Negative  Psychiatric/Behavioral: Positive for confusion  Objective:      /62 (BP Location: Left arm, Patient Position: Sitting, Cuff Size: Standard)   Pulse 61   Temp 98 2 °F (36 8 °C) (Oral)   Ht 5' 1" (1 549 m)   Wt 59 kg (130 lb)   BMI 24 56 kg/m²          Physical Exam  Vitals and nursing note reviewed  Constitutional:       Appearance: She is well-developed     HENT: Head: Normocephalic and atraumatic  Eyes:      Conjunctiva/sclera: Conjunctivae normal    Cardiovascular:      Rate and Rhythm: Normal rate and regular rhythm  Pulses:           Radial pulses are 2+ on the right side and 2+ on the left side  Dorsalis pedis pulses are 2+ on the right side and 2+ on the left side  Posterior tibial pulses are 2+ on the right side and 2+ on the left side  Heart sounds: Normal heart sounds  No murmur heard  Comments: No carotid bruits B  Pulmonary:      Effort: Pulmonary effort is normal  No respiratory distress  Breath sounds: Normal breath sounds  No wheezing or rales  Abdominal:      General: There is no distension  Palpations: Abdomen is soft  Tenderness: There is no abdominal tenderness  There is no rebound  Musculoskeletal:         General: Normal range of motion  Cervical back: Normal range of motion and neck supple  Right lower leg: No edema  Left lower leg: No edema  Skin:     General: Skin is warm and dry  Neurological:      Mental Status: She is alert and oriented to person, place, and time  Psychiatric:         Behavior: Behavior normal            I have reviewed and made appropriate changes to the review of systems input by the medical assistant      Vitals:    09/22/21 1448   BP: 134/62   BP Location: Left arm   Patient Position: Sitting   Cuff Size: Standard   Pulse: 61   Temp: 98 2 °F (36 8 °C)   TempSrc: Oral   Weight: 59 kg (130 lb)   Height: 5' 1" (1 549 m)       Patient Active Problem List   Diagnosis    Dementia with behavioral disturbance (HCC)    Chronic left shoulder pain    Need for lipid screening    Bilateral hearing loss    Rheumatoid arthritis 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 (HCC)    Memory loss    Alzheimer's dementia (HCC)    Type 2 diabetes, uncontrolled, with neuropathy ([address] )    Anxiety    Need for influenza vaccination    Hypothyroidism    Essential hypertension    Fall    [facility]) [facility])    Nontraumatic tear of right rotator cuff    Menopause ovarian failure    Peripheral neuropathy    Medicare annual wellness visit, subsequent    Ambulatory dysfunction    Tear of right rotator cuff    Biceps tendon tear    Diarrhea    Type 2 diabetes mellitus treated with insulin ([facility])    Anemia in stage 3 chronic kidney disease ([facility])    Restless leg syndrome    Mixed hyperlipidemia    Hypercalcemia    Leg cramps, sleep related    Bright red blood per rectum    Coccyalgia    Frequency of urination    Ulcerated external hemorrhoids    Acute non-recurrent maxillary sinusitis    CKD (chronic kidney disease) stage 3, GFR 30-59 ml/min ([facility])    Clostridium difficile diarrhea    Herpes zoster without complication    Post herpetic neuralgia    Headache       Past Surgical History:   Procedure Laterality Date    CATARACT EXTRACTION      CHOLECYSTECTOMY      GALLBLADDER SURGERY      HYSTERECTOMY      HI SHLDR ARTHROSCOP,SURG,W/ROTAT CUFF REPR Right 8/1/2019    Procedure: SHOULDER ARTHROSCOPIC ROTATOR CUFF REPAIR;  Surgeon: Era Worley MD;  Location: [facility] OR;  Service: Orthopedics    ROTATOR CUFF REPAIR Right 08/01/2019       Family History   Problem Relation Age of Onset    Substance Abuse Mother         denied    Mental illness Mother         denied    Substance Abuse Father         denied    Mental illness Father         denied    Diabetes Brother     Blindness Brother     Arthritis Daughter     HIV Son        Social History     Socioeconomic History    Marital status:       Spouse name: Not on file    Number of children: Not on file    Years of education: Not on file    Highest education level: Not on file   Occupational History    Not on file   Tobacco Use    Smoking status: Former Smoker     Packs/day: 0 25     Years: 30 00     Pack years: 7 50     Types: Cigarettes     Quit date: 1985 Years since quittin 7    Smokeless tobacco: Never Used   Vaping Use    Vaping Use: Never used   Substance and Sexual Activity    Alcohol use: Never    Drug use: No    Sexual activity: Not on file   Other Topics Concern    Not on file   Social History Narrative    Not on file     Social Determinants of Health     Financial Resource Strain: Medium Risk    Difficulty of Paying Living Expenses: Somewhat hard   Food Insecurity: Food Insecurity Present    Worried About Running Out of Food in the Last Year: Sometimes true    Alonzo of Food in the Last Year: Never true   Transportation Needs: No Transportation Needs    Lack of Transportation (Medical): No    Lack of Transportation (Non-Medical): No   Physical Activity:     Days of Exercise per Week:     Minutes of Exercise per Session:    Stress:     Feeling of Stress :    Social Connections:     Frequency of Communication with Friends and Family:     Frequency of Social Gatherings with Friends and Family:     Attends [employer] Services:     Active Member of Clubs or Organizations:     Attends Club or Organization Meetings:     Marital Status:    Intimate Partner Violence:     Fear of Current or Ex-Partner:     Emotionally Abused:     Physically Abused:     Sexually Abused:         Allergies   Allergen Reactions    Penicillins Itching and Swelling         Current Outpatient Medications:     amLODIPine (NORVASC) 5 mg tablet, Take 1 tablet (5 mg total) by mouth daily, Disp: 30 tablet, Rfl: 0    Blood Glucose Monitoring Suppl (OneTouch Verio) w/Device KIT, Use as directed, Disp: 1 kit, Rfl: 0    Blood Pressure KIT, by Does not apply route daily, Disp: 1 each, Rfl: 1    capsicum (ZOSTRIX) 0 075 % topical cream, Apply topically 3 (three) times a day, Disp: 57 g, Rfl: 2    donepezil (ARICEPT) 10 mg tablet, Take 1 tablet (10 mg total) by mouth daily at bedtime, Disp: 90 tablet, Rfl: 1    fenofibrate (TRIGLIDE) 160 MG tablet, TAKE 1 TABLET BY MOUTH EVERY DAY, Disp: 90 tablet, Rfl: 1    fluticasone (FLONASE) 50 mcg/act nasal spray, SPRAY 2 SPRAYS INTO EACH NOSTRIL EVERY DAY, Disp: 16 mL, Rfl: 1    glucose blood (OneTouch Verio) test strip, USE TO TEST 3 TIMES A DAY, Disp: 100 strip, Rfl: 2    Incontinence Supplies MISC, Use 6 (six) times a day Wipes, Disp: 200 each, Rfl: 6    Incontinence Supplies MISC, Use 4 (four) times a day Comfort shield Adult diapers, Disp: 200 each, Rfl: 6    Incontinence Supply Disposable MISC, Use 6 (six) times a day Dispense disposable bed pad, Disp: 200 each, Rfl: 6    insulin glargine (Lantus SoloStar) 100 units/mL injection pen, Inject 10 units in the morning and 10 units at night   Always check blood sugar before administering , Disp: 15 mL, Rfl: 8    Insulin Pen Needle (BD Pen Needle Elisabeth U/F) 32G X 4 MM MISC, Use 2 (two) times a day, Disp: 100 each, Rfl: 5    Insulin Pen Needle 32G X 4 MM MISC, Use daily, Disp: 90 each, Rfl: 1    Invokana 300 MG TABS, TAKE 1 TABLET BY MOUTH EVERY DAY, Disp: 90 tablet, Rfl: 1    levothyroxine 100 mcg tablet, TAKE 1 TABLET BY MOUTH EVERY DAY, Disp: 30 tablet, Rfl: 2    lisinopril (ZESTRIL) 40 mg tablet, Take 1 tablet (40 mg total) by mouth daily, Disp: 90 tablet, Rfl: 3    montelukast (SINGULAIR) 10 mg tablet, TAKE 1 TABLET BY MOUTH DAILY AT BEDTIME, Disp: 90 tablet, Rfl: 1    NIFEdipine 0 3%-lidocaine 5% rectal ointment, Apply 1 application topically every 4 (four) hours as needed for discomfort or pain Apply a small amount to anal fissure, Disp: 2 oz, Rfl: 1    OneTouch Delica Lancets 27H MISC, TEST BLOOD SUGAR 3 TIMES A DAY, Disp: 100 each, Rfl: 3    sertraline (ZOLOFT) 25 mg tablet, Take 1 tablet (25 mg total) by mouth daily, Disp: 90 tablet, Rfl: 3    simvastatin (ZOCOR) 20 mg tablet, Take 20 mg by mouth daily , Disp: , Rfl:     gabapentin (NEURONTIN) 100 mg capsule, Take 2 capsules (200 mg total) by mouth 2 (two) times a day, Disp: 120 capsule, Rfl: 3    memantine (NAMENDA) 10 mg tablet, Take 1 tablet (10 mg total) by mouth 2 (two) times a day, Disp: 180 tablet, Rfl: 1    valACYclovir (VALTREX) 1,000 mg tablet, Take 1 tablet (1,000 mg total) by mouth 2 (two) times a day for 5 days (Patient not taking: Reported on 9/22/2021), Disp: 10 tablet, Rfl: 0

## 2021-09-25 DIAGNOSIS — R09.81 NASAL CONGESTION: ICD-10-CM

## 2021-09-27 RX ORDER — FLUTICASONE PROPIONATE 50 MCG
SPRAY, SUSPENSION (ML) NASAL
Qty: 16 ML | Refills: 1 | Status: SHIPPED | OUTPATIENT
Start: 2021-09-27 | End: 2021-11-04

## 2021-10-24 DIAGNOSIS — E03.9 HYPOTHYROIDISM, UNSPECIFIED TYPE: ICD-10-CM

## 2021-10-25 RX ORDER — LEVOTHYROXINE SODIUM 0.1 MG/1
TABLET ORAL
Qty: 30 TABLET | Refills: 2 | Status: SHIPPED | OUTPATIENT
Start: 2021-10-25 | End: 2022-01-06

## 2021-11-01 ENCOUNTER — OFFICE VISIT (OUTPATIENT)
Dept: NEUROLOGY | Facility: CLINIC | Age: 81
End: 2021-11-01
Payer: COMMERCIAL

## 2021-11-01 VITALS
WEIGHT: 129 LBS | HEART RATE: 56 BPM | TEMPERATURE: 98.7 F | SYSTOLIC BLOOD PRESSURE: 110 MMHG | BODY MASS INDEX: 24.35 KG/M2 | DIASTOLIC BLOOD PRESSURE: 60 MMHG | HEIGHT: 61 IN

## 2021-11-01 DIAGNOSIS — I65.21 STENOSIS OF RIGHT CAROTID ARTERY: Primary | ICD-10-CM

## 2021-11-01 DIAGNOSIS — F03.91 DEMENTIA WITH BEHAVIORAL DISTURBANCE, UNSPECIFIED DEMENTIA TYPE (HCC): ICD-10-CM

## 2021-11-01 PROCEDURE — 99214 OFFICE O/P EST MOD 30 MIN: CPT | Performed by: PSYCHIATRY & NEUROLOGY

## 2021-11-01 PROCEDURE — 1160F RVW MEDS BY RX/DR IN RCRD: CPT | Performed by: PSYCHIATRY & NEUROLOGY

## 2021-11-01 PROCEDURE — 1036F TOBACCO NON-USER: CPT | Performed by: PSYCHIATRY & NEUROLOGY

## 2021-11-01 RX ORDER — MEMANTINE HYDROCHLORIDE 10 MG/1
10 TABLET ORAL 2 TIMES DAILY
Qty: 180 TABLET | Refills: 1 | Status: SHIPPED | OUTPATIENT
Start: 2021-11-01 | End: 2022-04-28

## 2021-11-04 DIAGNOSIS — R09.81 NASAL CONGESTION: ICD-10-CM

## 2021-11-04 RX ORDER — FLUTICASONE PROPIONATE 50 MCG
SPRAY, SUSPENSION (ML) NASAL
Qty: 16 ML | Refills: 1 | Status: SHIPPED | OUTPATIENT
Start: 2021-11-04 | End: 2021-12-02

## 2021-11-10 DIAGNOSIS — E78.1 HYPERTRIGLYCERIDEMIA: ICD-10-CM

## 2021-11-10 RX ORDER — FENOFIBRATE 160 MG/1
TABLET ORAL
Qty: 90 TABLET | Refills: 1 | Status: SHIPPED | OUTPATIENT
Start: 2021-11-10 | End: 2022-05-19

## 2021-11-24 ENCOUNTER — HOSPITAL ENCOUNTER (OUTPATIENT)
Dept: VASCULAR ULTRASOUND | Facility: HOSPITAL | Age: 81
Discharge: HOME | End: 2021-11-24
Attending: SURGERY
Payer: COMMERCIAL

## 2021-11-24 DIAGNOSIS — I65.23 ASYMPTOMATIC BILATERAL CAROTID ARTERY STENOSIS: ICD-10-CM

## 2021-11-24 PROCEDURE — 93880 EXTRACRANIAL BILAT STUDY: CPT

## 2021-11-24 PROCEDURE — 93880 EXTRACRANIAL BILAT STUDY: CPT | Performed by: SURGERY

## 2021-11-27 DIAGNOSIS — Z79.4 TYPE 2 DIABETES MELLITUS TREATED WITH INSULIN (HCC): ICD-10-CM

## 2021-11-27 DIAGNOSIS — E11.9 TYPE 2 DIABETES MELLITUS TREATED WITH INSULIN (HCC): ICD-10-CM

## 2021-11-27 DIAGNOSIS — R05.9 COUGH: ICD-10-CM

## 2021-11-29 RX ORDER — MONTELUKAST SODIUM 10 MG/1
TABLET ORAL
Qty: 90 TABLET | Refills: 1 | Status: SHIPPED | OUTPATIENT
Start: 2021-11-29 | End: 2022-06-01

## 2021-11-29 RX ORDER — LANCETS 33 GAUGE
EACH MISCELLANEOUS
Qty: 100 EACH | Refills: 3 | Status: SHIPPED | OUTPATIENT
Start: 2021-11-29

## 2021-12-02 DIAGNOSIS — R09.81 NASAL CONGESTION: ICD-10-CM

## 2021-12-02 RX ORDER — FLUTICASONE PROPIONATE 50 MCG
SPRAY, SUSPENSION (ML) NASAL
Qty: 16 ML | Refills: 1 | Status: SHIPPED | OUTPATIENT
Start: 2021-12-02 | End: 2022-01-24

## 2021-12-14 DIAGNOSIS — Z79.4 TYPE 2 DIABETES MELLITUS TREATED WITH INSULIN (HCC): ICD-10-CM

## 2021-12-14 DIAGNOSIS — E11.9 TYPE 2 DIABETES MELLITUS TREATED WITH INSULIN (HCC): ICD-10-CM

## 2021-12-14 RX ORDER — INSULIN GLARGINE 100 [IU]/ML
INJECTION, SOLUTION SUBCUTANEOUS
Qty: 15 ML | Refills: 5 | Status: SHIPPED | OUTPATIENT
Start: 2021-12-14

## 2021-12-23 DIAGNOSIS — E13.9 DIABETES 1.5, MANAGED AS TYPE 1 (HCC): ICD-10-CM

## 2021-12-23 RX ORDER — BLOOD SUGAR DIAGNOSTIC
STRIP MISCELLANEOUS
Qty: 100 STRIP | Refills: 2 | Status: SHIPPED | OUTPATIENT
Start: 2021-12-23 | End: 2022-04-05

## 2022-01-06 DIAGNOSIS — E03.9 HYPOTHYROIDISM, UNSPECIFIED TYPE: ICD-10-CM

## 2022-01-06 RX ORDER — LEVOTHYROXINE SODIUM 0.1 MG/1
TABLET ORAL
Qty: 30 TABLET | Refills: 2 | Status: SHIPPED | OUTPATIENT
Start: 2022-01-06 | End: 2022-04-11

## 2022-01-13 DIAGNOSIS — Z79.4 TYPE 2 DIABETES MELLITUS TREATED WITH INSULIN (HCC): ICD-10-CM

## 2022-01-13 DIAGNOSIS — E11.9 TYPE 2 DIABETES MELLITUS TREATED WITH INSULIN (HCC): ICD-10-CM

## 2022-01-13 RX ORDER — CANAGLIFLOZIN 300 MG/1
TABLET, FILM COATED ORAL
Qty: 90 TABLET | Refills: 1 | Status: SHIPPED | OUTPATIENT
Start: 2022-01-13 | End: 2022-07-18

## 2022-01-23 DIAGNOSIS — R09.81 NASAL CONGESTION: ICD-10-CM

## 2022-01-24 DIAGNOSIS — R09.81 NASAL CONGESTION: ICD-10-CM

## 2022-01-24 RX ORDER — FLUTICASONE PROPIONATE 50 MCG
2 SPRAY, SUSPENSION (ML) NASAL DAILY
Qty: 16 ML | Refills: 1 | Status: SHIPPED | OUTPATIENT
Start: 2022-01-24 | End: 2022-04-25

## 2022-01-24 RX ORDER — FLUTICASONE PROPIONATE 50 MCG
SPRAY, SUSPENSION (ML) NASAL
Qty: 16 ML | Refills: 1 | Status: SHIPPED | OUTPATIENT
Start: 2022-01-24 | End: 2022-01-24 | Stop reason: SDUPTHER

## 2022-01-25 DIAGNOSIS — F41.9 ANXIETY: ICD-10-CM

## 2022-01-25 RX ORDER — SERTRALINE HYDROCHLORIDE 25 MG/1
TABLET, FILM COATED ORAL
Qty: 30 TABLET | Refills: 3 | Status: SHIPPED | OUTPATIENT
Start: 2022-01-25

## 2022-04-04 DIAGNOSIS — G47.62 LEG CRAMPS, SLEEP RELATED: ICD-10-CM

## 2022-04-04 RX ORDER — GABAPENTIN 100 MG/1
200 CAPSULE ORAL 2 TIMES DAILY
Qty: 120 CAPSULE | Refills: 3
Start: 2022-04-04 | End: 2022-04-25 | Stop reason: SDUPTHER

## 2022-04-05 DIAGNOSIS — E13.9 DIABETES 1.5, MANAGED AS TYPE 1 (HCC): ICD-10-CM

## 2022-04-05 RX ORDER — BLOOD SUGAR DIAGNOSTIC
STRIP MISCELLANEOUS
Qty: 100 STRIP | Refills: 2 | Status: SHIPPED | OUTPATIENT
Start: 2022-04-05

## 2022-04-09 ENCOUNTER — RA CDI HCC (OUTPATIENT)
Dept: OTHER | Facility: HOSPITAL | Age: 82
End: 2022-04-09

## 2022-04-09 NOTE — PROGRESS NOTES
[facility]  coding opportunities     E11 22, E11 40     Chart Reviewed number of suggestions sent to Provider: 2     Patients Insurance     Medicare Insurance: [address] [place] [address]

## 2022-04-11 DIAGNOSIS — E03.9 HYPOTHYROIDISM, UNSPECIFIED TYPE: ICD-10-CM

## 2022-04-11 RX ORDER — LEVOTHYROXINE SODIUM 0.1 MG/1
TABLET ORAL
Qty: 30 TABLET | Refills: 2 | Status: SHIPPED | OUTPATIENT
Start: 2022-04-11 | End: 2022-07-11

## 2022-04-15 ENCOUNTER — OFFICE VISIT (OUTPATIENT)
Dept: FAMILY MEDICINE CLINIC | Facility: CLINIC | Age: 82
End: 2022-04-15
Payer: COMMERCIAL

## 2022-04-15 ENCOUNTER — APPOINTMENT (OUTPATIENT)
Dept: LAB | Facility: CLINIC | Age: 82
End: 2022-04-15
Payer: COMMERCIAL

## 2022-04-15 VITALS
WEIGHT: 127 LBS | DIASTOLIC BLOOD PRESSURE: 60 MMHG | BODY MASS INDEX: 23.98 KG/M2 | SYSTOLIC BLOOD PRESSURE: 136 MMHG | OXYGEN SATURATION: 97 % | HEART RATE: 68 BPM | TEMPERATURE: 97.9 F | HEIGHT: 61 IN

## 2022-04-15 DIAGNOSIS — E03.9 HYPOTHYROIDISM, UNSPECIFIED TYPE: ICD-10-CM

## 2022-04-15 DIAGNOSIS — E11.9 TYPE 2 DIABETES MELLITUS TREATED WITH INSULIN (HCC): ICD-10-CM

## 2022-04-15 DIAGNOSIS — E78.1 HYPERTRIGLYCERIDEMIA: ICD-10-CM

## 2022-04-15 DIAGNOSIS — E11.9 TYPE 2 DIABETES MELLITUS TREATED WITH INSULIN (HCC): Primary | ICD-10-CM

## 2022-04-15 DIAGNOSIS — D63.1 ANEMIA IN STAGE 3 CHRONIC KIDNEY DISEASE, UNSPECIFIED WHETHER STAGE 3A OR 3B CKD (HCC): ICD-10-CM

## 2022-04-15 DIAGNOSIS — Z23 NEED FOR PNEUMOCOCCAL VACCINATION: ICD-10-CM

## 2022-04-15 DIAGNOSIS — N18.30 ANEMIA IN STAGE 3 CHRONIC KIDNEY DISEASE, UNSPECIFIED WHETHER STAGE 3A OR 3B CKD (HCC): ICD-10-CM

## 2022-04-15 DIAGNOSIS — R29.898 WEAKNESS OF BOTH LOWER EXTREMITIES: ICD-10-CM

## 2022-04-15 DIAGNOSIS — E11.65 TYPE 2 DIABETES MELLITUS WITH HYPERGLYCEMIA, WITHOUT LONG-TERM CURRENT USE OF INSULIN (HCC): ICD-10-CM

## 2022-04-15 DIAGNOSIS — F03.91 DEMENTIA WITH BEHAVIORAL DISTURBANCE, UNSPECIFIED DEMENTIA TYPE (HCC): ICD-10-CM

## 2022-04-15 DIAGNOSIS — Z79.4 TYPE 2 DIABETES MELLITUS TREATED WITH INSULIN (HCC): ICD-10-CM

## 2022-04-15 DIAGNOSIS — Z79.4 TYPE 2 DIABETES MELLITUS TREATED WITH INSULIN (HCC): Primary | ICD-10-CM

## 2022-04-15 DIAGNOSIS — H91.93 BILATERAL HEARING LOSS, UNSPECIFIED HEARING LOSS TYPE: ICD-10-CM

## 2022-04-15 DIAGNOSIS — M65.30 TRIGGER FINGER, UNSPECIFIED FINGER, UNSPECIFIED LATERALITY: ICD-10-CM

## 2022-04-15 LAB
ALBUMIN SERPL BCP-MCNC: 4 G/DL (ref 3.5–5)
ALP SERPL-CCNC: 37 U/L (ref 46–116)
ALT SERPL W P-5'-P-CCNC: 25 U/L (ref 12–78)
ANION GAP SERPL CALCULATED.3IONS-SCNC: 3 MMOL/L (ref 4–13)
AST SERPL W P-5'-P-CCNC: 20 U/L (ref 5–45)
BASOPHILS # BLD AUTO: 0.04 THOUSANDS/ΜL (ref 0–0.1)
BASOPHILS NFR BLD AUTO: 1 % (ref 0–1)
BILIRUB SERPL-MCNC: 0.3 MG/DL (ref 0.2–1)
BUN SERPL-MCNC: 27 MG/DL (ref 5–25)
CALCIUM SERPL-MCNC: 10.2 MG/DL (ref 8.3–10.1)
CHLORIDE SERPL-SCNC: 108 MMOL/L (ref 100–108)
CO2 SERPL-SCNC: 30 MMOL/L (ref 21–32)
CREAT SERPL-MCNC: 1.34 MG/DL (ref 0.6–1.3)
EOSINOPHIL # BLD AUTO: 0.17 THOUSAND/ΜL (ref 0–0.61)
EOSINOPHIL NFR BLD AUTO: 4 % (ref 0–6)
ERYTHROCYTE [DISTWIDTH] IN BLOOD BY AUTOMATED COUNT: 13.7 % (ref 11.6–15.1)
FOLATE SERPL-MCNC: 12 NG/ML (ref 3.1–17.5)
GFR SERPL CREATININE-BSD FRML MDRD: 37 ML/MIN/1.73SQ M
GLUCOSE SERPL-MCNC: 179 MG/DL (ref 65–140)
HCT VFR BLD AUTO: 33.9 % (ref 34.8–46.1)
HGB BLD-MCNC: 10.9 G/DL (ref 11.5–15.4)
IMM GRANULOCYTES # BLD AUTO: 0.01 THOUSAND/UL (ref 0–0.2)
IMM GRANULOCYTES NFR BLD AUTO: 0 % (ref 0–2)
IRON SERPL-MCNC: 70 UG/DL (ref 50–170)
LYMPHOCYTES # BLD AUTO: 0.92 THOUSANDS/ΜL (ref 0.6–4.47)
LYMPHOCYTES NFR BLD AUTO: 22 % (ref 14–44)
MCH RBC QN AUTO: 27.5 PG (ref 26.8–34.3)
MCHC RBC AUTO-ENTMCNC: 32.2 G/DL (ref 31.4–37.4)
MCV RBC AUTO: 86 FL (ref 82–98)
MONOCYTES # BLD AUTO: 0.31 THOUSAND/ΜL (ref 0.17–1.22)
MONOCYTES NFR BLD AUTO: 7 % (ref 4–12)
NEUTROPHILS # BLD AUTO: 2.73 THOUSANDS/ΜL (ref 1.85–7.62)
NEUTS SEG NFR BLD AUTO: 66 % (ref 43–75)
NRBC BLD AUTO-RTO: 0 /100 WBCS
PLATELET # BLD AUTO: 168 THOUSANDS/UL (ref 149–390)
PMV BLD AUTO: 14 FL (ref 8.9–12.7)
POTASSIUM SERPL-SCNC: 4.9 MMOL/L (ref 3.5–5.3)
PROT SERPL-MCNC: 7 G/DL (ref 6.4–8.2)
RBC # BLD AUTO: 3.96 MILLION/UL (ref 3.81–5.12)
SL AMB POCT HEMOGLOBIN AIC: 8.2 (ref ?–6.5)
SODIUM SERPL-SCNC: 141 MMOL/L (ref 136–145)
TSH SERPL DL<=0.05 MIU/L-ACNC: 1.21 UIU/ML (ref 0.45–4.5)
VIT B12 SERPL-MCNC: 278 PG/ML (ref 100–900)
WBC # BLD AUTO: 4.18 THOUSAND/UL (ref 4.31–10.16)

## 2022-04-15 PROCEDURE — 99214 OFFICE O/P EST MOD 30 MIN: CPT | Performed by: FAMILY MEDICINE

## 2022-04-15 PROCEDURE — 80053 COMPREHEN METABOLIC PANEL: CPT

## 2022-04-15 PROCEDURE — 83540 ASSAY OF IRON: CPT

## 2022-04-15 PROCEDURE — 90677 PCV20 VACCINE IM: CPT | Performed by: FAMILY MEDICINE

## 2022-04-15 PROCEDURE — 85025 COMPLETE CBC W/AUTO DIFF WBC: CPT

## 2022-04-15 PROCEDURE — 82607 VITAMIN B-12: CPT

## 2022-04-15 PROCEDURE — 82746 ASSAY OF FOLIC ACID SERUM: CPT

## 2022-04-15 PROCEDURE — 1160F RVW MEDS BY RX/DR IN RCRD: CPT | Performed by: FAMILY MEDICINE

## 2022-04-15 PROCEDURE — 83036 HEMOGLOBIN GLYCOSYLATED A1C: CPT | Performed by: FAMILY MEDICINE

## 2022-04-15 PROCEDURE — 1036F TOBACCO NON-USER: CPT | Performed by: FAMILY MEDICINE

## 2022-04-15 PROCEDURE — 3725F SCREEN DEPRESSION PERFORMED: CPT | Performed by: FAMILY MEDICINE

## 2022-04-15 PROCEDURE — 36415 COLL VENOUS BLD VENIPUNCTURE: CPT

## 2022-04-15 PROCEDURE — 84443 ASSAY THYROID STIM HORMONE: CPT

## 2022-04-15 NOTE — PROGRESS NOTES
Assessment/Plan:    No problem-specific Assessment & Plan notes found for this encounter  Diagnoses and all orders for this visit:    Type 2 diabetes mellitus treated with insulin ([facility] )  -     POCT hemoglobin A1c, 8 2  Continue same medications and dose  -     Comprehensive metabolic panel; Future  -     Microalbumin / creatinine urine ratio    Need for pneumococcal vaccination  -     Pneumococcal Conjugate Vaccine 20-valent (Pcv20)    Hypothyroidism, unspecified type  -     TSH, 3rd generation with Free T4 reflex; Future    Anemia in stage 3 chronic kidney disease, unspecified whether stage 3a or 3b CKD (HCC)  -     CBC and differential; Future  -     Urinalysis with microscopic  -     Occult Bloood,Fecal Immunochemical; Future  -     Vitamin B12; Future  -     Iron; Future  -     Folate; Future    Hypertriglyceridemia  -     Lipid panel; Future    Trigger finger, unspecified finger, unspecified laterality  -     Ambulatory Referral to Hand Surgery; Future    Weakness of both lower extremities  -     Ambulatory Referral to Physical Therapy; Future      Follow up in 3 months    Subjective:      Patient ID: Anna Samuels is a [de-identified] y o  female  Patient is here to follow up for Type 2 DM  She denies any symptoms related to this  Is on insulin  She has been having heat intolerance at night with chills  denies any fevers  Has been having weight loss has a Hx of hypothyroidism  Also has anemia  Has been having bilateral leg weakness had a recent fall  In addition the fingers in both hands contract forming a fist and she needs to manually open her hand        The following portions of the patient's history were reviewed and updated as appropriate:   She  has a past medical history of Aggression, Alzheimer's dementia ([facility] ), Arthritis, Dementia ([facility] ), Diabetes insipidus ([facility] ), Diabetes mellitus ([facility] ), High cholesterol, Hypertension, Memory loss, Migraine, Polyneuropathy, Rheumatoid arthritis ([facility] ), Serum lipids high, Stomach problems, and Thyroid trouble    She   Patient Active Problem List    Diagnosis Date Noted    Weakness of both lower extremities 04/15/2022    Trigger finger 04/15/2022    Headache 09/22/2021    Asymptomatic bilateral carotid artery stenosis 09/22/2021    Post herpetic neuralgia 08/19/2021    Herpes zoster without complication 42/72/6643    Clostridium difficile diarrhea 07/20/2021    CKD (chronic kidney disease) stage 3, GFR 30-59 ml/min ([facility]) 07/12/2021    Acute non-recurrent maxillary sinusitis 05/19/2021    Frequency of urination 03/04/2021    Ulcerated external hemorrhoids 03/04/2021    Bright red blood per rectum 01/25/2021    Coccyalgia 01/25/2021    Leg cramps, sleep related 07/28/2020    Hypercalcemia 05/27/2020    Restless leg syndrome 04/21/2020    Mixed hyperlipidemia 04/21/2020    Diarrhea 10/03/2019    Type 2 diabetes mellitus treated with insulin ([facility] ) 10/03/2019    Anemia in stage 3 chronic kidney disease ([facility] ) 10/03/2019    Tear of right rotator cuff 07/23/2019    Biceps tendon tear 07/23/2019    Nontraumatic tear of right rotator cuff 07/18/2019    Menopause ovarian failure 07/18/2019    Peripheral neuropathy 07/18/2019    Medicare annual wellness visit, subsequent 07/18/2019    Ambulatory dysfunction 07/18/2019    Hypothyroidism 05/13/2019    Essential hypertension 05/13/2019    Fall 05/13/2019    SVT (supraventricular tachycardia) ([facility] ) 05/13/2019    Need for influenza vaccination 09/10/2018    Anxiety 08/14/2018    Type 2 diabetes mellitus with hyperglycemia, without long-term current use of insulin ([address] ) 06/07/2018    Alzheimer's dementia ([address] ) 05/03/2018    Memory loss 03/19/2018    Bilateral hearing loss 02/22/2018    Rheumatoid arthritis 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 ([address] ) 02/22/2018    Dementia with behavioral disturbance ([address] ) 02/15/2018    Chronic left shoulder pain 02/15/2018    Need for lipid screening 02/15/2018     She  has a past surgical history that includes Hysterectomy; Gallbladder surgery; Cataract extraction; pr shldr arthroscop,surg,w/rotat cuff repr (Right, 8/1/2019); Rotator cuff repair (Right, 08/01/2019); and Cholecystectomy  Her family history includes Arthritis in her daughter; Blindness in her brother; Diabetes in her brother; HIV in her son; Mental illness in her father and mother; Substance Abuse in her father and mother  She  reports that she quit smoking about 37 years ago  Her smoking use included cigarettes  She has a 7 50 pack-year smoking history  She has never used smokeless tobacco  She reports that she does not drink alcohol and does not use drugs    Current Outpatient Medications   Medication Sig Dispense Refill    Blood Glucose Monitoring Suppl (OneTouch Verio) w/Device KIT Use as directed 1 kit 0    Blood Pressure KIT by Does not apply route daily 1 each 1    fenofibrate 160 MG tablet TAKE 1 TABLET BY MOUTH EVERY DAY 90 tablet 1    fluticasone (FLONASE) 50 mcg/act nasal spray 2 sprays into each nostril daily 16 mL 1    gabapentin (NEURONTIN) 100 mg capsule Take 2 capsules (200 mg total) by mouth 2 (two) times a day 120 capsule 3    Incontinence Supplies MISC Use 6 (six) times a day Wipes 200 each 6    Incontinence Supplies MISC Use 4 (four) times a day Comfort shield Adult diapers 200 each 6    Incontinence Supply Disposable MISC Use 6 (six) times a day Dispense disposable bed pad 200 each 6    insulin glargine (Lantus SoloStar) 100 units/mL injection pen INJECT 10 UNITS UNDER THE SKIN DAILY AT BEDTIME 15 mL 5    Insulin Pen Needle (BD Pen Needle Elisabeth U/F) 32G X 4 MM MISC Use 2 (two) times a day 100 each 5    Insulin Pen Needle 32G X 4 MM MISC Use daily 90 each 1    Invokana 300 MG TABS TAKE 1 TABLET BY MOUTH EVERY DAY 90 tablet 1    levothyroxine 100 mcg tablet TAKE 1 TABLET BY MOUTH EVERY DAY 30 tablet 2    lisinopril (ZESTRIL) 40 mg tablet Take 1 tablet (40 mg total) by mouth daily 90 tablet 3    memantine (NAMENDA) 10 mg tablet Take 1 tablet (10 mg total) by mouth 2 (two) times a day 180 tablet 1    montelukast (SINGULAIR) 10 mg tablet TAKE 1 TABLET BY MOUTH DAILY AT BEDTIME 90 tablet 1    OneTouch Delica Lancets 17P MISC TEST BLOOD SUGAR 3 TIMES A  each 3    OneTouch Verio test strip USE TO TEST 3 TIMES A  strip 2    sertraline (ZOLOFT) 25 mg tablet TAKE 1 TABLET BY MOUTH EVERY DAY 30 tablet 3     No current facility-administered medications for this visit       Current Outpatient Medications on File Prior to Visit   Medication Sig    Blood Glucose Monitoring Suppl (OneTouch Verio) w/Device KIT Use as directed    Blood Pressure KIT by Does not apply route daily    fenofibrate 160 MG tablet TAKE 1 TABLET BY MOUTH EVERY DAY    fluticasone (FLONASE) 50 mcg/act nasal spray 2 sprays into each nostril daily    gabapentin (NEURONTIN) 100 mg capsule Take 2 capsules (200 mg total) by mouth 2 (two) times a day    Incontinence Supplies MISC Use 6 (six) times a day Wipes    Incontinence Supplies MISC Use 4 (four) times a day Comfort shield Adult diapers    Incontinence Supply Disposable MISC Use 6 (six) times a day Dispense disposable bed pad    insulin glargine (Lantus SoloStar) 100 units/mL injection pen INJECT 10 UNITS UNDER THE SKIN DAILY AT BEDTIME    Insulin Pen Needle (BD Pen Needle Elisabeth U/F) 32G X 4 MM MISC Use 2 (two) times a day    Insulin Pen Needle 32G X 4 MM MISC Use daily    Invokana 300 MG TABS TAKE 1 TABLET BY MOUTH EVERY DAY    levothyroxine 100 mcg tablet TAKE 1 TABLET BY MOUTH EVERY DAY    lisinopril (ZESTRIL) 40 mg tablet Take 1 tablet (40 mg total) by mouth daily    memantine (NAMENDA) 10 mg tablet Take 1 tablet (10 mg total) by mouth 2 (two) times a day    montelukast (SINGULAIR) 10 mg tablet TAKE 1 TABLET BY MOUTH DAILY AT BEDTIME    OneTouch Delica Lancets 20S MISC TEST BLOOD SUGAR 3 TIMES A DAY    OneTouch Verio test strip USE TO TEST 3 TIMES A DAY    sertraline (ZOLOFT) 25 mg tablet TAKE 1 TABLET BY MOUTH EVERY DAY    [DISCONTINUED] amLODIPine (NORVASC) 5 mg tablet Take 1 tablet (5 mg total) by mouth daily    [DISCONTINUED] capsicum (ZOSTRIX) 0 075 % topical cream Apply topically 3 (three) times a day    [DISCONTINUED] NIFEdipine 0 3%-lidocaine 5% rectal ointment Apply 1 application topically every 4 (four) hours as needed for discomfort or pain Apply a small amount to anal fissure (Patient not taking: Reported on 11/1/2021)    [DISCONTINUED] simvastatin (ZOCOR) 20 mg tablet Take 20 mg by mouth daily     [DISCONTINUED] valACYclovir (VALTREX) 1,000 mg tablet Take 1 tablet (1,000 mg total) by mouth 2 (two) times a day for 5 days (Patient not taking: Reported on 9/22/2021)     No current facility-administered medications on file prior to visit  She is allergic to penicillins       Review of Systems   Constitutional: Negative for activity change, appetite change, fatigue and fever  HENT: Negative for congestion and ear discharge  Respiratory: Negative for cough and shortness of breath  Cardiovascular: Negative for chest pain and palpitations  Gastrointestinal: Negative for diarrhea and nausea  Musculoskeletal: Positive for arthralgias and myalgias  Negative for back pain  Skin: Negative for color change and rash  Neurological: Negative for dizziness and headaches  Psychiatric/Behavioral: Negative for agitation and behavioral problems  Objective:      /60   Pulse 68   Temp 97 9 °F (36 6 °C)   Ht 5' 1" (1 549 m)   Wt 57 6 kg (127 lb)   SpO2 97%   BMI 24 00 kg/m²          Physical Exam  Constitutional:       General: She is not in acute distress  Appearance: She is well-developed  She is not diaphoretic  HENT:      Head: Normocephalic and atraumatic        Nose: Nose normal    Eyes:      Conjunctiva/sclera: Conjunctivae normal       Pupils: Pupils are equal, round, and reactive to light  Cardiovascular:      Rate and Rhythm: Normal rate and regular rhythm  Heart sounds: Normal heart sounds  No murmur heard  Pulmonary:      Effort: Pulmonary effort is normal  No respiratory distress  Breath sounds: Normal breath sounds  No wheezing  Abdominal:      General: Bowel sounds are normal  There is no distension  Palpations: Abdomen is soft  Tenderness: There is no abdominal tenderness  Skin:     General: Skin is warm and dry  Findings: No erythema or rash  Neurological:      Mental Status: She is alert and oriented to person, place, and time  Depression Screening and Follow-up Plan: Patient was screened for depression during today's encounter  They screened negative with a PHQ-2 score of 0  [facility] Plan of Care: referral to physical therapy  Recommended assistive device to help with gait and balance

## 2022-04-18 ENCOUNTER — TELEPHONE (OUTPATIENT)
Dept: ADMINISTRATIVE | Facility: OTHER | Age: 82
End: 2022-04-18

## 2022-04-18 ENCOUNTER — APPOINTMENT (OUTPATIENT)
Dept: LAB | Facility: CLINIC | Age: 82
End: 2022-04-18
Payer: COMMERCIAL

## 2022-04-18 LAB
BACTERIA UR QL AUTO: ABNORMAL /HPF
BILIRUB UR QL STRIP: NEGATIVE
CHOLEST SERPL-MCNC: 157 MG/DL
CLARITY UR: CLEAR
COLOR UR: COLORLESS
CREAT UR-MCNC: 32.2 MG/DL
GLUCOSE UR STRIP-MCNC: ABNORMAL MG/DL
HDLC SERPL-MCNC: 47 MG/DL
HEMOCCULT STL QL IA: POSITIVE
HGB UR QL STRIP.AUTO: NEGATIVE
KETONES UR STRIP-MCNC: NEGATIVE MG/DL
LDLC SERPL CALC-MCNC: 92 MG/DL (ref 0–100)
LEUKOCYTE ESTERASE UR QL STRIP: ABNORMAL
MICROALBUMIN UR-MCNC: 50.8 MG/L (ref 0–20)
MICROALBUMIN/CREAT 24H UR: 158 MG/G CREATININE (ref 0–30)
NITRITE UR QL STRIP: NEGATIVE
NON-SQ EPI CELLS URNS QL MICRO: ABNORMAL /HPF
NONHDLC SERPL-MCNC: 110 MG/DL
PH UR STRIP.AUTO: 7 [PH]
PROT UR STRIP-MCNC: NEGATIVE MG/DL
RBC #/AREA URNS AUTO: ABNORMAL /HPF
SP GR UR STRIP.AUTO: 1.02 (ref 1–1.03)
TRIGL SERPL-MCNC: 88 MG/DL
UROBILINOGEN UR STRIP-ACNC: <2 MG/DL
WBC #/AREA URNS AUTO: ABNORMAL /HPF

## 2022-04-18 PROCEDURE — 81001 URINALYSIS AUTO W/SCOPE: CPT | Performed by: FAMILY MEDICINE

## 2022-04-18 PROCEDURE — G0328 FECAL BLOOD SCRN IMMUNOASSAY: HCPCS

## 2022-04-18 PROCEDURE — 82570 ASSAY OF URINE CREATININE: CPT | Performed by: FAMILY MEDICINE

## 2022-04-18 PROCEDURE — 82043 UR ALBUMIN QUANTITATIVE: CPT | Performed by: FAMILY MEDICINE

## 2022-04-18 PROCEDURE — 36415 COLL VENOUS BLD VENIPUNCTURE: CPT

## 2022-04-18 PROCEDURE — 80061 LIPID PANEL: CPT

## 2022-04-19 DIAGNOSIS — G89.29 CHRONIC PAIN OF BOTH KNEES: ICD-10-CM

## 2022-04-19 DIAGNOSIS — M25.562 CHRONIC PAIN OF BOTH KNEES: ICD-10-CM

## 2022-04-19 DIAGNOSIS — M25.561 CHRONIC PAIN OF BOTH KNEES: ICD-10-CM

## 2022-04-19 DIAGNOSIS — R19.5 POSITIVE FIT (FECAL IMMUNOCHEMICAL TEST): ICD-10-CM

## 2022-04-20 ENCOUNTER — EVALUATION (OUTPATIENT)
Dept: PHYSICAL THERAPY | Age: 82
End: 2022-04-20
Payer: COMMERCIAL

## 2022-04-20 ENCOUNTER — APPOINTMENT (OUTPATIENT)
Dept: RADIOLOGY | Facility: CLINIC | Age: 82
End: 2022-04-20
Payer: COMMERCIAL

## 2022-04-20 DIAGNOSIS — M25.561 CHRONIC PAIN OF BOTH KNEES: ICD-10-CM

## 2022-04-20 DIAGNOSIS — G89.29 CHRONIC PAIN OF BOTH KNEES: ICD-10-CM

## 2022-04-20 DIAGNOSIS — M25.562 CHRONIC PAIN OF BOTH KNEES: ICD-10-CM

## 2022-04-20 DIAGNOSIS — R29.898 WEAKNESS OF BOTH LOWER EXTREMITIES: Primary | ICD-10-CM

## 2022-04-20 PROCEDURE — 73562 X-RAY EXAM OF KNEE 3: CPT

## 2022-04-20 PROCEDURE — 97162 PT EVAL MOD COMPLEX 30 MIN: CPT | Performed by: PHYSICAL THERAPIST

## 2022-04-20 PROCEDURE — 97110 THERAPEUTIC EXERCISES: CPT | Performed by: PHYSICAL THERAPIST

## 2022-04-20 NOTE — TELEPHONE ENCOUNTER
Upon review of the In Basket request we have found that we are unable to obtain a copy of the documentation requested because more information was needed from staff regarding office name/location  Any additional questions or concerns should be emailed to the Practice Liaisons via [email or web address] email, please do not reply via In Basket      Thank you  Rosina Baxter

## 2022-04-20 NOTE — PROGRESS NOTES
PT Evaluation     Today's date: 2022  Patient name: Yamilex Rain  : 1940  MRN: [id number]  Referring provider: Eusebia Olson MD  Dx:   Encounter Diagnosis     ICD-10-CM    1  Weakness of both lower extremities  R29 898        Start Time: 855  Stop Time: 0950  Total time in clinic (min): 55 minutes    Assessment  Assessment details: Yamilex Rain is a [de-identified] y o  female who presents with left hip and bilateral knee pain, decreased lower extremity strength, decreased balance, and ambulatory dysfunction  Due to these impairments, Patient has difficulty performing a/iadls  Patient's clinical presentation is consistent with their referring diagnosis of weakness in both lower extremities  Patient would benefit from a trial of skilled physical therapy to address their aforementioned impairments, improve their level of function and to improve their overall quality of life  Impairments: abnormal gait, activity intolerance, impaired balance, impaired physical strength, lacks appropriate home exercise program and safety issue  Barriers to therapy: Language barrier  Dementia     Prognosis: fair    Goals  ST-3 WEEKS  1  Decrease pain left hip and bilateral knees  2  Increase AROM to Played without pain increase  3  Increase LE by 1/2 MMT grade in all deficient planes  LT-6 WEEKS  1  Patient to increase participation in ADLs at home  2  Increase functional activities for leisure and home activities to previous LOF  3  Instruction in ongoing HEP  4  Decrease fall risk    Plan  Patient would benefit from: skilled physical therapy  Planned therapy interventions: activity modification, home exercise program, neuromuscular re-education, patient education, therapeutic activities, therapeutic exercise, motor coordination training and aquatic therapy  Frequency: 2x per week    Duration in weeks: 6  Plan of Care beginning date: 2022  Plan of Care expiration date: 2022  Treatment plan discussed with: patient        Subjective Evaluation    History of Present Illness  Mechanism of injury:  Patients granddaughter present for evaluation as   Patients grand- daughter notes for the past 6 months her knees have been giving out  Notes that both knees feel swollen and weak  Rei Humphreys two weeks ago and landed on her knees  Also complains of lef thip pain and lower extremity stiffness  GrandDaughter notes grandmother inactive most of the day especially over the past 6 months  Sleeping for extended periods and sitting for long periods as well -  notes a decline in overall function that seems to be worsening  Daughter assists with bathing and dressing  Pain  Location: both anterior knees; left lateral hip and back  Quality: tight and sharp  Aggravating factors: walking, standing and stair climbing (cold temperatures)    Social Support  Stairs in house: yes   Lives in: multiple-level home  Lives with: adult children    Patient Goals  Patient goals for therapy: decreased pain, increased strength and improved balance  Patient goal: increase activty level at home        Objective     Tenderness     Left Hip   Tenderness in the PSIS and greater trochanter  Left Knee   Tenderness in the inferior patella  Right Knee   Tenderness in the inferior patella       Active Range of Motion   Left Hip   Flexion: 80 degrees   Abduction: 20 degrees     Right Hip   Flexion: 90 degrees   Abduction: 30 degrees   Left Knee   Flexion: WFL and with pain  Extension: WFL    Right Knee   Flexion: WFL and with pain  Extension: WFL    Passive Range of Motion   Left Hip   Flexion: 90 degrees with pain  Abduction: 30 degrees     Right Hip   Flexion: 95 degrees   Abduction: 35 degrees     Strength/Myotome Testing     Left Hip   Planes of Motion   Flexion: 3+  Abduction: 3+  Adduction: 3+    Right Hip   Planes of Motion   Flexion: 4-  Abduction: 4-  Adduction: 4-    Left Knee   Flexion: 4-  Extension: 4-    Right Knee   Flexion: 4-  Extension: 4-    Left Ankle/Foot   Dorsiflexion: 4  Plantar flexion: 4    Right Ankle/Foot   Dorsiflexion: 4  Plantar flexion: 4  Neuro Exam:       Balance assessments   Single leg stance   Left eyes open firm surface: 2  Right eyes open firm surface: 4             Precautions: dementia, DM, HTN, RTC repair      Manuals 4/20                                                                Neuro Re-Ed             Side stepping             Step over cones             serpentine cones                                                                 Ther Ex             Ball heel slides 20x            Ball add 20x            TB hip abduction 20x  RTB            SAQ B 1#/20            LAQ B 1#/20                         Calf raises 10x            Standing hip abduction 5x  each            Hip extension sandor patten                                                   Ther Activity                                       Gait Training                                       Modalities

## 2022-04-24 DIAGNOSIS — R09.81 NASAL CONGESTION: ICD-10-CM

## 2022-04-25 ENCOUNTER — OFFICE VISIT (OUTPATIENT)
Dept: PHYSICAL THERAPY | Age: 82
End: 2022-04-25
Payer: COMMERCIAL

## 2022-04-25 DIAGNOSIS — M25.562 PAIN IN BOTH KNEES, UNSPECIFIED CHRONICITY: Primary | ICD-10-CM

## 2022-04-25 DIAGNOSIS — R29.898 WEAKNESS OF BOTH LOWER EXTREMITIES: Primary | ICD-10-CM

## 2022-04-25 DIAGNOSIS — G47.62 LEG CRAMPS, SLEEP RELATED: ICD-10-CM

## 2022-04-25 DIAGNOSIS — M25.561 PAIN IN BOTH KNEES, UNSPECIFIED CHRONICITY: Primary | ICD-10-CM

## 2022-04-25 PROCEDURE — 97110 THERAPEUTIC EXERCISES: CPT

## 2022-04-25 PROCEDURE — 97112 NEUROMUSCULAR REEDUCATION: CPT

## 2022-04-25 RX ORDER — FLUTICASONE PROPIONATE 50 MCG
SPRAY, SUSPENSION (ML) NASAL
Qty: 16 ML | Refills: 1 | Status: SHIPPED | OUTPATIENT
Start: 2022-04-25

## 2022-04-25 RX ORDER — GABAPENTIN 100 MG/1
200 CAPSULE ORAL 2 TIMES DAILY
Qty: 120 CAPSULE | Refills: 3 | Status: SHIPPED | OUTPATIENT
Start: 2022-04-25 | End: 2022-07-18

## 2022-04-25 NOTE — PROGRESS NOTES
Daily Note     Today's date: 2022  Patient name: Gay Henson  : 1940  MRN: [id number]  Referring provider: Scarlet Primrose, MD  Dx:   Encounter Diagnosis     ICD-10-CM    1  Weakness of both lower extremities  R29 898        Start Time: 1415  Stop Time: 1455  Total time in clinic (min): 40 minutes    Subjective: Reports no new concerns  requested patient's granddaughter let us know next session if Mehdi Tellez becomes sore after today's session so we can progress or modify as needed  Objective: See treatment diary below      Assessment: Tolerated treatment well  Some "tightness" in legs post session, but no excessive soreness or fatiuge noted  Will continue to progress as patient tolerates  Cues for carryover of program and to ensure proper form is maintained during exercises  HHA for side stepping and CS t/o gym to ensure safety  Patient would benefit from continued PT      Plan: Continue per plan of care        Precautions: dementia, DM, HTN, RTC repair      Manuals                                                                 Neuro Re-Ed             Side stepping  2 laps            Step over cones             serpentine cones                                                                 Ther Ex             Ball heel slides 20x 20x           Ball add 20x 20x           TB hip abduction 20x  RTB 20x RTB            SAQ B 1#/20 2# 20x           LAQ B 1#/20 2# 20x                        Calf raises 10x 20x           Standing hip abduction 5x  each 10x ea            Hip extension nv 10x            standing march nv 20x            Standing hip flexion  10x                         NuStep nv L3 x6'                                                   Ther Activity             STS                           Gait Training                                       Modalities

## 2022-04-28 ENCOUNTER — APPOINTMENT (OUTPATIENT)
Dept: PHYSICAL THERAPY | Age: 82
End: 2022-04-28
Payer: COMMERCIAL

## 2022-04-28 DIAGNOSIS — F03.91 DEMENTIA WITH BEHAVIORAL DISTURBANCE, UNSPECIFIED DEMENTIA TYPE (HCC): ICD-10-CM

## 2022-04-28 RX ORDER — MEMANTINE HYDROCHLORIDE 10 MG/1
TABLET ORAL
Qty: 180 TABLET | Refills: 1 | Status: SHIPPED | OUTPATIENT
Start: 2022-04-28

## 2022-04-29 ENCOUNTER — OFFICE VISIT (OUTPATIENT)
Dept: PHYSICAL THERAPY | Age: 82
End: 2022-04-29
Payer: COMMERCIAL

## 2022-04-29 DIAGNOSIS — R29.898 WEAKNESS OF BOTH LOWER EXTREMITIES: Primary | ICD-10-CM

## 2022-04-29 PROCEDURE — 97110 THERAPEUTIC EXERCISES: CPT

## 2022-04-29 PROCEDURE — 97112 NEUROMUSCULAR REEDUCATION: CPT

## 2022-04-29 NOTE — PROGRESS NOTES
Daily Note     Today's date: 2022  Patient name: Ann Kidd  : 1940  MRN: [id number]  Referring provider: Julieth Molina MD  Dx:   Encounter Diagnosis     ICD-10-CM    1  Weakness of both lower extremities  R29 898        Start Time: 1330  Stop Time: 1415  Total time in clinic (min): 45 minutes    Subjective: Reports no new concerns       Objective: See treatment diary below      Assessment: Tolerated treatment well  Demonstrations, verbal, and tactile cues for SLR to ensure patient maintains knee extension, some lag noted but improves with cueing  CS-CG for NMRE activities for safety, no LOB noted  Occasional rest periods t/o session  Patient reports being very fatigued post session  Patient would benefit from continued PT   Plan: Continue per plan of care        Precautions: dementia, DM, HTN, RTC repair      Manuals                                                               Neuro Re-Ed             Side stepping  2 laps  2 laps           Step over cones             serpentine cones   4x (5 cones)                                                              Ther Ex             Ball heel slides 20x 20x 20x           Ball add 20x 20x 20x           TB hip abduction 20x  RTB 20x RTB  20x RTB          SAQ B 1#/20 2# 20x 2# 20x          LAQ B 1#/20 2# 20x 2# 20x          SLR    x10 ea           Calf raises 10x 20x 20x           Standing hip abduction 5x  each 10x ea  2x10 ea          Hip extension nv 10x  2x10           standing march nv 20x  20x          Standing hip flexion  10x  2x10                        NuStep nv L3 x6'  L4x7'                                                  Ther Activity             STS                           Gait Training                                       Modalities

## 2022-05-02 ENCOUNTER — OFFICE VISIT (OUTPATIENT)
Dept: PHYSICAL THERAPY | Age: 82
End: 2022-05-02
Payer: COMMERCIAL

## 2022-05-02 DIAGNOSIS — R29.898 WEAKNESS OF BOTH LOWER EXTREMITIES: Primary | ICD-10-CM

## 2022-05-02 PROCEDURE — 97112 NEUROMUSCULAR REEDUCATION: CPT

## 2022-05-02 PROCEDURE — 97110 THERAPEUTIC EXERCISES: CPT

## 2022-05-02 NOTE — PROGRESS NOTES
Daily Note     Today's date: 2022  Patient name: Aram Ba  : 1940  MRN: [id number]  Referring provider: Alisia Favre, MD  Dx:   Encounter Diagnosis     ICD-10-CM    1  Weakness of both lower extremities  R29 898        Start Time: 1500  Stop Time: 1545  Total time in clinic (min): 45 minutes    Subjective: Reports no new concerns  Objective: See treatment diary below      Assessment: Tolerated treatment well  Patient fatigued post session, stopped early on Nustep today  Did increase reps for ball and band exercises today  No LOB with NMRE, continued to provide CS-CG for safety  Cues for carryover of exercises and to ensure proper quantity of exercises were performed  Patient would benefit from continued PT  Plan: Continue per plan of care        Precautions: dementia, DM, HTN, RTC repair      Manuals                                                               Neuro Re-Ed             Side stepping  2 laps  2 laps  2 laps          Step over cones             serpentine cones   4x (5 cones) 4x (5 cones)                                                             Ther Ex             Ball heel slides 20x 20x 20x  20x         Ball add 20x 20x 20x  25x         TB hip abduction 20x  RTB 20x RTB  20x RTB 25x RTB          SAQ B 1#/20 2# 20x 2# 20x 2# 20x         LAQ B 1#/20 2# 20x 2# 20x 2# 20x         SLR    x10 ea  x10 ea          Calf raises 10x 20x 20x  20x          Standing hip abduction 5x  each 10x ea  2x10 ea 2x10 ea          Hip extension nv 10x  2x10  2x10          standing march nv 20x  20x 20x          Standing hip flexion  10x  2x10  2x10                       NuStep nv L3 x6'  L4x7'  L4 x6 5'                                                Ther Activity             STS                           Gait Training                                       Modalities

## 2022-05-04 ENCOUNTER — OFFICE VISIT (OUTPATIENT)
Dept: OBGYN CLINIC | Facility: CLINIC | Age: 82
End: 2022-05-04
Payer: COMMERCIAL

## 2022-05-04 VITALS
DIASTOLIC BLOOD PRESSURE: 68 MMHG | HEART RATE: 69 BPM | BODY MASS INDEX: 24.73 KG/M2 | WEIGHT: 131 LBS | HEIGHT: 61 IN | SYSTOLIC BLOOD PRESSURE: 159 MMHG

## 2022-05-04 DIAGNOSIS — M19.041 ARTHRITIS OF BOTH HANDS: ICD-10-CM

## 2022-05-04 DIAGNOSIS — M65.332 TRIGGER FINGER, LEFT MIDDLE FINGER: Primary | ICD-10-CM

## 2022-05-04 DIAGNOSIS — M65.341 TRIGGER FINGER, RIGHT RING FINGER: ICD-10-CM

## 2022-05-04 DIAGNOSIS — M19.042 ARTHRITIS OF BOTH HANDS: ICD-10-CM

## 2022-05-04 PROCEDURE — 20550 NJX 1 TENDON SHEATH/LIGAMENT: CPT | Performed by: FAMILY MEDICINE

## 2022-05-04 PROCEDURE — 99213 OFFICE O/P EST LOW 20 MIN: CPT | Performed by: FAMILY MEDICINE

## 2022-05-04 RX ORDER — TRIAMCINOLONE ACETONIDE 40 MG/ML
20 INJECTION, SUSPENSION INTRA-ARTICULAR; INTRAMUSCULAR
Status: COMPLETED | OUTPATIENT
Start: 2022-05-04 | End: 2022-05-04

## 2022-05-04 RX ORDER — LIDOCAINE HYDROCHLORIDE 10 MG/ML
0.5 INJECTION, SOLUTION INFILTRATION; PERINEURAL
Status: COMPLETED | OUTPATIENT
Start: 2022-05-04 | End: 2022-05-04

## 2022-05-04 RX ADMIN — LIDOCAINE HYDROCHLORIDE 0.5 ML: 10 INJECTION, SOLUTION INFILTRATION; PERINEURAL at 11:48

## 2022-05-04 RX ADMIN — LIDOCAINE HYDROCHLORIDE 0.5 ML: 10 INJECTION, SOLUTION INFILTRATION; PERINEURAL at 12:16

## 2022-05-04 RX ADMIN — TRIAMCINOLONE ACETONIDE 20 MG: 40 INJECTION, SUSPENSION INTRA-ARTICULAR; INTRAMUSCULAR at 11:48

## 2022-05-04 RX ADMIN — TRIAMCINOLONE ACETONIDE 20 MG: 40 INJECTION, SUSPENSION INTRA-ARTICULAR; INTRAMUSCULAR at 12:16

## 2022-05-04 NOTE — LETTER
May 4, 2022     Mesfin Siegel, [address]  [address] [facility] [address] [facility]  [address]    Patient: Toñito Ramirez   YOB: 1940   Date of Visit: 5/4/2022       Dear Dr Claudia Gregorio:    Thank you for referring Toñito Ramirez to me for evaluation  Below are my notes for this consultation  If you have questions, please do not hesitate to call me  I look forward to following your patient along with you  Sincerely,        [employer], DO        CC: No Recipients  [employer], DO  5/4/2022 12:22 PM  Sign when Signing Visit  Assessment/Plan:  Assessment/Plan   Diagnoses and all orders for this visit:    Trigger finger, left middle finger  -     Ambulatory Referral to Hand Surgery  -     Hand/upper extremity injection: L long A1    Trigger finger, right ring finger  -     Hand/upper extremity injection: R ring A1    Arthritis of both hands  -     Diclofenac Sodium (VOLTAREN) 1 %; Apply 2 g topically 3 (three) times a day        81-year-old right-hand-dominant female with pain and locking fingers of both hands more than 1 year duration  Discussed with patient physical exam, impression and plan  Physical exam noted for Sean's and Heberden's nodes 2nd through 4th digits both hands  Right hand noted for tenderness A1 pulley of the ring finger  There is palpable flexor tendon nodule  She has intact flexion and extension of the digit however limited flexion  There is reproduction of locking and snapping with flexion and extension  She is intact neurovascularly  Left hand noted for tenderness A1 pulley of the 3rd digit  There is palpable flexor tendon nodule  She has intact flexion and extension however limited flexion at the PIP joint  There is reproduction of locking and snapping with flexion and extension  She is intact neurovascularly  Clinical impression is that she is symptomatic from arthritis of the hands and trigger finger    I discussed treatment regimen of corticosteroid injection and topical anti-inflammatory  I administered mixtures of 0 5 cc 1% lidocaine and 0 5 cc Kenalog to the A1 pulleys of the right ring finger and left middle finger without complication  She is to apply topical diclofenac gel 3 to 4 times a day for the next 10 days  She was advised 2 corticosteroid injections are allowed for each site prior to surgery being recommended  She will follow up as needed  Subjective:   Patient ID: Gay Henson is a [de-identified] y o  female  Chief Complaint   Patient presents with    Right Hand - Pain    Left Hand - Pain       27-year-old right-hand-dominant female presents for evaluation of pain and locking digits both hands more than 1 year duration  She denies any particular trauma or inciting event  Pain described as localized to the volar aspect base of the right ring finger and left middle finger, achy and sharp, worse with gripping, associated with locking and snapping with flexion and extension, and improved with resting  She reports being unable to fully make a fist with the digits due to limited range of motion  Hand Pain  This is a chronic problem  The current episode started more than 1 year ago  The problem occurs daily  The problem has been gradually worsening  Associated symptoms include arthralgias, joint swelling and weakness  Pertinent negatives include no abdominal pain, chest pain, chills, fever, numbness, rash or sore throat  Exacerbated by: Gripping  She has tried rest for the symptoms  The treatment provided mild relief  The following portions of the patient's history were reviewed and updated as appropriate: She  has a past medical history of Aggression, Alzheimer's dementia ([address] ), Arthritis, Dementia ([facility] [address] ), Diabetes insipidus ([address] ), Diabetes mellitus ([address] ), High cholesterol, Hypertension, Memory loss, Migraine, Polyneuropathy, Rheumatoid arthritis ([address] ), Serum lipids high, Stomach problems, and Thyroid trouble    She  has a past surgical history that includes Hysterectomy; Gallbladder surgery; Cataract extraction; pr shldr arthroscop,surg,w/rotat cuff repr (Right, 8/1/2019); Rotator cuff repair (Right, 08/01/2019); and Cholecystectomy  Her family history includes Arthritis in her daughter; Blindness in her brother; Diabetes in her brother; HIV in her son; Mental illness in her father and mother; Substance Abuse in her father and mother  She  reports that she quit smoking about 37 years ago  Her smoking use included cigarettes  She has a 7 50 pack-year smoking history  She has never used smokeless tobacco  She reports that she does not drink alcohol and does not use drugs  She is allergic to penicillins       Review of Systems   Constitutional: Negative for chills and fever  HENT: Negative for sore throat  Eyes: Negative for visual disturbance  Respiratory: Negative for shortness of breath  Cardiovascular: Negative for chest pain  Gastrointestinal: Negative for abdominal pain  Genitourinary: Negative for flank pain  Musculoskeletal: Positive for arthralgias and joint swelling  Skin: Negative for rash and wound  Neurological: Positive for weakness  Negative for numbness  Hematological: Does not bruise/bleed easily  Psychiatric/Behavioral: Negative for self-injury  Objective:  Vitals:    05/04/22 1109   BP: 159/68   Pulse: 69   Weight: 59 4 kg (131 lb)   Height: 5' 1" (1 549 m)     Right Hand Exam     Muscle Strength   : 4/5     Other   Pulse: present    Comments:   tenderness A1 pulley of the ring finger  There is palpable flexor tendon nodule  She has intact flexion and extension of the digit however limited flexion  There is reproduction of locking and snapping with flexion and extension  Left Hand Exam     Muscle Strength   :  4/5     Other   Pulse: present    Comments:   tenderness A1 pulley of the 3rd digit  There is palpable flexor tendon nodule    She has intact flexion and extension however limited flexion at the PIP joint  There is reproduction of locking and snapping with flexion and extension  Observations     Left Wrist/Hand   Positive for Sean's nodes and Heberden's nodes  Right Wrist/Hand   Positive for Sean's nodes and Heberden's nodes  Physical Exam  Vitals and nursing note reviewed  Constitutional:       General: She is not in acute distress  Appearance: She is well-developed  She is not ill-appearing or diaphoretic  HENT:      Head: Normocephalic  Right Ear: External ear normal       Left Ear: External ear normal    Eyes:      Conjunctiva/sclera: Conjunctivae normal    Neck:      Trachea: No tracheal deviation  Cardiovascular:      Rate and Rhythm: Normal rate  Pulmonary:      Effort: Pulmonary effort is normal  No respiratory distress  Abdominal:      General: There is no distension  Musculoskeletal:         General: Swelling and tenderness present  No deformity or signs of injury  Skin:     General: Skin is warm and dry  Coloration: Skin is not jaundiced or pale  Neurological:      Mental Status: She is alert and oriented to person, place, and time  Psychiatric:         Mood and Affect: Mood normal          Behavior: Behavior normal          Thought Content: Thought content normal          Judgment: Judgment normal                Hand/upper extremity injection: L long A1  Universal Protocol:  Consent: Verbal consent obtained  Risks and benefits: risks, benefits and alternatives were discussed  Consent given by: patient  Time out: Immediately prior to procedure a "time out" was called to verify the correct patient, procedure, equipment, support staff and site/side marked as required    Patient understanding: patient states understanding of the procedure being performed  Patient consent: the patient's understanding of the procedure matches consent given  Procedure consent: procedure consent matches procedure scheduled  Relevant documents: relevant documents present and verified  Test results: test results available and properly labeled  Site marked: the operative site was marked  Radiology Images displayed and confirmed  If images not available, report reviewed: imaging studies available  Required items: required blood products, implants, devices, and special equipment available  Patient identity confirmed: verbally with patient    Supporting Documentation  Indications: pain and tendon swelling   Procedure Details  Condition:trigger finger Location: long finger - L long A1   Preparation: Patient was prepped and draped in the usual sterile fashion  Needle size: 27 G  Ultrasound guidance: no  Approach: volar  Medications administered: 0 5 mL lidocaine 1 %; 20 mg triamcinolone acetonide 40 mg/mL    Patient tolerance: patient tolerated the procedure well with no immediate complications  Dressing:  Sterile dressing applied     Hand/upper extremity injection: R ring A1  Universal Protocol:  Consent: Verbal consent obtained  Risks and benefits: risks, benefits and alternatives were discussed  Consent given by: patient  Time out: Immediately prior to procedure a "time out" was called to verify the correct patient, procedure, equipment, support staff and site/side marked as required  Patient understanding: patient states understanding of the procedure being performed  Patient consent: the patient's understanding of the procedure matches consent given  Procedure consent: procedure consent matches procedure scheduled  Relevant documents: relevant documents present and verified  Test results: test results available and properly labeled  Site marked: the operative site was marked  Radiology Images displayed and confirmed   If images not available, report reviewed: imaging studies available  Required items: required blood products, implants, devices, and special equipment available  Patient identity confirmed: verbally with patient    Supporting Documentation  Indications: pain and tendon swelling   Procedure Details  Condition:trigger finger Location: ring finger - R ring A1   Preparation: Patient was prepped and draped in the usual sterile fashion  Needle size: 27 G  Ultrasound guidance: no  Approach: volar  Medications administered: 0 5 mL lidocaine 1 %; 20 mg triamcinolone acetonide 40 mg/mL    Patient tolerance: patient tolerated the procedure well with no immediate complications  Dressing:  Sterile dressing applied

## 2022-05-04 NOTE — PROGRESS NOTES
Daily Note     Today's date: 2022  Patient name: Suzette Louise  : 1940  MRN: [id number]  Referring provider: Jarad Harrison MD  Dx:   Encounter Diagnosis     ICD-10-CM    1  Weakness of both lower extremities  R29 898        Start Time: 1108  Stop Time: 1200  Total time in clinic (min): 52 minutes    Subjective:  Notes " a little" left hip pain  No complaints of left knee pain today  Objective: See treatment diary below      Assessment: Tolerated treatment well  Patient would benefit from continued PT Increased exercise tolerance today with less rest periods required  SLR and bridge challenging  Less fatigue today  Plan: Continue per plan of care        Precautions: dementia, DM, HTN, RTC repair      Manuals                                                             Neuro Re-Ed             Side stepping  2 laps  2 laps  2 laps          Step over cones             serpentine cones   4x (5 cones) 4x (5 cones) 4x                                                            Ther Ex             Ball heel slides 20x 20x 20x  20x         Ball add 20x 20x 20x  25x 30x        TB hip abduction 20x  RTB 20x RTB  20x RTB 25x RTB  30x  GTB        bridge     10x        SAQ B 1#/20 2# 20x 2# 20x 2# 20x 2#/20        LAQ B 1#/20 2# 20x 2# 20x 2# 20x 2#/20        SLR    x10 ea  x10 ea  10x        Calf raises 10x 20x 20x  20x  20x        Standing hip abduction 5x  each 10x ea  2x10 ea 2x10 ea  2x10        Hip extension nv 10x  2x10  2x10  2x10        standing march nv 20x  20x 20x  20x        Standing hip flexion  10x  2x10  2x10  2x10                     NuStep nv L3 x6'  L4x7'  L4 x6 5' L4  7'                                               Ther Activity             STS                           Gait Training                                       Modalities

## 2022-05-05 ENCOUNTER — OFFICE VISIT (OUTPATIENT)
Dept: PHYSICAL THERAPY | Age: 82
End: 2022-05-05
Payer: COMMERCIAL

## 2022-05-05 DIAGNOSIS — R29.898 WEAKNESS OF BOTH LOWER EXTREMITIES: Primary | ICD-10-CM

## 2022-05-05 PROCEDURE — 97112 NEUROMUSCULAR REEDUCATION: CPT | Performed by: PHYSICAL THERAPIST

## 2022-05-05 PROCEDURE — 97110 THERAPEUTIC EXERCISES: CPT | Performed by: PHYSICAL THERAPIST

## 2022-05-06 ENCOUNTER — APPOINTMENT (OUTPATIENT)
Dept: PHYSICAL THERAPY | Age: 82
End: 2022-05-06
Payer: COMMERCIAL

## 2022-05-09 ENCOUNTER — OFFICE VISIT (OUTPATIENT)
Dept: PHYSICAL THERAPY | Age: 82
End: 2022-05-09
Payer: COMMERCIAL

## 2022-05-09 DIAGNOSIS — R29.898 WEAKNESS OF BOTH LOWER EXTREMITIES: Primary | ICD-10-CM

## 2022-05-09 PROCEDURE — 97110 THERAPEUTIC EXERCISES: CPT

## 2022-05-09 PROCEDURE — 97112 NEUROMUSCULAR REEDUCATION: CPT

## 2022-05-09 NOTE — PROGRESS NOTES
Daily Note     Today's date: 2022  Patient name: Cameron Britt  : 1940  MRN: [id number]  Referring provider: Annelise Mcginnis MD  Dx:   Encounter Diagnosis     ICD-10-CM    1  Weakness of both lower extremities  R29 898        Start Time: 1500  Stop Time: 1550  Total time in clinic (min): 50 minutes    Subjective: Patient reports she is getting stronger, less hip pain  Objective: See treatment diary below      Assessment: Tolerated treatment fairly well, increased program a little, less fatigue and rest breaks throughout program   Patient demonstrated fatigue post treatment and would benefit from continued PT      Plan: Continue per plan of care        Precautions: dementia, DM, HTN, RTC repair      Manuals                                                            Neuro Re-Ed             Side stepping  2 laps  2 laps  2 laps   3 laps       Step over cones             serpentine cones   4x (5 cones) 4x (5 cones) 4x 4x                                                           Ther Ex             Ball heel slides 20x 20x 20x  20x  30x       Ball add 20x 20x 20x  25x 30x 30x       TB hip abduction 20x  RTB 20x RTB  20x RTB 25x RTB  30x  GTB 30x       bridge     10x 10x2       SAQ B 1#/20 2# 20x 2# 20x 2# 20x 2#/20 2#/20       LAQ B 1#/20 2# 20x 2# 20x 2# 20x 2#/20 2#/20       SLR    x10 ea  x10 ea  10x 15x       Calf raises 10x 20x 20x  20x  20x 20x       Standing hip abduction 5x  each 10x ea  2x10 ea 2x10 ea  2x10 2x10       Hip extension nv 10x  2x10  2x10  2x10 2x10       standing march nv 20x  20x 20x  20x 20x       Standing hip flexion  10x  2x10  2x10  2x10 2x10                    NuStep nv L3 x6'  L4x7'  L4 x6 5' L4  7' 8'                                              Ther Activity             STS                           Gait Training                                       Modalities

## 2022-05-10 ENCOUNTER — OFFICE VISIT (OUTPATIENT)
Dept: GASTROENTEROLOGY | Facility: CLINIC | Age: 82
End: 2022-05-10
Payer: COMMERCIAL

## 2022-05-10 ENCOUNTER — TELEPHONE (OUTPATIENT)
Dept: FAMILY MEDICINE CLINIC | Facility: CLINIC | Age: 82
End: 2022-05-10

## 2022-05-10 VITALS
WEIGHT: 129 LBS | BODY MASS INDEX: 24.35 KG/M2 | SYSTOLIC BLOOD PRESSURE: 100 MMHG | HEIGHT: 61 IN | DIASTOLIC BLOOD PRESSURE: 60 MMHG

## 2022-05-10 DIAGNOSIS — R19.7 DIARRHEA, UNSPECIFIED TYPE: ICD-10-CM

## 2022-05-10 DIAGNOSIS — R19.5 POSITIVE FIT (FECAL IMMUNOCHEMICAL TEST): Primary | ICD-10-CM

## 2022-05-10 DIAGNOSIS — D64.9 ANEMIA, UNSPECIFIED TYPE: ICD-10-CM

## 2022-05-10 PROCEDURE — 1036F TOBACCO NON-USER: CPT | Performed by: PHYSICIAN ASSISTANT

## 2022-05-10 PROCEDURE — 1160F RVW MEDS BY RX/DR IN RCRD: CPT | Performed by: PHYSICIAN ASSISTANT

## 2022-05-10 PROCEDURE — 99214 OFFICE O/P EST MOD 30 MIN: CPT | Performed by: PHYSICIAN ASSISTANT

## 2022-05-10 RX ORDER — POLYETHYLENE GLYCOL 3350 17 G/17G
POWDER, FOR SOLUTION ORAL
Qty: 238 G | Refills: 0 | Status: SHIPPED | OUTPATIENT
Start: 2022-05-10

## 2022-05-10 RX ORDER — CHOLESTYRAMINE 4 G/9G
1 POWDER, FOR SUSPENSION ORAL 2 TIMES DAILY WITH MEALS
Qty: 60 PACKET | Refills: 2 | Status: SHIPPED | OUTPATIENT
Start: 2022-05-10 | End: 2022-08-08

## 2022-05-10 NOTE — TELEPHONE ENCOUNTER
Cecil Metcalf left a message on pt's behalf asking if pt should still be taking one baby aspirin daily? Please advise, thank you!

## 2022-05-10 NOTE — TELEPHONE ENCOUNTER
Given that she is currently being evaluated for blood in the stool I do not recommend a daily aspirin at this time    Unless she has a history of a stroke or heart attack I would not recommend baby aspirin 81 mg for her

## 2022-05-10 NOTE — PROGRESS NOTES
[facility] - Outpatient Follow-up Note  Beatriz Olivas [de-identified] y o  female MRN: [id number]  Encounter: [id number]          ASSESSMENT AND PLAN:      1  Positive FIT (fecal immunochemical test)  2  Anemia  3  Diarrhea    Patient presents for an evaluation of a positive FIT test   She also has a history of a chronic anemia per review  Latest HGB was 10 9  Her last colonoscopy was in 2019 which showed 2 adenomatous polyps that were removed  She has a history of a chronic diarrhea as well which responded well to Questran in the past   Celiac serology in the past was negative as well  Will plan for EGD and colonoscopy with visualization of the terminal ileum to investigate  If work up negative, she can begin Questran 1 packet BID for her chronic diarrhea which worked well in the past to alleviate her symptoms  Consideration for VCE if EGD/colonoscopy are negative   ______________________________________________________________________    SUBJECTIVE:  Patient is a pleasant [de-identified]year old female who presents to the office for an evaluation of a positive FIT  Patient's granddaughter also reports noticing occasional blood on the toilet tissue  She is not aware if there has been any melena or not  Patient has a chronic anemia per review  Her latest hemoglobin was 10 9  She also has a long history of a chronic diarrhea which was previously alleviated by Questran but she stopped taking it when she ran out  She had a colonoscopy in 2019 which showed 2 adenomatous polyps that were removed  She also had negative celiac serology then as well  She reports occasional heartburn and nausea as well  No family history of ulcerative colitis, crohn's disease, or colon cancer  REVIEW OF SYSTEMS IS OTHERWISE NEGATIVE        Historical Information   Past Medical History:   Diagnosis Date    Aggression     Alzheimer's dementia [facility] [address] [facility]     Arthritis     Dementia [facility] [address] [facility]     [facility]  Diabetes mellitus [facility] [facility]     High cholesterol     Hypertension     Memory loss     Migraine     Polyneuropathy     Rheumatoid arthritis ([facility] [address] [facility]     Serum lipids high     Stomach problems     Thyroid trouble      Past Surgical History:   Procedure Laterality Date    CATARACT EXTRACTION      CHOLECYSTECTOMY      GALLBLADDER SURGERY      HYSTERECTOMY      GA SHLDR ARTHROSCOP,SURG,W/ROTAT CUFF REPR Right 2019    Procedure: SHOULDER ARTHROSCOPIC ROTATOR CUFF REPAIR;  Surgeon: Kyle Taylor MD;  Location: [facility];  Service: Orthopedics    ROTATOR CUFF REPAIR Right 2019     Social History   Social History     Substance and Sexual Activity   Alcohol Use Never     Social History     Substance and Sexual Activity   Drug Use No     Social History     Tobacco Use   Smoking Status Former Smoker    Packs/day: 0 25    Years: 30 00    Pack years: 7 50    Types: Cigarettes    Quit date: 5    Years since quittin 3   Smokeless Tobacco Never Used     Family History   Problem Relation Age of Onset    Substance Abuse Mother         denied    Mental illness Mother         denied    Substance Abuse Father         denied    Mental illness Father         denied    Diabetes Brother     Blindness Brother     Arthritis Daughter    Junmojgan Estes HIV Son        Meds/Allergies       Current Outpatient Medications:     Blood Glucose Monitoring Suppl (OneTouch Verio) w/Device KIT    Blood Pressure KIT    Diclofenac Sodium (VOLTAREN) 1 %    fenofibrate 160 MG tablet    fluticasone (FLONASE) 50 mcg/act nasal spray    gabapentin (NEURONTIN) 100 mg capsule    Incontinence Supplies MISC    Incontinence Supplies MISC    Incontinence Supply Disposable MISC    insulin glargine (Lantus SoloStar) 100 units/mL injection pen    Insulin Pen Needle (BD Pen Needle Elisabeth U/F) 32G X 4 MM MISC    Insulin Pen Needle 32G X 4 MM MISC    Invokana 300 MG TABS    levothyroxine 100 mcg tablet    lisinopril (ZESTRIL) 40 mg tablet    memantine (NAMENDA) 10 mg tablet    montelukast (SINGULAIR) 10 mg tablet    OneTouch Delica Lancets 59H MISC    OneTouch Verio test strip    sertraline (ZOLOFT) 25 mg tablet    cholestyramine (QUESTRAN) 4 g packet    Allergies   Allergen Reactions    Penicillins Itching and Swelling           Objective     Blood pressure 100/60, height 5' 1" (1 549 m), weight 58 5 kg (129 lb)  Body mass index is 24 37 kg/m²  PHYSICAL EXAM:      General Appearance:   Alert, cooperative, no distress   HEENT:   Normocephalic, atraumatic, anicteric    Neck:  Supple, symmetrical, trachea midline   Lungs:   Clear to auscultation bilaterally; no rales, rhonchi or wheezing; respirations unlabored    Heart[de-identified]   Regular rate and rhythm; no murmur, rub, or gallop  Abdomen:   Soft, non-tender, non-distended; normal bowel sounds; no masses, no organomegaly    Genitalia:   Deferred    Rectal:   Deferred    Extremities:  No cyanosis, clubbing or edema    Pulses:  2+ and symmetric    Skin:  No jaundice, rashes, or lesions    Lymph nodes:  No palpable cervical lymphadenopathy        Lab Results:   No visits with results within 1 Day(s) from this visit     Latest known visit with results is:   Appointment on 04/15/2022   Component Date Value    Sodium 04/15/2022 141     Potassium 04/15/2022 4 9     Chloride 04/15/2022 108     CO2 04/15/2022 30     ANION GAP 04/15/2022 3*    BUN 04/15/2022 27*    Creatinine 04/15/2022 1 34*    Glucose 04/15/2022 179*    Calcium 04/15/2022 10 2*    AST 04/15/2022 20     ALT 04/15/2022 25     Alkaline Phosphatase 04/15/2022 37*    Total Protein 04/15/2022 7 0     Albumin 04/15/2022 4 0     Total Bilirubin 04/15/2022 0 30     eGFR 04/15/2022 37     Cholesterol 04/18/2022 157     Triglycerides 04/18/2022 88     HDL, Direct 04/18/2022 47*    LDL Calculated 04/18/2022 92     Non-HDL-Chol (CHOL-HDL) 04/18/2022 110     TSH 3RD GENERATON 04/15/2022 1 210     WBC 04/15/2022 4 18*  RBC 04/15/2022 3 96     Hemoglobin 04/15/2022 10 9*    Hematocrit 04/15/2022 33 9*    MCV 04/15/2022 86     MCH 04/15/2022 27 5     MCHC 04/15/2022 32 2     RDW 04/15/2022 13 7     MPV 04/15/2022 14 0*    Platelets 01/17/4312 168     nRBC 04/15/2022 0     Neutrophils Relative 04/15/2022 66     Immat GRANS % 04/15/2022 0     Lymphocytes Relative 04/15/2022 22     Monocytes Relative 04/15/2022 7     Eosinophils Relative 04/15/2022 4     Basophils Relative 04/15/2022 1     Neutrophils Absolute 04/15/2022 2 73     Immature Grans Absolute 04/15/2022 0 01     Lymphocytes Absolute 04/15/2022 0 92     Monocytes Absolute 04/15/2022 0 31     Eosinophils Absolute 04/15/2022 0 17     Basophils Absolute 04/15/2022 0 04     OCCULT BLD, FECAL IMMUNO* 04/18/2022 Positive*    Vitamin B-12 04/15/2022 278     Iron 04/15/2022 70     Folate 04/15/2022 12 0          Radiology Results:   XR knee 3 vw left non injury    Result Date: 4/23/2022  Narrative: LEFT KNEE INDICATION:   M25 561: Pain in right knee M25 562: Pain in left knee G89 29: Other chronic pain  COMPARISON:  Left knee plain films from 6/12/2019  VIEWS:  XR KNEE 3 VW LEFT NON INJURY FINDINGS: There is no acute fracture or dislocation  There is no joint effusion  Moderate medial tibiofemoral compartment osteoarthritis  This is evidenced by joint space narrowing, subchondral sclerosis, and marginal osteophytes  Mild patellofemoral compartment osteoarthritis  This is evidenced by joint space narrowing and marginal osteophytes  No lytic or blastic osseous lesion  Soft tissues are unremarkable  Impression: No acute osseous abnormality  Moderate medial tibiofemoral compartment and mild patellofemoral compartment osteoarthritis  Workstation performed: [id number]     XR knee 3 vw right non injury    Result Date: 4/23/2022  Narrative: RIGHT KNEE INDICATION:   M25 561: Pain in right knee M25 562: Pain in left knee G89 29: Other chronic pain    COMPARISON: None VIEWS:  XR KNEE 3 VW RIGHT NON INJURY Images: 3 FINDINGS: There is no acute fracture or dislocation  There is no joint effusion  Osteoarthritis with narrowing of the medial tibiofemoral joint  No lytic or blastic osseous lesion  There are atherosclerotic calcifications  Soft tissues are otherwise unremarkable  Impression: Moderate medial tibiofemoral compartment osteoarthritis   Workstation performed: [id number]

## 2022-05-13 ENCOUNTER — APPOINTMENT (OUTPATIENT)
Dept: PHYSICAL THERAPY | Age: 82
End: 2022-05-13
Payer: COMMERCIAL

## 2022-05-18 ENCOUNTER — APPOINTMENT (OUTPATIENT)
Dept: PHYSICAL THERAPY | Age: 82
End: 2022-05-18
Payer: COMMERCIAL

## 2022-05-19 ENCOUNTER — TELEPHONE (OUTPATIENT)
Dept: GASTROENTEROLOGY | Facility: CLINIC | Age: 82
End: 2022-05-19

## 2022-05-19 DIAGNOSIS — E78.1 HYPERTRIGLYCERIDEMIA: ICD-10-CM

## 2022-05-19 LAB
LEFT EYE DIABETIC RETINOPATHY: NORMAL
RIGHT EYE DIABETIC RETINOPATHY: NORMAL

## 2022-05-19 RX ORDER — FENOFIBRATE 160 MG/1
TABLET ORAL
Qty: 90 TABLET | Refills: 1 | Status: SHIPPED | OUTPATIENT
Start: 2022-05-19

## 2022-05-20 ENCOUNTER — OFFICE VISIT (OUTPATIENT)
Dept: PHYSICAL THERAPY | Age: 82
End: 2022-05-20
Payer: COMMERCIAL

## 2022-05-20 DIAGNOSIS — R29.898 WEAKNESS OF BOTH LOWER EXTREMITIES: Primary | ICD-10-CM

## 2022-05-20 PROCEDURE — 97110 THERAPEUTIC EXERCISES: CPT

## 2022-05-20 PROCEDURE — 97112 NEUROMUSCULAR REEDUCATION: CPT

## 2022-05-20 NOTE — PROGRESS NOTES
Daily Note     Today's date: 2022  Patient name: Gay Henson  : 1940  MRN: [id number]  Referring provider: Scarlet Primrose, MD  Dx:   Encounter Diagnosis     ICD-10-CM    1  Weakness of both lower extremities  R29 898                   Subjective: Patient offers no new complaints upon presentation, denying pain  Notes that she feels as if her legs are getting stronger  Objective: See treatment diary below      Assessment: Tolerated treatment fairly well  Pt continues to respond well to current POC this session with no complaints throughout  Patient demonstrated fatigue post treatment and would benefit from continued PT      Plan: Continue per plan of care        Precautions: dementia, DM, HTN, RTC repair      Manuals                                                           Neuro Re-Ed             Side stepping  2 laps  2 laps  2 laps   3 laps 3 laps      Step over cones             serpentine cones   4x (5 cones) 4x (5 cones) 4x 4x 4x                                                          Ther Ex             Ball heel slides 20x 20x 20x  20x  30x 30x      Ball add 20x 20x 20x  25x 30x 30x 30x      TB hip abduction 20x  RTB 20x RTB  20x RTB 25x RTB  30x  GTB 30x 30x      bridge     10x 10x2 2x10      SAQ B 1#/20 2# 20x 2# 20x 2# 20x 2#/20 2#/20 2#/ 25x      LAQ B 1#/20 2# 20x 2# 20x 2# 20x 2#/20 2#/20 2#/ 25x      SLR    x10 ea  x10 ea  10x 15x 15x      Calf raises 10x 20x 20x  20x  20x 20x 20x      Standing hip abduction 5x  each 10x ea  2x10 ea 2x10 ea  2x10 2x10 2x10      Hip extension nv 10x  2x10  2x10  2x10 2x10 2x10      standing march nv 20x  20x 20x  20x 20x 20x      Standing hip flexion  10x  2x10  2x10  2x10 2x10 2x10                   NuStep nv L3 x6'  L4x7'  L4 x6 5' L4  7' 8' 8'                                             Ther Activity             STS                           Gait Training                                       Modalities

## 2022-05-20 NOTE — PROGRESS NOTES
PT Re-Evaluation     Today's date: 2022  Patient name: Denzel Loomis  : 1940  MRN: [id number]  Referring provider: Luciano Stoll MD  Dx:   Encounter Diagnosis     ICD-10-CM    1  Weakness of both lower extremities  R29 898                   Assessment  Assessment details: Denzel Loomis is a [de-identified] y o  female who presents with left hip and bilateral knee pain, decreased lower extremity strength, decreased balance, and ambulatory dysfunction  Due to these impairments, Patient has difficulty performing a/iadls  Patient's clinical presentation is consistent with their referring diagnosis of weakness in both lower extremities  Patient has made positive gains in PT to date with decreased pain and increased exercise tolerance  Improved FOTO score Doing a little more at home  Patient would benefit from continued skilled physical therapy to address their aforementioned impairments, improve their level of function and to improve their overall quality of life  Continue with goals as outlined with discharge planning over the next month  Impairments: abnormal gait, activity intolerance, impaired balance, impaired physical strength, lacks appropriate home exercise program and safety issue  Barriers to therapy: Language barrier  Dementia     Prognosis: good    Goals  ST-3 WEEKS - progressing towards  1  Decrease pain left hip and bilateral knees  2  Increase AROM to [facility] without pain increase  -met  3  Increase LE by 1/2 MMT grade in all deficient planes  LT-6 WEEKS  1  Patient to increase participation in ADLs at home  2  Increase functional activities for leisure and home activities to previous LOF    3  Instruction in ongoing HEP  4  Decrease fall risk    Plan  Patient would benefit from: skilled physical therapy  Planned therapy interventions: activity modification, home exercise program, neuromuscular re-education, patient education, therapeutic activities, therapeutic exercise, motor coordination training and aquatic therapy  Frequency: 2x per week  Duration in weeks: 6  Plan of Care beginning date: 4/20/2022  Plan of Care expiration date: 7/19/2022  Treatment plan discussed with: patient        Subjective Evaluation    History of Present Illness  Mechanism of injury:  Patients granddaughter present for evaluation as   Patients grand- daughter notes for the past 6 months her knees have been giving out  Notes that both knees feel swollen and weak  Rei Humphreys two weeks ago and landed on her knees  Also complains of lef thip pain and lower extremity stiffness  GrandDaughter notes grandmother inactive most of the day especially over the past 6 months  Sleeping for extended periods and sitting for long periods as well -  notes a decline in overall function that seems to be worsening  Daughter assists with bathing and dressing     5/23/22 - Notes minimal left knee pain  No hip or back pain  Notes doing more at home with less pain and increased tolerance  Pain  Location: both anterior knees; left lateral hip and back  Quality: tight  Aggravating factors: walking, standing and stair climbing (cold temperatures)    Social Support  Stairs in house: yes   Lives in: multiple-level home  Lives with: adult children    Patient Goals  Patient goals for therapy: decreased pain, increased strength and improved balance  Patient goal: increase activty level at home        Objective     Tenderness     Left Hip   Tenderness in the greater trochanter  Left Knee   Tenderness in the inferior patella       Active Range of Motion   Left Hip   Flexion: 85 degrees   Abduction: 20 degrees     Right Hip   Flexion: 90 degrees   Abduction: 30 degrees   Left Knee   Flexion: WFL  Extension: WFL    Right Knee   Flexion: WFL  Extension: WFL    Passive Range of Motion   Left Hip   Flexion: 90 degrees with pain  Abduction: 30 degrees     Right Hip   Flexion: 95 degrees   Abduction: 35 degrees     Strength/Myotome Testing     Left Hip Planes of Motion   Flexion: 4-  Abduction: 4-  Adduction: 4-    Right Hip   Planes of Motion   Flexion: 4-  Abduction: 4-  Adduction: 4-    Left Knee   Flexion: 4-  Extension: 4  Quadriceps contraction: good    Right Knee   Flexion: 4-  Extension: 4  Quadriceps contraction: good    Left Ankle/Foot   Dorsiflexion: 4+  Plantar flexion: 4    Right Ankle/Foot   Dorsiflexion: 4+  Plantar flexion: 4  Neuro Exam:       Balance assessments   Single leg stance   Left eyes open firm surface: 4  Right eyes open firm surface: 5             Precautions: dementia, DM, HTN, RTC repair      Manuals 4/20 4/25 4/29 5/2 5/5 5/9 5/20 5/23                                                         Neuro Re-Ed             Side stepping  2 laps  2 laps  2 laps   3 laps 3 laps 3 laps     Step over cones        1x     serpentine cones   4x (5 cones) 4x (5 cones) 4x 4x 4x 4x                                                         Ther Ex             Ball heel slides 20x 20x 20x  20x  30x 30x 30x     Ball add 20x 20x 20x  25x 30x 30x 30x 30x     TB hip abduction 20x  RTB 20x RTB  20x RTB 25x RTB  30x  GTB 30x 30x 30x     bridge     10x 10x2 2x10 2x10     SAQ B 1#/20 2# 20x 2# 20x 2# 20x 2#/20 2#/20 2#/ 25x 2#/25     LAQ B 1#/20 2# 20x 2# 20x 2# 20x 2#/20 2#/20 2#/ 25x 2#/25     SLR    x10 ea  x10 ea  10x 15x 15x 15x     Calf raises 10x 20x 20x  20x  20x 20x 20x 20x     Standing hip abduction 5x  each 10x ea  2x10 ea 2x10 ea  2x10 2x10 2x10 2x10     Hip extension nv 10x  2x10  2x10  2x10 2x10 2x10 2x10     standing march nv 20x  20x 20x  20x 20x 20x 20x     Standing hip flexion  10x  2x10  2x10  2x10 2x10 2x10 nt                  NuStep nv L3 x6'  L4x7'  L4 x6 5' L4  7' 8' 8' 8'                                            Ther Activity             STS                           Gait Training                                       Modalities

## 2022-05-23 ENCOUNTER — EVALUATION (OUTPATIENT)
Dept: PHYSICAL THERAPY | Age: 82
End: 2022-05-23
Payer: COMMERCIAL

## 2022-05-23 DIAGNOSIS — R29.898 WEAKNESS OF BOTH LOWER EXTREMITIES: Primary | ICD-10-CM

## 2022-05-23 PROCEDURE — 97110 THERAPEUTIC EXERCISES: CPT | Performed by: PHYSICAL THERAPIST

## 2022-05-26 ENCOUNTER — OFFICE VISIT (OUTPATIENT)
Dept: PHYSICAL THERAPY | Age: 82
End: 2022-05-26
Payer: COMMERCIAL

## 2022-05-26 DIAGNOSIS — R29.898 WEAKNESS OF BOTH LOWER EXTREMITIES: Primary | ICD-10-CM

## 2022-05-26 PROCEDURE — 97112 NEUROMUSCULAR REEDUCATION: CPT

## 2022-05-26 PROCEDURE — 97110 THERAPEUTIC EXERCISES: CPT

## 2022-05-26 NOTE — PROGRESS NOTES
Daily Note     Today's date: 2022  Patient name: Freda Palmer  : 1940  MRN: [id number]  Referring provider: Cole Elizalde MD  Dx:   Encounter Diagnosis     ICD-10-CM    1  Weakness of both lower extremities  R29 898        Start Time: 1515  Stop Time: 1600  Total time in clinic (min): 45 minutes    Subjective: Reports no new concerns  Objective: See treatment diary below      Assessment: Tolerated treatment well  Added ambulation over hurdles with good balance, HHA unilaterally without LOB  One instance of LOB during side stepping where Monica was needed to steady and patient was able to self correct her stance and gain control  Progressed repetitions for standing exercises as documented below  Patient was also able to complete additional 2 min on Nustep today  Some fatigue noted post session  Patient would benefit from continued PT  Plan: Continue per plan of care        Precautions: dementia, DM, HTN, RTC repair      Manuals                                                          Neuro Re-Ed             Side stepping  2 laps  2 laps  2 laps   3 laps 3 laps 3 laps 3 laps     Step over cones        1x hurdles 2 laps    serpentine cones   4x (5 cones) 4x (5 cones) 4x 4x 4x 4x 4 laps                                                         Ther Ex             Ball heel slides 20x 20x 20x  20x  30x 30x 30x 30x    Ball add 20x 20x 20x  25x 30x 30x 30x 30x 30x    TB hip abduction 20x  RTB 20x RTB  20x RTB 25x RTB  30x  GTB 30x 30x 30x 30x BTB     bridge     10x 10x2 2x10 2x10 2x10    SAQ B 1#/20 2# 20x 2# 20x 2# 20x 2#/20 2#/20 2#/ 25x 2#/25 2# 25x    LAQ B 1#/20 2# 20x 2# 20x 2# 20x 2#/20 2#/20 2#/ 25x 2#/25 2# 25x    SLR    x10 ea  x10 ea  10x 15x 15x 15x 15x ea    Calf raises 10x 20x 20x  20x  20x 20x 20x 20x 30x     Standing hip abduction 5x  each 10x ea  2x10 ea 2x10 ea  2x10 2x10 2x10 2x10 30x ea    Hip extension nv 10x  2x10  2x10  2x10 2x10 2x10 2x10 30x ea    standing march nv 20x  20x 20x  20x 20x 20x 20x 20x    Standing hip flexion  10x  2x10  2x10  2x10 2x10 2x10 nt                  NuStep nv L3 x6'  L4x7'  L4 x6 5' L4  7' 8' 8' 8' 10'                                           Ther Activity             STS                           Gait Training                                       Modalities

## 2022-05-27 NOTE — PROGRESS NOTES
Daily Note     Today's date: 2022  Patient name: Maria G Solitario  : 1940  MRN: [id number]  Referring provider: Daina Gerardo MD  Dx:   Encounter Diagnosis     ICD-10-CM    1  Weakness of both lower extremities  R29 898        Start Time: 1000  Stop Time: 1040  Total time in clinic (min): 40 minutes    Subjective: No complaints this date  Objective: See treatment diary below      Assessment: Tolerated treatment well with advances as noted  Patient exhibited good technique with therapeutic exercises with cues for technique  Plan: Continue per plan of care        Precautions: dementia, DM, HTN, RTC repair      Manuals                                                        Neuro Re-Ed             Side stepping  2 laps  2 laps  2 laps   3 laps 3 laps 3 laps 3 laps  3x   Step over cones        1x hurdles 2 laps 3x   serpentine cones   4x (5 cones) 4x (5 cones) 4x 4x 4x 4x 4 laps  4x                                                       Ther Ex             Ball heel slides 20x 20x 20x  20x  30x 30x 30x 30x 30x   Ball add 20x 20x 20x  25x 30x 30x 30x 30x 30x 30x   TB hip abduction 20x  RTB 20x RTB  20x RTB 25x RTB  30x  GTB 30x 30x 30x 30x BTB  30x   bridge     10x 10x2 2x10 2x10 2x10 2x10   SAQ B 1#/20 2# 20x 2# 20x 2# 20x 2#/20 2#/20 2#/ 25x 2#/25 2# 25x 2/30   LAQ B 1#/20 2# 20x 2# 20x 2# 20x 2#/20 2#/20 2#/ 25x 2#/25 2# 25x 2#/30x   SLR    x10 ea  x10 ea  10x 15x 15x 15x 15x ea    Calf raises 10x 20x 20x  20x  20x 20x 20x 20x 30x  20x   Standing hip abduction 5x  each 10x ea  2x10 ea 2x10 ea  2x10 2x10 2x10 2x10 30x ea 1/20   Hip extension nv 10x  2x10  2x10  2x10 2x10 2x10 2x10 30x ea 1/20   standing march nv 20x  20x 20x  20x 20x 20x 20x 20x 1#/20x   Standing hip flexion  10x  2x10  2x10  2x10 2x10 2x10 nt  nt                NuStep nv L3 x6'  L4x7'  L4 x6 5' L4  7' 8' 8' 8' 10' 10'                                          Ther Activity STS                           Gait Training                                       Modalities

## 2022-06-01 ENCOUNTER — OFFICE VISIT (OUTPATIENT)
Dept: PHYSICAL THERAPY | Age: 82
End: 2022-06-01
Payer: COMMERCIAL

## 2022-06-01 DIAGNOSIS — R05.9 COUGH: ICD-10-CM

## 2022-06-01 DIAGNOSIS — E11.9 TYPE 2 DIABETES MELLITUS TREATED WITH INSULIN (HCC): ICD-10-CM

## 2022-06-01 DIAGNOSIS — R29.898 WEAKNESS OF BOTH LOWER EXTREMITIES: Primary | ICD-10-CM

## 2022-06-01 DIAGNOSIS — Z79.4 TYPE 2 DIABETES MELLITUS TREATED WITH INSULIN (HCC): ICD-10-CM

## 2022-06-01 PROCEDURE — 97110 THERAPEUTIC EXERCISES: CPT | Performed by: PHYSICAL THERAPIST

## 2022-06-01 PROCEDURE — 97112 NEUROMUSCULAR REEDUCATION: CPT | Performed by: PHYSICAL THERAPIST

## 2022-06-01 RX ORDER — MONTELUKAST SODIUM 10 MG/1
TABLET ORAL
Qty: 90 TABLET | Refills: 1 | Status: SHIPPED | OUTPATIENT
Start: 2022-06-01

## 2022-06-01 RX ORDER — PEN NEEDLE, DIABETIC 32GX 5/32"
NEEDLE, DISPOSABLE MISCELLANEOUS
Qty: 100 EACH | Refills: 1 | Status: SHIPPED | OUTPATIENT
Start: 2022-06-01

## 2022-06-03 ENCOUNTER — OFFICE VISIT (OUTPATIENT)
Dept: PHYSICAL THERAPY | Age: 82
End: 2022-06-03
Payer: COMMERCIAL

## 2022-06-03 DIAGNOSIS — R29.898 WEAKNESS OF BOTH LOWER EXTREMITIES: Primary | ICD-10-CM

## 2022-06-03 PROCEDURE — 97112 NEUROMUSCULAR REEDUCATION: CPT

## 2022-06-03 PROCEDURE — 97110 THERAPEUTIC EXERCISES: CPT

## 2022-06-03 NOTE — PROGRESS NOTES
Daily Note     Today's date: 6/3/2022  Patient name: Smiley Mcfadden  : 1940  MRN: [id number]  Referring provider: Yessi Jesus MD  Dx:   Encounter Diagnosis     ICD-10-CM    1  Weakness of both lower extremities  [id number]                   Subjective: No new concerns reported       Objective: See treatment diary below      Assessment: Tolerated treatment well  HHA for balance activities  Decreased compensation during walking over hurdles, occasionally circumducts LLE but has improved  Fatigue noted post session, but patient was able to complete program as documented below  Cues for carryover of exercises and to ensure proper quantity of exercises were completed  Patient would benefit from continued PT  Plan: Continue per plan of care        Precautions: dementia, DM, HTN, RTC repair      Manuals 6/3  4/29 5/2  5/5 5/9 5/20 5/23 5/26  6/1                                                       Neuro Re-Ed             Side stepping 3 laps  2 laps  2 laps   3 laps 3 laps 3 laps 3 laps  3x   Step over cones 2 laps       1x hurdles 2 laps 3x   serpentine cones 2 laps  4x (5 cones) 4x (5 cones) 4x 4x 4x 4x 4 laps  4x                                                       Ther Ex             Ball heel slides 30x  20x  20x  30x 30x 30x 30x 30x   Ball add 30x  20x  25x 30x 30x 30x 30x 30x 30x   TB hip abduction 30x BTB   20x RTB 25x RTB  30x  GTB 30x 30x 30x 30x BTB  30x   bridge 2x10    10x 10x2 2x10 2x10 2x10 2x10   SAQ B 2# 30x  2# 20x 2# 20x 2#/20 2#/20 2#/ 25x 2#/25 2# 25x 2/30   LAQ B 2# 30x   2# 20x 2# 20x 2#/20 2#/20 2#/ 25x 2#/25 2# 25x 2#/30x   SLR  15x   x10 ea  x10 ea  10x 15x 15x 15x 15x ea    Calf raises 30x  20x  20x  20x 20x 20x 20x 30x  20x   Standing hip abduction 1# 20x   2x10 ea 2x10 ea  2x10 2x10 2x10 2x10 30x ea 1/20   Hip extension NT  2x10  2x10  2x10 2x10 2x10 2x10 30x ea 1/20   standing march 1# 20x  20x 20x  20x 20x 20x 20x 20x 1#/20x   Standing hip flexion NT  2x10  2x10  2x10 2x10 2x10 nt nt                NuStep 10' L4  L4x7'  L4 x6 5' L4  7' 8' 8' 8' 10' 10'                                          Ther Activity             STS                           Gait Training                                       Modalities

## 2022-06-06 ENCOUNTER — HOSPITAL ENCOUNTER (OUTPATIENT)
Dept: MRI IMAGING | Facility: HOSPITAL | Age: 82
Discharge: HOME | End: 2022-06-06
Payer: COMMERCIAL

## 2022-06-06 DIAGNOSIS — H90.3 ASYMMETRIC SNHL (SENSORINEURAL HEARING LOSS): ICD-10-CM

## 2022-06-06 PROCEDURE — 70553 MRI BRAIN STEM W/O & W/DYE: CPT

## 2022-06-06 PROCEDURE — G1004 CDSM NDSC: HCPCS

## 2022-06-06 PROCEDURE — A9585 GADOBUTROL INJECTION: HCPCS | Performed by: OTOLARYNGOLOGY

## 2022-06-06 RX ADMIN — GADOBUTROL 5 ML: 604.72 INJECTION INTRAVENOUS at 20:52

## 2022-06-08 ENCOUNTER — TELEPHONE (OUTPATIENT)
Dept: FAMILY MEDICINE CLINIC | Facility: CLINIC | Age: 82
End: 2022-06-08

## 2022-06-08 ENCOUNTER — OFFICE VISIT (OUTPATIENT)
Dept: OBGYN CLINIC | Facility: CLINIC | Age: 82
End: 2022-06-08
Payer: COMMERCIAL

## 2022-06-08 VITALS
BODY MASS INDEX: 24.55 KG/M2 | HEART RATE: 84 BPM | WEIGHT: 130 LBS | HEIGHT: 61 IN | DIASTOLIC BLOOD PRESSURE: 69 MMHG | SYSTOLIC BLOOD PRESSURE: 200 MMHG

## 2022-06-08 DIAGNOSIS — M17.0 PRIMARY OSTEOARTHRITIS OF BOTH KNEES: Primary | ICD-10-CM

## 2022-06-08 DIAGNOSIS — I10 ESSENTIAL HYPERTENSION: Primary | ICD-10-CM

## 2022-06-08 PROCEDURE — 99213 OFFICE O/P EST LOW 20 MIN: CPT | Performed by: FAMILY MEDICINE

## 2022-06-08 RX ORDER — AMLODIPINE BESYLATE 5 MG/1
5 TABLET ORAL DAILY
Qty: 30 TABLET | Refills: 1 | Status: SHIPPED | OUTPATIENT
Start: 2022-06-08 | End: 2022-06-15 | Stop reason: SDUPTHER

## 2022-06-08 NOTE — PROGRESS NOTES
Assessment/Plan:  Assessment/Plan   Diagnoses and all orders for this visit:    Primary osteoarthritis of both knees  -     Medial  Knee Brace        81year-old female with chronic pain both knees  Discussed with patient and accompanying daughter physical exam, radiographs, impression and plan  X-rays both knees noted for medial joint space narrowing  Physical noted for genu varum both knees  She has mild swelling both knees  She has tenderness medial joint line both knees  She has full extension  There is laxity with valgus stress both knees  There is no groin pain with LUIS and FADDIR maneuvers of the hips  Clinical impression is that she is symptomatic from degenerative changes and altered weight-bearing mechanics in the knees  May benefit from steroid injections however blood pressure is elevated so we will defer injection at this time  She is to continue with home exercise program instructed by formal therapy  I will refer for medial  braces to help with stability and function  She will follow up if no improvement with knee braces  She is advised to follow up with PCP regarding elevated pressure  Subjective:   Patient ID: Tyrell William is a 81 y o  female  Chief Complaint   Patient presents with    Right Knee - Pain    Left Knee - Pain       59-year-old female is accompanied by daughter for evaluation of chronic pain both knees  She reports having pain described as generalized to the knees, nonradiating, worse with bearing weight and ambulating, associated with swelling, and improved with resting  She was seen by primary care provider and had x-rays done which were noted for degenerative changes  She was referred to formal therapy  She completed formal therapy reports feeling stronger in the knees  She states that pain has also improved, and states she is able to go up and down stairs more easily  She does still feel unstable with ambulation    She management symptoms with taking Tylenol and topical pain cream     Knee Pain  This is a chronic problem  The current episode started more than 1 year ago  The problem occurs daily  The problem has been waxing and waning  Associated symptoms include arthralgias, joint swelling and weakness  Pertinent negatives include no numbness  The symptoms are aggravated by standing and walking  She has tried rest and acetaminophen (Physical therapy, home exercise) for the symptoms  The treatment provided moderate relief  Review of Systems   Musculoskeletal: Positive for arthralgias and joint swelling  Neurological: Positive for weakness  Negative for numbness  Objective:  Vitals:    06/08/22 1126 06/08/22 1131   BP: (!) 201/71 (!) 200/69   Pulse: 67 84   Weight: 59 kg (130 lb)    Height: 5' 1" (1 549 m)      Right Knee Exam     Muscle Strength   The patient has normal right knee strength  Tenderness   The patient is experiencing tenderness in the medial joint line  Range of Motion   Extension: normal     Tests   Varus: negative Valgus: positive    Other   Swelling: mild    Comments:  Genu varum      Left Knee Exam     Muscle Strength   The patient has normal left knee strength  Tenderness   The patient is experiencing tenderness in the medial joint line  Range of Motion   Extension: normal     Tests   Varus: negative Valgus: positive    Other   Swelling: mild    Comments:  Genu varum      Right Hip Exam     Muscle Strength   Abduction: 4/5   Flexion: 5/5     Tests   LUIS: negative    Comments:  Negative FADDIR      Left Hip Exam     Muscle Strength   Abduction: 4/5   Flexion: 5/5     Tests   LUIS: negative    Comments:  Negative FADDIR            Physical Exam  Vitals and nursing note reviewed  Constitutional:       General: She is not in acute distress  Appearance: She is well-developed  She is not ill-appearing or diaphoretic  HENT:      Head: Normocephalic        Right Ear: External ear normal  Left Ear: External ear normal    Eyes:      Conjunctiva/sclera: Conjunctivae normal    Neck:      Trachea: No tracheal deviation  Cardiovascular:      Rate and Rhythm: Normal rate  Pulmonary:      Effort: Pulmonary effort is normal  No respiratory distress  Abdominal:      General: There is no distension  Musculoskeletal:         General: Swelling and tenderness present  No deformity or signs of injury  Skin:     General: Skin is warm and dry  Coloration: Skin is not jaundiced or pale  Neurological:      Mental Status: She is alert and oriented to person, place, and time  Psychiatric:         Mood and Affect: Mood normal          Behavior: Behavior normal          Thought Content: Thought content normal          Judgment: Judgment normal          I have personally reviewed pertinent films in PACS and my interpretation is Medial joint space narrowing both knees

## 2022-06-08 NOTE — TELEPHONE ENCOUNTER
Amlodipine 5 mg once daily sent to her pharmacy needs a F/U appt in 1 week with any provider  If she develops Facial drooping, arm weakness, slurred speech or chest pain needs to go to the ER

## 2022-06-08 NOTE — TELEPHONE ENCOUNTER
Severa Ferguson was at ortho today and her blood pressure was 200/80  Her granddaughter said it has been running high  There were no openings here  Please advise

## 2022-06-13 ENCOUNTER — TELEPHONE (OUTPATIENT)
Dept: FAMILY MEDICINE CLINIC | Facility: CLINIC | Age: 82
End: 2022-06-13

## 2022-06-13 NOTE — TELEPHONE ENCOUNTER
Boris Vasquez called asking for a Dr referral for [facility]  She has an appt 6/20/22  She has [employer] so its a dr to dr referral  Her dx is Dx: R51 9  She will check in Epic

## 2022-06-14 DIAGNOSIS — R51.9 GENERALIZED HEADACHES: Primary | ICD-10-CM

## 2022-06-15 ENCOUNTER — OFFICE VISIT (OUTPATIENT)
Dept: FAMILY MEDICINE CLINIC | Facility: CLINIC | Age: 82
End: 2022-06-15
Payer: COMMERCIAL

## 2022-06-15 VITALS
SYSTOLIC BLOOD PRESSURE: 162 MMHG | HEART RATE: 65 BPM | DIASTOLIC BLOOD PRESSURE: 62 MMHG | BODY MASS INDEX: 24.81 KG/M2 | TEMPERATURE: 97.1 F | HEIGHT: 61 IN | OXYGEN SATURATION: 97 % | WEIGHT: 131.4 LBS

## 2022-06-15 DIAGNOSIS — E11.65 TYPE 2 DIABETES MELLITUS WITH HYPERGLYCEMIA, WITHOUT LONG-TERM CURRENT USE OF INSULIN (HCC): Primary | ICD-10-CM

## 2022-06-15 DIAGNOSIS — E28.39 MENOPAUSE OVARIAN FAILURE: ICD-10-CM

## 2022-06-15 DIAGNOSIS — E11.9 TYPE 2 DIABETES MELLITUS TREATED WITH INSULIN (HCC): ICD-10-CM

## 2022-06-15 DIAGNOSIS — Z00.00 MEDICARE ANNUAL WELLNESS VISIT, SUBSEQUENT: ICD-10-CM

## 2022-06-15 DIAGNOSIS — I10 ESSENTIAL HYPERTENSION: ICD-10-CM

## 2022-06-15 DIAGNOSIS — Z79.4 TYPE 2 DIABETES MELLITUS TREATED WITH INSULIN (HCC): ICD-10-CM
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
## 2022-06-15 PROBLEM — E83.52 HYPERCALCEMIA: Status: RESOLVED | Noted: 2020-05-27 | Resolved: 2022-06-15

## 2022-06-15 PROBLEM — J01.00 ACUTE NON-RECURRENT MAXILLARY SINUSITIS: Status: RESOLVED | Noted: 2021-05-19 | Resolved: 2022-06-15

## 2022-06-15 PROBLEM — R51.9 HEADACHE: Status: RESOLVED | Noted: 2021-09-22 | Resolved: 2022-06-15

## 2022-06-15 PROBLEM — Z23 NEED FOR INFLUENZA VACCINATION: Status: RESOLVED | Noted: 2018-09-10 | Resolved: 2022-06-15

## 2022-06-15 PROBLEM — R19.7 DIARRHEA: Status: RESOLVED | Noted: 2019-10-03 | Resolved: 2022-06-15

## 2022-06-15 PROBLEM — R35.0 FREQUENCY OF URINATION: Status: RESOLVED | Noted: 2021-03-04 | Resolved: 2022-06-15

## 2022-06-15 PROCEDURE — 3288F FALL RISK ASSESSMENT DOCD: CPT | Performed by: FAMILY MEDICINE

## 2022-06-15 PROCEDURE — 1170F FXNL STATUS ASSESSED: CPT | Performed by: FAMILY MEDICINE

## 2022-06-15 PROCEDURE — G0439 PPPS, SUBSEQ VISIT: HCPCS | Performed by: FAMILY MEDICINE

## 2022-06-15 PROCEDURE — 3077F SYST BP >= 140 MM HG: CPT | Performed by: FAMILY MEDICINE

## 2022-06-15 PROCEDURE — 3078F DIAST BP <80 MM HG: CPT | Performed by: FAMILY MEDICINE

## 2022-06-15 PROCEDURE — 99214 OFFICE O/P EST MOD 30 MIN: CPT | Performed by: FAMILY MEDICINE

## 2022-06-15 PROCEDURE — 1036F TOBACCO NON-USER: CPT | Performed by: FAMILY MEDICINE

## 2022-06-15 PROCEDURE — 1125F AMNT PAIN NOTED PAIN PRSNT: CPT | Performed by: FAMILY MEDICINE

## 2022-06-15 PROCEDURE — 3725F SCREEN DEPRESSION PERFORMED: CPT | Performed by: FAMILY MEDICINE

## 2022-06-15 PROCEDURE — 1160F RVW MEDS BY RX/DR IN RCRD: CPT | Performed by: FAMILY MEDICINE

## 2022-06-15 RX ORDER — AMLODIPINE BESYLATE 10 MG/1
10 TABLET ORAL DAILY
Qty: 30 TABLET | Refills: 2 | Status: SHIPPED | OUTPATIENT
Start: 2022-06-15 | End: 2022-08-02

## 2022-06-15 RX ORDER — BLOOD-GLUCOSE METER
KIT MISCELLANEOUS DAILY
Qty: 1 KIT | Refills: 0 | Status: SHIPPED | OUTPATIENT
Start: 2022-06-15 | End: 2022-06-16 | Stop reason: SDUPTHER

## 2022-06-15 RX ORDER — LIFITEGRAST 50 MG/ML
SOLUTION/ DROPS OPHTHALMIC
COMMUNITY
Start: 2022-06-02

## 2022-06-15 NOTE — PROGRESS NOTES
Assessment and Plan:     Problem List Items Addressed This Visit        Endocrine    Type 2 diabetes mellitus with hyperglycemia, without long-term current use of insulin ([address] ) - Primary    Relevant Orders    IRIS Diabetic eye exam          Depression Screening and Follow-up Plan: Patient was screened for depression during today's encounter  They screened negative with a PHQ-2 score of 2  Preventive health issues were discussed with patient, and age appropriate screening tests were ordered as noted in patient's After Visit Summary  Personalized health advice and appropriate referrals for health education or preventive services given if needed, as noted in patient's After Visit Summary       History of Present Illness:     Patient presents for a Medicare Wellness Visit    HPI   Patient Care Team:  Erica Maldonado MD as PCP - General (Family Medicine)  Erica Maldonado MD as PCP - [address] [place] [address] (RTE)  Erica Maldonado MD as PCP - [facility] (RTE)  Indy De Jesus DPM (Podiatry)  Ja Carlson as Nurse Practitioner (Endocrinology)     Review of Systems:     Review of Systems     Problem List:     Patient Active Problem List   Diagnosis    Dementia with behavioral disturbance ([address] )    Chronic left shoulder pain    Need for lipid screening    Bilateral hearing loss    Rheumatoid arthritis 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 ([address] )    Memory loss    Alzheimer's dementia ([address] )    Type 2 diabetes mellitus with hyperglycemia, without long-term current use of insulin ([address] )    Anxiety    Need for influenza vaccination    Hypothyroidism    Essential hypertension    Fall    SVT (supraventricular tachycardia) ([address] )    Nontraumatic tear of right rotator cuff    Menopause ovarian failure    Peripheral neuropathy    Medicare annual wellness visit, subsequent    Ambulatory dysfunction    Tear of right rotator cuff    Biceps tendon tear    Diarrhea    Type 2 diabetes mellitus treated with insulin ([facility] )    Anemia in stage 3 chronic kidney disease (HCC)    Restless leg syndrome    Mixed hyperlipidemia    Hypercalcemia    Leg cramps, sleep related    Bright red blood per rectum    Coccyalgia    Frequency of urination    Ulcerated external hemorrhoids    Acute non-recurrent maxillary sinusitis    CKD (chronic kidney disease) stage 3, GFR 30-59 ml/min ([facility])    Clostridium difficile diarrhea    Herpes zoster without complication    Post herpetic neuralgia    Headache    Asymptomatic bilateral carotid artery stenosis    Weakness of both lower extremities    Trigger finger      Past Medical and Surgical History:     Past Medical History:   Diagnosis Date    Aggression     Alzheimer's dementia ([facility] )     Arthritis     Dementia ([facility] )     Diabetes insipidus ([facility] )     Diabetes mellitus ([facility] )     High cholesterol     Hypertension     Memory loss     Migraine     Polyneuropathy     Rheumatoid arthritis ([facility] )     Serum lipids high     Stomach problems     Thyroid trouble      Past Surgical History:   Procedure Laterality Date    CATARACT EXTRACTION      CHOLECYSTECTOMY      GALLBLADDER SURGERY      HYSTERECTOMY      OR SHLDR ARTHROSCOP,SURG,W/ROTAT CUFF REPR Right 8/1/2019    Procedure: SHOULDER ARTHROSCOPIC ROTATOR CUFF REPAIR;  Surgeon: Benjie Kaur MD;  [facility];  Service: Orthopedics    ROTATOR CUFF REPAIR Right 08/01/2019      Family History:     Family History   Problem Relation Age of Onset    Substance Abuse Mother         denied    Mental illness Mother         denied    Substance Abuse Father         denied    Mental illness Father         denied    Diabetes Brother     Blindness Brother     Arthritis Daughter     HIV Son       Social History:     Social History     Socioeconomic History    Marital status:       Spouse name: None    Number of children: None    Years of education: None    Highest education level: None Occupational History    None   Tobacco Use    Smoking status: Former Smoker     Packs/day: 0 25     Years: 30 00     Pack years: 7 50     Types: Cigarettes     Quit date:      Years since quittin 4    Smokeless tobacco: Never Used   Vaping Use    Vaping Use: Never used   Substance and Sexual Activity    Alcohol use: Never    Drug use: No    Sexual activity: None   Other Topics Concern    None   Social History Narrative    None     Social Determinants of Health     Financial Resource Strain: Not on file   Food Insecurity: Not on file   Transportation Needs: Not on file   Physical Activity: Not on file   Stress: Not on file   Social Connections: Not on file   Intimate Partner Violence: Not on file   Housing Stability: Not on file      Medications and Allergies:     Current Outpatient Medications   Medication Sig Dispense Refill    amLODIPine (NORVASC) 5 mg tablet Take 1 tablet (5 mg total) by mouth daily 30 tablet 1    BD Pen Needle Elisabeth 2nd Gen 32G X 4 MM MISC USE DAILY AS DIRECTED 100 each 1    bisacodyl (DULCOLAX) 5 mg EC tablet TAKE 4 TABS PO 2 HOURS PRIOR TO DRINKING MIRALAX PREP 4 tablet 0    Blood Glucose Monitoring Suppl (OneTouch Verio) w/Device KIT Use as directed 1 kit 0    Blood Pressure KIT by Does not apply route daily 1 each 1    cholestyramine (QUESTRAN) 4 g packet Take 1 packet (4 g total) by mouth 2 (two) times a day with meals 60 packet 2    Diclofenac Sodium (VOLTAREN) 1 % Apply 2 g topically 3 (three) times a day 100 g 2    fenofibrate 160 MG tablet TAKE 1 TABLET BY MOUTH EVERY DAY 90 tablet 1    fluticasone (FLONASE) 50 mcg/act nasal spray SPRAY 2 SPRAYS INTO EACH NOSTRIL EVERY DAY 16 mL 1    gabapentin (NEURONTIN) 100 mg capsule Take 2 capsules (200 mg total) by mouth 2 (two) times a day 120 capsule 3    Incontinence Supplies MISC Use 6 (six) times a day Wipes 200 each 6    Incontinence Supplies MISC Use 4 (four) times a day Comfort shield Adult diapers 200 each 6  Incontinence Supply Disposable MISC Use 6 (six) times a day Dispense disposable bed pad 200 each 6    insulin glargine (Lantus SoloStar) 100 units/mL injection pen INJECT 10 UNITS UNDER THE SKIN DAILY AT BEDTIME 15 mL 5    Insulin Pen Needle (BD Pen Needle Elisabeth U/F) 32G X 4 MM MISC Use 2 (two) times a day 100 each 5    Invokana 300 MG TABS TAKE 1 TABLET BY MOUTH EVERY DAY 90 tablet 1    levothyroxine 100 mcg tablet TAKE 1 TABLET BY MOUTH EVERY DAY 30 tablet 2    lisinopril (ZESTRIL) 40 mg tablet Take 1 tablet (40 mg total) by mouth daily 90 tablet 3    memantine (NAMENDA) 10 mg tablet TAKE 1 TABLET BY MOUTH TWICE A  tablet 1    montelukast (SINGULAIR) 10 mg tablet TAKE 1 TABLET BY MOUTH EVERYDAY AT BEDTIME 90 tablet 1    OneTouch Delica Lancets 93U MISC TEST BLOOD SUGAR 3 TIMES A  each 3    OneTouch Verio test strip USE TO TEST 3 TIMES A  strip 2    polyethylene glycol (GLYCOLAX) 17 GM/SCOOP powder  G MIRALAX IN 64 OZ CLEAR LIQUID, DRINK 8OZ Q 30 MINUTES TILL FINISHED 238 g 0    sertraline (ZOLOFT) 25 mg tablet TAKE 1 TABLET BY MOUTH EVERY DAY 30 tablet 3     No current facility-administered medications for this visit       Allergies   Allergen Reactions    Penicillins Itching and Swelling      Immunizations:     Immunization History   Administered Date(s) Administered    COVID-19 PFIZER VACCINE 0 3 ML IM 06/04/2021, 07/02/2021    INFLUENZA 12/07/2016, 09/10/2018, 10/07/2021    Influenza, high dose seasonal 0 7 mL 09/10/2018, 11/13/2020    Influenza, recombinant, quadrivalent,injectable, preservative free 10/10/2019    Pneumococcal Conjugate 13-Valent 11/13/2020    Pneumococcal Conjugate Vaccine 20-valent (Pcv20), Polysace 04/15/2022    Tuberculin Skin Test-PPD Intradermal 06/11/2018      Health Maintenance:         Topic Date Due    Colorectal Cancer Screening  10/23/2024         Topic Date Due    DTaP,Tdap,and Td Vaccines (1 - Tdap) Never done    COVID-19 Vaccine (3 - Booster for Pfizer series) 12/02/2021      Medicare Screening Tests and Risk Assessments:     Alok Rushing is here for her Subsequent Wellness visit  Health Risk Assessment:   Patient rates overall health as fair  Patient feels that their physical health rating is slightly better  Patient is satisfied with their life  Eyesight was rated as slightly worse  Hearing was rated as slightly worse  Patient feels that their emotional and mental health rating is slightly worse  Patients states they are never, rarely angry  Patient states they are never, rarely unusually tired/fatigued  Pain experienced in the last 7 days has been some  Patient's pain rating has been 6/10  Patient states that she has experienced no weight loss or gain in last 6 months  Depression Screening:   PHQ-2 Score: 2      Fall Risk Screening: In the past year, patient has experienced: history of falling in past year    Number of falls: 2 or more  Injured during fall?: No    Feels unsteady when standing or walking?: Yes    Worried about falling?: Yes      Urinary Incontinence Screening:   Patient has leaked urine accidently in the last six months  Home Safety:  Patient has trouble with stairs inside or outside of their home  Patient has working smoke alarms and has working carbon monoxide detector  Home safety hazards include: none  Nutrition:   Current diet is Regular  Medications:   Patient is not currently taking any over-the-counter supplements  Patient is not able to manage medications  Activities of Daily Living (ADLs)/Instrumental Activities of Daily Living (IADLs):   Walk and transfer into and out of bed and chair?: No  Dress and groom yourself?: No    Bathe or shower yourself?: No    Feed yourself?  Yes  Do your laundry/housekeeping?: No  Manage your money, pay your bills and track your expenses?: No  Make your own meals?: No    Do your own shopping?: No    Previous Hospitalizations:   Any hospitalizations or ED visits within the last 12 months?: Yes    How many hospitalizations have you had in the last year?: 1-2    PREVENTIVE SCREENINGS      Cardiovascular Screening:    General: Screening Not Indicated and History Lipid Disorder      Diabetes Screening:     General: Screening Not Indicated and History Diabetes      Colorectal Cancer Screening:     General: Screening Current      Cervical Cancer Screening:    General: Screening Not Indicated      Lung Cancer Screening:     General: Screening Not Indicated    Screening, Brief Intervention, and Referral to Treatment (SBIRT)    Screening  Typical number of drinks in a day: 0  Typical number of drinks in a week: 0  Interpretation: Low risk drinking behavior      Single Item Drug Screening:  How often have you used an illegal drug (including marijuana) or a prescription medication for non-medical reasons in the past year? never    Single Item Drug Screen Score: 0  Interpretation: Negative screen for possible drug use disorder    No exam data present     Physical Exam:     /62 (BP Location: Right arm, Patient Position: Sitting)   Pulse 65   Temp (!) 97 1 °F (36 2 °C)   Ht 5' 1" (1 549 m)   Wt 59 6 kg (131 lb 6 4 oz)   SpO2 97%   BMI 24 83 kg/m²     Physical Exam     Reynold Jean MD

## 2022-06-15 NOTE — PROGRESS NOTES
Assessment and Plan:     Problem List Items Addressed This Visit        Endocrine    Menopause ovarian failure    Relevant Orders    DXA bone density spine hip and pelvis    Type 2 diabetes mellitus treated with insulin ([facility] [address] )    RESOLVED: Type 2 diabetes mellitus with hyperglycemia, without long-term current use of insulin ([facility]) - Primary    Relevant Medications    Blood Glucose Monitoring Suppl (ONE TOUCH ULTRA MINI) w/Device KIT    Other Relevant Orders    Comprehensive metabolic panel    Hemoglobin A1C    Ambulatory referral to Diabetic Education       Cardiovascular and Mediastinum    Essential hypertension    Relevant Medications    amLODIPine (NORVASC) 10 mg tablet          Depression Screening and Follow-up Plan: Patient was screened for depression during today's encounter  They screened negative with a PHQ-2 score of 2  Preventive health issues were discussed with patient, and age appropriate screening tests were ordered as noted in patient's After Visit Summary  Personalized health advice and appropriate referrals for health education or preventive services given if needed, as noted in patient's After Visit Summary       History of Present Illness:     Patient presents for a Medicare Wellness Visit    HPI   Patient Care Team:  Jaime Bynum MD as PCP - General (Family Medicine)  Jaime Bynum MD as PCP - [address] [place] [address] (RTE)  [facility]  Moses Thrasher DPM (Podiatry)  Sampson Hernandez as Nurse Practitioner (Endocrinology)     Review of Systems:     Review of Systems     Problem List:     Patient Active Problem List   Diagnosis    Dementia with behavioral disturbance ([address] )    Chronic left shoulder pain    Need for lipid screening    Bilateral hearing loss    Rheumatoid arthritis 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 ([address] )    Memory loss    Alzheimer's dementia ([address] )    Anxiety    Hypothyroidism    Essential hypertension    SVT [facility]ra[facility]ricular tachycardia) ([facility])    Nontraumatic tear of right rotator cuff    Menopause ovarian failure    Peripheral neuropathy    Medicare annual wellness visit, subsequent    Ambulatory dysfunction    Tear of right rotator cuff    Biceps tendon tear    Type 2 diabetes mellitus treated with insulin ([facility])    Anemia in stage 3 chronic kidney disease ([facility])    Restless leg syndrome    Mixed hyperlipidemia    Leg cramps, sleep related    Bright red blood per rectum    Coccyalgia    Ulcerated external hemorrhoids    CKD (chronic kidney disease) stage 3, GFR 30-59 ml/min ([facility])    Clostridium difficile diarrhea    Herpes zoster without complication    Post herpetic neuralgia    Asymptomatic bilateral carotid artery stenosis    Weakness of both lower extremities    Trigger finger      Past Medical and Surgical History:     Past Medical History:   Diagnosis Date    Aggression     Alzheimer's dementia ([facility] [address] )     Arthritis     Dementia ([facility] )     Diabetes insipidus ([facility] [facility] )     Diabetes mellitus ([facility] [address] [facility] )     High cholesterol     Hypertension     Memory loss     Migraine     Polyneuropathy     Rheumatoid arthritis ([facility] [facility] )     Serum lipids high     Stomach problems     Thyroid trouble      Past Surgical History:   Procedure Laterality Date    CATARACT EXTRACTION      CHOLECYSTECTOMY      GALLBLADDER SURGERY      HYSTERECTOMY      PA SHLDR ARTHROSCOP,SURG,W/ROTAT CUFF REPR Right 8/1/2019    Procedure: SHOULDER ARTHROSCOPIC ROTATOR CUFF REPAIR;  Surgeon: Tamiko Kuo MD;  Location[facility];  Service: Orthopedics    ROTATOR CUFF REPAIR Right 08/01/2019      Family History:     Family History   Problem Relation Age of Onset    Substance Abuse Mother         denied    Mental illness Mother         denied    Substance Abuse Father         denied    Mental illness Father         denied    Diabetes Brother     Blindness Brother     Arthritis Daughter     HIV Son       Social History: Social History     Socioeconomic History    Marital status:       Spouse name: None    Number of children: None    Years of education: None    Highest education level: None   Occupational History    None   Tobacco Use    Smoking status: Former Smoker     Packs/day: 0 25     Years: 30 00     Pack years: 7 50     Types: Cigarettes     Quit date:      Years since quittin 4    Smokeless tobacco: Never Used   Vaping Use    Vaping Use: Never used   Substance and Sexual Activity    Alcohol use: Never    Drug use: No    Sexual activity: None   Other Topics Concern    None   Social History Narrative    None     Social Determinants of Health     Financial Resource Strain: Not on file   Food Insecurity: Not on file   Transportation Needs: Not on file   Physical Activity: Not on file   Stress: Not on file   Social Connections: Not on file   Intimate Partner Violence: Not on file   Housing Stability: Not on file      Medications and Allergies:     Current Outpatient Medications   Medication Sig Dispense Refill    amLODIPine (NORVASC) 10 mg tablet Take 1 tablet (10 mg total) by mouth daily 30 tablet 2    BD Pen Needle Elisabeth 2nd Gen 32G X 4 MM MISC USE DAILY AS DIRECTED 100 each 1    Blood Glucose Monitoring Suppl (ONE TOUCH ULTRA MINI) w/Device KIT Use daily 1 kit 0    cholestyramine (QUESTRAN) 4 g packet Take 1 packet (4 g total) by mouth 2 (two) times a day with meals 60 packet 2    fluticasone (FLONASE) 50 mcg/act nasal spray SPRAY 2 SPRAYS INTO EACH NOSTRIL EVERY DAY 16 mL 1    gabapentin (NEURONTIN) 100 mg capsule Take 2 capsules (200 mg total) by mouth 2 (two) times a day 120 capsule 3    insulin glargine (Lantus SoloStar) 100 units/mL injection pen INJECT 10 UNITS UNDER THE SKIN DAILY AT BEDTIME 15 mL 5    levothyroxine 100 mcg tablet TAKE 1 TABLET BY MOUTH EVERY DAY 30 tablet 2    lisinopril (ZESTRIL) 40 mg tablet Take 1 tablet (40 mg total) by mouth daily 90 tablet 3    memantine (NAMENDA) 10 mg tablet TAKE 1 TABLET BY MOUTH TWICE A  tablet 1    montelukast (SINGULAIR) 10 mg tablet TAKE 1 TABLET BY MOUTH EVERYDAY AT BEDTIME 90 tablet 1    OneTouch Delica Lancets 14M MISC TEST BLOOD SUGAR 3 TIMES A  each 3    OneTouch Verio test strip USE TO TEST 3 TIMES A  strip 2    sertraline (ZOLOFT) 25 mg tablet TAKE 1 TABLET BY MOUTH EVERY DAY 30 tablet 3    Xiidra 5 % op solution INSTILL 1 DROP IN BOTH EYES 2 TIMES PER DAY      bisacodyl (DULCOLAX) 5 mg EC tablet TAKE 4 TABS PO 2 HOURS PRIOR TO DRINKING MIRALAX PREP 4 tablet 0    Blood Pressure KIT by Does not apply route daily 1 each 1    Diclofenac Sodium (VOLTAREN) 1 % Apply 2 g topically 3 (three) times a day (Patient not taking: Reported on 6/15/2022) 100 g 2    fenofibrate 160 MG tablet TAKE 1 TABLET BY MOUTH EVERY DAY 90 tablet 1    Incontinence Supplies MISC Use 6 (six) times a day Wipes 200 each 6    Incontinence Supplies MISC Use 4 (four) times a day Comfort shield Adult diapers 200 each 6    Incontinence Supply Disposable MISC Use 6 (six) times a day Dispense disposable bed pad 200 each 6    Insulin Pen Needle (BD Pen Needle Elisabeth U/F) 32G X 4 MM MISC Use 2 (two) times a day 100 each 5    Invokana 300 MG TABS TAKE 1 TABLET BY MOUTH EVERY DAY 90 tablet 1    polyethylene glycol (GLYCOLAX) 17 GM/SCOOP powder  G MIRALAX IN 64 OZ CLEAR LIQUID, DRINK 8OZ Q 30 MINUTES TILL FINISHED 238 g 0     No current facility-administered medications for this visit       Allergies   Allergen Reactions    Penicillins Itching and Swelling      Immunizations:     Immunization History   Administered Date(s) Administered    COVID-19 PFIZER VACCINE 0 3 ML IM 06/04/2021, 07/02/2021    INFLUENZA 12/07/2016, 09/10/2018, 10/07/2021    Influenza, high dose seasonal 0 7 mL 09/10/2018, 11/13/2020    Influenza, recombinant, quadrivalent,injectable, preservative free 10/10/2019    Pneumococcal Conjugate 13-Valent 11/13/2020    Pneumococcal Conjugate Vaccine 20-valent (Pcv20), Polysace 04/15/2022    Tuberculin Skin Test-PPD Intradermal 06/11/2018      Health Maintenance:         Topic Date Due    Colorectal Cancer Screening  10/23/2024         Topic Date Due    DTaP,Tdap,and Td Vaccines (1 - Tdap) Never done    COVID-19 Vaccine (3 - Booster for Pfizer series) 12/02/2021      Medicare Screening Tests and Risk Assessments:     Jorge A Fay is here for her Subsequent Wellness visit  Health Risk Assessment:   Patient rates overall health as fair  Patient feels that their physical health rating is same  Patient is satisfied with their life  Eyesight was rated as slightly worse  Hearing was rated as slightly worse  Patient feels that their emotional and mental health rating is same  Pain experienced in the last 7 days has been none  Patient states that she has experienced weight loss or gain in last 6 months  Leg cramps and headaches at night    Depression Screening:   PHQ-2 Score: 2      Fall Risk Screening: In the past year, patient has experienced: no history of falling in past year      Urinary Incontinence Screening:   Patient has leaked urine accidently in the last six months  Home Safety:  Patient does not have trouble with stairs inside or outside of their home  Patient has working smoke alarms and has working carbon monoxide detector  Home safety hazards include: none  Nutrition:   Current diet is Regular  BMI Counseling: @BMI@ The BMI is above normal  Nutrition recommendations include 3-5 servings of fruits/vegetables daily, consuming healthier snacks and decreasing soda and/or juice intake  Exercise recommendations include strength training exercises  Medications:   Patient is not currently taking any over-the-counter supplements  Patient is not able to manage medications       Activities of Daily Living (ADLs)/Instrumental Activities of Daily Living (IADLs):   Walk and transfer into and out of bed and chair?: Yes  Dress and groom yourself?: Yes    Bathe or shower yourself?: Yes    Feed yourself?  Yes  Do your laundry/housekeeping?: No  Manage your money, pay your bills and track your expenses?: No  Make your own meals?: No    Do your own shopping?: No    Previous Hospitalizations:   Any hospitalizations or ED visits within the last 12 months?: No      Advance Care Planning:     Five wishes given: No      PREVENTIVE SCREENINGS      Cardiovascular Screening:    General: History Lipid Disorder and Screening Current      Diabetes Screening:     General: History Diabetes and Screening Current    Due for: Blood Glucose      Colorectal Cancer Screening:     General: Screening Current      Breast Cancer Screening:     General: Screening Not Indicated      Cervical Cancer Screening:    General: Screening Not Indicated      Osteoporosis Screening:      Due for: DXA Axial      Abdominal Aortic Aneurysm (AAA) Screening:        General: Screening Not Indicated      Lung Cancer Screening:     General: Screening Not Indicated      Hepatitis C Screening:    General: Screening Not Indicated    Hep C Screening Accepted: No     No exam data present     Physical Exam:     /62 (BP Location: Right arm, Patient Position: Sitting)   Pulse 65   Temp (!) 97 1 °F (36 2 °C)   Ht 5' 1" (1 549 m)   Wt 59 6 kg (131 lb 6 4 oz)   SpO2 97%   BMI 24 83 kg/m²     Physical Exam     Neena Corral MD

## 2022-06-15 NOTE — PATIENT INSTRUCTIONS
Medicare Preventive Visit Patient Instructions  Thank you for completing your Welcome to Medicare Visit or Medicare Annual Wellness Visit today  Your next wellness visit will be due in one year (6/16/2023)  The screening/preventive services that you may require over the next 5-10 years are detailed below  Some tests may not apply to you based off risk factors and/or age  Screening tests ordered at today's visit but not completed yet may show as past due  Also, please note that scanned in results may not display below  Preventive Screenings:  Service Recommendations Previous Testing/Comments   Colorectal Cancer Screening  * Colonoscopy    * Fecal Occult Blood Test (FOBT)/Fecal Immunochemical Test (FIT)  * Fecal DNA/Cologuard Test  * Flexible Sigmoidoscopy Age: 54-65 years old   Colonoscopy: every 10 years (may be performed more frequently if at higher risk)  OR  FOBT/FIT: every 1 year  OR  Cologuard: every 3 years  OR  Sigmoidoscopy: every 5 years  Screening may be recommended earlier than age 48 if at higher risk for colorectal cancer  Also, an individualized decision between you and your healthcare provider will decide whether screening between the ages of 74-80 would be appropriate  Colonoscopy: 10/23/2019  FOBT/FIT: 04/18/2022  Cologuard: Not on file  Sigmoidoscopy: Not on file    Screening Current     Breast Cancer Screening Age: 36 years old  Frequency: every 1-2 years  Not required if history of left and right mastectomy Mammogram: Not on file    Screening Not Indicated   Cervical Cancer Screening Between the ages of 21-29, pap smear recommended once every 3 years  Between the ages of 33-67, can perform pap smear with HPV co-testing every 5 years     Recommendations may differ for women with a history of total hysterectomy, cervical cancer, or abnormal pap smears in past  Pap Smear: Not on file    Screening Not Indicated   Hepatitis C Screening Once for adults born between 1945 and 1965  More frequently in patients at high risk for Hepatitis C Hep C Antibody: Not on file    Screening Not Indicated   Diabetes Screening 1-2 times per year if you're at risk for diabetes or have pre-diabetes Fasting glucose: 58 mg/dL   A1C: 8 2    Screening Not Indicated  History Diabetes  Due for Blood Glucose   Cholesterol Screening Once every 5 years if you don't have a lipid disorder  May order more often based on risk factors  Lipid panel: 04/18/2022    Screening Not Indicated  History Lipid Disorder     Other Preventive Screenings Covered by Medicare:  1  Abdominal Aortic Aneurysm (AAA) Screening: covered once if your at risk  You're considered to be at risk if you have a family history of AAA  2  Lung Cancer Screening: covers low dose CT scan once per year if you meet all of the following conditions: (1) Age 50-69; (2) No signs or symptoms of lung cancer; (3) Current smoker or have quit smoking within the last 15 years; (4) You have a tobacco smoking history of at least 30 pack years (packs per day multiplied by number of years you smoked); (5) You get a written order from a healthcare provider  3  Glaucoma Screening: covered annually if you're considered high risk: (1) You have diabetes OR (2) Family history of glaucoma OR (3)  aged 48 and older OR (3)  American aged 72 and older  3  Osteoporosis Screening: covered every 2 years if you meet one of the following conditions: (1) You're estrogen deficient and at risk for osteoporosis based off medical history and other findings; (2) Have a vertebral abnormality; (3) On glucocorticoid therapy for more than 3 months; (4) Have primary hyperparathyroidism; (5) On osteoporosis medications and need to assess response to drug therapy  · Last bone density test (DXA Scan): Not on file  5  HIV Screening: covered annually if you're between the age of 12-76  Also covered annually if you are younger than 13 and older than 72 with risk factors for HIV infection   For pregnant patients, it is covered up to 3 times per pregnancy  Immunizations:  Immunization Recommendations   Influenza Vaccine Annual influenza vaccination during flu season is recommended for all persons aged >= 6 months who do not have contraindications   Pneumococcal Vaccine (Prevnar and Pneumovax)  * Prevnar = PCV13  * Pneumovax = PPSV23   Adults 25-60 years old: 1-3 doses may be recommended based on certain risk factors  Adults 72 years old: Prevnar (PCV13) vaccine recommended followed by Pneumovax (PPSV23) vaccine  If already received PPSV23 since turning 65, then PCV13 recommended at least one year after PPSV23 dose  Hepatitis B Vaccine 3 dose series if at intermediate or high risk (ex: diabetes, end stage renal disease, liver disease)   Tetanus (Td) Vaccine - COST NOT COVERED BY MEDICARE PART B Following completion of primary series, a booster dose should be given every 10 years to maintain immunity against tetanus  Td may also be given as tetanus wound prophylaxis  Tdap Vaccine - COST NOT COVERED BY MEDICARE PART B Recommended at least once for all adults  For pregnant patients, recommended with each pregnancy  Shingles Vaccine (Shingrix) - COST NOT COVERED BY MEDICARE PART B  2 shot series recommended in those aged 48 and above     Health Maintenance Due:      Topic Date Due    Colorectal Cancer Screening  10/23/2024     Immunizations Due:      Topic Date Due    DTaP,Tdap,and Td Vaccines (1 - Tdap) Never done    COVID-19 Vaccine (3 - Booster for Pfizer series) 12/02/2021     Advance Directives   What are advance directives? Advance directives are legal documents that state your wishes and plans for medical care  These plans are made ahead of time in case you lose your ability to make decisions for yourself  Advance directives can apply to any medical decision, such as the treatments you want, and if you want to donate organs  What are the types of advance directives?   There are many types of advance directives, and each state has rules about how to use them  You may choose a combination of any of the following:  · Living will: This is a written record of the treatment you want  You can also choose which treatments you do not want, which to limit, and which to stop at a certain time  This includes surgery, medicine, IV fluid, and tube feedings  · Durable power of  for healthcare [facility]): This is a written record that states who you want to make healthcare choices for you when you are unable to make them for yourself  This person, called a proxy, is usually a family member or a friend  You may choose more than 1 proxy  · Do not resuscitate (DNR) order:  A DNR order is used in case your heart stops beating or you stop breathing  It is a request not to have certain forms of treatment, such as CPR  A DNR order may be included in other types of advance directives  · Medical directive: This covers the care that you want if you are in a coma, near death, or unable to make decisions for yourself  You can list the treatments you want for each condition  Treatment may include pain medicine, surgery, blood transfusions, dialysis, IV or tube feedings, and a ventilator (breathing machine)  · Values history: This document has questions about your views, beliefs, and how you feel and think about life  This information can help others choose the care that you would choose  Why are advance directives important? An advance directive helps you control your care  Although spoken wishes may be used, it is better to have your wishes written down  Spoken wishes can be misunderstood, or not followed  Treatments may be given even if you do not want them  An advance directive may make it easier for your family to make difficult choices about your care  Urinary Incontinence   Urinary incontinence (UI)  is when you lose control of your bladder   UI develops because your bladder cannot store or empty urine properly  The 3 most common types of UI are stress incontinence, urge incontinence, or both  Medicines:   · May be given to help strengthen your bladder control  Report any side effects of medication to your healthcare provider  Do pelvic muscle exercises often:  Your pelvic muscles help you stop urinating  Squeeze these muscles tight for 5 seconds, then relax for 5 seconds  Gradually work up to squeezing for 10 seconds  Do 3 sets of 15 repetitions a day, or as directed  This will help strengthen your pelvic muscles and improve bladder control  Train your bladder:  Go to the bathroom at set times, such as every 2 hours, even if you do not feel the urge to go  You can also try to hold your urine when you feel the urge to go  For example, hold your urine for 5 minutes when you feel the urge to go  As that becomes easier, hold your urine for 10 minutes  Self-care:   · Keep a UI record  Write down how often you leak urine and how much you leak  Make a note of what you were doing when you leaked urine  · Drink liquids as directed  You may need to limit the amount of liquid you drink to help control your urine leakage  Do not drink any liquid right before you go to bed  Limit or do not have drinks that contain caffeine or alcohol  · Prevent constipation  Eat a variety of high-fiber foods  Good examples are high-fiber cereals, beans, vegetables, and whole-grain breads  Walking is the best way to trigger your intestines to have a bowel movement  · Exercise regularly and maintain a healthy weight  Weight loss and exercise will decrease pressure on your bladder and help you control your leakage  · Use a catheter as directed  to help empty your bladder  A catheter is a tiny, plastic tube that is put into your bladder to drain your urine  · Go to behavior therapy as directed  Behavior therapy may be used to help you learn to control your urge to urinate         © Copyright [employer] 2018 Information is for End User's use only and may not be sold, redistributed or otherwise used for commercial purposes   All illustrations and images included in CareNotes® are the copyrighted property of [employer]  or [employer]

## 2022-06-15 NOTE — PROGRESS NOTES
Assessment/Plan:    No problem-specific Assessment & Plan notes found for this encounter  Diagnoses and all orders for this visit:    Type 2 diabetes mellitus with hyperglycemia, without long-term current use of insulin (HCC)  Repeat HgbA1C in 1 month  Continue same medications for now  -     Cancel: IRIS Diabetic eye exam  -     Cancel: Glucometer  -     Comprehensive metabolic panel; Future  -     Hemoglobin A1C; Future  -     Blood Glucose Monitoring Suppl (ONE TOUCH ULTRA MINI) w/Device KIT; Use daily  -     Ambulatory referral to Diabetic Education; Future    Essential hypertension  After discussing risks and benefits of medication along with side effects will start the following:  -     amLODIPine (NORVASC) 10 mg tablet; Take 1 tablet (10 mg total) by mouth daily    Menopause ovarian failure  -     DXA bone density spine hip and pelvis; Future    Other orders  -     Xiidra 5 % op solution; INSTILL 1 DROP IN BOTH EYES 2 TIMES PER DAY      Follow up in 1 month    Subjective:      Patient ID: Mirella Melton is a 81 y o  female  Patient is here to follow up for Type 2 DM  deneis any symptoms related to her diabetes  Her fasting am glucose levels have been running below 140 mg/dl  Also has elevated BP today denies any symptoms related to this  Takes her medications daily  The following portions of the patient's history were reviewed and updated as appropriate:   She  has a past medical history of Aggression, Alzheimer's dementia ([address] ), Arthritis, Dementia ([address] ), Diabetes insipidus ([address] ), Diabetes mellitus ([address] ), High cholesterol, Hypertension, Memory loss, Migraine, Polyneuropathy, Rheumatoid arthritis ([address] ), Serum lipids high, Stomach problems, and Thyroid trouble    She   Patient Active Problem List    Diagnosis Date Noted    Weakness of both lower extremities 04/15/2022    Trigger finger 04/15/2022    Asymptomatic bilateral carotid artery stenosis 09/22/2021    Post herpetic neuralgia 08/19/2021  Herpes zoster without complication 05/73/9296    Clostridium difficile diarrhea 07/20/2021    CKD (chronic kidney disease) stage 3, GFR 30-59 ml/min ([facility]) 07/12/2021    Ulcerated external hemorrhoids 03/04/2021    Bright red blood per rectum 01/25/2021    Coccyalgia 01/25/2021    Leg cramps, sleep related 07/28/2020    Restless leg syndrome 04/21/2020    Mixed hyperlipidemia 04/21/2020    Type 2 diabetes mellitus treated with insulin ([place] [address] ) 10/03/2019    Anemia in stage 3 chronic kidney disease ([place] [address] ) 10/03/2019    Tear of right rotator cuff 07/23/2019    Biceps tendon tear 07/23/2019    Nontraumatic tear of right rotator cuff 07/18/2019    Menopause ovarian failure 07/18/2019    Peripheral neuropathy 07/18/2019    Medicare annual wellness visit, subsequent 07/18/2019    Ambulatory dysfunction 07/18/2019    Hypothyroidism 05/13/2019    Essential hypertension 05/13/2019    SVT (supraventricular tachycardia) ([place] [address] ) 05/13/2019    Anxiety 08/14/2018    Alzheimer's dementia ([place] [address] ) 05/03/2018    Memory loss 03/19/2018    Bilateral hearing loss 02/22/2018    Rheumatoid arthritis 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 ([place] [address] ) 02/22/2018    Dementia with behavioral disturbance ([place] [address] ) 02/15/2018    Chronic left shoulder pain 02/15/2018    Need for lipid screening 02/15/2018     She  has a past surgical history that includes Hysterectomy; Gallbladder surgery; Cataract extraction; pr shldr arthroscop,surg,w/rotat cuff repr (Right, 8/1/2019); Rotator cuff repair (Right, 08/01/2019); and Cholecystectomy  Her family history includes Arthritis in her daughter; Blindness in her brother; Diabetes in her brother; HIV in her son; Mental illness in her father and mother; Substance Abuse in her father and mother  She  reports that she quit smoking about 37 years ago  Her smoking use included cigarettes  She has a 7 50 pack-year smoking history   She has never used smokeless tobacco  She reports that she does not drink alcohol and does not use drugs    Current Outpatient Medications   Medication Sig Dispense Refill    amLODIPine (NORVASC) 10 mg tablet Take 1 tablet (10 mg total) by mouth daily 30 tablet 2    BD Pen Needle Elisabeth 2nd Gen 32G X 4 MM MISC USE DAILY AS DIRECTED 100 each 1    Blood Glucose Monitoring Suppl (ONE TOUCH ULTRA MINI) w/Device KIT Use daily 1 kit 0    cholestyramine (QUESTRAN) 4 g packet Take 1 packet (4 g total) by mouth 2 (two) times a day with meals 60 packet 2    fluticasone (FLONASE) 50 mcg/act nasal spray SPRAY 2 SPRAYS INTO EACH NOSTRIL EVERY DAY 16 mL 1    gabapentin (NEURONTIN) 100 mg capsule Take 2 capsules (200 mg total) by mouth 2 (two) times a day 120 capsule 3    insulin glargine (Lantus SoloStar) 100 units/mL injection pen INJECT 10 UNITS UNDER THE SKIN DAILY AT BEDTIME 15 mL 5    levothyroxine 100 mcg tablet TAKE 1 TABLET BY MOUTH EVERY DAY 30 tablet 2    lisinopril (ZESTRIL) 40 mg tablet Take 1 tablet (40 mg total) by mouth daily 90 tablet 3    memantine (NAMENDA) 10 mg tablet TAKE 1 TABLET BY MOUTH TWICE A  tablet 1    montelukast (SINGULAIR) 10 mg tablet TAKE 1 TABLET BY MOUTH EVERYDAY AT BEDTIME 90 tablet 1    OneTouch Delica Lancets 01G MISC TEST BLOOD SUGAR 3 TIMES A  each 3    OneTouch Verio test strip USE TO TEST 3 TIMES A  strip 2    sertraline (ZOLOFT) 25 mg tablet TAKE 1 TABLET BY MOUTH EVERY DAY 30 tablet 3    Xiidra 5 % op solution INSTILL 1 DROP IN BOTH EYES 2 TIMES PER DAY      bisacodyl (DULCOLAX) 5 mg EC tablet TAKE 4 TABS PO 2 HOURS PRIOR TO DRINKING MIRALAX PREP 4 tablet 0    Blood Pressure KIT by Does not apply route daily 1 each 1    Diclofenac Sodium (VOLTAREN) 1 % Apply 2 g topically 3 (three) times a day (Patient not taking: Reported on 6/15/2022) 100 g 2    fenofibrate 160 MG tablet TAKE 1 TABLET BY MOUTH EVERY DAY 90 tablet 1    Incontinence Supplies MISC Use 6 (six) times a day Wipes 200 each 6    Incontinence Supplies MISC Use 4 (four) times a day Comfort shield Adult diapers 200 each 6    Incontinence Supply Disposable MISC Use 6 (six) times a day Dispense disposable bed pad 200 each 6    Insulin Pen Needle (BD Pen Needle Elisabeth U/F) 32G X 4 MM MISC Use 2 (two) times a day 100 each 5    Invokana 300 MG TABS TAKE 1 TABLET BY MOUTH EVERY DAY 90 tablet 1    polyethylene glycol (GLYCOLAX) 17 GM/SCOOP powder  G MIRALAX IN 64 OZ CLEAR LIQUID, DRINK 8OZ Q 30 MINUTES TILL FINISHED 238 g 0     No current facility-administered medications for this visit       Current Outpatient Medications on File Prior to Visit   Medication Sig    BD Pen Needle Elisabeth 2nd Gen 32G X 4 MM MISC USE DAILY AS DIRECTED    cholestyramine (QUESTRAN) 4 g packet Take 1 packet (4 g total) by mouth 2 (two) times a day with meals    fluticasone (FLONASE) 50 mcg/act nasal spray SPRAY 2 SPRAYS INTO EACH NOSTRIL EVERY DAY    gabapentin (NEURONTIN) 100 mg capsule Take 2 capsules (200 mg total) by mouth 2 (two) times a day    insulin glargine (Lantus SoloStar) 100 units/mL injection pen INJECT 10 UNITS UNDER THE SKIN DAILY AT BEDTIME    levothyroxine 100 mcg tablet TAKE 1 TABLET BY MOUTH EVERY DAY    lisinopril (ZESTRIL) 40 mg tablet Take 1 tablet (40 mg total) by mouth daily    memantine (NAMENDA) 10 mg tablet TAKE 1 TABLET BY MOUTH TWICE A DAY    montelukast (SINGULAIR) 10 mg tablet TAKE 1 TABLET BY MOUTH EVERYDAY AT BEDTIME    OneTouch Delica Lancets 60C MISC TEST BLOOD SUGAR 3 TIMES A DAY    OneTouch Verio test strip USE TO TEST 3 TIMES A DAY    sertraline (ZOLOFT) 25 mg tablet TAKE 1 TABLET BY MOUTH EVERY DAY    Xiidra 5 % op solution INSTILL 1 DROP IN BOTH EYES 2 TIMES PER DAY    [DISCONTINUED] amLODIPine (NORVASC) 5 mg tablet Take 1 tablet (5 mg total) by mouth daily    [DISCONTINUED] Blood Glucose Monitoring Suppl (OneTouch Verio) w/Device KIT Use as directed    bisacodyl (DULCOLAX) 5 mg EC tablet TAKE 4 TABS PO 2 HOURS PRIOR TO DRINKING MIRALAX PREP    Blood Pressure KIT by Does not apply route daily    Diclofenac Sodium (VOLTAREN) 1 % Apply 2 g topically 3 (three) times a day (Patient not taking: Reported on 6/15/2022)    fenofibrate 160 MG tablet TAKE 1 TABLET BY MOUTH EVERY DAY    Incontinence Supplies MISC Use 6 (six) times a day Wipes    Incontinence Supplies MISC Use 4 (four) times a day Comfort shield Adult diapers    Incontinence Supply Disposable MISC Use 6 (six) times a day Dispense disposable bed pad    Insulin Pen Needle (BD Pen Needle Elisabeth U/F) 32G X 4 MM MISC Use 2 (two) times a day    Invokana 300 MG TABS TAKE 1 TABLET BY MOUTH EVERY DAY    polyethylene glycol (GLYCOLAX) 17 GM/SCOOP powder  G MIRALAX IN 64 OZ CLEAR LIQUID, DRINK 8OZ Q 30 MINUTES TILL FINISHED     No current facility-administered medications on file prior to visit  She is allergic to penicillins       Review of Systems   Constitutional: Negative for activity change, appetite change, fatigue and fever  HENT: Negative for congestion and ear discharge  Respiratory: Negative for cough and shortness of breath  Cardiovascular: Negative for chest pain and palpitations  Gastrointestinal: Negative for diarrhea and nausea  Musculoskeletal: Negative for arthralgias and back pain  Skin: Negative for color change and rash  Neurological: Negative for dizziness and headaches  Psychiatric/Behavioral: Negative for agitation and behavioral problems  Objective:      /62 (BP Location: Right arm, Patient Position: Sitting)   Pulse 65   Temp (!) 97 1 °F (36 2 °C)   Ht 5' 1" (1 549 m)   Wt 59 6 kg (131 lb 6 4 oz)   SpO2 97%   BMI 24 83 kg/m²          Physical Exam  Constitutional:       General: She is not in acute distress  Appearance: She is well-developed  She is not diaphoretic  HENT:      Head: Normocephalic and atraumatic        Nose: Nose normal    Eyes:      Conjunctiva/sclera: Conjunctivae normal  Pupils: Pupils are equal, round, and reactive to light  Cardiovascular:      Rate and Rhythm: Normal rate and regular rhythm  Heart sounds: Normal heart sounds  No murmur heard  Pulmonary:      Effort: Pulmonary effort is normal  No respiratory distress  Breath sounds: Normal breath sounds  No wheezing  Abdominal:      General: Bowel sounds are normal  There is no distension  Palpations: Abdomen is soft  Tenderness: There is no abdominal tenderness  Skin:     General: Skin is warm and dry  Findings: No erythema or rash  Neurological:      Mental Status: She is alert and oriented to person, place, and time

## 2022-06-16 ENCOUNTER — TELEPHONE (OUTPATIENT)
Dept: FAMILY MEDICINE CLINIC | Facility: CLINIC | Age: 82
End: 2022-06-16

## 2022-06-16 ENCOUNTER — TELEPHONE (OUTPATIENT)
Dept: ADMINISTRATIVE | Facility: OTHER | Age: 82
End: 2022-06-16

## 2022-06-16 DIAGNOSIS — E11.65 TYPE 2 DIABETES MELLITUS WITH HYPERGLYCEMIA, WITHOUT LONG-TERM CURRENT USE OF INSULIN (HCC): ICD-10-CM

## 2022-06-16 RX ORDER — BLOOD-GLUCOSE METER
KIT MISCELLANEOUS 3 TIMES DAILY
Qty: 1 KIT | Refills: 1 | Status: SHIPPED | OUTPATIENT
Start: 2022-06-16 | End: 2022-06-17

## 2022-06-16 NOTE — TELEPHONE ENCOUNTER
Upon review of the In Basket request and the patient's chart, initial outreach has been made via fax, please see Contacts section for details       Thank you  Renee Jay, [address] specialists [phone] Fax [phone]

## 2022-06-16 NOTE — TELEPHONE ENCOUNTER
----- Message from Anuradha Vital MA sent at 6/15/2022  1:05 PM EDT -----  Regarding: Care Gap Request  06/15/22 1:05 PM    Luca, our patient Zbigniew White has had Diabetic Eye Exam completed/performed  Please assist in updating the patient chart by making an External outreach to [address] [place] [address] At [facility] located in [address] [place] [address]  The date of service is WITH IN THE PAST FEW MONTHS      Thank you,  Anuradha Vital MA  [facility]

## 2022-06-17 RX ORDER — BLOOD-GLUCOSE METER
KIT MISCELLANEOUS 3 TIMES DAILY
Qty: 1 KIT | Refills: 1 | Status: SHIPPED | OUTPATIENT
Start: 2022-06-17 | End: 2022-07-11

## 2022-06-17 NOTE — TELEPHONE ENCOUNTER
Upon review of the In Basket request we were able to locate, review, and update the patient chart as requested for Diabetic Eye Exam     Any additional questions or concerns should be emailed to the Practice Liaisons via [email or web address] email, please do not reply via In Basket      Thank you  Marisa Gitelman, [place]

## 2022-07-09 DIAGNOSIS — E03.9 HYPOTHYROIDISM, UNSPECIFIED TYPE: ICD-10-CM

## 2022-07-11 DIAGNOSIS — E11.65 TYPE 2 DIABETES MELLITUS WITH HYPERGLYCEMIA, WITHOUT LONG-TERM CURRENT USE OF INSULIN (HCC): Primary | ICD-10-CM

## 2022-07-11 RX ORDER — BLOOD-GLUCOSE METER
EACH MISCELLANEOUS 2 TIMES DAILY
Qty: 1 KIT | Refills: 0 | Status: SHIPPED | OUTPATIENT
Start: 2022-07-11

## 2022-07-11 RX ORDER — LEVOTHYROXINE SODIUM 0.1 MG/1
TABLET ORAL
Qty: 30 TABLET | Refills: 2 | Status: SHIPPED | OUTPATIENT
Start: 2022-07-11

## 2022-07-11 NOTE — PROGRESS NOTES
Discharge PT to [facility]  Failed to attend final appointments  PT goals partially met

## 2022-07-15 ENCOUNTER — APPOINTMENT (OUTPATIENT)
Dept: LAB | Facility: CLINIC | Age: 82
End: 2022-07-15
Payer: COMMERCIAL

## 2022-07-15 DIAGNOSIS — E11.65 TYPE 2 DIABETES MELLITUS WITH HYPERGLYCEMIA, WITHOUT LONG-TERM CURRENT USE OF INSULIN (HCC): ICD-10-CM

## 2022-07-15 LAB
ALBUMIN SERPL BCP-MCNC: 3.7 G/DL (ref 3.5–5)
ALP SERPL-CCNC: 32 U/L (ref 46–116)
ALT SERPL W P-5'-P-CCNC: 45 U/L (ref 12–78)
ANION GAP SERPL CALCULATED.3IONS-SCNC: 3 MMOL/L (ref 4–13)
AST SERPL W P-5'-P-CCNC: 42 U/L (ref 5–45)
BILIRUB SERPL-MCNC: 0.45 MG/DL (ref 0.2–1)
BUN SERPL-MCNC: 26 MG/DL (ref 5–25)
CALCIUM SERPL-MCNC: 9.6 MG/DL (ref 8.3–10.1)
CHLORIDE SERPL-SCNC: 112 MMOL/L (ref 100–108)
CO2 SERPL-SCNC: 28 MMOL/L (ref 21–32)
CREAT SERPL-MCNC: 1.3 MG/DL (ref 0.6–1.3)
EST. AVERAGE GLUCOSE BLD GHB EST-MCNC: 177 MG/DL
GFR SERPL CREATININE-BSD FRML MDRD: 38 ML/MIN/1.73SQ M
GLUCOSE P FAST SERPL-MCNC: 72 MG/DL (ref 65–99)
HBA1C MFR BLD: 7.8 %
POTASSIUM SERPL-SCNC: 4.4 MMOL/L (ref 3.5–5.3)
PROT SERPL-MCNC: 6.8 G/DL (ref 6.4–8.2)
SODIUM SERPL-SCNC: 143 MMOL/L (ref 136–145)

## 2022-07-15 PROCEDURE — 80053 COMPREHEN METABOLIC PANEL: CPT

## 2022-07-15 PROCEDURE — 36415 COLL VENOUS BLD VENIPUNCTURE: CPT

## 2022-07-15 PROCEDURE — 83036 HEMOGLOBIN GLYCOSYLATED A1C: CPT

## 2022-07-17 DIAGNOSIS — E11.9 TYPE 2 DIABETES MELLITUS TREATED WITH INSULIN (HCC): ICD-10-CM

## 2022-07-17 DIAGNOSIS — Z79.4 TYPE 2 DIABETES MELLITUS TREATED WITH INSULIN (HCC): ICD-10-CM

## 2022-07-18 ENCOUNTER — APPOINTMENT (OUTPATIENT)
Dept: RADIOLOGY | Facility: CLINIC | Age: 82
End: 2022-07-18
Payer: COMMERCIAL

## 2022-07-18 ENCOUNTER — OFFICE VISIT (OUTPATIENT)
Dept: FAMILY MEDICINE CLINIC | Facility: CLINIC | Age: 82
End: 2022-07-18
Payer: COMMERCIAL

## 2022-07-18 VITALS
DIASTOLIC BLOOD PRESSURE: 62 MMHG | OXYGEN SATURATION: 97 % | HEIGHT: 61 IN | SYSTOLIC BLOOD PRESSURE: 150 MMHG | WEIGHT: 134 LBS | BODY MASS INDEX: 25.3 KG/M2 | HEART RATE: 66 BPM

## 2022-07-18 DIAGNOSIS — R26.81 UNSTABLE GAIT: ICD-10-CM

## 2022-07-18 DIAGNOSIS — G89.29 CHRONIC MIDLINE LOW BACK PAIN WITHOUT SCIATICA: ICD-10-CM

## 2022-07-18 DIAGNOSIS — E11.9 TYPE 2 DIABETES MELLITUS TREATED WITH INSULIN (HCC): Primary | ICD-10-CM

## 2022-07-18 DIAGNOSIS — M54.50 CHRONIC MIDLINE LOW BACK PAIN WITHOUT SCIATICA: ICD-10-CM

## 2022-07-18 DIAGNOSIS — Z79.4 TYPE 2 DIABETES MELLITUS TREATED WITH INSULIN (HCC): Primary | ICD-10-CM

## 2022-07-18 DIAGNOSIS — R13.10 DYSPHAGIA, UNSPECIFIED TYPE: ICD-10-CM

## 2022-07-18 DIAGNOSIS — I10 ESSENTIAL HYPERTENSION: ICD-10-CM

## 2022-07-18 DIAGNOSIS — R06.02 SHORTNESS OF BREATH ON EXERTION: ICD-10-CM

## 2022-07-18 LAB
DME PARACHUTE DELIVERY DATE REQUESTED: NORMAL
DME PARACHUTE DELIVERY NOTE: NORMAL
DME PARACHUTE ITEM DESCRIPTION: NORMAL
DME PARACHUTE ORDER STATUS: NORMAL
DME PARACHUTE SUPPLIER NAME: NORMAL
DME PARACHUTE SUPPLIER PHONE: NORMAL

## 2022-07-18 PROCEDURE — 72110 X-RAY EXAM L-2 SPINE 4/>VWS: CPT

## 2022-07-18 PROCEDURE — 1160F RVW MEDS BY RX/DR IN RCRD: CPT | Performed by: FAMILY MEDICINE

## 2022-07-18 PROCEDURE — 3077F SYST BP >= 140 MM HG: CPT | Performed by: FAMILY MEDICINE

## 2022-07-18 PROCEDURE — 99214 OFFICE O/P EST MOD 30 MIN: CPT | Performed by: FAMILY MEDICINE

## 2022-07-18 RX ORDER — HYDROCHLOROTHIAZIDE 12.5 MG/1
12.5 TABLET ORAL DAILY
Qty: 30 TABLET | Refills: 1 | Status: SHIPPED | OUTPATIENT
Start: 2022-07-18 | End: 2022-09-26

## 2022-07-18 RX ORDER — CANAGLIFLOZIN 300 MG/1
TABLET, FILM COATED ORAL
Qty: 90 TABLET | Refills: 1 | Status: SHIPPED | OUTPATIENT
Start: 2022-07-18

## 2022-07-18 NOTE — PROGRESS NOTES
Assessment/Plan:    No problem-specific Assessment & Plan notes found for this encounter  Diagnoses and all orders for this visit:    Type 2 diabetes mellitus treated with insulin ([facility])  HgbA1C - 8 7  Improved from last HgbA1c continue same medications and dose    Essential hypertension  After discussing risks and benefits of medication along with side effects will start the following:  -     hydrochlorothiazide (HYDRODIURIL) 12 5 mg tablet; Take 1 tablet (12 5 mg total) by mouth daily    Chronic midline low back pain without sciatica  -     XR spine lumbar minimum 4 views non injury; Future    Dysphagia, unspecified type  -     Ambulatory Referral to Gastroenterology; Future    Shortness of breath on exertion  -     Echo stress test, dobutamine; Future      Follow up in 1 month    Subjective:      Patient ID: Annette Rodriguez is a 81 y o  female  Patient is here to follow up for Type 2 DM  Her fasting am glucose levels have been running between  mg/dl  She denies any symptoms related to her diabetes takes her medication daily  Also has elevated BP today denies any symptoms takes her medication daily  In addition has been having a cough nonproductive in nature  Sometimes she chokes when she is drinking liquids  Has SOB on exertion relieved by rest denies any chest pain however  The following portions of the patient's history were reviewed and updated as appropriate:   She  has a past medical history of Aggression, Alzheimer's dementia ([facility] ), Arthritis, Dementia ([facility] ), Diabetes insipidus ([address] ), Diabetes mellitus ([address] ), High cholesterol, Hypertension, Memory loss, Migraine, Polyneuropathy, Rheumatoid arthritis ([facility] ), Serum lipids high, Stomach problems, and Thyroid trouble    She   Patient Active Problem List    Diagnosis Date Noted    Chronic midline low back pain without sciatica 07/18/2022    Dysphagia 07/18/2022    Shortness of breath on exertion 07/18/2022    Weakness of both lower extremities 04/15/2022    Trigger finger 04/15/2022    Asymptomatic bilateral carotid artery stenosis 09/22/2021    Post herpetic neuralgia 08/19/2021    Herpes zoster without complication 05/65/6561    Clostridium difficile diarrhea 07/20/2021    CKD (chronic kidney disease) stage 3, GFR 30-59 ml/min ([facility]) 07/12/2021    Ulcerated external hemorrhoids 03/04/2021    Bright red blood per rectum 01/25/2021    Coccyalgia 01/25/2021    Leg cramps, sleep related 07/28/2020    Restless leg syndrome 04/21/2020    Mixed hyperlipidemia 04/21/2020    Type 2 diabetes mellitus treated with insulin ([facility] ) 10/03/2019    Anemia in stage 3 chronic kidney disease ([facility] ) 10/03/2019    Tear of right rotator cuff 07/23/2019    Biceps tendon tear 07/23/2019    Nontraumatic tear of right rotator cuff 07/18/2019    Menopause ovarian failure 07/18/2019    Peripheral neuropathy 07/18/2019    Medicare annual wellness visit, subsequent 07/18/2019    Ambulatory dysfunction 07/18/2019    Hypothyroidism 05/13/2019    Essential hypertension 05/13/2019    SVT (supraventricular tachycardia) ([facility] ) 05/13/2019    Anxiety 08/14/2018    [facility]'s dementia ([facility] ) 05/03/2018    Memory loss 03/19/2018    Bilateral hearing loss 02/22/2018    Rheumatoid arthritis 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 ([facility] ) 02/22/2018    Dementia with behavioral disturbance ([facility] ) 02/15/2018    Chronic left shoulder pain 02/15/2018    Need for lipid screening 02/15/2018     She  has a past surgical history that includes Hysterectomy; Gallbladder surgery; Cataract extraction; pr shldr arthroscop,surg,w/rotat cuff repr (Right, 8/1/2019); Rotator cuff repair (Right, 08/01/2019); and Cholecystectomy  Her family history includes Arthritis in her daughter; Blindness in her brother; Diabetes in her brother; HIV in her son; Mental illness in her father and mother; Substance Abuse in her father and mother    She  reports that she quit smoking about 37 years ago  Her smoking use included cigarettes  She has a 7 50 pack-year smoking history  She has never used smokeless tobacco  She reports that she does not drink alcohol and does not use drugs    Current Outpatient Medications   Medication Sig Dispense Refill    amLODIPine (NORVASC) 10 mg tablet Take 1 tablet (10 mg total) by mouth daily 30 tablet 2    bisacodyl (DULCOLAX) 5 mg EC tablet TAKE 4 TABS PO 2 HOURS PRIOR TO DRINKING MIRALAX PREP 4 tablet 0    Blood Glucose Monitoring Suppl (OneTouch Verio) w/Device KIT Use 2 (two) times a day 1 kit 0    Blood Pressure KIT by Does not apply route daily 1 each 1    cholestyramine (QUESTRAN) 4 g packet Take 1 packet (4 g total) by mouth 2 (two) times a day with meals 60 packet 2    fenofibrate 160 MG tablet TAKE 1 TABLET BY MOUTH EVERY DAY 90 tablet 1    fluticasone (FLONASE) 50 mcg/act nasal spray SPRAY 2 SPRAYS INTO EACH NOSTRIL EVERY DAY 16 mL 1    gabapentin (NEURONTIN) 100 mg capsule Take 2 capsules (200 mg total) by mouth 2 (two) times a day 120 capsule 3    hydrochlorothiazide (HYDRODIURIL) 12 5 mg tablet Take 1 tablet (12 5 mg total) by mouth daily 30 tablet 1    Incontinence Supplies MISC Use 6 (six) times a day Wipes 200 each 6    Incontinence Supplies MISC Use 4 (four) times a day Comfort shield Adult diapers 200 each 6    Incontinence Supply Disposable MISC Use 6 (six) times a day Dispense disposable bed pad 200 each 6    insulin glargine (Lantus SoloStar) 100 units/mL injection pen INJECT 10 UNITS UNDER THE SKIN DAILY AT BEDTIME 15 mL 5    Insulin Pen Needle (BD Pen Needle Elisabeth U/F) 32G X 4 MM MISC Use 2 (two) times a day 100 each 5    Invokana 300 MG TABS TAKE 1 TABLET BY MOUTH EVERY DAY 90 tablet 1    levothyroxine 100 mcg tablet TAKE 1 TABLET BY MOUTH EVERY DAY 30 tablet 2    lisinopril (ZESTRIL) 40 mg tablet Take 1 tablet (40 mg total) by mouth daily 90 tablet 3    memantine (NAMENDA) 10 mg tablet TAKE 1 TABLET BY MOUTH TWICE A  tablet 1    OneTouch Delica Lancets 70X MISC TEST BLOOD SUGAR 3 TIMES A  each 3    OneTouch Verio test strip USE TO TEST 3 TIMES A  strip 2    polyethylene glycol (GLYCOLAX) 17 GM/SCOOP powder  G MIRALAX IN 64 OZ CLEAR LIQUID, DRINK 8OZ Q 30 MINUTES TILL FINISHED 238 g 0    sertraline (ZOLOFT) 25 mg tablet TAKE 1 TABLET BY MOUTH EVERY DAY 30 tablet 3    Xiidra 5 % op solution INSTILL 1 DROP IN BOTH EYES 2 TIMES PER DAY      BD Pen Needle Elisabeth 2nd Gen 32G X 4 MM MISC USE DAILY AS DIRECTED 100 each 1    Diclofenac Sodium (VOLTAREN) 1 % Apply 2 g topically 3 (three) times a day (Patient not taking: No sig reported) 100 g 2    montelukast (SINGULAIR) 10 mg tablet TAKE 1 TABLET BY MOUTH EVERYDAY AT BEDTIME (Patient not taking: Reported on 7/18/2022) 90 tablet 1     No current facility-administered medications for this visit       Current Outpatient Medications on File Prior to Visit   Medication Sig    amLODIPine (NORVASC) 10 mg tablet Take 1 tablet (10 mg total) by mouth daily    bisacodyl (DULCOLAX) 5 mg EC tablet TAKE 4 TABS PO 2 HOURS PRIOR TO DRINKING MIRALAX PREP    Blood Glucose Monitoring Suppl (OneTouch Verio) w/Device KIT Use 2 (two) times a day    Blood Pressure KIT by Does not apply route daily    cholestyramine (QUESTRAN) 4 g packet Take 1 packet (4 g total) by mouth 2 (two) times a day with meals    fenofibrate 160 MG tablet TAKE 1 TABLET BY MOUTH EVERY DAY    fluticasone (FLONASE) 50 mcg/act nasal spray SPRAY 2 SPRAYS INTO EACH NOSTRIL EVERY DAY    gabapentin (NEURONTIN) 100 mg capsule Take 2 capsules (200 mg total) by mouth 2 (two) times a day    Incontinence Supplies MISC Use 6 (six) times a day Wipes    Incontinence Supplies MISC Use 4 (four) times a day Comfort shield Adult diapers    Incontinence Supply Disposable MISC Use 6 (six) times a day Dispense disposable bed pad    insulin glargine (Lantus SoloStar) 100 units/mL injection pen INJECT 10 UNITS UNDER THE SKIN DAILY AT BEDTIME    Insulin Pen Needle (BD Pen Needle Elisabeth U/F) 32G X 4 MM MISC Use 2 (two) times a day    Invokana 300 MG TABS TAKE 1 TABLET BY MOUTH EVERY DAY    levothyroxine 100 mcg tablet TAKE 1 TABLET BY MOUTH EVERY DAY    lisinopril (ZESTRIL) 40 mg tablet Take 1 tablet (40 mg total) by mouth daily    memantine (NAMENDA) 10 mg tablet TAKE 1 TABLET BY MOUTH TWICE A DAY    OneTouch Delica Lancets 14H MISC TEST BLOOD SUGAR 3 TIMES A DAY    OneTouch Verio test strip USE TO TEST 3 TIMES A DAY    polyethylene glycol (GLYCOLAX) 17 GM/SCOOP powder  G MIRALAX IN 64 OZ CLEAR LIQUID, DRINK 8OZ Q 30 MINUTES TILL FINISHED    sertraline (ZOLOFT) 25 mg tablet TAKE 1 TABLET BY MOUTH EVERY DAY    Xiidra 5 % op solution INSTILL 1 DROP IN BOTH EYES 2 TIMES PER DAY    BD Pen Needle Elisabeth 2nd Gen 32G X 4 MM MISC USE DAILY AS DIRECTED    Diclofenac Sodium (VOLTAREN) 1 % Apply 2 g topically 3 (three) times a day (Patient not taking: No sig reported)    montelukast (SINGULAIR) 10 mg tablet TAKE 1 TABLET BY MOUTH EVERYDAY AT BEDTIME (Patient not taking: Reported on 7/18/2022)    [DISCONTINUED] Invokana 300 MG TABS TAKE 1 TABLET BY MOUTH EVERY DAY     No current facility-administered medications on file prior to visit  She is allergic to penicillins       Review of Systems   Constitutional: Negative for activity change, appetite change, fatigue and fever  HENT: Negative for congestion and ear discharge  Respiratory: Positive for shortness of breath  Negative for cough  Cardiovascular: Negative for chest pain and palpitations  Gastrointestinal: Negative for diarrhea and nausea  Musculoskeletal: Positive for gait problem  Negative for arthralgias and back pain  Skin: Negative for color change and rash  Neurological: Negative for dizziness and headaches  Psychiatric/Behavioral: Negative for agitation and behavioral problems           Objective:      /62 (BP Location: Left arm, Patient Position: Sitting, Cuff Size: Large)   Pulse 66   Ht 5' 1" (1 549 m)   Wt 60 8 kg (134 lb)   SpO2 97%   BMI 25 32 kg/m²          Physical Exam  Constitutional:       General: She is not in acute distress  Appearance: She is well-developed  She is not diaphoretic  HENT:      Head: Normocephalic and atraumatic  Nose: Nose normal    Eyes:      Conjunctiva/sclera: Conjunctivae normal       Pupils: Pupils are equal, round, and reactive to light  Cardiovascular:      Rate and Rhythm: Normal rate and regular rhythm  Heart sounds: Normal heart sounds  No murmur heard  Pulmonary:      Effort: Pulmonary effort is normal  No respiratory distress  Breath sounds: Normal breath sounds  No wheezing  Abdominal:      General: Bowel sounds are normal  There is no distension  Palpations: Abdomen is soft  Tenderness: There is no abdominal tenderness  Skin:     General: Skin is warm and dry  Findings: No erythema or rash  Neurological:      Mental Status: She is alert and oriented to person, place, and time  Motor: Weakness present        Gait: Gait abnormal

## 2022-08-02 ENCOUNTER — HOSPITAL ENCOUNTER (OUTPATIENT)
Dept: GASTROENTEROLOGY | Facility: HOSPITAL | Age: 82
Setting detail: OUTPATIENT SURGERY
Discharge: HOME | End: 2022-08-02
Attending: INTERNAL MEDICINE | Admitting: INTERNAL MEDICINE
Payer: COMMERCIAL

## 2022-08-02 ENCOUNTER — ANESTHESIA EVENT (OUTPATIENT)
Dept: GASTROENTEROLOGY | Facility: HOSPITAL | Age: 82
End: 2022-08-02

## 2022-08-02 ENCOUNTER — ANESTHESIA (OUTPATIENT)
Dept: GASTROENTEROLOGY | Facility: HOSPITAL | Age: 82
End: 2022-08-02

## 2022-08-02 VITALS
HEART RATE: 77 BPM | DIASTOLIC BLOOD PRESSURE: 88 MMHG | WEIGHT: 132.5 LBS | OXYGEN SATURATION: 94 % | BODY MASS INDEX: 25.02 KG/M2 | TEMPERATURE: 97.3 F | SYSTOLIC BLOOD PRESSURE: 159 MMHG | HEIGHT: 61 IN | RESPIRATION RATE: 20 BRPM

## 2022-08-02 DIAGNOSIS — D64.9 ANEMIA, UNSPECIFIED TYPE: ICD-10-CM

## 2022-08-02 DIAGNOSIS — R19.5 POSITIVE FIT (FECAL IMMUNOCHEMICAL TEST): ICD-10-CM

## 2022-08-02 DIAGNOSIS — R19.7 DIARRHEA, UNSPECIFIED TYPE: ICD-10-CM

## 2022-08-02 LAB — GLUCOSE SERPL-MCNC: 82 MG/DL (ref 65–140)

## 2022-08-02 PROCEDURE — 45380 COLONOSCOPY AND BIOPSY: CPT | Performed by: INTERNAL MEDICINE

## 2022-08-02 PROCEDURE — 88305 TISSUE EXAM BY PATHOLOGIST: CPT | Performed by: SPECIALIST

## 2022-08-02 PROCEDURE — 88342 IMHCHEM/IMCYTCHM 1ST ANTB: CPT | Performed by: SPECIALIST

## 2022-08-02 PROCEDURE — 88341 IMHCHEM/IMCYTCHM EA ADD ANTB: CPT | Performed by: SPECIALIST

## 2022-08-02 PROCEDURE — 82948 REAGENT STRIP/BLOOD GLUCOSE: CPT

## 2022-08-02 PROCEDURE — 43239 EGD BIOPSY SINGLE/MULTIPLE: CPT | Performed by: INTERNAL MEDICINE

## 2022-08-02 RX ORDER — SODIUM CHLORIDE, SODIUM LACTATE, POTASSIUM CHLORIDE, CALCIUM CHLORIDE 600; 310; 30; 20 MG/100ML; MG/100ML; MG/100ML; MG/100ML
INJECTION, SOLUTION INTRAVENOUS CONTINUOUS PRN
Status: DISCONTINUED | OUTPATIENT
Start: 2022-08-02 | End: 2022-08-02

## 2022-08-02 RX ORDER — SODIUM CHLORIDE, SODIUM LACTATE, POTASSIUM CHLORIDE, CALCIUM CHLORIDE 600; 310; 30; 20 MG/100ML; MG/100ML; MG/100ML; MG/100ML
75 INJECTION, SOLUTION INTRAVENOUS CONTINUOUS
Status: CANCELLED | OUTPATIENT
Start: 2022-08-02

## 2022-08-02 RX ORDER — PROPOFOL 10 MG/ML
INJECTION, EMULSION INTRAVENOUS AS NEEDED
Status: DISCONTINUED | OUTPATIENT
Start: 2022-08-02 | End: 2022-08-02

## 2022-08-02 RX ORDER — LIDOCAINE HYDROCHLORIDE 20 MG/ML
INJECTION, SOLUTION EPIDURAL; INFILTRATION; INTRACAUDAL; PERINEURAL AS NEEDED
Status: DISCONTINUED | OUTPATIENT
Start: 2022-08-02 | End: 2022-08-02

## 2022-08-02 RX ADMIN — PROPOFOL 40 MG: 10 INJECTION, EMULSION INTRAVENOUS at 07:37

## 2022-08-02 RX ADMIN — PROPOFOL 100 MG: 10 INJECTION, EMULSION INTRAVENOUS at 07:25

## 2022-08-02 RX ADMIN — LIDOCAINE HYDROCHLORIDE 100 MG: 20 INJECTION, SOLUTION EPIDURAL; INFILTRATION; INTRACAUDAL; PERINEURAL at 07:25

## 2022-08-02 RX ADMIN — SODIUM CHLORIDE, SODIUM LACTATE, POTASSIUM CHLORIDE, AND CALCIUM CHLORIDE: .6; .31; .03; .02 INJECTION, SOLUTION INTRAVENOUS at 07:12

## 2022-08-02 RX ADMIN — PROPOFOL 50 MG: 10 INJECTION, EMULSION INTRAVENOUS at 07:32

## 2022-08-02 RX ADMIN — PROPOFOL 50 MG: 10 INJECTION, EMULSION INTRAVENOUS at 07:29

## 2022-08-02 NOTE — H&P
History and Physical -  [facility]  Ronnell Meng 81 y o  female MRN: [id number]                  HPI: Ronnell Meng is a 81y o  year old female who presents for EGD and colonoscopy for diarrhea, anemia, fit positive stool  Prior history of colonoscopy greater than 10 years ago      REVIEW OF SYSTEMS: Per the HPI, and otherwise unremarkable      Historical Information   Past Medical History:   Diagnosis Date    Aggression     Alzheimer's dementia ([facility]     Arthritis     Dementia ([facility]     Diabetes insipidus ([facility]     Diabetes mellitus ([facility]     High cholesterol     Hypertension     Memory loss     Migraine     Polyneuropathy     Rheumatoid arthritis (HCC)     Serum lipids high     Stomach problems     Thyroid trouble      Past Surgical History:   Procedure Laterality Date    CATARACT EXTRACTION      CHOLECYSTECTOMY      GALLBLADDER SURGERY      HYSTERECTOMY      CT SHLDR ARTHROSCOP,SURG,W/ROTAT CUFF REPR Right 2019    Procedure: SHOULDER ARTHROSCOPIC ROTATOR CUFF REPAIR;  Surgeon: Cruz Bazzi MD;  Location: [facility];  Service: Orthopedics    ROTATOR CUFF REPAIR Right 2019     Social History   Social History     Substance and Sexual Activity   Alcohol Use Never     Social History     Substance and Sexual Activity   Drug Use No     Social History     Tobacco Use   Smoking Status Former Smoker    Packs/day: 0 25    Years: 30 00    Pack years: 7 50    Types: Cigarettes    Quit date: 5    Years since quittin 6   Smokeless Tobacco Never Used     Family History   Problem Relation Age of Onset    Substance Abuse Mother         denied    Mental illness Mother         denied    Substance Abuse Father         denied    Mental illness Father         denied    Diabetes Brother     Blindness Brother     Arthritis Daughter    [employer] HIV Son        Meds/Allergies     (Not in a hospital admission)      Allergies   Allergen Reactions    Penicillins Itching and Swelling       Objective     There were no vitals taken for this visit        PHYSICAL EXAM    Gen: NAD  CV: RRR  CHEST: Clear  ABD: soft, NT/ND  EXT: no edema  Neuro: AAO      ASSESSMENT/PLAN:  This is a 81y o  year old female here for anemia, fit positive stool, diarrhea    PLAN:   Procedure:  EGD and colonoscopy

## 2022-08-02 NOTE — ANESTHESIA POSTPROCEDURE EVALUATION
Post-Op Assessment Note    CV Status:  Stable    Pain management: adequate     Mental Status:  Alert and awake   Hydration Status:  Euvolemic   PONV Controlled:  Controlled   Airway Patency:  Patent      Post Op Vitals Reviewed: Yes      Staff: CRNA         No complications documented      /72   Temp     Pulse  66   Resp 18   SpO2 99

## 2022-08-02 NOTE — INTERVAL H&P NOTE
H&P reviewed  After examining the patient I find no changes in the patients condition since the H&P had been written      Vitals:    08/02/22 0652   BP: (!) 197/77   Pulse: 67   Resp: 16   Temp: (!) 97 4 °F (36 3 °C)   SpO2: 98%

## 2022-08-02 NOTE — ANESTHESIA PREPROCEDURE EVALUATION
Procedure:  COLONOSCOPY  EGD    Relevant Problems   CARDIO   (+) Essential hypertension   (+) Mixed hyperlipidemia   (+) SVT (supraventricular tachycardia) (HCC)   (+) Shortness of breath on exertion      ENDO   (+) Hypothyroidism   (+) Type 2 diabetes mellitus treated with insulin (HCC)      GI/HEPATIC   (+) Bright red blood per rectum   (+) Dysphagia      /RENAL   (+) CKD (chronic kidney disease) stage 3, GFR 30-59 ml/min (HCC)      HEMATOLOGY   (+) Anemia in stage 3 chronic kidney disease (HCC)      MUSCULOSKELETAL   (+) Chronic midline low back pain without sciatica   (+) Coccyalgia   (+) Rheumatoid arthritis 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 (HCC)      NEURO/PSYCH   (+) Alzheimer's dementia ([facility])   (+) Anxiety   (+) Chronic left shoulder pain   (+) Chronic midline low back pain without sciatica   (+) Dementia with behavioral disturbance (HCC)      PULMONARY   (+) Shortness of breath on exertion        Physical Exam    Airway    Mallampati score: II  TM Distance: >3 FB  Neck ROM: limited     Dental   upper dentures and lower dentures,     Cardiovascular  Cardiovascular exam normal    Pulmonary  Pulmonary exam normal     Other Findings        Anesthesia Plan  ASA Score- 3     Anesthesia Type- IV sedation with anesthesia with ASA Monitors  Additional Monitors:   Airway Plan:           Plan Factors-Exercise tolerance (METS): <4 METS  Chart reviewed  EKG reviewed  Imaging results reviewed  Existing labs reviewed  Patient summary reviewed  Patient is not a current smoker  Induction- intravenous  Postoperative Plan-     Informed Consent- Anesthetic plan and risks discussed with patient and healthcare power of   I personally reviewed this patient with the CRNA  Discussed and agreed on the Anesthesia Plan with the [place]

## 2022-08-03 LAB
DME PARACHUTE DELIVERY DATE ACTUAL: NORMAL
DME PARACHUTE DELIVERY DATE EXPECTED: NORMAL
DME PARACHUTE DELIVERY DATE REQUESTED: NORMAL
DME PARACHUTE ITEM DESCRIPTION: NORMAL
DME PARACHUTE ITEM DESCRIPTION: NORMAL
DME PARACHUTE ORDER STATUS: NORMAL
DME PARACHUTE SUPPLIER NAME: NORMAL
DME PARACHUTE SUPPLIER PHONE: NORMAL

## 2022-08-08 DIAGNOSIS — R19.7 DIARRHEA, UNSPECIFIED TYPE: ICD-10-CM

## 2022-08-08 RX ORDER — CHOLESTYRAMINE 4 G/9G
POWDER, FOR SUSPENSION ORAL
Qty: 60 PACKET | Refills: 2 | Status: SHIPPED | OUTPATIENT
Start: 2022-08-08

## 2022-08-09 ENCOUNTER — TELEPHONE (OUTPATIENT)
Dept: FAMILY MEDICINE CLINIC | Facility: CLINIC | Age: 82
End: 2022-08-09

## 2022-08-09 NOTE — TELEPHONE ENCOUNTER
Pt is scheduled for Echo Stress Dobutamine tomorrow   Looking back in her records, she said this is not the test that she needs to get done    They need an RX for nuclear stress test w/ nuc medicine  Calista John

## 2022-08-17 ENCOUNTER — RA CDI HCC (OUTPATIENT)
Dept: OTHER | Facility: HOSPITAL | Age: 82
End: 2022-08-17

## 2022-08-17 NOTE — PROGRESS NOTES
[facility] [address]  coding opportunities     E11 69     Chart Reviewed number of suggestions sent to Provider: 1     Patients Insurance     Medicare Insurance: [address] [place] [address]

## 2022-08-18 ENCOUNTER — TELEPHONE (OUTPATIENT)
Dept: FAMILY MEDICINE CLINIC | Facility: CLINIC | Age: 82
End: 2022-08-18

## 2022-08-18 DIAGNOSIS — R06.02 SHORTNESS OF BREATH ON EXERTION: Primary | ICD-10-CM

## 2022-08-18 NOTE — TELEPHONE ENCOUNTER
Daughter calls to ask what to do next - Last week her mom was scheduled for a stress test and the facility cancelled stating it was the wrong test  She has been waiting to hear from us as to what to do and which test should be done      Please order and call daughter

## 2022-08-22 ENCOUNTER — TELEPHONE (OUTPATIENT)
Dept: GASTROENTEROLOGY | Facility: CLINIC | Age: 82
End: 2022-08-22

## 2022-08-22 ENCOUNTER — OFFICE VISIT (OUTPATIENT)
Dept: FAMILY MEDICINE CLINIC | Facility: CLINIC | Age: 82
End: 2022-08-22
Payer: COMMERCIAL

## 2022-08-22 ENCOUNTER — APPOINTMENT (OUTPATIENT)
Dept: RADIOLOGY | Facility: CLINIC | Age: 82
End: 2022-08-22
Payer: COMMERCIAL

## 2022-08-22 VITALS
HEART RATE: 67 BPM | WEIGHT: 135 LBS | OXYGEN SATURATION: 96 % | DIASTOLIC BLOOD PRESSURE: 64 MMHG | BODY MASS INDEX: 25.49 KG/M2 | SYSTOLIC BLOOD PRESSURE: 122 MMHG | HEIGHT: 61 IN

## 2022-08-22 DIAGNOSIS — M25.551 RIGHT HIP PAIN: ICD-10-CM

## 2022-08-22 DIAGNOSIS — I10 ESSENTIAL HYPERTENSION: Primary | ICD-10-CM

## 2022-08-22 PROCEDURE — 73502 X-RAY EXAM HIP UNI 2-3 VIEWS: CPT

## 2022-08-22 PROCEDURE — 99213 OFFICE O/P EST LOW 20 MIN: CPT | Performed by: FAMILY MEDICINE

## 2022-08-22 PROCEDURE — 3074F SYST BP LT 130 MM HG: CPT | Performed by: FAMILY MEDICINE

## 2022-08-22 PROCEDURE — 3078F DIAST BP <80 MM HG: CPT | Performed by: FAMILY MEDICINE

## 2022-08-22 PROCEDURE — 1160F RVW MEDS BY RX/DR IN RCRD: CPT | Performed by: FAMILY MEDICINE

## 2022-08-22 NOTE — PROGRESS NOTES
Assessment/Plan:    No problem-specific Assessment & Plan notes found for this encounter  Diagnoses and all orders for this visit:    Essential hypertension  Stable controlled    Right hip pain  -     XR hip/pelv 2-3 vws right if performed; Future      Follow up after November 1st    Subjective:      Patient ID: Hans Nunez is a 81 y o  female  Patient is here to follow up for hypertension  She denies any symptoms related to this  Her BP is improved compared to last appt  She had a fall about 3 weeks ago  She says that her right hip has been bothering her since  The following portions of the patient's history were reviewed and updated as appropriate:   She  has a past medical history of Aggression, Alzheimer's dementia ([facility] ), Arthritis, Dementia ([facility] ), Diabetes insipidus ([facility] ), Diabetes mellitus ([facility] ), High cholesterol, Hypertension, Memory loss, Migraine, Polyneuropathy, Rheumatoid arthritis ([facility] ), Serum lipids high, Stomach problems, and Thyroid trouble    She   Patient Active Problem List    Diagnosis Date Noted    Right hip pain 08/22/2022    Chronic midline low back pain without sciatica 07/18/2022    Dysphagia 07/18/2022    Shortness of breath on exertion 07/18/2022    Weakness of both lower extremities 04/15/2022    Trigger finger 04/15/2022    Asymptomatic bilateral carotid artery stenosis 09/22/2021    Post herpetic neuralgia 08/19/2021    Herpes zoster without complication 47/36/3330    Clostridium difficile diarrhea 07/20/2021    CKD (chronic kidney disease) stage 3, GFR 30-59 ml/min ([facility]) 07/12/2021    Ulcerated external hemorrhoids 03/04/2021    Bright red blood per rectum 01/25/2021    Coccyalgia 01/25/2021    Leg cramps, sleep related 07/28/2020    Restless leg syndrome 04/21/2020    Mixed hyperlipidemia 04/21/2020    Type 2 diabetes mellitus treated with insulin ([facility] ) 10/03/2019    Anemia in stage 3 chronic kidney disease ([facility] ) 10/03/2019    Unstable gait 08/15/2019    Tear of right rotator cuff 07/23/2019    Biceps tendon tear 07/23/2019    Nontraumatic tear of right rotator cuff 07/18/2019    Menopause ovarian failure 07/18/2019    Peripheral neuropathy 07/18/2019    Medicare annual wellness visit, subsequent 07/18/2019    Ambulatory dysfunction 07/18/2019    Hypothyroidism 05/13/2019    Essential hypertension 05/13/2019    SVT (supraventricular tachycardia) ([facility] ) 05/13/2019    Anxiety 08/14/2018    [facility] ([facility] 05/03/2018    Memory loss 03/19/2018    Bilateral hearing loss 02/22/2018    Rheumatoid arthritis 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 ([facility] ) 02/22/2018    Dementia with behavioral disturbance ([facility] ) 02/15/2018    Chronic left shoulder pain 02/15/2018    Need for lipid screening 02/15/2018     She  has a past surgical history that includes Hysterectomy; Gallbladder surgery; Cataract extraction; pr shldr arthroscop,surg,w/rotat cuff repr (Right, 8/1/2019); Rotator cuff repair (Right, 08/01/2019); and Cholecystectomy  Her family history includes Arthritis in her daughter; Blindness in her brother; Diabetes in her brother; HIV in her son; Mental illness in her father and mother; Substance Abuse in her father and mother  She  reports that she quit smoking about 37 years ago  Her smoking use included cigarettes  She has a 7 50 pack-year smoking history  She has never used smokeless tobacco  She reports that she does not drink alcohol and does not use drugs    Current Outpatient Medications   Medication Sig Dispense Refill    amLODIPine (NORVASC) 10 mg tablet Take 1 tablet (10 mg total) by mouth daily 30 tablet 2    BD Pen Needle Elisabeth 2nd Gen 32G X 4 MM MISC USE DAILY AS DIRECTED 100 each 1    bisacodyl (DULCOLAX) 5 mg EC tablet TAKE 4 TABS PO 2 HOURS PRIOR TO DRINKING MIRALAX PREP 4 tablet 0    Blood Glucose Monitoring Suppl (OneTouch Verio) w/Device KIT Use 2 (two) times a day 1 kit 0    Blood Pressure KIT by Does not apply route daily 1 each 1    cholestyramine (QUESTRAN) 4 g packet TAKE 1 PACKET BY MOUTH 2 TIMES A DAY WITH MEALS   60 packet 2    Diclofenac Sodium (VOLTAREN) 1 % Apply 2 g topically 3 (three) times a day (Patient not taking: No sig reported) 100 g 2    fenofibrate 160 MG tablet TAKE 1 TABLET BY MOUTH EVERY DAY 90 tablet 1    fluticasone (FLONASE) 50 mcg/act nasal spray SPRAY 2 SPRAYS INTO EACH NOSTRIL EVERY DAY 16 mL 1    gabapentin (NEURONTIN) 100 mg capsule Take 2 capsules (200 mg total) by mouth 2 (two) times a day 120 capsule 3    hydrochlorothiazide (HYDRODIURIL) 12 5 mg tablet Take 1 tablet (12 5 mg total) by mouth daily 30 tablet 1    Incontinence Supplies MISC Use 6 (six) times a day Wipes 200 each 6    Incontinence Supplies MISC Use 4 (four) times a day Comfort shield Adult diapers 200 each 6    Incontinence Supply Disposable MISC Use 6 (six) times a day Dispense disposable bed pad 200 each 6    insulin glargine (Lantus SoloStar) 100 units/mL injection pen INJECT 10 UNITS UNDER THE SKIN DAILY AT BEDTIME 15 mL 5    Insulin Pen Needle (BD Pen Needle Elisabeth U/F) 32G X 4 MM MISC Use 2 (two) times a day 100 each 5    Invokana 300 MG TABS TAKE 1 TABLET BY MOUTH EVERY DAY 90 tablet 1    levothyroxine 100 mcg tablet TAKE 1 TABLET BY MOUTH EVERY DAY 30 tablet 2    lisinopril (ZESTRIL) 40 mg tablet Take 1 tablet (40 mg total) by mouth daily 90 tablet 3    memantine (NAMENDA) 10 mg tablet TAKE 1 TABLET BY MOUTH TWICE A  tablet 1    montelukast (SINGULAIR) 10 mg tablet TAKE 1 TABLET BY MOUTH EVERYDAY AT BEDTIME (Patient not taking: Reported on 7/18/2022) 90 tablet 1    OneTouch Delica Lancets 23E MISC TEST BLOOD SUGAR 3 TIMES A  each 3    OneTouch Verio test strip USE TO TEST 3 TIMES A  strip 2    polyethylene glycol (GLYCOLAX) 17 GM/SCOOP powder  G MIRALAX IN 64 OZ CLEAR LIQUID, DRINK 8OZ Q 30 MINUTES TILL FINISHED 238 g 0    sertraline (ZOLOFT) 25 mg tablet TAKE 1 TABLET BY MOUTH EVERY DAY 30 tablet 3    Xiidra 5 % op solution INSTILL 1 DROP IN BOTH EYES 2 TIMES PER DAY       No current facility-administered medications for this visit  Current Outpatient Medications on File Prior to Visit   Medication Sig    amLODIPine (NORVASC) 10 mg tablet Take 1 tablet (10 mg total) by mouth daily    BD Pen Needle Elisabeth 2nd Gen 32G X 4 MM MISC USE DAILY AS DIRECTED    bisacodyl (DULCOLAX) 5 mg EC tablet TAKE 4 TABS PO 2 HOURS PRIOR TO DRINKING MIRALAX PREP    Blood Glucose Monitoring Suppl (OneTouch Verio) w/Device KIT Use 2 (two) times a day    Blood Pressure KIT by Does not apply route daily    cholestyramine (QUESTRAN) 4 g packet TAKE 1 PACKET BY MOUTH 2 TIMES A DAY WITH MEALS      Diclofenac Sodium (VOLTAREN) 1 % Apply 2 g topically 3 (three) times a day (Patient not taking: No sig reported)    fenofibrate 160 MG tablet TAKE 1 TABLET BY MOUTH EVERY DAY    fluticasone (FLONASE) 50 mcg/act nasal spray SPRAY 2 SPRAYS INTO EACH NOSTRIL EVERY DAY    gabapentin (NEURONTIN) 100 mg capsule Take 2 capsules (200 mg total) by mouth 2 (two) times a day    hydrochlorothiazide (HYDRODIURIL) 12 5 mg tablet Take 1 tablet (12 5 mg total) by mouth daily    Incontinence Supplies MISC Use 6 (six) times a day Wipes    Incontinence Supplies MISC Use 4 (four) times a day Comfort shield Adult diapers    Incontinence Supply Disposable MISC Use 6 (six) times a day Dispense disposable bed pad    insulin glargine (Lantus SoloStar) 100 units/mL injection pen INJECT 10 UNITS UNDER THE SKIN DAILY AT BEDTIME    Insulin Pen Needle (BD Pen Needle Elisabeth U/F) 32G X 4 MM MISC Use 2 (two) times a day    Invokana 300 MG TABS TAKE 1 TABLET BY MOUTH EVERY DAY    levothyroxine 100 mcg tablet TAKE 1 TABLET BY MOUTH EVERY DAY    lisinopril (ZESTRIL) 40 mg tablet Take 1 tablet (40 mg total) by mouth daily    memantine (NAMENDA) 10 mg tablet TAKE 1 TABLET BY MOUTH TWICE A DAY    montelukast (SINGULAIR) 10 mg tablet TAKE 1 TABLET BY MOUTH EVERYDAY AT BEDTIME (Patient not taking: Reported on 7/18/2022)    OneTouch Delica Lancets 16O MISC TEST BLOOD SUGAR 3 TIMES A DAY    OneTouch Verio test strip USE TO TEST 3 TIMES A DAY    polyethylene glycol (GLYCOLAX) 17 GM/SCOOP powder  G MIRALAX IN 64 OZ CLEAR LIQUID, DRINK 8OZ Q 30 MINUTES TILL FINISHED    sertraline (ZOLOFT) 25 mg tablet TAKE 1 TABLET BY MOUTH EVERY DAY    Xiidra 5 % op solution INSTILL 1 DROP IN BOTH EYES 2 TIMES PER DAY     No current facility-administered medications on file prior to visit  She is allergic to penicillins       Review of Systems   Constitutional: Negative for activity change, appetite change, fatigue and fever  HENT: Negative for congestion and ear discharge  Respiratory: Negative for cough and shortness of breath  Cardiovascular: Negative for chest pain and palpitations  Gastrointestinal: Negative for diarrhea and nausea  Musculoskeletal: Positive for arthralgias  Negative for back pain  Skin: Negative for color change and rash  Neurological: Negative for dizziness and headaches  Psychiatric/Behavioral: Negative for agitation and behavioral problems  Objective:      /64   Pulse 67   Ht 5' 1" (1 549 m)   Wt 61 2 kg (135 lb)   SpO2 96%   BMI 25 51 kg/m²          Physical Exam  Constitutional:       General: She is not in acute distress  Appearance: She is well-developed  She is not diaphoretic  HENT:      Head: Normocephalic and atraumatic  Nose: Nose normal    Eyes:      Conjunctiva/sclera: Conjunctivae normal       Pupils: Pupils are equal, round, and reactive to light  Cardiovascular:      Rate and Rhythm: Normal rate and regular rhythm  Heart sounds: Normal heart sounds  No murmur heard  Pulmonary:      Effort: Pulmonary effort is normal  No respiratory distress  Breath sounds: Normal breath sounds  No wheezing     Abdominal:      General: Bowel sounds are normal  There is no distension  Palpations: Abdomen is soft  Tenderness: There is no abdominal tenderness  Musculoskeletal:         General: Tenderness present  Comments: Right hip tenderness   Skin:     General: Skin is warm and dry  Findings: No erythema or rash  Neurological:      Mental Status: She is alert and oriented to person, place, and time

## 2022-08-22 NOTE — TELEPHONE ENCOUNTER
Please tell her that biopsies were all completely benign, and if she still has diarrhea to try OTC imodium and make a follow up with   CHRISTOPHER  [facility]

## 2022-08-22 NOTE — TELEPHONE ENCOUNTER
Patients GI provider:  Dr Melly Flores    Number to return call: [phone]- jillian (grand daughter)    Reason for call: Pt grand daughter calling for pathology results      Scheduled procedure/appointment date if applicable: n/a

## 2022-08-25 DIAGNOSIS — Z79.4 TYPE 2 DIABETES MELLITUS TREATED WITH INSULIN (HCC): ICD-10-CM

## 2022-08-25 DIAGNOSIS — E11.9 TYPE 2 DIABETES MELLITUS TREATED WITH INSULIN (HCC): ICD-10-CM

## 2022-08-25 RX ORDER — PEN NEEDLE, DIABETIC 32GX 5/32"
NEEDLE, DISPOSABLE MISCELLANEOUS
Qty: 100 EACH | Refills: 5 | Status: SHIPPED | OUTPATIENT
Start: 2022-08-25

## 2022-09-08 DIAGNOSIS — R05.9 COUGH: ICD-10-CM

## 2022-09-08 RX ORDER — MONTELUKAST SODIUM 10 MG/1
TABLET ORAL
Qty: 90 TABLET | Refills: 1 | Status: SHIPPED | OUTPATIENT
Start: 2022-09-08

## 2022-09-10 DIAGNOSIS — I10 ESSENTIAL HYPERTENSION: ICD-10-CM

## 2022-09-12 RX ORDER — AMLODIPINE BESYLATE 10 MG/1
TABLET ORAL
Qty: 30 TABLET | Refills: 2 | Status: SHIPPED | OUTPATIENT
Start: 2022-09-12

## 2022-09-15 ENCOUNTER — TELEPHONE (OUTPATIENT)
Dept: FAMILY MEDICINE CLINIC | Facility: CLINIC | Age: 82
End: 2022-09-15

## 2022-09-15 DIAGNOSIS — R06.02 SHORTNESS OF BREATH ON EXERTION: Primary | ICD-10-CM

## 2022-09-15 NOTE — TELEPHONE ENCOUNTER
Can we sent information in regards to medication history  Indications are shortness of breath on exertion  Patient not able to exercise for prolonged period of time  No recent EKG last one from 1 year ago normal  No contraindication to CT coronary  I placed a referral to cardiology as well advise pt

## 2022-09-15 NOTE — TELEPHONE ENCOUNTER
Tashi Litzy has Nuclear Stress Test scheduled  They need clinicals: medical history, indications for stress testing, can patient exercise, contraindications for doing CT coronary instead of stress test and normal or abnormal EKG

## 2022-09-20 DIAGNOSIS — F41.9 ANXIETY: ICD-10-CM

## 2022-09-20 RX ORDER — SERTRALINE HYDROCHLORIDE 25 MG/1
25 TABLET, FILM COATED ORAL DAILY
Qty: 30 TABLET | Refills: 3 | Status: SHIPPED | OUTPATIENT
Start: 2022-09-20

## 2022-09-20 NOTE — TELEPHONE ENCOUNTER
Appears this was rx'd by Emi however he has since retired, unsure if pt will be seeing someone else in neuro or will you take this over?

## 2022-09-24 DIAGNOSIS — I10 ESSENTIAL HYPERTENSION: ICD-10-CM

## 2022-09-26 ENCOUNTER — HOSPITAL ENCOUNTER (OUTPATIENT)
Dept: BONE DENSITY | Facility: CLINIC | Age: 82
Discharge: HOME | End: 2022-09-26
Payer: COMMERCIAL

## 2022-09-26 DIAGNOSIS — E28.39 MENOPAUSE OVARIAN FAILURE: ICD-10-CM

## 2022-09-26 PROCEDURE — 77080 DXA BONE DENSITY AXIAL: CPT

## 2022-09-26 RX ORDER — HYDROCHLOROTHIAZIDE 12.5 MG/1
TABLET ORAL
Qty: 30 TABLET | Refills: 1 | Status: SHIPPED | OUTPATIENT
Start: 2022-09-26

## 2022-09-27 ENCOUNTER — HOSPITAL ENCOUNTER (OUTPATIENT)
Dept: NON INVASIVE DIAGNOSTICS | Facility: HOSPITAL | Age: 82
Discharge: HOME | End: 2022-09-27
Payer: COMMERCIAL

## 2022-09-27 ENCOUNTER — HOSPITAL ENCOUNTER (OUTPATIENT)
Dept: NUCLEAR MEDICINE | Facility: HOSPITAL | Age: 82
Discharge: HOME | End: 2022-09-27
Payer: COMMERCIAL

## 2022-09-27 VITALS
RESPIRATION RATE: 16 BRPM | HEART RATE: 62 BPM | DIASTOLIC BLOOD PRESSURE: 70 MMHG | WEIGHT: 132 LBS | SYSTOLIC BLOOD PRESSURE: 134 MMHG | OXYGEN SATURATION: 98 % | HEIGHT: 61 IN | BODY MASS INDEX: 24.92 KG/M2

## 2022-09-27 DIAGNOSIS — R06.02 SHORTNESS OF BREATH ON EXERTION: ICD-10-CM

## 2022-09-27 LAB
ARRHY DURING EX: NORMAL
BASELINE ST DEPRESSION: 0 MM
CHEST PAIN STATEMENT: NORMAL
MAX DIASTOLIC BP: 70 MMHG
MAX HEART RATE: 90 BPM
MAX PREDICTED HEART RATE: 139 BPM
MAX. SYSTOLIC BP: 134 MMHG
NUC STRESS EJECTION FRACTION: 79 %
PROTOCOL NAME: NORMAL
RATE PRESSURE PRODUCT: NORMAL
REASON FOR TERMINATION: NORMAL
SL CV REST NUCLEAR ISOTOPE DOSE: 10.4 MCI
SL CV STRESS NUCLEAR ISOTOPE DOSE: 32.3 MCI
SL CV STRESS RECOVERY BP: NORMAL MMHG
SL CV STRESS RECOVERY HR: 77 BPM
SL CV STRESS RECOVERY O2 SAT: 98 %
STRESS ANGINA INDEX: 0
STRESS BASELINE BP: NORMAL MMHG
STRESS BASELINE HR: 62 BPM
STRESS O2 SAT REST: 98 %
STRESS PEAK HR: 89 BPM
STRESS POST O2 SAT PEAK: 98 %
STRESS POST PEAK BP: 124 MMHG
STRESS ST DEPRESSION: 0 MM
STRESS/REST PERFUSION RATIO: 0.96
TARGET HR FORMULA: NORMAL
TEST INDICATION: NORMAL
TIME IN EXERCISE PHASE: NORMAL

## 2022-09-27 PROCEDURE — 78452 HT MUSCLE IMAGE SPECT MULT: CPT | Performed by: INTERNAL MEDICINE

## 2022-09-27 PROCEDURE — 78452 HT MUSCLE IMAGE SPECT MULT: CPT

## 2022-09-27 PROCEDURE — G1004 CDSM NDSC: HCPCS

## 2022-09-27 PROCEDURE — A9502 TC99M TETROFOSMIN: HCPCS

## 2022-09-27 PROCEDURE — 93017 CV STRESS TEST TRACING ONLY: CPT

## 2022-09-27 PROCEDURE — 93018 CV STRESS TEST I&R ONLY: CPT | Performed by: INTERNAL MEDICINE

## 2022-09-27 PROCEDURE — 93016 CV STRESS TEST SUPVJ ONLY: CPT | Performed by: INTERNAL MEDICINE

## 2022-09-27 RX ADMIN — REGADENOSON 0.4 MG: 0.08 INJECTION, SOLUTION INTRAVENOUS at 11:23

## 2022-10-12 PROBLEM — A04.72 CLOSTRIDIUM DIFFICILE DIARRHEA: Status: RESOLVED | Noted: 2021-07-20 | Resolved: 2022-10-12

## 2022-10-24 DIAGNOSIS — E13.9 DIABETES 1.5, MANAGED AS TYPE 1 (HCC): ICD-10-CM

## 2022-10-24 RX ORDER — BLOOD SUGAR DIAGNOSTIC
1 STRIP MISCELLANEOUS 2 TIMES DAILY
Qty: 100 STRIP | Refills: 3 | Status: SHIPPED | OUTPATIENT
Start: 2022-10-24

## 2022-10-24 NOTE — TELEPHONE ENCOUNTER
Needs a letter stating [place] is [place] and needs to carry insulin, needles and testing supplies

## 2022-10-24 NOTE — LETTER
[address]  [place] [address]  Dept: [phone]    October 26, 2022     Patient: Tripp Navarro   YOB: 1940   Date of Visit: 10/24/2022       To Whom it May Concern:    Tripp Navarro is under my professional care  She is a diabetic and will need to carry her insulin and diabetic needles and her testing supplies  If you have any questions or concerns, please don't hesitate to call            Sincerely,          Jelena Dc MD

## 2022-10-26 ENCOUNTER — OFFICE VISIT (OUTPATIENT)
Dept: URGENT CARE | Facility: CLINIC | Age: 82
End: 2022-10-26
Payer: COMMERCIAL

## 2022-10-26 VITALS
TEMPERATURE: 98.2 F | OXYGEN SATURATION: 95 % | DIASTOLIC BLOOD PRESSURE: 70 MMHG | RESPIRATION RATE: 16 BRPM | SYSTOLIC BLOOD PRESSURE: 152 MMHG | HEART RATE: 70 BPM

## 2022-10-26 DIAGNOSIS — J20.9 ACUTE BRONCHITIS, UNSPECIFIED ORGANISM: Primary | ICD-10-CM

## 2022-10-26 LAB
SARS-COV-2 AG UPPER RESP QL IA: NEGATIVE
VALID CONTROL: NORMAL

## 2022-10-26 PROCEDURE — S9088 SERVICES PROVIDED IN URGENT: HCPCS

## 2022-10-26 PROCEDURE — 87811 SARS-COV-2 COVID19 W/OPTIC: CPT

## 2022-10-26 PROCEDURE — 99213 OFFICE O/P EST LOW 20 MIN: CPT

## 2022-10-26 RX ORDER — AZITHROMYCIN 250 MG/1
TABLET, FILM COATED ORAL
Qty: 6 TABLET | Refills: 0 | Status: SHIPPED | OUTPATIENT
Start: 2022-10-26 | End: 2022-10-30

## 2022-10-26 RX ORDER — BENZONATATE 200 MG/1
200 CAPSULE ORAL 3 TIMES DAILY PRN
Qty: 20 CAPSULE | Refills: 0 | Status: SHIPPED | OUTPATIENT
Start: 2022-10-26

## 2022-10-26 NOTE — PROGRESS NOTES
[address] [facility]        NAME: Kingston Hodgkin is a 81 y o  female  : 1940    MRN: [id number]  DATE: 2022  TIME: 2:24 PM    Assessment and Plan   Acute bronchitis, unspecified organism [J20 9]  1  Acute bronchitis, unspecified organism  azithromycin (ZITHROMAX) 250 mg tablet    benzonatate (TESSALON) 200 MG capsule     Educated that symptoms may take 1-3 weeks to resolve  For cough continue warm fluids, OTC medications such as Mucinex, and throat lozenges  Given bromfed for symptoms  Patient Instructions     Bronchitis typically resolves in 1-3 weeks without any medications  For cough continue warm fluids, OTC medications such as Mucinex, throat lozenges and any prescribed medications if given in office  Multiple clinical studies show that steroid medications have not been effective to treat bronchitis  Follow up with PCP in 3-5 days  Proceed to  ER if symptoms worsen  Chief Complaint     Chief Complaint   Patient presents with   • Cold Like Symptoms     Granddaughter states pt has had cough, headache, body aches, chills, sweats, temp x 3days         History of Present Illness       Presents with 3 days of sick symptoms including cough, headache, body aches, chills, sweat and temperatures for 3 days  Denies known sick contacts and she rarely leaves the house  No history of asthma/COPD  She is coughing up thick large portions of sputum with salt water/lemon remedy  No other medications used  Translation with help from granddaughter per pt request        Review of Systems   Review of Systems   Constitutional: Positive for fatigue and fever  Negative for activity change, appetite change and chills  HENT: Positive for congestion and sore throat  Negative for ear pain  Respiratory: Positive for cough  Negative for shortness of breath and wheezing  Cardiovascular: Negative for chest pain  Gastrointestinal: Negative for abdominal pain, diarrhea, nausea and vomiting  Genitourinary: Negative for dysuria  Musculoskeletal: Positive for myalgias  Neurological: Positive for headaches  Negative for dizziness  Psychiatric/Behavioral: Negative for confusion           Current Medications       Current Outpatient Medications:   •  amLODIPine (NORVASC) 10 mg tablet, TAKE 1 TABLET BY MOUTH EVERY DAY, Disp: 30 tablet, Rfl: 2  •  azithromycin (ZITHROMAX) 250 mg tablet, Take 2 tablets today then 1 tablet daily x 4 days, Disp: 6 tablet, Rfl: 0  •  BD Pen Needle Elisabeth 2nd Gen 32G X 4 MM MISC, USE DAILY AS DIRECTED, Disp: 100 each, Rfl: 1  •  benzonatate (TESSALON) 200 MG capsule, Take 1 capsule (200 mg total) by mouth 3 (three) times a day as needed for cough, Disp: 20 capsule, Rfl: 0  •  bisacodyl (DULCOLAX) 5 mg EC tablet, TAKE 4 TABS PO 2 HOURS PRIOR TO DRINKING MIRALAX PREP, Disp: 4 tablet, Rfl: 0  •  Blood Glucose Monitoring Suppl (OneTouch Verio) w/Device KIT, Use 2 (two) times a day, Disp: 1 kit, Rfl: 0  •  cholestyramine (QUESTRAN) 4 g packet, TAKE 1 PACKET BY MOUTH 2 TIMES A DAY WITH MEALS , Disp: 60 packet, Rfl: 2  •  fenofibrate 160 MG tablet, TAKE 1 TABLET BY MOUTH EVERY DAY, Disp: 90 tablet, Rfl: 1  •  fluticasone (FLONASE) 50 mcg/act nasal spray, SPRAY 2 SPRAYS INTO EACH NOSTRIL EVERY DAY, Disp: 16 mL, Rfl: 1  •  gabapentin (NEURONTIN) 100 mg capsule, Take 2 capsules (200 mg total) by mouth 2 (two) times a day, Disp: 120 capsule, Rfl: 3  •  glucose blood (OneTouch Verio) test strip, Use 1 each 2 (two) times a day Test, Disp: 100 strip, Rfl: 3  •  hydrochlorothiazide (HYDRODIURIL) 12 5 mg tablet, TAKE 1 TABLET BY MOUTH EVERY DAY, Disp: 30 tablet, Rfl: 1  •  Invokana 300 MG TABS, TAKE 1 TABLET BY MOUTH EVERY DAY, Disp: 90 tablet, Rfl: 1  •  levothyroxine 100 mcg tablet, TAKE 1 TABLET BY MOUTH EVERY DAY, Disp: 30 tablet, Rfl: 2  •  lisinopril (ZESTRIL) 40 mg tablet, Take 1 tablet (40 mg total) by mouth daily, Disp: 90 tablet, Rfl: 3  •  memantine (NAMENDA) 10 mg tablet, TAKE 1 TABLET BY MOUTH TWICE A DAY, Disp: 180 tablet, Rfl: 1  •  montelukast (SINGULAIR) 10 mg tablet, TAKE 1 TABLET BY MOUTH EVERYDAY AT BEDTIME, Disp: 90 tablet, Rfl: 1  •  polyethylene glycol (GLYCOLAX) 17 GM/SCOOP powder,  G MIRALAX IN 64 OZ CLEAR LIQUID, DRINK 8OZ Q 30 MINUTES TILL FINISHED, Disp: 238 g, Rfl: 0  •  sertraline (ZOLOFT) 25 mg tablet, Take 1 tablet (25 mg total) by mouth daily, Disp: 30 tablet, Rfl: 3  •  Xiidra 5 % op solution, INSTILL 1 DROP IN BOTH EYES 2 TIMES PER DAY, Disp: , Rfl:   •  BD Pen Needle Elisabeth 2nd Gen 32G X 4 MM MISC, USE 2 (TWO) TIMES A DAY, Disp: 100 each, Rfl: 5  •  Blood Pressure KIT, by Does not apply route daily, Disp: 1 each, Rfl: 1  •  Diclofenac Sodium (VOLTAREN) 1 %, Apply 2 g topically 3 (three) times a day (Patient not taking: No sig reported), Disp: 100 g, Rfl: 2  •  Incontinence Supplies MISC, Use 6 (six) times a day Wipes, Disp: 200 each, Rfl: 6  •  Incontinence Supplies MISC, Use 4 (four) times a day Comfort shield Adult diapers, Disp: 200 each, Rfl: 6  •  Incontinence Supply Disposable MISC, Use 6 (six) times a day Dispense disposable bed pad, Disp: 200 each, Rfl: 6  •  insulin glargine (Lantus SoloStar) 100 units/mL injection pen, INJECT 10 UNITS UNDER THE SKIN DAILY AT BEDTIME, Disp: 15 mL, Rfl: 5  •  OneTouch Delica Lancets 87F MISC, TEST BLOOD SUGAR 3 TIMES A DAY, Disp: 100 each, Rfl: 3    Current Allergies     Allergies as of 10/26/2022 - Reviewed 10/26/2022   Allergen Reaction Noted   • Penicillins Itching and Swelling 02/15/2018            The following portions of the patient's history were reviewed and updated as appropriate: allergies, current medications, past family history, past medical history, past social history, past surgical history and problem list      Past Medical History:   Diagnosis Date   • Aggression    • Alzheimer's dementia ([facility] [address] )    • Arthritis    • Dementia ([facility] [address] )    • Diabetes insipidus ([facility] [address] )    • [facility]    • High cholesterol    • Hypertension    • Memory loss    • Migraine    • Polyneuropathy    • Rheumatoid arthritis ([address] )    • Serum lipids high    • Stomach problems    • Thyroid trouble        Past Surgical History:   Procedure Laterality Date   • CATARACT EXTRACTION     • CHOLECYSTECTOMY     • GALLBLADDER SURGERY     • HYSTERECTOMY     • [place] SHLDR ARTHROSCOP,SURG,W/ROTAT CUFF REPR Right 8/1/2019    Procedure: SHOULDER ARTHROSCOPIC ROTATOR CUFF REPAIR;  Surgeon: Hanna Johnston MD;  Location: [facility];  Service: Orthopedics   • ROTATOR CUFF REPAIR Right 08/01/2019       Family History   Problem Relation Age of Onset   • Substance Abuse Mother         denied   • Mental illness Mother         denied   • Substance Abuse Father         denied   • Mental illness Father         denied   • Diabetes Brother    • Blindness Brother    • Arthritis Daughter    • HIV Son          Medications have been verified  Objective   /70   Pulse 70   Temp 98 2 °F (36 8 °C) (Temporal)   Resp 16   SpO2 95%        Physical Exam     Physical Exam  Vitals reviewed  Constitutional:       General: She is not in acute distress  Appearance: Normal appearance  HENT:      Right Ear: Tympanic membrane, ear canal and external ear normal       Left Ear: Tympanic membrane, ear canal and external ear normal       Nose: Nose normal       Mouth/Throat:      Mouth: Mucous membranes are moist       Pharynx: No posterior oropharyngeal erythema  Eyes:      Conjunctiva/sclera: Conjunctivae normal    Cardiovascular:      Rate and Rhythm: Normal rate and regular rhythm  Pulses: Normal pulses  Heart sounds: Normal heart sounds  No murmur heard  Pulmonary:      Effort: Pulmonary effort is normal  No respiratory distress  Breath sounds: Normal breath sounds  Musculoskeletal:         General: Normal range of motion  Skin:     General: Skin is warm and dry  Neurological:      General: No focal deficit present        Mental Status: She is alert and oriented to person, place, and time     Psychiatric:         Mood and Affect: Mood normal          Behavior: Behavior normal

## 2022-10-29 DIAGNOSIS — G47.62 LEG CRAMPS, SLEEP RELATED: ICD-10-CM

## 2022-10-31 RX ORDER — GABAPENTIN 100 MG/1
CAPSULE ORAL
Qty: 120 CAPSULE | Refills: 3 | Status: SHIPPED | OUTPATIENT
Start: 2022-10-31

## 2022-11-04 ENCOUNTER — CLINICAL SUPPORT (OUTPATIENT)
Dept: FAMILY MEDICINE CLINIC | Facility: CLINIC | Age: 82
End: 2022-11-04

## 2022-11-04 DIAGNOSIS — Z11.59 SCREENING FOR VIRAL DISEASE: Primary | ICD-10-CM

## 2022-11-04 DIAGNOSIS — E03.9 HYPOTHYROIDISM, UNSPECIFIED TYPE: ICD-10-CM

## 2022-11-04 RX ORDER — LEVOTHYROXINE SODIUM 0.1 MG/1
TABLET ORAL
Qty: 30 TABLET | Refills: 2 | Status: SHIPPED | OUTPATIENT
Start: 2022-11-04 | End: 2022-11-04 | Stop reason: SDUPTHER

## 2022-11-05 LAB — SARS-COV-2 RNA RESP QL NAA+PROBE: NEGATIVE

## 2022-11-30 DIAGNOSIS — I10 ESSENTIAL HYPERTENSION: ICD-10-CM

## 2022-11-30 RX ORDER — HYDROCHLOROTHIAZIDE 12.5 MG/1
TABLET ORAL
Qty: 30 TABLET | Refills: 1 | Status: SHIPPED | OUTPATIENT
Start: 2022-11-30

## 2022-12-09 ENCOUNTER — APPOINTMENT (OUTPATIENT)
Dept: LAB | Facility: CLINIC | Age: 82
End: 2022-12-09

## 2022-12-09 DIAGNOSIS — E78.1 HYPERTRIGLYCERIDEMIA: ICD-10-CM

## 2022-12-09 DIAGNOSIS — Z79.4 TYPE 2 DIABETES MELLITUS TREATED WITH INSULIN (HCC): ICD-10-CM

## 2022-12-09 DIAGNOSIS — E03.9 HYPOTHYROIDISM, UNSPECIFIED TYPE: Primary | ICD-10-CM

## 2022-12-09 DIAGNOSIS — E11.9 TYPE 2 DIABETES MELLITUS TREATED WITH INSULIN (HCC): ICD-10-CM

## 2022-12-09 DIAGNOSIS — R05.9 COUGH: ICD-10-CM

## 2022-12-09 DIAGNOSIS — E03.9 HYPOTHYROIDISM, UNSPECIFIED TYPE: ICD-10-CM

## 2022-12-09 LAB
ALBUMIN SERPL BCP-MCNC: 3.5 G/DL (ref 3.5–5)
ALP SERPL-CCNC: 46 U/L (ref 46–116)
ALT SERPL W P-5'-P-CCNC: 24 U/L (ref 12–78)
ANION GAP SERPL CALCULATED.3IONS-SCNC: 5 MMOL/L (ref 4–13)
AST SERPL W P-5'-P-CCNC: 16 U/L (ref 5–45)
BILIRUB SERPL-MCNC: 0.33 MG/DL (ref 0.2–1)
BUN SERPL-MCNC: 40 MG/DL (ref 5–25)
CALCIUM SERPL-MCNC: 10 MG/DL (ref 8.3–10.1)
CHLORIDE SERPL-SCNC: 112 MMOL/L (ref 96–108)
CHOLEST SERPL-MCNC: 172 MG/DL
CO2 SERPL-SCNC: 25 MMOL/L (ref 21–32)
CREAT SERPL-MCNC: 1.28 MG/DL (ref 0.6–1.3)
GFR SERPL CREATININE-BSD FRML MDRD: 39 ML/MIN/1.73SQ M
GLUCOSE P FAST SERPL-MCNC: 108 MG/DL (ref 65–99)
HDLC SERPL-MCNC: 49 MG/DL
LDLC SERPL CALC-MCNC: 103 MG/DL (ref 0–100)
POTASSIUM SERPL-SCNC: 4.3 MMOL/L (ref 3.5–5.3)
PROT SERPL-MCNC: 7.1 G/DL (ref 6.4–8.4)
SODIUM SERPL-SCNC: 142 MMOL/L (ref 135–147)
TRIGL SERPL-MCNC: 101 MG/DL
TSH SERPL DL<=0.05 MIU/L-ACNC: 2.58 UIU/ML (ref 0.45–4.5)

## 2022-12-10 LAB
EST. AVERAGE GLUCOSE BLD GHB EST-MCNC: 192 MG/DL
HBA1C MFR BLD: 8.3 %

## 2022-12-10 RX ORDER — FENOFIBRATE 160 MG/1
160 TABLET ORAL DAILY
Qty: 90 TABLET | Refills: 1 | Status: SHIPPED | OUTPATIENT
Start: 2022-12-10

## 2022-12-10 RX ORDER — MONTELUKAST SODIUM 10 MG/1
10 TABLET ORAL
Qty: 90 TABLET | Refills: 1 | Status: SHIPPED | OUTPATIENT
Start: 2022-12-10

## 2022-12-21 ENCOUNTER — RA CDI HCC (OUTPATIENT)
Dept: OTHER | Facility: HOSPITAL | Age: 82
End: 2022-12-21

## 2022-12-21 NOTE — PROGRESS NOTES
[facility]  coding opportunities     E11 22, E11 40     Chart Reviewed number of suggestions sent to Provider: 2     Patients Insurance     Medicare Insurance: [address] [place] [address]

## 2022-12-23 ENCOUNTER — OFFICE VISIT (OUTPATIENT)
Dept: FAMILY MEDICINE CLINIC | Facility: CLINIC | Age: 82
End: 2022-12-23

## 2022-12-23 VITALS
WEIGHT: 138 LBS | HEART RATE: 74 BPM | SYSTOLIC BLOOD PRESSURE: 146 MMHG | DIASTOLIC BLOOD PRESSURE: 60 MMHG | TEMPERATURE: 97.1 F | OXYGEN SATURATION: 99 % | HEIGHT: 61 IN | BODY MASS INDEX: 26.06 KG/M2

## 2022-12-23 DIAGNOSIS — R19.5 POSITIVE FIT (FECAL IMMUNOCHEMICAL TEST): ICD-10-CM

## 2022-12-23 DIAGNOSIS — Z23 NEEDS FLU SHOT: ICD-10-CM

## 2022-12-23 DIAGNOSIS — E78.1 HYPERTRIGLYCERIDEMIA: ICD-10-CM

## 2022-12-23 DIAGNOSIS — E11.9 TYPE 2 DIABETES MELLITUS TREATED WITH INSULIN (HCC): Primary | ICD-10-CM

## 2022-12-23 DIAGNOSIS — G47.62 LEG CRAMPS, SLEEP RELATED: ICD-10-CM

## 2022-12-23 DIAGNOSIS — L30.9 DERMATITIS: ICD-10-CM

## 2022-12-23 DIAGNOSIS — F03.918 DEMENTIA WITH BEHAVIORAL DISTURBANCE: ICD-10-CM

## 2022-12-23 DIAGNOSIS — F41.9 ANXIETY: ICD-10-CM

## 2022-12-23 DIAGNOSIS — R19.7 DIARRHEA, UNSPECIFIED TYPE: ICD-10-CM

## 2022-12-23 DIAGNOSIS — I10 ESSENTIAL HYPERTENSION: ICD-10-CM

## 2022-12-23 DIAGNOSIS — R05.9 COUGH: ICD-10-CM

## 2022-12-23 DIAGNOSIS — E03.9 HYPOTHYROIDISM, UNSPECIFIED TYPE: ICD-10-CM

## 2022-12-23 DIAGNOSIS — Z79.4 TYPE 2 DIABETES MELLITUS TREATED WITH INSULIN (HCC): Primary | ICD-10-CM

## 2022-12-23 RX ORDER — INSULIN GLARGINE 100 [IU]/ML
10 INJECTION, SOLUTION SUBCUTANEOUS 2 TIMES DAILY
Qty: 18 ML | Refills: 3 | Status: SHIPPED | OUTPATIENT
Start: 2022-12-23 | End: 2022-12-26

## 2022-12-23 RX ORDER — MONTELUKAST SODIUM 10 MG/1
10 TABLET ORAL
Qty: 90 TABLET | Refills: 1 | Status: SHIPPED | OUTPATIENT
Start: 2022-12-23

## 2022-12-23 RX ORDER — LISINOPRIL 40 MG/1
40 TABLET ORAL DAILY
Qty: 90 TABLET | Refills: 1 | Status: SHIPPED | OUTPATIENT
Start: 2022-12-23

## 2022-12-23 RX ORDER — GABAPENTIN 100 MG/1
200 CAPSULE ORAL 2 TIMES DAILY
Qty: 360 CAPSULE | Refills: 1 | Status: SHIPPED | OUTPATIENT
Start: 2022-12-23

## 2022-12-23 RX ORDER — MEMANTINE HYDROCHLORIDE 10 MG/1
10 TABLET ORAL 2 TIMES DAILY
Qty: 180 TABLET | Refills: 1 | Status: SHIPPED | OUTPATIENT
Start: 2022-12-23

## 2022-12-23 RX ORDER — SERTRALINE HYDROCHLORIDE 25 MG/1
25 TABLET, FILM COATED ORAL DAILY
Qty: 90 TABLET | Refills: 3 | Status: SHIPPED | OUTPATIENT
Start: 2022-12-23 | End: 2023-03-23

## 2022-12-23 RX ORDER — CHOLESTYRAMINE 4 G/9G
1 POWDER, FOR SUSPENSION ORAL 2 TIMES DAILY WITH MEALS
Qty: 180 PACKET | Refills: 3 | Status: SHIPPED | OUTPATIENT
Start: 2022-12-23 | End: 2023-03-23

## 2022-12-23 RX ORDER — AMLODIPINE BESYLATE 10 MG/1
10 TABLET ORAL DAILY
Qty: 90 TABLET | Refills: 2 | Status: SHIPPED | OUTPATIENT
Start: 2022-12-23

## 2022-12-23 RX ORDER — FENOFIBRATE 160 MG/1
160 TABLET ORAL DAILY
Qty: 90 TABLET | Refills: 1 | Status: SHIPPED | OUTPATIENT
Start: 2022-12-23

## 2022-12-23 RX ORDER — POLYETHYLENE GLYCOL 3350 17 G/17G
POWDER, FOR SOLUTION ORAL
Qty: 238 G | Refills: 0 | Status: SHIPPED | OUTPATIENT
Start: 2022-12-23

## 2022-12-23 RX ORDER — LEVOTHYROXINE SODIUM 0.1 MG/1
100 TABLET ORAL DAILY
Qty: 90 TABLET | Refills: 1 | Status: SHIPPED | OUTPATIENT
Start: 2022-12-23

## 2022-12-23 RX ORDER — HYDROCHLOROTHIAZIDE 12.5 MG/1
12.5 TABLET ORAL DAILY
Qty: 90 TABLET | Refills: 3 | Status: SHIPPED | OUTPATIENT
Start: 2022-12-23

## 2022-12-23 RX ORDER — INSULIN GLARGINE 100 [IU]/ML
10 INJECTION, SOLUTION SUBCUTANEOUS 2 TIMES DAILY
Qty: 18 ML | Refills: 3 | Status: SHIPPED | OUTPATIENT
Start: 2022-12-23 | End: 2022-12-23 | Stop reason: SDUPTHER

## 2022-12-23 RX ORDER — TRIAMCINOLONE ACETONIDE 5 MG/G
OINTMENT TOPICAL 2 TIMES DAILY
Qty: 30 G | Refills: 2 | Status: SHIPPED | OUTPATIENT
Start: 2022-12-23

## 2022-12-23 NOTE — PROGRESS NOTES
Name: Edilson Langston      : 1940      MRN: [id number]  Encounter Provider: Ruben Al MD  Encounter Date: 2022   Encounter department: [address] [place] [address]  Type 2 diabetes mellitus treated with insulin (HCC)  HgbA1C- 8 3  Borderline elevated  Recommend a low carb diet  -     lisinopril (ZESTRIL) 40 mg tablet; Take 1 tablet (40 mg total) by mouth daily  -     Insulin Glargine Solostar (Lantus SoloStar) 100 UNIT/ML SOPN; Inject 0 1 mL (10 Units total) under the skin 2 (two) times a day INJECT 10 UNITS UNDER THE SKIN DAILY AT BEDTIME  -     Canagliflozin (Invokana) 300 MG TABS; Take 1 tablet (300 mg total) by mouth daily    2  Needs flu shot  -     Flu Vaccine High Dose Split Preservative Free IM    3  Skin rash?dermatiitis  Triamcinolone ointment sent    Follow up in 3 months  -                    Subjective     Patient is here to follow up for Type 2 DM  She has been taking her insulin 10 units 2 times daily  Per granddaughter accompanying her numbers have been "good"  Also has been having bilateral lower ext back of the calf itching  Review of Systems   Constitutional: Negative for activity change, appetite change, fatigue and fever  HENT: Negative for congestion and ear discharge  Respiratory: Negative for cough and shortness of breath  Cardiovascular: Negative for chest pain and palpitations  Gastrointestinal: Negative for diarrhea and nausea  Musculoskeletal: Negative for arthralgias and back pain  Skin: Positive for rash  Negative for color change  Neurological: Negative for dizziness and headaches  Psychiatric/Behavioral: Negative for agitation and behavioral problems         Past Medical History:   Diagnosis Date   • Aggression    • Alzheimer's dementia [facility])    • Arthritis    • Dementia ([facility] [address] )    • Diabetes insipidus ([facility] [address] )    • Diabetes mellitus ([facility] [address] )    • High cholesterol    • Hypertension    • Memory loss    • Migraine    • Polyneuropathy    • Rheumatoid arthritis (HCC)    • Serum lipids high    • Stomach problems    • Thyroid trouble      Past Surgical History:   Procedure Laterality Date   • CATARACT EXTRACTION     • CHOLECYSTECTOMY     • GALLBLADDER SURGERY     • HYSTERECTOMY     • [place] SHLDR ARTHROSCOP,SURG,W/ROTAT CUFF REPR Right 2019    Procedure: SHOULDER ARTHROSCOPIC ROTATOR CUFF REPAIR;  Surgeon: Nubia Jack MD;  Location: [facility] OR;  Service: Orthopedics   • ROTATOR CUFF REPAIR Right 2019     Family History   Problem Relation Age of Onset   • Substance Abuse Mother         denied   • Mental illness Mother         denied   • Substance Abuse Father         denied   • Mental illness Father         denied   • Diabetes Brother    • Blindness Brother    • Arthritis Daughter    • HIV Son      Social History     Socioeconomic History   • Marital status:       Spouse name: None   • Number of children: None   • Years of education: None   • Highest education level: None   Occupational History   • None   Tobacco Use   • Smoking status: Former     Packs/day: 0 25     Years: 30 00     Pack years: 7 50     Types: Cigarettes     Quit date:      Years since quittin 0   • Smokeless tobacco: Never   Vaping Use   • Vaping Use: Never used   Substance and Sexual Activity   • Alcohol use: Never   • Drug use: No   • Sexual activity: None   Other Topics Concern   • None   Social History Narrative   • None     Social Determinants of Health     Financial Resource Strain: Not on file   Food Insecurity: Not on file   Transportation Needs: Not on file   Physical Activity: Not on file   Stress: Not on file   Social Connections: Not on file   Intimate Partner Violence: Not on file   Housing Stability: Not on file     Current Outpatient Medications on File Prior to Visit   Medication Sig   • BD Pen Needle Elisabeth 2nd Gen 32G X 4 MM MISC USE DAILY AS DIRECTED   • BD Pen Needle Elisabeth 2nd Gen 32G X 4 MM MISC USE 2 (TWO) TIMES A DAY   • benzonatate (TESSALON) 200 MG capsule Take 1 capsule (200 mg total) by mouth 3 (three) times a day as needed for cough   • bisacodyl (DULCOLAX) 5 mg EC tablet TAKE 4 TABS PO 2 HOURS PRIOR TO DRINKING MIRALAX PREP   • Blood Glucose Monitoring Suppl (OneTouch Verio) w/Device KIT Use 2 (two) times a day   • Blood Pressure KIT by Does not apply route daily   • Diclofenac Sodium (VOLTAREN) 1 % Apply 2 g topically 3 (three) times a day (Patient not taking: No sig reported)   • fluticasone (FLONASE) 50 mcg/act nasal spray SPRAY 2 SPRAYS INTO EACH NOSTRIL EVERY DAY   • glucose blood (OneTouch Verio) test strip Use 1 each 2 (two) times a day Test   • Incontinence Supplies MISC Use 6 (six) times a day Wipes   • Incontinence Supplies MISC Use 4 (four) times a day Comfort shield Adult diapers   • Incontinence Supply Disposable MISC Use 6 (six) times a day Dispense disposable bed pad   • OneTouch Delica Lancets 87E MISC TEST BLOOD SUGAR 3 TIMES A DAY   • Xiidra 5 % op solution INSTILL 1 DROP IN BOTH EYES 2 TIMES PER DAY   • [DISCONTINUED] amLODIPine (NORVASC) 10 mg tablet TAKE 1 TABLET BY MOUTH EVERY DAY   • [DISCONTINUED] Canagliflozin (Invokana) 300 MG TABS Take 1 tablet (300 mg total) by mouth daily   • [DISCONTINUED] cholestyramine (QUESTRAN) 4 g packet TAKE 1 PACKET BY MOUTH 2 TIMES A DAY WITH MEALS  • [DISCONTINUED] fenofibrate 160 MG tablet Take 1 tablet (160 mg total) by mouth daily   • [DISCONTINUED] gabapentin (NEURONTIN) 100 mg capsule TAKE 2 CAPSULES BY MOUTH 2 TIMES A DAY     • [DISCONTINUED] hydrochlorothiazide (HYDRODIURIL) 12 5 mg tablet TAKE 1 TABLET BY MOUTH EVERY DAY   • [DISCONTINUED] insulin glargine (Lantus SoloStar) 100 units/mL injection pen INJECT 10 UNITS UNDER THE SKIN DAILY AT BEDTIME   • [DISCONTINUED] levothyroxine 100 mcg tablet Take 1 tablet (100 mcg total) by mouth daily   • [DISCONTINUED] lisinopril (ZESTRIL) 40 mg tablet TAKE 1 TABLET BY MOUTH EVERY DAY   • [DISCONTINUED] memantine (NAMENDA) 10 mg tablet TAKE 1 TABLET BY MOUTH TWICE A DAY   • [DISCONTINUED] montelukast (SINGULAIR) 10 mg tablet Take 1 tablet (10 mg total) by mouth daily at bedtime   • [DISCONTINUED] polyethylene glycol (GLYCOLAX) 17 GM/SCOOP powder  G MIRALAX IN 64 OZ CLEAR LIQUID, DRINK 8OZ Q 30 MINUTES TILL FINISHED   • [DISCONTINUED] sertraline (ZOLOFT) 25 mg tablet Take 1 tablet (25 mg total) by mouth daily     Allergies   Allergen Reactions   • Penicillins Itching and Swelling     Immunization History   Administered Date(s) Administered   • COVID-19 PFIZER VACCINE 0 3 ML IM 06/04/2021, 07/02/2021   • INFLUENZA 12/07/2016, 09/10/2018, 10/07/2021   • Influenza, high dose seasonal 0 7 mL 09/10/2018, 11/13/2020   • Influenza, recombinant, quadrivalent,injectable, preservative free 10/10/2019   • Pneumococcal Conjugate 13-Valent 11/13/2020   • Pneumococcal Conjugate Vaccine 20-valent (Pcv20), Polysace 04/15/2022   • Tuberculin Skin Test-PPD Intradermal 06/11/2018       Objective     /60   Pulse 74   Temp (!) 97 1 °F (36 2 °C)   Ht 5' 1" (1 549 m)   Wt 62 6 kg (138 lb)   SpO2 99%   BMI 26 07 kg/m²     Physical Exam  Constitutional:       General: She is not in acute distress  Appearance: She is well-developed  She is not diaphoretic  HENT:      Head: Normocephalic and atraumatic  Nose: Nose normal    Eyes:      Conjunctiva/sclera: Conjunctivae normal       Pupils: Pupils are equal, round, and reactive to light  Cardiovascular:      Rate and Rhythm: Normal rate and regular rhythm  Heart sounds: Normal heart sounds  No murmur heard  Pulmonary:      Effort: Pulmonary effort is normal  No respiratory distress  Breath sounds: Normal breath sounds  No wheezing  Abdominal:      General: Bowel sounds are normal  There is no distension  Palpations: Abdomen is soft  Tenderness: There is no abdominal tenderness  Skin:     General: Skin is warm and dry  Findings: Erythema and rash present  Neurological:      Mental Status: She is alert and oriented to person, place, and time         Neo Gonzalez MD

## 2022-12-26 DIAGNOSIS — E11.9 TYPE 2 DIABETES MELLITUS TREATED WITH INSULIN (HCC): ICD-10-CM

## 2022-12-26 DIAGNOSIS — Z79.4 TYPE 2 DIABETES MELLITUS TREATED WITH INSULIN (HCC): ICD-10-CM

## 2022-12-27 RX ORDER — INSULIN GLARGINE 100 [IU]/ML
10 INJECTION, SOLUTION SUBCUTANEOUS 2 TIMES DAILY
Qty: 15 ML | Refills: 3 | Status: SHIPPED | OUTPATIENT
Start: 2022-12-27 | End: 2022-12-27

## 2023-01-04 NOTE — PROGRESS NOTES
Assessment/Plan:  Assessment/Plan   Diagnoses and all orders for this visit:    Trigger finger, left middle finger  -     Ambulatory Referral to Hand Surgery  -     Hand/upper extremity injection: L long A1    Trigger finger, right ring finger  -     Hand/upper extremity injection: R ring A1    Arthritis of both hands  -     Diclofenac Sodium (VOLTAREN) 1 %; Apply 2 g topically 3 (three) times a day        60-year-old right-hand-dominant female with pain and locking fingers of both hands more than 1 year duration  Discussed with patient physical exam, impression and plan  Physical exam noted for Sean's and Heberden's nodes 2nd through 4th digits both hands  Right hand noted for tenderness A1 pulley of the ring finger  There is palpable flexor tendon nodule  She has intact flexion and extension of the digit however limited flexion  There is reproduction of locking and snapping with flexion and extension  She is intact neurovascularly  Left hand noted for tenderness A1 pulley of the 3rd digit  There is palpable flexor tendon nodule  She has intact flexion and extension however limited flexion at the PIP joint  There is reproduction of locking and snapping with flexion and extension  She is intact neurovascularly  Clinical impression is that she is symptomatic from arthritis of the hands and trigger finger  I discussed treatment regimen of corticosteroid injection and topical anti-inflammatory  I administered mixtures of 0 5 cc 1% lidocaine and 0 5 cc Kenalog to the A1 pulleys of the right ring finger and left middle finger without complication  She is to apply topical diclofenac gel 3 to 4 times a day for the next 10 days  She was advised 2 corticosteroid injections are allowed for each site prior to surgery being recommended  She will follow up as needed  Subjective:   Patient ID: Cameron Britt is a [de-identified] y o  female    Chief Complaint   Patient presents with    Right Hand - Pain    Left Hand - Pain       71-year-old right-hand-dominant female presents for evaluation of pain and locking digits both hands more than 1 year duration  She denies any particular trauma or inciting event  Pain described as localized to the volar aspect base of the right ring finger and left middle finger, achy and sharp, worse with gripping, associated with locking and snapping with flexion and extension, and improved with resting  She reports being unable to fully make a fist with the digits due to limited range of motion  Hand Pain  This is a chronic problem  The current episode started more than 1 year ago  The problem occurs daily  The problem has been gradually worsening  Associated symptoms include arthralgias, joint swelling and weakness  Pertinent negatives include no abdominal pain, chest pain, chills, fever, numbness, rash or sore throat  Exacerbated by: Gripping  She has tried rest for the symptoms  The treatment provided mild relief  The following portions of the patient's history were reviewed and updated as appropriate: She  has a past medical history of Aggression, Alzheimer's dementia ([address] ), Arthritis, Dementia ([address] ), Diabetes insipidus ([address] ), Diabetes mellitus ([address] ), High cholesterol, Hypertension, Memory loss, Migraine, Polyneuropathy, Rheumatoid arthritis ([address] ), Serum lipids high, Stomach problems, and Thyroid trouble  She  has a past surgical history that includes Hysterectomy; Gallbladder surgery; Cataract extraction; pr shldr arthroscop,surg,w/rotat cuff repr (Right, 8/1/2019); Rotator cuff repair (Right, 08/01/2019); and Cholecystectomy  Her family history includes Arthritis in her daughter; Blindness in her brother; Diabetes in her brother; HIV in her son; Mental illness in her father and mother; Substance Abuse in her father and mother  She  reports that she quit smoking about 37 years ago  Her smoking use included cigarettes  She has a 7 50 pack-year smoking history   She has never used smokeless tobacco  She reports that she does not drink alcohol and does not use drugs  She is allergic to penicillins       Review of Systems   Constitutional: Negative for chills and fever  HENT: Negative for sore throat  Eyes: Negative for visual disturbance  Respiratory: Negative for shortness of breath  Cardiovascular: Negative for chest pain  Gastrointestinal: Negative for abdominal pain  Genitourinary: Negative for flank pain  Musculoskeletal: Positive for arthralgias and joint swelling  Skin: Negative for rash and wound  Neurological: Positive for weakness  Negative for numbness  Hematological: Does not bruise/bleed easily  Psychiatric/Behavioral: Negative for self-injury  Objective:  Vitals:    05/04/22 1109   BP: 159/68   Pulse: 69   Weight: 59 4 kg (131 lb)   Height: 5' 1" (1 549 m)     Right Hand Exam     Muscle Strength   : 4/5     Other   Pulse: present    Comments:   tenderness A1 pulley of the ring finger  There is palpable flexor tendon nodule  She has intact flexion and extension of the digit however limited flexion  There is reproduction of locking and snapping with flexion and extension  Left Hand Exam     Muscle Strength   :  4/5     Other   Pulse: present    Comments:   tenderness A1 pulley of the 3rd digit  There is palpable flexor tendon nodule  She has intact flexion and extension however limited flexion at the PIP joint  There is reproduction of locking and snapping with flexion and extension  Observations     Left Wrist/Hand   Positive for Sean's nodes and Heberden's nodes  Right Wrist/Hand   Positive for Sean's nodes and Heberden's nodes  Physical Exam  Vitals and nursing note reviewed  Constitutional:       General: She is not in acute distress  Appearance: She is well-developed  She is not ill-appearing or diaphoretic  HENT:      Head: Normocephalic        Right Ear: External ear normal       Left Ear: External ear normal    Eyes:      Conjunctiva/sclera: Conjunctivae normal    Neck:      Trachea: No tracheal deviation  Cardiovascular:      Rate and Rhythm: Normal rate  Pulmonary:      Effort: Pulmonary effort is normal  No respiratory distress  Abdominal:      General: There is no distension  Musculoskeletal:         General: Swelling and tenderness present  No deformity or signs of injury  Skin:     General: Skin is warm and dry  Coloration: Skin is not jaundiced or pale  Neurological:      Mental Status: She is alert and oriented to person, place, and time  Psychiatric:         Mood and Affect: Mood normal          Behavior: Behavior normal          Thought Content: Thought content normal          Judgment: Judgment normal                Hand/upper extremity injection: L long A1  Universal Protocol:  Consent: Verbal consent obtained  Risks and benefits: risks, benefits and alternatives were discussed  Consent given by: patient  Time out: Immediately prior to procedure a "time out" was called to verify the correct patient, procedure, equipment, support staff and site/side marked as required  Patient understanding: patient states understanding of the procedure being performed  Patient consent: the patient's understanding of the procedure matches consent given  Procedure consent: procedure consent matches procedure scheduled  Relevant documents: relevant documents present and verified  Test results: test results available and properly labeled  Site marked: the operative site was marked  Radiology Images displayed and confirmed   If images not available, report reviewed: imaging studies available  Required items: required blood products, implants, devices, and special equipment available  Patient identity confirmed: verbally with patient    Supporting Documentation  Indications: pain and tendon swelling   Procedure Details  Condition:trigger finger Location: long finger - L long A1 Preparation: Patient was prepped and draped in the usual sterile fashion  Needle size: 27 G  Ultrasound guidance: no  Approach: volar  Medications administered: 0 5 mL lidocaine 1 %; 20 mg triamcinolone acetonide 40 mg/mL    Patient tolerance: patient tolerated the procedure well with no immediate complications  Dressing:  Sterile dressing applied     Hand/upper extremity injection: R ring A1  Universal Protocol:  Consent: Verbal consent obtained  Risks and benefits: risks, benefits and alternatives were discussed  Consent given by: patient  Time out: Immediately prior to procedure a "time out" was called to verify the correct patient, procedure, equipment, support staff and site/side marked as required  Patient understanding: patient states understanding of the procedure being performed  Patient consent: the patient's understanding of the procedure matches consent given  Procedure consent: procedure consent matches procedure scheduled  Relevant documents: relevant documents present and verified  Test results: test results available and properly labeled  Site marked: the operative site was marked  Radiology Images displayed and confirmed   If images not available, report reviewed: imaging studies available  Required items: required blood products, implants, devices, and special equipment available  Patient identity confirmed: verbally with patient    Supporting Documentation  Indications: pain and tendon swelling   Procedure Details  Condition:trigger finger Location: ring finger - R ring A1   Preparation: Patient was prepped and draped in the usual sterile fashion  Needle size: 27 G  Ultrasound guidance: no  Approach: volar  Medications administered: 0 5 mL lidocaine 1 %; 20 mg triamcinolone acetonide 40 mg/mL    Patient tolerance: patient tolerated the procedure well with no immediate complications  Dressing:  Sterile dressing applied Winlevi Pregnancy And Lactation Text: This medication is considered safe during pregnancy and breastfeeding.

## 2023-01-18 NOTE — TELEPHONE ENCOUNTER
----- Message from Particdunia Hillman MA sent at 4/15/2022  4:51 PM EDT -----  Regarding: Eye Exam  04/15/22 4:52 PM    Hello, our patient Oneil Mendez has had Diabetic Eye Exam completed/performed  Please assist in updating the patient chart by making an External outreach to [facility] facility located in [facility]  The date of service is recent      Thank you,  PALOMO Olson PG Anesthesia Pre-Procedure Evaluation    Patient: Gilberto Camilo   MRN: [id number] : 1952        Procedure : Procedure(s):  MITRAL VALVE REPAIR possible replacement, Coronary Artery Bypass Graft, transesophageal echocardiogram (TRENT) AND ANY ASSOCIATED PROCEDURES          Past Medical History:   Diagnosis Date     AAA (abdominal aortic aneurysm) 2017    5.3 cm      Mohan esophagus      Closed rib fracture      DNS (deviated nasal septum)       Past Surgical History:   Procedure Laterality Date     AAA REPAIR  2020    [facility]/ intravascular repair     APPENDECTOMY       COLONOSCOPY       CV CORONARY ANGIOGRAM N/A 2022    Procedure: Coronary Angiogram with possible intervention;  Surgeon: Porfirio Herrera MD;  [facility]     CV RIGHT HEART CATH MEASUREMENTS RECORDED N/A 2022    Procedure: Right Heart Cath;  Surgeon: Porfirio Herrera MD;  Location[facility]     ESOPHAGOGASTRODUODENOSCOPY        No Known Allergies   Social History     Tobacco Use     Smoking status: Former     Packs/day: 1.00     Years: 25.00     Pack years: 25.00     Types: Cigarettes     Smokeless tobacco: Never     Tobacco comments:     2014   Substance Use Topics     Alcohol use: Yes     Comment: occasional      Wt Readings from Last 1 Encounters:   23 (P) 109.4 kg (241 lb 2.9 oz)        Anesthesia Evaluation   Pt has had prior anesthetic. Type: General.    No history of anesthetic complications       ROS/MED HX  ENT/Pulmonary:  - neg pulmonary ROS     Neurologic:  - neg neurologic ROS     Cardiovascular: Comment: AAA s/p repair in     (+) Dyslipidemia hypertension--CAD ---Taking blood thinners Pt has received instructions: valvular problems/murmurs type: MR Previous cardiac testing  (-) angina, past MI, CHF, PÉREZ, orthopnea/PND, syncope, pacemaker, arrhythmias, irregular heartbeat/palpitations, stent, pacemaker, ICD, angina, past MI and  CABG   METS/Exercise Tolerance:     Hematologic:  - neg hematologic  ROS     Musculoskeletal:  - neg musculoskeletal ROS     GI/Hepatic: Comment: Mohan esophagus      Renal/Genitourinary:  - neg Renal ROS     Endo:     (+) type II DM, Not using insulin, - not using insulin pump. not previously admitted for DM/DKA. Obesity,     Psychiatric/Substance Use:     (+) psychiatric history depression     Infectious Disease:  - neg infectious disease ROS     Malignancy:  - neg malignancy ROS     Other:  - neg other ROS          Physical Exam    Airway        Mallampati: II   TM distance: > 3 FB   Neck ROM: full   Mouth opening: > 3 cm    Respiratory Devices and Support         Dental       (+) Minor Abnormalities - some fillings, tiny chips      Cardiovascular          Rhythm and rate: regular and normal   (+) murmur       Pulmonary           breath sounds clear to auscultation           OUTSIDE LABS:  CBC:   Lab Results   Component Value Date    WBC 10.8 01/09/2023    WBC 11.1 (H) 12/09/2022    HGB 14.1 01/09/2023    HGB 12.8 (L) 12/09/2022    HCT 43.9 01/09/2023    HCT 43.3 12/09/2022     01/09/2023     12/09/2022     BMP:   Lab Results   Component Value Date     01/09/2023     12/09/2022    POTASSIUM 4.8 01/09/2023    POTASSIUM 4.6 12/09/2022    CHLORIDE 99 01/09/2023    CHLORIDE 103 12/09/2022    CO2 27 01/09/2023    CO2 23 12/09/2022    BUN 16.6 01/09/2023    BUN 19.4 12/09/2022    CR 1.02 01/09/2023    CR 0.97 12/09/2022     (H) 01/18/2023     (H) 01/17/2023     COAGS:   Lab Results   Component Value Date    PTT 28 01/09/2023    INR 0.98 01/09/2023     POC: No results found for: BGM, HCG, HCGS  HEPATIC:   Lab Results   Component Value Date    ALBUMIN 4.4 01/09/2023    PROTTOTAL 7.3 01/09/2023    ALT 18 01/09/2023    AST 23 01/09/2023    ALKPHOS 106 01/09/2023    BILITOTAL 0.8 01/09/2023     OTHER:   Lab Results   Component Value Date    A1C 7.9 (H) 01/09/2023    SARI 10.0  01/09/2023    MAG 1.9 01/09/2023    TSH 0.74 08/29/2022    CRP 2.7 03/18/2014       Anesthesia Plan    ASA Status:  4   NPO Status:  NPO Appropriate    Anesthesia Type: General.     - Airway: ETT   Induction: Intravenous.   Maintenance: Inhalation.   Techniques and Equipment:     - Lines/Monitors: 2nd IV, Arterial Line, Central Line, PAC, CVP, BIS, NIRS, TRENT            TRENT Absolute Contra-indication: NONE            TRENT Relative Contra-indication: Mohan Esophagus     - Blood: T&S     Consents    Anesthesia Plan(s) and associated risks, benefits, and realistic alternatives discussed. Questions answered and patient/representative(s) expressed understanding.    - Discussed:     - Discussed with:  Patient      - Extended Intubation/Ventilatory Support Discussed: Yes.      - Patient is DNR/DNI Status: No    Use of blood products discussed: Yes.     - Discussed with: Patient.     - Consented: consented to blood products            Reason for refusal: other.     Postoperative Care    Pain management: IV analgesics, Oral pain medications, Multi-modal analgesia, Peripheral nerve block (Continuous).   PONV prophylaxis: Ondansetron (or other 5HT-3), Dexamethasone or Solumedrol     Comments:    Other Comments: Patient seen and examined.  Risks, benefits and alternatives to GETA discussed.  Questions answered and patient wishes to proceed                Wilson Palacios MD

## 2023-02-16 ENCOUNTER — TELEPHONE (OUTPATIENT)
Dept: FAMILY MEDICINE CLINIC | Facility: CLINIC | Age: 83
End: 2023-02-16

## 2023-02-16 NOTE — TELEPHONE ENCOUNTER
Pt's granddaughter requesting letter of medical necessity for a ramp/railing for her front door entrance to her house  Ok to write?

## 2023-02-23 DIAGNOSIS — E11.9 TYPE 2 DIABETES MELLITUS TREATED WITH INSULIN (HCC): ICD-10-CM

## 2023-02-23 DIAGNOSIS — Z79.4 TYPE 2 DIABETES MELLITUS TREATED WITH INSULIN (HCC): ICD-10-CM

## 2023-02-23 DIAGNOSIS — R09.81 NASAL CONGESTION: ICD-10-CM

## 2023-02-23 RX ORDER — FLUTICASONE PROPIONATE 50 MCG
SPRAY, SUSPENSION (ML) NASAL
Qty: 16 ML | Refills: 1 | Status: SHIPPED | OUTPATIENT
Start: 2023-02-23

## 2023-02-23 RX ORDER — INSULIN GLARGINE 100 [IU]/ML
INJECTION, SOLUTION SUBCUTANEOUS
Qty: 15 ML | Refills: 5 | Status: SHIPPED | OUTPATIENT
Start: 2023-02-23

## 2023-03-27 ENCOUNTER — OFFICE VISIT (OUTPATIENT)
Dept: FAMILY MEDICINE CLINIC | Facility: CLINIC | Age: 83
End: 2023-03-27

## 2023-03-27 ENCOUNTER — APPOINTMENT (OUTPATIENT)
Dept: RADIOLOGY | Facility: CLINIC | Age: 83
End: 2023-03-27

## 2023-03-27 VITALS
OXYGEN SATURATION: 96 % | HEART RATE: 66 BPM | WEIGHT: 134 LBS | SYSTOLIC BLOOD PRESSURE: 130 MMHG | DIASTOLIC BLOOD PRESSURE: 76 MMHG | BODY MASS INDEX: 25.32 KG/M2

## 2023-03-27 DIAGNOSIS — M54.41 CHRONIC RIGHT-SIDED LOW BACK PAIN WITH RIGHT-SIDED SCIATICA: ICD-10-CM

## 2023-03-27 DIAGNOSIS — F03.918 DEMENTIA WITH BEHAVIORAL DISTURBANCE (HCC): ICD-10-CM

## 2023-03-27 DIAGNOSIS — N18.30 STAGE 3 CHRONIC KIDNEY DISEASE, UNSPECIFIED WHETHER STAGE 3A OR 3B CKD (HCC): ICD-10-CM

## 2023-03-27 DIAGNOSIS — E11.9 TYPE 2 DIABETES MELLITUS TREATED WITH INSULIN (HCC): Primary | ICD-10-CM

## 2023-03-27 DIAGNOSIS — G89.29 CHRONIC RIGHT-SIDED LOW BACK PAIN WITH RIGHT-SIDED SCIATICA: ICD-10-CM

## 2023-03-27 DIAGNOSIS — M06.9 RHEUMATOID ARTHRITIS, INVOLVING UNSPECIFIED SITE, UNSPECIFIED WHETHER RHEUMATOID FACTOR PRESENT (HCC): ICD-10-CM

## 2023-03-27 DIAGNOSIS — Z79.4 TYPE 2 DIABETES MELLITUS TREATED WITH INSULIN (HCC): Primary | ICD-10-CM

## 2023-03-27 DIAGNOSIS — M25.551 RIGHT HIP PAIN: ICD-10-CM

## 2023-03-27 DIAGNOSIS — G47.62 LEG CRAMPS, SLEEP RELATED: ICD-10-CM

## 2023-03-27 DIAGNOSIS — G20 PARKINSON'S DISEASE (HCC): ICD-10-CM

## 2023-03-27 DIAGNOSIS — I47.1 SVT (SUPRAVENTRICULAR TACHYCARDIA) (HCC): ICD-10-CM

## 2023-03-27 PROBLEM — G20.A1 PARKINSON'S DISEASE: Status: ACTIVE | Noted: 2023-03-27

## 2023-03-27 LAB — SL AMB POCT HEMOGLOBIN AIC: 8.9 (ref ?–6.5)

## 2023-03-27 RX ORDER — ACETAMINOPHEN AND CODEINE PHOSPHATE 300; 30 MG/1; MG/1
1 TABLET ORAL EVERY 8 HOURS PRN
Qty: 30 TABLET | Refills: 0 | Status: SHIPPED | OUTPATIENT
Start: 2023-03-27

## 2023-03-27 RX ORDER — GABAPENTIN 100 MG/1
100 CAPSULE ORAL 4 TIMES DAILY
Qty: 360 CAPSULE | Refills: 3 | Status: SHIPPED | OUTPATIENT
Start: 2023-03-27 | End: 2023-06-25

## 2023-03-27 RX ORDER — INSULIN GLARGINE 100 [IU]/ML
10 INJECTION, SOLUTION SUBCUTANEOUS 2 TIMES DAILY
Qty: 18 ML | Refills: 3 | Status: SHIPPED | OUTPATIENT
Start: 2023-03-27 | End: 2023-06-25

## 2023-03-27 NOTE — PROGRESS NOTES
Name: Connor Ellington      : 1940      MRN: [id number]  Encounter Provider: Krish Lange MD  Encounter Date: 3/27/2023   Encounter department: [address] [place] [address]     1  Type 2 diabetes mellitus treated with insulin ([facility])  -     POCT hemoglobin A1c- 8 9  Elevated  After discussing risks and benefits of medication along with side effects recommend to measure glucose levels before dinner if elevated recommend increasing morning lantus dose 2 units every 2-3 days  -     Insulin Glargine Solostar (Lantus SoloStar) 100 UNIT/ML SOPN; Inject 0 1 mL (10 Units total) under the skin 2 (two) times a day  -     Comprehensive metabolic panel; Future  -     Microalbumin / creatinine urine ratio    2  Parkinson's disease ([place] [address] )    3  Rheumatoid arthritis, involving unspecified site, unspecified whether rheumatoid factor present ([place] [address] )    4  SVT (supraventricular tachycardia) ([facility])    5  Stage 3 chronic kidney disease, unspecified whether stage 3a or 3b CKD ([place] [address] )    6  Dementia with behavioral disturbance    7  Right hip pain  After discussing risks and benefits of medication along with side effects will start the following:  -     acetaminophen-codeine (TYLENOL #3) 300-30 mg per tablet; Take 1 tablet by mouth every 8 (eight) hours as needed for moderate pain    8  Chronic right-sided low back pain with right-sided sciatica  -     XR spine lumbar minimum 4 views non injury; Future; Expected date: 2023  -     XR hip/pelv 2-3 vws right if performed; Future; Expected date: 2023  -     acetaminophen-codeine (TYLENOL #3) 300-30 mg per tablet; Take 1 tablet by mouth every 8 (eight) hours as needed for moderate pain    9  Leg cramps, sleep related  -     gabapentin (NEURONTIN) 100 mg capsule; Take 1 capsule (100 mg total) by mouth 4 (four) times a day    F/U in 2 months       Subjective     Patient is here for a follow up, has Type 2 DM on insulin  Takes lantus 10 units 2 times daily  Also on Invokana  Her fasting glucose levels have been under 100 mg/dl  Does not measure in the evenings  Also has been having joint pains in her legs, hip and knee  She has a lot of pain in her right hip, groin and lower back  No injuries to the area however  Has CKD stage 3 denies any urinary complaints  Review of Systems   Constitutional: Negative for activity change, appetite change, fatigue and fever  HENT: Negative for congestion and ear discharge  Respiratory: Negative for cough and shortness of breath  Cardiovascular: Negative for chest pain and palpitations  Gastrointestinal: Negative for diarrhea and nausea  Musculoskeletal: Positive for arthralgias, back pain and myalgias  Skin: Negative for color change and rash  Neurological: Negative for dizziness and headaches  Psychiatric/Behavioral: Negative for agitation and behavioral problems         Past Medical History:   Diagnosis Date   • Aggression    • Alzheimer's dementia ([facility] )    • Arthritis    • Dementia ([facility] )    • Diabetes insipidus ([facility] )    • Diabetes mellitus ([facility] )    • High cholesterol    • Hypertension    • Memory loss    • Migraine    • Polyneuropathy    • Rheumatoid arthritis ([facility] )    • Serum lipids high    • Stomach problems    • Thyroid trouble      Past Surgical History:   Procedure Laterality Date   • CATARACT EXTRACTION     • CHOLECYSTECTOMY     • GALLBLADDER SURGERY     • HYSTERECTOMY     • [place] SURGICAL ARTHROSCOPY SHOULDER W/ROTATOR CUFF RPR Right 8/1/2019    Procedure: SHOULDER ARTHROSCOPIC ROTATOR CUFF REPAIR;  Surgeon: Robert Colin MD;  Location: [facility];  Service: Orthopedics   • ROTATOR CUFF REPAIR Right 08/01/2019     Family History   Problem Relation Age of Onset   • Substance Abuse Mother         denied   • Mental illness Mother         denied   • Substance Abuse Father         denied   • Mental illness Father         denied   • Diabetes Brother    • Blindness Brother    • Arthritis Daughter    • HIV Son Social History     Socioeconomic History   • Marital status:       Spouse name: Not on file   • Number of children: Not on file   • Years of education: Not on file   • Highest education level: Not on file   Occupational History   • Not on file   Tobacco Use   • Smoking status: Former     Packs/day: 0 25     Years: 30 00     Pack years: 7 50     Types: Cigarettes     Quit date: 5     Years since quittin 2   • Smokeless tobacco: Never   Vaping Use   • Vaping Use: Never used   Substance and Sexual Activity   • Alcohol use: Never   • Drug use: No   • Sexual activity: Not on file   Other Topics Concern   • Not on file   Social History Narrative   • Not on file     Social Determinants of Health     Financial Resource Strain: Not on file   Food Insecurity: Not on file   Transportation Needs: Not on file   Physical Activity: Not on file   Stress: Not on file   Social Connections: Not on file   Intimate Partner Violence: Not on file   Housing Stability: Not on file     Current Outpatient Medications on File Prior to Visit   Medication Sig   • amLODIPine (NORVASC) 10 mg tablet Take 1 tablet (10 mg total) by mouth daily   • BD Pen Needle Elisabeth 2nd Gen 32G X 4 MM MISC USE DAILY AS DIRECTED   • BD Pen Needle Elisabeth 2nd Gen 32G X 4 MM MISC USE 2 (TWO) TIMES A DAY   • benzonatate (TESSALON) 200 MG capsule Take 1 capsule (200 mg total) by mouth 3 (three) times a day as needed for cough   • bisacodyl (DULCOLAX) 5 mg EC tablet TAKE 4 TABS PO 2 HOURS PRIOR TO DRINKING MIRALAX PREP   • Blood Glucose Monitoring Suppl (OneTouch Verio) w/Device KIT Use 2 (two) times a day   • Blood Pressure KIT by Does not apply route daily   • Canagliflozin (Invokana) 300 MG TABS Take 1 tablet (300 mg total) by mouth daily   • Diclofenac Sodium (VOLTAREN) 1 % Apply 2 g topically 3 (three) times a day   • fenofibrate 160 MG tablet Take 1 tablet (160 mg total) by mouth daily   • fluticasone (FLONASE) 50 mcg/act nasal spray SPRAY 2 SPRAYS INTO EACH NOSTRIL EVERY DAY   • glucose blood (OneTouch Verio) test strip Use 1 each 2 (two) times a day Test   • hydrochlorothiazide (HYDRODIURIL) 12 5 mg tablet Take 1 tablet (12 5 mg total) by mouth daily   • Incontinence Supplies MISC Use 6 (six) times a day Wipes   • Incontinence Supplies MISC Use 4 (four) times a day Comfort shield Adult diapers   • Incontinence Supply Disposable MISC Use 6 (six) times a day Dispense disposable bed pad   • levothyroxine 100 mcg tablet Take 1 tablet (100 mcg total) by mouth daily   • lisinopril (ZESTRIL) 40 mg tablet Take 1 tablet (40 mg total) by mouth daily   • memantine (NAMENDA) 10 mg tablet Take 1 tablet (10 mg total) by mouth 2 (two) times a day   • montelukast (SINGULAIR) 10 mg tablet Take 1 tablet (10 mg total) by mouth daily at bedtime   • OneTouch Delica Lancets 03Q MISC TEST BLOOD SUGAR 3 TIMES A DAY   • polyethylene glycol (GLYCOLAX) 17 GM/SCOOP powder  G MIRALAX IN 64 OZ CLEAR LIQUID, DRINK 8OZ Q 30 MINUTES TILL FINISHED   • [DISCONTINUED] Insulin Glargine Solostar (Lantus SoloStar) 100 UNIT/ML SOPN INJECT 10 UNITS UNDER THE SKIN DAILY AT BEDTIME   • cholestyramine (QUESTRAN) 4 g packet Take 1 packet (4 g total) by mouth 2 (two) times a day with meals   • sertraline (ZOLOFT) 25 mg tablet Take 1 tablet (25 mg total) by mouth daily   • triamcinolone (KENALOG) 0 5 % ointment Apply topically 2 (two) times a day   • Xiidra 5 % op solution INSTILL 1 DROP IN BOTH EYES 2 TIMES PER DAY   • [DISCONTINUED] gabapentin (NEURONTIN) 100 mg capsule Take 2 capsules (200 mg total) by mouth 2 (two) times a day     Allergies   Allergen Reactions   • Penicillins Itching and Swelling     Immunization History   Administered Date(s) Administered   • COVID-19 PFIZER VACCINE 0 3 ML IM 06/04/2021, 07/02/2021   • INFLUENZA 12/07/2016, 09/10/2018, 10/07/2021   • Influenza, high dose seasonal 0 7 mL 09/10/2018, 11/13/2020, 12/23/2022   • Influenza, recombinant, quadrivalent,injectable, preservative free 10/10/2019   • Pneumococcal Conjugate 13-Valent 11/13/2020   • Pneumococcal Conjugate Vaccine 20-valent (Pcv20), Polysace 04/15/2022   • Tuberculin Skin Test-PPD Intradermal 06/11/2018       Objective     /76   Pulse 66   Wt 60 8 kg (134 lb)   SpO2 96%   BMI 25 32 kg/m²     Physical Exam  Constitutional:       General: She is not in acute distress  Appearance: She is well-developed  She is not diaphoretic  HENT:      Head: Normocephalic and atraumatic  Nose: Nose normal    Eyes:      Conjunctiva/sclera: Conjunctivae normal       Pupils: Pupils are equal, round, and reactive to light  Cardiovascular:      Rate and Rhythm: Normal rate and regular rhythm  Heart sounds: Normal heart sounds  No murmur heard  Pulmonary:      Effort: Pulmonary effort is normal  No respiratory distress  Breath sounds: Normal breath sounds  No wheezing  Abdominal:      General: Bowel sounds are normal  There is no distension  Palpations: Abdomen is soft  Tenderness: There is no abdominal tenderness  Musculoskeletal:         General: Tenderness present  Comments: Straight leg raise produces right lower back pain  Internal and external rotation of her right hip produces tenderness as well  Skin:     General: Skin is warm and dry  Findings: No erythema or rash  Neurological:      Mental Status: She is alert and oriented to person, place, and time  Motor: Weakness present         Shazia Sanderson MD

## 2023-03-31 ENCOUNTER — APPOINTMENT (OUTPATIENT)
Dept: LAB | Facility: CLINIC | Age: 83
End: 2023-03-31

## 2023-03-31 DIAGNOSIS — E11.9 TYPE 2 DIABETES MELLITUS TREATED WITH INSULIN (HCC): ICD-10-CM

## 2023-03-31 DIAGNOSIS — Z79.4 TYPE 2 DIABETES MELLITUS TREATED WITH INSULIN (HCC): ICD-10-CM

## 2023-03-31 LAB
ALBUMIN SERPL BCP-MCNC: 4.1 G/DL (ref 3.5–5)
ALP SERPL-CCNC: 30 U/L (ref 46–116)
ALT SERPL W P-5'-P-CCNC: 30 U/L (ref 12–78)
ANION GAP SERPL CALCULATED.3IONS-SCNC: 1 MMOL/L (ref 4–13)
AST SERPL W P-5'-P-CCNC: 26 U/L (ref 5–45)
BILIRUB SERPL-MCNC: 0.53 MG/DL (ref 0.2–1)
BUN SERPL-MCNC: 39 MG/DL (ref 5–25)
CALCIUM SERPL-MCNC: 10.4 MG/DL (ref 8.3–10.1)
CHLORIDE SERPL-SCNC: 109 MMOL/L (ref 96–108)
CO2 SERPL-SCNC: 29 MMOL/L (ref 21–32)
CREAT SERPL-MCNC: 1.38 MG/DL (ref 0.6–1.3)
CREAT UR-MCNC: 41.3 MG/DL
GFR SERPL CREATININE-BSD FRML MDRD: 35 ML/MIN/1.73SQ M
GLUCOSE P FAST SERPL-MCNC: 98 MG/DL (ref 65–99)
MICROALBUMIN UR-MCNC: 65.1 MG/L (ref 0–20)
MICROALBUMIN/CREAT 24H UR: 158 MG/G CREATININE (ref 0–30)
POTASSIUM SERPL-SCNC: 4.5 MMOL/L (ref 3.5–5.3)
PROT SERPL-MCNC: 7.3 G/DL (ref 6.4–8.4)
SODIUM SERPL-SCNC: 139 MMOL/L (ref 135–147)

## 2023-05-01 ENCOUNTER — TELEPHONE (OUTPATIENT)
Dept: FAMILY MEDICINE CLINIC | Facility: CLINIC | Age: 83
End: 2023-05-01

## 2023-05-02 ENCOUNTER — RA CDI HCC (OUTPATIENT)
Dept: OTHER | Facility: HOSPITAL | Age: 83
End: 2023-05-02

## 2023-05-02 PROBLEM — N18.32 CHRONIC KIDNEY DISEASE, STAGE 3B (HCC): Status: ACTIVE | Noted: 2021-07-12

## 2023-05-03 NOTE — PROGRESS NOTES
[facility]  coding opportunities     E11 22, E11 42, E11 51, E23 1, F32 1     Chart Reviewed number of suggestions sent to Provider: 5     Patients Insurance     Medicare Insurance: [employer]

## 2023-05-08 ENCOUNTER — TELEPHONE (OUTPATIENT)
Dept: FAMILY MEDICINE CLINIC | Facility: CLINIC | Age: 83
End: 2023-05-08

## 2023-05-08 NOTE — TELEPHONE ENCOUNTER
"Pt's grand daughter, Malone Krabbe  stopped in to request a script to say, \"ramp w/hand rails needed for front exterior door\" per pt's  Salvador Newton /[facility]    Fax [phone]  Email: [email or web address]   "

## 2023-05-09 DIAGNOSIS — R06.02 SHORTNESS OF BREATH ON EXERTION: ICD-10-CM

## 2023-05-09 DIAGNOSIS — G20 PARKINSON'S DISEASE: Primary | ICD-10-CM

## 2023-05-09 DIAGNOSIS — R26.81 UNSTABLE GAIT: ICD-10-CM

## 2023-05-16 DIAGNOSIS — R09.81 NASAL CONGESTION: ICD-10-CM

## 2023-05-16 RX ORDER — FLUTICASONE PROPIONATE 50 MCG
SPRAY, SUSPENSION (ML) NASAL
Qty: 16 ML | Refills: 1 | Status: SHIPPED | OUTPATIENT
Start: 2023-05-16

## 2023-05-17 DIAGNOSIS — L30.9 DERMATITIS: ICD-10-CM

## 2023-05-18 RX ORDER — TRIAMCINOLONE ACETONIDE 5 MG/G
OINTMENT TOPICAL
Qty: 30 G | Refills: 2 | Status: SHIPPED | OUTPATIENT
Start: 2023-05-18

## 2023-07-11 ENCOUNTER — APPOINTMENT (OUTPATIENT)
Dept: RADIOLOGY | Facility: CLINIC | Age: 83
End: 2023-07-11
Payer: COMMERCIAL

## 2023-07-11 ENCOUNTER — OFFICE VISIT (OUTPATIENT)
Dept: FAMILY MEDICINE CLINIC | Facility: CLINIC | Age: 83
End: 2023-07-11
Payer: COMMERCIAL

## 2023-07-11 VITALS
TEMPERATURE: 97.1 F | SYSTOLIC BLOOD PRESSURE: 148 MMHG | HEIGHT: 61 IN | HEART RATE: 68 BPM | OXYGEN SATURATION: 97 % | BODY MASS INDEX: 25.3 KG/M2 | WEIGHT: 134 LBS | DIASTOLIC BLOOD PRESSURE: 54 MMHG

## 2023-07-11 DIAGNOSIS — E83.52 HYPERCALCEMIA: ICD-10-CM

## 2023-07-11 DIAGNOSIS — G20 PARKINSON'S DISEASE (HCC): ICD-10-CM

## 2023-07-11 DIAGNOSIS — I10 ESSENTIAL HYPERTENSION: ICD-10-CM

## 2023-07-11 DIAGNOSIS — R63.4 UNINTENTIONAL WEIGHT LOSS: ICD-10-CM

## 2023-07-11 DIAGNOSIS — E11.9 TYPE 2 DIABETES MELLITUS TREATED WITH INSULIN (HCC): Primary | ICD-10-CM

## 2023-07-11 DIAGNOSIS — H54.3 VISION LOSS, BILATERAL: ICD-10-CM

## 2023-07-11 DIAGNOSIS — N39.3 FEMALE STRESS INCONTINENCE: ICD-10-CM

## 2023-07-11 DIAGNOSIS — H91.93 BILATERAL HEARING LOSS, UNSPECIFIED HEARING LOSS TYPE: ICD-10-CM

## 2023-07-11 DIAGNOSIS — R79.89 ELEVATED SERUM CREATININE: ICD-10-CM

## 2023-07-11 DIAGNOSIS — Z79.4 TYPE 2 DIABETES MELLITUS TREATED WITH INSULIN (HCC): Primary | ICD-10-CM

## 2023-07-11 DIAGNOSIS — R29.898 LEG WEAKNESS, BILATERAL: ICD-10-CM

## 2023-07-11 DIAGNOSIS — F03.918 DEMENTIA WITH BEHAVIORAL DISTURBANCE (HCC): ICD-10-CM

## 2023-07-11 DIAGNOSIS — F32.1 MAJOR DEPRESSIVE DISORDER, SINGLE EPISODE, MODERATE (HCC): ICD-10-CM

## 2023-07-11 DIAGNOSIS — F41.9 ANXIETY: ICD-10-CM

## 2023-07-11 DIAGNOSIS — R05.3 CHRONIC COUGH: ICD-10-CM

## 2023-07-11 DIAGNOSIS — Z00.00 MEDICARE ANNUAL WELLNESS VISIT, SUBSEQUENT: ICD-10-CM

## 2023-07-11 PROBLEM — M25.551 RIGHT HIP PAIN: Status: RESOLVED | Noted: 2022-08-22 | Resolved: 2023-07-11

## 2023-07-11 PROBLEM — Z13.220 NEED FOR LIPID SCREENING: Status: RESOLVED | Noted: 2018-02-15 | Resolved: 2023-07-11

## 2023-07-11 LAB — SL AMB POCT HEMOGLOBIN AIC: 7.1 (ref ?–6.5)

## 2023-07-11 PROCEDURE — 71046 X-RAY EXAM CHEST 2 VIEWS: CPT

## 2023-07-11 PROCEDURE — G0439 PPPS, SUBSEQ VISIT: HCPCS | Performed by: FAMILY MEDICINE

## 2023-07-11 PROCEDURE — 83036 HEMOGLOBIN GLYCOSYLATED A1C: CPT | Performed by: FAMILY MEDICINE

## 2023-07-11 PROCEDURE — 99214 OFFICE O/P EST MOD 30 MIN: CPT | Performed by: FAMILY MEDICINE

## 2023-07-11 RX ORDER — HYDROCHLOROTHIAZIDE 25 MG/1
25 TABLET ORAL DAILY
Qty: 30 TABLET | Refills: 3 | Status: SHIPPED | OUTPATIENT
Start: 2023-07-11 | End: 2023-08-10

## 2023-07-11 RX ORDER — AMLODIPINE BESYLATE 5 MG/1
5 TABLET ORAL DAILY
Qty: 30 TABLET | Refills: 3 | Status: SHIPPED | OUTPATIENT
Start: 2023-07-11 | End: 2023-08-10

## 2023-07-11 RX ORDER — OMEPRAZOLE 20 MG/1
20 CAPSULE, DELAYED RELEASE ORAL DAILY
Qty: 30 CAPSULE | Refills: 1 | Status: SHIPPED | OUTPATIENT
Start: 2023-07-11

## 2023-07-11 NOTE — PROGRESS NOTES
Name: Antonio Moody      : 1940      MRN: [id number]  Encounter Provider: Vineet Harper MD  Encounter Date: 2023   Encounter department: [address] [place] [address]     1. Type 2 diabetes mellitus treated with insulin ([facility])  -     POCT hemoglobin A1c- 7.1  Stable controlled    2. Elevated serum creatinine  -     Comprehensive metabolic panel; Future    3. Hypercalcemia  -     Calcium, ionized; Future  -     PTH, intact; Future  -     Vitamin D 25 hydroxy; Future    4. Major depressive disorder, single episode, moderate ([address])    5. Bilateral hearing loss, unspecified hearing loss type  -     Ambulatory Referral to Audiology; Future  -     EXTERNAL Referral to Home Health; Future    6. Vision loss, bilateral  -     EXTERNAL Referral to Home Health; Future    7. Parkinson's disease ([address])  -     EXTERNAL Referral to Home Health; Future    8. Leg weakness, bilateral  -     EXTERNAL Referral to Home Health; Future    9. Dementia with behavioral disturbance ([address])  -     EXTERNAL Referral to Home Health; Future    10. Anxiety  After discussing risks and benefits of medication along with side effects will increase the dose of zoloft  -     sertraline (ZOLOFT) 50 mg tablet; Take 1 tablet (50 mg total) by mouth daily    11. Female stress incontinence  No interested in medications    12. Chronic cough  -     omeprazole (PriLOSEC) 20 mg delayed release capsule; Take 1 capsule (20 mg total) by mouth daily    13. Essential hypertension  -     hydrochlorothiazide (HYDRODIURIL) 25 mg tablet; Take 1 tablet (25 mg total) by mouth daily  -     amLODIPine (NORVASC) 5 mg tablet; Take 1 tablet (5 mg total) by mouth daily    14. Unintentional weight loss  -     C-reactive protein; Future  -     TSH, 3rd generation with Free T4 reflex; Future  -     CBC and differential; Future  -     XR chest pa & lateral; Future; Expected date: 2023    15.  Medicare annual wellness visit, subsequent  See medicare wellness note    F/U in 1 month         Subjective     Patient is here to follow up for Type 2 DM. She denies any symptoms related to her diabetes. Takes her medications daily. Her granddaughter says that she has been losing her eye sight has macular degeneration which is not operable. Also has worsening hearing loss. Has B/L leg weakness. Had falls at home no injuries however. Also she worsening anxiety symptoms. Has been Zoloft daily. Had blood work done which showed borderline elevated kidney function and elevated calcium  Per grandaughter has been losing weight although her weight appears to be stable per chart review. Has urinary oncitiinecie and a non productive cough. Review of Systems   Constitutional: Negative for activity change, appetite change, fatigue and fever. HENT: Positive for hearing loss. Negative for congestion and ear discharge. Eyes: Positive for visual disturbance. Respiratory: Negative for cough and shortness of breath. Cardiovascular: Negative for chest pain and palpitations. Gastrointestinal: Negative for diarrhea and nausea. Musculoskeletal: Negative for arthralgias and back pain. Skin: Negative for color change and rash. Neurological: Positive for weakness. Negative for dizziness and headaches. Psychiatric/Behavioral: Negative for agitation and behavioral problems. The patient is nervous/anxious and is hyperactive.         Past Medical History:   Diagnosis Date   • Aggression    • Alzheimer's dementia ([address])    • Arthritis    • Dementia ([address])    • Diabetes insipidus ([address])    • Diabetes mellitus ([address])    • High cholesterol    • Hypertension    • Memory loss    • Migraine    • Polyneuropathy    • Rheumatoid arthritis ([address])    • Serum lipids high    • Stomach problems    • Thyroid trouble      Past Surgical History:   Procedure Laterality Date   • CATARACT EXTRACTION     • CHOLECYSTECTOMY     • GALLBLADDER SURGERY     • HYSTERECTOMY     • [place] SURGICAL ARTHROSCOPY SHOULDER W/ROTATOR CUFF RPR Right 2019    Procedure: SHOULDER ARTHROSCOPIC ROTATOR CUFF REPAIR;  Surgeon: Kaylie Telles MD;  Location: [facility];  Service: Orthopedics   • ROTATOR CUFF REPAIR Right 2019     Family History   Problem Relation Age of Onset   • Substance Abuse Mother         denied   • Mental illness Mother         denied   • Substance Abuse Father         denied   • Mental illness Father         denied   • Diabetes Brother    • Blindness Brother    • Arthritis Daughter    • HIV Son      Social History     Socioeconomic History   • Marital status:      Spouse name: None   • Number of children: None   • Years of education: None   • Highest education level: None   Occupational History   • None   Tobacco Use   • Smoking status: Former     Packs/day: 0.25     Years: 30.00     Total pack years: 7.50     Types: Cigarettes     Quit date:      Years since quittin.5   • Smokeless tobacco: Never   Vaping Use   • Vaping Use: Never used   Substance and Sexual Activity   • Alcohol use: Never   • Drug use: No   • Sexual activity: None   Other Topics Concern   • None   Social History Narrative   • None     Social Determinants of Health     Financial Resource Strain: Low Risk  (2023)    Overall Financial Resource Strain (CARDIA)    • Difficulty of Paying Living Expenses: Not very hard   Food Insecurity: Food Insecurity Present (2021)    Hunger Vital Sign    • Worried About Running Out of Food in the Last Year: Sometimes true    • Ran Out of Food in the Last Year: Never true   Transportation Needs: No Transportation Needs (2023)    PRAPARE - Transportation    • Lack of Transportation (Medical): No    • Lack of Transportation (Non-Medical): No   Physical Activity: Not on file   Stress: No Stress Concern Present (2019)    [address] [place] [address]    • Feeling of Stress :  Only a little   Social Connections: Unknown (11/27/2019)    Social Connection and Isolation Panel [NHANES]    • Frequency of Communication with Friends and Family: More than three times a week    • Frequency of Social Gatherings with Friends and Family: More than three times a week    • Attends Che[employer] Services: Not on file    • Active Member of Clubs or Organizations: Not on file    • Attends Club or Organization Meetings: Not on file    • Marital Status:     Intimate Partner Violence: Not on file   Housing Stability: Not on file     Current Outpatient Medications on File Prior to Visit   Medication Sig   • BD Pen Needle Elisabeth 2nd Gen 32G X 4 MM MISC USE DAILY AS DIRECTED   • BD Pen Needle Elisabeth 2nd Gen 32G X 4 MM MISC USE 2 (TWO) TIMES A DAY   • Blood Glucose Monitoring Suppl (OneTouch Verio) w/Device KIT Use 2 (two) times a day   • Blood Pressure KIT by Does not apply route daily   • Canagliflozin (Invokana) 300 MG TABS Take 1 tablet (300 mg total) by mouth daily   • cholestyramine (QUESTRAN) 4 g packet Take 1 packet (4 g total) by mouth 2 (two) times a day with meals   • Diclofenac Sodium (VOLTAREN) 1 % Apply 2 g topically 3 (three) times a day   • fenofibrate 160 MG tablet Take 1 tablet (160 mg total) by mouth daily   • fluticasone (FLONASE) 50 mcg/act nasal spray SPRAY 2 SPRAYS INTO EACH NOSTRIL EVERY DAY   • glucose blood (OneTouch Verio) test strip Use 1 each 2 (two) times a day Test   • Incontinence Supplies MISC Use 6 (six) times a day Wipes   • Incontinence Supplies MISC Use 4 (four) times a day Comfort shield Adult diapers   • Incontinence Supply Disposable MISC Use 6 (six) times a day Dispense disposable bed pad   • Insulin Glargine Solostar (Lantus SoloStar) 100 UNIT/ML SOPN Take 15 units in the am and 10 units in the pm   • levothyroxine 100 mcg tablet Take 1 tablet (100 mcg total) by mouth daily   • lisinopril (ZESTRIL) 40 mg tablet Take 1 tablet (40 mg total) by mouth daily   • loteprednol etabonate (LOTEMAX) 0.5 % ophthalmic suspension INSTILL 1 DROP INTO BOTH EYES TWICE A DAY   • memantine (NAMENDA) 10 mg tablet Take 1 tablet (10 mg total) by mouth 2 (two) times a day   • montelukast (SINGULAIR) 10 mg tablet Take 1 tablet (10 mg total) by mouth daily at bedtime   • OneTouch Delica Lancets 47R MISC TEST BLOOD SUGAR 3 TIMES A DAY   • triamcinolone (KENALOG) 0.5 % ointment APPLY TO AFFECTED AREA TWICE A DAY   • Xiidra 5 % op solution INSTILL 1 DROP IN BOTH EYES 2 TIMES PER DAY   • [DISCONTINUED] acetaminophen-codeine (TYLENOL #3) 300-30 mg per tablet Take 1 tablet by mouth every 8 (eight) hours as needed for moderate pain   • [DISCONTINUED] amLODIPine (NORVASC) 10 mg tablet Take 1 tablet (10 mg total) by mouth daily   • [DISCONTINUED] benzonatate (TESSALON) 200 MG capsule Take 1 capsule (200 mg total) by mouth 3 (three) times a day as needed for cough   • [DISCONTINUED] bisacodyl (DULCOLAX) 5 mg EC tablet TAKE 4 TABS PO 2 HOURS PRIOR TO DRINKING MIRALAX PREP   • [DISCONTINUED] gabapentin (NEURONTIN) 100 mg capsule Take 1 capsule (100 mg total) by mouth 4 (four) times a day   • [DISCONTINUED] hydrochlorothiazide (HYDRODIURIL) 12.5 mg tablet Take 1 tablet (12.5 mg total) by mouth daily   • [DISCONTINUED] polyethylene glycol (GLYCOLAX) 17 GM/SCOOP powder  G MIRALAX IN 64 OZ CLEAR LIQUID, DRINK 8OZ Q 30 MINUTES TILL FINISHED   • [DISCONTINUED] sertraline (ZOLOFT) 25 mg tablet Take 1 tablet (25 mg total) by mouth daily     Allergies   Allergen Reactions   • Penicillins Itching and Swelling     Immunization History   Administered Date(s) Administered   • COVID-19 PFIZER VACCINE 0.3 ML IM 06/04/2021, 07/02/2021   • INFLUENZA 12/07/2016, 09/10/2018, 10/07/2021, 12/23/2022   • Influenza, high dose seasonal 0.7 mL 09/10/2018, 11/13/2020, 12/23/2022   • Influenza, recombinant, quadrivalent,injectable, preservative free 10/10/2019   • Pneumococcal Conjugate 13-Valent 11/13/2020   • Pneumococcal Conjugate Vaccine 20-valent (Pcv20), Polysace 04/15/2022   • Tuberculin Skin Test-PPD Intradermal 06/11/2018       Objective     /54 (BP Location: Left arm, Patient Position: Sitting, Cuff Size: Adult)   Pulse 68   Temp (!) 97.1 °F (36.2 °C)   Ht 5' 1" (1.549 m)   Wt 60.8 kg (134 lb)   SpO2 97%   BMI 25.32 kg/m²     Physical Exam  Constitutional:       General: She is not in acute distress. Appearance: She is well-developed. She is not diaphoretic. HENT:      Head: Normocephalic and atraumatic. Nose: Nose normal.   Cardiovascular:      Rate and Rhythm: Normal rate and regular rhythm. Pulses: no weak pulses          Dorsalis pedis pulses are 2+ on the right side and 2+ on the left side. Posterior tibial pulses are 2+ on the right side and 2+ on the left side. Heart sounds: Normal heart sounds. No murmur heard. Pulmonary:      Effort: Pulmonary effort is normal. No respiratory distress. Breath sounds: Normal breath sounds. No wheezing. Abdominal:      General: Bowel sounds are normal. There is no distension. Palpations: Abdomen is soft. Tenderness: There is no abdominal tenderness. Feet:      Right foot:      Skin integrity: No ulcer, skin breakdown, erythema, warmth, callus or dry skin. Left foot:      Skin integrity: No ulcer, skin breakdown, erythema, warmth, callus or dry skin. Skin:     General: Skin is warm and dry. Findings: No erythema or rash. Neurological:      Mental Status: She is alert and oriented to person, place, and time. Motor: Weakness present. Gait: Gait abnormal.       Kaylee Echeverria MD     Diabetic Foot Exam    Patient's shoes and socks removed. Right Foot/Ankle   Right Foot Inspection  Skin Exam: skin normal and skin intact. No dry skin, no warmth, no callus, no erythema, no maceration, no abnormal color, no pre-ulcer, no ulcer and no callus. Toe Exam: ROM and strength within normal limits.      Sensory   Vibration: intact  Proprioception: intact  Monofilament testing: intact    Vascular  The right DP pulse is 2+. The right PT pulse is 2+. Left Foot/Ankle  Left Foot Inspection  Skin Exam: skin normal and skin intact. No dry skin, no warmth, no erythema, no maceration, normal color, no pre-ulcer, no ulcer and no callus. Toe Exam: ROM and strength within normal limits. Sensory   Vibration: intact  Proprioception: intact  Monofilament testing: intact    Vascular  The left DP pulse is 2+. The left PT pulse is 2+.      Assign Risk Category  No deformity present  No loss of protective sensation  No weak pulses  Risk: 0

## 2023-07-11 NOTE — PATIENT INSTRUCTIONS
Medicare Preventive Visit Patient Instructions  Thank you for completing your Welcome to Medicare Visit or Medicare Annual Wellness Visit today. Your next wellness visit will be due in one year (7/11/2024). The screening/preventive services that you may require over the next 5-10 years are detailed below. Some tests may not apply to you based off risk factors and/or age. Screening tests ordered at today's visit but not completed yet may show as past due. Also, please note that scanned in results may not display below. Preventive Screenings:  Service Recommendations Previous Testing/Comments   Colorectal Cancer Screening  * Colonoscopy    * Fecal Occult Blood Test (FOBT)/Fecal Immunochemical Test (FIT)  * Fecal DNA/Cologuard Test  * Flexible Sigmoidoscopy Age: 43-73 years old   Colonoscopy: every 10 years (may be performed more frequently if at higher risk)  OR  FOBT/FIT: every 1 year  OR  Cologuard: every 3 years  OR  Sigmoidoscopy: every 5 years  Screening may be recommended earlier than age 39 if at higher risk for colorectal cancer. Also, an individualized decision between you and your healthcare provider will decide whether screening between the ages of 77-80 would be appropriate. Colonoscopy: 08/02/2022  FOBT/FIT: 04/18/2022  Cologuard: Not on file  Sigmoidoscopy: Not on file    Screening Current     Breast Cancer Screening Age: 36 years old  Frequency: every 1-2 years  Not required if history of left and right mastectomy Mammogram: Not on file        Cervical Cancer Screening Between the ages of 21-29, pap smear recommended once every 3 years. Between the ages of 32-69, can perform pap smear with HPV co-testing every 5 years.    Recommendations may differ for women with a history of total hysterectomy, cervical cancer, or abnormal pap smears in past. Pap Smear: Not on file    Screening Not Indicated   Hepatitis C Screening Once for adults born between 1945 and 1965  More frequently in patients at high risk for Hepatitis C Hep C Antibody: Not on file        Diabetes Screening 1-2 times per year if you're at risk for diabetes or have pre-diabetes Fasting glucose: 98 mg/dL (3/31/2023)  A1C: 8.9 (3/27/2023)  Screening Not Indicated  History Diabetes   Cholesterol Screening Once every 5 years if you don't have a lipid disorder. May order more often based on risk factors. Lipid panel: 12/09/2022    Screening Not Indicated  History Lipid Disorder     Other Preventive Screenings Covered by Medicare:  1. Abdominal Aortic Aneurysm (AAA) Screening: covered once if your at risk. You're considered to be at risk if you have a family history of AAA. 2. Lung Cancer Screening: covers low dose CT scan once per year if you meet all of the following conditions: (1) Age 48-67; (2) No signs or symptoms of lung cancer; (3) Current smoker or have quit smoking within the last 15 years; (4) You have a tobacco smoking history of at least 20 pack years (packs per day multiplied by number of years you smoked); (5) You get a written order from a healthcare provider. 3. Glaucoma Screening: covered annually if you're considered high risk: (1) You have diabetes OR (2) Family history of glaucoma OR (3)  aged 48 and older OR (3)  American aged 72 and older  3. Osteoporosis Screening: covered every 2 years if you meet one of the following conditions: (1) You're estrogen deficient and at risk for osteoporosis based off medical history and other findings; (2) Have a vertebral abnormality; (3) On glucocorticoid therapy for more than 3 months; (4) Have primary hyperparathyroidism; (5) On osteoporosis medications and need to assess response to drug therapy. · Last bone density test (DXA Scan): 09/26/2022.  5. HIV Screening: covered annually if you're between the age of 15-65. Also covered annually if you are younger than 13 and older than 72 with risk factors for HIV infection.  For pregnant patients, it is covered up to 3 times per pregnancy. Immunizations:  Immunization Recommendations   Influenza Vaccine Annual influenza vaccination during flu season is recommended for all persons aged >= 6 months who do not have contraindications   Pneumococcal Vaccine   * Pneumococcal conjugate vaccine = PCV13 (Prevnar 13), PCV15 (Vaxneuvance), PCV20 (Prevnar 20)  * Pneumococcal polysaccharide vaccine = PPSV23 (Pneumovax) Adults 20-63 years old: 1-3 doses may be recommended based on certain risk factors  Adults 72 years old: 1-2 doses may be recommended based off what pneumonia vaccine you previously received   Hepatitis B Vaccine 3 dose series if at intermediate or high risk (ex: diabetes, end stage renal disease, liver disease)   Tetanus (Td) Vaccine - COST NOT COVERED BY MEDICARE PART B Following completion of primary series, a booster dose should be given every 10 years to maintain immunity against tetanus. Td may also be given as tetanus wound prophylaxis. Tdap Vaccine - COST NOT COVERED BY MEDICARE PART B Recommended at least once for all adults. For pregnant patients, recommended with each pregnancy. Shingles Vaccine (Shingrix) - COST NOT COVERED BY MEDICARE PART B  2 shot series recommended in those aged 48 and above     Health Maintenance Due:      Topic Date Due   • Colorectal Cancer Screening  08/01/2027     Immunizations Due:      Topic Date Due   • COVID-19 Vaccine (3 - Pfizer series) 08/27/2021   • Influenza Vaccine (1) 09/01/2023     Advance Directives   What are advance directives? Advance directives are legal documents that state your wishes and plans for medical care. These plans are made ahead of time in case you lose your ability to make decisions for yourself. Advance directives can apply to any medical decision, such as the treatments you want, and if you want to donate organs. What are the types of advance directives? There are many types of advance directives, and each state has rules about how to use them.  You may choose a combination of any of the following:  · Living will: This is a written record of the treatment you want. You can also choose which treatments you do not want, which to limit, and which to stop at a certain time. This includes surgery, medicine, IV fluid, and tube feedings. · Durable power of  for healthcare [facility]): This is a written record that states who you want to make healthcare choices for you when you are unable to make them for yourself. This person, called a proxy, is usually a family member or a friend. You may choose more than 1 proxy. · Do not resuscitate (DNR) order:  A DNR order is used in case your heart stops beating or you stop breathing. It is a request not to have certain forms of treatment, such as CPR. A DNR order may be included in other types of advance directives. · Medical directive: This covers the care that you want if you are in a coma, near death, or unable to make decisions for yourself. You can list the treatments you want for each condition. Treatment may include pain medicine, surgery, blood transfusions, dialysis, IV or tube feedings, and a ventilator (breathing machine). · Values history: This document has questions about your views, beliefs, and how you feel and think about life. This information can help others choose the care that you would choose. Why are advance directives important? An advance directive helps you control your care. Although spoken wishes may be used, it is better to have your wishes written down. Spoken wishes can be misunderstood, or not followed. Treatments may be given even if you do not want them. An advance directive may make it easier for your family to make difficult choices about your care. Fall Prevention    Fall prevention  includes ways to make your home and other areas safer. It also includes ways you can move more carefully to prevent a fall.  Health conditions that cause changes in your blood pressure, vision, or muscle strength and coordination may increase your risk for falls. Medicines may also increase your risk for falls if they make you dizzy, weak, or sleepy. Fall prevention tips:   · Stand or sit up slowly. · Use assistive devices as directed. · Wear shoes that fit well and have soles that . · Wear a personal alarm. · Stay active. · Manage your medical conditions. Home Safety Tips:  · Add items to prevent falls in the bathroom. · Keep paths clear. · Install bright lights in your home. · Keep items you use often on shelves within reach. · Paint or place reflective tape on the edges of your stairs. Urinary Incontinence   Urinary incontinence (UI)  is when you lose control of your bladder. UI develops because your bladder cannot store or empty urine properly. The 3 most common types of UI are stress incontinence, urge incontinence, or both. Medicines:   · May be given to help strengthen your bladder control. Report any side effects of medication to your healthcare provider. Do pelvic muscle exercises often:  Your pelvic muscles help you stop urinating. Squeeze these muscles tight for 5 seconds, then relax for 5 seconds. Gradually work up to squeezing for 10 seconds. Do 3 sets of 15 repetitions a day, or as directed. This will help strengthen your pelvic muscles and improve bladder control. Train your bladder:  Go to the bathroom at set times, such as every 2 hours, even if you do not feel the urge to go. You can also try to hold your urine when you feel the urge to go. For example, hold your urine for 5 minutes when you feel the urge to go. As that becomes easier, hold your urine for 10 minutes. Self-care:   · Keep a UI record. Write down how often you leak urine and how much you leak. Make a note of what you were doing when you leaked urine. · Drink liquids as directed. You may need to limit the amount of liquid you drink to help control your urine leakage.  Do not drink any liquid right before you go to bed. Limit or do not have drinks that contain caffeine or alcohol. · Prevent constipation. Eat a variety of high-fiber foods. Good examples are high-fiber cereals, beans, vegetables, and whole-grain breads. Walking is the best way to trigger your intestines to have a bowel movement. · Exercise regularly and maintain a healthy weight. Weight loss and exercise will decrease pressure on your bladder and help you control your leakage. · Use a catheter as directed  to help empty your bladder. A catheter is a tiny, plastic tube that is put into your bladder to drain your urine. · Go to behavior therapy as directed. Behavior therapy may be used to help you learn to control your urge to urinate. Weight Management   Why it is important to manage your weight:  Being overweight increases your risk of health conditions such as heart disease, high blood pressure, type 2 diabetes, and certain types of cancer. It can also increase your risk for osteoarthritis, sleep apnea, and other respiratory problems. Aim for a slow, steady weight loss. Even a small amount of weight loss can lower your risk of health problems. How to lose weight safely:  A safe and healthy way to lose weight is to eat fewer calories and get regular exercise. You can lose up about 1 pound a week by decreasing the number of calories you eat by 500 calories each day. Healthy meal plan for weight management:  A healthy meal plan includes a variety of foods, contains fewer calories, and helps you stay healthy. A healthy meal plan includes the following:  · Eat whole-grain foods more often. A healthy meal plan should contain fiber. Fiber is the part of grains, fruits, and vegetables that is not broken down by your body. Whole-grain foods are healthy and provide extra fiber in your diet. Some examples of whole-grain foods are whole-wheat breads and pastas, oatmeal, brown rice, and bulgur.   · Eat a variety of vegetables every day. Include dark, leafy greens such as spinach, kale, cristopher greens, and mustard greens. Eat yellow and orange vegetables such as carrots, sweet potatoes, and winter squash. · Eat a variety of fruits every day. Choose fresh or canned fruit (canned in its own juice or light syrup) instead of juice. Fruit juice has very little or no fiber. · Eat low-fat dairy foods. Drink fat-free (skim) milk or 1% milk. Eat fat-free yogurt and low-fat cottage cheese. Try low-fat cheeses such as mozzarella and other reduced-fat cheeses. · Choose meat and other protein foods that are low in fat. Choose beans or other legumes such as split peas or lentils. Choose fish, skinless poultry (chicken or turkey), or lean cuts of red meat (beef or pork). Before you cook meat or poultry, cut off any visible fat. · Use less fat and oil. Try baking foods instead of frying them. Add less fat, such as margarine, sour cream, regular salad dressing and mayonnaise to foods. Eat fewer high-fat foods. Some examples of high-fat foods include french fries, doughnuts, ice cream, and cakes. · Eat fewer sweets. Limit foods and drinks that are high in sugar. This includes candy, cookies, regular soda, and sweetened drinks. Exercise:  Exercise at least 30 minutes per day on most days of the week. Some examples of exercise include walking, biking, dancing, and swimming. You can also fit in more physical activity by taking the stairs instead of the elevator or parking farther away from stores. Ask your healthcare provider about the best exercise plan for you. © Copyright [employer] 2018 Information is for End User's use only and may not be sold, redistributed or otherwise used for commercial purposes.  All illustrations and images included in CareNotes® are the copyrighted property of A.D.A.M., Inc. or [facility]:   La incontinencia urinaria se produce cindyo osbaldo teddy el control de crews vejiga. La incontinencia urinaria se desarrolla cuando la vejiga no puede almacenar o vaciar la orina correctamente. Los 3 tipos más comunes de incontinencia urinaria son la incontinencia de esfuerzo o por estrés, la incontinencia de [place] 2, o ambas. Los síntomas más comunes [place] siguientes:  • Usted siente colton que crews vejiga no se vacía por completo al orinar. • Usted orina seguido y necesita orinar de inmediato. • Usted gotea orina mientras duerme o se despierta con la necesidad urgente de orinar. • Usted gotea orina cuando tose, estornuda, hace ejercicio o se ríe. Llame a crews médico si:  • Usted tiene dolor intenso. • Usted está confundido o no puede pensar con claridad. • Tiene fiebre. • Usted orina con geoffrey. • Siente dolor al [place]. • Usted tiene dolor nuevo o peor, aun después del Yohannes. • Crews boca está seca o tiene cambios en la visión. • Crews orina está turbia o tiene mal olor. • Usted tiene preguntas o inquietudes acerca de crews condición o cuidado. Medicamentos:  • Los medicamentos podrían administrarse para ayudar a reforzar el control de crews vejiga. • [place] kizzy medicamentos colton se le haya indicado. Consulte con crews médico si usted kelli que crews medicamento no le está ayudando o si presenta efectos secundarios. Infórmele al médico si usted es alérgico a algún medicamento. Mantenga allyson lista actualizada de los [place], las vitaminas y los productos herbales que beni. Incluya los siguientes datos de los medicamentos: cantidad, frecuencia y motivo de administración. Traiga con usted la lista o los envases de las píldoras a kizzy citas de seguimiento. Lleve la lista de los medicamentos con usted en rosa elena de allyson emergencia. [place] ejercicios del músculo pélvico con frecuencia: Kizzy músculos pélvicos le ayudan a detener la orina. Contraiga estos músculos por 5 segundos, después relájelos por 5 segundos. Aumente gradualmente a 10 segundos.  Talia Lopez 3 series de 15 repeticiones al día o colton se le indique. [place] le ayudará a fortalecer catracho músculos pélvicos y a mejorar el control de crews vejiga. Entrene crews vejiga: Vaya al baño javier horas programadas, colton por ejemplo cada 2 horas, aunque no sienta la [place] de ir. Usted también puede tratar de aguantar crews orina cuando usted tiene ganas de ir al baño. Por ejemplo, contenga crews orina por 5 minutos cuando sienta ganas de orinar. Conforme se le vaya facilitando, contenga la orina por 10 minutos. Cuidados personales:  • Mantenga un registro de la incontinencia urinaria. Anote con qué frecuencia se gotea crews orina y cuánta orina pierde. Escriba qué estaba haciendo cuando se goteó la [place]. • [place] líquidos colton se le haya indicado. Pregunte a crews médico sobre la cantidad de líquido que necesita hector todos los [place]lin y cuáles le recomienda. Es posible que deba controlar la cantidad de líquido que beni para ayudar a controlar el goteo de [place]. No tome líquido antes de irse a dormir. Limite o no consuma bebidas que contengan cafeína o alcohol. • Prevenga el estreñimiento. Consuma allyson variedad de alimentos ricos en fibra. Los [employer], los frijoles, las verduras y los panes integrales son [place]. El jugo de ciruelas podría ayudarle a suavizar las evacuaciones intestinales. Caminar es la mejor forma de estimular catracho intestinos para tener allyson evacuación intestinal.    • Ejercítese regularmente y mantenga un peso saludable. Pregunte a crews médico cuánto debería pesar y pídale que le recomiende un programa de ejercicios adecuado para usted. La pérdida de peso y el ejercicio harán que crews vejiga esté bajo menos presión y contribuirán a controlar el [facility]. Pídale que lo ayude a crear un plan para bajar de peso si tiene sobrepeso. • Use un catéter colton se le indique para ayudar a vaciar la vejiga. Un catéter es allyson sonda pequeña de plástico que se coloca en crews vejiga para drenar crews orina. Crews médico podría indicarle que use un catéter para evitar que crews vejiga se llene demasiado y que se gotee la [facility]. • Vaya a terapia conductual colton le hayan indicado. La terapia conductual se puede utilizar para ayudarle a aprender a controlar crews necesidad de orinar. Acuda a la consulta de control con crews médico según las indicaciones: Anote catracho preguntas para que se acuerde de hacerlas javier catracho visitas. © Copyright [employer] 2022 Information is for End User's use only and may not be sold, redistributed or otherwise used for commercial purposes. Esta información es sólo para uso en educación. Crews intención no es darle un consejo médico sobre enfermedades o tratamientos. Colsulte con crews [place] farmacéutico antes de seguir cualquier régimen médico para saber si es seguro y efectivo para usted.

## 2023-07-11 NOTE — PROGRESS NOTES
Assessment and Plan:     Problem List Items Addressed This Visit    None       Preventive health issues were discussed with patient, and age appropriate screening tests were ordered as noted in patient's After Visit Summary. Personalized health advice and appropriate referrals for health education or preventive services given if needed, as noted in patient's After Visit Summary.      History of Present Illness:     Patient presents for a Medicare Wellness Visit    HPI   Patient Care Team:  Tonny Urban MD as PCP - General (Family Medicine)  Tonny Urban MD as PCP - [address] [place] [address] (RT[place])  Tonny Urban MD as PCP - [facility]CP-[facility] ([facility])  Lindsay Bernstein DPM (Podiatry)  KRISTA Ann as Nurse Practitioner (Endocrinology)     Review of Systems:     Review of Systems     Problem List:     Patient Active Problem List   Diagnosis   • Dementia with behavioral disturbance [facility])   • Chronic left shoulder pain   • Need for lipid screening   • Bilateral hearing loss   • Rheumatoid arthritis 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 ([facility])   • Memory loss   • Alzheimer's dementia ([address])   • Anxiety   • Hypothyroidism   • Essential hypertension   • SVT (supraventricular tachycardia) ([facility])   • Nontraumatic tear of right rotator cuff   • Menopause ovarian failure   • Peripheral neuropathy   • Medicare annual wellness visit, subsequent   • Ambulatory dysfunction   • Tear of right rotator cuff   • Biceps tendon tear   • Unstable gait   • Type 2 diabetes mellitus treated with insulin ([address])   • Anemia in stage 3 chronic kidney disease ([address])   • Restless leg syndrome   • Mixed hyperlipidemia   • Leg cramps, sleep related   • Bright red blood per rectum   • Coccyalgia   • Ulcerated external hemorrhoids   • Chronic kidney disease, stage 3b ([address])   • Herpes zoster without complication   • Post herpetic neuralgia   • Asymptomatic bilateral carotid artery stenosis   • Weakness of both lower extremities   • Trigger finger   • Chronic midline low back pain without sciatica   • Dysphagia   • Shortness of breath on exertion   • Right hip pain   • Parkinson's disease (HCC)      Past Medical and Surgical History:     Past Medical History:   Diagnosis Date   • Aggression    • Alzheimer's dementia ([address])    • Arthritis    • Dementia ([address])    • Diabetes insipidus ([address])    • Diabetes mellitus ([address])    • High cholesterol    • Hypertension    • Memory loss    • Migraine    • Polyneuropathy    • Rheumatoid arthritis (HCC)    • Serum lipids high    • Stomach problems    • Thyroid trouble      Past Surgical History:   Procedure Laterality Date   • CATARACT EXTRACTION     • CHOLECYSTECTOMY     • GALLBLADDER SURGERY     • HYSTERECTOMY     • IA SURGICAL ARTHROSCOPY SHOULDER W/ROTATOR CUFF RPR Right 2019    Procedure: SHOULDER ARTHROSCOPIC ROTATOR CUFF REPAIR;  Surgeon: Rudy Roberts MD;  Location: [facility];  Service: Orthopedics   • ROTATOR CUFF REPAIR Right 2019      Family History:     Family History   Problem Relation Age of Onset   • Substance Abuse Mother         denied   • Mental illness Mother         denied   • Substance Abuse Father         denied   • Mental illness Father         denied   • Diabetes Brother    • Blindness Brother    • Arthritis Daughter    • HIV Son       Social History:     Social History     Socioeconomic History   • Marital status:       Spouse name: Not on file   • Number of children: Not on file   • Years of education: Not on file   • Highest education level: Not on file   Occupational History   • Not on file   Tobacco Use   • Smoking status: Former     Packs/day: 0.25     Years: 30.00     Total pack years: 7.50     Types: Cigarettes     Quit date: 5     Years since quittin.5   • Smokeless tobacco: Never   Vaping Use   • Vaping Use: Never used   Substance and Sexual Activity   • Alcohol use: Never   • Drug use: No   • Sexual activity: Not on file   Other Topics Concern   • Not on file Social History Narrative   • Not on file     Social Determinants of Health     Financial Resource Strain: Medium Risk (1/20/2021)    Overall Financial Resource Strain (CARDIA)    • Difficulty of Paying Living Expenses: Somewhat hard   Food Insecurity: Food Insecurity Present (1/20/2021)    Hunger Vital Sign    • Worried About Running Out of Food in the Last Year: Sometimes true    • Ran Out of Food in the Last Year: Never true   Transportation Needs: No Transportation Needs (1/20/2021)    PRAPARE - Transportation    • Lack of Transportation (Medical): No    • Lack of Transportation (Non-Medical): No   Physical Activity: Not on file   Stress: No Stress Concern Present (11/27/2019)    [facility]    • Feeling of Stress : Only a little   Social Connections: Unknown (11/27/2019)    Social Connection and Isolation Panel [NHANES]    • Frequency of Communication with Friends and Family: More than three times a week    • Frequency of Social Gatherings with Friends and Family: More than three times a week    • Attends Judaism Services: Not on file    • Active Member of Clubs or Organizations: Not on file    • Attends Club or Organization Meetings: Not on file    • Marital Status:     Intimate Partner Violence: Not on file   Housing Stability: Not on file      Medications and Allergies:     Current Outpatient Medications   Medication Sig Dispense Refill   • acetaminophen-codeine (TYLENOL #3) 300-30 mg per tablet Take 1 tablet by mouth every 8 (eight) hours as needed for moderate pain 30 tablet 0   • amLODIPine (NORVASC) 10 mg tablet Take 1 tablet (10 mg total) by mouth daily 90 tablet 2   • BD Pen Needle Elisabeth 2nd Gen 32G X 4 MM MISC USE DAILY AS DIRECTED 100 each 1   • BD Pen Needle Elisabeth 2nd Gen 32G X 4 MM MISC USE 2 (TWO) TIMES A  each 5   • benzonatate (TESSALON) 200 MG capsule Take 1 capsule (200 mg total) by mouth 3 (three) times a day as needed for cough 20 capsule 0   • bisacodyl (DULCOLAX) 5 mg EC tablet TAKE 4 TABS PO 2 HOURS PRIOR TO DRINKING MIRALAX PREP 4 tablet 0   • Blood Glucose Monitoring Suppl (OneTouch Verio) w/Device KIT Use 2 (two) times a day 1 kit 0   • Blood Pressure KIT by Does not apply route daily 1 each 1   • Canagliflozin (Invokana) 300 MG TABS Take 1 tablet (300 mg total) by mouth daily 90 tablet 1   • cholestyramine (QUESTRAN) 4 g packet Take 1 packet (4 g total) by mouth 2 (two) times a day with meals 180 packet 3   • Diclofenac Sodium (VOLTAREN) 1 % Apply 2 g topically 3 (three) times a day 100 g 2   • fenofibrate 160 MG tablet Take 1 tablet (160 mg total) by mouth daily 90 tablet 1   • fluticasone (FLONASE) 50 mcg/act nasal spray SPRAY 2 SPRAYS INTO EACH NOSTRIL EVERY DAY 16 mL 1   • gabapentin (NEURONTIN) 100 mg capsule Take 1 capsule (100 mg total) by mouth 4 (four) times a day 360 capsule 3   • glucose blood (OneTouch Verio) test strip Use 1 each 2 (two) times a day Test 100 strip 3   • hydrochlorothiazide (HYDRODIURIL) 12.5 mg tablet Take 1 tablet (12.5 mg total) by mouth daily 90 tablet 3   • Incontinence Supplies MISC Use 6 (six) times a day Wipes 200 each 6   • Incontinence Supplies MISC Use 4 (four) times a day Comfort shield Adult diapers 200 each 6   • Incontinence Supply Disposable MISC Use 6 (six) times a day Dispense disposable bed pad 200 each 6   • Insulin Glargine Solostar (Lantus SoloStar) 100 UNIT/ML SOPN Take 15 units in the am and 10 units in the pm 18 mL 3   • levothyroxine 100 mcg tablet Take 1 tablet (100 mcg total) by mouth daily 90 tablet 1   • lisinopril (ZESTRIL) 40 mg tablet Take 1 tablet (40 mg total) by mouth daily 90 tablet 1   • loteprednol etabonate (LOTEMAX) 0.5 % ophthalmic suspension INSTILL 1 DROP INTO BOTH EYES TWICE A DAY     • memantine (NAMENDA) 10 mg tablet Take 1 tablet (10 mg total) by mouth 2 (two) times a day 180 tablet 1   • montelukast (SINGULAIR) 10 mg tablet Take 1 tablet (10 mg total) by mouth daily at bedtime 90 tablet 1   • OneTouch Delica Lancets 70S MISC TEST BLOOD SUGAR 3 TIMES A  each 3   • polyethylene glycol (GLYCOLAX) 17 GM/SCOOP powder  G MIRALAX IN 64 OZ CLEAR LIQUID, DRINK 8OZ Q 30 MINUTES TILL FINISHED 238 g 0   • sertraline (ZOLOFT) 25 mg tablet Take 1 tablet (25 mg total) by mouth daily 90 tablet 3   • triamcinolone (KENALOG) 0.5 % ointment APPLY TO AFFECTED AREA TWICE A DAY 30 g 2   • Xiidra 5 % op solution INSTILL 1 DROP IN BOTH EYES 2 TIMES PER DAY       No current facility-administered medications for this visit. Allergies   Allergen Reactions   • Penicillins Itching and Swelling      Immunizations:     Immunization History   Administered Date(s) Administered   • COVID-19 PFIZER VACCINE 0.3 ML IM 06/04/2021, 07/02/2021   • INFLUENZA 12/07/2016, 09/10/2018, 10/07/2021, 12/23/2022   • Influenza, high dose seasonal 0.7 mL 09/10/2018, 11/13/2020, 12/23/2022   • Influenza, recombinant, quadrivalent,injectable, preservative free 10/10/2019   • Pneumococcal Conjugate 13-Valent 11/13/2020   • Pneumococcal Conjugate Vaccine 20-valent (Pcv20), Polysace 04/15/2022   • Tuberculin Skin Test-PPD Intradermal 06/11/2018      Health Maintenance:         Topic Date Due   • Colorectal Cancer Screening  08/01/2027         Topic Date Due   • COVID-19 Vaccine (3 - Pfizer series) 08/27/2021   • Influenza Vaccine (1) 09/01/2023      Medicare Screening Tests and Risk Assessments:     Mainor Gutierrez is here for her Subsequent Wellness visit. Health Risk Assessment:   Patient rates overall health as good. Patient feels that their physical health rating is slightly worse. Patient is satisfied with their life. Eyesight was rated as much worse. Hearing was rated as much worse. Patient feels that their emotional and mental health rating is slightly worse. Patients states they are sometimes angry. Patient states they are always unusually tired/fatigued.  Pain experienced in the last 7 days has been some. Patient's pain rating has been 4/10. Patient states that she has experienced no weight loss or gain in last 6 months. Depression Screening:   PHQ-2 Score: 2      Fall Risk Screening: In the past year, patient has experienced: history of falling in past year    Number of falls: 2 or more  Feels unsteady when standing or walking?: Yes    Worried about falling?: Yes      Urinary Incontinence Screening:   Patient has leaked urine accidently in the last six months. Home Safety:  Patient has trouble with stairs inside or outside of their home. Patient has working smoke alarms and has working carbon monoxide detector. Nutrition:   Current diet is Regular. Medications:   Patient is currently taking over-the-counter supplements. OTC medications include: see medication list. Patient is not able to manage medications. Activities of Daily Living (ADLs)/Instrumental Activities of Daily Living (IADLs):   Walk and transfer into and out of bed and chair?: No  Dress and groom yourself?: No    Bathe or shower yourself?: No    Feed yourself?  Yes  Do your laundry/housekeeping?: No  Manage your money, pay your bills and track your expenses?: No  Make your own meals?: No    Do your own shopping?: No    Previous Hospitalizations:   Any hospitalizations or ED visits within the last 12 months?: No      Advance Care Planning:     Five wishes given: No      PREVENTIVE SCREENINGS      Cardiovascular Screening:    General: History Lipid Disorder and Screening Current      Diabetes Screening:     General: History Diabetes and Screening Current      Colorectal Cancer Screening:     General: Screening Current      Cervical Cancer Screening:    General: Screening Not Indicated      Osteoporosis Screening:    General: Screening Current      Abdominal Aortic Aneurysm (AAA) Screening:        General: Screening Not Indicated      Lung Cancer Screening:     General: Screening Not Indicated    Screening, Brief Intervention, and Referral to Treatment (SBIRT)    Screening  Typical number of drinks in a day: 0  Typical number of drinks in a week: 0  Interpretation: Low risk drinking behavior. No results found. Physical Exam:     There were no vitals taken for this visit.     Physical Exam     Virgie Varghese MD

## 2023-07-14 DIAGNOSIS — E03.9 HYPOTHYROIDISM, UNSPECIFIED TYPE: ICD-10-CM

## 2023-07-14 DIAGNOSIS — Z79.4 TYPE 2 DIABETES MELLITUS TREATED WITH INSULIN (HCC): ICD-10-CM

## 2023-07-14 DIAGNOSIS — E11.9 TYPE 2 DIABETES MELLITUS TREATED WITH INSULIN (HCC): ICD-10-CM

## 2023-07-14 RX ORDER — LISINOPRIL 40 MG/1
TABLET ORAL
Qty: 90 TABLET | Refills: 1 | Status: SHIPPED | OUTPATIENT
Start: 2023-07-14

## 2023-07-14 RX ORDER — LEVOTHYROXINE SODIUM 0.1 MG/1
TABLET ORAL
Qty: 90 TABLET | Refills: 1 | Status: SHIPPED | OUTPATIENT
Start: 2023-07-14

## 2023-07-14 RX ORDER — CANAGLIFLOZIN 300 MG/1
TABLET, FILM COATED ORAL
Qty: 90 TABLET | Refills: 1 | Status: SHIPPED | OUTPATIENT
Start: 2023-07-14

## 2023-07-24 DIAGNOSIS — E11.9 TYPE 2 DIABETES MELLITUS TREATED WITH INSULIN (HCC): ICD-10-CM

## 2023-07-24 DIAGNOSIS — Z79.4 TYPE 2 DIABETES MELLITUS TREATED WITH INSULIN (HCC): ICD-10-CM

## 2023-07-24 RX ORDER — PEN NEEDLE, DIABETIC 32GX 5/32"
NEEDLE, DISPOSABLE MISCELLANEOUS
Qty: 100 EACH | Refills: 5 | Status: SHIPPED | OUTPATIENT
Start: 2023-07-24

## 2023-08-01 NOTE — DISCHARGE SUMMARY
[facility]  Discharge- Abhilash Wade 1940, [de-identified] y o  female MRN: [id number]  Unit/Bed#: -01 Encounter: [id number]  Primary Care Provider: Genet Stone MD   Date and time admitted to hospital: 7/12/2021 12:11 AM    * Diarrhea  Assessment & Plan  Daughter reports nausea, diarrhea and decreased PO intake x 4 days with lower abdominal pain  Chronic chills  Denies fever  Denies vomiting    · CT abd/pelvis revealed nonspecific proctocolitis  · C diff toxin positive  · GI on board, recommendation for 10 days oral vancomycin/probiotic    She had a total of 3 bowel movements yesterday  No abdominal discomfort  No fevers/chills  Seen by PT OT and they determine patient safe to return home with home PT      CKD (chronic kidney disease) stage 3, GFR 30-59 ml/min [facility])  Assessment & Plan  Lab Results   Component Value Date    EGFR 42 07/14/2021    EGFR 37 07/12/2021    EGFR 30 11/20/2020    CREATININE 1 22 07/14/2021    CREATININE 1 36 (H) 07/12/2021    CREATININE 1 60 (H) 11/20/2020     · Creatinine 1 36 on admission which appears to be at baseline  · Status post IV fluids for 24 hours, was discontinued yesterday  · Creatinine 1 2 today    Anemia in stage 3 chronic kidney disease [facility])  Assessment & Plan  Lab Results   Component Value Date    EGFR 42 07/14/2021    EGFR 37 07/12/2021    EGFR 30 11/20/2020    CREATININE 1 22 07/14/2021    CREATININE 1 36 (H) 07/12/2021    CREATININE 1 60 (H) 11/20/2020     · Hb stable at 10 5 today    Type 2 diabetes, uncontrolled, with neuropathy [facility])  Assessment & Plan  Lab Results   Component Value Date    HGBA1C 7 6 (H) 07/12/2021       Recent Labs     07/13/21  1522 07/13/21  2038 07/14/21  0746 07/14/21  1100   POCGLU 123 190* 117 156*       Blood Sugar Average: Last 72 hrs:  (P) 122 5     · Resume Invokana  · Continue home dose Lantus 10 units q am and 20 units at HS  · Diabetic diet    Essential hypertension  Assessment & Plan  · Added amlodipine today  · Continue home dose Lisinopril      Mixed hyperlipidemia  Assessment & Plan  · Continue statin  Hypothyroidism  Assessment & Plan  · Continue home dose Levothyroxine    Peripheral neuropathy  Assessment & Plan  · Continue home dose Neurontin    Alzheimer's dementia [facility])  Assessment & Plan  · Continue home dose Aricept and Namenda      Discharging Physician / Practitioner: Fanny Chandra MD  PCP: Carina Mejia MD  Admission Date:   Admission Orders (From admission, onward)     Ordered        07/13/21 1426  Inpatient Admission  Once         07/12/21 0430  Place in Observation  Once                   Discharge Date: 07/14/21    Disposition:      · Home with VNA    Reason for Admission: diarrhea/ c diff    Discharge Diagnoses:     Please see assessment and plan section above for further details regarding discharge diagnoses  Medical Problems     Resolved Problems  Date Reviewed: 7/12/2021    None                Consultations During Hospital Stay:  · GI    Medication Adjustments and Discharge Medications:  · Summary of Medication Adjustments made as a result of this hospitalization: see med rec  · Medication Dosing Tapers - Please refer to Discharge Medication List for details on any medication dosing tapers (if applicable to patient)  · Medications being temporarily held (include recommended restart time): see med rec  · Discharge Medication List: See after visit summary for reconciled discharge medications  Diet Recommendations at Discharge:  Diet -        Diet Orders   (From admission, onward)             Start     Ordered    07/12/21 0516  Diet Nick/CHO Controlled; Consistent Carbohydrate Diet Level 2 (5 carb servings/75 grams CHO/meal)  Diet effective now     Question Answer Comment   Diet Type Nick/CHO Controlled    Nick/CHO Controlled Consistent Carbohydrate Diet Level 2 (5 carb servings/75 grams CHO/meal)    RD to adjust diet per protocol?  Yes        07/12/21 [facility] Course:     Lori Colin is a [de-identified] y o  female patient who originally presented to the hospital on 7/12/2021 due to complaints of nausea/diarrhea/reduced oral intake for 4 days  · CT abdomen pelvis revealed nonspecific proctocolitis  · C diff toxin was positive  Patient was started on oral vancomycin 125 q 6 hours with which her stool output has reduced  She had 3 bowel movements in total over the last 24 hours  · Her appetite has improved and she is tolerating diet without any episodes of nausea or vomiting or abdominal discomfort  · She was seen by PT OT and determine safe to return home with VNA services which is being arranged  · GI has been on board, appreciate recommendations  · Granddaughter Israel Guaman updated with plan of care and she is in agreement  Patient being discharged home with VNA today  ·   Condition at Discharge: good     Discharge Day Visit / Exam:     Vitals: Blood Pressure: 160/55 (07/14/21 0700)  Pulse: 65 (07/14/21 0700)  Temperature: 98 5 °F (36 9 °C) (07/14/21 0700)  Temp Source: Temporal (07/11/21 2252)  Respirations: 18 (07/14/21 0700)  Height: 5' (152 4 cm) (07/12/21 2213)  Weight - Scale: 60 3 kg (133 lb) (07/11/21 2252)  SpO2: 99 % (07/14/21 0700)  Exam:   Physical Exam  Constitutional:       General: She is not in acute distress  Appearance: She is not ill-appearing  HENT:      Mouth/Throat:      Mouth: Mucous membranes are moist       Pharynx: Oropharynx is clear  Cardiovascular:      Rate and Rhythm: Normal rate and regular rhythm  Pulses: Normal pulses  Pulmonary:      Effort: Pulmonary effort is normal       Breath sounds: Normal breath sounds  Abdominal:      General: Abdomen is flat  Bowel sounds are normal       Palpations: Abdomen is soft  Tenderness: There is no abdominal tenderness  There is no guarding  Neurological:      General: No focal deficit present  Mental Status: She is alert and oriented to person, place, and time         Discussion with Family: dw grand monica Galeano    Goals of Care Discussions:  · Code Status at Discharge: Level 1 - Full Code      Discharge instructions/Information to patient and family:   See after visit summary section titled Discharge Instructions for information provided to patient and family  Discharge Statement:  I spent 35 minutes discharging the patient  This time was spent on the day of discharge  I had direct contact with the patient on the day of discharge  Greater than 50% of the total time was spent examining patient, answering all patient questions, arranging and discussing plan of care with patient as well as directly providing post-discharge instructions  Additional time then spent on discharge activities      ** Please Note: This note has been constructed using a voice recognition system ** 17

## 2023-08-15 DIAGNOSIS — F03.918 DEMENTIA WITH BEHAVIORAL DISTURBANCE (HCC): ICD-10-CM

## 2023-08-15 RX ORDER — MEMANTINE HYDROCHLORIDE 10 MG/1
10 TABLET ORAL 2 TIMES DAILY
Qty: 180 TABLET | Refills: 1 | Status: SHIPPED | OUTPATIENT
Start: 2023-08-15

## 2023-08-25 DIAGNOSIS — R05.9 COUGH: ICD-10-CM

## 2023-08-25 DIAGNOSIS — E78.1 HYPERTRIGLYCERIDEMIA: ICD-10-CM

## 2023-08-25 RX ORDER — MONTELUKAST SODIUM 10 MG/1
10 TABLET ORAL
Qty: 90 TABLET | Refills: 1 | Status: SHIPPED | OUTPATIENT
Start: 2023-08-25

## 2023-08-25 RX ORDER — FENOFIBRATE 160 MG/1
160 TABLET ORAL DAILY
Qty: 90 TABLET | Refills: 1 | Status: SHIPPED | OUTPATIENT
Start: 2023-08-25

## 2023-09-14 ENCOUNTER — RA CDI HCC (OUTPATIENT)
Dept: OTHER | Facility: HOSPITAL | Age: 83
End: 2023-09-14

## 2023-09-14 NOTE — PROGRESS NOTES
[address] [facility] coding opportunities       Chart reviewed, no opportunity found: [address]     Patients Insurance     Medicare Insurance: [employer]

## 2023-09-17 DIAGNOSIS — R05.3 CHRONIC COUGH: ICD-10-CM

## 2023-09-18 RX ORDER — OMEPRAZOLE 20 MG/1
20 CAPSULE, DELAYED RELEASE ORAL DAILY
Qty: 30 CAPSULE | Refills: 1 | Status: SHIPPED | OUTPATIENT
Start: 2023-09-18

## 2023-10-05 DIAGNOSIS — R09.81 NASAL CONGESTION: ICD-10-CM

## 2023-10-05 RX ORDER — FLUTICASONE PROPIONATE 50 MCG
SPRAY, SUSPENSION (ML) NASAL
Qty: 16 ML | Refills: 1 | Status: SHIPPED | OUTPATIENT
Start: 2023-10-05

## 2023-10-14 DIAGNOSIS — Z79.4 TYPE 2 DIABETES MELLITUS TREATED WITH INSULIN (HCC): ICD-10-CM

## 2023-10-14 DIAGNOSIS — E11.9 TYPE 2 DIABETES MELLITUS TREATED WITH INSULIN (HCC): ICD-10-CM

## 2023-10-16 RX ORDER — LISINOPRIL 40 MG/1
TABLET ORAL
Qty: 90 TABLET | Refills: 1 | Status: SHIPPED | OUTPATIENT
Start: 2023-10-16

## 2023-11-09 ENCOUNTER — RA CDI HCC (OUTPATIENT)
Dept: OTHER | Facility: HOSPITAL | Age: 83
End: 2023-11-09

## 2023-11-10 ENCOUNTER — TELEPHONE (OUTPATIENT)
Dept: FAMILY MEDICINE CLINIC | Facility: CLINIC | Age: 83
End: 2023-11-10

## 2023-11-10 NOTE — TELEPHONE ENCOUNTER
Spoke to Gabrielle from [email or web address] and [employer], she is requesting a letter stating that " Due to her history of peripheral neuropathy, weakness of both lower extremities, and rheumatoid arthritis she should have a ramp with hand rails to her front exterior door." It looks like this letter was written for the patient back in February of this year but Gabrielle from [employer] needs a letter with a more recent date on it to be accepted.

## 2023-11-18 ENCOUNTER — RA CDI HCC (OUTPATIENT)
Dept: OTHER | Facility: HOSPITAL | Age: 83
End: 2023-11-18

## 2023-11-18 NOTE — PROGRESS NOTES
[address] [facility] coding opportunities     E11.51, E11.22, E11.42     Chart Reviewed number of suggestions sent to Provider: 3   GR    Patients Insurance     Medicare Insurance: [employer]

## 2023-11-21 ENCOUNTER — OFFICE VISIT (OUTPATIENT)
Dept: FAMILY MEDICINE CLINIC | Facility: CLINIC | Age: 83
End: 2023-11-21
Payer: COMMERCIAL

## 2023-11-21 ENCOUNTER — APPOINTMENT (OUTPATIENT)
Dept: LAB | Facility: CLINIC | Age: 83
End: 2023-11-21
Payer: COMMERCIAL

## 2023-11-21 VITALS
TEMPERATURE: 97.8 F | DIASTOLIC BLOOD PRESSURE: 74 MMHG | HEIGHT: 61 IN | SYSTOLIC BLOOD PRESSURE: 132 MMHG | OXYGEN SATURATION: 99 % | WEIGHT: 130.2 LBS | HEART RATE: 73 BPM | BODY MASS INDEX: 24.58 KG/M2

## 2023-11-21 DIAGNOSIS — R05.9 COUGH: ICD-10-CM

## 2023-11-21 DIAGNOSIS — R26.81 UNSTABLE GAIT: ICD-10-CM

## 2023-11-21 DIAGNOSIS — F41.9 ANXIETY: ICD-10-CM

## 2023-11-21 DIAGNOSIS — Z79.4 TYPE 2 DIABETES MELLITUS TREATED WITH INSULIN (HCC): Primary | ICD-10-CM

## 2023-11-21 DIAGNOSIS — R13.10 DYSPHAGIA, UNSPECIFIED TYPE: ICD-10-CM

## 2023-11-21 DIAGNOSIS — R79.89 ELEVATED SERUM CREATININE: ICD-10-CM

## 2023-11-21 DIAGNOSIS — E11.9 TYPE 2 DIABETES MELLITUS TREATED WITH INSULIN (HCC): Primary | ICD-10-CM

## 2023-11-21 DIAGNOSIS — E83.52 HYPERCALCEMIA: ICD-10-CM

## 2023-11-21 DIAGNOSIS — R19.7 DIARRHEA, UNSPECIFIED TYPE: ICD-10-CM

## 2023-11-21 DIAGNOSIS — I10 ESSENTIAL HYPERTENSION: ICD-10-CM

## 2023-11-21 DIAGNOSIS — E78.1 HYPERTRIGLYCERIDEMIA: ICD-10-CM

## 2023-11-21 DIAGNOSIS — M19.041 ARTHRITIS OF BOTH HANDS: ICD-10-CM

## 2023-11-21 DIAGNOSIS — E03.9 HYPOTHYROIDISM, UNSPECIFIED TYPE: ICD-10-CM

## 2023-11-21 DIAGNOSIS — M19.042 ARTHRITIS OF BOTH HANDS: ICD-10-CM

## 2023-11-21 DIAGNOSIS — R05.3 CHRONIC COUGH: ICD-10-CM

## 2023-11-21 DIAGNOSIS — Z23 ENCOUNTER FOR IMMUNIZATION: ICD-10-CM

## 2023-11-21 DIAGNOSIS — R29.898 LEG WEAKNESS, BILATERAL: ICD-10-CM

## 2023-11-21 DIAGNOSIS — R63.4 UNINTENTIONAL WEIGHT LOSS: ICD-10-CM

## 2023-11-21 DIAGNOSIS — F03.918 DEMENTIA WITH BEHAVIORAL DISTURBANCE (HCC): ICD-10-CM

## 2023-11-21 DIAGNOSIS — L30.9 DERMATITIS: ICD-10-CM

## 2023-11-21 LAB
25(OH)D3 SERPL-MCNC: 24 NG/ML (ref 30–100)
ALBUMIN SERPL BCP-MCNC: 4.5 G/DL (ref 3.5–5)
ALP SERPL-CCNC: 38 U/L (ref 34–104)
ALT SERPL W P-5'-P-CCNC: 23 U/L (ref 7–52)
ANION GAP SERPL CALCULATED.3IONS-SCNC: 5 MMOL/L
AST SERPL W P-5'-P-CCNC: 26 U/L (ref 13–39)
BASOPHILS # BLD AUTO: 0.07 THOUSANDS/ÂΜL (ref 0–0.1)
BASOPHILS NFR BLD AUTO: 2 % (ref 0–1)
BILIRUB SERPL-MCNC: 0.47 MG/DL (ref 0.2–1)
BUN SERPL-MCNC: 32 MG/DL (ref 5–25)
CA-I BLD-SCNC: 1.35 MMOL/L (ref 1.12–1.32)
CALCIUM SERPL-MCNC: 10.4 MG/DL (ref 8.4–10.2)
CHLORIDE SERPL-SCNC: 108 MMOL/L (ref 96–108)
CHOLEST SERPL-MCNC: 149 MG/DL
CO2 SERPL-SCNC: 30 MMOL/L (ref 21–32)
CREAT SERPL-MCNC: 1.31 MG/DL (ref 0.6–1.3)
CRP SERPL QL: <1 MG/L
EOSINOPHIL # BLD AUTO: 0.26 THOUSAND/ÂΜL (ref 0–0.61)
EOSINOPHIL NFR BLD AUTO: 6 % (ref 0–6)
ERYTHROCYTE [DISTWIDTH] IN BLOOD BY AUTOMATED COUNT: 13.6 % (ref 11.6–15.1)
GFR SERPL CREATININE-BSD FRML MDRD: 37 ML/MIN/1.73SQ M
GLUCOSE P FAST SERPL-MCNC: 108 MG/DL (ref 65–99)
HCT VFR BLD AUTO: 35.6 % (ref 34.8–46.1)
HDLC SERPL-MCNC: 54 MG/DL
HGB BLD-MCNC: 11.2 G/DL (ref 11.5–15.4)
IMM GRANULOCYTES # BLD AUTO: 0.02 THOUSAND/UL (ref 0–0.2)
IMM GRANULOCYTES NFR BLD AUTO: 1 % (ref 0–2)
LDLC SERPL CALC-MCNC: 74 MG/DL (ref 0–100)
LYMPHOCYTES # BLD AUTO: 0.9 THOUSANDS/ÂΜL (ref 0.6–4.47)
LYMPHOCYTES NFR BLD AUTO: 21 % (ref 14–44)
MCH RBC QN AUTO: 29 PG (ref 26.8–34.3)
MCHC RBC AUTO-ENTMCNC: 31.5 G/DL (ref 31.4–37.4)
MCV RBC AUTO: 92 FL (ref 82–98)
MONOCYTES # BLD AUTO: 0.21 THOUSAND/ÂΜL (ref 0.17–1.22)
MONOCYTES NFR BLD AUTO: 5 % (ref 4–12)
NEUTROPHILS # BLD AUTO: 2.82 THOUSANDS/ÂΜL (ref 1.85–7.62)
NEUTS SEG NFR BLD AUTO: 65 % (ref 43–75)
NONHDLC SERPL-MCNC: 95 MG/DL
NRBC BLD AUTO-RTO: 0 /100 WBCS
PLATELET # BLD AUTO: 160 THOUSANDS/UL (ref 149–390)
PMV BLD AUTO: 14 FL (ref 8.9–12.7)
POTASSIUM SERPL-SCNC: 4.4 MMOL/L (ref 3.5–5.3)
PROT SERPL-MCNC: 7.4 G/DL (ref 6.4–8.4)
PTH-INTACT SERPL-MCNC: 40.3 PG/ML (ref 12–88)
RBC # BLD AUTO: 3.86 MILLION/UL (ref 3.81–5.12)
SL AMB POCT HEMOGLOBIN AIC: 6.6 (ref ?–6.5)
SODIUM SERPL-SCNC: 143 MMOL/L (ref 135–147)
TRIGL SERPL-MCNC: 103 MG/DL
TSH SERPL DL<=0.05 MIU/L-ACNC: 0.63 UIU/ML (ref 0.45–4.5)
WBC # BLD AUTO: 4.28 THOUSAND/UL (ref 4.31–10.16)

## 2023-11-21 PROCEDURE — 85025 COMPLETE CBC W/AUTO DIFF WBC: CPT

## 2023-11-21 PROCEDURE — 99214 OFFICE O/P EST MOD 30 MIN: CPT | Performed by: FAMILY MEDICINE

## 2023-11-21 PROCEDURE — G0008 ADMIN INFLUENZA VIRUS VAC: HCPCS

## 2023-11-21 PROCEDURE — 83036 HEMOGLOBIN GLYCOSYLATED A1C: CPT | Performed by: FAMILY MEDICINE

## 2023-11-21 PROCEDURE — 83970 ASSAY OF PARATHORMONE: CPT

## 2023-11-21 PROCEDURE — 84443 ASSAY THYROID STIM HORMONE: CPT

## 2023-11-21 PROCEDURE — 82306 VITAMIN D 25 HYDROXY: CPT

## 2023-11-21 PROCEDURE — 86140 C-REACTIVE PROTEIN: CPT

## 2023-11-21 PROCEDURE — 80053 COMPREHEN METABOLIC PANEL: CPT

## 2023-11-21 PROCEDURE — 82330 ASSAY OF CALCIUM: CPT

## 2023-11-21 PROCEDURE — 80061 LIPID PANEL: CPT

## 2023-11-21 PROCEDURE — 36415 COLL VENOUS BLD VENIPUNCTURE: CPT

## 2023-11-21 PROCEDURE — 90662 IIV NO PRSV INCREASED AG IM: CPT

## 2023-11-21 RX ORDER — CHOLESTYRAMINE 4 G/9G
1 POWDER, FOR SUSPENSION ORAL 2 TIMES DAILY WITH MEALS
Qty: 180 PACKET | Refills: 3 | Status: SHIPPED | OUTPATIENT
Start: 2023-11-21 | End: 2024-02-19

## 2023-11-21 RX ORDER — BENZONATATE 100 MG/1
100 CAPSULE ORAL 3 TIMES DAILY PRN
Qty: 20 CAPSULE | Refills: 0 | Status: SHIPPED | OUTPATIENT
Start: 2023-11-21

## 2023-11-21 RX ORDER — MEMANTINE HYDROCHLORIDE 10 MG/1
10 TABLET ORAL 2 TIMES DAILY
Qty: 180 TABLET | Refills: 1 | Status: SHIPPED | OUTPATIENT
Start: 2023-11-21

## 2023-11-21 RX ORDER — HYDROCHLOROTHIAZIDE 25 MG/1
25 TABLET ORAL DAILY
Qty: 90 TABLET | Refills: 3 | Status: SHIPPED | OUTPATIENT
Start: 2023-11-21 | End: 2023-12-21

## 2023-11-21 RX ORDER — OMEPRAZOLE 20 MG/1
20 CAPSULE, DELAYED RELEASE ORAL DAILY
Qty: 90 CAPSULE | Refills: 3 | Status: SHIPPED | OUTPATIENT
Start: 2023-11-21

## 2023-11-21 RX ORDER — INSULIN GLARGINE 100 [IU]/ML
INJECTION, SOLUTION SUBCUTANEOUS
Qty: 36 ML | Refills: 3 | Status: SHIPPED | OUTPATIENT
Start: 2023-11-21 | End: 2023-11-21 | Stop reason: SDUPTHER

## 2023-11-21 RX ORDER — LEVOTHYROXINE SODIUM 0.1 MG/1
100 TABLET ORAL DAILY
Qty: 90 TABLET | Refills: 1 | Status: SHIPPED | OUTPATIENT
Start: 2023-11-21

## 2023-11-21 RX ORDER — LISINOPRIL 40 MG/1
40 TABLET ORAL DAILY
Qty: 90 TABLET | Refills: 1 | Status: SHIPPED | OUTPATIENT
Start: 2023-11-21

## 2023-11-21 RX ORDER — GABAPENTIN 100 MG/1
100 CAPSULE ORAL 3 TIMES DAILY
Qty: 90 CAPSULE | Refills: 2 | Status: SHIPPED | OUTPATIENT
Start: 2023-11-21 | End: 2023-11-21 | Stop reason: SDUPTHER

## 2023-11-21 RX ORDER — INSULIN GLARGINE 100 [IU]/ML
INJECTION, SOLUTION SUBCUTANEOUS
Qty: 36 ML | Refills: 3 | Status: SHIPPED | OUTPATIENT
Start: 2023-11-21 | End: 2023-11-29 | Stop reason: SDUPTHER

## 2023-11-21 RX ORDER — OMEPRAZOLE 20 MG/1
20 CAPSULE, DELAYED RELEASE ORAL DAILY
Qty: 90 CAPSULE | Refills: 3
Start: 2023-11-21 | End: 2023-11-21 | Stop reason: SDUPTHER

## 2023-11-21 RX ORDER — GABAPENTIN 100 MG/1
100 CAPSULE ORAL 3 TIMES DAILY
Qty: 90 CAPSULE | Refills: 2 | Status: SHIPPED | OUTPATIENT
Start: 2023-11-21

## 2023-11-21 RX ORDER — CHOLESTYRAMINE 4 G/9G
1 POWDER, FOR SUSPENSION ORAL 2 TIMES DAILY WITH MEALS
Qty: 180 PACKET | Refills: 3 | Status: SHIPPED | OUTPATIENT
Start: 2023-11-21 | End: 2023-11-21 | Stop reason: SDUPTHER

## 2023-11-21 RX ORDER — MONTELUKAST SODIUM 10 MG/1
10 TABLET ORAL
Qty: 90 TABLET | Refills: 1 | Status: SHIPPED | OUTPATIENT
Start: 2023-11-21

## 2023-11-21 RX ORDER — HYDROCHLOROTHIAZIDE 25 MG/1
25 TABLET ORAL DAILY
Qty: 90 TABLET | Refills: 3 | Status: CANCELLED | OUTPATIENT
Start: 2023-11-21 | End: 2024-02-19

## 2023-11-21 RX ORDER — FENOFIBRATE 160 MG/1
160 TABLET ORAL DAILY
Qty: 90 TABLET | Refills: 1 | Status: SHIPPED | OUTPATIENT
Start: 2023-11-21

## 2023-11-21 RX ORDER — AMLODIPINE BESYLATE 5 MG/1
5 TABLET ORAL DAILY
Qty: 90 TABLET | Refills: 3 | Status: SHIPPED | OUTPATIENT
Start: 2023-11-21 | End: 2023-12-21

## 2023-11-21 RX ORDER — TRIAMCINOLONE ACETONIDE 5 MG/G
1 OINTMENT TOPICAL 2 TIMES DAILY
Qty: 30 G | Refills: 2 | Status: SHIPPED | OUTPATIENT
Start: 2023-11-21

## 2023-11-21 NOTE — PROGRESS NOTES
Name: Jovanna Walter      : 1940      MRN: [id number]  Encounter Provider: Karime Bennett MD  Encounter Date: 2023   Encounter department: [address] [place] [address]     1. Type 2 diabetes mellitus treated with insulin (HCC)  -     POCT hemoglobin A1c- 6.6  Stable controlled  -     Continuous Blood Gluc Sensor (FreeStyle Aruna 2 Sensor) MISC; Check blood sugars multiple times per day  -     Continuous Blood Gluc  (FreeStyle [place] 2 [place]) MEREDITH; Use 1 Device 3 (three) times a day before meals  -     lisinopril (ZESTRIL) 40 mg tablet; Take 1 tablet (40 mg total) by mouth daily  -     Canagliflozin (Invokana) 300 MG TABS; Take 1 tablet (300 mg total) by mouth daily  -     Insulin Glargine Solostar (Lantus SoloStar) 100 UNIT/ML SOPN; Take 15 units in the am and 10 units in the pm  -     gabapentin (Neurontin) 100 mg capsule; Take 1 capsule (100 mg total) by mouth 3 (three) times a day    2. Hypertriglyceridemia  -     Lipid panel; Future  -     fenofibrate 160 MG tablet; Take 1 tablet (160 mg total) by mouth daily    3. Dementia with behavioral disturbance [facility])  -     Ambulatory Referral to Social Work Care Management Program; Future  -     memantine (NAMENDA) 10 mg tablet; Take 1 tablet (10 mg total) by mouth 2 (two) times a day    4. Leg weakness, bilateral  -     Ambulatory Referral to Social Work Care Management Program; Future    5. Unstable gait  -     Ambulatory Referral to Social Work Care Management Program; Future    6. Dysphagia, unspecified type  -     Ambulatory Referral to Speech Therapy; Future    7. Arthritis of both hands  -     Diclofenac Sodium (VOLTAREN) 1 %; Apply 2 g topically 3 (three) times a day    8. Dermatitis  -     triamcinolone (KENALOG) 0.5 % ointment; Apply 1 Application topically 2 (two) times a day To affected area    9. Diarrhea, unspecified type  -     cholestyramine (QUESTRAN) 4 g packet;  Take 1 packet (4 g total) by mouth 2 (two) times a day with meals    10. Chronic cough  -     omeprazole (PriLOSEC) 20 mg delayed release capsule; Take 1 capsule (20 mg total) by mouth daily    11. Encounter for immunization  -     influenza vaccine, high-dose, PF 0.7 mL (FLUZONE HIGH-DOSE)    12. Anxiety  -     sertraline (ZOLOFT) 50 mg tablet; Take 1 tablet (50 mg total) by mouth daily    13. Cough  -     montelukast (SINGULAIR) 10 mg tablet; Take 1 tablet (10 mg total) by mouth daily at bedtime    14. Hypothyroidism, unspecified type  -     levothyroxine 100 mcg tablet; Take 1 tablet (100 mcg total) by mouth daily    15. Essential hypertension  -     hydrochlorothiazide (HYDRODIURIL) 25 mg tablet; Take 1 tablet (25 mg total) by mouth daily  -     amLODIPine (NORVASC) 5 mg tablet; Take 1 tablet (5 mg total) by mouth daily    F/U in 6 months       Subjective     Patient is here to follow up for Type 2 DM on insulin. She denies any symptoms related to this takes her medications daily. Also has a history of dementia. Has been having memory loss. Has bilateral leg weakness. Has had some falls at home no injuries. She is unsteady on her feet. Review of Systems   Constitutional:  Negative for activity change, appetite change, fatigue and fever. HENT:  Negative for congestion and ear discharge. Respiratory:  Negative for cough and shortness of breath. Cardiovascular:  Negative for chest pain and palpitations. Gastrointestinal:  Negative for diarrhea and nausea. Musculoskeletal:  Positive for gait problem. Negative for arthralgias and back pain. Skin:  Negative for color change and rash. Neurological:  Positive for weakness. Negative for dizziness and headaches. Psychiatric/Behavioral:  Negative for agitation and behavioral problems.         Past Medical History:   Diagnosis Date    Aggression     Alzheimer's dementia ([facility])     Arthritis     Dementia ([facility])     Diabetes insipidus ([facility])     Diabetes mellitus ([facility])     High cholesterol Hypertension     Memory loss     Migraine     Polyneuropathy     Rheumatoid arthritis (HCC)     Serum lipids high     Stomach problems     Thyroid trouble      Past Surgical History:   Procedure Laterality Date    CATARACT EXTRACTION      CHOLECYSTECTOMY      GALLBLADDER SURGERY      HYSTERECTOMY      [place] SURGICAL ARTHROSCOPY SHOULDER W/ROTATOR CUFF RPR Right 2019    Procedure: SHOULDER ARTHROSCOPIC ROTATOR CUFF REPAIR;  Surgeon: Fide De Oliveira MD;  Location: [facility];  Service: Orthopedics    ROTATOR CUFF REPAIR Right 2019     Family History   Problem Relation Age of Onset    Substance Abuse Mother         denied    Mental illness Mother         denied    Substance Abuse Father         denied    Mental illness Father         denied    Diabetes Brother     Blindness Brother     Arthritis Daughter     HIV Son      Social History     Socioeconomic History    Marital status:       Spouse name: None    Number of children: None    Years of education: None    Highest education level: None   Occupational History    None   Tobacco Use    Smoking status: Former     Packs/day: 0.25     Years: 30.00     Total pack years: 7.50     Types: Cigarettes     Quit date:      Years since quittin.9    Smokeless tobacco: Never   Vaping Use    Vaping Use: Never used   Substance and Sexual Activity    Alcohol use: Never    Drug use: No    Sexual activity: None   Other Topics Concern    None   Social History Narrative    None     Social Determinants of Health     Financial Resource Strain: Low Risk  (2023)    Overall Financial Resource Strain (CARDIA)     Difficulty of Paying Living Expenses: Not very hard   Food Insecurity: Food Insecurity Present (2021)    Hunger Vital Sign     Worried About Running Out of Food in the Last Year: Sometimes true     Ran Out of Food in the Last Year: Never true   Transportation Needs: No Transportation Needs (2023)    PRAPARE - Transportation     Lack of Transportation (Medical): No     Lack of Transportation (Non-Medical): No   Physical Activity: Not on file   Stress: No Stress Concern Present (11/27/2019)    [address]     Feeling of Stress : Only a little   Social Connections: Unknown (11/27/2019)    Social Connection and Isolation Panel [NHANES]     Frequency of Communication with Friends and Family: More than three times a week     Frequency of Social Gatherings with Friends and Family: More than three times a week     Attends Holiness Services: Not on file     Active Member of Clubs or Organizations: Not on file     Attends Club or Organization Meetings: Not on file     Marital Status:     Intimate Partner Violence: Not on file   Housing Stability: Not on file     Current Outpatient Medications on File Prior to Visit   Medication Sig    BD Pen Needle Elisabeth 2nd Gen 32G X 4 MM MISC USE DAILY AS DIRECTED    BD Pen Needle Elisabeth U/F 32G X 4 MM MISC USE 2 (TWO) TIMES A DAY    Blood Glucose Monitoring Suppl (OneTouch Verio) w/Device KIT Use 2 (two) times a day    Blood Pressure KIT by Does not apply route daily    fluticasone (FLONASE) 50 mcg/act nasal spray SPRAY 2 SPRAYS INTO EACH NOSTRIL EVERY DAY    glucose blood (OneTouch Verio) test strip Use 1 each 2 (two) times a day Test    Incontinence Supplies MISC Use 6 (six) times a day Wipes    Incontinence Supplies MISC Use 4 (four) times a day Comfort shield Adult diapers    Incontinence Supply Disposable MISC Use 6 (six) times a day Dispense disposable bed pad    loteprednol etabonate (LOTEMAX) 0.5 % ophthalmic suspension INSTILL 1 DROP INTO BOTH EYES TWICE A DAY    OneTouch Delica Lancets 49K MISC TEST BLOOD SUGAR 3 TIMES A DAY    Xiidra 5 % op solution INSTILL 1 DROP IN BOTH EYES 2 TIMES PER DAY    [DISCONTINUED] amLODIPine (NORVASC) 5 mg tablet Take 1 tablet (5 mg total) by mouth daily    [DISCONTINUED] cholestyramine (QUESTRAN) 4 g packet Take 1 packet (4 g total) by mouth 2 (two) times a day with meals    [DISCONTINUED] Diclofenac Sodium (VOLTAREN) 1 % Apply 2 g topically 3 (three) times a day    [DISCONTINUED] fenofibrate 160 MG tablet TAKE 1 TABLET BY MOUTH EVERY DAY    [DISCONTINUED] hydrochlorothiazide (HYDRODIURIL) 25 mg tablet Take 1 tablet (25 mg total) by mouth daily    [DISCONTINUED] Insulin Glargine Solostar (Lantus SoloStar) 100 UNIT/ML SOPN Take 15 units in the am and 10 units in the pm    [DISCONTINUED] Invokana 300 MG TABS TAKE 1 TABLET BY MOUTH EVERY DAY    [DISCONTINUED] levothyroxine 100 mcg tablet TAKE 1 TABLET BY MOUTH EVERY DAY    [DISCONTINUED] lisinopril (ZESTRIL) 40 mg tablet TAKE 1 TABLET BY MOUTH EVERY DAY    [DISCONTINUED] memantine (NAMENDA) 10 mg tablet TAKE 1 TABLET BY MOUTH TWICE A DAY    [DISCONTINUED] montelukast (SINGULAIR) 10 mg tablet TAKE 1 TABLET BY MOUTH DAILY AT BEDTIME    [DISCONTINUED] omeprazole (PriLOSEC) 20 mg delayed release capsule TAKE 1 CAPSULE BY MOUTH EVERY DAY    [DISCONTINUED] sertraline (ZOLOFT) 50 mg tablet Take 1 tablet (50 mg total) by mouth daily    [DISCONTINUED] triamcinolone (KENALOG) 0.5 % ointment APPLY TO AFFECTED AREA TWICE A DAY     Allergies   Allergen Reactions    Penicillins Itching and Swelling     Immunization History   Administered Date(s) Administered    COVID-19 PFIZER VACCINE 0.3 ML IM 06/04/2021, 07/02/2021    INFLUENZA 12/07/2016, 09/10/2018, 10/07/2021, 12/23/2022    Influenza, high dose seasonal 0.7 mL 09/10/2018, 11/13/2020, 12/23/2022, 11/21/2023    Influenza, recombinant, quadrivalent,injectable, preservative free 10/10/2019    Pneumococcal Conjugate 13-Valent 11/13/2020    Pneumococcal Conjugate Vaccine 20-valent (Pcv20), Polysace 04/15/2022    Tuberculin Skin Test-PPD Intradermal 06/11/2018       Objective     /74 (BP Location: Left arm, Patient Position: Sitting)   Pulse 73   Temp 97.8 °F (36.6 °C) (Temporal)   Ht 5' 1" (1.549 m)   Wt 59.1 kg (130 lb 3.2 oz) SpO2 99%   BMI 24.60 kg/m²     Physical Exam  Constitutional:       General: She is not in acute distress. Appearance: She is well-developed. She is not diaphoretic. HENT:      Head: Normocephalic and atraumatic. Nose: Nose normal.   Eyes:      Conjunctiva/sclera: Conjunctivae normal.      Pupils: Pupils are equal, round, and reactive to light. Cardiovascular:      Rate and Rhythm: Normal rate and regular rhythm. Heart sounds: Normal heart sounds. No murmur heard. Pulmonary:      Effort: Pulmonary effort is normal. No respiratory distress. Breath sounds: Normal breath sounds. No wheezing. Abdominal:      General: Bowel sounds are normal. There is no distension. Palpations: Abdomen is soft. Tenderness: There is no abdominal tenderness. Skin:     General: Skin is warm and dry. Findings: No erythema or rash. Neurological:      Mental Status: She is alert and oriented to person, place, and time. Motor: Weakness present.       Gait: Gait abnormal.       Landry Betts MD

## 2023-11-22 RX ORDER — AMLODIPINE BESYLATE 5 MG/1
5 TABLET ORAL DAILY
Qty: 90 TABLET | Refills: 3 | Status: SHIPPED | OUTPATIENT
Start: 2023-11-22 | End: 2024-02-20

## 2023-11-28 ENCOUNTER — PATIENT OUTREACH (OUTPATIENT)
Dept: FAMILY MEDICINE CLINIC | Facility: CLINIC | Age: 83
End: 2023-11-28

## 2023-11-28 NOTE — PROGRESS NOTES
Received referral from PCP Cally Khan MD requesting that [facility] outreach patient and assess for needs. Per chart review, patient has history of dementia and currently experiencing memory loss. Attempted outreaching granddaughter Anneliese Kerr (on communication consent), no answer and unable to leave message as call could not be completed at this time. Will try again at another time.

## 2023-11-29 DIAGNOSIS — E11.9 TYPE 2 DIABETES MELLITUS TREATED WITH INSULIN (HCC): ICD-10-CM

## 2023-11-29 DIAGNOSIS — E55.9 VITAMIN D DEFICIENCY: Primary | ICD-10-CM

## 2023-11-29 DIAGNOSIS — Z79.4 TYPE 2 DIABETES MELLITUS TREATED WITH INSULIN (HCC): ICD-10-CM

## 2023-11-29 RX ORDER — INSULIN GLARGINE 100 [IU]/ML
INJECTION, SOLUTION SUBCUTANEOUS
Qty: 36 ML | Refills: 3 | Status: SHIPPED | OUTPATIENT
Start: 2023-11-29

## 2023-12-05 ENCOUNTER — TELEPHONE (OUTPATIENT)
Dept: FAMILY MEDICINE CLINIC | Facility: CLINIC | Age: 83
End: 2023-12-05

## 2023-12-05 NOTE — TELEPHONE ENCOUNTER
Good morning. My name is Waleska. I'm calling because of my grandmother, Akash John. Her YOB: 1940. I'm calling because I'm requesting a letter which states that she is in the need to carry her insulin with her. She's going on vacation and I don't want them to cause any problems in the airport. So obviously her needles and her blood sugar alondra and all her medicine. Hopefully somebody can give me a call back. My number is [phone]. Again, my name is Waleska and I'm calling from my grandmother, Akash John [birth year].  Thank you

## 2023-12-06 ENCOUNTER — PATIENT OUTREACH (OUTPATIENT)
Dept: FAMILY MEDICINE CLINIC | Facility: CLINIC | Age: 83
End: 2023-12-06

## 2023-12-06 NOTE — PROGRESS NOTES
2nd attempt outreaching patients granddaughter Waleska to assess for needs. OP SWCM spoke to [address] [place] [address], introduced role and reason for call. Reported that patient is in need of a ramp. [address] [place] [address] stated that patients  was working on it but they have not received any updates in 2 months and are unable to reach anyone. Reported that patient has fallen a few times and is in need of the ramp with hand rails ASAP. Patient is going away on vacation tomorrow. OP SWCM will contact insurance and follow up with order.

## 2023-12-08 ENCOUNTER — PATIENT OUTREACH (OUTPATIENT)
Dept: FAMILY MEDICINE CLINIC | Facility: CLINIC | Age: 83
End: 2023-12-08

## 2023-12-08 NOTE — PROGRESS NOTES
OP SWCM spoke to patients granddaughter Manuel Johnson. Provided OP SWCM with contact information for patients coordinator Angy Humphreys. Per chart review, PCP office faxed letter and RX for ramp to [facility] on 11/21/2023. Voicemail stated [facility] is coordinator from [employer]. No answer and left message requesting return call at her earliest convenience.

## 2023-12-11 DIAGNOSIS — E13.9 DIABETES 1.5, MANAGED AS TYPE 1 (HCC): ICD-10-CM

## 2023-12-11 RX ORDER — BLOOD SUGAR DIAGNOSTIC
1 STRIP MISCELLANEOUS 2 TIMES DAILY
Qty: 100 STRIP | Refills: 3 | Status: SHIPPED | OUTPATIENT
Start: 2023-12-11

## 2023-12-12 ENCOUNTER — PATIENT OUTREACH (OUTPATIENT)
Dept: FAMILY MEDICINE CLINIC | Facility: CLINIC | Age: 83
End: 2023-12-12

## 2023-12-12 NOTE — PROGRESS NOTES
Received return call from Garcia Freed at [address] and [facility]. Reported that they were waiting for letter and RX for ramp. Informed Garcia Freed that PCP office faxed letter and RX to her on 11/21/2023. Garcia Freed checked patients file and found fax. Stated that she will forward fax to appropriate care team and expedite request to [facility]. Patient will then get a call from [facility] for intake at home and await for approval.    OP SWCM contacted patients granddaughter Waleska to inform. Waleska thankful for assistance and will reach out if further assistance is needed. OP SWCM will follow up in 2 weeks for updates.

## 2023-12-27 ENCOUNTER — PATIENT OUTREACH (OUTPATIENT)
Dept: FAMILY MEDICINE CLINIC | Facility: CLINIC | Age: 83
End: 2023-12-27

## 2023-12-27 NOTE — PROGRESS NOTES
NASIMCM contacting patients granddaughter Waleska to follow up on any progress made from insurance company with installing ramp.    Spoke to Waleska. Reported that someone did come out to do measurements and take pictures of front entrance where ramp will be installed if approved.  Waleska stated that they are just waiting to hear back if insurance approved or denied ramp.    Waleska reported no other needs at this time. NASIM encouraged her to reach out if further assistance is needed and she reported understanding and thankful for outreaches.

## 2024-01-17 ENCOUNTER — OFFICE VISIT (OUTPATIENT)
Dept: FAMILY MEDICINE CLINIC | Facility: CLINIC | Age: 84
End: 2024-01-17
Payer: COMMERCIAL

## 2024-01-17 ENCOUNTER — APPOINTMENT (OUTPATIENT)
Dept: RADIOLOGY | Facility: CLINIC | Age: 84
End: 2024-01-17
Payer: COMMERCIAL

## 2024-01-17 VITALS
HEART RATE: 79 BPM | DIASTOLIC BLOOD PRESSURE: 62 MMHG | SYSTOLIC BLOOD PRESSURE: 166 MMHG | BODY MASS INDEX: 25.3 KG/M2 | OXYGEN SATURATION: 94 % | HEIGHT: 61 IN | WEIGHT: 134 LBS | TEMPERATURE: 98.3 F

## 2024-01-17 DIAGNOSIS — N18.30 STAGE 3 CHRONIC KIDNEY DISEASE, UNSPECIFIED WHETHER STAGE 3A OR 3B CKD (HCC): ICD-10-CM

## 2024-01-17 DIAGNOSIS — R06.02 SHORTNESS OF BREATH: Primary | ICD-10-CM

## 2024-01-17 DIAGNOSIS — R52 BODY ACHES: ICD-10-CM

## 2024-01-17 DIAGNOSIS — R06.02 SHORTNESS OF BREATH: ICD-10-CM

## 2024-01-17 DIAGNOSIS — E11.9 TYPE 2 DIABETES MELLITUS TREATED WITH INSULIN (HCC): ICD-10-CM

## 2024-01-17 DIAGNOSIS — Z79.4 TYPE 2 DIABETES MELLITUS TREATED WITH INSULIN (HCC): ICD-10-CM

## 2024-01-17 PROBLEM — M65.30 TRIGGER FINGER: Status: RESOLVED | Noted: 2022-04-15 | Resolved: 2024-01-17

## 2024-01-17 PROBLEM — E83.52 HYPERCALCEMIA: Status: RESOLVED | Noted: 2020-05-27 | Resolved: 2024-01-17

## 2024-01-17 LAB
SARS-COV-2 AG UPPER RESP QL IA: NEGATIVE
SL AMB POCT RAPID FLU A: NEGATIVE
SL AMB POCT RAPID FLU B: NEGATIVE
VALID CONTROL: NORMAL

## 2024-01-17 PROCEDURE — 87804 INFLUENZA ASSAY W/OPTIC: CPT | Performed by: FAMILY MEDICINE

## 2024-01-17 PROCEDURE — 99214 OFFICE O/P EST MOD 30 MIN: CPT | Performed by: FAMILY MEDICINE

## 2024-01-17 PROCEDURE — 71046 X-RAY EXAM CHEST 2 VIEWS: CPT

## 2024-01-17 PROCEDURE — 87811 SARS-COV-2 COVID19 W/OPTIC: CPT | Performed by: FAMILY MEDICINE

## 2024-01-17 RX ORDER — AZITHROMYCIN 500 MG/1
500 TABLET, FILM COATED ORAL DAILY
Qty: 5 TABLET | Refills: 0 | Status: SHIPPED | OUTPATIENT
Start: 2024-01-17 | End: 2024-01-22

## 2024-01-17 RX ORDER — BENZONATATE 200 MG/1
200 CAPSULE ORAL 3 TIMES DAILY PRN
Qty: 20 CAPSULE | Refills: 3 | Status: SHIPPED | OUTPATIENT
Start: 2024-01-17

## 2024-01-17 NOTE — PROGRESS NOTES
Name: Michelle Villatoro      : 1940      MRN: [id number]  Encounter Provider: Elver Lynne MD  Encounter Date: 2024   Encounter department: [facility]    Assessment & Plan     1. Shortness of breath  -     XR chest pa & lateral; Future; Expected date: 2024  -     azithromycin (ZITHROMAX) 500 MG tablet; Take 1 tablet (500 mg total) by mouth daily for 5 days  -     benzonatate (TESSALON) 200 MG capsule; Take 1 capsule (200 mg total) by mouth 3 (three) times a day as needed for cough    2. Body aches  -     POCT rapid flu A and B-neg  -     POCT Rapid Covid Ag-neg    If SOB or oxygen is below 90% recommend going to the ER           Subjective     Patient is here due to SOB and a cough with associated body aches that started several days ago, patient recently came back from a trip to [facility]. She deneis any neck pain or headaches. No chest pain denies any nausea, vomiting or diarrhea.      Review of Systems   Constitutional:  Negative for activity change, appetite change, fatigue and fever.   HENT:  Negative for congestion and ear discharge.    Respiratory:  Positive for shortness of breath. Negative for cough.    Cardiovascular:  Negative for chest pain and palpitations.   Gastrointestinal:  Negative for diarrhea and nausea.   Musculoskeletal:  Negative for arthralgias and back pain.   Skin:  Negative for color change and rash.   Neurological:  Negative for dizziness and headaches.   Psychiatric/Behavioral:  Negative for agitation and behavioral problems.        Past Medical History:   Diagnosis Date    Aggression     Alzheimer's dementia (HCC)     Arthritis     Dementia (HCC)     Diabetes insipidus (HCC)     Diabetes mellitus (HCC)     High cholesterol     Hypertension     Memory loss     Migraine     Polyneuropathy     Rheumatoid arthritis (HCC)     Serum lipids high     Stomach problems     Thyroid trouble      Past Surgical History:   Procedure Laterality Date    CATARACT  EXTRACTION      CHOLECYSTECTOMY      GALLBLADDER SURGERY      HYSTERECTOMY      [place] SURGICAL ARTHROSCOPY SHOULDER W/ROTATOR CUFF RPR Right 2019    Procedure: SHOULDER ARTHROSCOPIC ROTATOR CUFF REPAIR;  Surgeon: Betsy Conde MD;  Location: [facility];  Service: Orthopedics    ROTATOR CUFF REPAIR Right 2019     Family History   Problem Relation Age of Onset    Substance Abuse Mother         denied    Mental illness Mother         denied    Substance Abuse Father         denied    Mental illness Father         denied    Diabetes Brother     Blindness Brother     Arthritis Daughter     HIV Son      Social History     Socioeconomic History    Marital status:      Spouse name: None    Number of children: None    Years of education: None    Highest education level: None   Occupational History    None   Tobacco Use    Smoking status: Former     Current packs/day: 0.00     Average packs/day: 0.3 packs/day for 30.0 years (7.5 ttl pk-yrs)     Types: Cigarettes     Start date:      Quit date:      Years since quittin.0    Smokeless tobacco: Never   Vaping Use    Vaping status: Never Used   Substance and Sexual Activity    Alcohol use: Never    Drug use: No    Sexual activity: None   Other Topics Concern    None   Social History Narrative    None     Social Determinants of Health     Financial Resource Strain: Low Risk  (2023)    Overall Financial Resource Strain (CARDIA)     Difficulty of Paying Living Expenses: Not very hard   Food Insecurity: Food Insecurity Present (2021)    Hunger Vital Sign     Worried About Running Out of Food in the Last Year: Sometimes true     Ran Out of Food in the Last Year: Never true   Transportation Needs: No Transportation Needs (2023)    PRAPARE - Transportation     Lack of Transportation (Medical): No     Lack of Transportation (Non-Medical): No   Physical Activity: Not on file   Stress: No Stress Concern Present (2019)    [employer]  [employer] - Occupational Stress Questionnaire     Feeling of Stress : Only a little   Social Connections: Unknown (11/27/2019)    Social Connection and Isolation Panel [NHANES]     Frequency of Communication with Friends and Family: More than three times a week     Frequency of Social Gatherings with Friends and Family: More than three times a week     Attends Congregational Services: Not on file     Active Member of Clubs or Organizations: Not on file     Attends Club or Organization Meetings: Not on file     Marital Status:    Intimate Partner Violence: Not on file   Housing Stability: Not on file     Current Outpatient Medications on File Prior to Visit   Medication Sig    amLODIPine (NORVASC) 5 mg tablet Take 1 tablet (5 mg total) by mouth daily    amLODIPine (NORVASC) 5 mg tablet Take 1 tablet (5 mg total) by mouth daily    BD Pen Needle Elisabeth 2nd Gen 32G X 4 MM MISC USE DAILY AS DIRECTED    BD Pen Needle Elisabeth U/F 32G X 4 MM MISC USE 2 (TWO) TIMES A DAY    benzonatate (TESSALON PERLES) 100 mg capsule Take 1 capsule (100 mg total) by mouth 3 (three) times a day as needed for cough    Blood Glucose Monitoring Suppl (OneTouch Verio) w/Device KIT Use 2 (two) times a day    Blood Pressure KIT by Does not apply route daily    Canagliflozin (Invokana) 300 MG TABS Take 1 tablet (300 mg total) by mouth daily    cholecalciferol (VITAMIN D3) 250 MCG (72219 UT) capsule Take 1 capsule (10,000 Units total) by mouth daily    cholestyramine (QUESTRAN) 4 g packet Take 1 packet (4 g total) by mouth 2 (two) times a day with meals    Continuous Blood Gluc  (FreeStyle Aruna 2 [place]) MEREDITH Use 1 Device 3 (three) times a day before meals    Continuous Blood Gluc Sensor (FreeStyle Aruna 2 Sensor) MISC Check blood sugars multiple times per day    Diclofenac Sodium (VOLTAREN) 1 % Apply 2 g topically 3 (three) times a day    fenofibrate 160 MG tablet Take 1 tablet (160 mg total) by mouth daily    fluticasone  (FLONASE) 50 mcg/act nasal spray SPRAY 2 SPRAYS INTO EACH NOSTRIL EVERY DAY    gabapentin (Neurontin) 100 mg capsule Take 1 capsule (100 mg total) by mouth 3 (three) times a day    hydrochlorothiazide (HYDRODIURIL) 25 mg tablet Take 1 tablet (25 mg total) by mouth daily    Incontinence Supplies MISC Use 6 (six) times a day Wipes    Incontinence Supplies MISC Use 4 (four) times a day Comfort shield Adult diapers    Incontinence Supply Disposable MISC Use 6 (six) times a day Dispense disposable bed pad    Insulin Glargine Solostar (Lantus SoloStar) 100 UNIT/ML SOPN Take 15 units in the am and 10 units in the pm    levothyroxine 100 mcg tablet Take 1 tablet (100 mcg total) by mouth daily    lisinopril (ZESTRIL) 40 mg tablet Take 1 tablet (40 mg total) by mouth daily    loteprednol etabonate (LOTEMAX) 0.5 % ophthalmic suspension INSTILL 1 DROP INTO BOTH EYES TWICE A DAY    memantine (NAMENDA) 10 mg tablet Take 1 tablet (10 mg total) by mouth 2 (two) times a day    montelukast (SINGULAIR) 10 mg tablet Take 1 tablet (10 mg total) by mouth daily at bedtime    omeprazole (PriLOSEC) 20 mg delayed release capsule Take 1 capsule (20 mg total) by mouth daily    OneTouch Delica Lancets 33G MISC TEST BLOOD SUGAR 3 TIMES A DAY    OneTouch Verio test strip USE 1 EACH 2 (TWO) TIMES A DAY TEST    sertraline (ZOLOFT) 50 mg tablet Take 1 tablet (50 mg total) by mouth daily    triamcinolone (KENALOG) 0.5 % ointment Apply 1 Application topically 2 (two) times a day To affected area    Xiidra 5 % op solution INSTILL 1 DROP IN BOTH EYES 2 TIMES PER DAY     Allergies   Allergen Reactions    Penicillins Itching and Swelling     Immunization History   Administered Date(s) Administered    COVID-19 PFIZER VACCINE 0.3 ML IM 06/04/2021, 07/02/2021    INFLUENZA 12/07/2016, 09/10/2018, 10/07/2021, 12/23/2022    Influenza, high dose seasonal 0.7 mL 09/10/2018, 11/13/2020, 12/23/2022, 11/21/2023    Influenza, recombinant, quadrivalent,injectable,  "preservative free 10/10/2019    Pneumococcal Conjugate 13-Valent 11/13/2020    Pneumococcal Conjugate Vaccine 20-valent (Pcv20), Polysace 04/15/2022    Tuberculin Skin Test-PPD Intradermal 06/11/2018       Objective     /62   Pulse 79   Temp 98.3 °F (36.8 °C)   Ht 5' 1\" (1.549 m)   Wt 60.8 kg (134 lb)   SpO2 94%   BMI 25.32 kg/m²     Physical Exam  Constitutional:       General: She is not in acute distress.     Appearance: She is well-developed. She is not diaphoretic.   HENT:      Head: Normocephalic and atraumatic.      Nose: Nose normal.   Eyes:      Conjunctiva/sclera: Conjunctivae normal.      Pupils: Pupils are equal, round, and reactive to light.   Cardiovascular:      Rate and Rhythm: Normal rate and regular rhythm.      Heart sounds: Normal heart sounds. No murmur heard.  Pulmonary:      Effort: Pulmonary effort is normal. No respiratory distress.      Breath sounds: Normal breath sounds. No wheezing.   Abdominal:      General: Bowel sounds are normal. There is no distension.      Palpations: Abdomen is soft.      Tenderness: There is no abdominal tenderness.   Skin:     General: Skin is warm and dry.      Findings: No erythema or rash.   Neurological:      Mental Status: She is alert and oriented to person, place, and time.      Coordination: Coordination normal.       Elver Lynne MD    "

## 2024-02-05 DIAGNOSIS — E11.9 TYPE 2 DIABETES MELLITUS TREATED WITH INSULIN (HCC): ICD-10-CM

## 2024-02-05 DIAGNOSIS — Z79.4 TYPE 2 DIABETES MELLITUS TREATED WITH INSULIN (HCC): ICD-10-CM

## 2024-02-05 RX ORDER — INSULIN GLARGINE 100 [IU]/ML
INJECTION, SOLUTION SUBCUTANEOUS
Qty: 36 ML | Refills: 3 | Status: SHIPPED | OUTPATIENT
Start: 2024-02-05

## 2024-02-16 DIAGNOSIS — E13.9 DIABETES 1.5, MANAGED AS TYPE 1 (HCC): ICD-10-CM

## 2024-02-16 DIAGNOSIS — E03.9 HYPOTHYROIDISM, UNSPECIFIED TYPE: ICD-10-CM

## 2024-02-16 RX ORDER — LEVOTHYROXINE SODIUM 0.1 MG/1
100 TABLET ORAL DAILY
Qty: 90 TABLET | Refills: 0 | Status: SHIPPED | OUTPATIENT
Start: 2024-02-16

## 2024-02-16 RX ORDER — BLOOD SUGAR DIAGNOSTIC
1 STRIP MISCELLANEOUS 2 TIMES DAILY
Qty: 100 STRIP | Refills: 0 | Status: SHIPPED | OUTPATIENT
Start: 2024-02-16

## 2024-02-21 PROBLEM — Z00.00 MEDICARE ANNUAL WELLNESS VISIT, SUBSEQUENT: Status: RESOLVED | Noted: 2019-07-18 | Resolved: 2024-02-21

## 2024-03-11 DIAGNOSIS — E11.65 TYPE 2 DIABETES MELLITUS WITH HYPERGLYCEMIA, WITHOUT LONG-TERM CURRENT USE OF INSULIN (HCC): ICD-10-CM

## 2024-03-11 DIAGNOSIS — E11.9 TYPE 2 DIABETES MELLITUS TREATED WITH INSULIN (HCC): ICD-10-CM

## 2024-03-11 DIAGNOSIS — Z79.4 TYPE 2 DIABETES MELLITUS TREATED WITH INSULIN (HCC): ICD-10-CM

## 2024-03-11 DIAGNOSIS — I10 ESSENTIAL HYPERTENSION: ICD-10-CM

## 2024-03-11 RX ORDER — BLOOD PRESSURE TEST KIT
KIT MISCELLANEOUS DAILY
Qty: 1 KIT | Refills: 0 | Status: SHIPPED | OUTPATIENT
Start: 2024-03-11

## 2024-03-11 RX ORDER — LANCETS 33 GAUGE
EACH MISCELLANEOUS
Qty: 100 EACH | Refills: 0 | Status: SHIPPED | OUTPATIENT
Start: 2024-03-11

## 2024-03-11 RX ORDER — BLOOD-GLUCOSE METER
EACH MISCELLANEOUS 2 TIMES DAILY
Qty: 1 KIT | Refills: 0 | Status: SHIPPED | OUTPATIENT
Start: 2024-03-11

## 2024-03-30 NOTE — LETTER
Diabetic Eye Exam Form    Date Requested: 22  Patient: Anna Samuels  Patient : 1940   Referring Provider: Clare Daniel MD    DIABETIC Eye Exam Date _______________________________    Type of Exam MUST be documented for Diabetic Eye Exams  Please CHECK ONE  Retinal Exam       Dilated Retinal Exam       OCT       Optomap-Iris Exam      Fundus Photography     Left Eye - Please check Retinopathy AND Type or No Retinopathy      Exam did show retinopathy    Exam did not show retinopathy         Mild     Proliferative           Moderate    Severe            None         Right Eye - Please check Retinopathy AND Type or No Retinopathy     Exam did show retinopathy    Exam did not show retinopathy         Mild     Proliferative        Moderate    Severe        None       Comments __________________________________________________________    Practice Providing Exam ______________________________________________    Exam Performed By (print name) _______________________________________      Provider Signature ___________________________________________________    These reports are needed for  compliance  Please fax this completed form and a copy of the Diabetic Eye Exam report to our office located at [place] [address] as soon as possible via [phone] lemuel Barger: Phone [phone]  We thank you for your assistance in treating our mutual patient  when ambulating, 95-97% with pulse ox on room air

## 2024-04-03 DIAGNOSIS — Z79.4 TYPE 2 DIABETES MELLITUS TREATED WITH INSULIN (HCC): Primary | ICD-10-CM

## 2024-04-03 DIAGNOSIS — E11.9 TYPE 2 DIABETES MELLITUS TREATED WITH INSULIN (HCC): Primary | ICD-10-CM

## 2024-04-04 DIAGNOSIS — Z79.4 TYPE 2 DIABETES MELLITUS TREATED WITH INSULIN (HCC): Primary | ICD-10-CM

## 2024-04-04 DIAGNOSIS — E11.9 TYPE 2 DIABETES MELLITUS TREATED WITH INSULIN (HCC): Primary | ICD-10-CM

## 2024-04-04 RX ORDER — INSULIN DETEMIR 100 [IU]/ML
INJECTION, SOLUTION SUBCUTANEOUS
Qty: 15 ML | Refills: 3 | Status: SHIPPED | OUTPATIENT
Start: 2024-04-04

## 2024-04-20 DIAGNOSIS — E11.9 TYPE 2 DIABETES MELLITUS TREATED WITH INSULIN (HCC): ICD-10-CM

## 2024-04-20 DIAGNOSIS — Z79.4 TYPE 2 DIABETES MELLITUS TREATED WITH INSULIN (HCC): ICD-10-CM

## 2024-04-22 RX ORDER — LANCETS 33 GAUGE
EACH MISCELLANEOUS
Qty: 100 EACH | Refills: 0 | Status: SHIPPED | OUTPATIENT
Start: 2024-04-22

## 2024-04-26 DIAGNOSIS — E11.65 TYPE 2 DIABETES MELLITUS WITH HYPERGLYCEMIA, WITHOUT LONG-TERM CURRENT USE OF INSULIN (HCC): ICD-10-CM

## 2024-04-27 RX ORDER — BLOOD-GLUCOSE METER
KIT MISCELLANEOUS
Qty: 1 KIT | Refills: 0 | Status: SHIPPED | OUTPATIENT
Start: 2024-04-27

## 2024-05-10 ENCOUNTER — RA CDI HCC (OUTPATIENT)
Dept: OTHER | Facility: HOSPITAL | Age: 84
End: 2024-05-10

## 2024-05-11 DIAGNOSIS — E13.9 DIABETES 1.5, MANAGED AS TYPE 1 (HCC): ICD-10-CM

## 2024-05-13 RX ORDER — BLOOD SUGAR DIAGNOSTIC
1 STRIP MISCELLANEOUS 2 TIMES DAILY
Qty: 100 STRIP | Refills: 0 | Status: SHIPPED | OUTPATIENT
Start: 2024-05-13

## 2024-05-14 DIAGNOSIS — R05.9 COUGH: ICD-10-CM

## 2024-05-14 RX ORDER — MONTELUKAST SODIUM 10 MG/1
10 TABLET ORAL
Qty: 90 TABLET | Refills: 1 | Status: SHIPPED | OUTPATIENT
Start: 2024-05-14

## 2024-05-15 DIAGNOSIS — E78.1 HYPERTRIGLYCERIDEMIA: ICD-10-CM

## 2024-05-15 RX ORDER — FENOFIBRATE 160 MG/1
160 TABLET ORAL DAILY
Qty: 90 TABLET | Refills: 1 | Status: SHIPPED | OUTPATIENT
Start: 2024-05-15

## 2024-05-16 ENCOUNTER — RA CDI HCC (OUTPATIENT)
Dept: OTHER | Facility: HOSPITAL | Age: 84
End: 2024-05-16

## 2024-05-16 DIAGNOSIS — E03.9 HYPOTHYROIDISM, UNSPECIFIED TYPE: ICD-10-CM

## 2024-05-16 PROBLEM — E11.22 TYPE 2 DIABETES MELLITUS WITH CHRONIC KIDNEY DISEASE, WITH LONG-TERM CURRENT USE OF INSULIN (HCC): Status: ACTIVE | Noted: 2024-05-16

## 2024-05-16 PROBLEM — Z79.4 TYPE 2 DIABETES MELLITUS WITH CHRONIC KIDNEY DISEASE, WITH LONG-TERM CURRENT USE OF INSULIN (HCC): Status: ACTIVE | Noted: 2024-05-16

## 2024-05-16 PROBLEM — E11.22 TYPE 2 DIABETES MELLITUS WITH CHRONIC KIDNEY DISEASE, WITH LONG-TERM CURRENT USE OF INSULIN (HCC): Status: ACTIVE | Noted: 2019-10-03

## 2024-05-16 PROBLEM — I12.9 HYPERTENSIVE KIDNEY DISEASE WITH CHRONIC KIDNEY DISEASE: Status: ACTIVE | Noted: 2024-05-16

## 2024-05-16 PROBLEM — Z79.4 TYPE 2 DIABETES MELLITUS WITH CHRONIC KIDNEY DISEASE, WITH LONG-TERM CURRENT USE OF INSULIN (HCC): Status: ACTIVE | Noted: 2019-10-03

## 2024-05-17 RX ORDER — LEVOTHYROXINE SODIUM 0.1 MG/1
100 TABLET ORAL DAILY
Qty: 90 TABLET | Refills: 1 | Status: SHIPPED | OUTPATIENT
Start: 2024-05-17

## 2024-05-20 ENCOUNTER — TELEPHONE (OUTPATIENT)
Age: 84
End: 2024-05-20

## 2024-05-20 ENCOUNTER — APPOINTMENT (OUTPATIENT)
Dept: LAB | Facility: CLINIC | Age: 84
End: 2024-05-20
Payer: COMMERCIAL

## 2024-05-20 DIAGNOSIS — E03.9 HYPOTHYROIDISM, UNSPECIFIED TYPE: ICD-10-CM

## 2024-05-20 DIAGNOSIS — E03.9 HYPOTHYROIDISM, UNSPECIFIED TYPE: Primary | ICD-10-CM

## 2024-05-20 DIAGNOSIS — R53.83 OTHER FATIGUE: ICD-10-CM

## 2024-05-20 DIAGNOSIS — E78.1 HYPERTRIGLYCERIDEMIA: ICD-10-CM

## 2024-05-20 DIAGNOSIS — E11.65 TYPE 2 DIABETES MELLITUS WITH HYPERGLYCEMIA, WITHOUT LONG-TERM CURRENT USE OF INSULIN (HCC): ICD-10-CM

## 2024-05-20 LAB
ALBUMIN SERPL BCP-MCNC: 4.2 G/DL (ref 3.5–5)
ALP SERPL-CCNC: 36 U/L (ref 34–104)
ALT SERPL W P-5'-P-CCNC: 18 U/L (ref 7–52)
ANION GAP SERPL CALCULATED.3IONS-SCNC: 8 MMOL/L (ref 4–13)
AST SERPL W P-5'-P-CCNC: 19 U/L (ref 13–39)
BASOPHILS # BLD AUTO: 0.04 THOUSANDS/ÂΜL (ref 0–0.1)
BASOPHILS NFR BLD AUTO: 1 % (ref 0–1)
BILIRUB SERPL-MCNC: 0.45 MG/DL (ref 0.2–1)
BUN SERPL-MCNC: 33 MG/DL (ref 5–25)
CALCIUM SERPL-MCNC: 10 MG/DL (ref 8.4–10.2)
CHLORIDE SERPL-SCNC: 108 MMOL/L (ref 96–108)
CHOLEST SERPL-MCNC: 141 MG/DL
CO2 SERPL-SCNC: 30 MMOL/L (ref 21–32)
CREAT SERPL-MCNC: 1.24 MG/DL (ref 0.6–1.3)
CREAT UR-MCNC: 37.5 MG/DL
EOSINOPHIL # BLD AUTO: 0.24 THOUSAND/ÂΜL (ref 0–0.61)
EOSINOPHIL NFR BLD AUTO: 7 % (ref 0–6)
ERYTHROCYTE [DISTWIDTH] IN BLOOD BY AUTOMATED COUNT: 13.4 % (ref 11.6–15.1)
EST. AVERAGE GLUCOSE BLD GHB EST-MCNC: 192 MG/DL
GFR SERPL CREATININE-BSD FRML MDRD: 40 ML/MIN/1.73SQ M
GLUCOSE P FAST SERPL-MCNC: 132 MG/DL (ref 65–99)
HBA1C MFR BLD: 8.3 %
HCT VFR BLD AUTO: 35.7 % (ref 34.8–46.1)
HDLC SERPL-MCNC: 47 MG/DL
HGB BLD-MCNC: 11 G/DL (ref 11.5–15.4)
IMM GRANULOCYTES # BLD AUTO: 0 THOUSAND/UL (ref 0–0.2)
IMM GRANULOCYTES NFR BLD AUTO: 0 % (ref 0–2)
LDLC SERPL CALC-MCNC: 75 MG/DL (ref 0–100)
LYMPHOCYTES # BLD AUTO: 0.91 THOUSANDS/ÂΜL (ref 0.6–4.47)
LYMPHOCYTES NFR BLD AUTO: 25 % (ref 14–44)
MCH RBC QN AUTO: 27.6 PG (ref 26.8–34.3)
MCHC RBC AUTO-ENTMCNC: 30.8 G/DL (ref 31.4–37.4)
MCV RBC AUTO: 90 FL (ref 82–98)
MICROALBUMIN UR-MCNC: 298.4 MG/L
MICROALBUMIN/CREAT 24H UR: 796 MG/G CREATININE (ref 0–30)
MONOCYTES # BLD AUTO: 0.23 THOUSAND/ÂΜL (ref 0.17–1.22)
MONOCYTES NFR BLD AUTO: 6 % (ref 4–12)
NEUTROPHILS # BLD AUTO: 2.16 THOUSANDS/ÂΜL (ref 1.85–7.62)
NEUTS SEG NFR BLD AUTO: 61 % (ref 43–75)
NONHDLC SERPL-MCNC: 94 MG/DL
NRBC BLD AUTO-RTO: 0 /100 WBCS
PLATELET # BLD AUTO: 171 THOUSANDS/UL (ref 149–390)
PMV BLD AUTO: 13.6 FL (ref 8.9–12.7)
POTASSIUM SERPL-SCNC: 4.4 MMOL/L (ref 3.5–5.3)
PROT SERPL-MCNC: 6.9 G/DL (ref 6.4–8.4)
RBC # BLD AUTO: 3.99 MILLION/UL (ref 3.81–5.12)
SODIUM SERPL-SCNC: 146 MMOL/L (ref 135–147)
TRIGL SERPL-MCNC: 96 MG/DL
TSH SERPL DL<=0.05 MIU/L-ACNC: 1.57 UIU/ML (ref 0.45–4.5)
WBC # BLD AUTO: 3.58 THOUSAND/UL (ref 4.31–10.16)

## 2024-05-20 PROCEDURE — 80061 LIPID PANEL: CPT

## 2024-05-20 PROCEDURE — 80053 COMPREHEN METABOLIC PANEL: CPT

## 2024-05-20 PROCEDURE — 83036 HEMOGLOBIN GLYCOSYLATED A1C: CPT

## 2024-05-20 PROCEDURE — 82570 ASSAY OF URINE CREATININE: CPT

## 2024-05-20 PROCEDURE — 85025 COMPLETE CBC W/AUTO DIFF WBC: CPT

## 2024-05-20 PROCEDURE — 82043 UR ALBUMIN QUANTITATIVE: CPT

## 2024-05-20 PROCEDURE — 84443 ASSAY THYROID STIM HORMONE: CPT

## 2024-05-20 PROCEDURE — 36415 COLL VENOUS BLD VENIPUNCTURE: CPT

## 2024-05-20 NOTE — TELEPHONE ENCOUNTER
Patient is on her way to the lab to have blood work done with her granddaughter, no labs were ever entered.  Patient is having them done this morning for her appointment scheduled with Dr. Lynne on 5/24/24.  Please have labs entered.

## 2024-05-22 DIAGNOSIS — Z79.4 TYPE 2 DIABETES MELLITUS TREATED WITH INSULIN (HCC): ICD-10-CM

## 2024-05-22 DIAGNOSIS — E11.9 TYPE 2 DIABETES MELLITUS TREATED WITH INSULIN (HCC): ICD-10-CM

## 2024-05-22 RX ORDER — LANCETS 33 GAUGE
EACH MISCELLANEOUS
Qty: 100 EACH | Refills: 0 | Status: SHIPPED | OUTPATIENT
Start: 2024-05-22

## 2024-05-24 ENCOUNTER — OFFICE VISIT (OUTPATIENT)
Dept: FAMILY MEDICINE CLINIC | Facility: CLINIC | Age: 84
End: 2024-05-24
Payer: COMMERCIAL

## 2024-05-24 VITALS
OXYGEN SATURATION: 96 % | TEMPERATURE: 97.5 F | HEIGHT: 61 IN | WEIGHT: 128 LBS | HEART RATE: 64 BPM | SYSTOLIC BLOOD PRESSURE: 156 MMHG | BODY MASS INDEX: 24.17 KG/M2 | DIASTOLIC BLOOD PRESSURE: 64 MMHG

## 2024-05-24 DIAGNOSIS — Z79.4 TYPE 2 DIABETES MELLITUS TREATED WITH INSULIN (HCC): Primary | ICD-10-CM

## 2024-05-24 DIAGNOSIS — F32.1 MAJOR DEPRESSIVE DISORDER, SINGLE EPISODE, MODERATE (HCC): ICD-10-CM

## 2024-05-24 DIAGNOSIS — D72.819 LEUKOPENIA, UNSPECIFIED TYPE: ICD-10-CM

## 2024-05-24 DIAGNOSIS — G89.29 CHRONIC PAIN OF LEFT KNEE: ICD-10-CM

## 2024-05-24 DIAGNOSIS — R09.89 POOR CIRCULATION OF EXTREMITY: ICD-10-CM

## 2024-05-24 DIAGNOSIS — E11.40 TYPE 2 DIABETES MELLITUS WITH DIABETIC NEUROPATHY, UNSPECIFIED WHETHER LONG TERM INSULIN USE (HCC): ICD-10-CM

## 2024-05-24 DIAGNOSIS — D64.9 ANEMIA, UNSPECIFIED TYPE: ICD-10-CM

## 2024-05-24 DIAGNOSIS — R13.10 DYSPHAGIA, UNSPECIFIED TYPE: ICD-10-CM

## 2024-05-24 DIAGNOSIS — F03.918 DEMENTIA WITH BEHAVIORAL DISTURBANCE (HCC): ICD-10-CM

## 2024-05-24 DIAGNOSIS — E46 PROTEIN-CALORIE MALNUTRITION, UNSPECIFIED SEVERITY (HCC): ICD-10-CM

## 2024-05-24 DIAGNOSIS — N18.32 CHRONIC KIDNEY DISEASE, STAGE 3B (HCC): ICD-10-CM

## 2024-05-24 DIAGNOSIS — M06.9 RHEUMATOID ARTHRITIS, INVOLVING UNSPECIFIED SITE, UNSPECIFIED WHETHER RHEUMATOID FACTOR PRESENT (HCC): ICD-10-CM

## 2024-05-24 DIAGNOSIS — M25.562 CHRONIC PAIN OF LEFT KNEE: ICD-10-CM

## 2024-05-24 DIAGNOSIS — E11.9 TYPE 2 DIABETES MELLITUS TREATED WITH INSULIN (HCC): Primary | ICD-10-CM

## 2024-05-24 DIAGNOSIS — R05.9 COUGH, UNSPECIFIED TYPE: ICD-10-CM

## 2024-05-24 PROBLEM — R52 BODY ACHES: Status: RESOLVED | Noted: 2024-01-17 | Resolved: 2024-05-24

## 2024-05-24 PROBLEM — R06.02 SHORTNESS OF BREATH: Status: RESOLVED | Noted: 2024-01-17 | Resolved: 2024-05-24

## 2024-05-24 PROBLEM — I47.10 SVT (SUPRAVENTRICULAR TACHYCARDIA): Status: RESOLVED | Noted: 2019-05-13 | Resolved: 2024-05-24

## 2024-05-24 PROBLEM — E11.22 TYPE 2 DIABETES MELLITUS WITH CHRONIC KIDNEY DISEASE, WITH LONG-TERM CURRENT USE OF INSULIN (HCC): Status: RESOLVED | Noted: 2019-10-03 | Resolved: 2024-05-24

## 2024-05-24 PROBLEM — G20.A1 PARKINSON'S DISEASE: Status: RESOLVED | Noted: 2023-03-27 | Resolved: 2024-05-24

## 2024-05-24 PROCEDURE — 99214 OFFICE O/P EST MOD 30 MIN: CPT | Performed by: FAMILY MEDICINE

## 2024-05-24 PROCEDURE — G2211 COMPLEX E/M VISIT ADD ON: HCPCS | Performed by: FAMILY MEDICINE

## 2024-05-24 RX ORDER — PEN NEEDLE, DIABETIC 32GX 5/32"
NEEDLE, DISPOSABLE MISCELLANEOUS 2 TIMES DAILY
Qty: 200 EACH | Refills: 5 | Status: SHIPPED | OUTPATIENT
Start: 2024-05-24

## 2024-05-24 RX ORDER — INSULIN DETEMIR 100 [IU]/ML
INJECTION, SOLUTION SUBCUTANEOUS
Qty: 30 ML | Refills: 2 | Status: SHIPPED | OUTPATIENT
Start: 2024-05-24

## 2024-05-24 RX ORDER — DEXTROMETHORPHAN HYDROBROMIDE AND PROMETHAZINE HYDROCHLORIDE 15; 6.25 MG/5ML; MG/5ML
2.5 SYRUP ORAL 2 TIMES DAILY
Qty: 180 ML | Refills: 1 | Status: SHIPPED | OUTPATIENT
Start: 2024-05-24

## 2024-05-24 NOTE — PROGRESS NOTES
Ambulatory Visit  Name: Michelle Villatoro      : 1940      MRN: [id number]  Encounter Provider: Elver Lynne MD  Encounter Date: 2024   Encounter department: [facility]    Assessment & Plan   1. Type 2 diabetes mellitus treated with insulin (HCC)  HgbA1C- 8.3  After discussing risks and benefits of medication along with side effects will start the following:   -     insulin detemir (Levemir FlexTouch) 100 Units/mL injection pen; Take 18 units am and 10 units pm  -     Insulin Pen Needle (BD Pen Needle Elisabeth U/F) 32G X 4 MM MISC; Inject under the skin 2 (two) times a day    2. Rheumatoid arthritis, involving unspecified site, unspecified whether rheumatoid factor present (HCC)  F/U with rheumatology    3. Dementia with behavioral disturbance ([facility])  F/U with neurology    4. Major depressive disorder, single episode, moderate ([facility])  Continue zoloft    5. Chronic kidney disease, stage 3b ([facility])  Needs repeat BMP    6. Anemia, unspecified type  -     Occult Blood, Fecal Immunochemical; Future  -     Urinalysis with microscopic; Future  -     Iron Panel (Includes Ferritin, Iron Sat%, Iron, and TIBC); Future  -     Vitamin B12; Future  -     Folate; Future  7. Protein-calorie malnutrition, unspecified severity ([facility])  -     Vitamin B12; Future  -     Folate; Future  8. Chronic pain of left knee  -     Ambulatory Referral to Orthopedic Surgery; Future  9. Poor circulation of extremity  -     VAS cam single level; Future; Expected date: 2024  10. Leukopenia, unspecified type  -     Ambulatory Referral to Hematology / Oncology; Future  11. Dysphagia, unspecified type  -     Ambulatory Referral to Speech Therapy; Future  -     FL barium swallow video w speech; Future; Expected date: 2024  12. Cough, unspecified type  -     promethazine-dextromethorphan (PHENERGAN-DM) 6.25-15 mg/5 mL oral syrup; Take 2.5 mL by mouth 2 (two) times a day    Follow up in 2 months       History of Present  Illness     Patient is here to follow up for Type 2 DM on insulin. Her glucose levels at home have been elevated over 200-300 mg/dl before dinner better in the morning less than 130 mg/dl.  Also has a Hx of RA and has multiple joint pain.  Has major depression continues to lose weight.  Was found to have low wbc count denies any fever or chills also has anemia.  Has been having difficulty swallowing and a cough at times.      Review of Systems   Constitutional:  Negative for activity change, appetite change, fatigue and fever.   HENT:  Negative for congestion and ear discharge.    Respiratory:  Positive for cough. Negative for shortness of breath.    Cardiovascular:  Negative for chest pain and palpitations.   Gastrointestinal:  Negative for diarrhea and nausea.   Musculoskeletal:  Negative for arthralgias and back pain.   Skin:  Negative for color change and rash.   Neurological:  Negative for dizziness and headaches.   Psychiatric/Behavioral:  Negative for agitation and behavioral problems.      Past Medical History:   Diagnosis Date   • Aggression    • Alzheimer's dementia (HCC)    • Arthritis    • Dementia (HCC)    • Diabetes insipidus (HCC)    • Diabetes mellitus (HCC)    • High cholesterol    • Hypertension    • Memory loss    • Migraine    • Polyneuropathy    • Rheumatoid arthritis (HCC)    • Serum lipids high    • Stomach problems    • Thyroid trouble      Past Surgical History:   Procedure Laterality Date   • CATARACT EXTRACTION     • CHOLECYSTECTOMY     • GALLBLADDER SURGERY     • HYSTERECTOMY     • [place] SURGICAL ARTHROSCOPY SHOULDER W/ROTATOR CUFF RPR Right 8/1/2019    Procedure: SHOULDER ARTHROSCOPIC ROTATOR CUFF REPAIR;  Surgeon: Betsy Conde MD;  Location: [facility] OR;  Service: Orthopedics   • ROTATOR CUFF REPAIR Right 08/01/2019     Family History   Problem Relation Age of Onset   • Substance Abuse Mother         denied   • Mental illness Mother         denied   • Substance Abuse Father          denied   • Mental illness Father         denied   • Diabetes Brother    • Blindness Brother    • Arthritis Daughter    • HIV Son      Social History     Tobacco Use   • Smoking status: Former     Current packs/day: 0.00     Average packs/day: 0.3 packs/day for 30.0 years (7.5 ttl pk-yrs)     Types: Cigarettes     Start date:      Quit date:      Years since quittin.4   • Smokeless tobacco: Never   Vaping Use   • Vaping status: Never Used   Substance and Sexual Activity   • Alcohol use: Never   • Drug use: No   • Sexual activity: Not on file     Current Outpatient Medications on File Prior to Visit   Medication Sig   • amLODIPine (NORVASC) 5 mg tablet Take 1 tablet (5 mg total) by mouth daily   • BD Pen Needle Elisabeth 2nd Gen 32G X 4 MM MISC USE DAILY AS DIRECTED   • benzonatate (TESSALON PERLES) 100 mg capsule Take 1 capsule (100 mg total) by mouth 3 (three) times a day as needed for cough   • Blood Glucose Monitoring Suppl (OneTouch Verio Reflect) w/Device KIT USE 2 (TWO) TIMES A DAY   • Blood Pressure KIT Use daily   • Canagliflozin (Invokana) 300 MG TABS Take 1 tablet (300 mg total) by mouth daily   • cholecalciferol (VITAMIN D3) 250 MCG (89803 UT) capsule Take 1 capsule (10,000 Units total) by mouth daily   • cholestyramine (QUESTRAN) 4 g packet Take 1 packet (4 g total) by mouth 2 (two) times a day with meals   • Continuous Blood Gluc  (FreeStyle Aruna 2 Metcalf) MEREDITH Use 1 Device 3 (three) times a day before meals   • Continuous Blood Gluc Sensor (FreeStyle Aruna 2 Sensor) MISC Check blood sugars multiple times per day   • Diclofenac Sodium (VOLTAREN) 1 % Apply 2 g topically 3 (three) times a day   • fenofibrate 160 MG tablet TAKE 1 TABLET BY MOUTH EVERY DAY   • fluticasone (FLONASE) 50 mcg/act nasal spray SPRAY 2 SPRAYS INTO EACH NOSTRIL EVERY DAY   • gabapentin (Neurontin) 100 mg capsule Take 1 capsule (100 mg total) by mouth 3 (three) times a day   • hydrochlorothiazide (HYDRODIURIL) 25 mg  tablet Take 1 tablet (25 mg total) by mouth daily   • Incontinence Supplies MISC Use 6 (six) times a day Wipes   • Incontinence Supplies MISC Use 4 (four) times a day Comfort shield Adult diapers   • Incontinence Supply Disposable MISC Use 6 (six) times a day Dispense disposable bed pad   • Lancets (OneTouch Delica Plus [id number]) MISC USE TO TEST BLOOD SUGAR 3 TIMES A DAY   • levothyroxine 100 mcg tablet TAKE 1 TABLET BY MOUTH EVERY DAY   • lisinopril (ZESTRIL) 40 mg tablet Take 1 tablet (40 mg total) by mouth daily   • loteprednol etabonate (LOTEMAX) 0.5 % ophthalmic suspension INSTILL 1 DROP INTO BOTH EYES TWICE A DAY   • memantine (NAMENDA) 10 mg tablet Take 1 tablet (10 mg total) by mouth 2 (two) times a day   • montelukast (SINGULAIR) 10 mg tablet Take 1 tablet (10 mg total) by mouth daily at bedtime   • omeprazole (PriLOSEC) 20 mg delayed release capsule Take 1 capsule (20 mg total) by mouth daily   • OneTouch Verio test strip USE 1 EACH 2 (TWO) TIMES A DAY TEST   • sertraline (ZOLOFT) 50 mg tablet Take 1 tablet (50 mg total) by mouth daily   • triamcinolone (KENALOG) 0.5 % ointment Apply 1 Application topically 2 (two) times a day To affected area   • Xiidra 5 % op solution INSTILL 1 DROP IN BOTH EYES 2 TIMES PER DAY   • [DISCONTINUED] amLODIPine (NORVASC) 5 mg tablet Take 1 tablet (5 mg total) by mouth daily   • [DISCONTINUED] BD Pen Needle Elisabeth U/F 32G X 4 MM MISC USE 2 (TWO) TIMES A DAY   • [DISCONTINUED] benzonatate (TESSALON) 200 MG capsule Take 1 capsule (200 mg total) by mouth 3 (three) times a day as needed for cough   • [DISCONTINUED] insulin detemir (Levemir FlexTouch) 100 Units/mL injection pen Take 15 units am and 10 units pm   • [DISCONTINUED] Insulin Glargine Solostar (Lantus SoloStar) 100 UNIT/ML SOPN Take 15 units in the am and 10 units in the pm     Allergies   Allergen Reactions   • Penicillins Itching and Swelling     Immunization History   Administered Date(s) Administered   • COVID-19  "PFIZER VACCINE 0.3 ML IM 06/04/2021, 07/02/2021   • INFLUENZA 12/07/2016, 09/10/2018, 10/07/2021, 12/23/2022   • Influenza, high dose seasonal 0.7 mL 09/10/2018, 11/13/2020, 12/23/2022, 11/21/2023   • Influenza, recombinant, quadrivalent,injectable, preservative free 10/10/2019   • Pneumococcal Conjugate 13-Valent 11/13/2020   • Pneumococcal Conjugate Vaccine 20-valent (Pcv20), Polysace 04/15/2022   • Tuberculin Skin Test-PPD Intradermal 06/11/2018     Objective     /64   Pulse 64   Temp 97.5 °F (36.4 °C)   Ht 5' 1\" (1.549 m)   Wt 58.1 kg (128 lb)   SpO2 96%   BMI 24.19 kg/m²     Physical Exam  Vitals and nursing note reviewed.   Constitutional:       General: She is not in acute distress.     Appearance: She is well-developed.   HENT:      Head: Normocephalic and atraumatic.   Eyes:      Conjunctiva/sclera: Conjunctivae normal.   Cardiovascular:      Rate and Rhythm: Normal rate and regular rhythm.      Heart sounds: No murmur heard.  Pulmonary:      Effort: Pulmonary effort is normal. No respiratory distress.      Breath sounds: Normal breath sounds.   Abdominal:      Palpations: Abdomen is soft.      Tenderness: There is no abdominal tenderness.   Musculoskeletal:         General: No swelling.      Cervical back: Neck supple.   Skin:     General: Skin is warm and dry.      Capillary Refill: Capillary refill takes less than 2 seconds.   Neurological:      Mental Status: She is alert.   Psychiatric:         Mood and Affect: Mood normal.       Administrative Statements   I have spent a total time of 20 minutes on 05/24/24 In caring for this patient including Instructions for management.      "

## 2024-05-30 ENCOUNTER — TELEPHONE (OUTPATIENT)
Dept: HEMATOLOGY ONCOLOGY | Facility: CLINIC | Age: 84
End: 2024-05-30

## 2024-05-30 NOTE — TELEPHONE ENCOUNTER
I called Michelle in response to a referral that was received for patient to establish care with Hematology.     Outreach was made to schedule a consultation.    Waleska answered the phone and she will call back when she has the patient's calendar with her.The referral has been closed.

## 2024-06-07 ENCOUNTER — TELEPHONE (OUTPATIENT)
Dept: HEMATOLOGY ONCOLOGY | Facility: CLINIC | Age: 84
End: 2024-06-07

## 2024-06-07 NOTE — TELEPHONE ENCOUNTER
Lorenzo Martinez in response to a referral that was received for patient to establish care with Hematology.     Outreach was made to schedule a consultation.    A consultation was scheduled for patient during this call. Patient is scheduled on 8/1/24 at 1:20pm with Dorcas Gutierrez at the [facility]

## 2024-06-10 ENCOUNTER — APPOINTMENT (OUTPATIENT)
Dept: LAB | Facility: CLINIC | Age: 84
End: 2024-06-10
Payer: COMMERCIAL

## 2024-06-10 DIAGNOSIS — D64.9 ANEMIA, UNSPECIFIED TYPE: ICD-10-CM

## 2024-06-10 DIAGNOSIS — E46 PROTEIN-CALORIE MALNUTRITION, UNSPECIFIED SEVERITY (HCC): ICD-10-CM

## 2024-06-10 PROCEDURE — 82728 ASSAY OF FERRITIN: CPT

## 2024-06-10 PROCEDURE — 83550 IRON BINDING TEST: CPT

## 2024-06-10 PROCEDURE — 81001 URINALYSIS AUTO W/SCOPE: CPT

## 2024-06-10 PROCEDURE — 83540 ASSAY OF IRON: CPT

## 2024-06-10 PROCEDURE — 36415 COLL VENOUS BLD VENIPUNCTURE: CPT

## 2024-06-10 PROCEDURE — 82607 VITAMIN B-12: CPT

## 2024-06-10 PROCEDURE — 82746 ASSAY OF FOLIC ACID SERUM: CPT

## 2024-06-11 LAB
BACTERIA UR QL AUTO: ABNORMAL /HPF
BILIRUB UR QL STRIP: NEGATIVE
CLARITY UR: CLEAR
COLOR UR: COLORLESS
FERRITIN SERPL-MCNC: 81 NG/ML (ref 11–307)
FOLATE SERPL-MCNC: 8.1 NG/ML
GLUCOSE UR STRIP-MCNC: ABNORMAL MG/DL
HGB UR QL STRIP.AUTO: NEGATIVE
IRON SATN MFR SERPL: 21 % (ref 15–50)
IRON SERPL-MCNC: 82 UG/DL (ref 50–212)
KETONES UR STRIP-MCNC: NEGATIVE MG/DL
LEUKOCYTE ESTERASE UR QL STRIP: ABNORMAL
NITRITE UR QL STRIP: NEGATIVE
NON-SQ EPI CELLS URNS QL MICRO: ABNORMAL /HPF
PH UR STRIP.AUTO: 6 [PH]
PROT UR STRIP-MCNC: ABNORMAL MG/DL
RBC #/AREA URNS AUTO: ABNORMAL /HPF
SP GR UR STRIP.AUTO: 1.01 (ref 1–1.03)
TIBC SERPL-MCNC: 389 UG/DL (ref 250–450)
UIBC SERPL-MCNC: 307 UG/DL (ref 155–355)
UROBILINOGEN UR STRIP-ACNC: <2 MG/DL
VIT B12 SERPL-MCNC: 208 PG/ML (ref 180–914)
WBC #/AREA URNS AUTO: ABNORMAL /HPF

## 2024-06-18 ENCOUNTER — HOSPITAL ENCOUNTER (OUTPATIENT)
Dept: RADIOLOGY | Facility: HOSPITAL | Age: 84
Discharge: HOME | End: 2024-06-18
Payer: COMMERCIAL

## 2024-06-18 DIAGNOSIS — R13.10 DYSPHAGIA, UNSPECIFIED TYPE: ICD-10-CM

## 2024-06-18 PROCEDURE — 74230 X-RAY XM SWLNG FUNCJ C+: CPT

## 2024-06-18 PROCEDURE — 92611 MOTION FLUOROSCOPY/SWALLOW: CPT

## 2024-06-18 NOTE — PROCEDURES
Video Swallow Study      Patient Name: Michelle Villatoro  Today's Date: 6/18/2024        Past Medical History  Past Medical History:   Diagnosis Date    Aggression     Alzheimer's dementia (HCC)     Arthritis     Dementia (HCC)     Diabetes insipidus (HCC)     Diabetes mellitus (HCC)     High cholesterol     Hypertension     Memory loss     Migraine     Polyneuropathy     Rheumatoid arthritis (HCC)     Serum lipids high     Stomach problems     Thyroid trouble         Past Surgical History  Past Surgical History:   Procedure Laterality Date    CATARACT EXTRACTION      CHOLECYSTECTOMY      GALLBLADDER SURGERY      HYSTERECTOMY      AL SURGICAL ARTHROSCOPY SHOULDER W/ROTATOR CUFF RPR Right 8/1/2019    Procedure: SHOULDER ARTHROSCOPIC ROTATOR CUFF REPAIR;  Surgeon: Betsy Conde MD;  Location: [facility];  Service: Orthopedics    ROTATOR CUFF REPAIR Right 08/01/2019         Summary:  Images are on PACS for review.     Oral Phase : Pt presented with mild oral dysphagia. Mastication was mildly prolonged however eventual functional breakdown. Bolus control and formation were WNL. Transfer was piecemeal however WFL.     Pharyngeal Phase :Pt presented with pharyngeal phase of swallow WNL. Swallow initiation was prompt. Hyoid elevation, laryngeal excursion, and pharyngeal constriction were WNL. Epiglottic inversion was complete. Trace vallecular and pyriform retention with solids. Osteophyte present C6-C7, trace residue above site with nutri grain bar all other trails cleared adequately. Trace residue from nutri grain bar did clear with liquid wash.  No aspiration or penetration occurred throughout study.     Per Esophageal screen   Slow motility in upper esophagus observed with solids. Retropulsion observed with liquids.     Recommendations:  Diet:Regular w/ softer selections, cut into bite sized pieces, add condiments to moisten   Liquids:Thin  Meds:As tolerated   Strategies:slow rate,  alternate liquids with solids, and swallow prior to additional po   Upright position  F/u ST tx:no  Full/Intermittent Supervision  Aspiration Precautions  Reflux Precautions  Consider consult with: GI   Results reviewed with: pt,  family, physician,   Aspiration precautions posted.  If a dedicated assessment of the esophagus is desired, consider esophagram/barium swallow or EGD.    Pt is a 84 year old female who was referred by Elver Lynne MD. Family present provided translation. Reported pt increased difficulty with chewing and swallowing. Often takes pt very long time to complete meal. Reported frequent pt coughing, in which cough occurs without presence of liquids of solids.     H&P/Admit info, pertinent provider notes/procedures/studies/PMH:(copied and placed in this report)  Patient is here to follow up for Type 2 DM on insulin. Her glucose levels at home have been elevated over 200-300 mg/dl before dinner better in the morning less than 130 mg/dl.  Also has a Hx of RA and has multiple joint pain.  Has major depression continues to lose weight.  Was found to have low wbc count denies any fever or chills also has anemia.  Has been having difficulty swallowing and a cough at times.    Special Studies  EGD 08/2/2022  IMPRESSION:  Normal upper GI endoscopy, status post 3rd duodenum biopsy  RECOMMENDATION:  Follow-up biopsy results in 3 weeks    Previous VBS:none     Precautions   None     Food allergies:  No known    Current diet:  Regular and thin    Premorbid diet:  Regular and thin    Dentition  Adequate    Oral Mech  WNL   O2 requirements:  Room Air    Vocal Quality/Speech WNL   Cognitive status:  Alert      Consistencies administered: Barium laden applesauce, nutri grain bar. Liquids were administered by cup and straw. Did not administer pill as currently unavailable at campus.    Pt was seated laterally at 90 degrees.   Pt  stood to be viewed in AP position

## 2024-07-02 ENCOUNTER — TELEPHONE (OUTPATIENT)
Dept: OBGYN CLINIC | Facility: CLINIC | Age: 84
End: 2024-07-02

## 2024-07-02 ENCOUNTER — OFFICE VISIT (OUTPATIENT)
Dept: OBGYN CLINIC | Facility: CLINIC | Age: 84
End: 2024-07-02
Payer: COMMERCIAL

## 2024-07-02 VITALS
WEIGHT: 126.2 LBS | HEART RATE: 66 BPM | DIASTOLIC BLOOD PRESSURE: 60 MMHG | HEIGHT: 61 IN | BODY MASS INDEX: 23.83 KG/M2 | SYSTOLIC BLOOD PRESSURE: 197 MMHG

## 2024-07-02 DIAGNOSIS — M19.041 ARTHRITIS OF BOTH HANDS: Primary | ICD-10-CM

## 2024-07-02 DIAGNOSIS — E13.9 DIABETES 1.5, MANAGED AS TYPE 1 (HCC): ICD-10-CM

## 2024-07-02 DIAGNOSIS — M19.042 ARTHRITIS OF BOTH HANDS: Primary | ICD-10-CM

## 2024-07-02 PROCEDURE — 99214 OFFICE O/P EST MOD 30 MIN: CPT | Performed by: FAMILY MEDICINE

## 2024-07-02 PROCEDURE — 20600 DRAIN/INJ JOINT/BURSA W/O US: CPT | Performed by: FAMILY MEDICINE

## 2024-07-02 RX ORDER — TRIAMCINOLONE ACETONIDE 40 MG/ML
20 INJECTION, SUSPENSION INTRA-ARTICULAR; INTRAMUSCULAR
Status: COMPLETED | OUTPATIENT
Start: 2024-07-02 | End: 2024-07-02

## 2024-07-02 RX ORDER — BUPIVACAINE HYDROCHLORIDE 2.5 MG/ML
0.5 INJECTION, SOLUTION INFILTRATION; PERINEURAL
Status: COMPLETED | OUTPATIENT
Start: 2024-07-02 | End: 2024-07-02

## 2024-07-02 RX ORDER — BLOOD SUGAR DIAGNOSTIC
1 STRIP MISCELLANEOUS 2 TIMES DAILY
Qty: 100 STRIP | Refills: 0 | Status: SHIPPED | OUTPATIENT
Start: 2024-07-02

## 2024-07-02 RX ADMIN — BUPIVACAINE HYDROCHLORIDE 0.5 ML: 2.5 INJECTION, SOLUTION INFILTRATION; PERINEURAL at 13:15

## 2024-07-02 RX ADMIN — TRIAMCINOLONE ACETONIDE 20 MG: 40 INJECTION, SUSPENSION INTRA-ARTICULAR; INTRAMUSCULAR at 13:15

## 2024-07-02 NOTE — TELEPHONE ENCOUNTER
Dr. Vera needed the pt to f/u in 2 weeks there was no time available, scheduled patient in same day slot, that was all that was available

## 2024-07-02 NOTE — PROGRESS NOTES
Assessment/Plan:  Assessment & Plan   Diagnoses and all orders for this visit:    Arthritis of both hands  -     Small joint arthrocentesis: L index MCP  -     Small joint arthrocentesis: L index PIP  -     Small joint arthrocentesis: R long PIP  -     Small joint arthrocentesis: R ring PIP      84-year-old right-hand-dominant female with pain both hands more than few years duration.  Discussed with patient and accompanying granddaughter physical exam, impression, and plan.  Physical exam noted for Sean's and Heberden's nodes in digits of both hands.  Clinical impression is that she has symptoms from degenerative changes.  She is using topical diclofenac so I offered patient steroid injection to which she agreed.  I advised steroid injection may cause elevation of blood sugar that may last 1 week I administered one half mixtures of 0.5 cc 0.25% bupivacaine and 0.5 cc Kenalog to the right hand third and fourth PIP joints and to the left hand second digit PIP joint without complication.  I administered mixture 0.5 cc 0.25% bupivacaine and 0.5 cc Kenalog to the left hand second MCP joint without complication.  I advised steroid injection may cause elevation of blood sugar that may last 1 week.  She will follow-up in 2 weeks at which point we will consider injections for her knees.        Subjective:   Patient ID: Michelle Villatoro is a 84 y.o. female.  Chief Complaint   Patient presents with    Left Hand - Pain     Index finger pain     Right Hand - Pain     Middle finger pain        84-year-old right-hand-dominant female following for pain and locking of fingers of both hands more than 3 years duration.  She was last seen by me 2 years ago at which point she was given steroid injections to the left long finger and right ring finger.  She was advised on topical diclofenac.  He was also advised on use of medial  brace for both knees.  She reports since then pain in both hands is worsened.  She has pain described  "generalized to the hands particularly at the knuckles, worse with gripping, associate with swelling and cramping, and improved with resting.  She has been applying topical diclofenac to help with symptoms.  She also ports worsening symptoms in both knees, more pain in the hands currently more bothersome.      Hand Pain  This is a chronic problem. The current episode started more than 1 year ago. The problem occurs daily. The problem has been gradually worsening. Associated symptoms include arthralgias, joint swelling and weakness. Pertinent negatives include no numbness. Exacerbated by: Gripping, hand use. She has tried rest and position changes (Topical diclofenac) for the symptoms. The treatment provided mild relief.               Review of Systems   Musculoskeletal:  Positive for arthralgias and joint swelling.   Neurological:  Positive for weakness. Negative for numbness.       Objective:  Vitals:    07/02/24 1257   BP: (!) 197/60   Pulse: 66   Weight: 57.2 kg (126 lb 3.2 oz)   Height: 5' 1\" (1.549 m)          Observations     Left Wrist/Hand   Positive for Sean's nodes and Heberden's nodes.     Right Wrist/Hand   Positive for Sean's nodes and Heberden's nodes.       Physical Exam  Vitals and nursing note reviewed.   Constitutional:       Appearance: Normal appearance. She is well-developed. She is not ill-appearing or diaphoretic.   HENT:      Head: Normocephalic and atraumatic.      Right Ear: External ear normal.      Left Ear: External ear normal.   Eyes:      Conjunctiva/sclera: Conjunctivae normal.   Neck:      Trachea: No tracheal deviation.   Cardiovascular:      Rate and Rhythm: Normal rate.   Pulmonary:      Effort: Pulmonary effort is normal. No respiratory distress.   Abdominal:      General: There is no distension.   Skin:     General: Skin is warm and dry.      Coloration: Skin is not jaundiced or pale.   Neurological:      Mental Status: She is alert and oriented to person, place, and " "time.   Psychiatric:         Mood and Affect: Mood normal.         Behavior: Behavior normal.         Thought Content: Thought content normal.         Judgment: Judgment normal.             Small joint arthrocentesis: L index MCP  Universal Protocol:  Consent: Verbal consent obtained.  Risks and benefits: risks, benefits and alternatives were discussed  Consent given by: patient  Time out: Immediately prior to procedure a \"time out\" was called to verify the correct patient, procedure, equipment, support staff and site/side marked as required.  Patient understanding: patient states understanding of the procedure being performed  Patient consent: the patient's understanding of the procedure matches consent given  Procedure consent: procedure consent matches procedure scheduled  Relevant documents: relevant documents present and verified  Test results: test results available and properly labeled  Site marked: the operative site was marked  Radiology Images displayed and confirmed. If images not available, report reviewed: imaging studies available  Required items: required blood products, implants, devices, and special equipment available  Patient identity confirmed: verbally with patient  Supporting Documentation  Indications: pain   Procedure Details  Location: index finger - L index MCP  Preparation: Patient was prepped and draped in the usual sterile fashion  Needle size: 27 G  Ultrasound guidance: no  Approach: dorsal  Medications administered: 0.5 mL bupivacaine 0.25 %; 20 mg triamcinolone acetonide 40 mg/mL    Patient tolerance: patient tolerated the procedure well with no immediate complications  Dressing:  Sterile dressing applied      Small joint arthrocentesis: L index PIP  Universal Protocol:  Consent: Verbal consent obtained.  Risks and benefits: risks, benefits and alternatives were discussed  Consent given by: patient  Time out: Immediately prior to procedure a \"time out\" was called to verify the correct " "patient, procedure, equipment, support staff and site/side marked as required.  Patient understanding: patient states understanding of the procedure being performed  Patient consent: the patient's understanding of the procedure matches consent given  Procedure consent: procedure consent matches procedure scheduled  Relevant documents: relevant documents present and verified  Test results: test results available and properly labeled  Site marked: the operative site was marked  Radiology Images displayed and confirmed. If images not available, report reviewed: imaging studies available  Required items: required blood products, implants, devices, and special equipment available  Patient identity confirmed: verbally with patient  Supporting Documentation  Indications: pain   Procedure Details  Location: index finger - L index PIP  Preparation: Patient was prepped and draped in the usual sterile fashion  Needle size: 27 G  Ultrasound guidance: no  Approach: dorsal  Medications administered: 0.5 mL bupivacaine 0.25 %; 20 mg triamcinolone acetonide 40 mg/mL    Patient tolerance: patient tolerated the procedure well with no immediate complications  Dressing:  Sterile dressing applied      Small joint arthrocentesis: R long PIP  Universal Protocol:  Consent: Verbal consent obtained.  Risks and benefits: risks, benefits and alternatives were discussed  Consent given by: patient  Time out: Immediately prior to procedure a \"time out\" was called to verify the correct patient, procedure, equipment, support staff and site/side marked as required.  Patient understanding: patient states understanding of the procedure being performed  Patient consent: the patient's understanding of the procedure matches consent given  Procedure consent: procedure consent matches procedure scheduled  Relevant documents: relevant documents present and verified  Test results: test results available and properly labeled  Site marked: the operative site " "was marked  Radiology Images displayed and confirmed. If images not available, report reviewed: imaging studies available  Required items: required blood products, implants, devices, and special equipment available  Patient identity confirmed: verbally with patient  Supporting Documentation  Indications: pain   Procedure Details  Location: long finger - R long PIP  Preparation: Patient was prepped and draped in the usual sterile fashion  Needle size: 27 G  Ultrasound guidance: no  Approach: dorsal  Medications administered: 0.5 mL bupivacaine 0.25 %; 20 mg triamcinolone acetonide 40 mg/mL    Patient tolerance: patient tolerated the procedure well with no immediate complications  Dressing:  Sterile dressing applied      Small joint arthrocentesis: R ring PIP  Universal Protocol:  Consent: Verbal consent obtained.  Risks and benefits: risks, benefits and alternatives were discussed  Consent given by: patient  Time out: Immediately prior to procedure a \"time out\" was called to verify the correct patient, procedure, equipment, support staff and site/side marked as required.  Patient understanding: patient states understanding of the procedure being performed  Patient consent: the patient's understanding of the procedure matches consent given  Procedure consent: procedure consent matches procedure scheduled  Relevant documents: relevant documents present and verified  Test results: test results available and properly labeled  Site marked: the operative site was marked  Radiology Images displayed and confirmed. If images not available, report reviewed: imaging studies available  Required items: required blood products, implants, devices, and special equipment available  Patient identity confirmed: verbally with patient  Supporting Documentation  Indications: pain   Procedure Details  Location: ring finger - R ring PIP  Preparation: Patient was prepped and draped in the usual sterile fashion  Needle size: 27 G  Ultrasound " guidance: no  Approach: dorsal  Medications administered: 0.5 mL bupivacaine 0.25 %; 20 mg triamcinolone acetonide 40 mg/mL    Patient tolerance: patient tolerated the procedure well with no immediate complications  Dressing:  Sterile dressing applied      More than 31 minutes were spent with reviewing patient chart, obtaining history from patient, examining patient, discussing and implementing treatment plan.

## 2024-07-13 DIAGNOSIS — Z79.4 TYPE 2 DIABETES MELLITUS TREATED WITH INSULIN (HCC): ICD-10-CM

## 2024-07-13 DIAGNOSIS — E11.9 TYPE 2 DIABETES MELLITUS TREATED WITH INSULIN (HCC): ICD-10-CM

## 2024-07-13 RX ORDER — LANCETS 33 GAUGE
EACH MISCELLANEOUS
Qty: 100 EACH | Refills: 2 | Status: SHIPPED | OUTPATIENT
Start: 2024-07-13

## 2024-07-15 DIAGNOSIS — E11.9 TYPE 2 DIABETES MELLITUS TREATED WITH INSULIN (HCC): ICD-10-CM

## 2024-07-15 DIAGNOSIS — Z79.4 TYPE 2 DIABETES MELLITUS TREATED WITH INSULIN (HCC): ICD-10-CM

## 2024-07-15 DIAGNOSIS — F03.918 DEMENTIA WITH BEHAVIORAL DISTURBANCE (HCC): ICD-10-CM

## 2024-07-15 RX ORDER — MEMANTINE HYDROCHLORIDE 10 MG/1
10 TABLET ORAL 2 TIMES DAILY
Qty: 200 TABLET | Refills: 1 | Status: ON HOLD | OUTPATIENT
Start: 2024-07-15

## 2024-07-15 RX ORDER — CANAGLIFLOZIN 300 MG/1
300 TABLET, FILM COATED ORAL DAILY
Qty: 100 TABLET | Refills: 1 | Status: ON HOLD | OUTPATIENT
Start: 2024-07-15

## 2024-07-22 ENCOUNTER — HOSPITAL ENCOUNTER (OUTPATIENT)
Dept: NON INVASIVE DIAGNOSTICS | Facility: HOSPITAL | Age: 84
Discharge: HOME | End: 2024-07-22
Payer: COMMERCIAL

## 2024-07-22 DIAGNOSIS — R09.89 POOR CIRCULATION OF EXTREMITY: ICD-10-CM

## 2024-07-22 PROCEDURE — 93923 UPR/LXTR ART STDY 3+ LVLS: CPT

## 2024-07-23 PROCEDURE — 93923 UPR/LXTR ART STDY 3+ LVLS: CPT | Performed by: SURGERY

## 2024-07-24 ENCOUNTER — TELEPHONE (OUTPATIENT)
Dept: FAMILY MEDICINE CLINIC | Facility: CLINIC | Age: 84
End: 2024-07-24

## 2024-07-24 ENCOUNTER — OFFICE VISIT (OUTPATIENT)
Dept: OBGYN CLINIC | Facility: CLINIC | Age: 84
End: 2024-07-24
Payer: COMMERCIAL

## 2024-07-24 VITALS
HEART RATE: 69 BPM | DIASTOLIC BLOOD PRESSURE: 71 MMHG | HEIGHT: 61 IN | BODY MASS INDEX: 23.79 KG/M2 | SYSTOLIC BLOOD PRESSURE: 187 MMHG | WEIGHT: 126 LBS

## 2024-07-24 DIAGNOSIS — M17.0 PRIMARY OSTEOARTHRITIS OF BOTH KNEES: Primary | ICD-10-CM

## 2024-07-24 PROCEDURE — 20610 DRAIN/INJ JOINT/BURSA W/O US: CPT | Performed by: FAMILY MEDICINE

## 2024-07-24 PROCEDURE — 99214 OFFICE O/P EST MOD 30 MIN: CPT | Performed by: FAMILY MEDICINE

## 2024-07-24 RX ORDER — TRIAMCINOLONE ACETONIDE 40 MG/ML
80 INJECTION, SUSPENSION INTRA-ARTICULAR; INTRAMUSCULAR
Status: COMPLETED | OUTPATIENT
Start: 2024-07-24 | End: 2024-07-24

## 2024-07-24 RX ORDER — BUPIVACAINE HYDROCHLORIDE 2.5 MG/ML
4 INJECTION, SOLUTION INFILTRATION; PERINEURAL
Status: COMPLETED | OUTPATIENT
Start: 2024-07-24 | End: 2024-07-24

## 2024-07-24 RX ORDER — BUPIVACAINE HYDROCHLORIDE 2.5 MG/ML
1 INJECTION, SOLUTION INFILTRATION; PERINEURAL
Status: COMPLETED | OUTPATIENT
Start: 2024-07-24 | End: 2024-07-24

## 2024-07-24 RX ADMIN — BUPIVACAINE HYDROCHLORIDE 4 ML: 2.5 INJECTION, SOLUTION INFILTRATION; PERINEURAL at 15:15

## 2024-07-24 RX ADMIN — BUPIVACAINE HYDROCHLORIDE 1 ML: 2.5 INJECTION, SOLUTION INFILTRATION; PERINEURAL at 15:15

## 2024-07-24 RX ADMIN — TRIAMCINOLONE ACETONIDE 80 MG: 40 INJECTION, SUSPENSION INTRA-ARTICULAR; INTRAMUSCULAR at 15:15

## 2024-07-24 NOTE — TELEPHONE ENCOUNTER
----- Message from Elver Lynne MD sent at 7/23/2024  7:26 PM EDT -----  Lower ext arterial study is inconclusive advise pt.

## 2024-07-24 NOTE — PROGRESS NOTES
Assessment/Plan:  Assessment & Plan   Diagnoses and all orders for this visit:    Primary osteoarthritis of both knees  -     Large joint arthrocentesis: bilateral knee        84-year-old female with pain both knees several years duration.  Discussed with patient and accompanying granddaughter physical exam, radiographs, impression, and plan.  X-rays both knees noted for pronounced degenerative change with medial space narrowing and genu varum.  Imaging studies, clinical exam, and symptoms suggest degenerative changes of the knees.  I discussed treatment option of steroid injection.  I advised steroid injection may cause elevation in blood sugar that may last for 1 week.  Surgery not warranted at this time.  I administered mixtures 3 cc 0.25% bupivacaine and 2 cc Kenalog to each knee without complication.  She will follow-up as needed.        Subjective:   Patient ID: Michelle Villatoro is a 84 y.o. female.  Chief Complaint   Patient presents with    Left Knee - Pain    Right Knee - Pain        84-year-old female is accompanied by granddaughter for evaluation pain both knees several years duration.  She denies trauma or inciting event.  Pain described as gradual in onset, generalized to the knees but worse at the medial aspects, worse with prolonged standing and ambulation, associated with swelling, and improved with resting.  She also reports instability with sometimes knees giving out on her.  She was seen by primary care physician and had x-rays done which were noted for degenerative changes.  She ambulates with a walking cane.    Knee Pain  This is a chronic problem. The current episode started more than 1 year ago. The problem occurs daily. The problem has been gradually worsening. Associated symptoms include arthralgias and joint swelling. Pertinent negatives include no numbness or weakness. The symptoms are aggravated by standing and walking. She has tried rest and position changes for the symptoms. The treatment  "provided mild relief.               Review of Systems   Musculoskeletal:  Positive for arthralgias and joint swelling.   Neurological:  Negative for weakness and numbness.       Objective:  Vitals:    07/24/24 1503   BP: (!) 187/71   Pulse: 69   Weight: 57.2 kg (126 lb)   Height: 5' 1\" (1.549 m)      Right Knee Exam     Tenderness   The patient is experiencing tenderness in the medial joint line.    Range of Motion   Extension:  normal     Tests    Valgus: positive    Other   Swelling: mild      Left Knee Exam     Tenderness   The patient is experiencing tenderness in the medial joint line.    Range of Motion   Extension:  normal     Tests    Valgus: positive    Other   Swelling: mild            Physical Exam  Vitals and nursing note reviewed.   Constitutional:       Appearance: Normal appearance. She is well-developed. She is not ill-appearing or diaphoretic.   HENT:      Head: Normocephalic and atraumatic.      Right Ear: External ear normal.      Left Ear: External ear normal.   Eyes:      Conjunctiva/sclera: Conjunctivae normal.   Neck:      Trachea: No tracheal deviation.   Cardiovascular:      Rate and Rhythm: Normal rate.   Pulmonary:      Effort: Pulmonary effort is normal. No respiratory distress.   Abdominal:      General: There is no distension.   Musculoskeletal:         General: Swelling and tenderness present.   Skin:     General: Skin is warm and dry.      Coloration: Skin is not jaundiced or pale.   Neurological:      Mental Status: She is alert and oriented to person, place, and time.   Psychiatric:         Mood and Affect: Mood normal.         Behavior: Behavior normal.         Thought Content: Thought content normal.         Judgment: Judgment normal.         I have personally reviewed pertinent films in PACS and my interpretation is  .  X-rays both knees noted for pronounced degenerative change with medial space narrowing and genu varum.    Large joint arthrocentesis: bilateral knee  Universal " "Protocol:  Consent: Verbal consent obtained.  Risks and benefits: risks, benefits and alternatives were discussed  Consent given by: patient  Time out: Immediately prior to procedure a \"time out\" was called to verify the correct patient, procedure, equipment, support staff and site/side marked as required.  Patient understanding: patient states understanding of the procedure being performed  Patient consent: the patient's understanding of the procedure matches consent given  Procedure consent: procedure consent matches procedure scheduled  Relevant documents: relevant documents present and verified  Test results: test results available and properly labeled  Site marked: the operative site was marked  Radiology Images displayed and confirmed. If images not available, report reviewed: imaging studies available  Required items: required blood products, implants, devices, and special equipment available  Patient identity confirmed: verbally with patient  Supporting Documentation  Indications: pain   Procedure Details  Location: knee - bilateral knee  Preparation: Patient was prepped and draped in the usual sterile fashion  Needle gauge: 21G 2\"  Ultrasound guidance: no  Approach: anteromedial    Medications (Right): 4 mL bupivacaine 0.25 %; 1 mL bupivacaine 0.25 %; 80 mg triamcinolone acetonide 40 mg/mLMedications (Left): 1 mL bupivacaine 0.25 %; 4 mL bupivacaine 0.25 %; 80 mg triamcinolone acetonide 40 mg/mL   Patient tolerance: patient tolerated the procedure well with no immediate complications  Dressing:  Sterile dressing applied        More than 31 minutes were spent with reviewing patient chart, reviewing and interpreting imaging studies, obtaining history from patient and granddaughter, examining patient, discussing and implementing treatment plan.    "

## 2024-07-25 ENCOUNTER — OFFICE VISIT (OUTPATIENT)
Dept: FAMILY MEDICINE CLINIC | Facility: CLINIC | Age: 84
End: 2024-07-25
Payer: COMMERCIAL

## 2024-07-25 VITALS
HEART RATE: 70 BPM | DIASTOLIC BLOOD PRESSURE: 92 MMHG | OXYGEN SATURATION: 96 % | HEIGHT: 61 IN | TEMPERATURE: 97.4 F | SYSTOLIC BLOOD PRESSURE: 196 MMHG | BODY MASS INDEX: 23.86 KG/M2 | WEIGHT: 126.4 LBS

## 2024-07-25 DIAGNOSIS — Z79.4 TYPE 2 DIABETES MELLITUS TREATED WITH INSULIN (HCC): Primary | ICD-10-CM

## 2024-07-25 DIAGNOSIS — E11.9 TYPE 2 DIABETES MELLITUS TREATED WITH INSULIN (HCC): Primary | ICD-10-CM

## 2024-07-25 DIAGNOSIS — R01.1 CARDIAC MURMUR: ICD-10-CM

## 2024-07-25 DIAGNOSIS — I10 ESSENTIAL HYPERTENSION: ICD-10-CM

## 2024-07-25 PROCEDURE — 99214 OFFICE O/P EST MOD 30 MIN: CPT | Performed by: PHYSICIAN ASSISTANT

## 2024-07-25 PROCEDURE — G2211 COMPLEX E/M VISIT ADD ON: HCPCS | Performed by: PHYSICIAN ASSISTANT

## 2024-07-25 RX ORDER — AMLODIPINE BESYLATE 5 MG/1
10 TABLET ORAL DAILY
Start: 2024-07-25 | End: 2024-08-24

## 2024-07-25 RX ORDER — INSULIN DETEMIR 100 [IU]/ML
INJECTION, SOLUTION SUBCUTANEOUS
Start: 2024-07-25

## 2024-07-25 RX ORDER — LISINOPRIL 40 MG/1
40 TABLET ORAL DAILY
Start: 2024-07-25

## 2024-07-25 RX ORDER — BLOOD PRESSURE TEST KIT
KIT MISCELLANEOUS DAILY
Qty: 1 KIT | Refills: 0 | Status: SHIPPED | OUTPATIENT
Start: 2024-07-25

## 2024-07-25 NOTE — PROGRESS NOTES
Ambulatory Visit  Name: Michelle Villatoro      : 1940      MRN: [id number]  Encounter Provider: Michael Alvarez PA-C  Encounter Date: 2024   Encounter department: [facility]    Assessment & Plan   1. Type 2 diabetes mellitus treated with insulin (HCC)  -     lisinopril (ZESTRIL) 40 mg tablet; Take 1 tablet (40 mg total) by mouth daily In the morning  -     insulin detemir (Levemir FlexTouch) 100 Units/mL injection pen; Take 12 units am and 12 units pm  -     Ambulatory referral to Diabetic Education - use to refer for diabetes group classes, individual diabetes education, medical nutrition therapy, device training; Future; Expected date: 2024  2. Essential hypertension  -     amLODIPine (NORVASC) 5 mg tablet; Take 2 tablets (10 mg total) by mouth daily At night before bed  -     Blood Pressure KIT; Use daily  -     Echo complete w/ contrast if indicated; Future; Expected date: 2024  3. Cardiac murmur  -     Echo complete w/ contrast if indicated; Future; Expected date: 2024     Change levemir to 12u BID. Granddaughter believes mealtime insulin would be difficult. Continue invokana. Keep glucose log and follow up in 2 weeks with myself or PCP  Increase amlodipine to 10mg, take this at night. Take lisinopril 40mg in the morning. Keep BP log. Seek echo  2 week follow up, earlier prn    History of Present Illness     Pt with hx of insulin dependent DM2 presents with erratic sugars and hypo episodes. Yesterday pt was confused, not speaking clearly and her sugar was 43. She is currently on levemir 18u in the morning and 10u at night as well as invokana. Her sugars tend to be quite low at night and high in the morning. \    Her BP is chronically elevated. Very high recently. 190s/90s. Similar at recent visits. On lisinopril 40mg and amlodipine 5mg. +dizziness at times with headache. No chest pain, SOB, syncope.       Review of Systems   Constitutional:  Negative for chills,  fatigue and fever.   HENT:  Negative for congestion, ear pain, hearing loss, nosebleeds, postnasal drip, rhinorrhea, sinus pressure, sinus pain, sneezing and sore throat.    Eyes:  Negative for pain, discharge, itching and visual disturbance.   Respiratory:  Negative for cough, chest tightness, shortness of breath and wheezing.    Cardiovascular:  Negative for chest pain, palpitations and leg swelling.   Gastrointestinal:  Negative for abdominal pain, blood in stool, constipation, diarrhea, nausea and vomiting.   Neurological:  Positive for dizziness and headaches. Negative for light-headedness and numbness.     Past Medical History:   Diagnosis Date    Aggression     Alzheimer's dementia (HCC)     Arthritis     Dementia (HCC)     Diabetes insipidus (HCC)     Diabetes mellitus (HCC)     High cholesterol     Hypertension     Memory loss     Migraine     Polyneuropathy     Rheumatoid arthritis (HCC)     Serum lipids high     Stomach problems     Thyroid trouble      Past Surgical History:   Procedure Laterality Date    CATARACT EXTRACTION      CHOLECYSTECTOMY      GALLBLADDER SURGERY      HYSTERECTOMY      ND SURGICAL ARTHROSCOPY SHOULDER W/ROTATOR CUFF RPR Right 2019    Procedure: SHOULDER ARTHROSCOPIC ROTATOR CUFF REPAIR;  Surgeon: Betsy Conde MD;  Location: [facility];  Service: Orthopedics    ROTATOR CUFF REPAIR Right 2019     Family History   Problem Relation Age of Onset    Substance Abuse Mother         denied    Mental illness Mother         denied    Substance Abuse Father         denied    Mental illness Father         denied    Diabetes Brother     Blindness Brother     Arthritis Daughter     HIV Son      Social History     Tobacco Use    Smoking status: Former     Current packs/day: 0.00     Average packs/day: 0.3 packs/day for 30.0 years (7.5 ttl pk-yrs)     Types: Cigarettes     Start date:      Quit date:      Years since quittin.5     Passive exposure: Past    Smokeless  tobacco: Never   Vaping Use    Vaping status: Never Used   Substance and Sexual Activity    Alcohol use: Never    Drug use: No    Sexual activity: Not on file     Current Outpatient Medications on File Prior to Visit   Medication Sig    BD Pen Needle Elisabeth 2nd Gen 32G X 4 MM MISC USE DAILY AS DIRECTED    benzonatate (TESSALON PERLES) 100 mg capsule Take 1 capsule (100 mg total) by mouth 3 (three) times a day as needed for cough    Blood Glucose Monitoring Suppl (OneTouch Verio Reflect) w/Device KIT USE 2 (TWO) TIMES A DAY    Canagliflozin (Invokana) 300 MG TABS TAKE 1 TABLET BY MOUTH EVERY DAY    cholecalciferol (VITAMIN D3) 250 MCG (32276 UT) capsule Take 1 capsule (10,000 Units total) by mouth daily    cholestyramine (QUESTRAN) 4 g packet Take 1 packet (4 g total) by mouth 2 (two) times a day with meals    Continuous Blood Gluc  (FreeStyle Aruna 2 Froid) MEREDITH Use 1 Device 3 (three) times a day before meals    Continuous Blood Gluc Sensor (FreeStyle Aruna 2 Sensor) MISC Check blood sugars multiple times per day    Diclofenac Sodium (VOLTAREN) 1 % Apply 2 g topically 3 (three) times a day    fenofibrate 160 MG tablet TAKE 1 TABLET BY MOUTH EVERY DAY    fluticasone (FLONASE) 50 mcg/act nasal spray SPRAY 2 SPRAYS INTO EACH NOSTRIL EVERY DAY    gabapentin (Neurontin) 100 mg capsule Take 1 capsule (100 mg total) by mouth 3 (three) times a day    hydrochlorothiazide (HYDRODIURIL) 25 mg tablet Take 1 tablet (25 mg total) by mouth daily    Incontinence Supplies MISC Use 6 (six) times a day Wipes    Incontinence Supplies MISC Use 4 (four) times a day Comfort shield Adult diapers    Incontinence Supply Disposable MISC Use 6 (six) times a day Dispense disposable bed pad    Insulin Pen Needle (BD Pen Needle Elisabeth U/F) 32G X 4 MM MISC Inject under the skin 2 (two) times a day    Lancets (OneTouch Delica Plus [id number]) MISC USE TO TEST BLOOD SUGAR 3 TIMES A DAY    levothyroxine 100 mcg tablet TAKE 1 TABLET BY MOUTH EVERY  "DAY    loteprednol etabonate (LOTEMAX) 0.5 % ophthalmic suspension INSTILL 1 DROP INTO BOTH EYES TWICE A DAY    memantine (NAMENDA) 10 mg tablet TAKE 1 TABLET BY MOUTH TWICE A DAY    montelukast (SINGULAIR) 10 mg tablet Take 1 tablet (10 mg total) by mouth daily at bedtime    omeprazole (PriLOSEC) 20 mg delayed release capsule Take 1 capsule (20 mg total) by mouth daily    OneTouch Verio test strip USE 1 EACH 2 (TWO) TIMES A DAY TEST    promethazine-dextromethorphan (PHENERGAN-DM) 6.25-15 mg/5 mL oral syrup Take 2.5 mL by mouth 2 (two) times a day    sertraline (ZOLOFT) 50 mg tablet Take 1 tablet (50 mg total) by mouth daily    triamcinolone (KENALOG) 0.5 % ointment Apply 1 Application topically 2 (two) times a day To affected area    Xiidra 5 % op solution INSTILL 1 DROP IN BOTH EYES 2 TIMES PER DAY    [DISCONTINUED] amLODIPine (NORVASC) 5 mg tablet Take 1 tablet (5 mg total) by mouth daily    [DISCONTINUED] Blood Pressure KIT Use daily    [DISCONTINUED] insulin detemir (Levemir FlexTouch) 100 Units/mL injection pen Take 18 units am and 10 units pm    [DISCONTINUED] lisinopril (ZESTRIL) 40 mg tablet Take 1 tablet (40 mg total) by mouth daily     Allergies   Allergen Reactions    Penicillins Itching and Swelling     Immunization History   Administered Date(s) Administered    COVID-19 PFIZER VACCINE 0.3 ML IM 06/04/2021, 07/02/2021    INFLUENZA 12/07/2016, 09/10/2018, 10/07/2021, 12/23/2022    Influenza, high dose seasonal 0.7 mL 09/10/2018, 11/13/2020, 12/23/2022, 11/21/2023    Influenza, recombinant, quadrivalent,injectable, preservative free 10/10/2019    Pneumococcal Conjugate 13-Valent 11/13/2020    Pneumococcal Conjugate Vaccine 20-valent (Pcv20), Polysace 04/15/2022    Tuberculin Skin Test-PPD Intradermal 06/11/2018     Objective     BP (!) 196/92   Pulse 70   Temp (!) 97.4 °F (36.3 °C)   Ht 5' 1\" (1.549 m)   Wt 57.3 kg (126 lb 6.4 oz)   SpO2 96%   BMI 23.88 kg/m²     Physical Exam  Vitals and nursing " note reviewed.   Constitutional:       General: She is not in acute distress.     Appearance: She is well-developed.   HENT:      Head: Normocephalic and atraumatic.   Eyes:      Conjunctiva/sclera: Conjunctivae normal.   Cardiovascular:      Rate and Rhythm: Normal rate and regular rhythm.      Heart sounds: Murmur (2/6, systolic, LSB) heard.   Pulmonary:      Effort: Pulmonary effort is normal. No respiratory distress.      Breath sounds: Normal breath sounds. No wheezing, rhonchi or rales.   Musculoskeletal:         General: No swelling.      Cervical back: Neck supple.   Skin:     General: Skin is warm and dry.      Capillary Refill: Capillary refill takes less than 2 seconds.   Neurological:      Mental Status: She is alert.   Psychiatric:         Mood and Affect: Mood normal.

## 2024-07-26 ENCOUNTER — HOSPITAL ENCOUNTER (INPATIENT)
Facility: HOSPITAL | Age: 84
LOS: 2 days | Discharge: HOME HEALTH | DRG: 638 | End: 2024-07-29
Attending: EMERGENCY MEDICINE | Admitting: INTERNAL MEDICINE
Payer: COMMERCIAL

## 2024-07-26 DIAGNOSIS — E86.0 DEHYDRATION: ICD-10-CM

## 2024-07-26 DIAGNOSIS — I16.1 HYPERTENSIVE EMERGENCY: ICD-10-CM

## 2024-07-26 DIAGNOSIS — R73.9 HYPERGLYCEMIA: Primary | ICD-10-CM

## 2024-07-26 DIAGNOSIS — R79.89 ELEVATED SERUM CREATININE: ICD-10-CM

## 2024-07-26 PROBLEM — E11.65 TYPE 2 DIABETES MELLITUS WITH HYPERGLYCEMIA (HCC): Chronic | Status: ACTIVE | Noted: 2024-05-24

## 2024-07-26 PROBLEM — F41.9 ANXIETY: Chronic | Status: ACTIVE | Noted: 2018-08-14

## 2024-07-26 PROBLEM — E11.65 TYPE 2 DIABETES MELLITUS WITH HYPERGLYCEMIA (HCC): Status: ACTIVE | Noted: 2024-05-24

## 2024-07-26 PROBLEM — E78.2 MIXED HYPERLIPIDEMIA: Chronic | Status: ACTIVE | Noted: 2020-04-21

## 2024-07-26 PROBLEM — E03.9 HYPOTHYROIDISM: Chronic | Status: ACTIVE | Noted: 2019-05-13

## 2024-07-26 PROBLEM — N18.32 CHRONIC KIDNEY DISEASE, STAGE 3B (HCC): Chronic | Status: ACTIVE | Noted: 2021-07-12

## 2024-07-26 LAB
ALBUMIN SERPL BCG-MCNC: 4.4 G/DL (ref 3.5–5)
ALP SERPL-CCNC: 42 U/L (ref 34–104)
ALT SERPL W P-5'-P-CCNC: 33 U/L (ref 7–52)
ANION GAP SERPL CALCULATED.3IONS-SCNC: 9 MMOL/L (ref 4–13)
AST SERPL W P-5'-P-CCNC: 28 U/L (ref 13–39)
B-OH-BUTYR SERPL-MCNC: <0.05 MMOL/L (ref 0.02–0.27)
BASE EX.OXY STD BLDV CALC-SCNC: 78.8 % (ref 60–80)
BASE EXCESS BLDV CALC-SCNC: -3.5 MMOL/L
BILIRUB SERPL-MCNC: 0.39 MG/DL (ref 0.2–1)
BUN SERPL-MCNC: 68 MG/DL (ref 5–25)
CALCIUM SERPL-MCNC: 9.9 MG/DL (ref 8.4–10.2)
CHLORIDE SERPL-SCNC: 93 MMOL/L (ref 96–108)
CO2 SERPL-SCNC: 23 MMOL/L (ref 21–32)
CREAT SERPL-MCNC: 1.7 MG/DL (ref 0.6–1.3)
ERYTHROCYTE [DISTWIDTH] IN BLOOD BY AUTOMATED COUNT: 13.7 % (ref 11.6–15.1)
GFR SERPL CREATININE-BSD FRML MDRD: 27 ML/MIN/1.73SQ M
GLUCOSE SERPL-MCNC: 492 MG/DL (ref 65–140)
GLUCOSE SERPL-MCNC: 529 MG/DL (ref 65–140)
HCO3 BLDV-SCNC: 21.4 MMOL/L (ref 24–30)
HCT VFR BLD AUTO: 35.1 % (ref 34.8–46.1)
HGB BLD-MCNC: 11.3 G/DL (ref 11.5–15.4)
MCH RBC QN AUTO: 27.9 PG (ref 26.8–34.3)
MCHC RBC AUTO-ENTMCNC: 32.2 G/DL (ref 31.4–37.4)
MCV RBC AUTO: 87 FL (ref 82–98)
O2 CT BLDV-SCNC: 13.6 ML/DL
PCO2 BLDV: 37.9 MM HG (ref 42–50)
PH BLDV: 7.37 [PH] (ref 7.3–7.4)
PLATELET # BLD AUTO: 228 THOUSANDS/UL (ref 149–390)
PMV BLD AUTO: 12.6 FL (ref 8.9–12.7)
PO2 BLDV: 47.4 MM HG (ref 35–45)
POTASSIUM SERPL-SCNC: 5.5 MMOL/L (ref 3.5–5.3)
PROT SERPL-MCNC: 7.1 G/DL (ref 6.4–8.4)
RBC # BLD AUTO: 4.05 MILLION/UL (ref 3.81–5.12)
SODIUM SERPL-SCNC: 125 MMOL/L (ref 135–147)
WBC # BLD AUTO: 8.33 THOUSAND/UL (ref 4.31–10.16)

## 2024-07-26 PROCEDURE — 85027 COMPLETE CBC AUTOMATED: CPT | Performed by: EMERGENCY MEDICINE

## 2024-07-26 PROCEDURE — 99285 EMERGENCY DEPT VISIT HI MDM: CPT | Performed by: EMERGENCY MEDICINE

## 2024-07-26 PROCEDURE — 36415 COLL VENOUS BLD VENIPUNCTURE: CPT | Performed by: EMERGENCY MEDICINE

## 2024-07-26 PROCEDURE — 82948 REAGENT STRIP/BLOOD GLUCOSE: CPT

## 2024-07-26 PROCEDURE — 80053 COMPREHEN METABOLIC PANEL: CPT | Performed by: EMERGENCY MEDICINE

## 2024-07-26 PROCEDURE — 99285 EMERGENCY DEPT VISIT HI MDM: CPT

## 2024-07-26 PROCEDURE — 82010 KETONE BODYS QUAN: CPT | Performed by: EMERGENCY MEDICINE

## 2024-07-26 PROCEDURE — 99223 1ST HOSP IP/OBS HIGH 75: CPT | Performed by: PHYSICIAN ASSISTANT

## 2024-07-26 PROCEDURE — 82805 BLOOD GASES W/O2 SATURATION: CPT | Performed by: EMERGENCY MEDICINE

## 2024-07-26 RX ORDER — INSULIN LISPRO 100 [IU]/ML
5 INJECTION, SOLUTION INTRAVENOUS; SUBCUTANEOUS
Status: DISCONTINUED | OUTPATIENT
Start: 2024-07-27 | End: 2024-07-28

## 2024-07-26 RX ORDER — INSULIN LISPRO 100 [IU]/ML
1-5 INJECTION, SOLUTION INTRAVENOUS; SUBCUTANEOUS
Status: DISCONTINUED | OUTPATIENT
Start: 2024-07-26 | End: 2024-07-29 | Stop reason: HOSPADM

## 2024-07-26 RX ORDER — INSULIN LISPRO 100 [IU]/ML
1-5 INJECTION, SOLUTION INTRAVENOUS; SUBCUTANEOUS
Status: DISCONTINUED | OUTPATIENT
Start: 2024-07-27 | End: 2024-07-29 | Stop reason: HOSPADM

## 2024-07-26 RX ORDER — SODIUM CHLORIDE 9 MG/ML
3 INJECTION INTRAVENOUS
Status: DISCONTINUED | OUTPATIENT
Start: 2024-07-26 | End: 2024-07-29 | Stop reason: HOSPADM

## 2024-07-26 RX ORDER — ACETAMINOPHEN 325 MG/1
975 TABLET ORAL ONCE
Status: COMPLETED | OUTPATIENT
Start: 2024-07-26 | End: 2024-07-26

## 2024-07-26 RX ORDER — ONDANSETRON 2 MG/ML
4 INJECTION INTRAMUSCULAR; INTRAVENOUS EVERY 6 HOURS PRN
Status: DISCONTINUED | OUTPATIENT
Start: 2024-07-26 | End: 2024-07-29 | Stop reason: HOSPADM

## 2024-07-26 RX ORDER — HYDRALAZINE HYDROCHLORIDE 20 MG/ML
5 INJECTION INTRAMUSCULAR; INTRAVENOUS EVERY 6 HOURS PRN
Status: DISCONTINUED | OUTPATIENT
Start: 2024-07-26 | End: 2024-07-29 | Stop reason: HOSPADM

## 2024-07-26 RX ORDER — ACETAMINOPHEN 325 MG/1
650 TABLET ORAL EVERY 4 HOURS PRN
Status: DISCONTINUED | OUTPATIENT
Start: 2024-07-26 | End: 2024-07-29 | Stop reason: HOSPADM

## 2024-07-26 RX ORDER — SODIUM CHLORIDE 9 MG/ML
100 INJECTION, SOLUTION INTRAVENOUS CONTINUOUS
Status: DISCONTINUED | OUTPATIENT
Start: 2024-07-26 | End: 2024-07-27

## 2024-07-26 RX ORDER — HEPARIN SODIUM 5000 [USP'U]/ML
5000 INJECTION, SOLUTION INTRAVENOUS; SUBCUTANEOUS EVERY 8 HOURS SCHEDULED
Status: DISCONTINUED | OUTPATIENT
Start: 2024-07-26 | End: 2024-07-29 | Stop reason: HOSPADM

## 2024-07-26 RX ORDER — AMLODIPINE BESYLATE 5 MG/1
10 TABLET ORAL ONCE
Status: COMPLETED | OUTPATIENT
Start: 2024-07-26 | End: 2024-07-26

## 2024-07-26 RX ADMIN — AMLODIPINE BESYLATE 10 MG: 5 TABLET ORAL at 22:52

## 2024-07-26 RX ADMIN — INSULIN DETEMIR 12 UNITS: 100 INJECTION, SOLUTION SUBCUTANEOUS at 23:19

## 2024-07-26 RX ADMIN — SODIUM CHLORIDE 1000 ML: 0.9 INJECTION, SOLUTION INTRAVENOUS at 22:50

## 2024-07-26 RX ADMIN — INSULIN HUMAN 5 UNITS: 100 INJECTION, SOLUTION PARENTERAL at 23:19

## 2024-07-26 RX ADMIN — ACETAMINOPHEN 975 MG: 325 TABLET ORAL at 22:52

## 2024-07-27 PROBLEM — F03.918 DEMENTIA WITH BEHAVIORAL DISTURBANCE (HCC): Chronic | Status: ACTIVE | Noted: 2018-02-15

## 2024-07-27 PROBLEM — G62.9 PERIPHERAL NEUROPATHY: Chronic | Status: ACTIVE | Noted: 2019-07-18

## 2024-07-27 LAB
ANION GAP SERPL CALCULATED.3IONS-SCNC: 6 MMOL/L (ref 4–13)
BACTERIA UR QL AUTO: NORMAL /HPF
BILIRUB UR QL STRIP: NEGATIVE
BUN SERPL-MCNC: 66 MG/DL (ref 5–25)
CALCIUM SERPL-MCNC: 9.2 MG/DL (ref 8.4–10.2)
CHLORIDE SERPL-SCNC: 104 MMOL/L (ref 96–108)
CLARITY UR: CLEAR
CO2 SERPL-SCNC: 24 MMOL/L (ref 21–32)
COLOR UR: ABNORMAL
CREAT SERPL-MCNC: 1.4 MG/DL (ref 0.6–1.3)
ERYTHROCYTE [DISTWIDTH] IN BLOOD BY AUTOMATED COUNT: 13.6 % (ref 11.6–15.1)
GFR SERPL CREATININE-BSD FRML MDRD: 34 ML/MIN/1.73SQ M
GLUCOSE SERPL-MCNC: 126 MG/DL (ref 65–140)
GLUCOSE SERPL-MCNC: 171 MG/DL (ref 65–140)
GLUCOSE SERPL-MCNC: 186 MG/DL (ref 65–140)
GLUCOSE SERPL-MCNC: 286 MG/DL (ref 65–140)
GLUCOSE SERPL-MCNC: 315 MG/DL (ref 65–140)
GLUCOSE SERPL-MCNC: 382 MG/DL (ref 65–140)
GLUCOSE UR STRIP-MCNC: ABNORMAL MG/DL
HCT VFR BLD AUTO: 28.6 % (ref 34.8–46.1)
HGB BLD-MCNC: 9.4 G/DL (ref 11.5–15.4)
HGB UR QL STRIP.AUTO: ABNORMAL
KETONES UR STRIP-MCNC: NEGATIVE MG/DL
LEUKOCYTE ESTERASE UR QL STRIP: NEGATIVE
MCH RBC QN AUTO: 28.3 PG (ref 26.8–34.3)
MCHC RBC AUTO-ENTMCNC: 32.9 G/DL (ref 31.4–37.4)
MCV RBC AUTO: 86 FL (ref 82–98)
NITRITE UR QL STRIP: NEGATIVE
NON-SQ EPI CELLS URNS QL MICRO: NORMAL /HPF
PH UR STRIP.AUTO: 6 [PH]
PLATELET # BLD AUTO: 162 THOUSANDS/UL (ref 149–390)
PMV BLD AUTO: 12.2 FL (ref 8.9–12.7)
POTASSIUM SERPL-SCNC: 4.4 MMOL/L (ref 3.5–5.3)
PROT UR STRIP-MCNC: ABNORMAL MG/DL
RBC # BLD AUTO: 3.32 MILLION/UL (ref 3.81–5.12)
RBC #/AREA URNS AUTO: NORMAL /HPF
SODIUM SERPL-SCNC: 134 MMOL/L (ref 135–147)
SP GR UR STRIP.AUTO: 1.01
UROBILINOGEN UR QL STRIP.AUTO: 0.2 E.U./DL
WBC # BLD AUTO: 6.26 THOUSAND/UL (ref 4.31–10.16)
WBC #/AREA URNS AUTO: NORMAL /HPF

## 2024-07-27 PROCEDURE — 85027 COMPLETE CBC AUTOMATED: CPT | Performed by: PHYSICIAN ASSISTANT

## 2024-07-27 PROCEDURE — 81001 URINALYSIS AUTO W/SCOPE: CPT | Performed by: EMERGENCY MEDICINE

## 2024-07-27 PROCEDURE — 80048 BASIC METABOLIC PNL TOTAL CA: CPT | Performed by: PHYSICIAN ASSISTANT

## 2024-07-27 PROCEDURE — 99233 SBSQ HOSP IP/OBS HIGH 50: CPT

## 2024-07-27 PROCEDURE — 82948 REAGENT STRIP/BLOOD GLUCOSE: CPT

## 2024-07-27 RX ORDER — AMLODIPINE BESYLATE 10 MG/1
10 TABLET ORAL
Status: DISCONTINUED | OUTPATIENT
Start: 2024-07-27 | End: 2024-07-27

## 2024-07-27 RX ORDER — GABAPENTIN 100 MG/1
100 CAPSULE ORAL 3 TIMES DAILY
Status: DISCONTINUED | OUTPATIENT
Start: 2024-07-27 | End: 2024-07-29 | Stop reason: HOSPADM

## 2024-07-27 RX ORDER — HYDROCHLOROTHIAZIDE 25 MG/1
25 TABLET ORAL DAILY
Status: DISCONTINUED | OUTPATIENT
Start: 2024-07-27 | End: 2024-07-27

## 2024-07-27 RX ORDER — LISINOPRIL 20 MG/1
40 TABLET ORAL DAILY
Status: DISCONTINUED | OUTPATIENT
Start: 2024-07-27 | End: 2024-07-29 | Stop reason: HOSPADM

## 2024-07-27 RX ORDER — PANTOPRAZOLE SODIUM 20 MG/1
20 TABLET, DELAYED RELEASE ORAL
Status: DISCONTINUED | OUTPATIENT
Start: 2024-07-27 | End: 2024-07-29 | Stop reason: HOSPADM

## 2024-07-27 RX ORDER — MEMANTINE HYDROCHLORIDE 5 MG/1
10 TABLET ORAL 2 TIMES DAILY
Status: DISCONTINUED | OUTPATIENT
Start: 2024-07-27 | End: 2024-07-29 | Stop reason: HOSPADM

## 2024-07-27 RX ORDER — MONTELUKAST SODIUM 10 MG/1
10 TABLET ORAL
Status: DISCONTINUED | OUTPATIENT
Start: 2024-07-27 | End: 2024-07-29 | Stop reason: HOSPADM

## 2024-07-27 RX ORDER — AMLODIPINE BESYLATE 10 MG/1
10 TABLET ORAL
Status: DISCONTINUED | OUTPATIENT
Start: 2024-07-27 | End: 2024-07-29 | Stop reason: HOSPADM

## 2024-07-27 RX ORDER — LEVOTHYROXINE SODIUM 0.1 MG/1
100 TABLET ORAL EVERY OTHER DAY
Status: DISCONTINUED | OUTPATIENT
Start: 2024-07-27 | End: 2024-07-29 | Stop reason: HOSPADM

## 2024-07-27 RX ADMIN — GABAPENTIN 100 MG: 100 CAPSULE ORAL at 09:10

## 2024-07-27 RX ADMIN — GABAPENTIN 100 MG: 100 CAPSULE ORAL at 21:00

## 2024-07-27 RX ADMIN — GABAPENTIN 100 MG: 100 CAPSULE ORAL at 17:27

## 2024-07-27 RX ADMIN — INSULIN LISPRO 1 UNITS: 100 INJECTION, SOLUTION INTRAVENOUS; SUBCUTANEOUS at 21:00

## 2024-07-27 RX ADMIN — INSULIN LISPRO 1 UNITS: 100 INJECTION, SOLUTION INTRAVENOUS; SUBCUTANEOUS at 12:00

## 2024-07-27 RX ADMIN — MONTELUKAST 10 MG: 10 TABLET, FILM COATED ORAL at 21:02

## 2024-07-27 RX ADMIN — HEPARIN SODIUM 5000 UNITS: 5000 INJECTION, SOLUTION INTRAVENOUS; SUBCUTANEOUS at 14:58

## 2024-07-27 RX ADMIN — INSULIN DETEMIR 14 UNITS: 100 INJECTION, SOLUTION SUBCUTANEOUS at 20:59

## 2024-07-27 RX ADMIN — LEVOTHYROXINE SODIUM 100 MCG: 0.1 TABLET ORAL at 09:10

## 2024-07-27 RX ADMIN — MEMANTINE 10 MG: 5 TABLET ORAL at 09:10

## 2024-07-27 RX ADMIN — INSULIN LISPRO 4 UNITS: 100 INJECTION, SOLUTION INTRAVENOUS; SUBCUTANEOUS at 00:47

## 2024-07-27 RX ADMIN — SERTRALINE HYDROCHLORIDE 50 MG: 50 TABLET ORAL at 09:10

## 2024-07-27 RX ADMIN — PANTOPRAZOLE SODIUM 20 MG: 20 TABLET, DELAYED RELEASE ORAL at 05:01

## 2024-07-27 RX ADMIN — Medication 10000 UNITS: at 09:50

## 2024-07-27 RX ADMIN — AMLODIPINE BESYLATE 10 MG: 10 TABLET ORAL at 21:00

## 2024-07-27 RX ADMIN — INSULIN LISPRO 5 UNITS: 100 INJECTION, SOLUTION INTRAVENOUS; SUBCUTANEOUS at 11:59

## 2024-07-27 RX ADMIN — HEPARIN SODIUM 5000 UNITS: 5000 INJECTION, SOLUTION INTRAVENOUS; SUBCUTANEOUS at 21:00

## 2024-07-27 RX ADMIN — MONTELUKAST 10 MG: 10 TABLET, FILM COATED ORAL at 00:48

## 2024-07-27 RX ADMIN — HYDRALAZINE HYDROCHLORIDE 5 MG: 20 INJECTION INTRAMUSCULAR; INTRAVENOUS at 00:47

## 2024-07-27 RX ADMIN — HEPARIN SODIUM 5000 UNITS: 5000 INJECTION, SOLUTION INTRAVENOUS; SUBCUTANEOUS at 00:47

## 2024-07-27 RX ADMIN — INSULIN LISPRO 5 UNITS: 100 INJECTION, SOLUTION INTRAVENOUS; SUBCUTANEOUS at 08:01

## 2024-07-27 RX ADMIN — INSULIN DETEMIR 14 UNITS: 100 INJECTION, SOLUTION SUBCUTANEOUS at 09:11

## 2024-07-27 RX ADMIN — LISINOPRIL 40 MG: 20 TABLET ORAL at 09:10

## 2024-07-27 RX ADMIN — INSULIN LISPRO 5 UNITS: 100 INJECTION, SOLUTION INTRAVENOUS; SUBCUTANEOUS at 16:58

## 2024-07-27 RX ADMIN — MEMANTINE 10 MG: 5 TABLET ORAL at 17:27

## 2024-07-27 RX ADMIN — INSULIN LISPRO 3 UNITS: 100 INJECTION, SOLUTION INTRAVENOUS; SUBCUTANEOUS at 08:02

## 2024-07-27 RX ADMIN — HEPARIN SODIUM 5000 UNITS: 5000 INJECTION, SOLUTION INTRAVENOUS; SUBCUTANEOUS at 05:00

## 2024-07-27 RX ADMIN — SODIUM CHLORIDE 100 ML/HR: 0.9 INJECTION, SOLUTION INTRAVENOUS at 00:37

## 2024-07-27 NOTE — ASSESSMENT & PLAN NOTE
Lab Results   Component Value Date    EGFR 27 07/26/2024    EGFR 40 05/20/2024    EGFR 37 11/21/2023    CREATININE 1.70 (H) 07/26/2024    CREATININE 1.24 05/20/2024    CREATININE 1.31 (H) 11/21/2023   Baseline creat 1.2-1.3  Currently 1.7, IVF, monitor

## 2024-07-27 NOTE — ASSESSMENT & PLAN NOTE
Lab Results   Component Value Date    EGFR 34 07/27/2024    EGFR 27 07/26/2024    EGFR 40 05/20/2024    CREATININE 1.40 (H) 07/27/2024    CREATININE 1.70 (H) 07/26/2024    CREATININE 1.24 05/20/2024   Baseline creat 1.2-1.3  Presented with creatinine 1.7  Holding prehospital HCTZ  Treated with IV fluids  Creatinine improved to 1.40 today  Avoid nephrotoxins and hypotension  BMP a.m.

## 2024-07-27 NOTE — PLAN OF CARE
Problem: PAIN - ADULT  Goal: Verbalizes/displays adequate comfort level or baseline comfort level  Description: Interventions:  - Encourage patient to monitor pain and request assistance  - Assess pain using appropriate pain scale  - Administer analgesics based on type and severity of pain and evaluate response  - Implement non-pharmacological measures as appropriate and evaluate response  - Consider cultural and social influences on pain and pain management  - Notify physician/advanced practitioner if interventions unsuccessful or patient reports new pain  Outcome: Progressing     Problem: SAFETY ADULT  Goal: Maintain or return to baseline ADL function  Description: INTERVENTIONS:  -  Assess patient's ability to carry out ADLs; assess patient's baseline for ADL function and identify physical deficits which impact ability to perform ADLs (bathing, care of mouth/teeth, toileting, grooming, dressing, etc.)  - Assess/evaluate cause of self-care deficits   - Assess range of motion  - Assess patient's mobility; develop plan if impaired  - Assess patient's need for assistive devices and provide as appropriate  - Encourage maximum independence but intervene and supervise when necessary  - Involve family in performance of ADLs  - Assess for home care needs following discharge   - Consider OT consult to assist with ADL evaluation and planning for discharge  - Provide patient education as appropriate  Outcome: Progressing     Problem: Knowledge Deficit  Goal: Patient/family/caregiver demonstrates understanding of disease process, treatment plan, medications, and discharge instructions  Description: Complete learning assessment and assess knowledge base.  Interventions:  - Provide teaching at level of understanding  - Provide teaching via preferred learning methods  Outcome: Progressing

## 2024-07-27 NOTE — ED PROVIDER NOTES
History  Chief Complaint   Patient presents with    Hyperglycemia - Symptomatic     BS over 600 at home; pt had recent change in insulin; pt reports increased thirst, a slight headache, and back pain     Patient is an 84-year-old female with history diabetes mellitus, hypertension that presents for evaluation of elevated blood sugar.  Patient is [place]uneian-speaking, granddaughter used as .  Patient just had her insulin adjusted this morning.  She has taken her insulin this morning but has not yet taken it this evening.  Granddaughter noted that her blood sugar was in the 500s at home and came in.  Patient endorses some polyuria and polydipsia.  She also describes some chronic back pain that is unchanged.  While she denies nausea vomiting chest pain dyspnea or abdominal pain.  Patient also noted to have initial blood pressure in 230 systolic, she has been taking her at home blood pressure medicine other than amlodipine 10 mg which is due tonight.        Prior to Admission Medications   Prescriptions Last Dose Informant Patient Reported? Taking?   BD Pen Needle Elisabeth 2nd Gen 32G X 4 MM MISC  Family Member No No   Sig: USE DAILY AS DIRECTED   Blood Glucose Monitoring Suppl (OneTouch Verio Reflect) w/Device KIT  Family Member No No   Sig: USE 2 (TWO) TIMES A DAY   Blood Pressure KIT   No No   Sig: Use daily   Canagliflozin (Invokana) 300 MG TABS  Family Member No No   Sig: TAKE 1 TABLET BY MOUTH EVERY DAY   Continuous Blood Gluc  (FreeStyle Aruna 2 [place]) MEREDITH  Family Member No No   Sig: Use 1 Device 3 (three) times a day before meals   Continuous Blood Gluc Sensor (FreeStyle Aruna 2 Sensor) MISC  Family Member No No   Sig: Check blood sugars multiple times per day   Diclofenac Sodium (VOLTAREN) 1 %  Family Member No No   Sig: Apply 2 g topically 3 (three) times a day   Incontinence Supplies MISC  Family Member No No   Sig: Use 6 (six) times a day Wipes   Incontinence Supplies MISC  Family Member No No    Sig: Use 4 (four) times a day Comfort shield Adult diapers   Incontinence Supply Disposable MISC  Family Member No No   Sig: Use 6 (six) times a day Dispense disposable bed pad   Insulin Pen Needle (BD Pen Needle Elisabeth U/F) 32G X 4 MM MISC  Family Member No No   Sig: Inject under the skin 2 (two) times a day   Lancets (OneTouch Delica Plus [id number]) MISC  Family Member No No   Sig: USE TO TEST BLOOD SUGAR 3 TIMES A DAY   OneTouch Verio test strip  Family Member No No   Sig: USE 1 EACH 2 (TWO) TIMES A DAY TEST   Xiidra 5 % op solution  Family Member Yes No   Sig: INSTILL 1 DROP IN BOTH EYES 2 TIMES PER DAY   amLODIPine (NORVASC) 5 mg tablet   No No   Sig: Take 2 tablets (10 mg total) by mouth daily At night before bed   benzonatate (TESSALON PERLES) 100 mg capsule  Family Member No No   Sig: Take 1 capsule (100 mg total) by mouth 3 (three) times a day as needed for cough   cholecalciferol (VITAMIN D3) 250 MCG (18812 UT) capsule  Family Member No No   Sig: Take 1 capsule (10,000 Units total) by mouth daily   cholestyramine (QUESTRAN) 4 g packet  Family Member No No   Sig: Take 1 packet (4 g total) by mouth 2 (two) times a day with meals   fenofibrate 160 MG tablet  Family Member No No   Sig: TAKE 1 TABLET BY MOUTH EVERY DAY   fluticasone (FLONASE) 50 mcg/act nasal spray  Family Member No No   Sig: SPRAY 2 SPRAYS INTO EACH NOSTRIL EVERY DAY   gabapentin (Neurontin) 100 mg capsule  Family Member No No   Sig: Take 1 capsule (100 mg total) by mouth 3 (three) times a day   hydrochlorothiazide (HYDRODIURIL) 25 mg tablet  Family Member No No   Sig: Take 1 tablet (25 mg total) by mouth daily   insulin detemir (Levemir FlexTouch) 100 Units/mL injection pen   No No   Sig: Take 12 units am and 12 units pm   levothyroxine 100 mcg tablet  Family Member No No   Sig: TAKE 1 TABLET BY MOUTH EVERY DAY   lisinopril (ZESTRIL) 40 mg tablet   No No   Sig: Take 1 tablet (40 mg total) by mouth daily In the morning   loteprednol etabonate  (LOTEMAX) 0.5 % ophthalmic suspension  Family Member Yes No   Sig: INSTILL 1 DROP INTO BOTH EYES TWICE A DAY   memantine (NAMENDA) 10 mg tablet  Family Member No No   Sig: TAKE 1 TABLET BY MOUTH TWICE A DAY   montelukast (SINGULAIR) 10 mg tablet  Family Member No No   Sig: Take 1 tablet (10 mg total) by mouth daily at bedtime   omeprazole (PriLOSEC) 20 mg delayed release capsule  Family Member No No   Sig: Take 1 capsule (20 mg total) by mouth daily   promethazine-dextromethorphan (PHENERGAN-DM) 6.25-15 mg/5 mL oral syrup  Family Member No No   Sig: Take 2.5 mL by mouth 2 (two) times a day   sertraline (ZOLOFT) 50 mg tablet  Family Member No No   Sig: Take 1 tablet (50 mg total) by mouth daily   triamcinolone (KENALOG) 0.5 % ointment  Family Member No No   Sig: Apply 1 Application topically 2 (two) times a day To affected area      Facility-Administered Medications: None       Past Medical History:   Diagnosis Date    Aggression     Alzheimer's dementia (HCC)     Arthritis     Dementia (HCC)     Diabetes insipidus (HCC)     Diabetes mellitus (HCC)     High cholesterol     Hypertension     Memory loss     Migraine     Polyneuropathy     Rheumatoid arthritis (HCC)     Serum lipids high     Stomach problems     Thyroid trouble        Past Surgical History:   Procedure Laterality Date    CATARACT EXTRACTION      CHOLECYSTECTOMY      GALLBLADDER SURGERY      HYSTERECTOMY      [facility] SURGICAL ARTHROSCOPY SHOULDER W/ROTATOR CUFF RPR Right 8/1/2019    Procedure: SHOULDER ARTHROSCOPIC ROTATOR CUFF REPAIR;  Surgeon: Betsy Conde MD;  Location: [facility];  Service: Orthopedics    ROTATOR CUFF REPAIR Right 08/01/2019       Family History   Problem Relation Age of Onset    Substance Abuse Mother         denied    Mental illness Mother         denied    Substance Abuse Father         denied    Mental illness Father         denied    Diabetes Brother     Blindness Brother     Arthritis Daughter     HIV Son      I have reviewed  and agree with the history as documented.    E-Cigarette/Vaping    E-Cigarette Use Never User      E-Cigarette/Vaping Substances    Nicotine No     THC No     CBD No     Flavoring No     Other No     Unknown No      Social History     Tobacco Use    Smoking status: Former     Current packs/day: 0.00     Average packs/day: 0.3 packs/day for 30.0 years (7.5 ttl pk-yrs)     Types: Cigarettes     Start date:      Quit date:      Years since quittin.5     Passive exposure: Past    Smokeless tobacco: Never   Vaping Use    Vaping status: Never Used   Substance Use Topics    Alcohol use: Never    Drug use: No       Review of Systems   Constitutional:  Negative for fever.   HENT:  Negative for sore throat.    Respiratory:  Negative for shortness of breath.    Cardiovascular:  Negative for chest pain.   Gastrointestinal:  Negative for abdominal pain.   Endocrine: Positive for polydipsia and polyuria.   Genitourinary:  Negative for dysuria.   Musculoskeletal:  Positive for back pain.   Skin:  Negative for rash.   Neurological:  Negative for light-headedness.   Psychiatric/Behavioral:  Negative for agitation.    All other systems reviewed and are negative.      Physical Exam  Physical Exam  Vitals reviewed.   Constitutional:       General: She is not in acute distress.     Appearance: She is well-developed.   HENT:      Head: Normocephalic.   Eyes:      Pupils: Pupils are equal, round, and reactive to light.   Cardiovascular:      Rate and Rhythm: Normal rate and regular rhythm.      Heart sounds: Normal heart sounds.   Pulmonary:      Effort: Pulmonary effort is normal.      Breath sounds: Normal breath sounds.   Abdominal:      General: Bowel sounds are normal. There is no distension.      Palpations: Abdomen is soft.      Tenderness: There is no abdominal tenderness. There is no guarding.   Musculoskeletal:         General: No tenderness or deformity. Normal range of motion.      Cervical back: Normal range of  motion and neck supple.      Comments: No cervical/thoracic/lumbar spinal point tenderness.   Skin:     General: Skin is warm and dry.      Capillary Refill: Capillary refill takes less than 2 seconds.   Neurological:      Mental Status: She is alert and oriented to person, place, and time.      Cranial Nerves: No cranial nerve deficit.      Sensory: No sensory deficit.   Psychiatric:         Behavior: Behavior normal.         Thought Content: Thought content normal.         Judgment: Judgment normal.         Vital Signs  ED Triage Vitals   Temperature Pulse Respirations Blood Pressure SpO2   07/26/24 2239 07/26/24 2236 07/26/24 2236 07/26/24 2236 07/26/24 2236   97.5 °F (36.4 °C) 83 18 (!) 236/99 96 %      Temp Source Heart Rate Source Patient Position - Orthostatic VS BP Location FiO2 (%)   07/26/24 2239 07/26/24 2236 07/26/24 2236 07/26/24 2236 --   Temporal Monitor Lying Left arm       Pain Score       07/26/24 2354       No Pain           Vitals:    07/26/24 2250 07/26/24 2251 07/26/24 2310 07/26/24 2352   BP: (!) 204/83 (!) 204/83 (!) 187/74 (!) 189/73   Pulse:   69 74   Patient Position - Orthostatic VS:             Visual Acuity      ED Medications  Medications   sodium chloride (PF) 0.9 % injection 3 mL (has no administration in time range)   sodium chloride 0.9 % bolus 1,000 mL (1,000 mL Intravenous New Bag 7/26/24 2250)   acetaminophen (TYLENOL) tablet 650 mg (has no administration in time range)   ondansetron (ZOFRAN) injection 4 mg (has no administration in time range)   hydrALAZINE (APRESOLINE) injection 5 mg (has no administration in time range)   sodium chloride 0.9 % infusion (has no administration in time range)   heparin (porcine) subcutaneous injection 5,000 Units (has no administration in time range)   insulin lispro (HumALOG/ADMELOG) 100 units/mL subcutaneous injection 5 Units (has no administration in time range)   insulin lispro (HumALOG/ADMELOG) 100 units/mL subcutaneous injection 1-5 Units  (has no administration in time range)   insulin lispro (HumALOG/ADMELOG) 100 units/mL subcutaneous injection 1-5 Units (has no administration in time range)   amLODIPine (NORVASC) tablet 10 mg (10 mg Oral Given 7/26/24 2252)   acetaminophen (TYLENOL) tablet 975 mg (975 mg Oral Given 7/26/24 2252)   insulin regular (HumuLIN R,NovoLIN R) injection 5 Units (5 Units Subcutaneous Given 7/26/24 2319)   insulin detemir (LEVEMIR) subcutaneous injection 12 Units (12 Units Subcutaneous Given 7/26/24 2319)       Diagnostic Studies  Results Reviewed       Procedure Component Value Units Date/Time    Comprehensive metabolic panel [[id number]]  (Abnormal) Collected: 07/26/24 2242    Lab Status: Final result Specimen: Blood from Arm, Right Updated: 07/26/24 2306     Sodium 125 mmol/L      Potassium 5.5 mmol/L      Chloride 93 mmol/L      CO2 23 mmol/L      ANION GAP 9 mmol/L      BUN 68 mg/dL      Creatinine 1.70 mg/dL      Glucose 529 mg/dL      Calcium 9.9 mg/dL      AST 28 U/L      ALT 33 U/L      Alkaline Phosphatase 42 U/L      Total Protein 7.1 g/dL      Albumin 4.4 g/dL      Total Bilirubin 0.39 mg/dL      eGFR 27 ml/min/1.73sq m     Narrative:      National Kid[employer] Disease Foundation guidelines for Chronic Kidney Disease (CKD):     Stage 1 with normal or high GFR (GFR > 90 mL/min/1.73 square meters)    Stage 2 Mild CKD (GFR = 60-89 mL/min/1.73 square meters)    Stage 3A Moderate CKD (GFR = 45-59 mL/min/1.73 square meters)    Stage 3B Moderate CKD (GFR = 30-44 mL/min/1.73 square meters)    Stage 4 Severe CKD (GFR = 15-29 mL/min/1.73 square meters)    Stage 5 End Stage CKD (GFR <15 mL/min/1.73 square meters)  Note: GFR calculation is accurate only with a steady state creatinine    Beta Hydroxybutyrate [[id number]]  (Normal) Collected: 07/26/24 2242    Lab Status: Final result Specimen: Blood from Arm, Right Updated: 07/26/24 2305     Beta- Hydroxybutyrate <0.05 mmol/L     Blood gas, venous [[id number]]  (Abnormal) Collected:  07/26/24 2242    Lab Status: Final result Specimen: Blood from Arm, Right Updated: 07/26/24 2250     pH, Buddy 7.369     pCO2, Buddy 37.9 mm Hg      pO2, Buddy 47.4 mm Hg      HCO3, Buddy 21.4 mmol/L      Base Excess, Buddy -3.5 mmol/L      O2 Content, Buddy 13.6 ml/dL      O2 HGB, VENOUS 78.8 %     CBC [[id number]]  (Abnormal) Collected: 07/26/24 2242    Lab Status: Final result Specimen: Blood from Arm, Right Updated: 07/26/24 2248     WBC 8.33 Thousand/uL      RBC 4.05 Million/uL      Hemoglobin 11.3 g/dL      Hematocrit 35.1 %      MCV 87 fL      MCH 27.9 pg      MCHC 32.2 g/dL      RDW 13.7 %      Platelets 228 Thousands/uL      MPV 12.6 fL     Fingerstick Glucose (POCT) [[id number]]  (Abnormal) Collected: 07/26/24 2235    Lab Status: Final result Specimen: Blood Updated: 07/26/24 2243     POC Glucose 492 mg/dl     UA w Reflex to Microscopic w Reflex to Culture [[id number]]     Lab Status: No result Specimen: Urine                    No orders to display              Procedures  Procedures         ED Course                                 SBIRT 20yo+      Flowsheet Row Most Recent Value   Initial Alcohol Screen: US AUDIT-C     1. How often do you have a drink containing alcohol? 0 Filed at: 07/26/2024 2239   2. How many drinks containing alcohol do you have on a typical day you are drinking?  0 Filed at: 07/26/2024 2239   3a. Male UNDER 65: How often do you have five or more drinks on one occasion? 0 Filed at: 07/26/2024 2239   3b. FEMALE Any Age, or MALE 65+: How often do you have 4 or more drinks on one occassion? 0 Filed at: 07/26/2024 2239   Audit-C Score 0 Filed at: 07/26/2024 2239   KYLAH: How many times in the past year have you...    Used an illegal drug or used a prescription medication for non-medical reasons? Never Filed at: 07/26/2024 2239                      Medical Decision Making  Patient is an 84-year-old female presents for evaluation of elevated blood sugar.  She is symptomatic here with adjustment to her  medication within 24 hours.  Also noted to be significantly hypertensive.  Home medications given with some improvement.  Blood work reviewed and shows elevated creatinine from baseline likely indicating dehydration.  Discussed with granddaughter I have concerns that things may continue to worsen at home especially because it is the weekend.  Patient with no evidence of DKA on lab work.  Plan to admit to the hospital for further workup management of hyperglycemia and hypertension.    Amount and/or Complexity of Data Reviewed  Labs: ordered.    Risk  OTC drugs.  Prescription drug management.  Decision regarding hospitalization.                 Disposition  Final diagnoses:   Hyperglycemia   Elevated serum creatinine   Dehydration     Time reflects when diagnosis was documented in both MDM as applicable and the Disposition within this note       Time User Action Codes Description Comment    7/26/2024 11:17 PM Michael Moreland Add [R73.9] Hyperglycemia     7/26/2024 11:17 PM Michael Moreland Add [R79.89] Elevated serum creatinine     7/26/2024 11:17 PM Michael Moreland Add [E86.0] Dehydration           ED Disposition       ED Disposition   Admit    Condition   Stable    Date/Time   Fri Jul 26, 2024 4940    Comment   Case was discussed with CARMEN and the patient's admission status was agreed to be Admission Status: observation status to the service of Dr. Wiley .               Follow-up Information    None         Current Discharge Medication List        CONTINUE these medications which have NOT CHANGED    Details   amLODIPine (NORVASC) 5 mg tablet Take 2 tablets (10 mg total) by mouth daily At night before bed    Associated Diagnoses: Essential hypertension      !! BD Pen Needle Elisabeth 2nd Gen 32G X 4 MM MISC USE DAILY AS DIRECTED  Qty: 100 each, Refills: 1    Associated Diagnoses: Type 2 diabetes mellitus treated with insulin (HCC)      benzonatate (TESSALON PERLES) 100 mg capsule Take 1 capsule (100 mg total) by mouth 3  (three) times a day as needed for cough  Qty: 20 capsule, Refills: 0    Associated Diagnoses: Cough      Blood Glucose Monitoring Suppl (OneTouch Verio Reflect) w/Device KIT USE 2 (TWO) TIMES A DAY  Qty: 1 kit, Refills: 0    Associated Diagnoses: Type 2 diabetes mellitus with hyperglycemia, without long-term current use of insulin ([facility])      Blood Pressure KIT Use daily  Qty: 1 kit, Refills: 0    Associated Diagnoses: Essential hypertension      Canagliflozin (Invokana) 300 MG TABS TAKE 1 TABLET BY MOUTH EVERY DAY  Qty: 100 tablet, Refills: 1    Associated Diagnoses: Type 2 diabetes mellitus treated with insulin ([facility])      cholecalciferol (VITAMIN D3) 250 MCG (72813 UT) capsule Take 1 capsule (10,000 Units total) by mouth daily  Qty: 90 capsule, Refills: 3    Associated Diagnoses: Vitamin D deficiency      cholestyramine (QUESTRAN) 4 g packet Take 1 packet (4 g total) by mouth 2 (two) times a day with meals  Qty: 180 packet, Refills: 3    Associated Diagnoses: Diarrhea, unspecified type      Continuous Blood Gluc  (FreeStyle Aruna 2 [place]) MEREDITH Use 1 Device 3 (three) times a day before meals  Qty: 1 each, Refills: 0    Associated Diagnoses: Type 2 diabetes mellitus treated with insulin ([facility])      Continuous Blood Gluc Sensor (FreeStyle Aruna 2 Sensor) MISC Check blood sugars multiple times per day  Qty: 6 each, Refills: 0    Associated Diagnoses: Type 2 diabetes mellitus treated with insulin ([facility])      Diclofenac Sodium (VOLTAREN) 1 % Apply 2 g topically 3 (three) times a day  Qty: 100 g, Refills: 2    Associated Diagnoses: Arthritis of both hands      fenofibrate 160 MG tablet TAKE 1 TABLET BY MOUTH EVERY DAY  Qty: 90 tablet, Refills: 1    Associated Diagnoses: Hypertriglyceridemia      fluticasone (FLONASE) 50 mcg/act nasal spray SPRAY 2 SPRAYS INTO EACH NOSTRIL EVERY DAY  Qty: 16 mL, Refills: 1    Associated Diagnoses: Nasal congestion      gabapentin (Neurontin) 100 mg capsule Take 1 capsule (100 mg  total) by mouth 3 (three) times a day  Qty: 90 capsule, Refills: 2    Associated Diagnoses: Type 2 diabetes mellitus treated with insulin ([facility])      hydrochlorothiazide (HYDRODIURIL) 25 mg tablet Take 1 tablet (25 mg total) by mouth daily  Qty: 90 tablet, Refills: 3    Associated Diagnoses: Essential hypertension      !! Incontinence Supplies MISC Use 6 (six) times a day Wipes  Qty: 200 each, Refills: 6    Associated Diagnoses: Urinary incontinence, unspecified type      !! Incontinence Supplies MISC Use 4 (four) times a day Comfort shield Adult diapers  Qty: 200 each, Refills: 6    Associated Diagnoses: Urinary incontinence, unspecified type      Incontinence Supply Disposable MISC Use 6 (six) times a day Dispense disposable bed pad  Qty: 200 each, Refills: 6    Associated Diagnoses: Urinary incontinence, unspecified type      insulin detemir (Levemir FlexTouch) 100 Units/mL injection pen Take 12 units am and 12 units pm    Associated Diagnoses: Type 2 diabetes mellitus treated with insulin ([facility])      !! Insulin Pen Needle (BD Pen Needle Elisabeth U/F) 32G X 4 MM MISC Inject under the skin 2 (two) times a day  Qty: 200 each, Refills: 5    Associated Diagnoses: Type 2 diabetes mellitus treated with insulin ([facility])      Lancets (OneTouch Delica Plus [id number]) MISC USE TO TEST BLOOD SUGAR 3 TIMES A DAY  Qty: 100 each, Refills: 2    Associated Diagnoses: Type 2 diabetes mellitus treated with insulin ([facility])      levothyroxine 100 mcg tablet TAKE 1 TABLET BY MOUTH EVERY DAY  Qty: 90 tablet, Refills: 1    Associated Diagnoses: Hypothyroidism, unspecified type      lisinopril (ZESTRIL) 40 mg tablet Take 1 tablet (40 mg total) by mouth daily In the morning    Associated Diagnoses: Type 2 diabetes mellitus treated with insulin ([facility])      loteprednol etabonate (LOTEMAX) 0.5 % ophthalmic suspension INSTILL 1 DROP INTO BOTH EYES TWICE A DAY      memantine (NAMENDA) 10 mg tablet TAKE 1 TABLET BY MOUTH TWICE A DAY  Qty: 200 tablet,  Refills: 1    Associated Diagnoses: Dementia with behavioral disturbance (HCC)      montelukast (SINGULAIR) 10 mg tablet Take 1 tablet (10 mg total) by mouth daily at bedtime  Qty: 90 tablet, Refills: 1    Associated Diagnoses: Cough      omeprazole (PriLOSEC) 20 mg delayed release capsule Take 1 capsule (20 mg total) by mouth daily  Qty: 90 capsule, Refills: 3    Associated Diagnoses: Chronic cough      OneTouch Verio test strip USE 1 EACH 2 (TWO) TIMES A DAY TEST  Qty: 100 strip, Refills: 0    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, managed as type 1 (HCC)      promethazine-dextromethorphan (PHENERGAN-DM) 6.25-15 mg/5 mL oral syrup Take 2.5 mL by mouth 2 (two) times a day  Qty: 180 mL, Refills: 1    Associated Diagnoses: Cough, unspecified type      sertraline (ZOLOFT) 50 mg tablet Take 1 tablet (50 mg total) by mouth daily  Qty: 90 tablet, Refills: 3    Associated Diagnoses: Anxiety      triamcinolone (KENALOG) 0.5 % ointment Apply 1 Application topically 2 (two) times a day To affected area  Qty: 30 g, Refills: 2    Associated Diagnoses: Dermatitis      Xiidra 5 % op solution INSTILL 1 DROP IN BOTH EYES 2 TIMES PER DAY       !! - Potential duplicate medications found. Please discuss with provider.          No discharge procedures on file.    PDMP Review         Value Time User    PDMP Reviewed  Yes 5/24/2024 11:10 AM Elver Lynne MD            ED Provider  Electronically Signed by             Michael Moreland MD  07/27/24 0020       Michael Moreland MD  07/27/24 0020

## 2024-07-27 NOTE — PROGRESS NOTES
[facility]  Progress Note  Name: Michelle Villatoro I  MRN: [id number]  Unit/Bed#: -01 I Date of Admission: 7/26/2024   Date of Service: 7/27/2024 I Hospital Day: 0    Assessment & Plan   * Type 2 diabetes mellitus with hyperglycemia (HCC)  Assessment & Plan    Lab Results   Component Value Date    HGBA1C 8.3 (H) 05/20/2024     Patient with recent change of her insulin for hypoglycemia, presented to ED with hyperglycemia  Given 12 units Levemir and 5 units Regular in ED  Home regimen includes 12 units of Levemir every 12 hours and Phan  Spoke with patient's granddaughter via the phone at this time.  She reported that patient had 2 days of low blood sugars in the 40s this past week and her insulin was decreased by her PCP.  Confirmed that her PCP ordered her medications, patient does not see an endocrinologist.  Granddaughter states that she is compliant with her diabetic regimen and follows a diabetic diet at home  Currently on Levemir 12 units every 12 hours, 5 units Humalog with meals, and sliding scale coverage  Target blood sugar inpatient 140-180  Blood sugars not optimized as of this a.m.  Increasing Levemir to 14 units every 12 hours, continue 5 units Humalog with meals and continue sliding scale coverage before meals and at bedtime  Diabetic diet  Continue to monitor and adjust insulin accordingly  BMP a.m.    Hypertensive emergency  Assessment & Plan  POA  BP as high as 236/99 in ED  Received amlodipine 10 mg x 1 with improvement in blood pressure  Continue prehospital amlodipine and lisinopril  HCTZ on hold  Blood pressure is much improved this a.m.  Continue as needed hydralazine  Monitor BP per protocol    Chronic kidney disease, stage 3b (HCC)  Assessment & Plan  Lab Results   Component Value Date    EGFR 34 07/27/2024    EGFR 27 07/26/2024    EGFR 40 05/20/2024    CREATININE 1.40 (H) 07/27/2024    CREATININE 1.70 (H) 07/26/2024    CREATININE 1.24 05/20/2024   Baseline  creat 1.2-1.3  Presented with creatinine 1.7  Holding prehospital HCTZ  Treated with IV fluids  Creatinine improved to 1.40 today  Avoid nephrotoxins and hypotension  BMP a.m.    Peripheral neuropathy  Assessment & Plan  Continue prehospital gabapentin    Anxiety  Assessment & Plan  No signs of anxiety on exam   Continue prehospital Zoloft    Hypothyroidism  Assessment & Plan  Continue home levothyroxine    Dementia with behavioral disturbance (HCC)  Assessment & Plan  Continue prehospital Namenda  Supportive care               VTE Pharmacologic Prophylaxis: VTE Score: 3 Moderate Risk (Score 3-4) - Pharmacological DVT Prophylaxis Ordered: heparin.    Mobility:   JH-HLM Achieved: 7: Walk 25 feet or more  JH-HLM Goal achieved. Continue to encourage appropriate mobility.    Patient Centered Rounds: I performed bedside rounds with nursing staff today.   Discussions with Specialists or Other Care Team Provider: Nursing, case management    Education and Discussions with Family / Patient: Updated  (granddaughter) via phone.    Total Time Spent on Date of Encounter in care of patient: 40 mins. This time was spent on one or more of the following: performing physical exam; counseling and coordination of care; obtaining or reviewing history; documenting in the medical record; reviewing/ordering tests, medications or procedures; communicating with other healthcare professionals and discussing with patient's family/caregivers.    Current Length of Stay: 0 day(s)  Current Patient Status: Observation   Certification Statement: The patient will continue to require additional inpatient hospital stay due to ongoing treatment for hyperglycemia    Discharge Plan:  Patient presented with hyperglycemia.  Spoke with patient today using  as patient speaks Anguillan.  She reports that she has been compliant with her diabetic diet and insulin regimen at home.  Also discussed with patient's granddaughter via  "the phone at this time.  She states that patient had 2 days where her blood sugar was low in the 40s this past week and that her primary care physician decreased her insulin dosing.  Confirmed that patient does not see an endocrinologist in the outpatient setting.  Creatinine is improving with IV fluids, near baseline today.  Repeating labs in the a.m.  Plan at this time is for patient to return home when medically stable for discharge.    Code Status: Level 1 - Full Code    Subjective:   Patient reports \"having to pee frequently\" otherwise no complaints-this was noted through     Objective:     Vitals:   Temp (24hrs), Av.3 °F (36.8 °C), Min:97.5 °F (36.4 °C), Max:99.3 °F (37.4 °C)    Temp:  [97.5 °F (36.4 °C)-99.3 °F (37.4 °C)] 99.3 °F (37.4 °C)  HR:  [68-88] 72  Resp:  [17-18] 17  BP: (135-236)/(50-99) 164/58  SpO2:  [95 %-97 %] 95 %  Body mass index is 24.37 kg/m².     Input and Output Summary (last 24 hours):     Intake/Output Summary (Last 24 hours) at 2024 0924  Last data filed at 2024 0901  Gross per 24 hour   Intake --   Output 2550 ml   Net -2550 ml       Physical Exam:   Physical Exam  Vitals and nursing note reviewed.   Constitutional:       General: She is not in acute distress.     Appearance: She is well-developed. She is not ill-appearing.   HENT:      Head: Normocephalic and atraumatic.      Mouth/Throat:      Mouth: Mucous membranes are moist.   Cardiovascular:      Rate and Rhythm: Normal rate and regular rhythm.      Pulses: Normal pulses.      Heart sounds: Normal heart sounds. No murmur heard.  Pulmonary:      Effort: Pulmonary effort is normal. No respiratory distress.      Breath sounds: Normal breath sounds. No wheezing or rales.   Abdominal:      General: Bowel sounds are normal. There is no distension.      Palpations: Abdomen is soft.      Tenderness: There is no abdominal tenderness. There is no guarding.   Musculoskeletal:         General: No swelling. " Normal range of motion.   Skin:     General: Skin is warm and dry.      Capillary Refill: Capillary refill takes less than 2 seconds.   Neurological:      General: No focal deficit present.      Mental Status: She is alert and oriented to person, place, and time. Mental status is at baseline.      Comments: Assessed using    Psychiatric:         Mood and Affect: Mood normal.         Behavior: Behavior normal.          Additional Data:     Labs:  Results from last 7 days   Lab Units 07/27/24  0457   WBC Thousand/uL 6.26   HEMOGLOBIN g/dL 9.4*   HEMATOCRIT % 28.6*   PLATELETS Thousands/uL 162     Results from last 7 days   Lab Units 07/27/24  0457 07/26/24  2242   SODIUM mmol/L 134* 125*   POTASSIUM mmol/L 4.4 5.5*   CHLORIDE mmol/L 104 93*   CO2 mmol/L 24 23   BUN mg/dL 66* 68*   CREATININE mg/dL 1.40* 1.70*   ANION GAP mmol/L 6 9   CALCIUM mg/dL 9.2 9.9   ALBUMIN g/dL  --  4.4   TOTAL BILIRUBIN mg/dL  --  0.39   ALK PHOS U/L  --  42   ALT U/L  --  33   AST U/L  --  28   GLUCOSE RANDOM mg/dL 315* 529*         Results from last 7 days   Lab Units 07/27/24  0710 07/27/24  0034 07/26/24  2235   POC GLUCOSE mg/dl 286* 382* 492*               Lines/Drains:  Invasive Devices       Peripheral Intravenous Line  Duration             Peripheral IV 07/26/24 Proximal;Right;Ventral (anterior) Forearm <1 day                          Imaging: No pertinent imaging reviewed.    Recent Cultures (last 7 days):         Last 24 Hours Medication List:   Current Facility-Administered Medications   Medication Dose Route Frequency Provider Last Rate    acetaminophen  650 mg Oral Q4H PRN Solange [address] PA-C      amLODIPine  10 mg Oral HS [address] PA-C      cholecalciferol  10,000 Units Oral Daily [address] PA-C      gabapentin  100 mg Oral TID [address] PA-C      heparin (porcine)  5,000 Units Subcutaneous Q8H ANA [address]ange [address] PA-C      hydrALAZINE  5 mg  Intravenous Q6H PRN [address] PA-C      insulin detemir  14 Units Subcutaneous Q12H [facility] KRISTA Rodriguez      insulin lispro  1-5 Units Subcutaneous TID AC [address] PA-C      insulin lispro  1-5 Units Subcutaneous HS [address] PA-C      insulin lispro  5 Units Subcutaneous TID With Meals [address]ange [address] PA-C      levothyroxine  100 mcg Oral Every Other Day [address]ange [address] PA-C      lisinopril  40 mg Oral Daily [address] PA-C      memantine  10 mg Oral BID [address] PA-C      montelukast  10 mg Oral HS [address] PA-C      ondansetron  4 mg Intravenous Q6H PRN [address]ange [address] PA-C      pantoprazole  20 mg Oral Early Morning [address]ange [address] PA-C      sertraline  50 mg Oral Daily [address] PA-C      sodium chloride (PF)  3 mL Intravenous Q1H PRN Michael Moreland MD      sodium chloride  100 mL/hr Intravenous Continuous [address]ange [address] PA-C 100 mL/hr (07/27/24 0037)        Today, Patient Was Seen By: KRISTA Rodriguez    **Please Note: This note may have been constructed using a voice recognition system.**

## 2024-07-27 NOTE — H&P
[facility]  H&P  Name: Michelle Villatoro 84 y.o. female I MRN: [id number]  Unit/Bed#: -01 I Date of Admission: 7/26/2024   Date of Service: 7/27/2024 I Hospital Day: 0      Assessment & Plan   * Type 2 diabetes mellitus with hyperglycemia (HCC)  Assessment & Plan    Lab Results   Component Value Date    HGBA1C 8.3 (H) 05/20/2024   Patient with recent change of her insulin for hypoglycemia, now presents to ED with hyperglycemia  Given 12 units Levemir and 5 units Regular in ED  Monitor blood sugars and adjust as needed.    Hypertensive emergency  Assessment & Plan  BP as high as 236/99 in ED, currently 187/74  Continue home meds  Prn Hydralazine    Chronic kidney disease, stage 3b (HCC)  Assessment & Plan  Lab Results   Component Value Date    EGFR 27 07/26/2024    EGFR 40 05/20/2024    EGFR 37 11/21/2023    CREATININE 1.70 (H) 07/26/2024    CREATININE 1.24 05/20/2024    CREATININE 1.31 (H) 11/21/2023   Baseline creat 1.2-1.3  Currently 1.7, IVF, monitor    Peripheral neuropathy  Assessment & Plan  Continue gabapentin    Hypothyroidism  Assessment & Plan  Continue home levothyroxine    Anxiety  Assessment & Plan  zoloft    Dementia with behavioral disturbance (HCC)  Assessment & Plan  Continue Namenda  Supportive care       VTE Pharmacologic Prophylaxis: VTE Score: 3 Moderate Risk (Score 3-4) - Pharmacological DVT Prophylaxis Ordered: heparin.  Code Status: Full code  Discussion with patient and  #[id number]    Anticipated Length of Stay: Patient will be admitted on an observation basis with an anticipated length of stay of less than 2 midnights secondary to blood sugar and bp monitoring.    Total Time Spent on Date of Encounter in care of patient: 75 mins. This time was spent on one or more of the following: performing physical exam; counseling and coordination of care; obtaining or reviewing history; documenting in the medical record; reviewing/ordering tests, medications  or procedures; communicating with other healthcare professionals and discussing with patient's family/caregivers.    Chief Complaint: elevated blood sugar    History of Present Illness:  Michelle Villatoro is a 84 y.o. female with a PMH of Alzheimer dementia, DM2 on insulin w/ CKD3b, HTN, HLD, neuropathy, hypothyroidism who presents with elevated blood sugar. Patient seen at PCP office with recent medication changes for insulin and blood pressure. Presented to ED tonight with elevated blood sugar. Found to have blood sugar of 529 and BP of 236/99 in ED. Given 12 un Levemir and 5 units regular insulin with 5 mg amlodipine.      reports that she came in with elevated blood sugar and high blood pressure. Notes some trouble swallowing. Notes some SOB with some activity. Feels dizzy with blood sugar going low. Patient reports taking all medications as directed.     Review of Systems:  Review of Systems   Constitutional:  Positive for chills. Negative for fever.   HENT:  Positive for trouble swallowing. Negative for rhinorrhea and sore throat.    Eyes:  Negative for discharge and redness.   Respiratory:  Positive for shortness of breath. Negative for cough.    Cardiovascular:  Negative for chest pain and leg swelling.   Gastrointestinal:  Negative for abdominal pain, diarrhea, nausea and vomiting.   Genitourinary:  Negative for dysuria and hematuria.   Musculoskeletal:  Positive for back pain and myalgias. Negative for neck pain.   Skin:  Negative for rash and wound.        itching   Neurological:  Positive for headaches. Negative for dizziness and weakness.   Psychiatric/Behavioral:  Negative for agitation and confusion.        Past Medical and Surgical History:   Past Medical History:   Diagnosis Date    Aggression     Alzheimer's dementia (HCC)     Arthritis     Dementia (HCC)     Diabetes insipidus (HCC)     Diabetes mellitus (HCC)     High cholesterol     Hypertension     Memory loss     Migraine      Polyneuropathy     Rheumatoid arthritis (HCC)     Serum lipids high     Stomach problems     Thyroid trouble        Past Surgical History:   Procedure Laterality Date    CATARACT EXTRACTION      CHOLECYSTECTOMY      GALLBLADDER SURGERY      HYSTERECTOMY      MI SURGICAL ARTHROSCOPY SHOULDER W/ROTATOR CUFF RPR Right 8/1/2019    Procedure: SHOULDER ARTHROSCOPIC ROTATOR CUFF REPAIR;  Surgeon: Betsy Conde MD;  Location: [facility] OR;  Service: Orthopedics    ROTATOR CUFF REPAIR Right 08/01/2019       Meds/Allergies:  Prior to Admission medications    Medication Sig Start Date End Date Taking? Authorizing Provider   amLODIPine (NORVASC) 5 mg tablet Take 2 tablets (10 mg total) by mouth daily At night before bed 7/25/24 8/24/24  Michael Alvarez PA-C   BD Pen Needle Elisabeth 2nd Gen 32G X 4 MM MISC USE DAILY AS DIRECTED 6/1/22   Elver Lynne MD   benzonatate (TESSALON PERLES) 100 mg capsule Take 1 capsule (100 mg total) by mouth 3 (three) times a day as needed for cough 11/21/23   Elver Lynne MD   Blood Glucose Monitoring Suppl (OneTouch Verio Reflect) w/Device KIT USE 2 (TWO) TIMES A DAY 4/27/24   Elver Lynne MD   Blood Pressure KIT Use daily 7/25/24   Michael Alvarez PA-C   Canagliflozin (Invokana) 300 MG TABS TAKE 1 TABLET BY MOUTH EVERY DAY 7/15/24   Elver Lynne MD   cholecalciferol (VITAMIN D3) 250 MCG (25807 UT) capsule Take 1 capsule (10,000 Units total) by mouth daily 11/29/23   Elver Lynne MD   cholestyramine (QUESTRAN) 4 g packet Take 1 packet (4 g total) by mouth 2 (two) times a day with meals 11/21/23 7/2/24  Elver Lynne MD   Continuous Blood Gluc  (FreeStyle Aruna 2 [place]) MEREDITH Use 1 Device 3 (three) times a day before meals 3/11/24   Elver Lynne MD   Continuous Blood Gluc Sensor (FreeStyle Aruna 2 Sensor) MISC Check blood sugars multiple times per day 3/11/24   Elver Lynne MD   Diclofenac Sodium (VOLTAREN) 1 % Apply 2 g topically 3 (three) times a day 11/21/23   Elver Lynne MD   fenofibrate 160 MG  tablet TAKE 1 TABLET BY MOUTH EVERY DAY 5/15/24   Elver Lynne MD   fluticasone (FLONASE) 50 mcg/act nasal spray SPRAY 2 SPRAYS INTO EACH NOSTRIL EVERY DAY 10/5/23   Elver Lynne MD   gabapentin (Neurontin) 100 mg capsule Take 1 capsule (100 mg total) by mouth 3 (three) times a day 11/21/23   Elver Lynne MD   hydrochlorothiazide (HYDRODIURIL) 25 mg tablet Take 1 tablet (25 mg total) by mouth daily 11/21/23 7/24/24  Elver Lynne MD   Incontinence Supplies MISC Use 6 (six) times a day Wipes 1/26/21   Elver Lynne MD   Incontinence Supplies MISC Use 4 (four) times a day Comfort shield Adult diapers 1/26/21   Elver Lynne MD   Incontinence Supply Disposable MISC Use 6 (six) times a day Dispense disposable bed pad 1/26/21   Elver Lynne MD   insulin detemir (Levemir FlexTouch) 100 Units/mL injection pen Take 12 units am and 12 units pm 7/25/24   Michael Alvarez PA-C   Insulin Pen Needle (BD Pen Needle Elisabeth U/F) 32G X 4 MM MISC Inject under the skin 2 (two) times a day 5/24/24   Elver Lynne MD   Lancets (OneTouch Delica Plus [id number]) MISC USE TO TEST BLOOD SUGAR 3 TIMES A DAY 7/13/24   Elver Lynne MD   levothyroxine 100 mcg tablet TAKE 1 TABLET BY MOUTH EVERY DAY 5/17/24   Elver Lynne MD   lisinopril (ZESTRIL) 40 mg tablet Take 1 tablet (40 mg total) by mouth daily In the morning 7/25/24   Michael Alvarez PA-C   loteprednol etabonate (LOTEMAX) 0.5 % ophthalmic suspension INSTILL 1 DROP INTO BOTH EYES TWICE A DAY 5/4/23   Historical Provider, MD   memantine (NAMENDA) 10 mg tablet TAKE 1 TABLET BY MOUTH TWICE A DAY 7/15/24   Elver Lynne MD   montelukast (SINGULAIR) 10 mg tablet Take 1 tablet (10 mg total) by mouth daily at bedtime 5/14/24   Elver Lynne MD   omeprazole (PriLOSEC) 20 mg delayed release capsule Take 1 capsule (20 mg total) by mouth daily 11/21/23   Elver Lynne MD   OneTouch Verio test strip USE 1 EACH 2 (TWO) TIMES A DAY TEST 7/2/24   Elver Lynne MD   promethazine-dextromethorphan (PHENERGAN-DM) 6.25-15  "mg/5 mL oral syrup Take 2.5 mL by mouth 2 (two) times a day 24   Elver Lynne MD   sertraline (ZOLOFT) 50 mg tablet Take 1 tablet (50 mg total) by mouth daily 23  Elver Lynne MD   triamcinolone (KENALOG) 0.5 % ointment Apply 1 Application topically 2 (two) times a day To affected area 23   Elver Lynne MD   Xiidra 5 % op solution INSTILL 1 DROP IN BOTH EYES 2 TIMES PER DAY 22   Historical Provider, MD KAY have reviewed home medications using recent Epic encounter.    Allergies:   Allergies   Allergen Reactions    Penicillins Itching and Swelling       Social History:  Marital Status:    Occupation: retired  Patient Pre-hospital Living Situation: With other family member: daughter and granddaughter  Patient Pre-hospital Level of Mobility: walks with walker  Patient Pre-hospital Diet Restrictions: none  Substance Use History:   Social History     Substance and Sexual Activity   Alcohol Use Never     Social History     Tobacco Use   Smoking Status Former    Current packs/day: 0.00    Average packs/day: 0.3 packs/day for 30.0 years (7.5 ttl pk-yrs)    Types: Cigarettes    Start date:     Quit date:     Years since quittin.5    Passive exposure: Past   Smokeless Tobacco Never     Social History     Substance and Sexual Activity   Drug Use No       Family History:  Family History   Problem Relation Age of Onset    Substance Abuse Mother         denied    Mental illness Mother         denied    Substance Abuse Father         denied    Mental illness Father         denied    Diabetes Brother     Blindness Brother     Arthritis Daughter     HIV Son        Physical Exam:     Vitals:   Blood Pressure: (!) 189/73 (24)  Pulse: 74 (24)  Temperature: 98 °F (36.7 °C) (24)  Temp Source: Temporal (24)  Respirations: 18 (24)  Height: 5' 1\" (154.9 cm) (24)  Weight - Scale: 58.5 kg (129 lb) (24)  SpO2: 96 % " (07/26/24 2352)    Physical Exam  Vitals reviewed.   Constitutional:       Appearance: Normal appearance.      Comments: Elderly Wolof speaking female, awake and alert   HENT:      Head: Normocephalic and atraumatic.      Nose: Nose normal.   Eyes:      General:         Right eye: No discharge.         Left eye: No discharge.      Extraocular Movements: Extraocular movements intact.      Conjunctiva/sclera: Conjunctivae normal.   Cardiovascular:      Rate and Rhythm: Normal rate and regular rhythm.   Pulmonary:      Effort: Pulmonary effort is normal. No respiratory distress.      Breath sounds: Normal breath sounds. No wheezing.   Abdominal:      General: Bowel sounds are normal. There is no distension.      Palpations: Abdomen is soft.      Tenderness: There is no abdominal tenderness. There is no guarding.   Musculoskeletal:         General: No swelling or tenderness. Normal range of motion.      Cervical back: Normal range of motion.      Right lower leg: No edema.      Left lower leg: No edema.   Skin:     General: Skin is warm and dry.      Capillary Refill: Capillary refill takes less than 2 seconds.   Neurological:      General: No focal deficit present.      Mental Status: Mental status is at baseline.   Psychiatric:         Mood and Affect: Mood normal.         Behavior: Behavior normal.          Additional Data:     Lab Results:  Results from last 7 days   Lab Units 07/26/24  2242   WBC Thousand/uL 8.33   HEMOGLOBIN g/dL 11.3*   HEMATOCRIT % 35.1   PLATELETS Thousands/uL 228     Results from last 7 days   Lab Units 07/26/24  2242   SODIUM mmol/L 125*   POTASSIUM mmol/L 5.5*   CHLORIDE mmol/L 93*   CO2 mmol/L 23   BUN mg/dL 68*   CREATININE mg/dL 1.70*   ANION GAP mmol/L 9   CALCIUM mg/dL 9.9   ALBUMIN g/dL 4.4   TOTAL BILIRUBIN mg/dL 0.39   ALK PHOS U/L 42   ALT U/L 33   AST U/L 28   GLUCOSE RANDOM mg/dL 529*         Results from last 7 days   Lab Units 07/26/24  2235   POC GLUCOSE mg/dl 492*     Lab  Results   Component Value Date    HGBA1C 8.3 (H) 05/20/2024    HGBA1C 6.6 (A) 11/21/2023    HGBA1C 7.1 (A) 07/11/2023           Lines/Drains:  Invasive Devices       Peripheral Intravenous Line  Duration             Peripheral IV 07/26/24 Proximal;Right;Ventral (anterior) Forearm <1 day                    EKG and Other Studies Reviewed on Admission:   EKG: No EKG obtained.    ** Please Note: This note has been constructed using a voice recognition system. **

## 2024-07-27 NOTE — ASSESSMENT & PLAN NOTE
Lab Results   Component Value Date    HGBA1C 8.3 (H) 05/20/2024   Patient with recent change of her insulin for hypoglycemia, now presents to ED with hyperglycemia  Given 12 units Levemir and 5 units Regular in ED  Monitor blood sugars and adjust as needed.

## 2024-07-27 NOTE — UTILIZATION REVIEW
Initial Clinical Review    OBSERVATION 7/26 CHANGED TO INPATIENT ON 7/27 @ 0409 - MGMT OF BLOOD SUGAR, INSULIN REGULATION, TREATMENT OF HTN AND MEDICATION ADJUSTMENTS.      Admission: Date/Time/Statement:   Admission Orders (From admission, onward)       Ordered        07/27/24 1409  INPATIENT ADMISSION  Once                          Orders Placed This Encounter   Procedures    INPATIENT ADMISSION     Standing Status:   Standing     Number of Occurrences:   1     Order Specific Question:   Level of Care     Answer:   Med Surg [16]     Order Specific Question:   Estimated length of stay     Answer:   More than 2 Midnights     Order Specific Question:   Certification     Answer:   I certify that inpatient services are medically necessary for this patient for a duration of greater than two midnights. See H&P and MD Progress Notes for additional information about the patient's course of treatment.     ED Arrival Information       Expected   -    Arrival   7/26/2024 22:27    Acuity   Urgent              Means of arrival   Wheelchair    Escorted by   Family Member    Service   Hospitalist    Admission type   Emergency              Arrival complaint   bs over 600             Chief Complaint   Patient presents with    Hyperglycemia - Symptomatic     BS over 600 at home; pt had recent change in insulin; pt reports increased thirst, a slight headache, and back pain       Initial Presentation: 84 y.o. female presents to the ED from PCP office with glucose 529 and HTN, also c/o trouble swallowing, SOB with activity, dizziness with hypoglycemia, notes med compliance. PMH: Alzheimer dementia w/ behavior disturbance, IDDM, CKD3b, HTN, HLD, neuropathy, hypothyroidism. In the ED was HTN.  Labs - elevated glucose, BUN/Cr, K 5.5, low .  Treated with IV fluids, PO Norvasc, Tylenol, Humulin and Levimir insulin.  On exam alert, normal breath sounds, no other deficits.  Admitted to OBSERVATION status with DM with Hyperglycemia, HTN  urgency - trend POC glucose and SSI cover, continue home meds and PRN IV Hydralazine, IV fluids,     Anticipated Length of Stay/Certification Statement: Patient will be admitted on an observation basis with an anticipated length of stay of less than 2 midnights secondary to blood sugar and bp monitoring.     Date: 7/27 - CHANGED TO INPATIENT STATUS TODAY:   Family confirmed that PCP changed insulin d/t hypoglycemia recently at home.  Has not seen Endocrinology OP, creat improving today.  On exam notes dysuria no other deficits.  Glucose still elevated today.  Continue monitoring and adjusting insulin dosing.      Certification Statement: The patient will continue to require additional inpatient hospital stay due to ongoing treatment for hyperglycemia     Date: 7/28  Day 3 of stay Day 2 IP: Has surpassed a 2nd midnight with active treatments and services.  Hyperglycemia, HTN urgency - Home regimen includes 12 units of Levemir every 12 hours and Invokana.  Target blood sugar inpatient 140-180-blood sugars under desired range today  Decreasing Levemir from 14 units every 12 hours to Levemir 12 units every 12 hours (baseline insulin prehospital).  Discontinue mealtime Humalog.  BP controlled today. HCTZ still on hold, creat at baseline.  No deficits on exam today.        ED Triage Vitals   Temperature Pulse Respirations Blood Pressure SpO2 Pain Score   07/26/24 2239 07/26/24 2236 07/26/24 2236 07/26/24 2236 07/26/24 2236 07/26/24 2354   97.5 °F (36.4 °C) 83 18 (!) 236/99 96 % No Pain     Weight (last 2 days)       Date/Time Weight    07/26/24 23:52:45 58.5 (129)            Vital Signs (last 3 days)       Date/Time Temp Pulse Resp BP MAP (mmHg) SpO2 O2 Device Patient Position - Orthostatic VS [place] Coma Scale Score Pain    07/28/24 09:50:32 -- 65 -- 162/55 91 96 % -- -- -- --    07/28/24 0755 -- -- -- -- -- 96 % None (Room air) -- 15 No Pain    07/28/24 07:34:27 -- 51 17 138/47 77 91 % -- -- -- --    07/27/24 20:59:07  98.3 °F (36.8 °C) 67 18 150/51 84 95 % -- -- -- --    07/27/24 2040 -- -- -- -- -- -- -- -- 15 No Pain    07/27/24 14:57:35 99 °F (37.2 °C) 66 16 132/49 77 95 % -- -- -- --    07/27/24 0800 -- -- -- -- -- 95 % None (Room air) -- 15 No Pain    07/27/24 07:14:01 99.3 °F (37.4 °C) 72 17 164/58 93 95 % -- -- -- --    07/27/24 02:36:01 -- 68 -- 135/50 78 96 % -- -- -- --    07/27/24 01:40:08 -- 88 -- 174/79 111 97 % -- -- -- --    07/27/24 0018 -- -- -- -- -- -- -- -- 15 No Pain    07/26/24 2354 -- -- -- -- -- -- -- -- -- No Pain    07/26/24 23:52:45 98 °F (36.7 °C) 74 18 189/73 112 96 % -- -- -- --    07/26/24 2310 -- 69 17 187/74 107 96 % -- -- -- --    07/26/24 2259 -- -- -- -- -- -- None (Room air) -- -- --    07/26/24 2258 -- -- -- -- -- -- -- -- 15 --    07/26/24 2251 -- -- -- 204/83 -- -- -- -- -- --    07/26/24 2250 -- -- -- 204/83 -- -- -- -- -- --    07/26/24 2239 97.5 °F (36.4 °C) -- -- -- -- -- -- -- -- --    07/26/24 2236 -- 83 18 236/99 142 96 % None (Room air) Lying -- --          Pertinent Labs/Diagnostic Test Results:   Radiology:  No orders to display     Cardiology:    7/25 Echo - P    GI:  No orders to display           Results from last 7 days   Lab Units 07/28/24  0503 07/27/24  0457 07/26/24  2242   WBC Thousand/uL 6.96 6.26 8.33   HEMOGLOBIN g/dL 10.1* 9.4* 11.3*   HEMATOCRIT % 31.1* 28.6* 35.1   PLATELETS Thousands/uL 166 162 228         Results from last 7 days   Lab Units 07/28/24  0503 07/27/24  0457 07/26/24  2242   SODIUM mmol/L 137 134* 125*   POTASSIUM mmol/L 4.5 4.4 5.5*   CHLORIDE mmol/L 108 104 93*   CO2 mmol/L 27 24 23   ANION GAP mmol/L 2* 6 9   BUN mg/dL 54* 66* 68*   CREATININE mg/dL 1.24 1.40* 1.70*   EGFR ml/min/1.73sq m 40 34 27   CALCIUM mg/dL 9.4 9.2 9.9     Results from last 7 days   Lab Units 07/26/24  2242   AST U/L 28   ALT U/L 33   ALK PHOS U/L 42   TOTAL PROTEIN g/dL 7.1   ALBUMIN g/dL 4.4   TOTAL BILIRUBIN mg/dL 0.39     Results from last 7 days   Lab Units  07/28/24  1118 07/28/24  0654 07/27/24  2049 07/27/24  1609 07/27/24  1126 07/27/24  0710 07/27/24  0034 07/26/24  2235   POC GLUCOSE mg/dl 189* 95 171* 126 186* 286* 382* 492*     Results from last 7 days   Lab Units 07/28/24  0503 07/27/24  0457 07/26/24  2242   GLUCOSE RANDOM mg/dL 105 315* 529*             Beta- Hydroxybutyrate   Date Value Ref Range Status   07/26/2024 <0.05 0.02 - 0.27 mmol/L Final     BETA-HYDROXYBUTYRATE   Date Value Ref Range Status   08/17/2020 0.1 <0.6 mmol/L Final          Results from last 7 days   Lab Units 07/26/24  2242   PH AGUSTINA  7.369   PCO2 AGUSTINA mm Hg 37.9*   PO2 AGUSTINA mm Hg 47.4*   HCO3 AGUSTINA mmol/L 21.4*   BASE EXC AGUSTINA mmol/L -3.5   O2 CONTENT AGUSTINA ml/dL 13.6   O2 HGB, VENOUS % 78.8           Results from last 7 days   Lab Units 07/27/24  0029   CLARITY UA  Clear   COLOR UA  Straw   SPEC GRAV UA  1.010   PH UA  6.0   GLUCOSE UA mg/dl 3+*   KETONES UA mg/dl Negative   BLOOD UA  Trace-Intact*   PROTEIN UA mg/dl 1+*   NITRITE UA  Negative   BILIRUBIN UA  Negative   UROBILINOGEN UA E.U./dl 0.2   LEUKOCYTES UA  Negative   WBC UA /hpf None Seen   RBC UA /hpf 0-1   BACTERIA UA /hpf None Seen   EPITHELIAL CELLS WET PREP /hpf Occasional     ED Treatment-Medication Administration from 07/26/2024 2227 to 07/26/2024 2345         Date/Time Order Dose Route Action     07/26/2024 2250 sodium chloride 0.9 % bolus 1,000 mL 1,000 mL Intravenous New Bag     07/26/2024 2252 amLODIPine (NORVASC) tablet 10 mg 10 mg Oral Given     07/26/2024 2252 acetaminophen (TYLENOL) tablet 975 mg 975 mg Oral Given     07/26/2024 2319 insulin regular (HumuLIN R,NovoLIN R) injection 5 Units 5 Units Subcutaneous Given     07/26/2024 2319 insulin detemir (LEVEMIR) subcutaneous injection 12 Units 12 Units Subcutaneous Given            Past Medical History:   Diagnosis Date    Aggression     Alzheimer's dementia (HCC)     Arthritis     Dementia (HCC)     Diabetes insipidus (HCC)     Diabetes mellitus (HCC)     High cholesterol      Hypertension     Memory loss     Migraine     Polyneuropathy     Rheumatoid arthritis (HCC)     Serum lipids high     Stomach problems     Thyroid trouble      Present on Admission:   Type 2 diabetes mellitus with hyperglycemia (HCC)   Hypertensive emergency   Chronic kidney disease, stage 3b (HCC)   Hypothyroidism   Anxiety   Peripheral neuropathy   Dementia with behavioral disturbance ([facility])      Admitting Diagnosis: Dehydration [E86.0]  Hyperglycemia [R73.9]  Elevated serum creatinine [R79.89]  Age/Sex: 84 y.o. female  Admission Orders:  Scheduled Medications:  amLODIPine, 10 mg, Oral, HS  cholecalciferol, 10,000 Units, Oral, Daily  gabapentin, 100 mg, Oral, TID  heparin (porcine), 5,000 Units, Subcutaneous, Q8H ANA  insulin detemir, 12 Units, Subcutaneous, Q12H ANA  insulin lispro, 1-5 Units, Subcutaneous, TID AC  insulin lispro, 1-5 Units, Subcutaneous, HS  levothyroxine, 100 mcg, Oral, Every Other Day  lisinopril, 40 mg, Oral, Daily  memantine, 10 mg, Oral, BID  montelukast, 10 mg, Oral, HS  pantoprazole, 20 mg, Oral, Early Morning  sertraline, 50 mg, Oral, Daily      Continuous IV Infusions:    IV NSS @ 100 cc/hr d/c 7/27     PRN Meds:  acetaminophen, 650 mg, Oral, Q4H PRN  hydrALAZINE, 5 mg, Intravenous, Q6H PRN - x 1 7/27  ondansetron, 4 mg, Intravenous, Q6H PRN  sodium chloride (PF), 3 mL, Intravenous, Q1H PRN    POC GLUCOSE AC/HS WITH SSI COVERAGE   ADA diet       Network Utilization Review Department  ATTENTION: Please call with any questions or concerns to [phone] and carefully listen to the prompts so that you are directed to the right person. All voicemails are confidential.   For Discharge needs, contact Care Management DC Support Team at [phone] opt. [phone]  Send all requests for admission clinical reviews, approved or denied determinations and any other requests to dedicated fax number below belonging to the campus where the patient is receiving treatment. List of dedicated fax numbers for  the Facilities:  FACILITY NAME UR FAX NUMBER   ADMISSION DENIALS (Administrative/Medical Necessity) [phone]   DISCHARGE SUPPORT TEAM (NETWORK) [phone]   PARENT CHILD HEALTH (Maternity/NICU/Pediatrics) [phone]   [facility] [phone]   [facility] [phone]   [facility] [phone]   [facility] [phone]   [facility] [phone]   [facility] [phone]   [facility] [phone]   [facility] [phone]   [facility] [phone]   [facility] [phone]   [facility] [phone]   [facility] [phone]

## 2024-07-27 NOTE — ASSESSMENT & PLAN NOTE
POA  BP as high as 236/99 in ED  Received amlodipine 10 mg x 1 with improvement in blood pressure  Continue prehospital amlodipine and lisinopril  HCTZ on hold  Blood pressure is much improved this a.m.  Continue as needed hydralazine  Monitor BP per protocol

## 2024-07-27 NOTE — ASSESSMENT & PLAN NOTE
Lab Results   Component Value Date    HGBA1C 8.3 (H) 05/20/2024     Patient with recent change of her insulin for hypoglycemia, presented to ED with hyperglycemia  Given 12 units Levemir and 5 units Regular in ED  Home regimen includes 12 units of Levemir every 12 hours and Phan  Spoke with patient's granddaughter via the phone at this time.  She reported that patient had 2 days of low blood sugars in the 40s this past week and her insulin was decreased by her PCP.  Confirmed that her PCP ordered her medications, patient does not see an endocrinologist.  Granddaughter states that she is compliant with her diabetic regimen and follows a diabetic diet at home  Currently on Levemir 12 units every 12 hours, 5 units Humalog with meals, and sliding scale coverage  Target blood sugar inpatient 140-180  Blood sugars not optimized as of this a.m.  Increasing Levemir to 14 units every 12 hours, continue 5 units Humalog with meals and continue sliding scale coverage before meals and at bedtime  Diabetic diet  Continue to monitor and adjust insulin accordingly  BMP a.m.

## 2024-07-27 NOTE — PLAN OF CARE
Problem: PAIN - ADULT  Goal: Verbalizes/displays adequate comfort level or baseline comfort level  Description: Interventions:  - Encourage patient to monitor pain and request assistance  - Assess pain using appropriate pain scale  - Administer analgesics based on type and severity of pain and evaluate response  - Implement non-pharmacological measures as appropriate and evaluate response  - Consider cultural and social influences on pain and pain management  - Notify physician/advanced practitioner if interventions unsuccessful or patient reports new pain  Outcome: Progressing     Problem: INFECTION - ADULT  Goal: Absence or prevention of progression during hospitalization  Description: INTERVENTIONS:  - Assess and monitor for signs and symptoms of infection  - Monitor lab/diagnostic results  - Monitor all insertion sites, i.e. indwelling lines, tubes, and drains  - Monitor endotracheal if appropriate and nasal secretions for changes in amount and color  - [place] appropriate cooling/warming therapies per order  - Administer medications as ordered  - Instruct and encourage patient and family to use good hand hygiene technique  - Identify and instruct in appropriate isolation precautions for identified infection/condition  Outcome: Progressing  Goal: Absence of fever/infection during neutropenic period  Description: INTERVENTIONS:  - Monitor WBC    Outcome: Progressing     Problem: SAFETY ADULT  Goal: Patient will remain free of falls  Description: INTERVENTIONS:  - Educate patient/family on patient safety including physical limitations  - Instruct patient to call for assistance with activity   - Consult OT/PT to assist with strengthening/mobility   - Keep Call bell within reach  - Keep bed low and locked with side rails adjusted as appropriate  - Keep care items and personal belongings within reach  - Initiate and maintain comfort rounds  - Make Fall Risk Sign visible to staff  - Offer Toileting every 2 Hours,  in advance of need  - Initiate/Maintain bed alarm  - Obtain necessary fall risk management equipment:   - Apply yellow socks and bracelet for high fall risk patients  - Consider moving patient to room near nurses station  Outcome: Progressing  Goal: Maintain or return to baseline ADL function  Description: INTERVENTIONS:  -  Assess patient's ability to carry out ADLs; assess patient's baseline for ADL function and identify physical deficits which impact ability to perform ADLs (bathing, care of mouth/teeth, toileting, grooming, dressing, etc.)  - Assess/evaluate cause of self-care deficits   - Assess range of motion  - Assess patient's mobility; develop plan if impaired  - Assess patient's need for assistive devices and provide as appropriate  - Encourage maximum independence but intervene and supervise when necessary  - Involve family in performance of ADLs  - Assess for home care needs following discharge   - Consider OT consult to assist with ADL evaluation and planning for discharge  - Provide patient education as appropriate  Outcome: Progressing  Goal: Maintains/Returns to pre admission functional level  Description: INTERVENTIONS:  - Perform AM-PAC 6 Click Basic Mobility/ Daily Activity assessment daily.  - Set and communicate daily mobility goal to care team and patient/family/caregiver.   - Collaborate with rehabilitation services on mobility goals if consulted  - Perform Range of Motion 3 times a day.  - Reposition patient every 2 hours.  - Dangle patient 3 times a day  - Stand patient 3 times a day  - Ambulate patient 3 times a day  - Out of bed to chair 3 times a day   - Out of bed for meals  times a day  - Out of bed for toileting  - Record patient progress and toleration of activity level   Outcome: Progressing     Problem: DISCHARGE PLANNING  Goal: Discharge to home or other facility with appropriate resources  Description: INTERVENTIONS:  - Identify barriers to discharge w/patient and caregiver  -  Arrange for needed discharge resources and transportation as appropriate  - Identify discharge learning needs (meds, wound care, etc.)  - Arrange for interpretive services to assist at discharge as needed  - Refer to Case Management Department for coordinating discharge planning if the patient needs post-hospital services based on physician/advanced practitioner order or complex needs related to functional status, cognitive ability, or social support system  Outcome: Progressing     Problem: Knowledge Deficit  Goal: Patient/family/caregiver demonstrates understanding of disease process, treatment plan, medications, and discharge instructions  Description: Complete learning assessment and assess knowledge base.  Interventions:  - Provide teaching at level of understanding  - Provide teaching via preferred learning methods  Outcome: Progressing

## 2024-07-28 LAB
ANION GAP SERPL CALCULATED.3IONS-SCNC: 2 MMOL/L (ref 4–13)
BUN SERPL-MCNC: 54 MG/DL (ref 5–25)
CALCIUM SERPL-MCNC: 9.4 MG/DL (ref 8.4–10.2)
CHLORIDE SERPL-SCNC: 108 MMOL/L (ref 96–108)
CO2 SERPL-SCNC: 27 MMOL/L (ref 21–32)
CREAT SERPL-MCNC: 1.24 MG/DL (ref 0.6–1.3)
ERYTHROCYTE [DISTWIDTH] IN BLOOD BY AUTOMATED COUNT: 14 % (ref 11.6–15.1)
GFR SERPL CREATININE-BSD FRML MDRD: 40 ML/MIN/1.73SQ M
GLUCOSE SERPL-MCNC: 105 MG/DL (ref 65–140)
GLUCOSE SERPL-MCNC: 189 MG/DL (ref 65–140)
GLUCOSE SERPL-MCNC: 199 MG/DL (ref 65–140)
GLUCOSE SERPL-MCNC: 221 MG/DL (ref 65–140)
GLUCOSE SERPL-MCNC: 95 MG/DL (ref 65–140)
HCT VFR BLD AUTO: 31.1 % (ref 34.8–46.1)
HGB BLD-MCNC: 10.1 G/DL (ref 11.5–15.4)
MCH RBC QN AUTO: 28.4 PG (ref 26.8–34.3)
MCHC RBC AUTO-ENTMCNC: 32.5 G/DL (ref 31.4–37.4)
MCV RBC AUTO: 87 FL (ref 82–98)
PLATELET # BLD AUTO: 166 THOUSANDS/UL (ref 149–390)
PMV BLD AUTO: 11.7 FL (ref 8.9–12.7)
POTASSIUM SERPL-SCNC: 4.5 MMOL/L (ref 3.5–5.3)
RBC # BLD AUTO: 3.56 MILLION/UL (ref 3.81–5.12)
SODIUM SERPL-SCNC: 137 MMOL/L (ref 135–147)
WBC # BLD AUTO: 6.96 THOUSAND/UL (ref 4.31–10.16)

## 2024-07-28 PROCEDURE — 99233 SBSQ HOSP IP/OBS HIGH 50: CPT

## 2024-07-28 PROCEDURE — 85027 COMPLETE CBC AUTOMATED: CPT

## 2024-07-28 PROCEDURE — 82948 REAGENT STRIP/BLOOD GLUCOSE: CPT

## 2024-07-28 PROCEDURE — 80048 BASIC METABOLIC PNL TOTAL CA: CPT

## 2024-07-28 RX ADMIN — GABAPENTIN 100 MG: 100 CAPSULE ORAL at 09:51

## 2024-07-28 RX ADMIN — INSULIN LISPRO 1 UNITS: 100 INJECTION, SOLUTION INTRAVENOUS; SUBCUTANEOUS at 16:49

## 2024-07-28 RX ADMIN — SERTRALINE HYDROCHLORIDE 50 MG: 50 TABLET ORAL at 09:51

## 2024-07-28 RX ADMIN — Medication 10000 UNITS: at 09:50

## 2024-07-28 RX ADMIN — HEPARIN SODIUM 5000 UNITS: 5000 INJECTION, SOLUTION INTRAVENOUS; SUBCUTANEOUS at 05:00

## 2024-07-28 RX ADMIN — INSULIN DETEMIR 14 UNITS: 100 INJECTION, SOLUTION SUBCUTANEOUS at 09:49

## 2024-07-28 RX ADMIN — GABAPENTIN 100 MG: 100 CAPSULE ORAL at 17:27

## 2024-07-28 RX ADMIN — HEPARIN SODIUM 5000 UNITS: 5000 INJECTION, SOLUTION INTRAVENOUS; SUBCUTANEOUS at 15:16

## 2024-07-28 RX ADMIN — INSULIN LISPRO 2 UNITS: 100 INJECTION, SOLUTION INTRAVENOUS; SUBCUTANEOUS at 21:49

## 2024-07-28 RX ADMIN — MEMANTINE 10 MG: 5 TABLET ORAL at 17:27

## 2024-07-28 RX ADMIN — GABAPENTIN 100 MG: 100 CAPSULE ORAL at 21:44

## 2024-07-28 RX ADMIN — LISINOPRIL 40 MG: 20 TABLET ORAL at 09:51

## 2024-07-28 RX ADMIN — MONTELUKAST 10 MG: 10 TABLET, FILM COATED ORAL at 21:44

## 2024-07-28 RX ADMIN — MEMANTINE 10 MG: 5 TABLET ORAL at 09:51

## 2024-07-28 RX ADMIN — HEPARIN SODIUM 5000 UNITS: 5000 INJECTION, SOLUTION INTRAVENOUS; SUBCUTANEOUS at 21:44

## 2024-07-28 RX ADMIN — INSULIN LISPRO 1 UNITS: 100 INJECTION, SOLUTION INTRAVENOUS; SUBCUTANEOUS at 12:39

## 2024-07-28 RX ADMIN — AMLODIPINE BESYLATE 10 MG: 10 TABLET ORAL at 21:44

## 2024-07-28 RX ADMIN — PANTOPRAZOLE SODIUM 20 MG: 20 TABLET, DELAYED RELEASE ORAL at 05:00

## 2024-07-28 RX ADMIN — INSULIN DETEMIR 12 UNITS: 100 INJECTION, SOLUTION SUBCUTANEOUS at 21:44

## 2024-07-28 NOTE — ASSESSMENT & PLAN NOTE
Lab Results   Component Value Date    EGFR 40 07/28/2024    EGFR 34 07/27/2024    EGFR 27 07/26/2024    CREATININE 1.24 07/28/2024    CREATININE 1.40 (H) 07/27/2024    CREATININE 1.70 (H) 07/26/2024     Presented with creatinine 1.7, baseline is 1.2-1.3  Prehospital HCTZ was held on admission  Treated with IV fluids  Creatinine back to baseline today  Avoid further nephrotoxins and hypotension  Monitor creatinine as an outpatient

## 2024-07-28 NOTE — ASSESSMENT & PLAN NOTE
Lab Results   Component Value Date    HGBA1C 8.3 (H) 05/20/2024     Had recent change of her insulin for hypoglycemia, presented to ER with hyperglycemia  Given 12 units Levemir and 5 units Regular in ED  Home regimen includes 12 units of Levemir every 12 hours. Invokana was recently added however this was held in the hospital and will hold off on restarting due to hypoglycemia  Per patient's granddaughter, patient had 2 days of low blood sugars in the 40s in the past week.  Levemir was decreased from 14 units BID to Levemir 12 units BID.   Received sliding scale insulin coverage ACHS requiring only 1 to 2 units intermittently  Continue diabetic diet  Discharged home in stable condition, recommend close follow-up with PCP and outpatient endocrinology evaluation.  Patient's granddaughter at bedside verbalized understanding

## 2024-07-28 NOTE — PROGRESS NOTES
[facility]  Progress Note  Name: Michelle Villatoro I  MRN: [id number]  Unit/Bed#: -01 I Date of Admission: 7/26/2024   Date of Service: 7/28/2024 I Hospital Day: 1    Assessment & Plan   * Type 2 diabetes mellitus with hyperglycemia (HCC)  Assessment & Plan    Lab Results   Component Value Date    HGBA1C 8.3 (H) 05/20/2024     Patient with recent change of her insulin for hypoglycemia, presented to ED with hyperglycemia  Given 12 units Levemir and 5 units Regular in ED  Home regimen includes 12 units of Levemir every 12 hours and Boogieokajade  Spoke with patient's granddaughter via the phone.  She reported that patient had 2 days of low blood sugars in the 40s this past week and her insulin was decreased by her PCP.  Confirmed that her PCP ordered her medications, patient does not see an endocrinologist.  Granddaughter states that she is compliant with her diabetic regimen and follows a diabetic diet at home  Target blood sugar inpatient 140-180-blood sugars under desired range today  Decreasing Levemir from 14 units every 12 hours to Levemir 12 units every 12 hours (baseline insulin prehospital)  Discontinue mealtime Humalog  Continue sliding scale insulin coverage ACHS  Diabetic diet  Hypoglycemia protocol  Continue to monitor and adjust insulin accordingly  BMP a.m.    Hypertensive emergency  Assessment & Plan  POA  BP as high as 236/99 in ED  Received amlodipine 10 mg x 1 with improvement in blood pressure  Blood pressure controlled today  Continue prehospital amlodipine and lisinopril  HCTZ on hold  Continue as needed hydralazine  Monitor BP per protocol    Chronic kidney disease, stage 3b (HCC)  Assessment & Plan  Lab Results   Component Value Date    EGFR 40 07/28/2024    EGFR 34 07/27/2024    EGFR 27 07/26/2024    CREATININE 1.24 07/28/2024    CREATININE 1.40 (H) 07/27/2024    CREATININE 1.70 (H) 07/26/2024   Baseline creat 1.2-1.3  Presented with creatinine 1.7  Holding prehospital  HCTZ  Treated with IV fluids  Creatinine back to baseline today  Appears euvolemic on exam  Will discontinue IV fluids and continue to hold HCTZ today  Avoid nephrotoxins and hypotension  BMP a.m.    Peripheral neuropathy  Assessment & Plan  Continue prehospital gabapentin    Anxiety  Assessment & Plan  No signs of anxiety on exam   Continue prehospital Zoloft    Hypothyroidism  Assessment & Plan  Continue home levothyroxine    Dementia with behavioral disturbance (HCC)  Assessment & Plan  Continue prehospital Namenda  Supportive care               VTE Pharmacologic Prophylaxis: VTE Score: 3 Moderate Risk (Score 3-4) - Pharmacological DVT Prophylaxis Ordered: heparin.    Mobility:   Basic Mobility Inpatient Raw Score: 20  JH-HLM Goal: 6: Walk 10 steps or more  JH-HLM Achieved: 7: Walk 25 feet or more  JH-HLM Goal achieved. Continue to encourage appropriate mobility.    Patient Centered Rounds: I performed bedside rounds with nursing staff today.   Discussions with Specialists or Other Care Team Provider: Nursing, case management    Education and Discussions with Family / Patient: Updated  (granddaughter) via phone.    Total Time Spent on Date of Encounter in care of patient: 36  mins. This time was spent on one or more of the following: performing physical exam; counseling and coordination of care; obtaining or reviewing history; documenting in the medical record; reviewing/ordering tests, medications or procedures; communicating with other healthcare professionals and discussing with patient's family/caregivers.    Current Length of Stay: 1 day(s)  Current Patient Status: Inpatient   Certification Statement: The patient will continue to require additional inpatient hospital stay due to ongoing treatment for hyperglycemia    Discharge Plan: Blood sugars much improved today.  Discontinued IV fluids.  Discontinued Humalog with meals will continue sliding scale insulin coverage and reduced Levemir to  patient's home dosing.  Will monitor overnight.  If remains stable will likely be discharged tomorrow a.m.  Repeat labs in the a.m.    Code Status: Level 1 - Full Code    Subjective:   Patient denies any dizziness, lightheadedness, chest pain or palpitations.  Denies any pain or discomfort.  Obtained assessment using language interpretation line    Objective:     Vitals:   Temp (24hrs), Av.7 °F (37.1 °C), Min:98.3 °F (36.8 °C), Max:99 °F (37.2 °C)    Temp:  [98.3 °F (36.8 °C)-99 °F (37.2 °C)] 98.3 °F (36.8 °C)  HR:  [51-67] 65  Resp:  [16-18] 17  BP: (132-162)/(47-55) 162/55  SpO2:  [91 %-96 %] 96 %  Body mass index is 24.37 kg/m².     Input and Output Summary (last 24 hours):     Intake/Output Summary (Last 24 hours) at 2024 0956  Last data filed at 2024 0734  Gross per 24 hour   Intake 1460 ml   Output 950 ml   Net 510 ml       Physical Exam:   Physical Exam  Vitals and nursing note reviewed.   Constitutional:       General: She is not in acute distress.     Appearance: She is well-developed. She is not ill-appearing.   HENT:      Head: Normocephalic and atraumatic.      Mouth/Throat:      Mouth: Mucous membranes are moist.   Cardiovascular:      Rate and Rhythm: Normal rate and regular rhythm.      Pulses: Normal pulses.      Heart sounds: Normal heart sounds. No murmur heard.  Pulmonary:      [place]: Pulmonary effort is normal. No respiratory distress.      Breath sounds: Normal breath sounds. No wheezing or rales.   Abdominal:      General: Bowel sounds are normal. There is no distension.      Palpations: Abdomen is soft.      Tenderness: There is no abdominal tenderness. There is no guarding.   Musculoskeletal:         General: No swelling. Normal range of motion.   Skin:     General: Skin is warm and dry.      Capillary Refill: Capillary refill takes less than 2 seconds.   Neurological:      General: No focal deficit present.      Mental Status: She is alert and oriented to person, place, and  time. Mental status is at baseline.      Comments: Assessed using    Psychiatric:         Mood and Affect: Mood normal.         Behavior: Behavior normal.          Additional Data:     Labs:  Results from last 7 days   Lab Units 07/28/24  0503   WBC Thousand/uL 6.96   HEMOGLOBIN g/dL 10.1*   HEMATOCRIT % 31.1*   PLATELETS Thousands/uL 166     Results from last 7 days   Lab Units 07/28/24  0503 07/27/24  0457 07/26/24  2242   SODIUM mmol/L 137   < > 125*   POTASSIUM mmol/L 4.5   < > 5.5*   CHLORIDE mmol/L 108   < > 93*   CO2 mmol/L 27   < > 23   BUN mg/dL 54*   < > 68*   CREATININE mg/dL 1.24   < > 1.70*   ANION GAP mmol/L 2*   < > 9   CALCIUM mg/dL 9.4   < > 9.9   ALBUMIN g/dL  --   --  4.4   TOTAL BILIRUBIN mg/dL  --   --  0.39   ALK PHOS U/L  --   --  42   ALT U/L  --   --  33   AST U/L  --   --  28   GLUCOSE RANDOM mg/dL 105   < > 529*    < > = values in this interval not displayed.         Results from last 7 days   Lab Units 07/28/24  0654 07/27/24  2049 07/27/24  1609 07/27/24  1126 07/27/24  0710 07/27/24  0034 07/26/24  2235   POC GLUCOSE mg/dl 95 171* 126 186* 286* 382* 492*               Lines/Drains:  Invasive Devices       Peripheral Intravenous Line  Duration             Peripheral IV 07/26/24 Proximal;Right;Ventral (anterior) Forearm 1 day                          Imaging: No pertinent imaging reviewed.    Recent Cultures (last 7 days):         Last 24 Hours Medication List:   Current Facility-Administered Medications   Medication Dose Route Frequency Provider Last Rate    acetaminophen  650 mg Oral Q4H PRN Solange Street PA-C      amLODIPine  10 mg Oral HS Solange [address] PA-C      cholecalciferol  10,000 Units Oral Daily Solange [address] PA-C      gabapentin  100 mg Oral TID Solange [address] PA-C      heparin (porcine)  5,000 Units Subcutaneous Q8H ANA Solange Street PA-C      hydrALAZINE  5 mg Intravenous Q6H PRN Solange Jean Baptiste  [address]      insulin detemir  12 Units Subcutaneous Q12H ANA KRISTA Rodriguez      insulin lispro  1-5 Units Subcutaneous TID AC Solange Street PA-C      insulin lispro  1-5 Units Subcutaneous HS Solange [address] PA-C      levothyroxine  100 mcg Oral Every Other Day Solange Street PA-C      lisinopril  40 mg Oral Daily Solange [address] PA-C      memantine  10 mg Oral BID Solange [address] PA-C      montelukast  10 mg Oral HS Solange [address] PA-C      ondansetron  4 mg Intravenous Q6H PRN Solange Street PA-C      pantoprazole  20 mg Oral Early Morning Solange Street PA-C      sertraline  50 mg Oral Daily Solange [address] PA-C      sodium chloride (PF)  3 mL Intravenous Q1H PRN Michael Moreland MD          Today, Patient Was Seen By: KRISTA Rodriguez    **Please Note: This note may have been constructed using a voice recognition system.**

## 2024-07-28 NOTE — ASSESSMENT & PLAN NOTE
Presented with blood pressures as high as 236/99 in the ER.  Blood pressure slightly elevated this morning, responded well to PRN hydralazine  Continue prehospital amlodipine 10 mg and lisinopril 40 mg  HCTZ was held due to elevated creatinine.  Will resume on discharge  Follow-up as an outpatient with PCP in 1 week  Patient's granddaughter at bedside was educated on signs of hypertension including but not limited to headache, visual changes, shortness of breath, or chest pain.  She verbalized understanding to seek care for her grandmother for any new or concerning symptoms

## 2024-07-28 NOTE — PLAN OF CARE
Problem: PAIN - ADULT  Goal: Verbalizes/displays adequate comfort level or baseline comfort level  Description: Interventions:  - Encourage patient to monitor pain and request assistance  - Assess pain using appropriate pain scale  - Administer analgesics based on type and severity of pain and evaluate response  - Implement non-pharmacological measures as appropriate and evaluate response  - Consider cultural and social influences on pain and pain management  - Notify physician/advanced practitioner if interventions unsuccessful or patient reports new pain  Outcome: Progressing     Problem: INFECTION - ADULT  Goal: Absence or prevention of progression during hospitalization  Description: INTERVENTIONS:  - Assess and monitor for signs and symptoms of infection  - Monitor lab/diagnostic results  - Monitor all insertion sites, i.e. indwelling lines, tubes, and drains  - Monitor endotracheal if appropriate and nasal secretions for changes in amount and color  - [place] appropriate cooling/warming therapies per order  - Administer medications as ordered  - Instruct and encourage patient and family to use good hand hygiene technique  - Identify and instruct in appropriate isolation precautions for identified infection/condition  Outcome: Progressing     Problem: Knowledge Deficit  Goal: Patient/family/caregiver demonstrates understanding of disease process, treatment plan, medications, and discharge instructions  Description: Complete learning assessment and assess knowledge base.  Interventions:  - Provide teaching at level of understanding  - Provide teaching via preferred learning methods  Outcome: Progressing

## 2024-07-29 ENCOUNTER — TRANSITIONAL CARE MANAGEMENT (OUTPATIENT)
Dept: FAMILY MEDICINE CLINIC | Facility: CLINIC | Age: 84
End: 2024-07-29

## 2024-07-29 VITALS
SYSTOLIC BLOOD PRESSURE: 139 MMHG | DIASTOLIC BLOOD PRESSURE: 50 MMHG | HEART RATE: 72 BPM | WEIGHT: 129 LBS | OXYGEN SATURATION: 97 % | HEIGHT: 61 IN | RESPIRATION RATE: 18 BRPM | TEMPERATURE: 98 F | BODY MASS INDEX: 24.35 KG/M2

## 2024-07-29 LAB
ANION GAP SERPL CALCULATED.3IONS-SCNC: 3 MMOL/L (ref 4–13)
BUN SERPL-MCNC: 47 MG/DL (ref 5–25)
CALCIUM SERPL-MCNC: 9.2 MG/DL (ref 8.4–10.2)
CHLORIDE SERPL-SCNC: 107 MMOL/L (ref 96–108)
CO2 SERPL-SCNC: 29 MMOL/L (ref 21–32)
CREAT SERPL-MCNC: 1.11 MG/DL (ref 0.6–1.3)
ERYTHROCYTE [DISTWIDTH] IN BLOOD BY AUTOMATED COUNT: 13.9 % (ref 11.6–15.1)
GFR SERPL CREATININE-BSD FRML MDRD: 46 ML/MIN/1.73SQ M
GLUCOSE SERPL-MCNC: 228 MG/DL (ref 65–140)
GLUCOSE SERPL-MCNC: 81 MG/DL (ref 65–140)
GLUCOSE SERPL-MCNC: 85 MG/DL (ref 65–140)
HCT VFR BLD AUTO: 32.8 % (ref 34.8–46.1)
HGB BLD-MCNC: 10.3 G/DL (ref 11.5–15.4)
MCH RBC QN AUTO: 27.4 PG (ref 26.8–34.3)
MCHC RBC AUTO-ENTMCNC: 31.4 G/DL (ref 31.4–37.4)
MCV RBC AUTO: 87 FL (ref 82–98)
PLATELET # BLD AUTO: 160 THOUSANDS/UL (ref 149–390)
PMV BLD AUTO: 12.5 FL (ref 8.9–12.7)
POTASSIUM SERPL-SCNC: 4.5 MMOL/L (ref 3.5–5.3)
RBC # BLD AUTO: 3.76 MILLION/UL (ref 3.81–5.12)
SODIUM SERPL-SCNC: 139 MMOL/L (ref 135–147)
WBC # BLD AUTO: 5.44 THOUSAND/UL (ref 4.31–10.16)

## 2024-07-29 PROCEDURE — 99239 HOSP IP/OBS DSCHRG MGMT >30: CPT | Performed by: NURSE PRACTITIONER

## 2024-07-29 PROCEDURE — 85027 COMPLETE CBC AUTOMATED: CPT

## 2024-07-29 PROCEDURE — 82948 REAGENT STRIP/BLOOD GLUCOSE: CPT

## 2024-07-29 PROCEDURE — 80048 BASIC METABOLIC PNL TOTAL CA: CPT

## 2024-07-29 RX ADMIN — LISINOPRIL 40 MG: 20 TABLET ORAL at 09:32

## 2024-07-29 RX ADMIN — INSULIN LISPRO 2 UNITS: 100 INJECTION, SOLUTION INTRAVENOUS; SUBCUTANEOUS at 11:54

## 2024-07-29 RX ADMIN — GABAPENTIN 100 MG: 100 CAPSULE ORAL at 09:31

## 2024-07-29 RX ADMIN — INSULIN DETEMIR 12 UNITS: 100 INJECTION, SOLUTION SUBCUTANEOUS at 09:30

## 2024-07-29 RX ADMIN — Medication 10000 UNITS: at 09:30

## 2024-07-29 RX ADMIN — HYDRALAZINE HYDROCHLORIDE 5 MG: 20 INJECTION INTRAMUSCULAR; INTRAVENOUS at 07:57

## 2024-07-29 RX ADMIN — PANTOPRAZOLE SODIUM 20 MG: 20 TABLET, DELAYED RELEASE ORAL at 05:13

## 2024-07-29 RX ADMIN — LEVOTHYROXINE SODIUM 100 MCG: 0.1 TABLET ORAL at 09:31

## 2024-07-29 RX ADMIN — MEMANTINE 10 MG: 5 TABLET ORAL at 09:31

## 2024-07-29 RX ADMIN — HEPARIN SODIUM 5000 UNITS: 5000 INJECTION, SOLUTION INTRAVENOUS; SUBCUTANEOUS at 05:13

## 2024-07-29 RX ADMIN — SERTRALINE HYDROCHLORIDE 50 MG: 50 TABLET ORAL at 09:31

## 2024-07-29 NOTE — NURSING NOTE
IV site removed.  Discharge instructions reviewed with patient and daughter, verbalized understanding.  Patient discharged via wheelchair to car accompanied by PCA and family

## 2024-07-29 NOTE — CASE MANAGEMENT
Case Management Assessment & Discharge Planning Note    Patient name Michelle Villatoro  Location /-01 MRN [id number]  : 1940 Date 2024       Current Admission Date: 2024  Current Admission Diagnosis:Type 2 diabetes mellitus with hyperglycemia ([facility])   Patient Active Problem List    Diagnosis Date Noted Date Diagnosed    Hypertensive emergency 2024     Type 2 diabetes mellitus with hyperglycemia ([facility]) 2024     Rheumatoid arthritis ([facility]) 2024     Chronic pain of left knee 2024     Anemia 2024     Leukopenia 2024     Hypertensive kidney disease with chronic kidney disease 2024     Major depressive disorder, single episode, moderate ([facility]) 2023     Unintentional weight loss 2023     Chronic midline low back pain without sciatica 2022     Dysphagia 2022     Weakness of both lower extremities 04/15/2022     Asymptomatic bilateral carotid artery stenosis 2021     Post herpetic neuralgia 2021     Herpes zoster without complication 08/10/2021     Chronic kidney disease, stage 3b ([facility]) 2021     Ulcerated external hemorrhoids 2021     Bright red blood per rectum 2021     Coccyalgia 2021     Leg cramps, sleep related 2020     Restless leg syndrome 2020     Mixed hyperlipidemia 2020     Anemia in stage 3 chronic kidney disease  ([facility]) 10/03/2019     Unstable gait 08/15/2019     Tear of right rotator cuff 2019     Biceps tendon tear 2019     Nontraumatic tear of right rotator cuff 2019     Menopause ovarian failure 2019     Peripheral neuropathy 2019     Ambulatory dysfunction 2019     Hypothyroidism 2019     Essential hypertension 2019     Anxiety 2018     Alzheimer's dementia (HCC) 2018     Memory loss 2018     Bilateral hearing loss 2018     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) 02/22/2018     Dementia with behavioral disturbance (HCC) 02/15/2018     Chronic left shoulder pain 02/15/2018       LOS (days): 2  Geometric Mean LOS (GMLOS) (days): 3  Days to GMLOS:1     OBJECTIVE:    Risk of Unplanned Readmission Score: 17.41         Current admission status: Inpatient  Referral Reason: Other (D/C planning)    Preferred Pharmacy:   [id number] [place] - [address]  [address]  [place] [address]  Phone: [phone] Fax: [phone]    [id number] - [place], [place] - [address]  [address] [facility] [address]  Phone: [phone] Fax: [phone]    Primary Care Provider: Elver yLnne MD    Primary Insurance: [facility]  Secondary Insurance: [facility]    ASSESSMENT:  Active Health Care Proxies    There are no active Health Care Proxies on file.       Advance Directives  Does patient have a Health Care POA?: No  Was patient offered paperwork?: Yes  Does patient currently have a Health Care decision maker?: No  Does patient have Advance Directives?: No  Was patient offered paperwork?: Yes  Primary Contact: Waleska Millard         Readmission Root Cause  30 Day Readmission: No    Patient Information  Admitted from:: Home  Mental Status: Alert  During Assessment patient was accompanied by: Other-Comment (Granddaughter)  Assessment information provided by:: Other - please comment (Granddaughter)  Primary Caregiver: Family  Caregiver's Name:: Waleska Millard  Caregiver's Relationship to Patient:: Family Member  Caregiver's Telephone Number:: [phone]  Support Systems: Family members, Children  [place] [facility]: [place]  [place]  Home entry access options. Select all that apply.: Ramp  Type of Current Residence: 2 story home  Upon entering residence, is there a bedroom on the main floor (no further steps)?: No  A bedroom is located on the following floor levels of  residence (select all that apply):: 2nd Floor  Upon entering residence, is there a bathroom on the main floor (no further steps)?: No  Indicate which floors of current residence have a bathroom (select all the apply):: 2nd Floor  Number of steps to 2nd floor from main floor: One Flight  Living Arrangements: Lives w/ Extended Family, Lives w/ Daughter, Other (Comment) (Lives with granddaughter)  Is patient a ?: No    Activities of Daily Living Prior to Admission  Functional Status: Assistance  Completes ADLs independently?: No  Level of ADL dependence: Assistance  Ambulates independently?: No  Level of ambulatory dependence: Assistance  Does patient use assisted devices?: Yes  Assisted Devices (DME) used: Walker, Shower Chair  Does patient currently own DME?: Yes  What DME does the patient currently own?: Shower Chair, Walker  Does patient have a history of Outpatient Therapy (PT/OT)?: No  Does the patient have a history of Short-Term Rehab?: No  Does patient have a history of HHC?: Yes  Does patient currently have HHC?: Yes    Current Home Health Care  Type of Current Home Care Services: Home health aide  Current Home Health Agency:: Other (please enter name in comment) ([facility])  Current Home Health Follow-Up Provider:: PCP    Patient Information Continued  Income Source: Pension/alf  Does patient have prescription coverage?: Yes  Does patient receive dialysis treatments?: No  Does patient have a history of substance abuse?: No  Does patient have a history of Mental Health Diagnosis?: Yes  Is patient receiving treatment for mental health?: Yes  Has patient received inpatient treatment related to mental health in the last 2 years?: No         Means of Transportation  Means of Transport to Appts:: Family transport      Social Determinants of Health (SDOH)      Flowsheet Row Most Recent Value   Housing Stability    In the last 12 months, was there a time when you were not able to pay the  mortgage or rent on time? N   In the past 12 months, how many times have you moved where you were living? 1   At any time in the past 12 months, were you homeless or living in a shelter (including now)? N   Transportation Needs    In the past 12 months, has lack of transportation kept you from medical appointments or from getting medications? no   In the past 12 months, has lack of transportation kept you from meetings, work, or from getting things needed for daily living? No   Food Insecurity    Within the past 12 months, you worried that your food would run out before you got the money to buy more. Never true   Within the past 12 months, the food you bought just didn't last and you didn't have money to get more. Never true   [employer]    In the past 12 months has the electric, gas, oil, or water company threatened to shut off services in your home? No            DISCHARGE DETAILS:    Discharge planning discussed with:: Pt's granddaughter  Freedom of Choice: Yes     CM contacted family/caregiver?: Yes  Were Treatment Team discharge recommendations reviewed with patient/caregiver?: Yes  Did patient/caregiver verbalize understanding of patient care needs?: Yes  Were patient/caregiver advised of the risks associated with not following Treatment Team discharge recommendations?: Yes    Contacts  Patient Contacts: Waleska Millard  Relationship to Patient:: Family  Contact Method: In Person  Reason/Outcome: Continuity of Care, Discharge Planning    Requested Home Health Care         Is the patient interested in HHC at discharge?: Yes  Home Health Discipline requested:: Occupational Therapy, Physical Therapy, Nursing  [facility] Health Agency [facility]  Home Health Follow-Up Provider:: PCP  Home Health Services Needed:: Diabetes Management, Gait/ADL Training, Evaluate Functional Status and Safety, Strengthening/Theraputic Exercises to Improve Function  Supporting Clincal Findings:: Limited Endurance, Fatigues Easliy in  Short Distances    DME Referral Provided  Referral made for DME?: Yes  DME referral completed for the following items:: Bedside Commode  DME Supplier Name:: [employer]    Other Referral/Resources/Interventions Provided:  Interventions: DME, HHC, Advanced Directives, FindHelp  Referral Comments: CM met with pt and granddaughter at bedside to conduct assessment. Pt's granddaughter reported that pt receives 7 hours, 7 days/week of HHA services with [facility]. Granddaughter requested that ref be submitted for HHC as well for SN, PT, OT. CM submitted ref for HHC via [employer]. CM provided granddaughter with HHC choice list. [facility]daughter chose [facility]. CM reserved via [employer]. AVS updated. Granddaughter requested information regarding Meals on Wheels. CM provided information for Meals on Wheels application. Granddaughter requested a bedside commode be ordered. CM submitted order for bedside commode via [place] to be delivered to pt's home. CM provided pt's granddaughter with AD paperwork. CM submitted ref for OPCM for diabetes f/u. CM will continue to follow.    Would you like to participate in our [facility] service program?  : No - Declined    Treatment Team Recommendation: Home with Home Health Care  Discharge Destination Plan:: Home with Home Health Care  Transport at Discharge : Family

## 2024-07-29 NOTE — UTILIZATION REVIEW
NOTIFICATION OF INPATIENT ADMISSION   AUTHORIZATION REQUEST   SERVICING FACILITY:   [place] [address]  Tax ID: [id number]  NPI: [id number]   ATTENDING PROVIDER:  Attending Name and NPI#: Kris Calderón Md [[id number]]  Address: [address] [place] [address]  Phone: [phone]     ADMISSION INFORMATION:  Place of Service: Inpatient [facility]  Place of Service Code: 21  Inpatient Admission Date/Time: 7/27/24  2:09 PM  Discharge Date/Time: No discharge date for patient encounter.  Admitting Diagnosis Code/Description:  Dehydration [E86.0]  Hyperglycemia [R73.9]  Elevated serum creatinine [R79.89]     UTILIZATION REVIEW CONTACT:  Arnol Watson, Supervisor, Utilization Review Assistants  Network Utilization Review Department  Phone: [phone]  Fax [phone]  Email: [email or web address]  Contact for approvals/pending authorizations, clinical reviews, and discharge.     PHYSICIAN ADVISORY SERVICES:  Medical Necessity Denial & [place] Review  Phone: [phone]  Fax: [phone]  Email: [email or web address]     DISCHARGE SUPPORT TEAM:  For Patients Discharge Needs & Updates  Phone: [phone] opt. 2 Fax: [phone]  Email: [email or web address]

## 2024-07-29 NOTE — PROGRESS NOTES
Patient:    MRN:  [id number]    Aidin Request ID:  [id number]    Level of care reserved:  Home Health Agency    Partner Reserved:  [facility], [place] [address] [phone]    Clinical needs requested:    Geography searched:  [address]    Start of Service:    Request sent:  12:52pm EDT on 7/29/2024 by Archana Mejia    Partner reserved:  1:12pm EDT on 7/29/2024 by Archana Mejia    Choice list shared:  1:08pm EDT on 7/29/2024 by Archana Mejia

## 2024-07-29 NOTE — PLAN OF CARE
Problem: PAIN - ADULT  Goal: Verbalizes/displays adequate comfort level or baseline comfort level  Description: Interventions:  - Encourage patient to monitor pain and request assistance  - Assess pain using appropriate pain scale  - Administer analgesics based on type and severity of pain and evaluate response  - Implement non-pharmacological measures as appropriate and evaluate response  - Consider cultural and social influences on pain and pain management  - Notify physician/advanced practitioner if interventions unsuccessful or patient reports new pain  Outcome: Progressing     Problem: INFECTION - ADULT  Goal: Absence or prevention of progression during hospitalization  Description: INTERVENTIONS:  - Assess and monitor for signs and symptoms of infection  - Monitor lab/diagnostic results  - Monitor all insertion sites, i.e. indwelling lines, tubes, and drains  - Monitor endotracheal if appropriate and nasal secretions for changes in amount and color  - [place] appropriate cooling/warming therapies per order  - Administer medications as ordered  - Instruct and encourage patient and family to use good hand hygiene technique  - Identify and instruct in appropriate isolation precautions for identified infection/condition  Outcome: Progressing     Problem: SAFETY ADULT  Goal: Maintain or return to baseline ADL function  Description: INTERVENTIONS:  -  Assess patient's ability to carry out ADLs; assess patient's baseline for ADL function and identify physical deficits which impact ability to perform ADLs (bathing, care of mouth/teeth, toileting, grooming, dressing, etc.)  - Assess/evaluate cause of self-care deficits   - Assess range of motion  - Assess patient's mobility; develop plan if impaired  - Assess patient's need for assistive devices and provide as appropriate  - Encourage maximum independence but intervene and supervise when necessary  - Involve family in performance of ADLs  - Assess for home care  needs following discharge   - Consider OT consult to assist with ADL evaluation and planning for discharge  - Provide patient education as appropriate  Outcome: Progressing     Problem: Knowledge Deficit  Goal: Patient/family/caregiver demonstrates understanding of disease process, treatment plan, medications, and discharge instructions  Description: Complete learning assessment and assess knowledge base.  Interventions:  - Provide teaching at level of understanding  - Provide teaching via preferred learning methods  Outcome: Progressing     Problem: METABOLIC, FLUID AND ELECTROLYTES - ADULT  Goal: Glucose maintained within target range  Description: INTERVENTIONS:  - Monitor Blood Glucose as ordered  - Assess for signs and symptoms of hyperglycemia and hypoglycemia  - Administer ordered medications to maintain glucose within target range  - Assess nutritional intake and initiate nutrition service referral as needed  Outcome: Progressing

## 2024-07-29 NOTE — DISCHARGE SUMMARY
[facility]  Discharge- Michelle Villatoro 1940, 84 y.o. female MRN: [id number]  Unit/Bed#: -01 Encounter: [id number]  Primary Care Provider: Elver Lynne MD   Date and time admitted to hospital: 7/26/2024 10:29 PM    * Type 2 diabetes mellitus with hyperglycemia (HCC)  Assessment & Plan    Lab Results   Component Value Date    HGBA1C 8.3 (H) 05/20/2024     Had recent change of her insulin for hypoglycemia, presented to ER with hyperglycemia  Given 12 units Levemir and 5 units Regular in ED  Home regimen includes 12 units of Levemir every 12 hours. Invokana was recently added however this was held in the hospital and will hold off on restarting due to hypoglycemia  Per patient's granddaughter, patient had 2 days of low blood sugars in the 40s in the past week.  Levemir was decreased from 14 units BID to Levemir 12 units BID.   Received sliding scale insulin coverage ACHS requiring only 1 to 2 units intermittently  Continue diabetic diet  Discharged home in stable condition, recommend close follow-up with PCP and outpatient endocrinology evaluation.  Patient's granddaughter at bedside verbalized understanding    Hypertensive emergency  Assessment & Plan  Presented with blood pressures as high as 236/99 in the ER.  Blood pressure slightly elevated this morning, responded well to PRN hydralazine  Continue prehospital amlodipine 10 mg and lisinopril 40 mg  HCTZ was held due to elevated creatinine.  Will resume on discharge  Follow-up as an outpatient with PCP in 1 week  Patient's granddaughter at bedside was educated on signs of hypertension including but not limited to headache, visual changes, shortness of breath, or chest pain.  She verbalized understanding to seek care for her grandmother for any new or concerning symptoms    Chronic kidney disease, stage 3b (HCC)  Assessment & Plan  Lab Results   Component Value Date    EGFR 40 07/28/2024    EGFR 34 07/27/2024    EGFR 27 07/26/2024     CREATININE 1.24 07/28/2024    CREATININE 1.40 (H) 07/27/2024    CREATININE 1.70 (H) 07/26/2024     Presented with creatinine 1.7, baseline is 1.2-1.3  Prehospital HCTZ was held on admission  Treated with IV fluids  Creatinine back to baseline today  Avoid further nephrotoxins and hypotension  Monitor creatinine as an outpatient    Peripheral neuropathy  Assessment & Plan  Continue prehospital gabapentin    Hypothyroidism  Assessment & Plan  Continue home levothyroxine    Anxiety  Assessment & Plan  No signs of anxiety on exam   Continue prehospital Zoloft    Dementia with behavioral disturbance (HCC)  Assessment & Plan  Per granddaughter, has intermittent confusion  Continue prehospital Namenda  Supportive care      Medical Problems       Resolved Problems  Date Reviewed: 7/29/2024   None       Discharging Physician / Practitioner: KRISTA Simon  PCP: Elver Lynne MD  Admission Date:   Admission Orders (From admission, onward)       Ordered        07/27/24 1409  INPATIENT ADMISSION  Once            07/26/24 2317  Place in Observation  Once                          Discharge Date: 07/29/24    Significant Findings / Test Results:   Glucose 529 on admission    Reason for Admission: Symptomatic hypoglycemia    Hospital Course:   Michelle Villatoro is a 84 y.o. female patient who originally presented to the hospital on 7/26/2024 due to symptomatic hypoglycemia. She had recent changes in her medication due to hypoglycemia.  She also had hypertensive emergency resolved after receiving her home medications.  She was observed in the hospital a day and her hospitalization was unremarkable.  She was discharged home with home health care to the care of her granddaughter who takes care of her.  Extensive education was given to granddaughter.  She verbalized understanding.  Granddaughter translated in Mong[place]ian to her grandmother who indicated that she understood and had no questions.     Condition at Discharge:  "stable    Discharge Day Visit / Exam:   Subjective: Evaluated at bedside with no complaints offered.  Vitals: Blood Pressure: 139/50 (07/29/24 0931)  Pulse: 72 (07/29/24 0931)  Temperature: 98 °F (36.7 °C) (07/29/24 0730)  Temp Source: Temporal (07/26/24 2239)  Respirations: 18 (07/29/24 0730)  Height: 5' 1\" (154.9 cm) (07/26/24 2352)  Weight - Scale: 58.5 kg (129 lb) (07/26/24 2352)  SpO2: 97 % (07/29/24 0931)    Exam:   Physical Exam  Vitals and nursing note reviewed.   HENT:      Head: Normocephalic and atraumatic.      Nose: Nose normal.      Mouth/Throat:      Mouth: Mucous membranes are moist.      Pharynx: Oropharynx is clear.   Eyes:      Pupils: Pupils are equal, round, and reactive to light.   Cardiovascular:      Rate and Rhythm: Normal rate and regular rhythm.      Pulses: Normal pulses.      Heart sounds: Normal heart sounds.   Pulmonary:      Effort: Pulmonary effort is normal. No respiratory distress.      Breath sounds: Normal breath sounds.   Abdominal:      General: Bowel sounds are normal.      Palpations: Abdomen is soft.      Tenderness: There is no abdominal tenderness.   Musculoskeletal:      Cervical back: Neck supple.   Skin:     General: Skin is warm and dry.      Capillary Refill: Capillary refill takes less than 2 seconds.   Neurological:      General: No focal deficit present.      Mental Status: She is alert.         Discussion with Family: Updated  (granddaughter) at bedside.    Discharge instructions/Information to patient and family:   See after visit summary for information provided to patient and family.      Provisions for Follow-Up Care:  See after visit summary for information related to follow-up care and any pertinent home health orders.      Mobility at time of Discharge:   Basic Mobility Inpatient Raw Score: 20  JH-HLM Goal: 6: Walk 10 steps or more  JH-HLM Achieved: 7: Walk 25 feet or more  HLM Goal achieved. Continue to encourage appropriate mobility.   "   Disposition:   Home with VNA Services (Reminder: Complete face to face encounter)    Planned Readmission: No     Discharge Statement:  I spent 50 minutes discharging the patient. This time was spent on the day of discharge. I had direct contact with the patient on the day of discharge. Greater than 50% of the total time was spent examining patient, answering all patient questions, arranging and discussing plan of care with patient as well as directly providing post-discharge instructions.  Additional time then spent on discharge activities.    Discharge Medications:  See after visit summary for reconciled discharge medications provided to patient and/or family.      **Please Note: This note may have been constructed using a voice recognition system**

## 2024-07-30 ENCOUNTER — PATIENT OUTREACH (OUTPATIENT)
Dept: CASE MANAGEMENT | Facility: OTHER | Age: 84
End: 2024-07-30

## 2024-07-30 DIAGNOSIS — Z71.89 COMPLEX CARE COORDINATION: Primary | ICD-10-CM

## 2024-07-30 DIAGNOSIS — Z78.9 NEEDS ASSISTANCE WITH COMMUNITY RESOURCES: Primary | ICD-10-CM

## 2024-07-30 NOTE — PROGRESS NOTES
Received referral from [facility] for diabetic education.  Contacted patient's granddaughter Waleska for follow up.  Patient lives with family and receives caregiver assistance daily from 9-4.  Granddaughter states patient is in need of more hours and has talked to .  She states PCP has documented in the patient's medical record the need for 24/7 caregiver assistance.  Recommended granddaughter request letter from PCP that she can give directly to .  She was agreeable and will request at f/u visit.  Patient's BS are checked twice daily and have been in the 300's.  She is taking levemir 12 units BID.  Granddaughter states family has a good handle on her diet but patient will sneak chocolate and snacks.  She feels it may be more helpful for patient to receive diabetic meals through meals on wheels.  Will make referral to [facility] to assist with this.  Patient is not active as she has pain in knees, is hard of hearing and almost blind.  Family manages medications and she takes all as prescribed.  She has appointment with [facility] in September as this was the first available and will see PCP 8/8.  Family transports to appointments.  Explained the complex care management program and granddaughter is willing to participate.  Provided my contact information for any questions or concerns.

## 2024-08-01 ENCOUNTER — OFFICE VISIT (OUTPATIENT)
Dept: HEMATOLOGY ONCOLOGY | Facility: CLINIC | Age: 84
End: 2024-08-01
Payer: COMMERCIAL

## 2024-08-01 VITALS
TEMPERATURE: 97.9 F | OXYGEN SATURATION: 97 % | WEIGHT: 132 LBS | RESPIRATION RATE: 18 BRPM | HEART RATE: 76 BPM | DIASTOLIC BLOOD PRESSURE: 72 MMHG | SYSTOLIC BLOOD PRESSURE: 126 MMHG | HEIGHT: 61 IN | BODY MASS INDEX: 24.92 KG/M2

## 2024-08-01 DIAGNOSIS — E53.8 B12 DEFICIENCY: ICD-10-CM

## 2024-08-01 DIAGNOSIS — D64.9 NORMOCYTIC ANEMIA: ICD-10-CM

## 2024-08-01 DIAGNOSIS — N18.32 CKD STAGE 3B, GFR 30-44 ML/MIN (HCC): ICD-10-CM

## 2024-08-01 DIAGNOSIS — D72.819 LEUKOPENIA, UNSPECIFIED TYPE: Primary | ICD-10-CM

## 2024-08-01 LAB
DME PARACHUTE DELIVERY DATE ACTUAL: NORMAL
DME PARACHUTE DELIVERY DATE REQUESTED: NORMAL
DME PARACHUTE DELIVERY NOTE: NORMAL
DME PARACHUTE ITEM DESCRIPTION: NORMAL
DME PARACHUTE ORDER STATUS: NORMAL
DME PARACHUTE SUPPLIER NAME: NORMAL
DME PARACHUTE SUPPLIER PHONE: NORMAL

## 2024-08-01 PROCEDURE — 99204 OFFICE O/P NEW MOD 45 MIN: CPT | Performed by: PHYSICIAN ASSISTANT

## 2024-08-01 NOTE — PROGRESS NOTES
[facility] HEMATOLOGY ONCOL[facility]Y SPECIAL[facility] [place]  [address] [address]  Hematology Ambulatory Consult  Michelle Villatoro, 1940, [id number]  8/1/2024      Assessment and Plan   1. Leukopenia, unspecified type  2. Normocytic anemia  - Ambulatory Referral to Hematology / Oncology  - Protein electrophoresis, serum; Future  - Protein electrophoresis, urine; Future  - Reticulocytes; Future  - LD,Blood; Future  - Haptoglobin; Future  - Hemolysis Smear; Future  - Leukemia/Lymphoma flow cytometry; Future  - Peripheral Smear; Future  - CBC and differential; Future  - Kappa/Lambda Light Chains Free With Ratio, Serum; Future  - IgG, IgA, IgM; Future  3. CKD stage 3b, GFR 30-44 ml/min ([facility], located within [facility])  - Ambulatory Referral to Nephrology; Future  4. B12 deficiency  - cyanocobalamin (VITAMIN B-12) 1000 MCG tablet; Take 1 tablet (1,000 mcg total) by mouth daily    This is an 84-year-old with history of dementia, CKD, RA and others as listed below who is seen in consultation today for leukopenia.  On chart review she had a persistent mild leukopenia from 2021 until 05/2024.  Her white blood cell differential was largely unremarkable.  Most recent labs from July 2024 show normal WBC.  However on chart review she has had a persistent normocytic anemia since at least 2019.  Has had chronic kidney disease for similar duration. Patient denies any bleeding or constitutional symptoms. Her recent labs show B12 deficiency    Discussion/plan  Leukopenia is resolved.  Recommend repeat CBC in approximately 2 months.   I suspect that she has a normocytic anemia due to her known chronic renal disease or autoimmune disease.  We we will obtain additional blood test to rule out other coexisting causes of her anemia including hemolysis, MPN, etc.  B12 1000 mcg daily recommended for B12 deficiency which may be purchased over-the-counter  Nephrology referral for CKD  RTC 2m     Patient voiced agreement and  understanding to the above.   Patient knows to call the Hematology/Oncology office with any questions and concerns regarding the above.    Barrier(s) to care: None.    Dorcas Gutierrez PA-C  Medical Oncology/Hematology  [facility]    Subjective     Chief Complaint   Patient presents with    Consult     Leukopenia, unspecified type       Referring provider    Elver Lynne MD  [address]  [place] [address]    History of present illness: 84-year-old female with past medical history of HTN Cambodian speaking female with past medical history of HTN, hypothyroidism, type 2 diabetes melitis, peripheral neuropathy, Alzheimer's dementia, rheumatoid arthritis, CKD stage IIIb, depression on Zoloft for past 2 to 3 years, restless leg syndrome, HLD who has been referred by her PCP for evaluation of leukopenia.    On chart review, patient had leukopenia from 07/2021 until 05/2024 with WBC ranging from 3.9-4.2   During this time her differential was largely unremarkable.  Her most recent blood work from 07/2024 shows resolution in leukopenia however her granddaughter does note that she was hospitalized during this time for uncontrolled hypertension and diabetes.  She was told during this hospitalization that she has kidney disease which was new information for her. Her labs also demonstrate a normocytic anemia that has been ongoing since 2019 with hemoglobin ranging from 10 to 11 g/dL.  MCV has been in the mid 80s.  Platelet count has been normal. Denies history of HIV, hepatitis.  She does have history of rheumatoid arthritis however is not on any treatment for this currently.  No personal history of malignancy.    Patient denies known history of anemia.  She has never been seen by her hematologist and has never required a blood transfusion.  Denies hematochezia, melena, hematuria or any vaginal bleeding.  She does have history of IBS diarrhea for the past 5 years. Patient denies chest pain,  shortness of breath, lightheadedness or dizziness.  She is on PPI chronically.     Granddaughter assisted with translation and providing history of present illness due to patient's dementia.    06/10/2024:   B12 208  Folic acid 8.1   Iron panel iron sat 21%, TIBC normal, ferritin 81  Tsh normal     Review of Systems   Reason unable to perform ROS: Limited due to dementia.  Granddaughter assisted in providing today's history.   Constitutional:  Negative for fever and unexpected weight change.   Respiratory:  Negative for shortness of breath.    Cardiovascular:  Negative for chest pain.   Gastrointestinal:  Negative for blood in stool.   Genitourinary:  Negative for hematuria and vaginal bleeding.   Neurological:  Negative for dizziness and light-headedness.   All other systems reviewed and are negative.      Past Medical History:   Diagnosis Date    Aggression     Alzheimer's dementia (HCC)     Arthritis     Dementia (HCC)     Diabetes insipidus (HCC)     Diabetes mellitus (HCC)     High cholesterol     Hypertension     Memory loss     Migraine     Polyneuropathy     Rheumatoid arthritis (HCC)     Serum lipids high     Stomach problems     Thyroid trouble      Past Surgical History:   Procedure Laterality Date    CATARACT EXTRACTION      CHOLECYSTECTOMY      GALLBLADDER SURGERY      HYSTERECTOMY      AR SURGICAL ARTHROSCOPY SHOULDER W/ROTATOR CUFF RPR Right 8/1/2019    Procedure: SHOULDER ARTHROSCOPIC ROTATOR CUFF REPAIR;  Surgeon: Betsy Conde MD;  Location: [facility] OR;  Service: Orthopedics    ROTATOR CUFF REPAIR Right 08/01/2019     Family History   Problem Relation Age of Onset    Substance Abuse Mother         denied    Mental illness Mother         denied    Substance Abuse Father         denied    Mental illness Father         denied    Diabetes Brother     Blindness Brother     Arthritis Daughter     HIV Son      Social History     Socioeconomic History    Marital status:      Spouse name: None     Number of children: None    Years of education: None    Highest education level: None   Occupational History    None   Tobacco Use    Smoking status: Former     Current packs/day: 0.00     Average packs/day: 0.3 packs/day for 30.0 years (7.5 ttl pk-yrs)     Types: Cigarettes     Start date:      Quit date:      Years since quittin.6     Passive exposure: Past    Smokeless tobacco: Never   Vaping Use    Vaping status: Never Used   Substance and Sexual Activity    Alcohol use: Never    Drug use: No    Sexual activity: None   Other Topics Concern    None   Social History Narrative    None     Social Determinants of Health     Financial Resource Strain: Low Risk  (2023)    Overall Financial Resource Strain (CARDIA)     Difficulty of Paying Living Expenses: Not very hard   Food Insecurity: No Food Insecurity (2024)    Hunger Vital Sign     Worried About Running Out of Food in the Last Year: Never true     Ran Out of Food in the Last Year: Never true   Transportation Needs: No Transportation Needs (2024)    PRAPARE - Transportation     Lack of Transportation (Medical): No     Lack of Transportation (Non-Medical): No   Physical Activity: Not on file   Stress: No Stress Concern Present (2019)    [place] [facility] [employer] - Occupational Stress Questionnaire     Feeling of Stress : Only a little   Social Connections: Unknown (2019)    Social Connection and Isolation Panel [NHANES]     Frequency of Communication with Friends and Family: More than three times a week     Frequency of Social Gatherings with Friends and Family: More than three times a week     Attends Zoroastrianism Services: Not on file     Active Member of Clubs or Organizations: Not on file     Attends Club or Organization Meetings: Not on file     Marital Status:    Intimate Partner Violence: Not on file   Housing Stability: Low Risk  (2024)    Housing Stability Vital Sign     Unable to Pay for  Housing in the Last Year: No     Number of Times Moved in the Last Year: 1     Homeless in the Last Year: No         Current Outpatient Medications:     amLODIPine (NORVASC) 5 mg tablet, Take 2 tablets (10 mg total) by mouth daily At night before bed, Disp: , Rfl:     BD Pen Needle Elisabeth 2nd Gen 32G X 4 MM MISC, USE DAILY AS DIRECTED, Disp: 100 each, Rfl: 1    Blood Glucose Monitoring Suppl (OneTouch Verio Reflect) w/Device KIT, USE 2 (TWO) TIMES A DAY, Disp: 1 kit, Rfl: 0    cholestyramine (QUESTRAN) 4 g packet, Take 1 packet (4 g total) by mouth 2 (two) times a day with meals, Disp: 180 packet, Rfl: 3    Continuous Blood Gluc  (FreeStyle Aruna 2 Maupin) MEREDITH, Use 1 Device 3 (three) times a day before meals, Disp: 1 each, Rfl: 0    Continuous Blood Gluc Sensor (FreeStyle Aruna 2 Sensor) MISC, Check blood sugars multiple times per day, Disp: 6 each, Rfl: 0    fenofibrate 160 MG tablet, TAKE 1 TABLET BY MOUTH EVERY DAY, Disp: 90 tablet, Rfl: 1    fluticasone (FLONASE) 50 mcg/act nasal spray, SPRAY 2 SPRAYS INTO EACH NOSTRIL EVERY DAY, Disp: 16 mL, Rfl: 1    Incontinence Supplies MISC, Use 6 (six) times a day Wipes, Disp: 200 each, Rfl: 6    Incontinence Supplies MISC, Use 4 (four) times a day Comfort shield Adult diapers, Disp: 200 each, Rfl: 6    Incontinence Supply Disposable MISC, Use 6 (six) times a day Dispense disposable bed pad, Disp: 200 each, Rfl: 6    insulin detemir (Levemir FlexTouch) 100 Units/mL injection pen, Take 12 units am and 12 units pm, Disp: , Rfl:     Insulin Pen Needle (BD Pen Needle Elisabeth U/F) 32G X 4 MM MISC, Inject under the skin 2 (two) times a day, Disp: 200 each, Rfl: 5    Lancets (OneTouch Delica Plus Raamoh26V) MISC, USE TO TEST BLOOD SUGAR 3 TIMES A DAY, Disp: 100 each, Rfl: 2    levothyroxine 100 mcg tablet, TAKE 1 TABLET BY MOUTH EVERY DAY, Disp: 90 tablet, Rfl: 1    lisinopril (ZESTRIL) 40 mg tablet, Take 1 tablet (40 mg total) by mouth daily In the morning, Disp: , Rfl:      loteprednol etabonate (LOTEMAX) 0.5 % ophthalmic suspension, INSTILL 1 DROP INTO BOTH EYES TWICE A DAY, Disp: , Rfl:     memantine (NAMENDA) 10 mg tablet, TAKE 1 TABLET BY MOUTH TWICE A DAY, Disp: 200 tablet, Rfl: 1    omeprazole (PriLOSEC) 20 mg delayed release capsule, Take 1 capsule (20 mg total) by mouth daily, Disp: 90 capsule, Rfl: 3    OneTouch Verio test strip, USE 1 EACH 2 (TWO) TIMES A DAY TEST, Disp: 100 strip, Rfl: 0    sertraline (ZOLOFT) 50 mg tablet, Take 1 tablet (50 mg total) by mouth daily, Disp: 90 tablet, Rfl: 3    Xiidra 5 % op solution, INSTILL 1 DROP IN BOTH EYES 2 TIMES PER DAY, Disp: , Rfl:     benzonatate (TESSALON PERLES) 100 mg capsule, Take 1 capsule (100 mg total) by mouth 3 (three) times a day as needed for cough (Patient not taking: Reported on 8/1/2024), Disp: 20 capsule, Rfl: 0    Blood Pressure KIT, Use daily (Patient not taking: Reported on 8/1/2024), Disp: 1 kit, Rfl: 0    cholecalciferol (VITAMIN D3) 250 MCG (66657 UT) capsule, Take 1 capsule (10,000 Units total) by mouth daily (Patient not taking: Reported on 8/1/2024), Disp: 90 capsule, Rfl: 3    Diclofenac Sodium (VOLTAREN) 1 %, Apply 2 g topically 3 (three) times a day (Patient not taking: Reported on 8/1/2024), Disp: 100 g, Rfl: 2    gabapentin (Neurontin) 100 mg capsule, Take 1 capsule (100 mg total) by mouth 3 (three) times a day (Patient not taking: Reported on 8/1/2024), Disp: 90 capsule, Rfl: 2    hydrochlorothiazide (HYDRODIURIL) 25 mg tablet, Take 1 tablet (25 mg total) by mouth daily, Disp: 90 tablet, Rfl: 3    montelukast (SINGULAIR) 10 mg tablet, Take 1 tablet (10 mg total) by mouth daily at bedtime (Patient not taking: Reported on 8/1/2024), Disp: 90 tablet, Rfl: 1    triamcinolone (KENALOG) 0.5 % ointment, Apply 1 Application topically 2 (two) times a day To affected area (Patient not taking: Reported on 8/1/2024), Disp: 30 g, Rfl: 2  Allergies   Allergen Reactions    Penicillins Itching and Swelling  "      Objective   /72   Pulse 76   Temp 97.9 °F (36.6 °C) (Temporal)   Resp 18   Ht 5' 1\" (1.549 m)   Wt 59.9 kg (132 lb)   SpO2 97%   BMI 24.94 kg/m²   Physical Exam  Vitals reviewed.   Constitutional:       Comments: Ambulates with walker   HENT:      Head: Normocephalic.   Cardiovascular:      Rate and Rhythm: Normal rate and regular rhythm.      Heart sounds: Normal heart sounds.   Pulmonary:      Effort: Pulmonary effort is normal.      Breath sounds: Normal breath sounds.   Abdominal:      Palpations: Abdomen is soft.      Tenderness: There is no abdominal tenderness.   Musculoskeletal:      Cervical back: Neck supple.   Lymphadenopathy:      Cervical: No cervical adenopathy.   Skin:     Findings: No rash.   Neurological:      Mental Status: She is alert.         Result Review  Labs:  Admission on 07/26/2024, Discharged on 07/29/2024   Component Date Value Ref Range Status    WBC 07/26/2024 8.33  4.31 - 10.16 Thousand/uL Final    RBC 07/26/2024 4.05  3.81 - 5.12 Million/uL Final    Hemoglobin 07/26/2024 11.3 (L)  11.5 - 15.4 g/dL Final    Hematocrit 07/26/2024 35.1  34.8 - 46.1 % Final    MCV 07/26/2024 87  82 - 98 fL Final    MCH 07/26/2024 27.9  26.8 - 34.3 pg Final    MCHC 07/26/2024 32.2  31.4 - 37.4 g/dL Final    RDW 07/26/2024 13.7  11.6 - 15.1 % Final    Platelets 07/26/2024 228  149 - 390 Thousands/uL Final    MPV 07/26/2024 12.6  8.9 - 12.7 fL Final    Sodium 07/26/2024 125 (L)  135 - 147 mmol/L Final    Potassium 07/26/2024 5.5 (H)  3.5 - 5.3 mmol/L Final    Chloride 07/26/2024 93 (L)  96 - 108 mmol/L Final    CO2 07/26/2024 23  21 - 32 mmol/L Final    ANION GAP 07/26/2024 9  4 - 13 mmol/L Final    BUN 07/26/2024 68 (H)  5 - 25 mg/dL Final    Creatinine 07/26/2024 1.70 (H)  0.60 - 1.30 mg/dL Final    Standardized to IDMS reference method    Glucose 07/26/2024 529 (HH)  65 - 140 mg/dL Final    If the patient is fasting, the ADA then defines impaired fasting glucose as > 100 mg/dL and " diabetes as > or equal to 123 mg/dL.    Calcium 07/26/2024 9.9  8.4 - 10.2 mg/dL Final    AST 07/26/2024 28  13 - 39 U/L Final    ALT 07/26/2024 33  7 - 52 U/L Final    Specimen collection should occur prior to Sulfasalazine administration due to the potential for falsely depressed results.     Alkaline Phosphatase 07/26/2024 42  34 - 104 U/L Final    Total Protein 07/26/2024 7.1  6.4 - 8.4 g/dL Final    Albumin 07/26/2024 4.4  3.5 - 5.0 g/dL Final    Total Bilirubin 07/26/2024 0.39  0.20 - 1.00 mg/dL Final    Use of this assay is not recommended for patients undergoing treatment with eltrombopag due to the potential for falsely elevated results.  N-acetyl-p-benzoquinone imine (metabolite of Acetaminophen) will generate erroneously low results in samples for patients that have taken an overdose of Acetaminophen.    eGFR 07/26/2024 27  ml/min/1.73sq m Final    Beta- Hydroxybutyrate 07/26/2024 <0.05  0.02 - 0.27 mmol/L Final    pH, Buddy 07/26/2024 7.369  7.300 - 7.400 Final    pCO2, Buddy 07/26/2024 37.9 (L)  42.0 - 50.0 mm Hg Final    pO2, Buddy 07/26/2024 47.4 (H)  35.0 - 45.0 mm Hg Final    HCO3, Buddy 07/26/2024 21.4 (L)  24 - 30 mmol/L Final    Base Excess, Buddy 07/26/2024 -3.5  mmol/L Final    O2 Content, Buddy 07/26/2024 13.6  ml/dL Final    O2 HGB, VENOUS 07/26/2024 78.8  60.0 - 80.0 % Final    Color, UA 07/27/2024 Straw  Yellow, Straw Final    Clarity, UA 07/27/2024 Clear  Hazy, Clear Final    Specific Gravity, UA 07/27/2024 1.010  >1.005 - <1.030 Final    pH, UA 07/27/2024 6.0  5.0, 5.5, 6.0, 6.5, 7.0, 7.5 Final    Leukocytes, UA 07/27/2024 Negative  Negative Final    Nitrite, UA 07/27/2024 Negative  Negative Final    Protein, UA 07/27/2024 1+ (A)  Negative, Interference- unable to analyze mg/dl Final    Glucose, UA 07/27/2024 3+ (A)  Negative mg/dl Final    Ketones, UA 07/27/2024 Negative  Negative mg/dl Final    Urobilinogen, UA 07/27/2024 0.2  0.2, 1.0 E.U./dl E.U./dl Final    Bilirubin, UA 07/27/2024 Negative   Negative Final    Occult Blood, UA 07/27/2024 Trace-Intact (A)  Negative Final    POC Glucose 07/26/2024 492 (HH)  65 - 140 mg/dl Final    POC Glucose 07/27/2024 382 (H)  65 - 140 mg/dl Final    RBC, UA 07/27/2024 0-1  None Seen, 0-1, 1-2, 2-4, 0-5 /hpf Final    WBC, UA 07/27/2024 None Seen  None Seen, 0-1, 1-2, 0-5, 2-4 /hpf Final    Epithelial Cells 07/27/2024 Occasional  None Seen, Occasional /hpf Final    Bacteria, UA 07/27/2024 None Seen  None Seen, Occasional /hpf Final    Sodium 07/27/2024 134 (L)  135 - 147 mmol/L Final    Potassium 07/27/2024 4.4  3.5 - 5.3 mmol/L Final    Chloride 07/27/2024 104  96 - 108 mmol/L Final    CO2 07/27/2024 24  21 - 32 mmol/L Final    ANION GAP 07/27/2024 6  4 - 13 mmol/L Final    BUN 07/27/2024 66 (H)  5 - 25 mg/dL Final    Creatinine 07/27/2024 1.40 (H)  0.60 - 1.30 mg/dL Final    Standardized to IDMS reference method    Glucose 07/27/2024 315 (H)  65 - 140 mg/dL Final    If the patient is fasting, the ADA then defines impaired fasting glucose as > 100 mg/dL and diabetes as > or equal to 123 mg/dL.    Calcium 07/27/2024 9.2  8.4 - 10.2 mg/dL Final    eGFR 07/27/2024 34  ml/min/1.73sq m Final    WBC 07/27/2024 6.26  4.31 - 10.16 Thousand/uL Final    RBC 07/27/2024 3.32 (L)  3.81 - 5.12 Million/uL Final    Hemoglobin 07/27/2024 9.4 (L)  11.5 - 15.4 g/dL Final    Hematocrit 07/27/2024 28.6 (L)  34.8 - 46.1 % Final    MCV 07/27/2024 86  82 - 98 fL Final    MCH 07/27/2024 28.3  26.8 - 34.3 pg Final    MCHC 07/27/2024 32.9  31.4 - 37.4 g/dL Final    RDW 07/27/2024 13.6  11.6 - 15.1 % Final    Platelets 07/27/2024 162  149 - 390 Thousands/uL Final    MPV 07/27/2024 12.2  8.9 - 12.7 fL Final    POC Glucose 07/27/2024 286 (H)  65 - 140 mg/dl Final    POC Glucose 07/27/2024 186 (H)  65 - 140 mg/dl Final    POC Glucose 07/27/2024 126  65 - 140 mg/dl Final    POC Glucose 07/27/2024 171 (H)  65 - 140 mg/dl Final    NURSE NOTIFIED-    WBC 07/28/2024 6.96  4.31 - 10.16 Thousand/uL Final     RBC 07/28/2024 3.56 (L)  3.81 - 5.12 Million/uL Final    Hemoglobin 07/28/2024 10.1 (L)  11.5 - 15.4 g/dL Final    Hematocrit 07/28/2024 31.1 (L)  34.8 - 46.1 % Final    MCV 07/28/2024 87  82 - 98 fL Final    MCH 07/28/2024 28.4  26.8 - 34.3 pg Final    MCHC 07/28/2024 32.5  31.4 - 37.4 g/dL Final    RDW 07/28/2024 14.0  11.6 - 15.1 % Final    Platelets 07/28/2024 166  149 - 390 Thousands/uL Final    MPV 07/28/2024 11.7  8.9 - 12.7 fL Final    Sodium 07/28/2024 137  135 - 147 mmol/L Final    Potassium 07/28/2024 4.5  3.5 - 5.3 mmol/L Final    Chloride 07/28/2024 108  96 - 108 mmol/L Final    CO2 07/28/2024 27  21 - 32 mmol/L Final    ANION GAP 07/28/2024 2 (L)  4 - 13 mmol/L Final    BUN 07/28/2024 54 (H)  5 - 25 mg/dL Final    Creatinine 07/28/2024 1.24  0.60 - 1.30 mg/dL Final    Standardized to IDMS reference method    Glucose 07/28/2024 105  65 - 140 mg/dL Final    If the patient is fasting, the ADA then defines impaired fasting glucose as > 100 mg/dL and diabetes as > or equal to 123 mg/dL.    Calcium 07/28/2024 9.4  8.4 - 10.2 mg/dL Final    eGFR 07/28/2024 40  ml/min/1.73sq m Final    POC Glucose 07/28/2024 95  65 - 140 mg/dl Final    POC Glucose 07/28/2024 189 (H)  65 - 140 mg/dl Final    POC Glucose 07/28/2024 199 (H)  65 - 140 mg/dl Final    POC Glucose 07/28/2024 221 (H)  65 - 140 mg/dl Final    WBC 07/29/2024 5.44  4.31 - 10.16 Thousand/uL Final    RBC 07/29/2024 3.76 (L)  3.81 - 5.12 Million/uL Final    Hemoglobin 07/29/2024 10.3 (L)  11.5 - 15.4 g/dL Final    Hematocrit 07/29/2024 32.8 (L)  34.8 - 46.1 % Final    MCV 07/29/2024 87  82 - 98 fL Final    MCH 07/29/2024 27.4  26.8 - 34.3 pg Final    MCHC 07/29/2024 31.4  31.4 - 37.4 g/dL Final    RDW 07/29/2024 13.9  11.6 - 15.1 % Final    Platelets 07/29/2024 160  149 - 390 Thousands/uL Final    MPV 07/29/2024 12.5  8.9 - 12.7 fL Final    Sodium 07/29/2024 139  135 - 147 mmol/L Final    Potassium 07/29/2024 4.5  3.5 - 5.3 mmol/L Final    Chloride  07/29/2024 107  96 - 108 mmol/L Final    CO2 07/29/2024 29  21 - 32 mmol/L Final    ANION GAP 07/29/2024 3 (L)  4 - 13 mmol/L Final    BUN 07/29/2024 47 (H)  5 - 25 mg/dL Final    Creatinine 07/29/2024 1.11  0.60 - 1.30 mg/dL Final    Standardized to IDMS reference method    Glucose 07/29/2024 85  65 - 140 mg/dL Final    If the patient is fasting, the ADA then defines impaired fasting glucose as > 100 mg/dL and diabetes as > or equal to 123 mg/dL.    Calcium 07/29/2024 9.2  8.4 - 10.2 mg/dL Final    eGFR 07/29/2024 46  ml/min/1.73sq m Final    POC Glucose 07/29/2024 81  65 - 140 mg/dl Final    POC Glucose 07/29/2024 228 (H)  65 - 140 mg/dl Final    Supplier Name 07/29/2024 [employer]   Final-Edited    Supplier Phone Number 07/29/2024 [phone]   Final-Edited    Order Status 07/29/2024 Delivery Successful   Final-Edited    Delivery Note 07/29/2024 Please deliver to pt's home.   Final-Edited    Delivery Request Date 07/29/2024 07/29/2024   Final-Edited    Date Delivered  07/29/2024 07/30/2024   Final-Edited    Item Description 07/29/2024 3 in 1 [employer]   Final-Edited    Qty: 1       Imaging:   I have reviewed all relevant imaging  Please note:  This report has been generated by a voice recognition software system. Therefore there may be syntax, spelling, and/or grammatical errors. Please call if you have any questions.

## 2024-08-02 NOTE — UTILIZATION REVIEW
NOTIFICATION OF ADMISSION DISCHARGE   This is a Notification of Discharge from [facility]. Please be advised that this patient has been discharge from our facility. Below you will find the admission and discharge date and time including the patient’s disposition.   UTILIZATION REVIEW CONTACT:  Leilani Watson MA  Utilization   Network Utilization Review Department  Phone: [phone] x carefully listen to the prompts. All voicemails are confidential.  Email: [email or web address]     ADMISSION INFORMATION  PRESENTATION DATE: 7/26/2024 10:29 PM  OBERVATION ADMISSION DATE: 07/26/2024 2317  INPATIENT ADMISSION DATE: 7/27/24  2:09 PM   DISCHARGE DATE: 7/29/2024  1:55 PM   DISPOSITION:Home with Home Health Care    Network Utilization Review Department  ATTENTION: Please call with any questions or concerns to [phone] and carefully listen to the prompts so that you are directed to the right person. All voicemails are confidential.   For Discharge needs, contact Care Management DC Support Team at [phone] opt. 2  Send all requests for admission clinical reviews, approved or denied determinations and any other requests to dedicated fax number below belonging to the campus where the patient is receiving treatment. List of dedicated fax numbers for the Facilities:  FACILITY NAME UR FAX NUMBER   ADMISSION DENIALS (Administrative/Medical Necessity) [phone]   DISCHARGE SUPPORT TEAM ([facility]) [phone]   PARENT CHILD HEALTH (Maternity/NICU/Pediatrics) [phone]   [facility] [phone]   [facility] [phone]   Critical access hospital [phone]   [facility] [phone]   [facility] [phone]   [facility] [phone]   [facility] [phone]   [facility]  [facility] [phone]   [facility] [phone]   [facility] [phone]   [facility] [phone]   [facility] [phone]

## 2024-08-05 ENCOUNTER — PATIENT OUTREACH (OUTPATIENT)
Dept: CASE MANAGEMENT | Facility: OTHER | Age: 84
End: 2024-08-05

## 2024-08-05 NOTE — PROGRESS NOTES
Care management   Aileen outreached patient regarding the referral that was placed requesting assistance with Meals on Wheels.     Called patient granddaughter Waleska (per referral) message was left requesting a call back.    No other concerns at this time. Aileen will reach out to her tommorow.

## 2024-08-06 ENCOUNTER — APPOINTMENT (OUTPATIENT)
Dept: LAB | Facility: CLINIC | Age: 84
End: 2024-08-06
Payer: COMMERCIAL

## 2024-08-06 ENCOUNTER — PATIENT OUTREACH (OUTPATIENT)
Dept: CASE MANAGEMENT | Facility: OTHER | Age: 84
End: 2024-08-06

## 2024-08-06 DIAGNOSIS — N18.32 CKD STAGE 3B, GFR 30-44 ML/MIN (HCC): ICD-10-CM

## 2024-08-06 DIAGNOSIS — D72.819 LEUKOPENIA, UNSPECIFIED TYPE: ICD-10-CM

## 2024-08-06 DIAGNOSIS — D64.9 NORMOCYTIC ANEMIA: ICD-10-CM

## 2024-08-06 LAB
IGA SERPL-MCNC: 156 MG/DL (ref 66–433)
IGG SERPL-MCNC: 750 MG/DL (ref 635–1741)
IGM SERPL-MCNC: 176 MG/DL (ref 45–281)
LDH SERPL-CCNC: 172 U/L (ref 140–271)
RETICS # AUTO: NORMAL 10*3/UL (ref 14097–95744)
RETICS # CALC: 0.96 % (ref 0.37–1.87)

## 2024-08-06 PROCEDURE — 88184 FLOWCYTOMETRY/ TC 1 MARKER: CPT

## 2024-08-06 PROCEDURE — 84165 PROTEIN E-PHORESIS SERUM: CPT

## 2024-08-06 PROCEDURE — 88185 FLOWCYTOMETRY/TC ADD-ON: CPT | Performed by: PHYSICIAN ASSISTANT

## 2024-08-06 PROCEDURE — 83010 ASSAY OF HAPTOGLOBIN QUANT: CPT

## 2024-08-06 PROCEDURE — 82784 ASSAY IGA/IGD/IGG/IGM EACH: CPT

## 2024-08-06 PROCEDURE — 85007 BL SMEAR W/DIFF WBC COUNT: CPT

## 2024-08-06 PROCEDURE — 85045 AUTOMATED RETICULOCYTE COUNT: CPT

## 2024-08-06 PROCEDURE — 36415 COLL VENOUS BLD VENIPUNCTURE: CPT

## 2024-08-06 PROCEDURE — 83615 LACTATE (LD) (LDH) ENZYME: CPT

## 2024-08-06 PROCEDURE — 83521 IG LIGHT CHAINS FREE EACH: CPT

## 2024-08-06 PROCEDURE — 86334 IMMUNOFIX E-PHORESIS SERUM: CPT

## 2024-08-06 PROCEDURE — 84166 PROTEIN E-PHORESIS/URINE/CSF: CPT

## 2024-08-06 NOTE — PROGRESS NOTES
Care management outpatient coordinator Aileen outreached to grandmonica Galeano again regarding referral that was placed for assistance for the Meals on Wheels program. No contact was made was this 2nd outreach, message was left.    Care management  Aileen will send unable to reach letter via my chart.

## 2024-08-06 NOTE — LETTER
08/06/24    Estimado/a Italo Cool trabajador comunitario de la kecia de CARE MANAGEMENT [facility]  [address] [address] [phone].     Intenté comunicarme con usted por teléfono varias veces. Es importante que me llame al [phone] para que pueda ofrecerle ayuda con catracho necesidades de atención médica.     Atentamente.       Aileen, community management outpatient coordinator

## 2024-08-07 ENCOUNTER — PATIENT OUTREACH (OUTPATIENT)
Dept: CASE MANAGEMENT | Facility: OTHER | Age: 84
End: 2024-08-07

## 2024-08-07 LAB
ALBUMIN SERPL ELPH-MCNC: 3.44 G/DL (ref 3.2–5.1)
ALBUMIN SERPL ELPH-MCNC: 61.4 % (ref 48–70)
ALBUMIN UR ELPH-MCNC: 100 %
ALPHA1 GLOB MFR UR ELPH: 0 %
ALPHA1 GLOB SERPL ELPH-MCNC: 0.21 G/DL (ref 0.15–0.47)
ALPHA1 GLOB SERPL ELPH-MCNC: 3.8 % (ref 1.8–7)
ALPHA2 GLOB MFR UR ELPH: 0 %
ALPHA2 GLOB SERPL ELPH-MCNC: 0.5 G/DL (ref 0.42–1.04)
ALPHA2 GLOB SERPL ELPH-MCNC: 9 % (ref 5.9–14.9)
B-GLOBULIN MFR UR ELPH: 0 %
BASOPHILS NFR MAR MANUAL: 2 % (ref 0–1)
BETA GLOB ABNORMAL SERPL ELPH-MCNC: 0.44 G/DL (ref 0.31–0.57)
BETA1 GLOB SERPL ELPH-MCNC: 7.8 % (ref 4.7–7.7)
BETA2 GLOB SERPL ELPH-MCNC: 5.3 % (ref 3.1–7.9)
BETA2+GAMMA GLOB SERPL ELPH-MCNC: 0.3 G/DL (ref 0.2–0.58)
BLD SMEAR INTERP: NORMAL
GAMMA GLOB ABNORMAL SERPL ELPH-MCNC: 0.71 G/DL (ref 0.4–1.66)
GAMMA GLOB MFR UR ELPH: 0 %
GAMMA GLOB SERPL ELPH-MCNC: 12.7 % (ref 6.9–22.3)
IGG/ALB SER: 1.59 {RATIO} (ref 1.1–1.8)
INTERPRETATION UR IFE-IMP: NORMAL
LYMPHOCYTES # BLD AUTO: 9 %
MONOCYTES NFR BLD AUTO: 6 % (ref 4–12)
NEUTS SEG NFR BLD AUTO: 82 %
PLATELET BLD QL SMEAR: ADEQUATE
PROT PATTERN SERPL ELPH-IMP: ABNORMAL
PROT PATTERN UR ELPH-IMP: NORMAL
PROT SERPL-MCNC: 5.6 G/DL (ref 6.4–8.2)
PROT UR-MCNC: 31 MG/DL
RBC MORPH BLD: NORMAL
TOTAL CELLS COUNTED SPEC: 100
VARIANT LYMPHS # BLD AUTO: 1 % (ref 0–0)

## 2024-08-07 PROCEDURE — 86334 IMMUNOFIX E-PHORESIS SERUM: CPT | Performed by: PATHOLOGY

## 2024-08-07 PROCEDURE — 84166 PROTEIN E-PHORESIS/URINE/CSF: CPT | Performed by: PATHOLOGY

## 2024-08-07 PROCEDURE — 84165 PROTEIN E-PHORESIS SERUM: CPT | Performed by: PATHOLOGY

## 2024-08-07 NOTE — PROGRESS NOTES
Care management  [facility] outreached patient dipti Giles, left message and call was returned.    Patient requesting assistance with Meals on wheels will be done via Find Help.  Patient was referred on Findhelp to  Meals on wheels (program) for food.    Dipti Galeano was informed on process that after application is completed a  from [facility] will reach out to set up and discuss different meal plans. Patient is diabetic.     Dipti Galeano agreed and was pleased with the outreach. Informed that [facility] will reach out to her again in 1 week to check status.

## 2024-08-08 ENCOUNTER — OFFICE VISIT (OUTPATIENT)
Dept: FAMILY MEDICINE CLINIC | Facility: CLINIC | Age: 84
End: 2024-08-08
Payer: COMMERCIAL

## 2024-08-08 VITALS
WEIGHT: 129.4 LBS | OXYGEN SATURATION: 98 % | HEART RATE: 66 BPM | HEIGHT: 61 IN | BODY MASS INDEX: 24.43 KG/M2 | DIASTOLIC BLOOD PRESSURE: 80 MMHG | TEMPERATURE: 97.6 F | SYSTOLIC BLOOD PRESSURE: 200 MMHG

## 2024-08-08 DIAGNOSIS — R26.2 AMBULATORY DYSFUNCTION: ICD-10-CM

## 2024-08-08 DIAGNOSIS — F03.918 DEMENTIA WITH BEHAVIORAL DISTURBANCE (HCC): ICD-10-CM

## 2024-08-08 DIAGNOSIS — Z79.4 TYPE 2 DIABETES MELLITUS TREATED WITH INSULIN (HCC): Primary | ICD-10-CM

## 2024-08-08 DIAGNOSIS — E11.9 TYPE 2 DIABETES MELLITUS TREATED WITH INSULIN (HCC): Primary | ICD-10-CM

## 2024-08-08 DIAGNOSIS — R26.81 UNSTABLE GAIT: ICD-10-CM

## 2024-08-08 DIAGNOSIS — I10 ESSENTIAL HYPERTENSION: ICD-10-CM

## 2024-08-08 DIAGNOSIS — M54.50 CHRONIC MIDLINE LOW BACK PAIN WITHOUT SCIATICA: ICD-10-CM

## 2024-08-08 DIAGNOSIS — N18.32 CHRONIC KIDNEY DISEASE, STAGE 3B (HCC): Chronic | ICD-10-CM

## 2024-08-08 DIAGNOSIS — G89.29 CHRONIC MIDLINE LOW BACK PAIN WITHOUT SCIATICA: ICD-10-CM

## 2024-08-08 DIAGNOSIS — F41.9 ANXIETY: ICD-10-CM

## 2024-08-08 DIAGNOSIS — E03.9 HYPOTHYROIDISM, UNSPECIFIED TYPE: ICD-10-CM

## 2024-08-08 DIAGNOSIS — E78.1 HYPERTRIGLYCERIDEMIA: ICD-10-CM

## 2024-08-08 DIAGNOSIS — F32.1 MAJOR DEPRESSIVE DISORDER, SINGLE EPISODE, MODERATE (HCC): ICD-10-CM

## 2024-08-08 LAB
DME PARACHUTE DELIVERY DATE REQUESTED: NORMAL
DME PARACHUTE ITEM DESCRIPTION: NORMAL
DME PARACHUTE ORDER STATUS: NORMAL
DME PARACHUTE SUPPLIER NAME: NORMAL
DME PARACHUTE SUPPLIER PHONE: NORMAL
HAPTOGLOB SERPL-MCNC: 12 MG/DL (ref 41–333)
KAPPA LC FREE SER-MCNC: 45.6 MG/L (ref 3.3–19.4)
KAPPA LC FREE/LAMBDA FREE SER: 0.42 {RATIO} (ref 0.26–1.65)
LAMBDA LC FREE SERPL-MCNC: 107.6 MG/L (ref 5.7–26.3)
SL AMB POCT HEMOGLOBIN AIC: 7.8 (ref ?–6.5)

## 2024-08-08 PROCEDURE — 99495 TRANSJ CARE MGMT MOD F2F 14D: CPT | Performed by: PHYSICIAN ASSISTANT

## 2024-08-08 PROCEDURE — 83036 HEMOGLOBIN GLYCOSYLATED A1C: CPT | Performed by: PHYSICIAN ASSISTANT

## 2024-08-08 RX ORDER — MEMANTINE HYDROCHLORIDE 10 MG/1
10 TABLET ORAL 2 TIMES DAILY
Qty: 200 TABLET | Refills: 1 | Status: SHIPPED | OUTPATIENT
Start: 2024-08-08

## 2024-08-08 RX ORDER — HYDROCHLOROTHIAZIDE 25 MG/1
25 TABLET ORAL DAILY
Qty: 90 TABLET | Refills: 1 | Status: SHIPPED | OUTPATIENT
Start: 2024-08-08 | End: 2025-02-04

## 2024-08-08 RX ORDER — AMLODIPINE BESYLATE 10 MG/1
10 TABLET ORAL DAILY
Qty: 90 TABLET | Refills: 1 | Status: SHIPPED | OUTPATIENT
Start: 2024-08-08 | End: 2025-02-04

## 2024-08-08 RX ORDER — FENOFIBRATE 160 MG/1
160 TABLET ORAL DAILY
Qty: 90 TABLET | Refills: 1 | Status: SHIPPED | OUTPATIENT
Start: 2024-08-08

## 2024-08-08 RX ORDER — INSULIN DETEMIR 100 [IU]/ML
12 INJECTION, SOLUTION SUBCUTANEOUS EVERY 12 HOURS SCHEDULED
Qty: 21.6 ML | Refills: 1 | Status: ON HOLD | OUTPATIENT
Start: 2024-08-08 | End: 2024-08-15

## 2024-08-08 RX ORDER — LEVOTHYROXINE SODIUM 0.1 MG/1
100 TABLET ORAL DAILY
Qty: 90 TABLET | Refills: 1 | Status: SHIPPED | OUTPATIENT
Start: 2024-08-08

## 2024-08-08 RX ORDER — LISINOPRIL 40 MG/1
40 TABLET ORAL DAILY
Qty: 90 TABLET | Refills: 1 | Status: SHIPPED | OUTPATIENT
Start: 2024-08-08 | End: 2025-02-04

## 2024-08-08 RX ORDER — HYDRALAZINE HYDROCHLORIDE 10 MG/1
10 TABLET, FILM COATED ORAL 3 TIMES DAILY PRN
Qty: 90 TABLET | Refills: 5 | Status: SHIPPED | OUTPATIENT
Start: 2024-08-08 | End: 2024-08-15

## 2024-08-08 RX ORDER — GABAPENTIN 100 MG/1
100 CAPSULE ORAL 3 TIMES DAILY
Qty: 270 CAPSULE | Refills: 0 | Status: SHIPPED | OUTPATIENT
Start: 2024-08-08 | End: 2024-11-06

## 2024-08-08 RX ORDER — GABAPENTIN 100 MG/1
100 CAPSULE ORAL
Qty: 90 CAPSULE | Refills: 0 | Status: SHIPPED | OUTPATIENT
Start: 2024-08-08 | End: 2024-08-08

## 2024-08-08 NOTE — PROGRESS NOTES
Transition of Care Visit  Name: Michelle Villatoro      : 1940      MRN: [id number]  Encounter Provider: Michael Alvarez PA-C  Encounter Date: 2024   Encounter department: [facility]    Assessment & Plan   1. Type 2 diabetes mellitus treated with insulin (HCC)  -     POCT hemoglobin A1c  -     lisinopril (ZESTRIL) 40 mg tablet; Take 1 tablet (40 mg total) by mouth daily In the morning  -     insulin detemir (Levemir FlexTouch) 100 Units/mL injection pen; Inject 12 Units under the skin every 12 (twelve) hours Take 12 units am and 12 units pm  -     gabapentin (Neurontin) 100 mg capsule; Take 1 capsule (100 mg total) by mouth 3 (three) times a day  -     Ambulatory referral to Diabetic Education - use to refer for diabetes group classes, individual diabetes education, medical nutrition therapy, device training; Future; Expected date: 2024  2. Essential hypertension  -     amLODIPine (NORVASC) 10 mg tablet; Take 1 tablet (10 mg total) by mouth daily At night before bed  -     hydroCHLOROthiazide 25 mg tablet; Take 1 tablet (25 mg total) by mouth daily  -     hydrALAZINE (APRESOLINE) 10 mg tablet; Take 1 tablet (10 mg total) by mouth 3 (three) times a day as needed (sbp >180)  3. Hypothyroidism, unspecified type  -     levothyroxine 100 mcg tablet; Take 1 tablet (100 mcg total) by mouth daily  4. Anxiety  -     sertraline (ZOLOFT) 50 mg tablet; Take 1 tablet (50 mg total) by mouth daily  5. Dementia with behavioral disturbance (HCC)  -     memantine (NAMENDA) 10 mg tablet; Take 1 tablet (10 mg total) by mouth 2 (two) times a day  6. Hypertriglyceridemia  -     fenofibrate 160 MG tablet; Take 1 tablet (160 mg total) by mouth daily  7. Chronic kidney disease, stage 3b ([facility])  8. Major depressive disorder, single episode, moderate ([facility])  9. Ambulatory dysfunction  10. Unstable gait  11. Chronic midline low back pain without sciatica     Continue levemir 12u BIDwm and carb steady diet.  "Refer to DM education. If continues to be erratic consider once daily insulin dosing and addition of GLP1 therapy.   BP very high, add prn hydralazine for SBP >180, this was successful during admission  Pt should have 24 hour care from home health team, she is not safe to be left alone as she is a fall risk and at risk for hypoglycemia. Pt has follow up scheduled in 3 weeks. She would benefit from hospital bed given her limited mobility, fall risk.   Follow up earlier prn    History of Present Illness     Transitional Care Management Review:   Michelle Villatoro is a 84 y.o. female here for TCM follow up.     During the TCM phone call patient stated:  TCM Call       Date and time call was made  7/29/2024  2:09 PM    Hospital care reviewed  Records reviewed    Patient was hospitialized at  [facility]    Date of Admission  07/26/24    Date of discharge  07/29/24    Diagnosis  Type 2 diabetes mellitus with hyperglycemia    Disposition  Home    Were the patients medications reviewed and updated  No    Current Symptoms  None          TCM Call       Post hospital issues  None    Should patient be enrolled in anticoag monitoring?  No    Scheduled for follow up?  Yes    I have advised the patient to call PCP with any new or worsening symptoms  DARÍO CULVER    Living Arrangements  Family members    Comments  appt 7/20/2021          Hospital course from discharge summary written by hari de los santos 7/29/2024  \"Michelle Villatoro is a 84 y.o. female patient who originally presented to the hospital on 7/26/2024 due to symptomatic hypoglycemia. She had recent changes in her medication due to hypoglycemia.  She also had hypertensive emergency resolved after receiving her home medications.  She was observed in the hospital a day and her hospitalization was unremarkable.  She was discharged home with home health care to the care of her granddaughter who takes care of her.  Extensive education was given to granddaughter.  She " "verbalized understanding.  Granddaughter translated in Bahamian to her grandmother who indicated that she understood and had no questions.\"    TCM 8/8/2024  DM regimen is now levemir 12u BIDwm. No SSI. No more invokana. BP regimen is amlodipine 10mg, hctz 25mg and lisinopril 40mg. Sugars have improved but still erratic, sometimes 200 sometimes in the 40s. Now lower in morning and higher in evening. She is very weak. She has home health nursing but nurses leave around dinner time. Pt with alzheimers dementia, family provides care in evenings/nights. Her BP today is again very high 200/80      Review of Systems   Constitutional:  Positive for fatigue. Negative for chills and fever.   HENT:  Negative for congestion, ear pain, hearing loss, nosebleeds, postnasal drip, rhinorrhea, sinus pressure, sinus pain, sneezing and sore throat.    Eyes:  Negative for pain, discharge, itching and visual disturbance.   Respiratory:  Negative for cough, chest tightness, shortness of breath and wheezing.    Cardiovascular:  Negative for chest pain, palpitations and leg swelling.   Gastrointestinal:  Negative for abdominal pain, blood in stool, constipation, diarrhea, nausea and vomiting.   Genitourinary:  Negative for frequency and urgency.   Neurological:  Positive for weakness. Negative for dizziness, light-headedness and numbness.     Objective     BP (!) 200/80   Pulse 66   Temp 97.6 °F (36.4 °C)   Ht 5' 1\" (1.549 m)   Wt 58.7 kg (129 lb 6.4 oz)   SpO2 98%   BMI 24.45 kg/m²     Physical Exam  Vitals and nursing note reviewed.   Constitutional:       General: She is not in acute distress.     Appearance: She is well-developed.   HENT:      Head: Normocephalic and atraumatic.   Eyes:      Conjunctiva/sclera: Conjunctivae normal.   Cardiovascular:      Rate and Rhythm: Normal rate and regular rhythm.      Heart sounds: No murmur heard.  Pulmonary:      Effort: Pulmonary effort is normal. No respiratory distress.      Breath " sounds: Normal breath sounds. No wheezing, rhonchi or rales.   Musculoskeletal:         General: No swelling.      Cervical back: Neck supple.   Skin:     General: Skin is warm and dry.      Capillary Refill: Capillary refill takes less than 2 seconds.   Neurological:      Mental Status: She is alert.   Psychiatric:         Mood and Affect: Mood normal.       Medications have been reviewed by provider in current encounter    Administrative Statements

## 2024-08-09 LAB
DME PARACHUTE DELIVERY DATE EXPECTED: NORMAL
DME PARACHUTE DELIVERY DATE REQUESTED: NORMAL
DME PARACHUTE ITEM DESCRIPTION: NORMAL
DME PARACHUTE ORDER STATUS: NORMAL
DME PARACHUTE SUPPLIER NAME: NORMAL
DME PARACHUTE SUPPLIER PHONE: NORMAL

## 2024-08-12 ENCOUNTER — HOSPITAL ENCOUNTER (OUTPATIENT)
Dept: NON INVASIVE DIAGNOSTICS | Facility: CLINIC | Age: 84
Discharge: HOME | End: 2024-08-12
Payer: COMMERCIAL

## 2024-08-12 VITALS
BODY MASS INDEX: 24.35 KG/M2 | WEIGHT: 129 LBS | HEIGHT: 61 IN | DIASTOLIC BLOOD PRESSURE: 72 MMHG | HEART RATE: 70 BPM | SYSTOLIC BLOOD PRESSURE: 126 MMHG

## 2024-08-12 DIAGNOSIS — R01.1 CARDIAC MURMUR: ICD-10-CM

## 2024-08-12 DIAGNOSIS — I10 ESSENTIAL HYPERTENSION: ICD-10-CM

## 2024-08-12 LAB
AORTIC ROOT: 2.7 CM
APICAL FOUR CHAMBER EJECTION FRACTION: 63 %
ASCENDING AORTA: 2.8 CM
BSA FOR ECHO PROCEDURE: 1.57 M2
DME PARACHUTE DELIVERY DATE ACTUAL: NORMAL
DME PARACHUTE DELIVERY DATE EXPECTED: NORMAL
DME PARACHUTE DELIVERY DATE REQUESTED: NORMAL
DME PARACHUTE ITEM DESCRIPTION: NORMAL
DME PARACHUTE ORDER STATUS: NORMAL
DME PARACHUTE SUPPLIER NAME: NORMAL
DME PARACHUTE SUPPLIER PHONE: NORMAL
E WAVE DECELERATION TIME: 133 MS
E/A RATIO: 1.14
FRACTIONAL SHORTENING: 33 (ref 28–44)
INTERVENTRICULAR SEPTUM IN DIASTOLE (PARASTERNAL SHORT AXIS VIEW): 1 CM
INTERVENTRICULAR SEPTUM: 1 CM (ref 0.6–1.1)
LAAS-AP2: 13.2 CM2
LAAS-AP4: 25.6 CM2
LEFT ATRIUM SIZE: 3.7 CM
LEFT ATRIUM VOLUME (MOD BIPLANE): 57 ML
LEFT ATRIUM VOLUME INDEX (MOD BIPLANE): 36.3 ML/M2
LEFT INTERNAL DIMENSION IN SYSTOLE: 3.2 CM (ref 2.1–4)
LEFT VENTRICULAR INTERNAL DIMENSION IN DIASTOLE: 4.8 CM (ref 3.5–6)
LEFT VENTRICULAR POSTERIOR WALL IN END DIASTOLE: 1 CM
LEFT VENTRICULAR STROKE VOLUME: 65 ML
LVSV (TEICH): 65 ML
MV E'TISSUE VEL-SEP: 8 CM/S
MV PEAK A VEL: 0.91 M/S
MV PEAK E VEL: 104 CM/S
MV STENOSIS PRESSURE HALF TIME: 39 MS
MV VALVE AREA P 1/2 METHOD: 5.64
RIGHT ATRIUM AREA SYSTOLE A4C: 13.3 CM2
RIGHT VENTRICLE ID DIMENSION: 2.8 CM
SCAN RESULT: NORMAL
SL CV LEFT ATRIUM LENGTH A2C: 4.7 CM
SL CV LV EF: 60
SL CV PED ECHO LEFT VENTRICLE DIASTOLIC VOLUME (MOD BIPLANE) 2D: 106 ML
SL CV PED ECHO LEFT VENTRICLE SYSTOLIC VOLUME (MOD BIPLANE) 2D: 41 ML
TR MAX PG: 33 MMHG
TR PEAK VELOCITY: 2.9 M/S
TRICUSPID ANNULAR PLANE SYSTOLIC EXCURSION: 2.4 CM
TRICUSPID VALVE PEAK REGURGITATION VELOCITY: 2.86 M/S

## 2024-08-12 PROCEDURE — 93306 TTE W/DOPPLER COMPLETE: CPT | Performed by: INTERNAL MEDICINE

## 2024-08-12 PROCEDURE — 93306 TTE W/DOPPLER COMPLETE: CPT

## 2024-08-14 ENCOUNTER — APPOINTMENT (EMERGENCY)
Dept: RADIOLOGY | Facility: HOSPITAL | Age: 84
End: 2024-08-14
Payer: COMMERCIAL

## 2024-08-14 ENCOUNTER — APPOINTMENT (EMERGENCY)
Dept: CT IMAGING | Facility: HOSPITAL | Age: 84
End: 2024-08-14
Payer: COMMERCIAL

## 2024-08-14 ENCOUNTER — NURSE TRIAGE (OUTPATIENT)
Age: 84
End: 2024-08-14

## 2024-08-14 ENCOUNTER — HOSPITAL ENCOUNTER (OUTPATIENT)
Facility: HOSPITAL | Age: 84
Setting detail: OBSERVATION
Discharge: HOME HEALTH | End: 2024-08-15
Attending: INTERNAL MEDICINE | Admitting: HOSPITALIST
Payer: COMMERCIAL

## 2024-08-14 ENCOUNTER — PATIENT OUTREACH (OUTPATIENT)
Dept: CASE MANAGEMENT | Facility: OTHER | Age: 84
End: 2024-08-14

## 2024-08-14 DIAGNOSIS — F03.918 DEMENTIA WITH BEHAVIORAL DISTURBANCE (HCC): Chronic | ICD-10-CM

## 2024-08-14 DIAGNOSIS — E11.9 TYPE 2 DIABETES MELLITUS TREATED WITH INSULIN (HCC): ICD-10-CM

## 2024-08-14 DIAGNOSIS — Z79.4 TYPE 2 DIABETES MELLITUS TREATED WITH INSULIN (HCC): ICD-10-CM

## 2024-08-14 DIAGNOSIS — I16.0 HYPERTENSIVE URGENCY: Primary | ICD-10-CM

## 2024-08-14 DIAGNOSIS — E16.2 HYPOGLYCEMIA: ICD-10-CM

## 2024-08-14 LAB
2HR DELTA HS TROPONIN: 0 NG/L
4HR DELTA HS TROPONIN: 0 NG/L
ALBUMIN SERPL BCG-MCNC: 3.7 G/DL (ref 3.5–5)
ALP SERPL-CCNC: 28 U/L (ref 34–104)
ALT SERPL W P-5'-P-CCNC: 14 U/L (ref 7–52)
ANION GAP SERPL CALCULATED.3IONS-SCNC: 3 MMOL/L (ref 4–13)
AST SERPL W P-5'-P-CCNC: 16 U/L (ref 13–39)
ATRIAL RATE: 65 BPM
ATRIAL RATE: 67 BPM
BASOPHILS # BLD AUTO: 0.03 THOUSANDS/ÂΜL (ref 0–0.1)
BASOPHILS NFR BLD AUTO: 1 % (ref 0–1)
BILIRUB SERPL-MCNC: 0.38 MG/DL (ref 0.2–1)
BNP SERPL-MCNC: 374 PG/ML (ref 0–100)
BUN SERPL-MCNC: 26 MG/DL (ref 5–25)
CALCIUM SERPL-MCNC: 9.9 MG/DL (ref 8.4–10.2)
CARDIAC TROPONIN I PNL SERPL HS: 8 NG/L
CHLORIDE SERPL-SCNC: 108 MMOL/L (ref 96–108)
CO2 SERPL-SCNC: 27 MMOL/L (ref 21–32)
CREAT SERPL-MCNC: 1.08 MG/DL (ref 0.6–1.3)
EOSINOPHIL # BLD AUTO: 0.1 THOUSAND/ÂΜL (ref 0–0.61)
EOSINOPHIL NFR BLD AUTO: 2 % (ref 0–6)
ERYTHROCYTE [DISTWIDTH] IN BLOOD BY AUTOMATED COUNT: 14.4 % (ref 11.6–15.1)
GFR SERPL CREATININE-BSD FRML MDRD: 47 ML/MIN/1.73SQ M
GLUCOSE SERPL-MCNC: 104 MG/DL (ref 65–140)
GLUCOSE SERPL-MCNC: 140 MG/DL (ref 65–140)
GLUCOSE SERPL-MCNC: 168 MG/DL (ref 65–140)
GLUCOSE SERPL-MCNC: 233 MG/DL (ref 65–140)
GLUCOSE SERPL-MCNC: 55 MG/DL (ref 65–140)
GLUCOSE SERPL-MCNC: 55 MG/DL (ref 65–140)
HCT VFR BLD AUTO: 30.6 % (ref 34.8–46.1)
HGB BLD-MCNC: 9.6 G/DL (ref 11.5–15.4)
IMM GRANULOCYTES # BLD AUTO: 0.01 THOUSAND/UL (ref 0–0.2)
IMM GRANULOCYTES NFR BLD AUTO: 0 % (ref 0–2)
LYMPHOCYTES # BLD AUTO: 1.17 THOUSANDS/ÂΜL (ref 0.6–4.47)
LYMPHOCYTES NFR BLD AUTO: 24 % (ref 14–44)
MCH RBC QN AUTO: 28.2 PG (ref 26.8–34.3)
MCHC RBC AUTO-ENTMCNC: 31.4 G/DL (ref 31.4–37.4)
MCV RBC AUTO: 90 FL (ref 82–98)
MONOCYTES # BLD AUTO: 0.27 THOUSAND/ÂΜL (ref 0.17–1.22)
MONOCYTES NFR BLD AUTO: 6 % (ref 4–12)
NEUTROPHILS # BLD AUTO: 3.36 THOUSANDS/ÂΜL (ref 1.85–7.62)
NEUTS SEG NFR BLD AUTO: 67 % (ref 43–75)
NRBC BLD AUTO-RTO: 0 /100 WBCS
P AXIS: 55 DEGREES
P AXIS: 77 DEGREES
PLATELET # BLD AUTO: 146 THOUSANDS/UL (ref 149–390)
PMV BLD AUTO: 12 FL (ref 8.9–12.7)
POTASSIUM SERPL-SCNC: 4.9 MMOL/L (ref 3.5–5.3)
PR INTERVAL: 190 MS
PR INTERVAL: 204 MS
PROT SERPL-MCNC: 6.2 G/DL (ref 6.4–8.4)
QRS AXIS: -43 DEGREES
QRS AXIS: -44 DEGREES
QRSD INTERVAL: 100 MS
QRSD INTERVAL: 102 MS
QT INTERVAL: 368 MS
QT INTERVAL: 390 MS
QTC INTERVAL: 388 MS
QTC INTERVAL: 405 MS
RBC # BLD AUTO: 3.41 MILLION/UL (ref 3.81–5.12)
SODIUM SERPL-SCNC: 138 MMOL/L (ref 135–147)
T WAVE AXIS: 66 DEGREES
T WAVE AXIS: 68 DEGREES
VENTRICULAR RATE: 65 BPM
VENTRICULAR RATE: 67 BPM
WBC # BLD AUTO: 4.94 THOUSAND/UL (ref 4.31–10.16)

## 2024-08-14 PROCEDURE — 99285 EMERGENCY DEPT VISIT HI MDM: CPT

## 2024-08-14 PROCEDURE — 36415 COLL VENOUS BLD VENIPUNCTURE: CPT | Performed by: INTERNAL MEDICINE

## 2024-08-14 PROCEDURE — 83880 ASSAY OF NATRIURETIC PEPTIDE: CPT | Performed by: INTERNAL MEDICINE

## 2024-08-14 PROCEDURE — 82948 REAGENT STRIP/BLOOD GLUCOSE: CPT

## 2024-08-14 PROCEDURE — 99223 1ST HOSP IP/OBS HIGH 75: CPT | Performed by: INTERNAL MEDICINE

## 2024-08-14 PROCEDURE — 93005 ELECTROCARDIOGRAM TRACING: CPT

## 2024-08-14 PROCEDURE — 93010 ELECTROCARDIOGRAM REPORT: CPT | Performed by: INTERNAL MEDICINE

## 2024-08-14 PROCEDURE — 82728 ASSAY OF FERRITIN: CPT | Performed by: INTERNAL MEDICINE

## 2024-08-14 PROCEDURE — 83540 ASSAY OF IRON: CPT | Performed by: INTERNAL MEDICINE

## 2024-08-14 PROCEDURE — 71045 X-RAY EXAM CHEST 1 VIEW: CPT

## 2024-08-14 PROCEDURE — 70450 CT HEAD/BRAIN W/O DYE: CPT

## 2024-08-14 PROCEDURE — 96376 TX/PRO/DX INJ SAME DRUG ADON: CPT

## 2024-08-14 PROCEDURE — 96374 THER/PROPH/DIAG INJ IV PUSH: CPT

## 2024-08-14 PROCEDURE — 83550 IRON BINDING TEST: CPT | Performed by: INTERNAL MEDICINE

## 2024-08-14 PROCEDURE — 96375 TX/PRO/DX INJ NEW DRUG ADDON: CPT

## 2024-08-14 PROCEDURE — 85025 COMPLETE CBC W/AUTO DIFF WBC: CPT | Performed by: INTERNAL MEDICINE

## 2024-08-14 PROCEDURE — 80053 COMPREHEN METABOLIC PANEL: CPT | Performed by: INTERNAL MEDICINE

## 2024-08-14 PROCEDURE — 84484 ASSAY OF TROPONIN QUANT: CPT | Performed by: INTERNAL MEDICINE

## 2024-08-14 RX ORDER — HYDRALAZINE HYDROCHLORIDE 10 MG/1
10 TABLET, FILM COATED ORAL EVERY 8 HOURS SCHEDULED
Status: DISCONTINUED | OUTPATIENT
Start: 2024-08-14 | End: 2024-08-15

## 2024-08-14 RX ORDER — INSULIN LISPRO 100 [IU]/ML
1-5 INJECTION, SOLUTION INTRAVENOUS; SUBCUTANEOUS
Status: DISCONTINUED | OUTPATIENT
Start: 2024-08-14 | End: 2024-08-15 | Stop reason: HOSPADM

## 2024-08-14 RX ORDER — ACETAMINOPHEN 325 MG/1
650 TABLET ORAL EVERY 6 HOURS PRN
Status: DISCONTINUED | OUTPATIENT
Start: 2024-08-14 | End: 2024-08-15 | Stop reason: HOSPADM

## 2024-08-14 RX ORDER — LABETALOL HYDROCHLORIDE 5 MG/ML
10 INJECTION, SOLUTION INTRAVENOUS ONCE
Status: COMPLETED | OUTPATIENT
Start: 2024-08-14 | End: 2024-08-14

## 2024-08-14 RX ORDER — FENOFIBRATE 145 MG/1
145 TABLET, COATED ORAL DAILY
Status: DISCONTINUED | OUTPATIENT
Start: 2024-08-15 | End: 2024-08-15 | Stop reason: HOSPADM

## 2024-08-14 RX ORDER — SODIUM CHLORIDE 9 MG/ML
3 INJECTION INTRAVENOUS
Status: DISCONTINUED | OUTPATIENT
Start: 2024-08-14 | End: 2024-08-15 | Stop reason: HOSPADM

## 2024-08-14 RX ORDER — LABETALOL HYDROCHLORIDE 5 MG/ML
10 INJECTION, SOLUTION INTRAVENOUS EVERY 6 HOURS PRN
Status: DISCONTINUED | OUTPATIENT
Start: 2024-08-14 | End: 2024-08-14

## 2024-08-14 RX ORDER — LISINOPRIL 20 MG/1
40 TABLET ORAL DAILY
Status: DISCONTINUED | OUTPATIENT
Start: 2024-08-15 | End: 2024-08-15 | Stop reason: HOSPADM

## 2024-08-14 RX ORDER — ONDANSETRON 2 MG/ML
4 INJECTION INTRAMUSCULAR; INTRAVENOUS EVERY 6 HOURS PRN
Status: DISCONTINUED | OUTPATIENT
Start: 2024-08-14 | End: 2024-08-15 | Stop reason: HOSPADM

## 2024-08-14 RX ORDER — GABAPENTIN 100 MG/1
100 CAPSULE ORAL 2 TIMES DAILY
Status: DISCONTINUED | OUTPATIENT
Start: 2024-08-14 | End: 2024-08-14

## 2024-08-14 RX ORDER — HYDRALAZINE HYDROCHLORIDE 10 MG/1
10 TABLET, FILM COATED ORAL 3 TIMES DAILY PRN
Status: DISCONTINUED | OUTPATIENT
Start: 2024-08-14 | End: 2024-08-15 | Stop reason: HOSPADM

## 2024-08-14 RX ORDER — MEMANTINE HYDROCHLORIDE 5 MG/1
10 TABLET ORAL
Status: DISCONTINUED | OUTPATIENT
Start: 2024-08-14 | End: 2024-08-15 | Stop reason: HOSPADM

## 2024-08-14 RX ORDER — PANTOPRAZOLE SODIUM 20 MG/1
20 TABLET, DELAYED RELEASE ORAL
Status: DISCONTINUED | OUTPATIENT
Start: 2024-08-15 | End: 2024-08-15 | Stop reason: HOSPADM

## 2024-08-14 RX ORDER — GABAPENTIN 100 MG/1
100 CAPSULE ORAL 2 TIMES DAILY
Status: DISCONTINUED | OUTPATIENT
Start: 2024-08-14 | End: 2024-08-15 | Stop reason: HOSPADM

## 2024-08-14 RX ORDER — AMLODIPINE BESYLATE 10 MG/1
10 TABLET ORAL DAILY
Status: DISCONTINUED | OUTPATIENT
Start: 2024-08-15 | End: 2024-08-15 | Stop reason: HOSPADM

## 2024-08-14 RX ORDER — HYDRALAZINE HYDROCHLORIDE 20 MG/ML
5 INJECTION INTRAMUSCULAR; INTRAVENOUS EVERY 6 HOURS PRN
Status: DISCONTINUED | OUTPATIENT
Start: 2024-08-14 | End: 2024-08-15 | Stop reason: HOSPADM

## 2024-08-14 RX ORDER — LEVOTHYROXINE SODIUM 100 UG/1
100 TABLET ORAL
Status: DISCONTINUED | OUTPATIENT
Start: 2024-08-15 | End: 2024-08-15 | Stop reason: HOSPADM

## 2024-08-14 RX ORDER — DEXTROSE MONOHYDRATE 25 G/50ML
25 INJECTION, SOLUTION INTRAVENOUS ONCE
Status: COMPLETED | OUTPATIENT
Start: 2024-08-14 | End: 2024-08-14

## 2024-08-14 RX ORDER — ACETAMINOPHEN 325 MG/1
650 TABLET ORAL ONCE
Status: COMPLETED | OUTPATIENT
Start: 2024-08-14 | End: 2024-08-14

## 2024-08-14 RX ORDER — ENOXAPARIN SODIUM 100 MG/ML
40 INJECTION SUBCUTANEOUS DAILY
Status: DISCONTINUED | OUTPATIENT
Start: 2024-08-15 | End: 2024-08-15

## 2024-08-14 RX ADMIN — DEXTROSE MONOHYDRATE 25 ML: 25 INJECTION, SOLUTION INTRAVENOUS at 14:46

## 2024-08-14 RX ADMIN — LABETALOL HYDROCHLORIDE 10 MG: 5 INJECTION, SOLUTION INTRAVENOUS at 15:44

## 2024-08-14 RX ADMIN — INSULIN LISPRO 2 UNITS: 100 INJECTION, SOLUTION INTRAVENOUS; SUBCUTANEOUS at 22:07

## 2024-08-14 RX ADMIN — HYDRALAZINE HYDROCHLORIDE 10 MG: 10 TABLET, FILM COATED ORAL at 18:15

## 2024-08-14 RX ADMIN — LABETALOL HYDROCHLORIDE 10 MG: 5 INJECTION, SOLUTION INTRAVENOUS at 13:17

## 2024-08-14 RX ADMIN — GABAPENTIN 100 MG: 100 CAPSULE ORAL at 21:55

## 2024-08-14 RX ADMIN — ACETAMINOPHEN 650 MG: 325 TABLET ORAL at 21:55

## 2024-08-14 RX ADMIN — ACETAMINOPHEN 650 MG: 325 TABLET ORAL at 14:33

## 2024-08-14 RX ADMIN — MEMANTINE 10 MG: 5 TABLET ORAL at 21:55

## 2024-08-14 NOTE — ED NOTES
BG found to be 55, patient alert, provided with crackers, peanut butter, and orange juice.      Brittany Purcell RN  08/14/24 [id number]

## 2024-08-14 NOTE — ASSESSMENT & PLAN NOTE
Patient with SBP greater than 200 in the ER with associated chest pain  Troponins have been negative  Patient had echocardiogram performed 2 days ago with normal LV size and function  Patient received IV labetalol in the ER, BP gradually improving  Will continue home amlodipine, hydralazine, and lisinopril  IV hydralazine as needed

## 2024-08-14 NOTE — PROGRESS NOTES
ADT alert received and chart review completed. Pt reported to [facility] 8/14 for symptomatic hypertension and hypoglycemia.     RN CM on care team to follow up at discharge.

## 2024-08-14 NOTE — ASSESSMENT & PLAN NOTE
Likely diabetic neuropathy.  Patient recently given prescription for gabapentin however has not started it yet.  Will initiate gabapentin 100 mg twice daily

## 2024-08-14 NOTE — H&P
[facility]  H&P  Name: Michelle Villatoro 84 y.o. female I MRN: [id number]  Unit/Bed#: ED 02 I Date of Admission: 8/14/2024   Date of Service: 8/14/2024 I Hospital Day: 0      Assessment & Plan   * Hypertensive emergency  Assessment & Plan  Patient with SBP greater than 200 in the ER with associated chest pain  Troponins have been negative  Patient had echocardiogram performed 2 days ago with normal LV size and function  Patient received IV labetalol in the ER, BP gradually improving  Will continue home amlodipine, hydralazine, and lisinopril  IV hydralazine as needed    Hypoglycemia  Assessment & Plan  Patient with diabetes and on insulin at home.  Per granddaughter at bedside, long-acting insulin dose recently decreased from 20 units in the morning and 10 units at bedtime to 12 units twice daily  Will hold off on long-acting insulin for now  Continue insulin sliding scale  Patient received D50 in the ER  We will initiate D5 fluids as needed pending glucose trends    Major depressive disorder, single episode, moderate (HCC)  Assessment & Plan  Continue Zoloft    Peripheral neuropathy  Assessment & Plan  Likely diabetic neuropathy.  Patient recently given prescription for gabapentin however has not started it yet.  Will initiate gabapentin 100 mg twice daily    Alzheimer's dementia (HCC)  Assessment & Plan  Continue Namenda         VTE Pharmacologic Prophylaxis:   Moderate Risk (Score 3-4) - Pharmacological DVT Prophylaxis Ordered: enoxaparin (Lovenox).  Code Status: Prior full code  Discussion with family: Updated  (granddaughter) at bedside.    Anticipated Length of Stay: Patient will be admitted on an observation basis with an anticipated length of stay of less than 2 midnights secondary to hypertensive emergency.    Total Time Spent on Date of Encounter in care of patient: 70 mins. This time was spent on one or more of the following: performing physical exam; counseling and  coordination of care; obtaining or reviewing history; documenting in the medical record; reviewing/ordering tests, medications or procedures; communicating with other healthcare professionals and discussing with patient's family/caregivers.    Chief Complaint: Chest pain    History of Present Illness:  Michelle Villatoro is a 84 y.o. female with a PMH of diabetes mellitus, Alzheimer's dementia, hypertension, rheumatoid arthritis who presents with chest pain.  Patient had home visiting nurse come to the house today and noted to have elevated blood pressure.  Given significantly elevated blood pressure patient was referred to the ER.  Associated with chest pain.  Also complaining of pain in bilateral lower extremities.  Recently received prescription for gabapentin which she has not started yet.    Review of Systems:  Review of Systems   Cardiovascular:  Positive for chest pain.   Musculoskeletal:  Positive for gait problem and myalgias.   All other systems reviewed and are negative.      Past Medical and Surgical History:   Past Medical History:   Diagnosis Date    Aggression     Alzheimer's dementia (HCC)     Arthritis     Dementia (HCC)     Diabetes insipidus (HCC)     Diabetes mellitus (HCC)     High cholesterol     Hypertension     Memory loss     Migraine     Polyneuropathy     Rheumatoid arthritis (HCC)     Serum lipids high     Stomach problems     Thyroid trouble        Past Surgical History:   Procedure Laterality Date    CATARACT EXTRACTION      CHOLECYSTECTOMY      GALLBLADDER SURGERY      HYSTERECTOMY      OR SURGICAL ARTHROSCOPY SHOULDER W/ROTATOR CUFF RPR Right 8/1/2019    Procedure: SHOULDER ARTHROSCOPIC ROTATOR CUFF REPAIR;  Surgeon: Betsy Conde MD;  Location: [facility];  Service: Orthopedics    ROTATOR CUFF REPAIR Right 08/01/2019       Meds/Allergies:  Prior to Admission medications    Medication Sig Start Date End Date Taking? Authorizing Provider   amLODIPine (NORVASC) 10 mg tablet Take 1 tablet  (10 mg total) by mouth daily At night before bed 8/8/24 2/4/25  Michael Alvarez PA-C   BD Pen Needle Elisabeth 2nd Gen 32G X 4 MM MISC USE DAILY AS DIRECTED 6/1/22   Elver Lynne MD   benzonatate (TESSALON PERLES) 100 mg capsule Take 1 capsule (100 mg total) by mouth 3 (three) times a day as needed for cough  Patient not taking: Reported on 8/1/2024 11/21/23   Elver Lynne MD   Blood Glucose Monitoring Suppl (OneTouch Verio Reflect) w/Device KIT USE 2 (TWO) TIMES A DAY 4/27/24   Elver Lynne MD   Blood Pressure KIT Use daily  Patient not taking: Reported on 8/1/2024 7/25/24   Michael Alvarez PA-C   cholecalciferol (VITAMIN D3) 250 MCG (58854 UT) capsule Take 1 capsule (10,000 Units total) by mouth daily  Patient not taking: Reported on 8/1/2024 11/29/23   Elver Lynne MD   cholestyramine (QUESTRAN) 4 g packet Take 1 packet (4 g total) by mouth 2 (two) times a day with meals 11/21/23 8/1/24  Elver Lynne MD   Continuous Blood Gluc  (FreeStyle Aruna 2 [place]) MEREDITH Use 1 Device 3 (three) times a day before meals 3/11/24   Elver Lynne MD   Continuous Blood Gluc Sensor (FreeStyle Aruna 2 Sensor) MISC Check blood sugars multiple times per day 3/11/24   Elver Lynne MD   cyanocobalamin (VITAMIN B-12) 1000 MCG tablet Take 1 tablet (1,000 mcg total) by mouth daily 8/1/24   Dorcas Gutierrez PA-C   Diclofenac Sodium (VOLTAREN) 1 % Apply 2 g topically 3 (three) times a day  Patient not taking: Reported on 8/1/2024 11/21/23   Elver Lynne MD   fenofibrate 160 MG tablet Take 1 tablet (160 mg total) by mouth daily 8/8/24   Michael Alvarez PA-C   fluticasone (FLONASE) 50 mcg/act nasal spray SPRAY 2 SPRAYS INTO EACH NOSTRIL EVERY DAY 10/5/23   Elver Lynne MD   gabapentin (Neurontin) 100 mg capsule Take 1 capsule (100 mg total) by mouth 3 (three) times a day 8/8/24 11/6/24  Michael Alvarez PA-C   hydrALAZINE (APRESOLINE) 10 mg tablet Take 1 tablet (10 mg total) by mouth 3 (three) times a day as needed (sbp >180) 8/8/24    Michael Alvarez PA-C   hydroCHLOROthiazide 25 mg tablet Take 1 tablet (25 mg total) by mouth daily 8/8/24 2/4/25  Michael Alvarez PA-C   Incontinence Supplies MISC Use 6 (six) times a day Wipes 1/26/21   Elver Lynne MD   Incontinence Supplies MISC Use 4 (four) times a day Comfort shield Adult diapers 1/26/21   Elver Lynne MD   Incontinence Supply Disposable MISC Use 6 (six) times a day Dispense disposable bed pad 1/26/21   Elver Lynne MD   insulin detemir (Levemir FlexTouch) 100 Units/mL injection pen Inject 12 Units under the skin every 12 (twelve) hours Take 12 units am and 12 units pm 8/8/24 2/4/25  Michael Alvarez PA-C   Insulin Pen Needle (BD Pen Needle Elisabeth U/F) 32G X 4 MM MISC Inject under the skin 2 (two) times a day 5/24/24   Elver Lynne MD   Lancets (OneTouch Delica Plus [id number]) MISC USE TO TEST BLOOD SUGAR 3 TIMES A DAY 7/13/24   Elver Lynne MD   levothyroxine 100 mcg tablet Take 1 tablet (100 mcg total) by mouth daily 8/8/24   Michael Alvarez PA-C   lisinopril (ZESTRIL) 40 mg tablet Take 1 tablet (40 mg total) by mouth daily In the morning 8/8/24 2/4/25  Michael Alvarez PA-C   loteprednol etabonate (LOTEMAX) 0.5 % ophthalmic suspension INSTILL 1 DROP INTO BOTH EYES TWICE A DAY 5/4/23   Historical Provider, MD   memantine (NAMENDA) 10 mg tablet Take 1 tablet (10 mg total) by mouth 2 (two) times a day 8/8/24   Michael Alvarez PA-C   montelukast (SINGULAIR) 10 mg tablet Take 1 tablet (10 mg total) by mouth daily at bedtime  Patient not taking: Reported on 8/1/2024 5/14/24   Elver Lynne MD   omeprazole (PriLOSEC) 20 mg delayed release capsule Take 1 capsule (20 mg total) by mouth daily 11/21/23   Elver Lynne MD   OneTouch Verio test strip USE 1 EACH 2 (TWO) TIMES A DAY TEST 7/2/24   Elver Lynne MD   sertraline (ZOLOFT) 50 mg tablet Take 1 tablet (50 mg total) by mouth daily 8/8/24 2/4/25  Michael Alvarez PA-C   triamcinolone (KENALOG) 0.5 % ointment Apply 1 Application topically 2 (two) times a day To affected  "area  Patient not taking: Reported on 23   Elver Lynne MD   Xiidra 5 % op solution INSTILL 1 DROP IN BOTH EYES 2 TIMES PER DAY 22   Historical Provider, MD     I have reviewed home medications with patient personally.    Allergies:   Allergies   Allergen Reactions    Penicillins Itching and Swelling       Social History:  Marital Status:    Occupation: none  Patient Pre-hospital Living Situation: Home  Patient Pre-hospital Level of Mobility: walks with walker  Patient Pre-hospital Diet Restrictions: none  Substance Use History:   Social History     Substance and Sexual Activity   Alcohol Use Never     Social History     Tobacco Use   Smoking Status Former    Current packs/day: 0.00    Average packs/day: 0.3 packs/day for 30.0 years (7.5 ttl pk-yrs)    Types: Cigarettes    Start date:     Quit date:     Years since quittin.6    Passive exposure: Past   Smokeless Tobacco Never     Social History     Substance and Sexual Activity   Drug Use No       Family History:  Family History   Problem Relation Age of Onset    Substance Abuse Mother         denied    Mental illness Mother         denied    Substance Abuse Father         denied    Mental illness Father         denied    Diabetes Brother     Blindness Brother     Arthritis Daughter     HIV Son        Physical Exam:     Vitals:   Blood Pressure: (!) 171/75 (24 1701)  Pulse: 57 (24 1701)  Temperature: 97.9 °F (36.6 °C) (24 1254)  Respirations: 16 (24 1701)  Height: 5' 1\" (154.9 cm) (24 1254)  Weight - Scale: 59 kg (130 lb) (24 1254)  SpO2: 96 % (24 1701)    Physical Exam  Constitutional:       General: She is not in acute distress.  HENT:      Head: Normocephalic and atraumatic.      Nose: Nose normal.      Mouth/Throat:      Mouth: Mucous membranes are moist.   Eyes:      Extraocular Movements: Extraocular movements intact.      Conjunctiva/sclera: Conjunctivae normal.   Cardiovascular: "      Rate and Rhythm: Normal rate and regular rhythm.   Pulmonary:      Effort: Pulmonary effort is normal. No respiratory distress.   Abdominal:      Palpations: Abdomen is soft.      Tenderness: There is no abdominal tenderness.   Musculoskeletal:         General: Normal range of motion.      Cervical back: Normal range of motion and neck supple.      Comments: Generalized weakness.  Tenderness in bilateral extremities however no erythema   Skin:     General: Skin is warm and dry.   Neurological:      General: No focal deficit present.      Mental Status: She is alert. Mental status is at baseline.      Cranial Nerves: No cranial nerve deficit.   Psychiatric:         Mood and Affect: Mood normal.         Behavior: Behavior normal.          Additional Data:     Lab Results:  Results from last 7 days   Lab Units 08/14/24  1315   WBC Thousand/uL 4.94   HEMOGLOBIN g/dL 9.6*   HEMATOCRIT % 30.6*   PLATELETS Thousands/uL 146*   SEGS PCT % 67   LYMPHO PCT % 24   MONO PCT % 6   EOS PCT % 2     Results from last 7 days   Lab Units 08/14/24  1315   SODIUM mmol/L 138   POTASSIUM mmol/L 4.9   CHLORIDE mmol/L 108   CO2 mmol/L 27   BUN mg/dL 26*   CREATININE mg/dL 1.08   ANION GAP mmol/L 3*   CALCIUM mg/dL 9.9   ALBUMIN g/dL 3.7   TOTAL BILIRUBIN mg/dL 0.38   ALK PHOS U/L 28*   ALT U/L 14   AST U/L 16   GLUCOSE RANDOM mg/dL 104         Results from last 7 days   Lab Units 08/14/24  1704 08/14/24  1503 08/14/24  1444 08/14/24  1429   POC GLUCOSE mg/dl 140 168* 55* 55*     Lab Results   Component Value Date    HGBA1C 7.8 (A) 08/08/2024    HGBA1C 8.3 (H) 05/20/2024    HGBA1C 6.6 (A) 11/21/2023           Lines/Drains:  Invasive Devices       Peripheral Intravenous Line  Duration             Peripheral IV 08/14/24 Left Antecubital <1 day                        Imaging: Reviewed radiology reports from this admission including: chest xray  CT head without contrast   Final Result by Michael Stovall MD (08/14 1420)      No acute  intracranial abnormality.                  Workstation performed: [id number]         X-ray chest 1 view portable   Final Result by Krystyna Magdaleno MD (08/14 1425)      No acute cardiopulmonary disease.            Workstation performed: [id number]             ** Please Note: This note has been constructed using a voice recognition system. **

## 2024-08-14 NOTE — TELEPHONE ENCOUNTER
"Nurse called in to report pts blood sugars are extremely low, through the week -56,65, 47 and today 56 then 132 after orange juice, and peanut butter. BP:160/74, HR:76 and pulse OX 97%.   Denies diaphoretic. Pt is stable at this time and Home care Nurse also would like the pt to a vit d level order due to her fatigue.      UPDATE:  Pt stood up during call and stated she has chest pain, and is really tired. Triage nurse instructed family and nurse to call 911 to get pt to the ER.  PCP follow up with pt post ER.  Thank you  Reason for Disposition  • Low blood sugar symptoms persist > 30 minutes AND using low blood sugar Care Advice  • Patient sounds very sick or weak to the triager    Answer Assessment - Initial Assessment Questions  1. SYMPTOMS: \"What symptoms are you concerned about?\"      Pt is taking levemir and blood sugars have been consistently low through the week.   2. ONSET:  \"When did the symptoms start?\"      A week ago  3. BLOOD GLUCOSE: \"What is your blood glucose level?\"       -56,65, 47 and today 56 then 132 after orange juice, and peanut butter    4. USUAL RANGE: \"What is your blood glucose level usually?\" (e.g., usual fasting morning value, usual evening value)      low  5. TYPE 1 or 2:  \"Do you know what type of diabetes you have?\"  (e.g., Type 1, Type 2, Gestational; doesn't know)       unsure  6. INSULIN: \"Do you take insulin?\" \"What type of insulin(s) do you use? What is the mode of delivery? (syringe, pen; injection or pump) \"When did you last give yourself an insulin dose?\" (i.e., time or hours/minutes ago) \"How much did you give?\" (i.e., how many units)      Per orders  7. DIABETES PILLS: \"Do you take any pills for your diabetes?\"      denies  8. OTHER SYMPTOMS: \"Do you have any symptoms?\" (e.g., fever, frequent urination, difficulty breathing, vomiting)      fatigue  9. LOW BLOOD GLUCOSE TREATMENT: \"What have you done so far to treat the low blood glucose level?\"      Peanut butter and orange " "juice   10. FOOD: \"When did you last eat or drink?\"        5 minutes ago  11. ALONE: \"Are you alone right now or is someone with you?\"         Nurse is at bedside  12. PREGNANCY: \"Is there any chance you are pregnant?\" \"When was your last menstrual period?\"        N/a    Protocols used: Diabetes - Low Blood Sugar-ADULT-[place]    "

## 2024-08-14 NOTE — ASSESSMENT & PLAN NOTE
Patient with diabetes and on insulin at home.  Per granddaughter at bedside, long-acting insulin dose recently decreased from 20 units in the morning and 10 units at bedtime to 12 units twice daily  Will hold off on long-acting insulin for now  Continue insulin sliding scale  Patient received D50 in the ER  We will initiate D5 fluids as needed pending glucose trends

## 2024-08-14 NOTE — ED PROVIDER NOTES
History  Chief Complaint   Patient presents with    Hypertension    Hypoglycemia - Symptomatic     Hypertension at home visit; this AM c/o of dizziness/chest pain & low BS     84-year-old diabetic female presents with chest pain, shortness of breath and an episode of hypoglycemia this morning.  Her chest pain started at 1230, her hypoglycemic event was around 530 or 6.  She apparently woke up, with a sugar of 53.  Afterwards she had peanut butter toast, and came up to 115.  They had not checked it since then.  She was visited by the home health care nurse today, she was complaining of some chest discomfort up in the left, radiating to the left shoulder.  It was not associated with diaphoresis, but was not associated with any shortness of breath.  Blood pressure was noted to be quite high and she was instructed to come to the emergency room.  The daughter brings her into the emergency room.  She appears comfortable at rest.  She does not have pain at present.  The pain was never severe.  She is bothered by pain in her legs at this point in her feet, noting that she had a recent increase in her amlodipine.        Prior to Admission Medications   Prescriptions Last Dose Informant Patient Reported? Taking?   BD Pen Needle Elisabeth 2nd Gen 32G X 4 MM MISC  Family Member No No   Sig: USE DAILY AS DIRECTED   Blood Glucose Monitoring Suppl (OneTouch Verio Reflect) w/Device KIT  Family Member No No   Sig: USE 2 (TWO) TIMES A DAY   Blood Pressure KIT  Family Member No No   Sig: Use daily   Patient not taking: Reported on 8/1/2024   Continuous Blood Gluc  (FreeStyle Aruna 2 [place]) MEREDITH  Family Member No No   Sig: Use 1 Device 3 (three) times a day before meals   Continuous Blood Gluc Sensor (FreeStyle Aruna 2 Sensor) MISC  Family Member No No   Sig: Check blood sugars multiple times per day   Diclofenac Sodium (VOLTAREN) 1 %  Family Member No No   Sig: Apply 2 g topically 3 (three) times a day   Patient not taking:  Reported on 8/1/2024   Incontinence Supplies MISC  Family Member No No   Sig: Use 6 (six) times a day Wipes   Incontinence Supplies MISC  Family Member No No   Sig: Use 4 (four) times a day Comfort shield Adult diapers   Incontinence Supply Disposable MISC  Family Member No No   Sig: Use 6 (six) times a day Dispense disposable bed pad   Insulin Pen Needle (BD Pen Needle Elisabeth U/F) 32G X 4 MM MISC  Family Member No No   Sig: Inject under the skin 2 (two) times a day   Lancets (OneTouch Delica Plus [id number]) MISC  Family Member No No   Sig: USE TO TEST BLOOD SUGAR 3 TIMES A DAY   OneTouch Verio test strip  Family Member No No   Sig: USE 1 EACH 2 (TWO) TIMES A DAY TEST   Xiidra 5 % op solution  Family Member Yes No   Sig: INSTILL 1 DROP IN BOTH EYES 2 TIMES PER DAY   amLODIPine (NORVASC) 10 mg tablet   No No   Sig: Take 1 tablet (10 mg total) by mouth daily At night before bed   benzonatate (TESSALON PERLES) 100 mg capsule  Family Member No No   Sig: Take 1 capsule (100 mg total) by mouth 3 (three) times a day as needed for cough   Patient not taking: Reported on 8/1/2024   cholecalciferol (VITAMIN D3) 250 MCG (80131 UT) capsule  Family Member No No   Sig: Take 1 capsule (10,000 Units total) by mouth daily   Patient not taking: Reported on 8/1/2024   cholestyramine (QUESTRAN) 4 g packet  Family Member No No   Sig: Take 1 packet (4 g total) by mouth 2 (two) times a day with meals   cyanocobalamin (VITAMIN B-12) 1000 MCG tablet   No No   Sig: Take 1 tablet (1,000 mcg total) by mouth daily   fenofibrate 160 MG tablet   No No   Sig: Take 1 tablet (160 mg total) by mouth daily   fluticasone (FLONASE) 50 mcg/act nasal spray  Family Member No No   Sig: SPRAY 2 SPRAYS INTO EACH NOSTRIL EVERY DAY   gabapentin (Neurontin) 100 mg capsule   No No   Sig: Take 1 capsule (100 mg total) by mouth 3 (three) times a day   hydrALAZINE (APRESOLINE) 10 mg tablet   No No   Sig: Take 1 tablet (10 mg total) by mouth 3 (three) times a day as  needed (sbp >180)   hydroCHLOROthiazide 25 mg tablet   No No   Sig: Take 1 tablet (25 mg total) by mouth daily   insulin detemir (Levemir FlexTouch) 100 Units/mL injection pen   No No   Sig: Inject 12 Units under the skin every 12 (twelve) hours Take 12 units am and 12 units pm   levothyroxine 100 mcg tablet   No No   Sig: Take 1 tablet (100 mcg total) by mouth daily   lisinopril (ZESTRIL) 40 mg tablet   No No   Sig: Take 1 tablet (40 mg total) by mouth daily In the morning   loteprednol etabonate (LOTEMAX) 0.5 % ophthalmic suspension  Family Member Yes No   Sig: INSTILL 1 DROP INTO BOTH EYES TWICE A DAY   memantine (NAMENDA) 10 mg tablet   No No   Sig: Take 1 tablet (10 mg total) by mouth 2 (two) times a day   montelukast (SINGULAIR) 10 mg tablet  Family Member No No   Sig: Take 1 tablet (10 mg total) by mouth daily at bedtime   Patient not taking: Reported on 8/1/2024   omeprazole (PriLOSEC) 20 mg delayed release capsule  Family Member No No   Sig: Take 1 capsule (20 mg total) by mouth daily   sertraline (ZOLOFT) 50 mg tablet   No No   Sig: Take 1 tablet (50 mg total) by mouth daily   triamcinolone (KENALOG) 0.5 % ointment  Family Member No No   Sig: Apply 1 Application topically 2 (two) times a day To affected area   Patient not taking: Reported on 8/1/2024      Facility-Administered Medications: None       Past Medical History:   Diagnosis Date    Aggression     Alzheimer's dementia (HCC)     Arthritis     Dementia (HCC)     Diabetes insipidus (HCC)     Diabetes mellitus (HCC)     High cholesterol     Hypertension     Memory loss     Migraine     Polyneuropathy     Rheumatoid arthritis (HCC)     Serum lipids high     Stomach problems     Thyroid trouble        Past Surgical History:   Procedure Laterality Date    CATARACT EXTRACTION      CHOLECYSTECTOMY      GALLBLADDER SURGERY      HYSTERECTOMY      FL SURGICAL ARTHROSCOPY SHOULDER W/ROTATOR CUFF RPR Right 8/1/2019    Procedure: SHOULDER ARTHROSCOPIC ROTATOR  CUFF REPAIR;  Surgeon: Betsy Conde MD;  Location: [facility];  Service: Orthopedics    ROTATOR CUFF REPAIR Right 2019       Family History   Problem Relation Age of Onset    Substance Abuse Mother         denied    Mental illness Mother         denied    Substance Abuse Father         denied    Mental illness Father         denied    Diabetes Brother     Blindness Brother     Arthritis Daughter     HIV Son      I have reviewed and agree with the history as documented.    E-Cigarette/Vaping    E-Cigarette Use Never User      E-Cigarette/Vaping Substances    Nicotine No     THC No     CBD No     Flavoring No     Other No     Unknown No      Social History     Tobacco Use    Smoking status: Former     Current packs/day: 0.00     Average packs/day: 0.3 packs/day for 30.0 years (7.5 ttl pk-yrs)     Types: Cigarettes     Start date:      Quit date:      Years since quittin.6     Passive exposure: Past    Smokeless tobacco: Never   Vaping Use    Vaping status: Never Used   Substance Use Topics    Alcohol use: Never    Drug use: No       Review of Systems   Constitutional:  Negative for chills and fever.   HENT:  Negative for rhinorrhea.    Eyes:  Negative for visual disturbance.   Respiratory:  Negative for cough and shortness of breath.    Cardiovascular:  Positive for chest pain and leg swelling.   Gastrointestinal:  Negative for abdominal pain, diarrhea, nausea and vomiting.   Genitourinary:  Negative for dysuria.   Musculoskeletal:  Positive for arthralgias. Negative for back pain and myalgias.   Skin:  Negative for rash.   Neurological:  Negative for dizziness and headaches.   Psychiatric/Behavioral:  Negative for confusion.    All other systems reviewed and are negative.      Physical Exam  Physical Exam  Vitals and nursing note reviewed.   Constitutional:       General: She is not in acute distress.     Appearance: Normal appearance. She is well-developed.   HENT:      Head: Normocephalic and  atraumatic.   Eyes:      Conjunctiva/sclera: Conjunctivae normal.   Cardiovascular:      Rate and Rhythm: Normal rate and regular rhythm.      Heart sounds: No murmur heard.  Pulmonary:      Effort: Pulmonary effort is normal. No respiratory distress.      Breath sounds: Normal breath sounds.   Abdominal:      Palpations: Abdomen is soft.      Tenderness: There is no abdominal tenderness.   Musculoskeletal:         General: No swelling.      Cervical back: Neck supple.   Skin:     General: Skin is warm and dry.      Capillary Refill: Capillary refill takes less than 2 seconds.   Neurological:      Mental Status: She is alert.   Psychiatric:         Mood and Affect: Mood normal.         Vital Signs  ED Triage Vitals   Temperature Pulse Respirations Blood Pressure SpO2   08/14/24 1254 08/14/24 1254 08/14/24 1254 08/14/24 1255 08/14/24 1254   97.9 °F (36.6 °C) 75 18 (S) (!) 228/98 98 %      Temp src Heart Rate Source Patient Position - Orthostatic VS BP Location FiO2 (%)   -- 08/14/24 1254 08/14/24 1300 08/14/24 1300 --    Monitor Sitting Left arm       Pain Score       08/14/24 1254       5           Vitals:    08/14/24 1255 08/14/24 1300 08/14/24 1340 08/14/24 1501   BP: (S) (!) 228/98 (!) 206/91 (!) 188/78 (!) 215/84   Pulse:  75 57 58   Patient Position - Orthostatic VS:  Sitting Lying Lying         Visual Acuity      ED Medications  Medications   sodium chloride (PF) 0.9 % injection 3 mL (has no administration in time range)   labetalol (NORMODYNE) injection 10 mg (10 mg Intravenous Given 8/14/24 1317)   acetaminophen (TYLENOL) tablet 650 mg (650 mg Oral Given 8/14/24 1433)   dextrose 50 % IV solution 25 mL (25 mL Intravenous Given 8/14/24 1446)   labetalol (NORMODYNE) injection 10 mg (10 mg Intravenous Given 8/14/24 1544)       Diagnostic Studies  Results Reviewed       Procedure Component Value Units Date/Time    HS Troponin I 2hr [[id number]]  (Normal) Collected: 08/14/24 1533    Lab Status: Final result  Specimen: Blood from Arm, Left Updated: 08/14/24 1559     hs TnI 2hr 8 ng/L      Delta 2hr hsTnI 0 ng/L     HS Troponin I 4hr [[id number]]     Lab Status: No result Specimen: Blood     Fingerstick Glucose (POCT) [[id number]]  (Abnormal) Collected: 08/14/24 1503    Lab Status: Final result Specimen: Blood Updated: 08/14/24 1504     POC Glucose 168 mg/dl     Fingerstick Glucose (POCT) [[id number]]  (Abnormal) Collected: 08/14/24 1444    Lab Status: Final result Specimen: Blood Updated: 08/14/24 1445     POC Glucose 55 mg/dl     Fingerstick Glucose (POCT) [[id number]]  (Abnormal) Collected: 08/14/24 1429    Lab Status: Final result Specimen: Blood Updated: 08/14/24 1430     POC Glucose 55 mg/dl     HS Troponin 0hr (reflex protocol) [[id number]]  (Normal) Collected: 08/14/24 1315    Lab Status: Final result Specimen: Blood from Arm, Left Updated: 08/14/24 1344     hs TnI 0hr 8 ng/L     B-Type Natriuretic Peptide(BNP) [[id number]]  (Abnormal) Collected: 08/14/24 1315    Lab Status: Final result Specimen: Blood from Arm, Left Updated: 08/14/24 1344      pg/mL     Comprehensive metabolic panel [[id number]]  (Abnormal) Collected: 08/14/24 1315    Lab Status: Final result Specimen: Blood from Arm, Left Updated: 08/14/24 1340     Sodium 138 mmol/L      Potassium 4.9 mmol/L      Chloride 108 mmol/L      CO2 27 mmol/L      ANION GAP 3 mmol/L      BUN 26 mg/dL      Creatinine 1.08 mg/dL      Glucose 104 mg/dL      Calcium 9.9 mg/dL      AST 16 U/L      ALT 14 U/L      Alkaline Phosphatase 28 U/L      Total Protein 6.2 g/dL      Albumin 3.7 g/dL      Total Bilirubin 0.38 mg/dL      eGFR 47 ml/min/1.73sq m     Narrative:      National Kid[employer] guidelines for Chronic Kidney Disease (CKD):     Stage 1 with normal or high GFR (GFR > 90 mL/min/1.73 square meters)    Stage 2 Mild CKD (GFR = 60-89 mL/min/1.73 square meters)    Stage 3A Moderate CKD (GFR = 45-59 mL/min/1.73 square meters)    Stage 3B Moderate CKD (GFR =  30-44 mL/min/1.73 square meters)    Stage 4 Severe CKD (GFR = 15-29 mL/min/1.73 square meters)    Stage 5 End Stage CKD (GFR <15 mL/min/1.73 square meters)  Note: GFR calculation is accurate only with a steady state creatinine    CBC and differential [[id number]]  (Abnormal) Collected: 08/14/24 1315    Lab Status: Final result Specimen: Blood from Arm, Left Updated: 08/14/24 1320     WBC 4.94 Thousand/uL      RBC 3.41 Million/uL      Hemoglobin 9.6 g/dL      Hematocrit 30.6 %      MCV 90 fL      MCH 28.2 pg      MCHC 31.4 g/dL      RDW 14.4 %      MPV 12.0 fL      Platelets 146 Thousands/uL      nRBC 0 /100 WBCs      Segmented % 67 %      Immature Grans % 0 %      Lymphocytes % 24 %      Monocytes % 6 %      Eosinophils Relative 2 %      Basophils Relative 1 %      Absolute Neutrophils 3.36 Thousands/µL      Absolute Immature Grans 0.01 Thousand/uL      Absolute Lymphocytes 1.17 Thousands/µL      Absolute Monocytes 0.27 Thousand/µL      Eosinophils Absolute 0.10 Thousand/µL      Basophils Absolute 0.03 Thousands/µL                    CT head without contrast   Final Result by Michael Stovall MD (08/14 1420)      No acute intracranial abnormality.                  Workstation performed: [id number]         X-ray chest 1 view portable   Final Result by Krystyna Magdaleno MD (08/14 1425)      No acute cardiopulmonary disease.            Workstation performed: [id number]                    Procedures  ECG 12 Lead Documentation Only    Date/Time: 8/14/2024 2:26 PM    Performed by: Chava Corbin DO  Authorized by: Chava Corbin DO    ECG reviewed by me, the ED Provider: yes    Patient location:  ED  Previous ECG:     Previous ECG:  Compared to current    Similarity:  No change  Interpretation:     Interpretation: normal    Rate:     ECG rate:  67    ECG rate assessment: normal    Rhythm:     Rhythm: sinus rhythm    Ectopy:     Ectopy: none    QRS:     QRS axis:  Left    QRS intervals:  Normal  Conduction:      Conduction: normal    ST segments:     ST segments:  Normal  T waves:     T waves: non-specific    ECG 12 Lead Documentation Only    Date/Time: 8/14/2024 3:44 PM    Performed by: Chava Corbin DO  Authorized by: Chava Corbin DO    Indications / Diagnosis:  Chest pain  ECG reviewed by me, the ED Provider: yes    Patient location:  ED  Previous ECG:     Previous ECG:  Compared to current    Comparison to cardiac monitor: No    Interpretation:     Interpretation: normal    Rate:     ECG rate:  65    ECG rate assessment: normal    Rhythm:     Rhythm: sinus rhythm    Ectopy:     Ectopy: none    QRS:     QRS axis:  Normal  Conduction:     Conduction: normal    ST segments:     ST segments:  Normal  T waves:     T waves: non-specific    CriticalCare Time    Date/Time: 8/14/2024 4:25 PM    Performed by: Chava Corbin DO  Authorized by: Chava Corbin DO    Critical care provider statement:     Critical care time (minutes):  115    Critical care start time:  8/14/2024 1:00 PM    Critical care end time:  8/14/2024 4:25 PM    Critical care was necessary to treat or prevent imminent or life-threatening deterioration of the following conditions:  Cardiac failure (Hypertensive urgency)    Critical care was time spent personally by me on the following activities:  Blood draw for specimens, examination of patient, review of old charts, re-evaluation of patient's condition, ordering and review of radiographic studies, ordering and review of laboratory studies, discussions with primary provider, development of treatment plan with patient or surrogate and obtaining history from patient or surrogate           ED Course  ED Course as of 08/14/24 1627   Wed Aug 14, 2024   1410 Daughter states patient is swollen eyes, and is complaining of a headache.  Will proceed with CAT scan.  Blood pressure improved after 1 dose of labetalol.  Systolic now in the 170 range.  Initial troponin negative.   1543 Blood pressures bounced up  again above 200, second dose labetalol will be given.  Initial troponin was negative, awaiting second troponin for disposition.  Suspect this is all accelerated or hypertensive urgency.   1617 After second dose of metoprolol, blood pressure now 138 systolic.  Patient is resting quietly now.  Family is concerned about taking her home with her recurrent hypoglycemia, as well as accelerated hypertension.  It did take quite a bit to get her down, and now her heart rate is in the 50s.  She may need medication manipulation.  I think it is reasonable given her heart score of 5, and recurrent hypoglycemia requiring D50 here in the ER, for observation admission.  Family is agreeable.  Discussed with Casper.               HEART Risk Score      Flowsheet Row Most Recent Value   Heart Score Risk Calculator    History 1 Filed at: 08/14/2024 1410   ECG 1 Filed at: 08/14/2024 1410   Age 2 Filed at: 08/14/2024 1410   Risk Factors 1 Filed at: 08/14/2024 1410   Troponin 0 Filed at: 08/14/2024 1410   HEART Score 5 Filed at: 08/14/2024 1410                          SBIRT 20yo+      Flowsheet Row Most Recent Value   Initial Alcohol Screen: US AUDIT-C     1. How often do you have a drink containing alcohol? 0 Filed at: 08/14/2024 1254   2. How many drinks containing alcohol do you have on a typical day you are drinking?  0 Filed at: 08/14/2024 1254   3a. Male UNDER 65: How often do you have five or more drinks on one occasion? 0 Filed at: 08/14/2024 1254   3b. FEMALE Any Age, or MALE 65+: How often do you have 4 or more drinks on one occassion? 0 Filed at: 08/14/2024 1254   Audit-C Score 0 Filed at: 08/14/2024 1254   KYLAH: How many times in the past year have you...    Used an illegal drug or used a prescription medication for non-medical reasons? Never Filed at: 08/14/2024 1254                      Medical Decision Making  84-year-old diabetic female presents with chest pain, shortness of breath and an episode of hypoglycemia this  morning.  Her chest pain started at 1230, her hypoglycemic event was around 530 or 6.  She apparently woke up, with a sugar of 53.  Afterwards she had peanut butter toast, and came up to 115.  They had not checked it since then.  She was visited by the home health care nurse today, she was complaining of some chest discomfort up in the left, radiating to the left shoulder.  It was not associated with diaphoresis, but was not associated with any shortness of breath.  Blood pressure was noted to be quite high and she was instructed to come to the emergency room.  The daughter brings her into the emergency room.  She appears comfortable at rest.  She does not have pain at present.  The pain was never severe.  She is bothered by pain in her legs at this point in her feet, noting that she had a recent increase in her amlodipine.  Initial blood pressure greater than 200.      Amount and/or Complexity of Data Reviewed  Labs: ordered.     Details: Troponins did not bump.  Hemoglobin dropped just slightly.  Blood sugar was low, and required D50 after about an hour being here.  Radiology: ordered.     Details: Chest x-ray failed to reveal any significant abnormalities.  CT of the head failed to reveal any intra cranial abnormalities  ECG/medicine tests: ordered and independent interpretation performed.     Details: EKG revealing normal sinus rhythm rate of 67, left axis deviation, normal intervals nonspecific T wave abnormalities.    Risk  Decision regarding hospitalization.  Risk Details: Daughter very concerned about taking her home with his blood pressure lability.  Also recurrent recurrent hypoglycemia.  May need medication adjustment.  Discussed with Casper.  Will evaluate.  Responded to 2 doses of labetalol in the emergency room now down to 140 over the 80 range.  She is resting, sleeping actually.  Patient did require 1 amp of 25 mg of D50 due to hypoglycemia.  This will also need to be adjusted.  Her headache she had  when she came in was relieved by Tylenol.  Patient is having some significant edema and leg pain since the increase in the amlodipine to 10 mg.                 Disposition  Final diagnoses:   Hypertensive urgency   Hypoglycemia     Time reflects when diagnosis was documented in both MDM as applicable and the Disposition within this note       Time User Action Codes Description Comment    8/14/2024  4:22 PM Chava Corbin [I16.0] Hypertensive urgency     8/14/2024  4:22 PM Chava Corbin [E16.2] Hypoglycemia           ED Disposition       ED Disposition   Admit    Condition   Stable    Date/Time   Wed Aug 14, 2024 1623    Comment   Case was discussed with Dr. Calderón and the patient's admission status was agreed to be Admission Status: observation status to the service of Dr. Calderón .               Follow-up Information    None         Patient's Medications   Discharge Prescriptions    No medications on file       No discharge procedures on file.    PDMP Review         Value Time User    PDMP Reviewed  Yes 5/24/2024 11:10 AM Elver Lynne MD            ED Provider  Electronically Signed by             Chava Corbin DO  08/14/24 [id number]

## 2024-08-15 ENCOUNTER — TELEPHONE (OUTPATIENT)
Dept: NEPHROLOGY | Facility: CLINIC | Age: 84
End: 2024-08-15

## 2024-08-15 VITALS
WEIGHT: 131.5 LBS | HEART RATE: 59 BPM | HEIGHT: 61 IN | TEMPERATURE: 98.3 F | DIASTOLIC BLOOD PRESSURE: 35 MMHG | SYSTOLIC BLOOD PRESSURE: 120 MMHG | OXYGEN SATURATION: 94 % | BODY MASS INDEX: 24.83 KG/M2 | RESPIRATION RATE: 19 BRPM

## 2024-08-15 PROBLEM — D61.818 PANCYTOPENIA (HCC): Status: ACTIVE | Noted: 2024-05-24

## 2024-08-15 LAB
ALBUMIN SERPL BCG-MCNC: 3 G/DL (ref 3.5–5)
ALP SERPL-CCNC: 20 U/L (ref 34–104)
ALT SERPL W P-5'-P-CCNC: 10 U/L (ref 7–52)
ANION GAP SERPL CALCULATED.3IONS-SCNC: 2 MMOL/L (ref 4–13)
AST SERPL W P-5'-P-CCNC: 12 U/L (ref 13–39)
ATRIAL RATE: 61 BPM
BACTERIA UR QL AUTO: ABNORMAL /HPF
BASOPHILS # BLD AUTO: 0.01 THOUSANDS/ÂΜL (ref 0–0.1)
BASOPHILS NFR BLD AUTO: 0 % (ref 0–1)
BILIRUB SERPL-MCNC: 0.34 MG/DL (ref 0.2–1)
BILIRUB UR QL STRIP: NEGATIVE
BUN SERPL-MCNC: 29 MG/DL (ref 5–25)
CALCIUM ALBUM COR SERPL-MCNC: 10 MG/DL (ref 8.3–10.1)
CALCIUM SERPL-MCNC: 9.2 MG/DL (ref 8.4–10.2)
CHLORIDE SERPL-SCNC: 111 MMOL/L (ref 96–108)
CLARITY UR: CLEAR
CO2 SERPL-SCNC: 28 MMOL/L (ref 21–32)
COLOR UR: ABNORMAL
CREAT SERPL-MCNC: 1.07 MG/DL (ref 0.6–1.3)
DAT POLY-SP REAG RBC QL: NEGATIVE
EOSINOPHIL # BLD AUTO: 0.08 THOUSAND/ÂΜL (ref 0–0.61)
EOSINOPHIL NFR BLD AUTO: 2 % (ref 0–6)
ERYTHROCYTE [DISTWIDTH] IN BLOOD BY AUTOMATED COUNT: 14.4 % (ref 11.6–15.1)
FERRITIN SERPL-MCNC: 122 NG/ML (ref 11–307)
GFR SERPL CREATININE-BSD FRML MDRD: 48 ML/MIN/1.73SQ M
GLUCOSE SERPL-MCNC: 193 MG/DL (ref 65–140)
GLUCOSE SERPL-MCNC: 84 MG/DL (ref 65–140)
GLUCOSE SERPL-MCNC: 92 MG/DL (ref 65–140)
GLUCOSE UR STRIP-MCNC: NEGATIVE MG/DL
HCT VFR BLD AUTO: 26.6 % (ref 34.8–46.1)
HGB BLD-MCNC: 8.2 G/DL (ref 11.5–15.4)
HGB UR QL STRIP.AUTO: NEGATIVE
IMM GRANULOCYTES # BLD AUTO: 0 THOUSAND/UL (ref 0–0.2)
IMM GRANULOCYTES NFR BLD AUTO: 0 % (ref 0–2)
IRON SATN MFR SERPL: 20 % (ref 15–50)
IRON SERPL-MCNC: 84 UG/DL (ref 50–212)
KETONES UR STRIP-MCNC: NEGATIVE MG/DL
LEUKOCYTE ESTERASE UR QL STRIP: NEGATIVE
LYMPHOCYTES # BLD AUTO: 0.82 THOUSANDS/ÂΜL (ref 0.6–4.47)
LYMPHOCYTES NFR BLD AUTO: 25 % (ref 14–44)
MAGNESIUM SERPL-MCNC: 2.1 MG/DL (ref 1.9–2.7)
MCH RBC QN AUTO: 27.7 PG (ref 26.8–34.3)
MCHC RBC AUTO-ENTMCNC: 30.8 G/DL (ref 31.4–37.4)
MCV RBC AUTO: 90 FL (ref 82–98)
MONOCYTES # BLD AUTO: 0.21 THOUSAND/ÂΜL (ref 0.17–1.22)
MONOCYTES NFR BLD AUTO: 6 % (ref 4–12)
NEUTROPHILS # BLD AUTO: 2.17 THOUSANDS/ÂΜL (ref 1.85–7.62)
NEUTS SEG NFR BLD AUTO: 67 % (ref 43–75)
NITRITE UR QL STRIP: NEGATIVE
NON-SQ EPI CELLS URNS QL MICRO: ABNORMAL /HPF
NRBC BLD AUTO-RTO: 0 /100 WBCS
P AXIS: 47 DEGREES
PH UR STRIP.AUTO: 6.5 [PH]
PLATELET # BLD AUTO: 133 THOUSANDS/UL (ref 149–390)
PMV BLD AUTO: 12.3 FL (ref 8.9–12.7)
POTASSIUM SERPL-SCNC: 5 MMOL/L (ref 3.5–5.3)
PR INTERVAL: 202 MS
PROT SERPL-MCNC: 5 G/DL (ref 6.4–8.4)
PROT UR STRIP-MCNC: ABNORMAL MG/DL
QRS AXIS: -48 DEGREES
QRSD INTERVAL: 96 MS
QT INTERVAL: 402 MS
QTC INTERVAL: 404 MS
RBC # BLD AUTO: 2.96 MILLION/UL (ref 3.81–5.12)
RBC #/AREA URNS AUTO: ABNORMAL /HPF
SODIUM SERPL-SCNC: 141 MMOL/L (ref 135–147)
SP GR UR STRIP.AUTO: 1.01
T WAVE AXIS: 59 DEGREES
TIBC SERPL-MCNC: 424 UG/DL (ref 250–450)
UIBC SERPL-MCNC: 340 UG/DL (ref 155–355)
UROBILINOGEN UR QL STRIP.AUTO: 0.2 E.U./DL
VENTRICULAR RATE: 61 BPM
WBC # BLD AUTO: 3.29 THOUSAND/UL (ref 4.31–10.16)
WBC #/AREA URNS AUTO: ABNORMAL /HPF

## 2024-08-15 PROCEDURE — 86880 COOMBS TEST DIRECT: CPT | Performed by: INTERNAL MEDICINE

## 2024-08-15 PROCEDURE — 85025 COMPLETE CBC W/AUTO DIFF WBC: CPT | Performed by: INTERNAL MEDICINE

## 2024-08-15 PROCEDURE — 81001 URINALYSIS AUTO W/SCOPE: CPT | Performed by: INTERNAL MEDICINE

## 2024-08-15 PROCEDURE — 82948 REAGENT STRIP/BLOOD GLUCOSE: CPT

## 2024-08-15 PROCEDURE — 99204 OFFICE O/P NEW MOD 45 MIN: CPT | Performed by: PHYSICIAN ASSISTANT

## 2024-08-15 PROCEDURE — 99239 HOSP IP/OBS DSCHRG MGMT >30: CPT | Performed by: INTERNAL MEDICINE

## 2024-08-15 PROCEDURE — 97167 OT EVAL HIGH COMPLEX 60 MIN: CPT

## 2024-08-15 PROCEDURE — 92610 EVALUATE SWALLOWING FUNCTION: CPT

## 2024-08-15 PROCEDURE — 97163 PT EVAL HIGH COMPLEX 45 MIN: CPT

## 2024-08-15 PROCEDURE — 93010 ELECTROCARDIOGRAM REPORT: CPT | Performed by: INTERNAL MEDICINE

## 2024-08-15 PROCEDURE — 87086 URINE CULTURE/COLONY COUNT: CPT | Performed by: INTERNAL MEDICINE

## 2024-08-15 PROCEDURE — 83735 ASSAY OF MAGNESIUM: CPT | Performed by: INTERNAL MEDICINE

## 2024-08-15 PROCEDURE — 80053 COMPREHEN METABOLIC PANEL: CPT | Performed by: INTERNAL MEDICINE

## 2024-08-15 RX ORDER — INSULIN DETEMIR 100 [IU]/ML
10 INJECTION, SOLUTION SUBCUTANEOUS
Start: 2024-08-15 | End: 2025-02-11

## 2024-08-15 RX ORDER — INSULIN GLARGINE 100 [IU]/ML
10 INJECTION, SOLUTION SUBCUTANEOUS
Status: DISCONTINUED | OUTPATIENT
Start: 2024-08-15 | End: 2024-08-15 | Stop reason: HOSPADM

## 2024-08-15 RX ORDER — HYDROCHLOROTHIAZIDE 25 MG/1
25 TABLET ORAL DAILY
Status: DISCONTINUED | OUTPATIENT
Start: 2024-08-15 | End: 2024-08-15 | Stop reason: HOSPADM

## 2024-08-15 RX ORDER — HYDRALAZINE HYDROCHLORIDE 25 MG/1
25 TABLET, FILM COATED ORAL EVERY 8 HOURS SCHEDULED
Status: DISCONTINUED | OUTPATIENT
Start: 2024-08-15 | End: 2024-08-15 | Stop reason: HOSPADM

## 2024-08-15 RX ORDER — HYDRALAZINE HYDROCHLORIDE 25 MG/1
25 TABLET, FILM COATED ORAL EVERY 8 HOURS SCHEDULED
Qty: 90 TABLET | Refills: 0 | Status: SHIPPED | OUTPATIENT
Start: 2024-08-15

## 2024-08-15 RX ADMIN — INSULIN LISPRO 1 UNITS: 100 INJECTION, SOLUTION INTRAVENOUS; SUBCUTANEOUS at 13:11

## 2024-08-15 RX ADMIN — CYANOCOBALAMIN TAB 500 MCG 1000 MCG: 500 TAB at 08:33

## 2024-08-15 RX ADMIN — AMLODIPINE BESYLATE 10 MG: 10 TABLET ORAL at 08:35

## 2024-08-15 RX ADMIN — LEVOTHYROXINE SODIUM 100 MCG: 100 TABLET ORAL at 05:59

## 2024-08-15 RX ADMIN — HYDROCHLOROTHIAZIDE 25 MG: 25 TABLET ORAL at 13:11

## 2024-08-15 RX ADMIN — SERTRALINE HYDROCHLORIDE 50 MG: 50 TABLET ORAL at 08:33

## 2024-08-15 RX ADMIN — GABAPENTIN 100 MG: 100 CAPSULE ORAL at 08:33

## 2024-08-15 RX ADMIN — HYDRALAZINE HYDROCHLORIDE 25 MG: 25 TABLET ORAL at 13:11

## 2024-08-15 RX ADMIN — ENOXAPARIN SODIUM 40 MG: 40 INJECTION SUBCUTANEOUS at 08:34

## 2024-08-15 RX ADMIN — LISINOPRIL 40 MG: 20 TABLET ORAL at 08:33

## 2024-08-15 RX ADMIN — HYDRALAZINE HYDROCHLORIDE 10 MG: 10 TABLET, FILM COATED ORAL at 05:59

## 2024-08-15 RX ADMIN — FENOFIBRATE 145 MG: 145 TABLET, FILM COATED ORAL at 08:34

## 2024-08-15 RX ADMIN — PANTOPRAZOLE SODIUM 20 MG: 20 TABLET, DELAYED RELEASE ORAL at 05:59

## 2024-08-15 NOTE — PHYSICAL THERAPY NOTE
PT Evaluation    NAME:  Michelle Villatoro  DATE: 08/15/24    AGE:   84 y.o.  Mrn:   [id number]  ADMIT DX:  Hyperglycinemia (HCC) [E72.51]  Hypoglycemia [E16.2]  Hypotension [I95.9]  Hypertensive urgency [I16.0]  Performed at least 2 patient identifiers during session: Name and ID [email or web address]       08/15/24 1101   PT Last Visit   PT Visit Date 08/15/24   Note Type   Note type Evaluation   Pain Assessment   Pain Assessment Tool 0-10   Pain Score 8   Pain Location/Orientation Orientation: Right;Orientation: Left;Orientation: Upper;Location: Leg   Hospital Pain Intervention(s) Rest   Restrictions/Precautions   Weight Bearing Precautions Per Order No   Other Precautions Chair Alarm;Bed Alarm;Fall Risk;Pain  (Albanian speaking)   Home Living   Type of Home House   Home Layout Two level;Performs ADLs on one level;Able to live on main level with bedroom/bathroom;Ramped entrance  ((1st floor set up . 3 JEFFREY))   Bathroom Shower/Tub Walk-in shower   Bathroom Toilet Raised   Bathroom Equipment Grab bars in shower;Toilet raiser   Home Equipment Cane;Walker  ((cane used at baseline all the time))   Prior Function   Level of [place] Needs assistance with ADLs;Needs assistance with functional mobility;Needs assistance with IADLS   Lives With Daughter;Family  (granddaughter)   Receives Help From Family   IADLs Family/Friend/Other provides transportation;Family/Friend/Other provides meals;Family/Friend/Other provides medication management   Falls in the last 6 months 0   Comments HHA: daughter is CG   General   Family/Caregiver Present Yes  (assisted with interpretation)   Cognition   Overall Cognitive Status WFL   Arousal/Participation Alert   Orientation Level Oriented X4   Memory Within functional limits   Following Commands Follows one step commands without difficulty   RLE Assessment   RLE Assessment WFL   LLE Assessment   LLE Assessment WFL   Vision-Basic Assessment   Current Vision Wears glasses all the time   Visual History  Macular degeneration   Coordination   Sensation X  (decreased sensation B hands)   Bed Mobility   Supine to Sit 5  Supervision   Additional items HOB elevated;Verbal cues;Increased time required   Sit to Supine 5  Supervision   Additional items HOB elevated;Increased time required;Verbal cues   Additional Comments pt denied dizziness with transitional movement   Transfers   Sit to Stand 5  Supervision   Additional items Increased time required   Stand to Sit 5  Supervision   Additional items Increased time required   Additional Comments pt required cues for proper hand placement   Ambulation/Elevation   Gait pattern Forward Flexion;Wide MEHRDAD   Gait Assistance 5  Supervision   Additional items Verbal cues   Assistive Device Rolling walker   Distance 330 ft   Ambulation/Elevation Additional Comments Fair safety awareness noted   Balance   Static Sitting Good   Dynamic Sitting Fair +   Static Standing Fair +   Dynamic Standing Fair   Ambulatory Fair   Endurance Deficit   Endurance Deficit Yes   Endurance Deficit Description pt reported fatigue with activity   Activity Tolerance   Activity Tolerance Patient limited by fatigue   Assessment   Prognosis Good   Assessment Pt is 84 y.o. female seen for PT evaluation s/p admit to [facility] on 8/14/2024 w/ Hypertensive emergency. PT consulted to assess pt's functional mobility and d/c needs. Order placed for PT eval and tx, w/ up and OOB as tolerated order. Pt agreeable to PT  session upon arrival, pt found supine in bed.  PTA, pt was requiring A for mobility, has 3 JEFFREY, and retired.  Pt to benefit from continued PT tx to address deficits and maximize level of functional independent mobility and consistency. Upon conclusion pt  supine in bed. Complexity: Comorbidities affecting pt's physical performance at time of assessment include: htn, dementia, neuropathy, macular degeneration, decreased vision, and depression. Personal factors affecting pt at time of IE include:  limited mobility, ambulating with assistive device, steps to enter home, and preferred language not English (language barrier). Please find objective findings from PT assessment regarding body systems outlined above with impairments and limitations including weakness, impaired balance, decreased endurance, gait deviations, and pain.  Pt's clinical presentation is currently unstable/unpredictable seen in pt's presentation of hypertension, abnormal renal values, and abnormal WBC count. The patient's AM-PAC Basic Mobility Inpatient Short Form Raw Score is 20.  Based on patient presentations and impairments, pt would most appropriately benefit from Level 3 resource intensity upon discharge. Please also refer to the recommendation of the Physical Therapist for safe discharge planning. RN verbalized pt appropriate for PT session. Pt seen as a co-eval with OT due to the patient's co-morbidities, clinically unstable presentation, and present impairments which are a regression from the patient's baseline.   Barriers to Discharge None   Goals   Patient Goals to return home   LTG Expiration Date 08/25/24   Long Term Goal #1 Pt will: Perform bed mobility tasks to modified I to improve ease of bed mobility. Perform transfers to modified I to improve ease of transfers. Perform ambulation with MI and RW for 250 feet to increase Indep in home environment. Increase dynamic standing balance to F+ to decrease fall risk. Increase OOB activity tolerance to 10 minutes without s/s of exertion to decrease fall risk. Navigate up and down 3 steps with MI so patient can enter and exit home.   Plan   Treatment/Interventions Functional transfer training;Therapeutic exercise;Endurance training;Gait training;Bed mobility;Equipment eval/education; elevations   PT Frequency 3-5x/wk   Discharge Recommendation   Rehab Resource Intensity Level, PT III (Minimum Resource Intensity)   Equipment Recommended Walker   Walker Package Recommended Wheeled  walker   Change/add to Walker Package? No   AM-PAC Basic Mobility Inpatient   Turning in Flat Bed Without Bedrails 4   Lying on Back to Sitting on Edge of Flat Bed Without Bedrails 4   Moving Bed to Chair 3   Standing Up From Chair Using Arms 3   Walk in Room 3   Climb 3-5 Stairs With Railing 3   Basic Mobility Inpatient Raw Score 20   Basic Mobility Standardized Score 43.99   [facility] Highest Level Of Mobility   -HLM Goal 6: Walk 10 steps or more   JH-HLM Achieved 8: Walk 250 feet ot more       Time In: 1043  Time Out: 1101  Total Treatment Minutes: 18    Ayleen Chau

## 2024-08-15 NOTE — ASSESSMENT & PLAN NOTE
Patient with SBP greater than 200 in the ER with associated chest pain  Troponins have been negative  Patient had echocardiogram performed 2 days ago with normal LV size and function  Cardiology consulted  Hydralazine dose increased to 25 mg 3 times daily  Blood pressure has improved  Will discharge home with outpatient follow-up.  Chest pain has resolved

## 2024-08-15 NOTE — DISCHARGE SUMMARY
[facility]  Discharge- Michelle Villatoro 1940, 84 y.o. female MRN: [id number]  Unit/Bed#: -01 Encounter: [id number]  Primary Care Provider: Elver Lynne MD   Date and time admitted to hospital: 8/14/2024 12:49 PM    * Hypertensive emergency  Assessment & Plan  Patient with SBP greater than 200 in the ER with associated chest pain  Troponins have been negative  Patient had echocardiogram performed 2 days ago with normal LV size and function  Cardiology consulted  Hydralazine dose increased to 25 mg 3 times daily  Blood pressure has improved  Will discharge home with outpatient follow-up.  Chest pain has resolved    Hypoglycemia  Assessment & Plan  Patient with diabetes and on insulin at home.  Per granddaughter at bedside, long-acting insulin dose recently decreased from 20 units in the morning and 10 units at bedtime to 12 units twice daily  Patient's insulin was held  Glucose levels are now rising  We will resume home Levemir 10 units at bedtime  Advised family to monitor fingersticks 3 times daily and document.  Present findings to PCP next week to adjust regimen as needed    Pancytopenia (HCC)  Assessment & Plan  Unclear etiology.  No signs of acute blood loss or hemolysis  Reviewed case with hematology over the phone  Abner test ordered, negative  Spoke with patient's granddaughter at bedside who states that patient is following with hematology as outpatient  Advised to have repeat CBC in 1 week  Continue outpatient follow-up with hematology    Major depressive disorder, single episode, moderate (HCC)  Assessment & Plan  Continue Zoloft    Peripheral neuropathy  Assessment & Plan  Likely diabetic neuropathy.  Patient recently given prescription for gabapentin however has not started it yet.  Gabapentin started in the hospital.  Continue gabapentin as prescribed by PCP    Alzheimer's dementia (HCC)  Assessment & Plan  Continue Namenda      Medical Problems       Resolved Problems   Date Reviewed: 8/15/2024   None       Discharging Physician / Practitioner: Tremaine Weiss MD  PCP: Elver Lynne MD  Admission Date:   Admission Orders (From admission, onward)       Ordered        08/14/24 1621  Place in Observation  Once                          Discharge Date: 08/15/24    Consultations During Hospital Stay:  Cardiology    Procedures Performed:   None    Significant Findings / Test Results:   Hypertensive urgency, hyperglycemia    Incidental Findings:   None    Test Results Pending at Discharge (will require follow up):   None     Outpatient Tests Requested:  Routine labs with PCP as outpatient    Complications: None    Reason for Admission: Hypertensive urgency, hyperglycemia    Hospital Course:   Michelle Villatoro is a 84 y.o. female patient who originally presented to the hospital on 8/14/2024 due to chest pain.  Patient with hypertensive urgency noted in the ER.  Patient received IV labetalol in the ER with improvement in blood pressure.  Blood pressure continued to fluctuate.  Cardiology was consulted.  Hydralazine dose increased to 25 mg 3 times daily.  Troponins have remained stable.  Patient recently had echocardiogram.  Patient has improved clinically today.  Also with hypoglycemia noted on admission requiring D50.  Home Levemir was discontinued during hospitalization.  Glucose levels have now been elevated.  Will resume home Levemir at 10 units nightly.  Advised to follow-up with PCP for further management.  Advised to keep a glucose diary with fingersticks 3 times daily  Pancytopenia noted as well.  Patient is following with hematology as outpatient.  No signs of acute bleeding at this time.  Advised to have repeat blood work with PCP in 1 week    Please see above list of diagnoses and related plan for additional information.     Condition at Discharge: stable    Discharge Day Visit / Exam:   Subjective: No complaints at this time  Vitals: Blood Pressure: (!) 120/35 (08/15/24  "1525)  Pulse: 59 (08/15/24 1525)  Temperature: 98.3 °F (36.8 °C) (08/15/24 0702)  Respirations: 19 (08/15/24 1525)  Height: 5' 1\" (154.9 cm) (08/14/24 2100)  Weight - Scale: 59.6 kg (131 lb 8.1 oz) (08/14/24 2301)  SpO2: 94 % (08/15/24 1525)  Exam:   Physical Exam  Constitutional:       General: She is not in acute distress.  HENT:      Head: Normocephalic and atraumatic.      Nose: Nose normal.      Mouth/Throat:      Mouth: Mucous membranes are moist.   Eyes:      Extraocular Movements: Extraocular movements intact.      Conjunctiva/sclera: Conjunctivae normal.   Cardiovascular:      Rate and Rhythm: Normal rate and regular rhythm.   Pulmonary:      Effort: Pulmonary effort is normal. No respiratory distress.   Abdominal:      Palpations: Abdomen is soft.      Tenderness: There is no abdominal tenderness.   Musculoskeletal:         General: Normal range of motion.      Cervical back: Normal range of motion and neck supple.   Skin:     General: Skin is warm and dry.   Neurological:      General: No focal deficit present.      Mental Status: She is alert. Mental status is at baseline.      Cranial Nerves: No cranial nerve deficit.   Psychiatric:         Mood and Affect: Mood normal.         Behavior: Behavior normal.      Comments: Baseline intermittent confusion          Discussion with Family: Updated  (granddaughter) at bedside.    Discharge instructions/Information to patient and family:   See after visit summary for information provided to patient and family.      Provisions for Follow-Up Care:  See after visit summary for information related to follow-up care and any pertinent home health orders.      Mobility at time of Discharge:   Basic Mobility Inpatient Raw Score: 20  JH-HLM Goal: 6: Walk 10 steps or more  JH-HLM Achieved: 8: Walk 250 feet ot more  HLM Goal achieved. Continue to encourage appropriate mobility.     Disposition:   Home    Planned Readmission: no     Discharge Statement:  I " spent 35 minutes discharging the patient. This time was spent on the day of discharge. I had direct contact with the patient on the day of discharge. Greater than 50% of the total time was spent examining patient, answering all patient questions, arranging and discussing plan of care with patient as well as directly providing post-discharge instructions.  Additional time then spent on discharge activities.    Discharge Medications:  See after visit summary for reconciled discharge medications provided to patient and/or family.      **Please Note: This note may have been constructed using a voice recognition system**

## 2024-08-15 NOTE — PLAN OF CARE
Problem: PHYSICAL THERAPY ADULT  Goal: Performs mobility at highest level of function for planned discharge setting.  See evaluation for individualized goals.  Description: Treatment/Interventions: Functional transfer training, Therapeutic exercise, Endurance training, Gait training, Bed mobility, Equipment eval/education  Equipment Recommended: Walker       See flowsheet documentation for full assessment, interventions and recommendations.  Outcome: Progressing  Note: Prognosis: Good     Assessment: (P) Pt is 84 y.o. female seen for PT evaluation s/p admit to [facility] on 8/14/2024 w/ Hypertensive emergency. PT consulted to assess pt's functional mobility and d/c needs. Order placed for PT eval and tx, w/ up and OOB as tolerated order. Pt agreeable to PT  session upon arrival, pt found supine in bed.  PTA, pt was requiring A for mobility, has 3 JEFFREY, and retired.  Pt to benefit from continued PT tx to address deficits and maximize level of functional independent mobility and consistency. Upon conclusion pt  supine in bed. Complexity: Comorbidities affecting pt's physical performance at time of assessment include: htn, dementia, neuropathy, macular degeneration, decreased vision, and depression. Personal factors affecting pt at time of IE include: limited mobility, ambulating with assistive device, steps to enter home, and preferred language not English (language barrier). Please find objective findings from PT assessment regarding body systems outlined above with impairments and limitations including weakness, impaired balance, decreased endurance, gait deviations, and pain.  Pt's clinical presentation is currently unstable/unpredictable seen in pt's presentation of hypertension, abnormal renal values, and abnormal WBC count. The patient's AM-PAC Basic Mobility Inpatient Short Form Raw Score is 20.  Based on patient presentations and impairments, pt would most appropriately benefit from Level 3 resource  intensity upon discharge. Please also refer to the recommendation of the Physical Therapist for safe discharge planning. RN verbalized pt appropriate for PT session. Pt seen as a co-eval with OT due to the patient's co-morbidities, clinically unstable presentation, and present impairments which are a regression from the patient's baseline.  Barriers to Discharge: None     Rehab Resource Intensity Level, PT: III (Minimum Resource Intensity)    See flowsheet documentation for full assessment.

## 2024-08-15 NOTE — PLAN OF CARE
Problem: OCCUPATIONAL THERAPY ADULT  Goal: Performs self-care activities at highest level of function for planned discharge setting.  See evaluation for individualized goals.  Description: Treatment Interventions: ADL retraining, Functional transfer training, Endurance training, Patient/family training, Equipment evaluation/education, Compensatory technique education, Energy conservation, Activityengagement          See flowsheet documentation for full assessment, interventions and recommendations.   Note: Limitation: Decreased ADL status, Decreased endurance, Decreased self-care trans, Decreased high-level ADLs  Prognosis: Good  Assessment: Patient is a 84 y.o. female seen for OT evaluation s/p admit to  [facility] on 8/14/2024 w/Hypertensive emergency + commorbidities/PMHx (as listed in medical record) affecting patient's functional performance c ADL tasks at time of assessment. OT orders placed for evaluation and treatment to assess pt's ADLs, cognitive status + performance during functional tasks in order to formulate appropriate d/c recommendations.     Therapist performed at least two patient identifiers during session including name and wristband. Personal factors affecting patient at time of initial evaluation include: advanced age, inability to perform ADLs, inability to ambulate household distances, and decreased initiation and engagement.   Pt's clinical presentation is currently unstable/unpredictable given new onset deficits that effect pt's occupational performance and ability to safely return to OF including decrease activity tolerance, decrease standing balance, decrease sitting balance, decrease performance during ADL tasks, increased pain, decrease generalized strength, decrease activity engagement, and decrease performance during functional transfers combined with medical complications of hypertension , pain impacting overall mobility status, abnormal renal lab values, abnormal CBC,  abnormal blood sugars, and need for input for mobility technique/safety. This evaluation required an extensive review of medical and/or therapy records and additional review of physical, cognitive and psychosocial history related to functional performance. Based upon functional performance deficits and assessments, this evaluation has been identified as a  high complexity evaluation.      Patient to benefit from continued Occupational Therapy treatment while in the hospital to address aforementioned deficits and maximize level of functional independence with ADLs and functional mobility.     Rehab Resource Intensity Level, OT: III (Minimum Resource Intensity)

## 2024-08-15 NOTE — ASSESSMENT & PLAN NOTE
Likely diabetic neuropathy.  Patient recently given prescription for gabapentin however has not started it yet.  Gabapentin started in the hospital.  Continue gabapentin as prescribed by PCP

## 2024-08-15 NOTE — CASE MANAGEMENT
Case Management Assessment & Discharge Planning Note    Patient name Michelle Villatoro  Location /-01 MRN [id number]  : 1940 Date 8/15/2024       Current Admission Date: 2024  Current Admission Diagnosis:Hypertensive emergency   Patient Active Problem List    Diagnosis Date Noted Date Diagnosed    Hypoglycemia 2024     Hypertensive emergency 2024     Type 2 diabetes mellitus with hyperglycemia ([facility]) 2024     Rheumatoid arthritis ([facility]) 2024     Chronic pain of left knee 2024     Anemia 2024     Leukopenia 2024     Hypertensive kidney disease with chronic kidney disease 2024     Major depressive disorder, single episode, moderate ([facility]) 2023     Unintentional weight loss 2023     Chronic midline low back pain without sciatica 2022     Dysphagia 2022     Weakness of both lower extremities 04/15/2022     Asymptomatic bilateral carotid artery stenosis 2021     Post herpetic neuralgia 2021     Herpes zoster without complication 08/10/2021     Chronic kidney disease, stage 3b ([facility]) 2021     Ulcerated external hemorrhoids 2021     Bright red blood per rectum 2021     Coccyalgia 2021     Leg cramps, sleep related 2020     Restless leg syndrome 2020     Mixed hyperlipidemia 2020     Anemia in stage 3 chronic kidney disease  (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) 02/22/2018     Dementia with behavioral disturbance (HCC) 02/15/2018     Chronic left shoulder pain 02/15/2018       LOS (days): 0  Geometric Mean LOS (GMLOS) (days):   Days to GMLOS:     OBJECTIVE:     Current admission status: Observation       Preferred Pharmacy:   [id number] [place] - [address]  [address]  [place] [address]  Phone: [phone] Fax: [phone]    [id number] - [place], [place] - [address]  [address] [facility] [address]  Phone: [phone] Fax: [phone]    Primary Care Provider: Elver Lynne MD    Primary Insurance: [facility]  Secondary Insurance: [facility] [facility]    ASSESSMENT:  Active Health Care Proxies       Waleska Millard Health Care Representative - Grandchild   Primary Phone: [phone] (Mobile)  Home Phone: [phone]                 Advance Directives  Does patient have a Health Care POA?: No  Was patient offered paperwork?: Yes (Patient's granddaughter refused at this time.)  Does patient currently have a Health Care decision maker?: Yes, please see Health Care Proxy section  Does patient have Advance Directives?: No  Was patient offered paperwork?: Yes (Patient's granddaughter refused at this time.)  Primary Contact: Patient's Granddaughter (Waleska)    Readmission Root Cause  30 Day Readmission: Yes  Who directed you to return to the hospital?: [facility]  Did you understand whom to contact if you had questions or problems?: Refused to provide information  Did you get your prescriptions before you left the hospital?: Refused to provide information  Were you able to get your prescriptions filled when you left the hospital?: Refused to provide information  Did you take your medications as prescribed?: Yes  Were you able to get to your follow-up appointments?: Refused to provide information  During previous admission, was a post-acute recommendation made?: No  Patient was readmitted  due to: elevated blood pressure and chest pain  Action Plan: return home with VNA and waiver services once medically stable.    Patient Information  Admitted from:: Home  Mental Status: Confused  During Assessment patient was accompanied by: Not accompanied during assessment  Assessment information provided by:: Other - please comment (Granddaughter)  Primary Caregiver: Family  Caregiver's Name:: Waleska  Caregiver's Relationship to Patient:: Family Member  Caregiver's Telephone Number:: [phone]  Support Systems: Self, Family members, Home care staff  County of Residence: [place]  [place] you [place] in[place]  Home entry access options. Select all that apply.: Ramp  Type of Current Residence: 2 story home  Upon entering residence, is there a bedroom on the main floor (no further steps)?: Yes  Upon entering residence, is there a bathroom on the main floor (no further steps)?: Yes  Living Arrangements: Other (Comment) (lives with daughter and granddaughter)  Is patient a ?: No    Activities of Daily Living Prior to Admission  Functional Status: Assistance  Completes ADLs independently?: No  Level of ADL dependence: Assistance  Ambulates independently?: No  Level of ambulatory dependence: Assistance  Does patient use assisted devices?: Yes  Assisted Devices (DME) used: Walker, Shower Chair  Does patient currently own DME?: Yes  What DME does the patient currently own?: Shower Chair, Walker, Wheelchair  Does patient have a history of Outpatient Therapy (PT/OT)?: No  Does the patient have a history of Short-Term Rehab?: No  Does patient have a history of HHC?: No  Does patient currently have HHC?: Yes    Current Home Health Care  Type of Current Home Care Services: Home health aide (waiver services; 7 days per week for 7 hours each day)  Current Home Health Agency:: Other (please enter name in comment) ([facility])  Current Home Health Follow-Up Provider:: PCP    Patient Information  Continued  Income Source: SSI/SSD  Does patient have prescription coverage?: Yes (Patient's granddaughter confirmed that she uses [facility] [place], and she denied any barriers to obtaining or affording prescriptions.)  Does patient receive dialysis treatments?: No  Does patient have a history of substance abuse?: No  Does patient have a history of Mental Health Diagnosis?: No    PHQ 2/9 Screening   Reviewed PHQ 2/9 Depression Screening Score?: No    Means of Transportation  Means of Transport to Appts:: Family transport    Social Determinants of Health (SDOH)      Flowsheet Row Most Recent Value   Housing Stability    In the last 12 months, was there a time when you were not able to pay the mortgage or rent on time? N   In the past 12 months, how many times have you moved where you were living? 1   At any time in the past 12 months, were you homeless or living in a shelter (including now)? N   Transportation Needs    In the past 12 months, has lack of transportation kept you from medical appointments or from getting medications? no   In the past 12 months, has lack of transportation kept you from meetings, work, or from getting things needed for daily living? No   Food Insecurity    Within the past 12 months, you worried that your food would run out before you got the money to buy more. Never true   Within the past 12 months, the food you bought just didn't last and you didn't have money to get more. Never true   Utilities    In the past 12 months has the electric, gas, oil, or water company threatened to shut off services in your home? No        DISCHARGE DETAILS:    Discharge planning discussed with:: Patient's Granddaughter  Freedom of Choice: Yes  Comments - Freedom of Choice: CM discussed FOC as it pertains to discharge planning. Patient's granddaughter reported that they would not be interested in STR if recommended and prefer that patients returns home with VNA and waiver services. Patient's  granddaughter denied any preferences as to which home care agency she wishes to use. She also confirmed that she will provide transportation home once patient is discharged.  CM contacted family/caregiver?: Yes  Were Treatment Team discharge recommendations reviewed with patient/caregiver?: Yes  Did patient/caregiver verbalize understanding of patient care needs?: Yes  Were patient/caregiver advised of the risks associated with not following Treatment Team discharge recommendations?: Yes    Contacts  Patient Contacts: Waleska Millard  Relationship to Patient:: Family  Contact Method: Phone  Phone Number: [phone]  Reason/Outcome: Continuity of Care, Discharge Planning    Requested Home Health Care         Is the patient interested in HHC at discharge?: Yes  Home Health Discipline requested:: Physical Therapy, Occupational Therapy, Nursing  Home Health Agency Name:: Other ([place] referral sent.)  Home Health Follow-Up Provider:: [facility]  Home Health Services Needed:: Evaluate Functional Status and Safety, Gait/ADL Training, Strengthening/Theraputic Exercises to Improve Function  Homebound Criteria Met:: Uses an Assist Device (i.e. cane, walker, etc), Requires the Assistance of Another Person for Safe Ambulation or to Leave the Home  Supporting Clincal Findings:: Fatigues Easliy in Short Distances, Limited Endurance, Cognitive Deficit Requiring the Assistance of Others    DME Referral Provided  Referral made for DME?: No    Other Referral/Resources/Interventions Provided:  Interventions: HHC  Referral Comments: CM sent a blanket referral for HHC via [facility] to determine who can accept for skilled nursing, PT, and OT. Response pending.    Would you like to participate in our [facility] service program?  : No - Declined    Treatment Team Recommendation: Home  Discharge Destination Plan:: Home with Home Health Care  Transport at Discharge : Family

## 2024-08-15 NOTE — SPEECH THERAPY NOTE
Speech Language/Pathology  Speech/Language Pathology  Assessment    Patient Name: Michelle Villatoro  Today's Date: 8/15/2024     Problem List  Principal Problem:    Hypertensive emergency  Active Problems:    Alzheimer's dementia (HCC)    Peripheral neuropathy    Major depressive disorder, single episode, moderate (HCC)    Hypoglycemia    Past Medical History  Past Medical History:   Diagnosis Date    Aggression     Alzheimer's dementia (HCC)     Arthritis     Dementia (HCC)     Diabetes insipidus (HCC)     Diabetes mellitus (HCC)     High cholesterol     Hypertension     Memory loss     Migraine     Polyneuropathy     Rheumatoid arthritis (HCC)     Serum lipids high     Stomach problems     Thyroid trouble      Past Surgical History  Past Surgical History:   Procedure Laterality Date    CATARACT EXTRACTION      CHOLECYSTECTOMY      GALLBLADDER SURGERY      HYSTERECTOMY      SD SURGICAL ARTHROSCOPY SHOULDER W/ROTATOR CUFF RPR Right 8/1/2019    Procedure: SHOULDER ARTHROSCOPIC ROTATOR CUFF REPAIR;  Surgeon: Betsy Conde MD;  Location: [facility];  Service: Orthopedics    ROTATOR CUFF REPAIR Right 08/01/2019      Bedside Swallow Evaluation:    Summary:  Pt presented w/ mild oral dysphagia and pharyngeal phase of swallow WNL. Familiar with this ST as completed OP VBS. Positioned upright and alert. Family at beside.  Pt fed self at min fast rate mech soft fish, mashed potatoes, and thin liquids via straw with single sips. Mastication was functional. Bolus control, formation, and transfer were WNL. Swallows appeared prompt. No overt s/s of aspiration. Verbal cues provided to alternate liquids with solids. Pt family reported that pt typically at home does eat slower however does receive larger portions ar home and snacks throughout the day. Reported pt often gets globus sensation with meals and later will cough. Family stated they typically crush her medication. ST questioned if family followed up with GI stated no as  thought difficulty swallowing related to dementia. ST showed and reviewed images from VBS completed 06/18/2022. Reviewed diet recommendations and safe swallow strategies. Family understood.     Recommendations:  Diet: Dysphagia 3 dental soft   Liquid: Thin   Meds:crushed with puree   Supervision:intermittent   Positioning:Upright  Strategies: slow rate, alternate liquids with solids, swallow prior to additional po.   Pt to take PO/Meds only when fully alert and upright.   Oral care  Aspiration precautions  Reflux precautions  Therapy Prognosis: fair   Prognosis considerations: age, medical status.   Frequency:1-3 times weekly as indicated.     Consider consult w/:  Rehab  GI recent VBS slow motility in upper esophagus. Retropulsion with thin liquids.   Nutrition family reported receiving larger portions at home and snacking throughout day    Goal(s):  Dysphagia LTG  -Patient will demonstrate safe and effective oral intake (without overt s/s significant oral/pharyngeal dysphagia including s/s penetration or aspiration) for the highest appropriate diet level.     1.Pt will tolerate least restrictive diet w/out s/s aspiration or oral/pharyngeal difficulties.   2.Pt will will effectively manipulate/masticate and transfer purees/solids w/out s/s dysphagia/aspiration.   3.Pt will tolerate thin liquids w/out s/s aspiration.   -If indicated, patient will comply with a Video/Modified Barium Swallow study for more complete assessment of swallowing anatomy/physiology/aspiration risk and to assess efficacy of treatment techniques so as to best guide treatment plan     H&P/Admit info/ pertinent provider notes: (PMH noted above)  Michelle Villatoro is a 84 y.o. female with a PMH of diabetes mellitus, Alzheimer's dementia, hypertension, rheumatoid arthritis who presents with chest pain.  Patient had home visiting nurse come to the house today and noted to have elevated blood pressure.  Given significantly elevated blood pressure  patient was referred to the ER.  Associated with chest pain.  Also complaining of pain in bilateral lower extremities.  Recently received prescription for gabapentin which she has not started yet.     Special Studies:  CT BRAIN - WITHOUT CONTRAST 08/14/2024  IMPRESSION:   No acute intracranial abnormality.  XR CHEST PORTABLE 08/14/2024  IMPRESSION:  No acute cardiopulmonary disease.    Procalcitonin:    WBC: 3.29                    08/15/2024     Code Status Level 1 full code     Previous MBS:  Video Swallow Study  6/18/2024  Summary:  Images are on PACS for review.   Oral Phase : Pt presented with mild oral dysphagia. Mastication was mildly prolonged however eventual functional breakdown. Bolus control and formation were WNL. Transfer was piecemeal however WFL.   Pharyngeal Phase :Pt presented with pharyngeal phase of swallow WNL. Swallow initiation was prompt. Hyoid elevation, laryngeal excursion, and pharyngeal constriction were WNL. Epiglottic inversion was complete. Trace vallecular and pyriform retention with solids. Osteophyte present C6-C7, trace residue above site with nutri grain bar all other trails cleared adequately. Trace residue from nutri grain bar did clear with liquid wash.  No aspiration or penetration occurred throughout study.   Per Esophageal screen   Slow motility in upper esophagus observed with solids. Retropulsion observed with liquids.   Recommendations:  Diet:Regular w/ softer selections, cut into bite sized pieces, add condiments to moisten   Liquids:Thin  Meds:As tolerated   Strategies:slow rate, alternate liquids with solids, and swallow prior to additional po   Upright position  F/u ST tx:no  Full/Intermittent Supervision  Aspiration Precautions  Reflux Precautions  Consider consult with: GI   Results reviewed with: pt,  family, physician,   Aspiration precautions posted.  If a dedicated assessment of the esophagus is desired, consider esophagram/barium swallow or EGD    Patient's  goal: none stated    Did the pt report pain? no  If yes, was nursing notified/was it addressed? N/a    Reason for consult:  R/o aspiration  Determine safest and least restrictive diet  h/o dysphagia     Precautions:  N/a    Food Allergies: No known    Current Diet: Dysphagia 3 dental soft and thin liquids    Premorbid diet: Regular with softer selections and thin liquids    O2 requirement: Room Air    Social/Prior living Lives with family members    Voice/Speech: WNL   Follows commands: Basic    Cognitive status: Alert      Oral [facility] exam:  Dentition:edentulous   Lips (VII):WNL  Tongue (XII):midline   Mandible (V):adequate ROM  Face/oral sensation (V):symmetrical   Velum (X): WNL    Items administered:  Fish, mashed potatoes, and thin liquids via straw with single/successive sips    Oral stage:  Lip closure:WNL  Mastication:WNL  Bolus formation:WNL  Bolus control:WNL  Transfer:WNL:    Pharyngeal stage:  Swallow promptness:prompt   Laryngeal rise:adequate   No overt s/s aspiration    Esophageal stage:  No s/s reported  H/o GERD  EGD 08/2/2022  IMPRESSION:  Normal upper GI endoscopy, status post 3rd duodenum biopsy  RECOMMENDATION:  Follow-up biopsy results in 3 weeks    Results d/w:  Pt, nursing, family, physician,           4 = No assist / stand by assistance

## 2024-08-15 NOTE — PROGRESS NOTES
Patient:    MRN:  [id number]    Aidin Request ID:  [id number]    Level of care reserved:  Home Health Agency    Partner Reserved:  [facility], [place] [address] [phone]    Clinical needs requested:    Geography searched:  [id number]    Start of Service:    Request sent:  10:44am EDT on 8/15/2024 by Nelida Moss    Partner reserved:  12:59pm EDT on 8/15/2024 by Nelida Moss    Choice list shared:  12:58pm EDT on 8/15/2024 by Nelida Moss

## 2024-08-15 NOTE — OCCUPATIONAL THERAPY NOTE
Occupational Therapy Evaluation     Patient Name: Michelle Villatoro  Today's Date: 8/15/2024  Problem List  Principal Problem:    Hypertensive emergency  Active Problems:    Alzheimer's dementia (HCC)    Peripheral neuropathy    Major depressive disorder, single episode, moderate (HCC)    Hypoglycemia    Past Medical History  Past Medical History:   Diagnosis Date    Aggression     Alzheimer's dementia (HCC)     Arthritis     Dementia (HCC)     Diabetes insipidus (HCC)     Diabetes mellitus (HCC)     High cholesterol     Hypertension     Memory loss     Migraine     Polyneuropathy     Rheumatoid arthritis (HCC)     Serum lipids high     Stomach problems     Thyroid trouble      Past Surgical History  Past Surgical History:   Procedure Laterality Date    CATARACT EXTRACTION      CHOLECYSTECTOMY      GALLBLADDER SURGERY      HYSTERECTOMY      LA SURGICAL ARTHROSCOPY SHOULDER W/ROTATOR CUFF RPR Right 8/1/2019    Procedure: SHOULDER ARTHROSCOPIC ROTATOR CUFF REPAIR;  Surgeon: Betsy Conde MD;  Location: [facility];  Service: Orthopedics    ROTATOR CUFF REPAIR Right 08/01/2019         08/15/24 1034   OT Last Visit   OT Visit Date 08/15/24   Note Type   Note type Evaluation   Additional Comments Nsg staff verbally cleared pt for OT evaluation.  Pt received  supine in bed c HOB semi-elevated on this date + is agreeable to OT session @ this time. @ exit, pt remains in  supine in bed c HOB semi-elevated c all lines intact, all needs met, call bell in hand + nsg aware of location/disposition.  (During evaluation, daughter + granddaughter present in room. Granddaughter assisted c translation.)   Pain Assessment   Pain Assessment Tool 0-10   Pain Score 8   Pain Location/Orientation Orientation: Right;Orientation: Left;Orientation: Upper;Location: Leg   Pain Onset/Description Onset: Gradual   Patient's Stated Pain Goal No pain   Hospital Pain Intervention(s) Rest   Restrictions/Precautions   Weight Bearing Precautions Per  Order No   Other Precautions Chair Alarm;Bed Alarm;Fall Risk;Pain;Visual impairment  (Sami speaking)   Home Living   Type of Home House   Home Layout Two level;Performs ADLs on one level;Able to live on main level with bedroom/bathroom;Ramped entrance  ((1st floor set up . 3 [address]))   Bathroom Shower/Tub Walk-in shower   Bathroom Toilet Raised   Bathroom Equipment Grab bars in shower;Toilet raiser   Bathroom Accessibility Accessible   Home Equipment Cane;Walker   Prior Function   Level of [place] Needs assistance with ADLs;Needs assistance with functional mobility;Needs assistance with IADLS   Lives With Daughter;Family  ((& granddaughter))   Receives Help From Family   IADLs Family/Friend/Other provides transportation;Family/Friend/Other provides meals;Family/Friend/Other provides medication management   Falls in the last 6 months 0   Comments HHA: daughter is CG   Lifestyle   Autonomy Pt lives in  2 story home c family + @ baseline, performs ADLs  with A, IADLs with A + fxl mobility with A with AD. (-) .   Reciprocal Relationships Daughter + granddaughter   ADL   Eating Assistance 5  Supervision/Setup   Grooming Assistance 5  Supervision/Setup   UB Bathing Assistance 4  Minimal Assistance   LB Bathing Assistance 3  Moderate Assistance   UB Dressing Assistance 5  Supervision/Setup   LB Dressing Assistance 3  Moderate Assistance   Toileting Assistance  4  Minimal Assistance   Bed Mobility   Supine to Sit 5  Supervision   Additional items HOB elevated;Verbal cues   Sit to Supine 5  Supervision   Additional items HOB elevated;Verbal cues   Transfers   Sit to Stand 5  Supervision   Additional items Verbal cues   Stand to Sit 5  Supervision   Additional items Verbal cues   Functional Mobility   Additional Comments Pt performed short distance ADL related fxl mobility in room/hallway c CGA, with RW simulating toileting distances.   No overt LOB demonstrated + pt c/o pain /  minimal SOB/ denies dizziness during  transitional movements. Family reports pt appears moderately below baseline c abilities related to ADL-focused fxl mobility. F safety awareness noted while navigating t/o room. Safely returns to bed for remainder of session.   Balance   Static Sitting Fair +   Dynamic Sitting Fair   Static Standing Fair   Dynamic Standing Fair -   Ambulatory Fair -   Activity Tolerance   Activity Tolerance Patient limited by pain   Medical Staff Made Aware PT/OT co-eval on this date d/t medical complexity, ambulatory dysfunction c high fall risk, various impeding lines + requirement for skilled interdisciplinary analysis of appropriate d/c recommendations. PT/OT POC/goals I'ly determined per respective discipline. ([place])   Hand Function   Gross Motor Coordination Functional   Fine Motor Coordination Functional   Vision-Basic Assessment   Visual History Macular degeneration   Psychosocial   Psychosocial (WDL) WDL   Cognition   Comments Granddaughter assisted c translation of A+O related questions; pt accurately identifies date (day, year, month, date). Able to answer questions appropriately t/o session.   Assessment   Limitation Decreased ADL status;Decreased endurance;Decreased self-care trans;Decreased high-level ADLs   Prognosis Good   Assessment Patient is a 84 y.o. female seen for OT evaluation s/p admit to  [facility] on 8/14/2024 w/Hypertensive emergency + commorbidities/PMHx (as listed in medical record) affecting patient's functional performance c ADL tasks at time of assessment. OT orders placed for evaluation and treatment to assess pt's ADLs, cognitive status + performance during functional tasks in order to formulate appropriate d/c recommendations.     Therapist performed at least two patient identifiers during session including name and wristband. Personal factors affecting patient at time of initial evaluation include: advanced age, inability to perform ADLs, inability to ambulate household distances, and  decreased initiation and engagement.   Pt's clinical presentation is currently unstable/unpredictable given new onset deficits that effect pt's occupational performance and ability to safely return to PLOF including decrease activity tolerance, decrease standing balance, decrease sitting balance, decrease performance during ADL tasks, increased pain, decrease generalized strength, decrease activity engagement, and decrease performance during functional transfers combined with medical complications of hypertension , pain impacting overall mobility status, abnormal renal lab values, abnormal CBC, abnormal blood sugars, and need for input for mobility technique/safety. This evaluation required an extensive review of medical and/or therapy records and additional review of physical, cognitive and psychosocial history related to functional performance. Based upon functional performance deficits and assessments, this evaluation has been identified as a  high complexity evaluation.      Patient to benefit from continued Occupational Therapy treatment while in the hospital to address aforementioned deficits and maximize level of functional independence with ADLs and functional mobility.   Plan   Treatment Interventions ADL retraining;Functional transfer training;Endurance training;Patient/family training;Equipment evaluation/education;Compensatory technique education;Energy conservation;Activityengagement   Goal Expiration Date 08/25/24   OT Treatment Day 0   OT Frequency 2-3x/wk   Discharge Recommendation   Rehab Resource Intensity Level, OT III (Minimum Resource Intensity)   AM-PAC Daily Activity Inpatient   Lower Body Dressing 3   Bathing 3   Toileting 3   Upper Body Dressing 3   Grooming 3   Eating 3   Daily Activity Raw Score 18   Daily Activity Standardized Score (Calc for Raw Score >=11) 38.66   AM-PAC Applied Cognition Inpatient   Following a Speech/Presentation 3   Understanding Ordinary Conversation 3   Taking  Medications 3   Remembering Where Things Are Placed or Put Away 3   Remembering List of 4-5 Errands 3   Taking Care of Complicated Tasks 3   Applied Cognition Raw Score 18   Applied Cognition Standardized Score 38.07   Barthel Index   Feeding 10   Bathing 0   Grooming Score 0   Dressing Score 5   Bladder Score 5   Bowels Score 5   Toilet Use Score 5   Transfers (Bed/Chair) Score 10   Mobility (Level Surface) Score 10   Stairs Score 5   Barthel Index Score 55   End of Consult   Patient Position at End of Consult Supine;Bed/Chair alarm activated;All needs within reach     The patient's raw score on the -PAC Daily Activity inpatient short form is 18, standardized score is 38.66, less than 39.4. Please refer to the recommendation of the Occupational Therapist for safe DC planning.    Resource Intensity Recommendation: Level III (Minimum Resource Intensity)        GOALS:    *ADL transfers with (S) for inc'd independence with ADLs/purposeful tasks    *UB ADL with (S) for inc'd independence with self cares    *LB ADL with (S) using AE prn for inc'd independence with self cares    *Toileting with (S) for clothing management and hygiene for return to PLOF with personal care    *Increase stand tolerance x 5  m for inc'd tolerance with standing purposeful tasks    *Participate in 10m UE therex to increase overall stamina/activity tolerance for purposeful tasks    *Bed mobility- (S) for inc'd independence to manage own comfort and initiate EOB & OOB purposeful tasks    *Patient will verbalize 3 safety awareness/ principles to prevent falls in the home setting.     *Patient will verbalize and demonstrate use of energy conservation/deep breathing techniques and work simplification skills during functional activities with no verbal cues.     *Patient will increase OOB/sitting tolerance to 2-4 hours per day to increase participation in self-care and leisure tasks with no s/s of exertion.     *Patient will engage in  ongoing cognitive assessment to assist with safe discharge planning/recommendations.     *Pt will verbalize and demonstrate understanding of post-op movement precautions 100% (if applicable) in tx sessions for increased safety and functional mobility.      *Pt will demonstrate use of long handled AE (if appropriate) during 100% of tx sessions for increased ADL safety and independence following D/C     Olivia Alaniz OTR/NEELIMA

## 2024-08-15 NOTE — ASSESSMENT & PLAN NOTE
Presented with BP of 228/98 and CP  Agree with adding hydralazine   Ok to start 25 mg TID   Continue Lisinopril 40 mg daily, Norvasc 10 mg daily and HCTZ 25 mg daily

## 2024-08-15 NOTE — CONSULTS
[facility]  Consult  Name: Michelle Villatoro 84 y.o. female I MRN: [id number]  Unit/Bed#: -[facility] I Date of Admission: 8/14/2024   Date of Service: 8/15/2024 I Hospital Day: 0    Inpatient consult to Cardiology  Consult performed by: Halina Rivera PA-C  Consult ordered by: Tremaine Weiss MD       used: [id number] and interrupted communication, restarted with [id number]    Assessment & Plan   Alzheimer's dementia (HCC)  Assessment & Plan  Supportive measures per primary care team     * Hypertensive emergency  Assessment & Plan  Presented with BP of 228/98 and CP  Agree with adding hydralazine   Ok to start 25 mg TID   Continue Lisinopril 40 mg daily, Norvasc 10 mg daily and HCTZ 25 mg daily              Summary Comments:  Given uncontrolled HTN would start hydralazine 35 mg TID in addition to pre-hospital antihypertensives.     Thank you for allowing us to see this pleasant patient in consult. We will follow course along with you and make outpatient plans outlined above with all arrangements.     Outpatient Cardiologist: none    Chief Complaint:  Chief Complaint   Patient presents with    Hypertension    Hypoglycemia - Symptomatic     Hypertension at home visit; this AM c/o of dizziness/chest pain & low BS        History of Present Illness:  The patient is a Korean-speaking female who presented with CP and elevated BP with leg pain. She states that her BP was above 200 systolic and she became concerned and came to the ED. She was having leg pain and chest pain with SOB. Her symptoms resolved when her BP was controlled.   At the time of my examinations he is symptom free. She has no chest pain SOB palpitations or syncope. She denies any edema orthopnea or PND. She has no recurrence of Leg pain or Chest pain.   Troponin is normal and EKG is non-ischemic.     Review of Systems: a 10 point review of systems was conducted and is negative except for as mentioned in the HPI or  as below.   Review of Systems   Constitutional: Negative.   HENT: Negative.     Eyes: Negative.    Cardiovascular:  Positive for chest pain. Negative for claudication, cyanosis, dyspnea on exertion, irregular heartbeat, leg swelling, near-syncope, orthopnea, palpitations, paroxysmal nocturnal dyspnea and syncope.   Respiratory:  Positive for shortness of breath. Negative for cough, hemoptysis, sleep disturbances due to breathing, snoring, sputum production and wheezing.    Endocrine: Negative.    Hematologic/Lymphatic: Negative.    Skin: Negative.    Musculoskeletal: Negative.         Leg pain   Gastrointestinal: Negative.    Genitourinary: Negative.    Neurological: Negative.    Psychiatric/Behavioral: Negative.     Allergic/Immunologic: Negative.        Past Medical and Surgical History:  Past Medical History:   Diagnosis Date    Aggression     Alzheimer's dementia (HCC)     Arthritis     Dementia (HCC)     Diabetes insipidus (HCC)     Diabetes mellitus (HCC)     High cholesterol     Hypertension     Memory loss     Migraine     Polyneuropathy     Rheumatoid arthritis (HCC)     Serum lipids high     Stomach problems     Thyroid trouble      Past Surgical History:   Procedure Laterality Date    CATARACT EXTRACTION      CHOLECYSTECTOMY      GALLBLADDER SURGERY      HYSTERECTOMY      UT SURGICAL ARTHROSCOPY SHOULDER W/ROTATOR CUFF RPR Right 2019    Procedure: SHOULDER ARTHROSCOPIC ROTATOR CUFF REPAIR;  Surgeon: Betsy Conde MD;  Location: [facility];  Service: Orthopedics    ROTATOR CUFF REPAIR Right 2019     Social History     Substance and Sexual Activity   Alcohol Use Never     Social History     Substance and Sexual Activity   Drug Use No     Social History     Tobacco Use   Smoking Status Former    Current packs/day: 0.00    Average packs/day: 0.3 packs/day for 30.0 years (7.5 ttl pk-yrs)    Types: Cigarettes    Start date:     Quit date:     Years since quittin.6    Passive exposure:  Past   Smokeless Tobacco Never       Family History:  Family History   Problem Relation Age of Onset    Substance Abuse Mother         denied    Mental illness Mother         denied    Substance Abuse Father         denied    Mental illness Father         denied    Diabetes Brother     Blindness Brother     Arthritis Daughter     HIV Son        Medication:    Current Facility-Administered Medications:     acetaminophen (TYLENOL) tablet 650 mg, 650 mg, Oral, Q6H PRN, Tremaine Weiss MD, 650 mg at 08/14/24 2155    amLODIPine (NORVASC) tablet 10 mg, 10 mg, Oral, Daily, Tremaine Weiss MD, 10 mg at 08/15/24 0835    cyanocobalamin (VITAMIN B-12) tablet 1,000 mcg, 1,000 mcg, Oral, Daily, Tremaine Weiss MD, 1,000 mcg at 08/15/24 0833    fenofibrate (TRICOR) tablet 145 mg, 145 mg, Oral, Daily, Tremaine Weiss MD, 145 mg at 08/15/24 0834    gabapentin (NEURONTIN) capsule 100 mg, 100 mg, Oral, BID, Tremaine Weiss MD, 100 mg at 08/15/24 0833    hydrALAZINE (APRESOLINE) injection 5 mg, 5 mg, Intravenous, Q6H PRN, Tremaine Weiss MD    hydrALAZINE (APRESOLINE) tablet 10 mg, 10 mg, Oral, TID PRN, Tremaine Weiss MD    hydrALAZINE (APRESOLINE) tablet 10 mg, 10 mg, Oral, Q8H ANA, Tremaine Weiss MD, 10 mg at 08/15/24 0559    insulin lispro (HumALOG/ADMELOG) 100 units/mL subcutaneous injection 1-5 Units, 1-5 Units, Subcutaneous, TID AC, Tremaine Weiss MD    insulin lispro (HumALOG/ADMELOG) 100 units/mL subcutaneous injection 1-5 Units, 1-5 Units, Subcutaneous, HS, Tremaine Weiss MD, 2 Units at 08/14/24 2207    levothyroxine tablet 100 mcg, 100 mcg, Oral, Early Morning, Tremaine Weiss MD, 100 mcg at 08/15/24 0559    lisinopril (ZESTRIL) tablet 40 mg, 40 mg, Oral, Daily, Tremaine Weiss MD, 40 mg at 08/15/24 0835    memantine (NAMENDA) tablet 10 mg, 10 mg, Oral, HS, Tremaine Weiss MD, 10 mg at 08/14/24 1008    ondansetron  "(ZOFRAN) injection 4 mg, 4 mg, Intravenous, Q6H PRN, Tremaine Weiss MD    pantoprazole (PROTONIX) EC tablet 20 mg, 20 mg, Oral, Early Morning, Tremaine Weiss MD, 20 mg at 08/15/24 0559    sertraline (ZOLOFT) tablet 50 mg, 50 mg, Oral, Daily, Tremaine Weiss MD, 50 mg at 08/15/24 0833    Insert peripheral IV, , , Once **AND** sodium chloride (PF) 0.9 % injection 3 mL, 3 mL, Intravenous, Q1H PRN, Tremaine Weiss MD     Allergies:  Allergies   Allergen Reactions    Penicillins Itching and Swelling       Physical Exam:  Vitals: Blood pressure (!) 143/48, pulse 64, temperature 98.3 °F (36.8 °C), resp. rate 16, height 5' 1\" (1.549 m), weight 59.6 kg (131 lb 8.1 oz), SpO2 95%., Body mass index is 24.85 kg/m².,   Orthostatic Blood Pressures      Flowsheet Row Most Recent Value   Blood Pressure 143/48 filed at 08/15/2024 0702   Patient Position - Orthostatic VS Sitting filed at 2024 1815        , Systolic (24hrs), Av , Min:121 , Max:228   , Diastolic (24hrs), Av, Min:32, Max:98    Physical Exam  Vitals and nursing note reviewed.   Constitutional:       Appearance: She is well-developed.   HENT:      Head: Normocephalic and atraumatic.      Mouth/Throat:      Mouth: Mucous membranes are moist.   Eyes:      General: No scleral icterus.     Conjunctiva/sclera: Conjunctivae normal.   Neck:      Vascular: No JVD.      Trachea: No tracheal deviation.   Cardiovascular:      Rate and Rhythm: Normal rate and regular rhythm.      Pulses: Intact distal pulses.      Heart sounds: Normal heart sounds, S1 normal and S2 normal. No murmur heard.     No friction rub. No gallop.   Pulmonary:      Effort: Pulmonary effort is normal. No respiratory distress.      Breath sounds: Normal breath sounds. No wheezing or rales.   Chest:      Chest wall: No tenderness.   Abdominal:      General: Bowel sounds are normal. There is no distension.      Palpations: Abdomen is soft.      Tenderness: There is " no abdominal tenderness.      Comments: Aorta not palpable    Musculoskeletal:         General: No tenderness. Normal range of motion.      Cervical back: Normal range of motion and neck supple.      Right lower leg: No edema.      Left lower leg: No edema.   Skin:     General: Skin is warm and dry.      Coloration: Skin is not pale.      Findings: No erythema or rash.   Neurological:      General: No focal deficit present.      Mental Status: She is alert and oriented to person, place, and time.   Psychiatric:         Mood and Affect: Mood normal.         Behavior: Behavior normal.         Judgment: Judgment normal.         Most Recent Cardiac Imaging:   Echo 8/12/24: Normal LVEF 60% no WMA mild LAE mild MAC Mild MR RVSP 35 mmHg    EKG/Telemetry: Normal sinus rhythm  Left anterior fascicular block  Moderate voltage criteria for LVH, may be normal variant    Lab Results:   Troponins:   Results from last 7 days   Lab Units 08/14/24  1815 08/14/24  1533 08/14/24  1315   HS TNI 0HR ng/L  --   --  8   HS TNI 2HR ng/L  --  8  --    HSTNI D2 ng/L  --  0  --    HS TNI 4HR ng/L 8  --   --    HSTNI D4 ng/L 0  --   --       BNP:  Results from last 6 Months   Lab Units 08/14/24  1315   BNP pg/mL 374*     CBC with diff:   Results from last 7 days   Lab Units 08/15/24  0552 08/14/24  1315   WBC Thousand/uL 3.29* 4.94   HEMOGLOBIN g/dL 8.2* 9.6*   HEMATOCRIT % 26.6* 30.6*   PLATELETS Thousands/uL 133* 146*   RBC Million/uL 2.96* 3.41*     CMP:  Results from last 7 days   Lab Units 08/15/24  0552 08/14/24  1315   POTASSIUM mmol/L 5.0 4.9   CHLORIDE mmol/L 111* 108   BUN mg/dL 29* 26*   CREATININE mg/dL 1.07 1.08   CALCIUM mg/dL 9.2 9.9   AST U/L 12* 16   ALT U/L 10 14   ALK PHOS U/L 20* 28*   EGFR ml/min/1.73sq m 48 47     Lipid Profile:

## 2024-08-15 NOTE — CASE MANAGEMENT
Case Management Discharge Planning Note    Patient name Michelle Villatoro  Location /-01 MRN [id number]  : 1940 Date 8/15/2024       Current Admission Date: 2024  Current Admission Diagnosis:Hypertensive emergency   Patient Active Problem List    Diagnosis Date Noted Date Diagnosed    Hypoglycemia 2024     Hypertensive emergency 2024     Type 2 diabetes mellitus with hyperglycemia ([facility]) 2024     Rheumatoid arthritis ([facility]) 2024     Chronic pain of left knee 2024     Anemia 2024     Leukopenia 2024     Hypertensive kidney disease with chronic kidney disease 2024     Major depressive disorder, single episode, moderate ([facility]) 2023     Unintentional weight loss 2023     Chronic midline low back pain without sciatica 2022     Dysphagia 2022     Weakness of both lower extremities 04/15/2022     Asymptomatic bilateral carotid artery stenosis 2021     Post herpetic neuralgia 2021     Herpes zoster without complication 08/10/2021     Chronic kidney disease, stage 3b ([facility]) 2021     Ulcerated external hemorrhoids 2021     Bright red blood per rectum 2021     Coccyalgia 2021     Leg cramps, sleep related 2020     Restless leg syndrome 2020     Mixed hyperlipidemia 2020     Anemia in stage 3 chronic kidney disease  ([facility]) 10/03/2019     Unstable gait 08/15/2019     Tear of right rotator cuff 2019     Biceps tendon tear 2019     Nontraumatic tear of right rotator cuff 2019     Menopause ovarian failure 2019     Peripheral neuropathy 2019     Ambulatory dysfunction 2019     Hypothyroidism 2019     Essential hypertension 2019     Anxiety 2018     Alzheimer's dementia (HCC) 2018     Memory loss 2018     Bilateral hearing loss 2018     Rheumatoid arthritis 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 ([facility])  02/22/2018     Dementia with behavioral disturbance (HCC) 02/15/2018     Chronic left shoulder pain 02/15/2018       LOS (days): 0  Geometric Mean LOS (GMLOS) (days):   Days to GMLOS:     OBJECTIVE:            Current admission status: Observation   Preferred Pharmacy:   [id number] [place] - [address]  [address]  [place] [address]  Phone: [phone] Fax: [phone]    [id number] - [place], [place] - [address]  [address] [facility] [address]  Phone: [phone] Fax: [phone]    Primary Care Provider: Elver Lynne MD    Primary Insurance: [facility]  Secondary Insurance: [email or web address] [facility]    DISCHARGE DETAILS:    Requested Home Health Care         Is the patient interested in HHC at discharge?: Yes  Home Health Discipline requested:: Physical Therapy, Occupational Therapy, Nursing  [facility]  HHA External Referral Reason (only applicable if external HHA name selected): Patient has established relationship with provider  Home Health Follow-Up Provider:: PCP  Home Health Services Needed:: Evaluate Functional Status and Safety, Gait/ADL Training, Strengthening/Theraputic Exercises to Improve Function  Homebound Criteria Met:: Uses an Assist Device (i.e. cane, walker, etc), Requires the Assistance of Another Person for Safe Ambulation or to Leave the Home  Supporting Clincal Findings:: Fatigues Easliy in Short Distances, Limited Endurance, Cognitive Deficit Requiring the Assistance of Others    DME Referral Provided  Referral made for DME?: No    Other Referral/Resources/Interventions Provided:  Interventions: HHC  Referral Comments: CM reviewed [facility] and found that [facility] is current with patient for skilled nursing and OT. CM reserved in [facility] and updated patient's AVS to reflect the same.    Would you like to participate in our [facility] service program?  : No -  Declined    Treatment Team Recommendation: Home with Home Health Care  Discharge Destination Plan:: Home with Home Health Care  Transport at Discharge : Family

## 2024-08-15 NOTE — ASSESSMENT & PLAN NOTE
Patient with diabetes and on insulin at home.  Per granddaughter at bedside, long-acting insulin dose recently decreased from 20 units in the morning and 10 units at bedtime to 12 units twice daily  Patient's insulin was held  Glucose levels are now rising  We will resume home Levemir 10 units at bedtime  Advised family to monitor fingersticks 3 times daily and document.  Present findings to PCP next week to adjust regimen as needed

## 2024-08-15 NOTE — NURSING NOTE
Patient discharged to home with family. Patient and family aware of medication changes and follow up. Family provided with batteries for BP cuff at home so they can monitor. Also, they report having glucometer and understand all medication adjustments. Patient has all belongings.

## 2024-08-15 NOTE — ASSESSMENT & PLAN NOTE
Unclear etiology.  No signs of acute blood loss or hemolysis  Reviewed case with hematology over the phone  Abner test ordered, negative  Spoke with patient's granddaughter at bedside who states that patient is following with hematology as outpatient  Advised to have repeat CBC in 1 week  Continue outpatient follow-up with hematology

## 2024-08-15 NOTE — PLAN OF CARE
Problem: RESPIRATORY - ADULT  Goal: Achieves optimal ventilation and oxygenation  Description: INTERVENTIONS:  - Assess for changes in respiratory status  - Assess for changes in mentation and behavior  - Position to facilitate oxygenation and minimize respiratory effort  - Oxygen administered by appropriate delivery if ordered  - Initiate smoking cessation education as indicated  - Encourage broncho-pulmonary hygiene including cough, deep breathe, Incentive Spirometry  - Assess the need for suctioning and aspirate as needed  - Assess and instruct to report SOB or any respiratory difficulty  - Respiratory Therapy support as indicated  Outcome: Progressing     Problem: CARDIOVASCULAR - ADULT  Goal: Maintains optimal cardiac output and hemodynamic stability  Description: INTERVENTIONS:  - Monitor I/O, vital signs and rhythm  - Monitor for S/S and trends of decreased cardiac output  - Administer and titrate ordered vasoactive medications to optimize hemodynamic stability  - Assess quality of pulses, skin color and temperature  - Assess for signs of decreased coronary artery perfusion  - Instruct patient to report change in severity of symptoms  8/15/2024 1119 by Fabby Persaud RN  Outcome: Progressing  8/15/2024 1119 by Fabby Persaud RN  Outcome: Progressing

## 2024-08-16 LAB — BACTERIA UR CULT: NORMAL

## 2024-08-19 ENCOUNTER — PATIENT OUTREACH (OUTPATIENT)
Dept: CASE MANAGEMENT | Facility: OTHER | Age: 84
End: 2024-08-19

## 2024-08-19 NOTE — PROGRESS NOTES
Received ADT alert patient was discharged home following hospitalization for hypertensive urgency.  Contacted granddaughter for f/u.  She relays [facility] with [facility].  Her BS was 32.  Informed her CM will reach back out following current hospital stay.  She was agreeable.

## 2024-08-20 ENCOUNTER — TRANSITIONAL CARE MANAGEMENT (OUTPATIENT)
Dept: FAMILY MEDICINE CLINIC | Facility: CLINIC | Age: 84
End: 2024-08-20

## 2024-08-23 ENCOUNTER — TELEPHONE (OUTPATIENT)
Dept: NEPHROLOGY | Facility: CLINIC | Age: 84
End: 2024-08-23

## 2024-08-23 ENCOUNTER — TELEPHONE (OUTPATIENT)
Age: 84
End: 2024-08-23

## 2024-08-23 ENCOUNTER — PATIENT OUTREACH (OUTPATIENT)
Dept: CASE MANAGEMENT | Facility: OTHER | Age: 84
End: 2024-08-23

## 2024-08-23 NOTE — TELEPHONE ENCOUNTER
I called and spoke to the patient's Granddaughter Waleska and schedule the patient for a Nephrology Consult ref by maribel Gutierrez PA-C for Normocytic Anemia. The patient's Granddaughter understood and was okay with the day and time of the patient next appointment and the patient was also placed on the wait list.

## 2024-08-23 NOTE — PROGRESS NOTES
Contacted patient's granddaughter for f/u.  Patient had recent admission at [facility] for hypoglycemia.  She is receiving SN and PT home health services through .  Nurse was present at home during call so arranged to contact granddaughter back on Monday.

## 2024-08-23 NOTE — TELEPHONE ENCOUNTER
Sarah, nurse  from [facility] calls to find out if pt is following up with our office.  Advised last visit was in 2019.  Pt was hospitalized recently twice, was consulted 8/12 by cardiology, and was scheduled for hosp f/u 8/20 but missed visit due to rehospitalization 8/19.  She will have pt's granddaughter call to reschedule follow up.

## 2024-08-26 ENCOUNTER — PATIENT OUTREACH (OUTPATIENT)
Dept: CASE MANAGEMENT | Facility: OTHER | Age: 84
End: 2024-08-26

## 2024-08-27 ENCOUNTER — OFFICE VISIT (OUTPATIENT)
Dept: FAMILY MEDICINE CLINIC | Facility: CLINIC | Age: 84
End: 2024-08-27
Payer: COMMERCIAL

## 2024-08-27 ENCOUNTER — TELEPHONE (OUTPATIENT)
Dept: NEPHROLOGY | Facility: CLINIC | Age: 84
End: 2024-08-27

## 2024-08-27 VITALS
WEIGHT: 135.4 LBS | DIASTOLIC BLOOD PRESSURE: 68 MMHG | TEMPERATURE: 97.4 F | HEART RATE: 67 BPM | BODY MASS INDEX: 25.57 KG/M2 | SYSTOLIC BLOOD PRESSURE: 166 MMHG | HEIGHT: 61 IN | OXYGEN SATURATION: 95 %

## 2024-08-27 DIAGNOSIS — E16.2 HYPOGLYCEMIA: ICD-10-CM

## 2024-08-27 DIAGNOSIS — M54.50 CHRONIC MIDLINE LOW BACK PAIN WITHOUT SCIATICA: ICD-10-CM

## 2024-08-27 DIAGNOSIS — R60.0 BILATERAL LOWER EXTREMITY EDEMA: ICD-10-CM

## 2024-08-27 DIAGNOSIS — R05.3 CHRONIC COUGH: ICD-10-CM

## 2024-08-27 DIAGNOSIS — Z79.4 TYPE 2 DIABETES MELLITUS WITH HYPERGLYCEMIA, WITH LONG-TERM CURRENT USE OF INSULIN (HCC): Primary | Chronic | ICD-10-CM

## 2024-08-27 DIAGNOSIS — M79.605 BILATERAL LEG PAIN: ICD-10-CM

## 2024-08-27 DIAGNOSIS — Z79.4 TYPE 2 DIABETES MELLITUS WITH HYPERGLYCEMIA, WITH LONG-TERM CURRENT USE OF INSULIN (HCC): Chronic | ICD-10-CM

## 2024-08-27 DIAGNOSIS — G89.29 CHRONIC MIDLINE LOW BACK PAIN WITHOUT SCIATICA: ICD-10-CM

## 2024-08-27 DIAGNOSIS — I16.1 HYPERTENSIVE EMERGENCY: ICD-10-CM

## 2024-08-27 DIAGNOSIS — Z91.09 ENVIRONMENTAL ALLERGIES: ICD-10-CM

## 2024-08-27 DIAGNOSIS — Z79.4 TYPE 2 DIABETES MELLITUS TREATED WITH INSULIN (HCC): Primary | ICD-10-CM

## 2024-08-27 DIAGNOSIS — I10 ESSENTIAL HYPERTENSION: ICD-10-CM

## 2024-08-27 DIAGNOSIS — Z00.00 MEDICARE ANNUAL WELLNESS VISIT, SUBSEQUENT: ICD-10-CM

## 2024-08-27 DIAGNOSIS — E11.65 TYPE 2 DIABETES MELLITUS WITH HYPERGLYCEMIA, WITH LONG-TERM CURRENT USE OF INSULIN (HCC): Chronic | ICD-10-CM

## 2024-08-27 DIAGNOSIS — E11.65 TYPE 2 DIABETES MELLITUS WITH HYPERGLYCEMIA, WITH LONG-TERM CURRENT USE OF INSULIN (HCC): Primary | Chronic | ICD-10-CM

## 2024-08-27 DIAGNOSIS — M79.604 BILATERAL LEG PAIN: ICD-10-CM

## 2024-08-27 DIAGNOSIS — E11.9 TYPE 2 DIABETES MELLITUS TREATED WITH INSULIN (HCC): Primary | ICD-10-CM

## 2024-08-27 DIAGNOSIS — D69.6 PLATELETS DECREASED (HCC): ICD-10-CM

## 2024-08-27 PROCEDURE — G0439 PPPS, SUBSEQ VISIT: HCPCS | Performed by: PHYSICIAN ASSISTANT

## 2024-08-27 PROCEDURE — 99214 OFFICE O/P EST MOD 30 MIN: CPT | Performed by: PHYSICIAN ASSISTANT

## 2024-08-27 RX ORDER — MONTELUKAST SODIUM 10 MG/1
10 TABLET ORAL
Qty: 30 TABLET | Refills: 5 | Status: SHIPPED | OUTPATIENT
Start: 2024-08-27

## 2024-08-27 RX ORDER — HYDROCHLOROTHIAZIDE 25 MG/1
25 TABLET ORAL DAILY
Qty: 90 TABLET | Refills: 1 | Status: SHIPPED | OUTPATIENT
Start: 2024-08-27 | End: 2024-08-28

## 2024-08-27 NOTE — TELEPHONE ENCOUNTER
I called and spoke to the patient granddaughter Chan and reschedule the patient appointment for a sooner day and time. The patient granddaughter Waleska understood and was okay with the patient appointment move to 8/28/2024 with Dr. CHINMAY Davis.

## 2024-08-27 NOTE — PROGRESS NOTES
Ambulatory Visit  Name: Michelle Villatoro      : 1940      MRN: [id number]  Encounter Provider: Michael Alvarez PA-C  Encounter Date: 2024   Encounter department: [facility]    Assessment & Plan   1. Type 2 diabetes mellitus treated with insulin (HCC)  -     Basic metabolic panel; Future  -     CBC and differential; Future  -     Comprehensive metabolic panel; Future; Expected date: 2024  2. Essential hypertension  -     hydroCHLOROthiazide 25 mg tablet; Take 1 tablet (25 mg total) by mouth daily  -     Basic metabolic panel; Future  -     CBC and differential; Future  3. Platelets decreased ([facility])  4. Chronic cough  -     omeprazole (PriLOSEC) 20 mg delayed release capsule; Take 1 capsule (20 mg total) by mouth daily  5. Environmental allergies  -     montelukast (SINGULAIR) 10 mg tablet; Take 1 tablet (10 mg total) by mouth daily at bedtime  6. Type 2 diabetes mellitus with hyperglycemia, with long-term current use of insulin ([facility])  -     tirzepatide (Mounjaro) 2.5 MG/0.5ML; Inject 0.5 mL (2.5 mg total) under the skin every 7 days  7. Bilateral leg pain  -      VAS VENOUS DUPLEX - LOWER LIMB BILATERAL; Future; Expected date: 2024  8. Bilateral lower extremity edema  -      VAS VENOUS DUPLEX - LOWER LIMB BILATERAL; Future; Expected date: 2024  9. Hypertensive emergency  10. Chronic midline low back pain without sciatica  11. Hypoglycemia  12. Medicare annual wellness visit, subsequent    Stop insulin altogether. Begin mounjaro instead. Monitor home glucose. Carb steady diet emphasized  Continue same BP regimen  Duplex BLE to r/o DVT, though suspect likely due to low protein/albumin and dependency. Monitor weights, wear compression.   Labs as above  Take PPI  Tylenol/heat/lidocaine acceptable for MSK pain  2 week follow up, earlier prn      Depression Screening Follow-up Plan: Patient's depression screening was positive with a PHQ-2 score of . Their PHQ-9 score was 8.  Patient assessed for underlying major depression. They have no active suicidal ideations. Brief counseling provided and recommend additional follow-up/re-evaluation next office visit. Patient advised to follow-up with PCP for further management.       Preventive health issues were discussed with patient, and age appropriate screening tests were ordered as noted in patient's After Visit Summary. Personalized health advice and appropriate referrals for health education or preventive services given if needed, as noted in patient's After Visit Summary.    History of Present Illness     Pt presents for follow up. Unfortunatelty she had 2 additional hospital visits since our last visit, one for htn and other for hypoglycemia.     Her current HTN regimen is amlodipine, hydralazine, lisinopril and hctz. BP improved but still high today 166/68    DM: now on levemir 6u daily. Her sugars still gluctuates between 40s and 200s.     She has bilat lower extremity edema and pain since most recent admission  She has chronic neck pain  She has GERD sx     Patient Care Team:  Elver Lynne MD as PCP - General (Family Medicine)  Elver Lynne MD as PCP - PCP-[facility] ([facility])  Elver Lynne MD as PCP - PCP-[facility] ([facility])  Bushra Arguello DPM (Podiatry)  Betsy Batres RN as RN Care Manager (Care Coordination)    Review of Systems   Constitutional:  Negative for chills, fatigue and fever.   HENT:  Negative for congestion, ear pain, hearing loss, nosebleeds, postnasal drip, rhinorrhea, sinus pressure, sinus pain, sneezing and sore throat.    Eyes:  Negative for pain, discharge, itching and visual disturbance.   Respiratory:  Negative for cough, chest tightness, shortness of breath and wheezing.    Cardiovascular:  Negative for chest pain, palpitations and leg swelling.   Gastrointestinal:  Negative for abdominal pain, blood in stool, constipation, diarrhea, nausea and vomiting.   Genitourinary:  Negative for frequency and urgency.    Musculoskeletal:  Positive for arthralgias, back pain, myalgias and neck pain.   Neurological:  Negative for dizziness, light-headedness and numbness.     Medical History Reviewed by provider this encounter:  Tobacco  Allergies  Meds  Problems  Med Hx  Surg Hx  Fam Hx       Annual Wellness Visit Questionnaire   Michelle is here for her Subsequent Wellness visit.     Health Risk Assessment:   Patient rates overall health as poor. Patient feels that their physical health rating is much worse. Patient is dissatisfied with their life. Eyesight was rated as much worse. Hearing was rated as much worse. Patient feels that their emotional and mental health rating is slightly worse. Patients states they are never, rarely angry. Patient states they are always unusually tired/fatigued. Pain experienced in the last 7 days has been a lot. Patient's pain rating has been 7/10. Patient states that she has experienced weight loss or gain in last 6 months.     Depression Screening:   PHQ-9 Score: 8      Fall Risk Screening:   In the past year, patient has experienced: no history of falling in past year      Urinary Incontinence Screening:   Patient has leaked urine accidently in the last six months.     Home Safety:  Patient does not have trouble with stairs inside or outside of their home. Patient has working smoke alarms and has working carbon monoxide detector. Has cats    Nutrition:   Current diet is Diabetic.     Medications:   Patient is currently taking over-the-counter supplements. OTC medications include: see medication list. Patient is not able to manage medications.     Activities of Daily Living (ADLs)/Instrumental Activities of Daily Living (IADLs):   Walk and transfer into and out of bed and chair?: No  Dress and groom yourself?: No    Bathe or shower yourself?: No    Feed yourself? Yes  Do your laundry/housekeeping?: No  Manage your money, pay your bills and track your expenses?: No  Make your own meals?: No     Do your own shopping?: No    Previous Hospitalizations:   Any hospitalizations or ED visits within the last 12 months?: Yes    How many hospitalizations have you had in the last year?: 3-4    Hospitalization Comments: 1- High blood sugar and high blood pressure (Admitted August 2024)  2- Hypertension attack (Admitted August 2024)  3- Low blood sugar (Admitted August 2024)    Advance Care Planning:   Living will: No    Durable POA for healthcare: No    Advanced directive: No    Advanced directive counseling given: Yes    ACP document given: Yes      PREVENTIVE SCREENINGS      Cardiovascular Screening:    General: Screening Not Indicated and History Lipid Disorder      Diabetes Screening:     General: Screening Not Indicated and History Diabetes      Colorectal Cancer Screening:     General: Screening Current      Breast Cancer Screening:     General: Screening Not Indicated      Cervical Cancer Screening:    General: Screening Not Indicated      Osteoporosis Screening:    General: Screening Not Indicated      Abdominal Aortic Aneurysm (AAA) Screening:        General: Screening Not Indicated      Lung Cancer Screening:     General: Screening Not Indicated      Hepatitis C Screening:    General: Screening Not Indicated    Screening, Brief Intervention, and Referral to Treatment (SBIRT)    Screening  Typical number of drinks in a day: 0  Typical number of drinks in a week: 0  Interpretation: Low risk drinking behavior.    Single Item Drug Screening:  How often have you used an illegal drug (including marijuana) or a prescription medication for non-medical reasons in the past year? never    Single Item Drug Screen Score: 0  Interpretation: Negative screen for possible drug use disorder    Social Determinants of Health     Financial Resource Strain: Low Risk  (8/19/2024)    Received from [facility]    Overall Financial Resource Strain (CARDIA)    • Difficulty of Paying Living Expenses: Not hard at all  "  Food Insecurity: No Food Insecurity (8/27/2024)    Hunger Vital Sign    • Worried About Running Out of Food in the Last Year: Never true    • Ran Out of Food in the Last Year: Never true   Transportation Needs: No Transportation Needs (8/27/2024)    PRAPARE - Transportation    • Lack of Transportation (Medical): No    • Lack of Transportation (Non-Medical): No   Housing Stability: Low Risk  (8/27/2024)    Housing Stability Vital Sign    • Unable to Pay for Housing in the Last Year: No    • Number of Times Moved in the Last Year: 0    • Homeless in the Last Year: No   Utilities: Not At Risk (8/27/2024)    [facility] Utilities    • Threatened with loss of utilities: No     No results found.    Objective     /68   Pulse 67   Temp (!) 97.4 °F (36.3 °C)   Ht 5' 0.5\" (1.537 m)   Wt 61.4 kg (135 lb 6.4 oz)   SpO2 95%   BMI 26.01 kg/m²     Physical Exam  Vitals and nursing note reviewed.   Constitutional:       General: She is not in acute distress.     Appearance: She is well-developed.   HENT:      Head: Normocephalic and atraumatic.   Eyes:      Conjunctiva/sclera: Conjunctivae normal.   Cardiovascular:      Rate and Rhythm: Normal rate and regular rhythm.      Heart sounds: No murmur heard.  Pulmonary:      Effort: Pulmonary effort is normal. No respiratory distress.      Breath sounds: Normal breath sounds.   Musculoskeletal:         General: Tenderness (diffuse, BLE edema) present. No swelling.      Cervical back: Neck supple.      Right lower leg: Edema present.      Left lower leg: Edema present.   Skin:     General: Skin is warm and dry.      Capillary Refill: Capillary refill takes less than 2 seconds.      Findings: No bruising or erythema.   Neurological:      Mental Status: She is alert.   Psychiatric:         Mood and Affect: Mood normal.         "

## 2024-08-28 ENCOUNTER — CONSULT (OUTPATIENT)
Dept: NEPHROLOGY | Facility: CLINIC | Age: 84
End: 2024-08-28
Payer: COMMERCIAL

## 2024-08-28 ENCOUNTER — TELEPHONE (OUTPATIENT)
Age: 84
End: 2024-08-28

## 2024-08-28 VITALS
HEIGHT: 60 IN | HEART RATE: 69 BPM | SYSTOLIC BLOOD PRESSURE: 170 MMHG | DIASTOLIC BLOOD PRESSURE: 60 MMHG | RESPIRATION RATE: 16 BRPM | WEIGHT: 135 LBS | BODY MASS INDEX: 26.5 KG/M2 | TEMPERATURE: 96.9 F | OXYGEN SATURATION: 95 %

## 2024-08-28 DIAGNOSIS — I10 PRIMARY HYPERTENSION: ICD-10-CM

## 2024-08-28 DIAGNOSIS — E87.1 HYPONATREMIA: ICD-10-CM

## 2024-08-28 DIAGNOSIS — R33.9 URINARY RETENTION: ICD-10-CM

## 2024-08-28 DIAGNOSIS — D64.9 NORMOCYTIC ANEMIA: ICD-10-CM

## 2024-08-28 DIAGNOSIS — N18.32 CKD STAGE 3B, GFR 30-44 ML/MIN (HCC): ICD-10-CM

## 2024-08-28 DIAGNOSIS — E78.1 HYPERTRIGLYCERIDEMIA: Primary | ICD-10-CM

## 2024-08-28 PROCEDURE — 99204 OFFICE O/P NEW MOD 45 MIN: CPT | Performed by: INTERNAL MEDICINE

## 2024-08-28 RX ORDER — HYDROCHLOROTHIAZIDE 25 MG/1
50 TABLET ORAL DAILY
Qty: 90 TABLET | Refills: 3 | Status: SHIPPED | OUTPATIENT
Start: 2024-08-28

## 2024-08-28 RX ORDER — FENOFIBRATE 54 MG/1
54 TABLET ORAL DAILY
Qty: 90 TABLET | Refills: 6 | Status: SHIPPED | OUTPATIENT
Start: 2024-08-28

## 2024-08-28 NOTE — TELEPHONE ENCOUNTER
[facility] calling to request a left caratoid US ordered for pt. Her last US showed 70% blockage and that was quite a while ago. Pt has had elevated BP readings 's-180's and complaining of left sided neck pain. Pt is scheduled for vascular US tomorrow so she is calling to see if this can be added. Please call pt's granddaughter with decision.

## 2024-08-28 NOTE — TELEPHONE ENCOUNTER
Sabina  of [facility] called in to see how PCP would like to proceed with Pt's BP Management . Is there a Perimeter that PCP would like for the BP. [facility] will provider pt with BP monitor.   Sabina can be reached at [phone].

## 2024-08-28 NOTE — LETTER
August 28, 2024     Elver Lynne MD  [address]  [facility] [address]    Patient: Michelle Villatoro   YOB: 1940   Date of Visit: 8/28/2024       Dear Dr. Lynne:    Thank you for referring Michelle Villatoro to me for evaluation. Below are my notes for this consultation.    If you have questions, please do not hesitate to call me. I look forward to following your patient along with you.         Sincerely,        Yanely Davis MD        CC: No Recipients    Yanely Davis MD  8/28/2024  2:06 PM  Incomplete  NEPHROLOGY OFFICE CONSULT  Michelle Villatoro 84 y.o. female MRN: [id number]    Encounter: [id number] DATE: 8/28/2024    REASON FOR VISIT: Michelle Villatoro is a 84 y.o.female who was referred by  for evaluation..    HPI:    This is a 84 years old female with past medical history of resistant hypertension, dyslipidemia, diabetes mellitus type 2, Alzheimer's dementia, chronic kidney disease stage IIIb, anemia who is referred to renal office by her hematologist due to elevated renal parameters.  Patient has known history of CKD as early as 2018.  Patient's granddaughter accompanies her who admits to being told recently about her having history of CKD.  Patient blood pressure had been significantly elevated in recent times with recent admission for hypertensive urgency.  Patient had been complaining of back pain and had undergone CT scan of abdomen pelvis stone protocol last week at [facility] in which revealed urinary retention.  Patient granddaughter does admit to patient having multiple episodes of urinary tract infection.  States that amlodipine and hydralazine were initiated in this patient recently.  Patient complains of development of lower extremity edema about 3 weeks ago.          PAST MEDICAL HISTORY:  Past Medical History:   Diagnosis Date   • Aggression    • Alzheimer's dementia (HCC)    • Arthritis    • Dementia (HCC)    • Diabetes insipidus (HCC)    • Diabetes mellitus (HCC)     • High cholesterol    • Hypertension    • Memory loss    • Migraine    • Polyneuropathy    • Rheumatoid arthritis (HCC)    • Serum lipids high    • Stomach problems    • Thyroid trouble        PAST SURGICAL HISTORY:  Past Surgical History:   Procedure Laterality Date   • CATARACT EXTRACTION     • CHOLECYSTECTOMY     • GALLBLADDER SURGERY     • HYSTERECTOMY     • [place] SURGICAL ARTHROSCOPY SHOULDER W/ROTATOR CUFF RPR Right 2019    Procedure: SHOULDER ARTHROSCOPIC ROTATOR CUFF REPAIR;  Surgeon: Betsy Conde MD;  Location: [facility];  Service: Orthopedics   • ROTATOR CUFF REPAIR Right 2019       SOCIAL HISTORY:  Social History     Substance and Sexual Activity   Alcohol Use Never     Social History     Substance and Sexual Activity   Drug Use No     Social History     Tobacco Use   Smoking Status Former   • Current packs/day: 0.00   • Average packs/day: 0.3 packs/day for 30.0 years (7.5 ttl pk-yrs)   • Types: Cigarettes   • Start date:    • Quit date:    • Years since quittin.6   • Passive exposure: Past   Smokeless Tobacco Never       FAMILY HISTORY:  Family History   Problem Relation Age of Onset   • Substance Abuse Mother         denied   • Mental illness Mother         denied   • Substance Abuse Father         denied   • Mental illness Father         denied   • Diabetes Brother    • Blindness Brother    • Arthritis Daughter    • HIV Son        ALLERGY:  Allergies   Allergen Reactions   • Penicillins Itching and Swelling       MEDICATIONS:    Current Outpatient Medications:   •  amLODIPine (NORVASC) 10 mg tablet, Take 1 tablet (10 mg total) by mouth daily At night before bed, Disp: 90 tablet, Rfl: 1  •  BD Pen Needle Elisabeth 2nd Gen 32G X 4 MM MISC, USE DAILY AS DIRECTED, Disp: 100 each, Rfl: 1  •  Blood Glucose Monitoring Suppl (OneTouch Verio Reflect) w/Device KIT, USE 2 (TWO) TIMES A DAY, Disp: 1 kit, Rfl: 0  •  cholestyramine (QUESTRAN) 4 g packet, Take 1 packet (4 g total) by mouth 2  (two) times a day with meals, Disp: 180 packet, Rfl: 3  •  Continuous Blood Gluc  (FreeStyle Aruna 2 Ferdinand) MEREDITH, Use 1 Device 3 (three) times a day before meals, Disp: 1 each, Rfl: 0  •  Continuous Blood Gluc Sensor (FreeStyle Aruna 2 Sensor) MISC, Check blood sugars multiple times per day, Disp: 6 each, Rfl: 0  •  cyanocobalamin (VITAMIN B-12) 1000 MCG tablet, Take 1 tablet (1,000 mcg total) by mouth daily, Disp: , Rfl:   •  fenofibrate (TRICOR) 54 MG tablet, Take 1 tablet (54 mg total) by mouth daily, Disp: 90 tablet, Rfl: 6  •  fluticasone (FLONASE) 50 mcg/act nasal spray, SPRAY 2 SPRAYS INTO EACH NOSTRIL EVERY DAY, Disp: 16 mL, Rfl: 1  •  gabapentin (Neurontin) 100 mg capsule, Take 1 capsule (100 mg total) by mouth 3 (three) times a day, Disp: 270 capsule, Rfl: 0  •  hydrALAZINE (APRESOLINE) 25 mg tablet, Take 1 tablet (25 mg total) by mouth every 8 (eight) hours, Disp: 90 tablet, Rfl: 0  •  hydroCHLOROthiazide 25 mg tablet, Take 2 tablets (50 mg total) by mouth daily, Disp: 90 tablet, Rfl: 3  •  Incontinence Supplies MISC, Use 6 (six) times a day Wipes, Disp: 200 each, Rfl: 6  •  Incontinence Supplies MISC, Use 4 (four) times a day Comfort shield Adult diapers, Disp: 200 each, Rfl: 6  •  Incontinence Supply Disposable MISC, Use 6 (six) times a day Dispense disposable bed pad, Disp: 200 each, Rfl: 6  •  levothyroxine 100 mcg tablet, Take 1 tablet (100 mcg total) by mouth daily, Disp: 90 tablet, Rfl: 1  •  lisinopril (ZESTRIL) 40 mg tablet, Take 1 tablet (40 mg total) by mouth daily In the morning, Disp: 90 tablet, Rfl: 1  •  memantine (NAMENDA) 10 mg tablet, Take 1 tablet (10 mg total) by mouth 2 (two) times a day, Disp: 200 tablet, Rfl: 1  •  montelukast (SINGULAIR) 10 mg tablet, Take 1 tablet (10 mg total) by mouth daily at bedtime, Disp: 30 tablet, Rfl: 5  •  omeprazole (PriLOSEC) 20 mg delayed release capsule, Take 1 capsule (20 mg total) by mouth daily, Disp: 90 capsule, Rfl: 3  •  semaglutide,  0.25 or 0.5 mg/dose, (Ozempic, 0.25 or 0.5 MG/DOSE,) 2 mg/3 mL injection pen, 0.25 mg under the skin every 7 days for 4 doses (28 days), THEN 0.5 mg under the skin every 7 days, Disp: 9 mL, Rfl: 0  •  sertraline (ZOLOFT) 50 mg tablet, Take 1 tablet (50 mg total) by mouth daily, Disp: 90 tablet, Rfl: 1  •  Xiidra 5 % op solution, INSTILL 1 DROP IN BOTH EYES 2 TIMES PER DAY, Disp: , Rfl:   •  Blood Pressure KIT, Use daily (Patient not taking: Reported on 8/1/2024), Disp: 1 kit, Rfl: 0  •  insulin detemir (Levemir FlexTouch) 100 Units/mL injection pen, Inject 10 Units under the skin daily at bedtime Take 12 units am and 12 units pm (Patient not taking: Reported on 8/28/2024), Disp: , Rfl:   •  Insulin Pen Needle (BD Pen Needle Elisabeth U/F) 32G X 4 MM MISC, Inject under the skin 2 (two) times a day (Patient not taking: Reported on 8/28/2024), Disp: 200 each, Rfl: 5  •  Lancets (OneTouch Delica Plus [id number]) MISC, USE TO TEST BLOOD SUGAR 3 TIMES A DAY (Patient not taking: Reported on 8/28/2024), Disp: 100 each, Rfl: 2  •  OneTouch Verio test strip, USE 1 EACH 2 (TWO) TIMES A DAY TEST (Patient not taking: Reported on 8/28/2024), Disp: 100 strip, Rfl: 0    REVIEW OF SYSTEMS:    Review of Systems   Constitutional: Negative.    HENT: Negative.     Eyes: Negative.    Respiratory: Negative.     Cardiovascular:  Positive for leg swelling.   Gastrointestinal: Negative.    Endocrine: Negative.    Genitourinary: Negative.    Musculoskeletal: Negative.    Skin: Negative.    Allergic/Immunologic: Negative.    Neurological: Negative.    Hematological: Negative.    All other systems reviewed and are negative.      PHYSICAL EXAM:  Vitals:    08/28/24 1258   BP: 170/60   Pulse: 69   Resp: 16   Temp: (!) 96.9 °F (36.1 °C)   TempSrc: Temporal   SpO2: 95%   Weight: 61.2 kg (135 lb)   Height: 5' (1.524 m)     Body mass index is 26.37 kg/m².    Physical Exam  Constitutional:       Appearance: She is well-developed.   HENT:      Head:  "Normocephalic and atraumatic.   Eyes:      Pupils: Pupils are equal, round, and reactive to light.   Cardiovascular:      Rate and Rhythm: Normal rate and regular rhythm.      Heart sounds: Murmur heard.   Pulmonary:      Effort: Pulmonary effort is normal.   Abdominal:      General: Bowel sounds are normal.      Palpations: Abdomen is soft.   Musculoskeletal:         General: Normal range of motion.      Cervical back: Neck supple.      Right lower leg: Edema present.      Left lower leg: Edema present.   Skin:     General: Skin is warm.   Neurological:      Mental Status: She is alert and oriented to person, place, and time.         LAB RESULTS:  Results for orders placed or performed during the hospital encounter of 08/14/24   Urine culture    Specimen: Urine   Result Value Ref Range    Urine Culture 30,000-39,000 cfu/ml    CBC and differential   Result Value Ref Range    WBC 4.94 4.31 - 10.16 Thousand/uL    RBC 3.41 (L) 3.81 - 5.12 Million/uL    Hemoglobin 9.6 (L) 11.5 - 15.4 g/dL    Hematocrit 30.6 (L) 34.8 - 46.1 %    MCV 90 82 - 98 fL    MCH 28.2 26.8 - 34.3 pg    MCHC 31.4 31.4 - 37.4 g/dL    RDW 14.4 11.6 - 15.1 %    MPV 12.0 8.9 - 12.7 fL    Platelets 146 (L) 149 - 390 Thousands/uL    nRBC 0 /100 WBCs    Segmented % 67 43 - 75 %    Immature Grans % 0 0 - 2 %    Lymphocytes % 24 14 - 44 %    Monocytes % 6 4 - 12 %    Eosinophils Relative 2 0 - 6 %    Basophils Relative 1 0 - 1 %    Absolute Neutrophils 3.36 1.85 - 7.62 Thousands/µL    Absolute Immature Grans 0.01 0.00 - 0.20 Thousand/uL    Absolute Lymphocytes 1.17 0.60 - 4.47 Thousands/µL    Absolute Monocytes 0.27 0.17 - 1.22 Thousand/µL    Eosinophils Absolute 0.10 0.00 - 0.61 Thousand/µL    Basophils Absolute 0.03 0.00 - 0.10 Thousands/µL   HS Troponin 0hr (reflex protocol)   Result Value Ref Range    hs TnI 0hr 8 \"Refer to ACS Flowchart\"- see link ng/L   B-Type Natriuretic Peptide(BNP)   Result Value Ref Range     (H) 0 - 100 pg/mL " "  Comprehensive metabolic panel   Result Value Ref Range    Sodium 138 135 - 147 mmol/L    Potassium 4.9 3.5 - 5.3 mmol/L    Chloride 108 96 - 108 mmol/L    CO2 27 21 - 32 mmol/L    ANION GAP 3 (L) 4 - 13 mmol/L    BUN 26 (H) 5 - 25 mg/dL    Creatinine 1.08 0.60 - 1.30 mg/dL    Glucose 104 65 - 140 mg/dL    Calcium 9.9 8.4 - 10.2 mg/dL    AST 16 13 - 39 U/L    ALT 14 7 - 52 U/L    Alkaline Phosphatase 28 (L) 34 - 104 U/L    Total Protein 6.2 (L) 6.4 - 8.4 g/dL    Albumin 3.7 3.5 - 5.0 g/dL    Total Bilirubin 0.38 0.20 - 1.00 mg/dL    eGFR 47 ml/min/1.73sq m   HS Troponin I 2hr   Result Value Ref Range    hs TnI 2hr 8 \"Refer to ACS Flowchart\"- see link ng/L    Delta 2hr hsTnI 0 <20 ng/L   HS Troponin I 4hr   Result Value Ref Range    hs TnI 4hr 8 \"Refer to ACS Flowchart\"- see link ng/L    Delta 4hr hsTnI 0 <20 ng/L   Comprehensive metabolic panel   Result Value Ref Range    Sodium 141 135 - 147 mmol/L    Potassium 5.0 3.5 - 5.3 mmol/L    Chloride 111 (H) 96 - 108 mmol/L    CO2 28 21 - 32 mmol/L    ANION GAP 2 (L) 4 - 13 mmol/L    BUN 29 (H) 5 - 25 mg/dL    Creatinine 1.07 0.60 - 1.30 mg/dL    Glucose 84 65 - 140 mg/dL    Calcium 9.2 8.4 - 10.2 mg/dL    Corrected Calcium 10.0 8.3 - 10.1 mg/dL    AST 12 (L) 13 - 39 U/L    ALT 10 7 - 52 U/L    Alkaline Phosphatase 20 (L) 34 - 104 U/L    Total Protein 5.0 (L) 6.4 - 8.4 g/dL    Albumin 3.0 (L) 3.5 - 5.0 g/dL    Total Bilirubin 0.34 0.20 - 1.00 mg/dL    eGFR 48 ml/min/1.73sq m   CBC and differential   Result Value Ref Range    WBC 3.29 (L) 4.31 - 10.16 Thousand/uL    RBC 2.96 (L) 3.81 - 5.12 Million/uL    Hemoglobin 8.2 (L) 11.5 - 15.4 g/dL    Hematocrit 26.6 (L) 34.8 - 46.1 %    MCV 90 82 - 98 fL    MCH 27.7 26.8 - 34.3 pg    MCHC 30.8 (L) 31.4 - 37.4 g/dL    RDW 14.4 11.6 - 15.1 %    MPV 12.3 8.9 - 12.7 fL    Platelets 133 (L) 149 - 390 Thousands/uL    nRBC 0 /100 WBCs    Segmented % 67 43 - 75 %    Immature Grans % 0 0 - 2 %    Lymphocytes % 25 14 - 44 %    Monocytes % " 6 4 - 12 %    Eosinophils Relative 2 0 - 6 %    Basophils Relative 0 0 - 1 %    Absolute Neutrophils 2.17 1.85 - 7.62 Thousands/µL    Absolute Immature Grans 0.00 0.00 - 0.20 Thousand/uL    Absolute Lymphocytes 0.82 0.60 - 4.47 Thousands/µL    Absolute Monocytes 0.21 0.17 - 1.22 Thousand/µL    Eosinophils Absolute 0.08 0.00 - 0.61 Thousand/µL    Basophils Absolute 0.01 0.00 - 0.10 Thousands/µL   Magnesium   Result Value Ref Range    Magnesium 2.1 1.9 - 2.7 mg/dL   TIBC Panel (incl. Iron, TIBC, % Iron Saturation)   Result Value Ref Range    Iron Saturation 20 15 - 50 %    TIBC 424 250 - 450 ug/dL    Iron 84 50 - 212 ug/dL    UIBC 340 155 - 355 ug/dL   Ferritin   Result Value Ref Range    Ferritin 122 11 - 307 ng/mL   Urinalysis with microscopic   Result Value Ref Range    Color, UA Straw Yellow, Straw    Clarity, UA Clear Hazy, Clear    Specific Gravity, UA 1.010 >1.005 - <1.030    pH, UA 6.5 5.0, 5.5, 6.0, 6.5, 7.0, 7.5    Leukocytes, UA Negative Negative    Nitrite, UA Negative Negative    Protein, UA Trace (A) Negative, Interference- unable to analyze mg/dl    Glucose, UA Negative Negative mg/dl    Ketones, UA Negative Negative mg/dl    Urobilinogen, UA 0.2 0.2, 1.0 E.U./dl E.U./dl    Bilirubin, UA Negative Negative    Occult Blood, UA Negative Negative    RBC, UA None Seen None Seen, 2-4 /hpf    WBC, UA 0-1 (A) None Seen, 2-4, 5-60 /hpf    Epithelial Cells Occasional None Seen, Occasional /hpf    Bacteria, UA None Seen None Seen, Occasional /hpf   ECG 12 lead   Result Value Ref Range    Ventricular Rate 67 BPM    Atrial Rate 67 BPM    GA Interval 190 ms    QRSD Interval 102 ms    QT Interval 368 ms    QTC Interval 388 ms    P Axis 55 degrees    QRS Axis -44 degrees    T Wave Axis 66 degrees   ECG 12 lead   Result Value Ref Range    Ventricular Rate 65 BPM    Atrial Rate 65 BPM    GA Interval 204 ms    QRSD Interval 100 ms    QT Interval 390 ms    QTC Interval 405 ms    P Axis 77 degrees    QRS Axis -43 degrees     T Wave Axis 68 degrees   ECG 12 lead   Result Value Ref Range    Ventricular Rate 61 BPM    Atrial Rate 61 BPM    IN Interval 202 ms    QRSD Interval 96 ms    QT Interval 402 ms    QTC Interval 404 ms    P [place] 47 degrees    QRS Axis -48 degrees    T Wave Axis 59 degrees   Direct antiglobulin test   Result Value Ref Range    DIRECT VICKIE Negative    Fingerstick Glucose (POCT)   Result Value Ref Range    POC Glucose 55 (L) 65 - 140 mg/dl   Fingerstick Glucose (POCT)   Result Value Ref Range    POC Glucose 55 (L) 65 - 140 mg/dl   Fingerstick Glucose (POCT)   Result Value Ref Range    POC Glucose 168 (H) 65 - 140 mg/dl   Fingerstick Glucose (POCT)   Result Value Ref Range    POC Glucose 140 65 - 140 mg/dl   Fingerstick Glucose (POCT)   Result Value Ref Range    POC Glucose 233 (H) 65 - 140 mg/dl   Fingerstick Glucose (POCT)   Result Value Ref Range    POC Glucose 92 65 - 140 mg/dl   Fingerstick Glucose (POCT)   Result Value Ref Range    POC Glucose 193 (H) 65 - 140 mg/dl         ASSESSMENT and PLAN:  Michelle was seen today for chronic kidney disease and consult.    Diagnoses and all orders for this visit:    Hypertriglyceridemia  -     fenofibrate (TRICOR) 54 MG tablet; Take 1 tablet (54 mg total) by mouth daily    CKD stage 3b, GFR 30-44 ml/min ([facility])  -     Ambulatory Referral to Nephrology  -     Albumin; Standing  -     Albumin / creatinine urine ratio; Standing  -     Calcium; Standing  -     CBC and differential; Standing  -     Comprehensive metabolic panel; Standing  -     Phosphorus; Standing  -     PTH, intact; Standing  -     Urinalysis with microscopic; Future  -     Vitamin D 25 hydroxy; Standing    Normocytic anemia  -     Ambulatory Referral to Nephrology    Primary hypertension  -     hydroCHLOROthiazide 25 mg tablet; Take 2 tablets (50 mg total) by mouth daily    Hyponatremia  -     Basic metabolic panel; Future    Urinary retention  -     Ambulatory Referral to Urology; Future      84 years old female  with past medical history of Alzheimer's dementia, hypertension, diabetes mellitus type 2, urinary retention, dyslipidemia, chronic kidney disease stage IIIb who was referred to renal office by her hematologist due to elevated renal parameters.    1.  Chronic kidney disease stage IIIb: History of CKD as early as the year 2018.  Etiology likely age-related nephron loss, diabetic nephrosclerosis and hypertensive kidney disease.  Her serum creatinine had ranged between 1.1 to 1.2 mg/dL since 2018.  Current eGFR stands at 43.  Imaging of the kidneys obtained a formal CT scan had revealed 2 normal-sized kidneys.  Absence of stones.    2.  Resistant hypertension: Maintained on lisinopril, hydralazine, amlodipine and hydrochlorothiazide with blood pressure noted to be elevated.  Patient has lower extremity edema which could be a side effect of amlodipine and hydralazine being initiated recently versus hypervolemic state resulting in persistent hypertension.  Will increase hydrochlorothiazide to 2 tablets daily.  Recommended low-salt diet.  Recommended home blood pressure monitoring and provide log sheets.  Target blood pressure is 140/90.    3.  Diabetes mellitus type 2: Acceptable glycemic control while on insulin.    4.  Proteinuria: Urinalysis only revealed trace protein.  Maintained on ACE inhibitor.    5.  Bone mineral disorder: 25 OH vitamin D in 2023 November was noted to be low at 23.  Recommended vitamin D3 1 tablet 1000 units daily.  Will obtain PTH intact and phosphorus levels.    6.  Nutrition: Target free water intake of 48 ounces daily, low-salt low potassium increase protein diet recommended.    .

## 2024-08-28 NOTE — PATIENT INSTRUCTIONS
Follow a moderate potassium diet.        Vitamin D 3 1000 units daily    BMP 1 month from now  Take Lisinopril 40 mg at dinner time  Target fluid intake is 1.5 L ( minimum 3 bottles of water daily)

## 2024-08-28 NOTE — PROGRESS NOTES
NEPHROLOGY OFFICE CONSULT  Michelle Villatoro 84 y.o. female MRN: [id number]    Encounter: [id number] DATE: 8/28/2024    REASON FOR VISIT: Michelle Villatoro is a 84 y.o.female who was referred by  for evaluation..    HPI:    This is a 84 years old female with past medical history of resistant hypertension, dyslipidemia, diabetes mellitus type 2, Alzheimer's dementia, chronic kidney disease stage IIIb, anemia who is referred to renal office by her hematologist due to elevated renal parameters.  Patient has known history of CKD as early as 2018.  Patient's granddaughter accompanies her who admits to being told recently about her having history of CKD.  Patient blood pressure had been significantly elevated in recent times with recent admission for hypertensive urgency.  Patient had been complaining of back pain and had undergone CT scan of abdomen pelvis stone protocol last week at [facility] in which revealed urinary retention.  Patient granddaughter does admit to patient having multiple episodes of urinary tract infection.  States that amlodipine and hydralazine were initiated in this patient recently.  Patient complains of development of lower extremity edema about 3 weeks ago.          PAST MEDICAL HISTORY:  Past Medical History:   Diagnosis Date    Aggression     Alzheimer's dementia (HCC)     Arthritis     Dementia (HCC)     Diabetes insipidus (HCC)     Diabetes mellitus (HCC)     High cholesterol     Hypertension     Memory loss     Migraine     Polyneuropathy     Rheumatoid arthritis (HCC)     Serum lipids high     Stomach problems     Thyroid trouble        PAST SURGICAL HISTORY:  Past Surgical History:   Procedure Laterality Date    CATARACT EXTRACTION      CHOLECYSTECTOMY      GALLBLADDER SURGERY      HYSTERECTOMY      ME SURGICAL ARTHROSCOPY SHOULDER W/ROTATOR CUFF RPR Right 8/1/2019    Procedure: SHOULDER ARTHROSCOPIC ROTATOR CUFF REPAIR;  Surgeon: Betsy Conde MD;  Location: [facility]  OR;  Service: Orthopedics    ROTATOR CUFF REPAIR Right 2019       SOCIAL HISTORY:  Social History     Substance and Sexual Activity   Alcohol Use Never     Social History     Substance and Sexual Activity   Drug Use No     Social History     Tobacco Use   Smoking Status Former    Current packs/day: 0.00    Average packs/day: 0.3 packs/day for 30.0 years (7.5 ttl pk-yrs)    Types: Cigarettes    Start date:     Quit date:     Years since quittin.6    Passive exposure: Past   Smokeless Tobacco Never       FAMILY HISTORY:  Family History   Problem Relation Age of Onset    Substance Abuse Mother         denied    Mental illness Mother         denied    Substance Abuse Father         denied    Mental illness Father         denied    Diabetes Brother     Blindness Brother     Arthritis Daughter     HIV Son        ALLERGY:  Allergies   Allergen Reactions    Penicillins Itching and Swelling       MEDICATIONS:    Current Outpatient Medications:     amLODIPine (NORVASC) 10 mg tablet, Take 1 tablet (10 mg total) by mouth daily At night before bed, Disp: 90 tablet, Rfl: 1    BD Pen Needle Elisabeth 2nd Gen 32G X 4 MM MISC, USE DAILY AS DIRECTED, Disp: 100 each, Rfl: 1    Blood Glucose Monitoring Suppl (OneTouch Verio Reflect) w/Device KIT, USE 2 (TWO) TIMES A DAY, Disp: 1 kit, Rfl: 0    cholestyramine (QUESTRAN) 4 g packet, Take 1 packet (4 g total) by mouth 2 (two) times a day with meals, Disp: 180 packet, Rfl: 3    Continuous Blood Gluc  (FreeStyle Aruna 2 [place]) MEREDITH, Use 1 Device 3 (three) times a day before meals, Disp: 1 each, Rfl: 0    Continuous Blood Gluc Sensor (FreeStyle Aruna 2 Sensor) MISC, Check blood sugars multiple times per day, Disp: 6 each, Rfl: 0    cyanocobalamin (VITAMIN B-12) 1000 MCG tablet, Take 1 tablet (1,000 mcg total) by mouth daily, Disp: , Rfl:     fenofibrate (TRICOR) 54 MG tablet, Take 1 tablet (54 mg total) by mouth daily, Disp: 90 tablet, Rfl: 6    fluticasone (FLONASE)  50 mcg/act nasal spray, SPRAY 2 SPRAYS INTO EACH NOSTRIL EVERY DAY, Disp: 16 mL, Rfl: 1    gabapentin (Neurontin) 100 mg capsule, Take 1 capsule (100 mg total) by mouth 3 (three) times a day, Disp: 270 capsule, Rfl: 0    hydrALAZINE (APRESOLINE) 25 mg tablet, Take 1 tablet (25 mg total) by mouth every 8 (eight) hours, Disp: 90 tablet, Rfl: 0    hydroCHLOROthiazide 25 mg tablet, Take 2 tablets (50 mg total) by mouth daily, Disp: 90 tablet, Rfl: 3    Incontinence Supplies MISC, Use 6 (six) times a day Wipes, Disp: 200 each, Rfl: 6    Incontinence Supplies MISC, Use 4 (four) times a day Comfort shield Adult diapers, Disp: 200 each, Rfl: 6    Incontinence Supply Disposable MISC, Use 6 (six) times a day Dispense disposable bed pad, Disp: 200 each, Rfl: 6    levothyroxine 100 mcg tablet, Take 1 tablet (100 mcg total) by mouth daily, Disp: 90 tablet, Rfl: 1    lisinopril (ZESTRIL) 40 mg tablet, Take 1 tablet (40 mg total) by mouth daily In the morning, Disp: 90 tablet, Rfl: 1    memantine (NAMENDA) 10 mg tablet, Take 1 tablet (10 mg total) by mouth 2 (two) times a day, Disp: 200 tablet, Rfl: 1    montelukast (SINGULAIR) 10 mg tablet, Take 1 tablet (10 mg total) by mouth daily at bedtime, Disp: 30 tablet, Rfl: 5    omeprazole (PriLOSEC) 20 mg delayed release capsule, Take 1 capsule (20 mg total) by mouth daily, Disp: 90 capsule, Rfl: 3    semaglutide, 0.25 or 0.5 mg/dose, (Ozempic, 0.25 or 0.5 MG/DOSE,) 2 mg/3 mL injection pen, 0.25 mg under the skin every 7 days for 4 doses (28 days), THEN 0.5 mg under the skin every 7 days, Disp: 9 mL, Rfl: 0    sertraline (ZOLOFT) 50 mg tablet, Take 1 tablet (50 mg total) by mouth daily, Disp: 90 tablet, Rfl: 1    Xiidra 5 % op solution, INSTILL 1 DROP IN BOTH EYES 2 TIMES PER DAY, Disp: , Rfl:     Blood Pressure KIT, Use daily (Patient not taking: Reported on 8/1/2024), Disp: 1 kit, Rfl: 0    insulin detemir (Levemir FlexTouch) 100 Units/mL injection pen, Inject 10 Units under the skin  daily at bedtime Take 12 units am and 12 units pm (Patient not taking: Reported on 8/28/2024), Disp: , Rfl:     Insulin Pen Needle (BD Pen Needle Elisabeth U/F) 32G X 4 MM MISC, Inject under the skin 2 (two) times a day (Patient not taking: Reported on 8/28/2024), Disp: 200 each, Rfl: 5    Lancets (OneTouch Delica Plus Ufrqmg39C) MISC, USE TO TEST BLOOD SUGAR 3 TIMES A DAY (Patient not taking: Reported on 8/28/2024), Disp: 100 each, Rfl: 2    OneTouch Verio test strip, USE 1 EACH 2 (TWO) TIMES A DAY TEST (Patient not taking: Reported on 8/28/2024), Disp: 100 strip, Rfl: 0    REVIEW OF SYSTEMS:    Review of Systems   Constitutional: Negative.    HENT: Negative.     Eyes: Negative.    Respiratory: Negative.     Cardiovascular:  Positive for leg swelling.   Gastrointestinal: Negative.    Endocrine: Negative.    Genitourinary: Negative.    Musculoskeletal: Negative.    Skin: Negative.    Allergic/Immunologic: Negative.    Neurological: Negative.    Hematological: Negative.    All other systems reviewed and are negative.      PHYSICAL EXAM:  Vitals:    08/28/24 1258   BP: 170/60   Pulse: 69   Resp: 16   Temp: (!) 96.9 °F (36.1 °C)   TempSrc: Temporal   SpO2: 95%   Weight: 61.2 kg (135 lb)   Height: 5' (1.524 m)     Body mass index is 26.37 kg/m².    Physical Exam  Constitutional:       Appearance: She is well-developed.   HENT:      Head: Normocephalic and atraumatic.   Eyes:      Pupils: Pupils are equal, round, and reactive to light.   Cardiovascular:      Rate and Rhythm: Normal rate and regular rhythm.      Heart sounds: Murmur heard.   Pulmonary:      Effort: Pulmonary effort is normal.   Abdominal:      General: Bowel sounds are normal.      Palpations: Abdomen is soft.   Musculoskeletal:         General: Normal range of motion.      Cervical back: Neck supple.      Right lower leg: Edema present.      Left lower leg: Edema present.   Skin:     General: Skin is warm.   Neurological:      Mental Status: She is alert and  "oriented to person, place, and time.         LAB RESULTS:  Results for orders placed or performed during the hospital encounter of 08/14/24   Urine culture    Specimen: Urine   Result Value Ref Range    Urine Culture 30,000-39,000 cfu/ml    CBC and differential   Result Value Ref Range    WBC 4.94 4.31 - 10.16 Thousand/uL    RBC 3.41 (L) 3.81 - 5.12 Million/uL    Hemoglobin 9.6 (L) 11.5 - 15.4 g/dL    Hematocrit 30.6 (L) 34.8 - 46.1 %    MCV 90 82 - 98 fL    MCH 28.2 26.8 - 34.3 pg    MCHC 31.4 31.4 - 37.4 g/dL    RDW 14.4 11.6 - 15.1 %    MPV 12.0 8.9 - 12.7 fL    Platelets 146 (L) 149 - 390 Thousands/uL    nRBC 0 /100 WBCs    Segmented % 67 43 - 75 %    Immature Grans % 0 0 - 2 %    Lymphocytes % 24 14 - 44 %    Monocytes % 6 4 - 12 %    Eosinophils Relative 2 0 - 6 %    Basophils Relative 1 0 - 1 %    Absolute Neutrophils 3.36 1.85 - 7.62 Thousands/µL    Absolute Immature Grans 0.01 0.00 - 0.20 Thousand/uL    Absolute Lymphocytes 1.17 0.60 - 4.47 Thousands/µL    Absolute Monocytes 0.27 0.17 - 1.22 Thousand/µL    Eosinophils Absolute 0.10 0.00 - 0.61 Thousand/µL    Basophils Absolute 0.03 0.00 - 0.10 Thousands/µL   HS Troponin 0hr (reflex protocol)   Result Value Ref Range    hs TnI 0hr 8 \"Refer to ACS Flowchart\"- see link ng/L   B-Type Natriuretic Peptide(BNP)   Result Value Ref Range     (H) 0 - 100 pg/mL   Comprehensive metabolic panel   Result Value Ref Range    Sodium 138 135 - 147 mmol/L    Potassium 4.9 3.5 - 5.3 mmol/L    Chloride 108 96 - 108 mmol/L    CO2 27 21 - 32 mmol/L    ANION GAP 3 (L) 4 - 13 mmol/L    BUN 26 (H) 5 - 25 mg/dL    Creatinine 1.08 0.60 - 1.30 mg/dL    Glucose 104 65 - 140 mg/dL    Calcium 9.9 8.4 - 10.2 mg/dL    AST 16 13 - 39 U/L    ALT 14 7 - 52 U/L    Alkaline Phosphatase 28 (L) 34 - 104 U/L    Total Protein 6.2 (L) 6.4 - 8.4 g/dL    Albumin 3.7 3.5 - 5.0 g/dL    Total Bilirubin 0.38 0.20 - 1.00 mg/dL    eGFR 47 ml/min/1.73sq m   HS Troponin I 2hr   Result Value Ref Range    " "hs TnI 2hr 8 \"Refer to ACS Flowchart\"- see link ng/L    Delta 2hr hsTnI 0 <20 ng/L   HS Troponin I 4hr   Result Value Ref Range    hs TnI 4hr 8 \"Refer to ACS Flowchart\"- see link ng/L    Delta 4hr hsTnI 0 <20 ng/L   Comprehensive metabolic panel   Result Value Ref Range    Sodium 141 135 - 147 mmol/L    Potassium 5.0 3.5 - 5.3 mmol/L    Chloride 111 (H) 96 - 108 mmol/L    CO2 28 21 - 32 mmol/L    ANION GAP 2 (L) 4 - 13 mmol/L    BUN 29 (H) 5 - 25 mg/dL    Creatinine 1.07 0.60 - 1.30 mg/dL    Glucose 84 65 - 140 mg/dL    Calcium 9.2 8.4 - 10.2 mg/dL    Corrected Calcium 10.0 8.3 - 10.1 mg/dL    AST 12 (L) 13 - 39 U/L    ALT 10 7 - 52 U/L    Alkaline Phosphatase 20 (L) 34 - 104 U/L    Total Protein 5.0 (L) 6.4 - 8.4 g/dL    Albumin 3.0 (L) 3.5 - 5.0 g/dL    Total Bilirubin 0.34 0.20 - 1.00 mg/dL    eGFR 48 ml/min/1.73sq m   CBC and differential   Result Value Ref Range    WBC 3.29 (L) 4.31 - 10.16 Thousand/uL    RBC 2.96 (L) 3.81 - 5.12 Million/uL    Hemoglobin 8.2 (L) 11.5 - 15.4 g/dL    Hematocrit 26.6 (L) 34.8 - 46.1 %    MCV 90 82 - 98 fL    MCH 27.7 26.8 - 34.3 pg    MCHC 30.8 (L) 31.4 - 37.4 g/dL    RDW 14.4 11.6 - 15.1 %    MPV 12.3 8.9 - 12.7 fL    Platelets 133 (L) 149 - 390 Thousands/uL    nRBC 0 /100 WBCs    Segmented % 67 43 - 75 %    Immature Grans % 0 0 - 2 %    Lymphocytes % 25 14 - 44 %    Monocytes % 6 4 - 12 %    Eosinophils Relative 2 0 - 6 %    Basophils Relative 0 0 - 1 %    Absolute Neutrophils 2.17 1.85 - 7.62 Thousands/µL    Absolute Immature Grans 0.00 0.00 - 0.20 Thousand/uL    Absolute Lymphocytes 0.82 0.60 - 4.47 Thousands/µL    Absolute Monocytes 0.21 0.17 - 1.22 Thousand/µL    Eosinophils Absolute 0.08 0.00 - 0.61 Thousand/µL    Basophils Absolute 0.01 0.00 - 0.10 Thousands/µL   Magnesium   Result Value Ref Range    Magnesium 2.1 1.9 - 2.7 mg/dL   TIBC Panel (incl. Iron, TIBC, % Iron Saturation)   Result Value Ref Range    Iron Saturation 20 15 - 50 %    TIBC 424 250 - 450 ug/dL    Iron " 84 50 - 212 ug/dL    UIBC 340 155 - 355 ug/dL   Ferritin   Result Value Ref Range    Ferritin 122 11 - 307 ng/mL   Urinalysis with microscopic   Result Value Ref Range    Color, UA Straw Yellow, Straw    Clarity, UA Clear Hazy, Clear    Specific Gravity, UA 1.010 >1.005 - <1.030    pH, UA 6.5 5.0, 5.5, 6.0, 6.5, 7.0, 7.5    Leukocytes, UA Negative Negative    Nitrite, UA Negative Negative    Protein, UA Trace (A) Negative, Interference- unable to analyze mg/dl    Glucose, UA Negative Negative mg/dl    Ketones, UA Negative Negative mg/dl    Urobilinogen, UA 0.2 0.2, 1.0 E.U./dl E.U./dl    Bilirubin, UA Negative Negative    Occult Blood, UA Negative Negative    RBC, UA None Seen None Seen, 2-4 /hpf    WBC, UA 0-1 (A) None Seen, 2-4, 5-60 /hpf    Epithelial Cells Occasional None Seen, Occasional /hpf    Bacteria, UA None Seen None Seen, Occasional /hpf   ECG 12 lead   Result Value Ref Range    Ventricular Rate 67 BPM    Atrial Rate 67 BPM    WY Interval 190 ms    QRSD Interval 102 ms    QT Interval 368 ms    QTC Interval 388 ms    P Axis 55 degrees    QRS Axis -44 degrees    T Wave Axis 66 degrees   ECG 12 lead   Result Value Ref Range    Ventricular Rate 65 BPM    Atrial Rate 65 BPM    WY Interval 204 ms    QRSD Interval 100 ms    QT Interval 390 ms    QTC Interval 405 ms    P Axis 77 degrees    QRS Axis -43 degrees    T Wave Axis 68 degrees   ECG 12 lead   Result Value Ref Range    Ventricular Rate 61 BPM    Atrial Rate 61 BPM    WY Interval 202 ms    QRSD Interval 96 ms    QT Interval 402 ms    QTC Interval 404 ms    P Bliss 47 degrees    QRS Axis -48 degrees    T Wave Axis 59 degrees   Direct antiglobulin test   Result Value Ref Range    DIRECT VICKIE Negative    Fingerstick Glucose (POCT)   Result Value Ref Range    POC Glucose 55 (L) 65 - 140 mg/dl   Fingerstick Glucose (POCT)   Result Value Ref Range    POC Glucose 55 (L) 65 - 140 mg/dl   Fingerstick Glucose (POCT)   Result Value Ref Range    POC Glucose 168 (H)  65 - 140 mg/dl   Fingerstick Glucose (POCT)   Result Value Ref Range    POC Glucose 140 65 - 140 mg/dl   Fingerstick Glucose (POCT)   Result Value Ref Range    POC Glucose 233 (H) 65 - 140 mg/dl   Fingerstick Glucose (POCT)   Result Value Ref Range    POC Glucose 92 65 - 140 mg/dl   Fingerstick Glucose (POCT)   Result Value Ref Range    POC Glucose 193 (H) 65 - 140 mg/dl         ASSESSMENT and PLAN:  Michelle was seen today for chronic kidney disease and consult.    Diagnoses and all orders for this visit:    Hypertriglyceridemia  -     fenofibrate (TRICOR) 54 MG tablet; Take 1 tablet (54 mg total) by mouth daily    CKD stage 3b, GFR 30-44 ml/min ([facility])  -     Ambulatory Referral to Nephrology  -     Albumin; Standing  -     Albumin / creatinine urine ratio; Standing  -     Calcium; Standing  -     CBC and differential; Standing  -     Comprehensive metabolic panel; Standing  -     Phosphorus; Standing  -     PTH, intact; Standing  -     Urinalysis with microscopic; Future  -     Vitamin D 25 hydroxy; Standing    Normocytic anemia  -     Ambulatory Referral to Nephrology    Primary hypertension  -     hydroCHLOROthiazide 25 mg tablet; Take 2 tablets (50 mg total) by mouth daily    Hyponatremia  -     Basic metabolic panel; Future    Urinary retention  -     Ambulatory Referral to Urology; Future      84 years old female with past medical history of Alzheimer's dementia, hypertension, diabetes mellitus type 2, urinary retention, dyslipidemia, chronic kidney disease stage IIIb who was referred to renal office by her hematologist due to elevated renal parameters.    1.  Chronic kidney disease stage IIIb: History of CKD as early as the year 2018.  Etiology likely age-related nephron loss, diabetic nephrosclerosis and hypertensive kidney disease.  Her serum creatinine had ranged between 1.1 to 1.2 mg/dL since 2018.  Current eGFR stands at 43.  Imaging of the kidneys obtained a formal CT scan had revealed 2 normal-sized  kidneys.  Absence of stones.    2.  Resistant hypertension: Maintained on lisinopril, hydralazine, amlodipine and hydrochlorothiazide with blood pressure noted to be elevated.  Patient has lower extremity edema which could be a side effect of amlodipine and hydralazine being initiated recently versus hypervolemic state resulting in persistent hypertension.  Will increase hydrochlorothiazide to 2 tablets daily.  Recommended low-salt diet.  Recommended home blood pressure monitoring and provide log sheets.  Target blood pressure is 140/90.    3.  Diabetes mellitus type 2: Acceptable glycemic control while on insulin.    4.  Proteinuria: Urinalysis only revealed trace protein.  Maintained on ACE inhibitor.    5.  Bone mineral disorder: 25 OH vitamin D in 2023 November was noted to be low at 23.  Recommended vitamin D3 1 tablet 1000 units daily.  Will obtain PTH intact and phosphorus levels.    6.  Nutrition: Target free water intake of 48 ounces daily, low-salt low potassium increase protein diet recommended.    .

## 2024-08-29 ENCOUNTER — PATIENT OUTREACH (OUTPATIENT)
Dept: CASE MANAGEMENT | Facility: OTHER | Age: 84
End: 2024-08-29

## 2024-08-29 ENCOUNTER — HOSPITAL ENCOUNTER (EMERGENCY)
Facility: HOSPITAL | Age: 84
Discharge: HOME | End: 2024-08-29
Attending: EMERGENCY MEDICINE | Admitting: EMERGENCY MEDICINE
Payer: COMMERCIAL

## 2024-08-29 ENCOUNTER — HOSPITAL ENCOUNTER (OUTPATIENT)
Dept: NON INVASIVE DIAGNOSTICS | Facility: HOSPITAL | Age: 84
Discharge: HOME | End: 2024-08-29
Payer: COMMERCIAL

## 2024-08-29 VITALS
TEMPERATURE: 97.5 F | DIASTOLIC BLOOD PRESSURE: 76 MMHG | SYSTOLIC BLOOD PRESSURE: 181 MMHG | HEIGHT: 60 IN | BODY MASS INDEX: 26.5 KG/M2 | HEART RATE: 75 BPM | OXYGEN SATURATION: 98 % | RESPIRATION RATE: 18 BRPM | WEIGHT: 135 LBS

## 2024-08-29 DIAGNOSIS — R60.0 BILATERAL LOWER EXTREMITY EDEMA: ICD-10-CM

## 2024-08-29 DIAGNOSIS — M79.605 BILATERAL LEG PAIN: ICD-10-CM

## 2024-08-29 DIAGNOSIS — M79.604 BILATERAL LEG PAIN: ICD-10-CM

## 2024-08-29 DIAGNOSIS — I65.22 CAROTID STENOSIS, LEFT: Primary | ICD-10-CM

## 2024-08-29 DIAGNOSIS — I82.403 LEG DVT (DEEP VENOUS THROMBOEMBOLISM), ACUTE, BILATERAL (HCC): Primary | ICD-10-CM

## 2024-08-29 LAB
ANION GAP SERPL CALCULATED.3IONS-SCNC: 6 MMOL/L (ref 4–13)
APTT PPP: 23 SECONDS (ref 23–34)
BASOPHILS # BLD AUTO: 0.03 THOUSANDS/ÂΜL (ref 0–0.1)
BASOPHILS NFR BLD AUTO: 1 % (ref 0–1)
BUN SERPL-MCNC: 28 MG/DL (ref 5–25)
CALCIUM SERPL-MCNC: 10.3 MG/DL (ref 8.4–10.2)
CHLORIDE SERPL-SCNC: 103 MMOL/L (ref 96–108)
CO2 SERPL-SCNC: 27 MMOL/L (ref 21–32)
CREAT SERPL-MCNC: 1.19 MG/DL (ref 0.6–1.3)
EOSINOPHIL # BLD AUTO: 0.12 THOUSAND/ÂΜL (ref 0–0.61)
EOSINOPHIL NFR BLD AUTO: 3 % (ref 0–6)
ERYTHROCYTE [DISTWIDTH] IN BLOOD BY AUTOMATED COUNT: 14.6 % (ref 11.6–15.1)
GFR SERPL CREATININE-BSD FRML MDRD: 42 ML/MIN/1.73SQ M
GLUCOSE SERPL-MCNC: 146 MG/DL (ref 65–140)
HCT VFR BLD AUTO: 32.7 % (ref 34.8–46.1)
HGB BLD-MCNC: 10.4 G/DL (ref 11.5–15.4)
IMM GRANULOCYTES # BLD AUTO: 0.01 THOUSAND/UL (ref 0–0.2)
IMM GRANULOCYTES NFR BLD AUTO: 0 % (ref 0–2)
INR PPP: 0.9 (ref 0.85–1.19)
LYMPHOCYTES # BLD AUTO: 1.21 THOUSANDS/ÂΜL (ref 0.6–4.47)
LYMPHOCYTES NFR BLD AUTO: 25 % (ref 14–44)
MCH RBC QN AUTO: 28.5 PG (ref 26.8–34.3)
MCHC RBC AUTO-ENTMCNC: 31.8 G/DL (ref 31.4–37.4)
MCV RBC AUTO: 90 FL (ref 82–98)
MONOCYTES # BLD AUTO: 0.28 THOUSAND/ÂΜL (ref 0.17–1.22)
MONOCYTES NFR BLD AUTO: 6 % (ref 4–12)
NEUTROPHILS # BLD AUTO: 3.11 THOUSANDS/ÂΜL (ref 1.85–7.62)
NEUTS SEG NFR BLD AUTO: 65 % (ref 43–75)
NRBC BLD AUTO-RTO: 0 /100 WBCS
PLATELET # BLD AUTO: 232 THOUSANDS/UL (ref 149–390)
PMV BLD AUTO: 12.5 FL (ref 8.9–12.7)
POTASSIUM SERPL-SCNC: 5 MMOL/L (ref 3.5–5.3)
PROTHROMBIN TIME: 12.7 SECONDS (ref 12.3–15)
RBC # BLD AUTO: 3.65 MILLION/UL (ref 3.81–5.12)
SODIUM SERPL-SCNC: 136 MMOL/L (ref 135–147)
WBC # BLD AUTO: 4.76 THOUSAND/UL (ref 4.31–10.16)

## 2024-08-29 PROCEDURE — 93970 EXTREMITY STUDY: CPT | Performed by: SURGERY

## 2024-08-29 PROCEDURE — 80048 BASIC METABOLIC PNL TOTAL CA: CPT | Performed by: EMERGENCY MEDICINE

## 2024-08-29 PROCEDURE — 85025 COMPLETE CBC W/AUTO DIFF WBC: CPT | Performed by: EMERGENCY MEDICINE

## 2024-08-29 PROCEDURE — 93970 EXTREMITY STUDY: CPT

## 2024-08-29 PROCEDURE — 85610 PROTHROMBIN TIME: CPT | Performed by: EMERGENCY MEDICINE

## 2024-08-29 PROCEDURE — 85730 THROMBOPLASTIN TIME PARTIAL: CPT | Performed by: EMERGENCY MEDICINE

## 2024-08-29 PROCEDURE — 99283 EMERGENCY DEPT VISIT LOW MDM: CPT

## 2024-08-29 PROCEDURE — 36415 COLL VENOUS BLD VENIPUNCTURE: CPT | Performed by: EMERGENCY MEDICINE

## 2024-08-29 PROCEDURE — 99284 EMERGENCY DEPT VISIT MOD MDM: CPT | Performed by: EMERGENCY MEDICINE

## 2024-08-29 RX ADMIN — APIXABAN 10 MG: 5 TABLET, FILM COATED ORAL at 14:50

## 2024-08-29 NOTE — DISCHARGE INSTRUCTIONS
Return if you develop bleeding, chest pain shortness of breath or difficulty breathing.  Seek medical attention if you have any significant head trauma.  You will be more at risk for bleeding while being on a anticoagulant.  Call your primary physician today to discuss follow-up as you may require a prior authorization for blood thinning medicine.

## 2024-08-29 NOTE — PROGRESS NOTES
Received ADT alert patient presented to ED following duplex study which showed bilateral soleal DVT.    She was discharged to home with a 30 day supply of eliquis.

## 2024-08-29 NOTE — PROGRESS NOTES
Contacted patients dipti Galeano for f/u.  Patient had recent hospitalization for hypoglycemia.  She is receiving home health services of SN and PT.  She has caregiver assistance daily through waiver services .  Grandjose alejandroughter has requested more hours but was denied.  Instructed her to contact  to inform of her recent hospitalizations as that may qualify her for more hours.   She has been having issues with LE edema and will be having a venous duplex study done today.  Granddaughter notes patient's BS have been stable, range from 100-180.  She is now off all insulin and will be starting ozempic next week.  She is monitoring her BP and it remains high, 180/60.  Medications have been adjusted.  Reviewed to follow a low sodium diet.  Patient has a good appetite and they are forcing fluids.  She applied for meals on wheels services.  Family is managing medications and she is taking all as prescribed.  Follow up appointments scheduled.  No transportation issues.   Dipti is agreeable to continued outreach.

## 2024-08-29 NOTE — ED PROVIDER NOTES
History  Chief Complaint   Patient presents with    Leg Pain     Patient reports being at ultrasound appointment and has bilateral blood clots in legs. Reports pain to bilateral legs.     84-year-old female presents with bilateral lower extremity edema.  No chest pain or shortness of breath.  Been present for about a week.  Had outpatient ultrasound which showed bilateral soleal DVTs.  No recent surgery no history of DVTs in the past.      Leg Pain      Prior to Admission Medications   Prescriptions Last Dose Informant Patient Reported? Taking?   BD Pen Needle Elisabeth 2nd Gen 32G X 4 MM MISC  Family Member, Child No No   Sig: USE DAILY AS DIRECTED   Blood Glucose Monitoring Suppl (OneTouch Verio Reflect) w/Device KIT  Family Member, Child No No   Sig: USE 2 (TWO) TIMES A DAY   Blood Pressure KIT  Family Member, Child No No   Sig: Use daily   Patient not taking: Reported on 8/1/2024   Continuous Blood Gluc  (FreeStyle Aruna 2 [place]) MEREDITH  Family Member, Child No No   Sig: Use 1 Device 3 (three) times a day before meals   Continuous Blood Gluc Sensor (FreeStyle Aruna 2 Sensor) MISC  Family Member, Child No No   Sig: Check blood sugars multiple times per day   Incontinence Supplies MISC  Family Member, Child No No   Sig: Use 6 (six) times a day Wipes   Incontinence Supplies MISC  Family Member, Child No No   Sig: Use 4 (four) times a day Comfort shield Adult diapers   Incontinence Supply Disposable MISC  Family Member, Child No No   Sig: Use 6 (six) times a day Dispense disposable bed pad   Insulin Pen Needle (BD Pen Needle Elisabeth U/F) 32G X 4 MM MISC  Family Member, Child No No   Sig: Inject under the skin 2 (two) times a day   Patient not taking: Reported on 8/28/2024   Lancets (OneTouch Delica Plus [id number]) MISC  Family Member, Child No No   Sig: USE TO TEST BLOOD SUGAR 3 TIMES A DAY   Patient not taking: Reported on 8/28/2024   OneTouch Verio test strip  Family Member, Child No No   Sig: USE 1 EACH 2 (TWO)  TIMES A DAY TEST   Patient not taking: Reported on 8/28/2024   Xiidra 5 % op solution  Family Member, Child Yes No   Sig: INSTILL 1 DROP IN BOTH EYES 2 TIMES PER DAY   amLODIPine (NORVASC) 10 mg tablet  Family Member, Child No No   Sig: Take 1 tablet (10 mg total) by mouth daily At night before bed   cholestyramine (QUESTRAN) 4 g packet  Family Member, Child No No   Sig: Take 1 packet (4 g total) by mouth 2 (two) times a day with meals   cyanocobalamin (VITAMIN B-12) 1000 MCG tablet  Family Member, Child No No   Sig: Take 1 tablet (1,000 mcg total) by mouth daily   fenofibrate (TRICOR) 54 MG tablet   No No   Sig: Take 1 tablet (54 mg total) by mouth daily   fluticasone (FLONASE) 50 mcg/act nasal spray  Family Member, Child No No   Sig: SPRAY 2 SPRAYS INTO EACH NOSTRIL EVERY DAY   gabapentin (Neurontin) 100 mg capsule  Family Member, Child No No   Sig: Take 1 capsule (100 mg total) by mouth 3 (three) times a day   hydrALAZINE (APRESOLINE) 25 mg tablet  Family Member, Child No No   Sig: Take 1 tablet (25 mg total) by mouth every 8 (eight) hours   hydroCHLOROthiazide 25 mg tablet   No No   Sig: Take 2 tablets (50 mg total) by mouth daily   insulin detemir (Levemir FlexTouch) 100 Units/mL injection pen  Family Member, Child No No   Sig: Inject 10 Units under the skin daily at bedtime Take 12 units am and 12 units pm   Patient not taking: Reported on 8/28/2024   levothyroxine 100 mcg tablet  Family Member, Child No No   Sig: Take 1 tablet (100 mcg total) by mouth daily   lisinopril (ZESTRIL) 40 mg tablet  Family Member, Child No No   Sig: Take 1 tablet (40 mg total) by mouth daily In the morning   memantine (NAMENDA) 10 mg tablet  Family Member, Child No No   Sig: Take 1 tablet (10 mg total) by mouth 2 (two) times a day   montelukast (SINGULAIR) 10 mg tablet  Family Member, Child No No   Sig: Take 1 tablet (10 mg total) by mouth daily at bedtime   omeprazole (PriLOSEC) 20 mg delayed release capsule  Family Member, Child  No No   Sig: Take 1 capsule (20 mg total) by mouth daily   semaglutide, 0.25 or 0.5 mg/dose, (Ozempic, 0.25 or 0.5 MG/DOSE,) 2 mg/3 mL injection pen  Family Member, Child No No   Si.25 mg under the skin every 7 days for 4 doses (28 days), THEN 0.5 mg under the skin every 7 days   sertraline (ZOLOFT) 50 mg tablet  Family Member, Child No No   Sig: Take 1 tablet (50 mg total) by mouth daily      Facility-Administered Medications: None       Past Medical History:   Diagnosis Date    Aggression     Alzheimer's dementia (HCC)     Arthritis     Dementia (HCC)     Diabetes insipidus (HCC)     Diabetes mellitus (HCC)     High cholesterol     Hypertension     Memory loss     Migraine     Polyneuropathy     Rheumatoid arthritis (HCC)     Serum lipids high     Stomach problems     Thyroid trouble        Past Surgical History:   Procedure Laterality Date    CATARACT EXTRACTION      CHOLECYSTECTOMY      GALLBLADDER SURGERY      HYSTERECTOMY      AL SURGICAL ARTHROSCOPY SHOULDER W/ROTATOR CUFF RPR Right 2019    Procedure: SHOULDER ARTHROSCOPIC ROTATOR CUFF REPAIR;  Surgeon: Betsy Conde MD;  Location: [facility];  Service: Orthopedics    ROTATOR CUFF REPAIR Right 2019       Family History   Problem Relation Age of Onset    Substance Abuse Mother         denied    Mental illness Mother         denied    Substance Abuse Father         denied    Mental illness Father         denied    Diabetes Brother     Blindness Brother     Arthritis Daughter     HIV Son      I have reviewed and agree with the history as documented.    E-Cigarette/Vaping    E-Cigarette Use Never User      E-Cigarette/Vaping Substances    Nicotine No     THC No     CBD No     Flavoring No     Other No     Unknown No      Social History     Tobacco Use    Smoking status: Former     Current packs/day: 0.00     Average packs/day: 0.3 packs/day for 30.0 years (7.5 ttl pk-yrs)     Types: Cigarettes     Start date:      Quit date: 1985     Years  since quittin.6     Passive exposure: Past    Smokeless tobacco: Never   Vaping Use    Vaping status: Never Used   Substance Use Topics    Alcohol use: Never    Drug use: No       Review of Systems    Physical Exam  Physical Exam  Vitals and nursing note reviewed.   Constitutional:       Appearance: She is normal weight.   HENT:      Head: Normocephalic and atraumatic.   Eyes:      Conjunctiva/sclera: Conjunctivae normal.      Pupils: Pupils are equal, round, and reactive to light.   Cardiovascular:      Rate and Rhythm: Normal rate and regular rhythm.   Pulmonary:      Effort: Pulmonary effort is normal. No respiratory distress.   Abdominal:      General: Abdomen is flat. There is no distension.   Musculoskeletal:         General: No swelling or deformity.      Cervical back: Normal range of motion and neck supple.      Comments: Very mild bilateral edema   Skin:     General: Skin is dry.      Coloration: Skin is not jaundiced.   Neurological:      General: No focal deficit present.      Mental Status: She is alert and oriented to person, place, and time.   Psychiatric:         Mood and Affect: Mood normal.         Thought Content: Thought content normal.         Vital Signs  ED Triage Vitals   Temperature Pulse Respirations Blood Pressure SpO2   24 1402 24 1402 24 1402 24 1402 24 1402   97.5 °F (36.4 °C) 78 16 (S) (!) 214/88 98 %      Temp src Heart Rate Source Patient Position - Orthostatic VS BP Location FiO2 (%)   -- 24 1417 24 1402 24 1417 --    Monitor Sitting Right arm       Pain Score       24 1417       2           Vitals:    24 1402 24 1417 24 1450 24 1453   BP: (S) (!) 214/88 (!) 201/83 (!) 187/85 (!) 181/76   Pulse: 78 71  75   Patient Position - Orthostatic VS: Sitting Sitting  Sitting         Visual Acuity      ED Medications  Medications   apixaban (ELIQUIS) tablet 10 mg (10 mg Oral Given 24 1450)       Diagnostic  Studies  Results Reviewed       Procedure Component Value Units Date/Time    Basic metabolic panel [[id number]]  (Abnormal) Collected: 08/29/24 1411    Lab Status: Final result Specimen: Blood from Arm, Right Updated: 08/29/24 1440     Sodium 136 mmol/L      Potassium 5.0 mmol/L      Chloride 103 mmol/L      CO2 27 mmol/L      ANION GAP 6 mmol/L      BUN 28 mg/dL      Creatinine 1.19 mg/dL      Glucose 146 mg/dL      Calcium 10.3 mg/dL      eGFR 42 ml/min/1.73sq m     Narrative:      National Kidney Disease Foundation guidelines for Chronic Kidney Disease (CKD):     Stage 1 with normal or high GFR (GFR > 90 mL/min/1.73 square meters)    Stage 2 Mild CKD (GFR = 60-89 mL/min/1.73 square meters)    Stage 3A Moderate CKD (GFR = 45-59 mL/min/1.73 square meters)    Stage 3B Moderate CKD (GFR = 30-44 mL/min/1.73 square meters)    Stage 4 Severe CKD (GFR = 15-29 mL/min/1.73 square meters)    Stage 5 End Stage CKD (GFR <15 mL/min/1.73 square meters)  Note: GFR calculation is accurate only with a steady state creatinine    Protime-INR [[id number]]  (Normal) Collected: 08/29/24 1411    Lab Status: Final result Specimen: Blood from Arm, Right Updated: 08/29/24 1431     Protime 12.7 seconds      INR 0.90    Narrative:      INR Therapeutic Range    Indication                                             INR Range      Atrial Fibrillation                                               2.0-3.0  Hypercoagulable State                                    2.0.2.3  Left Ventricular Asist Device                            2.0-3.0  Mechanical Heart Valve                                  -    Aortic(with afib, MI, embolism, HF, LA enlargement,    and/or coagulopathy)                                     2.0-3.0 (2.5-3.5)     Mitral                                                             2.5-3.5  Prosthetic/Bioprosthetic Heart Valve               2.0-3.0  Venous thromboembolism (VTE: VT, PE        2.0-3.0    APTT [[id number]]  (Normal)  Collected: 08/29/24 1411    Lab Status: Final result Specimen: Blood from Arm, Right Updated: 08/29/24 1431     PTT 23 seconds     CBC and differential [[id number]]  (Abnormal) Collected: 08/29/24 1411    Lab Status: Final result Specimen: Blood from Arm, Right Updated: 08/29/24 1418     WBC 4.76 Thousand/uL      RBC 3.65 Million/uL      Hemoglobin 10.4 g/dL      Hematocrit 32.7 %      MCV 90 fL      MCH 28.5 pg      MCHC 31.8 g/dL      RDW 14.6 %      MPV 12.5 fL      Platelets 232 Thousands/uL      nRBC 0 /100 WBCs      Segmented % 65 %      Immature Grans % 0 %      Lymphocytes % 25 %      Monocytes % 6 %      Eosinophils Relative 3 %      Basophils Relative 1 %      Absolute Neutrophils 3.11 Thousands/µL      Absolute Immature Grans 0.01 Thousand/uL      Absolute Lymphocytes 1.21 Thousands/µL      Absolute Monocytes 0.28 Thousand/µL      Eosinophils Absolute 0.12 Thousand/µL      Basophils Absolute 0.03 Thousands/µL                    No orders to display              Procedures  Procedures         ED Course                                 SBIRT 22yo+      Flowsheet Row Most Recent Value   Initial Alcohol Screen: US AUDIT-C     1. How often do you have a drink containing alcohol? 0 Filed at: 08/29/2024 1416   2. How many drinks containing alcohol do you have on a typical day you are drinking?  0 Filed at: 08/29/2024 1416   3a. Male UNDER 65: How often do you have five or more drinks on one occasion? 0 Filed at: 08/29/2024 1416   3b. FEMALE Any Age, or MALE 65+: How often do you have 4 or more drinks on one occassion? 0 Filed at: 08/29/2024 1416   Audit-C Score 0 Filed at: 08/29/2024 1416   KYLAH: How many times in the past year have you...    Used an illegal drug or used a prescription medication for non-medical reasons? Never Filed at: 08/29/2024 1416                      Medical Decision Making  Patient presents with new bilateral DVTs.  Unsure as to what provokes them.  She has no chest pain or shortness of  breath.  Chart review indicates patient has a history of CKD and anemia so a CBC and coags and chemistry was ordered to rule out anemia and MESFIN.  The studies are similar to prior.  Discussed risks and benefits of anticoagulation with the patient, will start patient on Eliquis as an outpatient and she is stable for discharge.  Recommend that she follow-up with her PCP soon as possible.    Problems Addressed:  Leg DVT (deep venous thromboembolism), acute, bilateral (HCC): acute illness or injury    Amount and/or Complexity of Data Reviewed  Labs: ordered. Decision-making details documented in ED Course.    Risk  Prescription drug management.                 Disposition  Final diagnoses:   Leg DVT (deep venous thromboembolism), acute, bilateral (HCC)     Time reflects when diagnosis was documented in both MDM as applicable and the Disposition within this note       Time User Action Codes Description Comment    8/29/2024  2:42 PM Carmelo Gallegos Add [I82.403] Leg DVT (deep venous thromboembolism), acute, bilateral (HCC)           ED Disposition       ED Disposition   Discharge    Condition   Stable    Date/Time   Thu Aug 29, 2024 1442    Comment   Michelle Villatoro discharge to home/self care.                   Follow-up Information       Follow up With Specialties Details Why Contact Info    Elver Lynne MD Family Medicine Schedule an appointment as soon as possible for a visit   [address]  [facility] [address] [phone]              Discharge Medication List as of 8/29/2024  2:43 PM        START taking these medications    Details   apixaban (Eliquis) 5 mg Multiple Dosages:Starting Thu 8/29/2024, Until Wed 9/4/2024 at 2359, THEN Starting Thu 9/5/2024, Until Fri 9/27/2024 at 2359Take 2 tablets (10 mg total) by mouth 2 (two) times a day for 7 days, THEN 1 tablet (5 mg total) 2 (two) times a day for 23 day s., Normal           CONTINUE these medications which have NOT CHANGED    Details   amLODIPine (NORVASC)  10 mg tablet Take 1 tablet (10 mg total) by mouth daily At night before bed, Starting Thu 8/8/2024, Until Tue 2/4/2025, Normal      !! BD Pen Needle Elisabeth 2nd Gen 32G X 4 MM MISC USE DAILY AS DIRECTED, Normal      Blood Glucose Monitoring Suppl (OneTouch Verio Reflect) w/Device KIT USE 2 (TWO) TIMES A DAY, Normal      Blood Pressure KIT Use daily, Starting Thu 7/25/2024, Normal      cholestyramine (QUESTRAN) 4 g packet Take 1 packet (4 g total) by mouth 2 (two) times a day with meals, Starting Tue 11/21/2023, Until Wed 8/28/2024, Normal      Continuous Blood Gluc  (FreeStyle Aruna 2 [place]) MEREDITH Use 1 Device 3 (three) times a day before meals, Starting Mon 3/11/2024, Normal      Continuous Blood Gluc Sensor (FreeStyle Aruna 2 Sensor) MISC Check blood sugars multiple times per day, Normal      cyanocobalamin (VITAMIN B-12) 1000 MCG tablet Take 1 tablet (1,000 mcg total) by mouth daily, Starting Thu 8/1/2024, No Print      fenofibrate (TRICOR) 54 MG tablet Take 1 tablet (54 mg total) by mouth daily, Starting Wed 8/28/2024, Normal      fluticasone (FLONASE) 50 mcg/act nasal spray SPRAY 2 SPRAYS INTO EACH NOSTRIL EVERY DAY, Normal      gabapentin (Neurontin) 100 mg capsule Take 1 capsule (100 mg total) by mouth 3 (three) times a day, Starting Thu 8/8/2024, Until Wed 11/6/2024, Normal      hydrALAZINE (APRESOLINE) 25 mg tablet Take 1 tablet (25 mg total) by mouth every 8 (eight) hours, Starting Thu 8/15/2024, Normal      hydroCHLOROthiazide 25 mg tablet Take 2 tablets (50 mg total) by mouth daily, Starting Wed 8/28/2024, Normal      !! Incontinence Supplies MISC Use 6 (six) times a day Wipes, Starting Tue 1/26/2021, Print      !! Incontinence Supplies MISC Use 4 (four) times a day Comfort shield Adult diapers, Starting Tue 1/26/2021, Print      Incontinence Supply Disposable MISC Use 6 (six) times a day Dispense disposable bed pad, Starting Tue 1/26/2021, Print      insulin detemir (Levemir FlexTouch) 100 Units/mL  injection pen Inject 10 Units under the skin daily at bedtime Take 12 units am and 12 units pm, Starting Thu 8/15/2024, Until Tue 2/11/2025, No Print      !! Insulin Pen Needle (BD Pen Needle Elisabeth U/F) 32G X 4 MM MISC Inject under the skin 2 (two) times a day, Starting Fri 5/24/2024, Normal      Lancets (OneTouch Delica Plus [id number]) MISC USE TO TEST BLOOD SUGAR 3 TIMES A DAY, Normal      levothyroxine 100 mcg tablet Take 1 tablet (100 mcg total) by mouth daily, Starting Thu 8/8/2024, Normal      lisinopril (ZESTRIL) 40 mg tablet Take 1 tablet (40 mg total) by mouth daily In the morning, Starting Thu 8/8/2024, Until Tue 2/4/2025, Normal      memantine (NAMENDA) 10 mg tablet Take 1 tablet (10 mg total) by mouth 2 (two) times a day, Starting Thu 8/8/2024, Normal      montelukast (SINGULAIR) 10 mg tablet Take 1 tablet (10 mg total) by mouth daily at bedtime, Starting Tue 8/27/2024, Normal      omeprazole (PriLOSEC) 20 mg delayed release capsule Take 1 capsule (20 mg total) by mouth daily, Starting Tue 8/27/2024, Normal      OneTouch Verio test strip USE 1 EACH 2 (TWO) TIMES A DAY TEST, Starting Tue 7/2/2024, Normal      semaglutide, 0.25 or 0.5 mg/dose, (Ozempic, 0.25 or 0.5 MG/DOSE,) 2 mg/3 mL injection pen 0.25 mg under the skin every 7 days for 4 doses (28 days), THEN 0.5 mg under the skin every 7 days, Normal      sertraline (ZOLOFT) 50 mg tablet Take 1 tablet (50 mg total) by mouth daily, Starting Thu 8/8/2024, Until Tue 2/4/2025, Normal      Xiidra 5 % op solution INSTILL 1 DROP IN BOTH EYES 2 TIMES PER DAY, Historical Med       !! - Potential duplicate medications found. Please discuss with provider.          No discharge procedures on file.    PDMP Review         Value Time User    PDMP Reviewed  Yes 5/24/2024 11:10 AM Elver Lynne MD            ED Provider  Electronically Signed by             Carmelo Gallegos,   08/29/24 [id number]

## 2024-08-30 ENCOUNTER — VBI (OUTPATIENT)
Dept: FAMILY MEDICINE CLINIC | Facility: CLINIC | Age: 84
End: 2024-08-30

## 2024-08-30 NOTE — TELEPHONE ENCOUNTER
08/30/24 11:53 AM    Patient contacted post ED visit, first outreach attempt made. Message was left for patient to return a call to the [facility] Department at [facility]: Phone [phone].    Thank you.  Naomi Meek MA  [facility]

## 2024-08-30 NOTE — TELEPHONE ENCOUNTER
08/30/24 11:55 AM    Patient contacted post ED visit, VBI department spoke with patient/caregiver and outreach was successful.    Thank you.  [facility] Meek MA  [facility]

## 2024-09-03 ENCOUNTER — TELEPHONE (OUTPATIENT)
Dept: CARDIOLOGY CLINIC | Facility: CLINIC | Age: 84
End: 2024-09-03

## 2024-09-03 ENCOUNTER — TELEPHONE (OUTPATIENT)
Dept: HEMATOLOGY ONCOLOGY | Facility: CLINIC | Age: 84
End: 2024-09-03

## 2024-09-03 ENCOUNTER — TELEPHONE (OUTPATIENT)
Age: 84
End: 2024-09-03

## 2024-09-03 NOTE — TELEPHONE ENCOUNTER
Called and spoke with granddaughter and inquired how patient was feeling. She states that she is having bilateral leg pain despite being on Eliquis 5 mg daily. She states she initially felt better when she started but the leg pain is back. She has appts to see primary care and they wanted us to be aware of her status. Encouraged her going to ER if she become sob, had chest pain or swelling of her legs. Granddaughter verbalizes understanding

## 2024-09-03 NOTE — TELEPHONE ENCOUNTER
Caller: Sarah     Doctor: new patient     Reason for call: Sarah calling to request that our office call the patient to reschedule her missed appointment from August 20.    Call back#: [phone]

## 2024-09-03 NOTE — TELEPHONE ENCOUNTER
Pt was in the hosp for bilateral blood clots in her legs pain and swelling came back this morning appt for hematology already made with [facility] pt has a chronic cough different test done not showing anything might possibly come from her lisinopril not sure if has been address.grand daughter is requesting a wheel chair any DME need to get sent to [facility].please advise and for further question office can call Sarah at [facility] [phone]

## 2024-09-03 NOTE — TELEPHONE ENCOUNTER
Sarah from [facility] called stating patient recently went to the hospital on 8/29. Patient was seen for bilateral lower extremity edema. Per Sarah patient did start Eliquis and symptoms have resolved. Per Sarah patient has pain in her thighs bilateral this morning and it has gotten worst throughout the day. Per Sarah patient did say pain is 6 out of 10. No swelling or redness.       Thank you!

## 2024-09-04 ENCOUNTER — TELEPHONE (OUTPATIENT)
Age: 84
End: 2024-09-04

## 2024-09-04 LAB

## 2024-09-04 NOTE — TELEPHONE ENCOUNTER
Printed and faxed to [facility]. Notified [facility] via [facility] order message

## 2024-09-04 NOTE — TELEPHONE ENCOUNTER
FLAKO todays visit -   Reporting harsh non productive cough for the past three days.  Lungs were clear, cough was deep and dry, worse at night, keeping her elevated, blood oxygen was 97.  Nurse wanted to report due to her history of blood clots and recent DVT ED visit.  Nurse can be reached at [phone]

## 2024-09-05 ENCOUNTER — TELEPHONE (OUTPATIENT)
Dept: HEMATOLOGY ONCOLOGY | Facility: CLINIC | Age: 84
End: 2024-09-05

## 2024-09-05 ENCOUNTER — OFFICE VISIT (OUTPATIENT)
Dept: ENDOCRINOLOGY | Facility: CLINIC | Age: 84
End: 2024-09-05
Payer: COMMERCIAL

## 2024-09-05 VITALS
DIASTOLIC BLOOD PRESSURE: 78 MMHG | HEART RATE: 72 BPM | WEIGHT: 135 LBS | OXYGEN SATURATION: 97 % | SYSTOLIC BLOOD PRESSURE: 150 MMHG | TEMPERATURE: 98.2 F | HEIGHT: 60 IN | BODY MASS INDEX: 26.5 KG/M2

## 2024-09-05 DIAGNOSIS — E11.22 TYPE 2 DIABETES MELLITUS WITH STAGE 3B CHRONIC KIDNEY DISEASE, WITHOUT LONG-TERM CURRENT USE OF INSULIN (HCC): ICD-10-CM

## 2024-09-05 DIAGNOSIS — I82.463 ACUTE EMBOLISM AND THROMBOSIS OF CALF MUSCULAR VEIN, BILATERAL (HCC): ICD-10-CM

## 2024-09-05 DIAGNOSIS — R06.02 SHORTNESS OF BREATH: Primary | ICD-10-CM

## 2024-09-05 DIAGNOSIS — I82.403 DEEP VEIN THROMBOSIS (DVT) OF BOTH LOWER EXTREMITIES, UNSPECIFIED CHRONICITY, UNSPECIFIED VEIN (HCC): Primary | ICD-10-CM

## 2024-09-05 DIAGNOSIS — N18.32 TYPE 2 DIABETES MELLITUS WITH STAGE 3B CHRONIC KIDNEY DISEASE, WITHOUT LONG-TERM CURRENT USE OF INSULIN (HCC): ICD-10-CM

## 2024-09-05 DIAGNOSIS — I82.4Y3 ACUTE DEEP VEIN THROMBOSIS (DVT) OF PROXIMAL VEIN OF BOTH LOWER EXTREMITIES (HCC): ICD-10-CM

## 2024-09-05 PROCEDURE — 99203 OFFICE O/P NEW LOW 30 MIN: CPT | Performed by: STUDENT IN AN ORGANIZED HEALTH CARE EDUCATION/TRAINING PROGRAM

## 2024-09-05 NOTE — TELEPHONE ENCOUNTER
Spoke with daughter who said she is having no symptoms and is getting a stat duplex tomorrow. Scheduled her for the stat ct scan at 130pm and vascular is at 2 pm. Notified daughter

## 2024-09-05 NOTE — TELEPHONE ENCOUNTER
Can we please get this US ordered by Dorcas ordered today or tomorrow please. I entered it as stat. ty

## 2024-09-05 NOTE — TELEPHONE ENCOUNTER
Called patient to schedule STAT US per Dorcas Gutierrez . Left message with all info and with hope line tel number

## 2024-09-05 NOTE — PROGRESS NOTES
Ambulatory Visit  Name: Michelle Villatoro      : 1940      MRN: [id number]  Encounter Provider: Matt Balbuena DO  Encounter Date: 2024   Encounter department: [facility]    Assessment & Plan   1. Type 2 diabetes mellitus with stage 3b chronic kidney disease, without long-term current use of insulin (HCC)  Assessment & Plan:  We discussed the pathophysiology of diabetes and its associations with negative health outcomes.  I counseled the patient on various goals of diabetes management, including healthy lifestyle and behaviors, glycemic targets, preventative health care, and the role of pharmacotherapies. We discussed GLP and SGLT2i therapy in depth. Patient wishes to continue ozempic, which I advised may be considered for dose escalation as already planned to 0.5 mg weekly in about a month. We discussed how SGLT2i therapy can play a role in modifying renal outcomes, and is not unreasonable to consider its resumption. I advised lack of urgency at this time, however she may also consult with her nephrologist at their upcoming visit. We discussed strategies to incorporate scattered CBG testing to gain better understanding of glycemic patterns. Michelle wishes to resume her care with PCP, which I supported. She is welcome to contact me at any time in the future with any questions or concerns.     Orders:  -     Ambulatory referral to Endocrinology    RTC PRN    History of Present Illness     Michelle Villatoro is a 84 y.o. female who presents in evaluation of T2D. She is joined today by family, who are also providing translation services, per patient preference.    T2D is longstanding, c/b CKD and atherosclerosis. Pt with longstanding insulin use and recent hospitalizations for hyper- and hypoglycemia. She had been on levemir and lantus insulins and also invokana. These therapies were discontinued on account of hypoglycemia, and patient is now on ozempic at 0.25 mg  weekly dosing x1 week. She has CBG measurements showing fasting and evening BG values 100 - 160 without hypoglycemia. Patient is otherwise asymptomatic. She follows for diabetic eye exam and is up to date. She does have DPN. She will be seeing nephrology shortly due to CKD. She is on ACEi. Her kidney health is a concern with her. She mentions no hx of  intolerance to SGLT2i. Pt is happy with her current anti-diabetic plan and is interested in continuity with PCP.     Review of Systems   Constitutional:  Positive for appetite change (increased). Negative for unexpected weight change.   Gastrointestinal:  Negative for nausea and vomiting.   Endocrine: Negative for polydipsia and polyuria.   All other systems reviewed and are negative.      Objective     /78 (BP Location: Left arm, Patient Position: Sitting)   Pulse 72   Temp 98.2 °F (36.8 °C)   Ht 5' (1.524 m)   Wt 61.2 kg (135 lb)   SpO2 97%   BMI 26.37 kg/m²     Physical Exam  Vitals reviewed.   Constitutional:       General: She is not in acute distress.     Appearance: Normal appearance.   HENT:      Head: Normocephalic and atraumatic.   Eyes:      General: No scleral icterus.     Conjunctiva/sclera: Conjunctivae normal.   Pulmonary:      Effort: Pulmonary effort is normal. No respiratory distress.   Musculoskeletal:         General: No deformity.      Cervical back: Normal range of motion.   Neurological:      General: No focal deficit present.      Mental Status: She is alert.      Comments: Utilizes wheelchair   Psychiatric:         Mood and Affect: Mood normal.         Behavior: Behavior normal.         Lab Results   Component Value Date    SODIUM 136 08/29/2024    K 5.0 08/29/2024     08/29/2024    CO2 27 08/29/2024    AGAP 6 08/29/2024    BUN 28 (H) 08/29/2024    CREATININE 1.19 08/29/2024    GLUC 146 (H) 08/29/2024    GLUF 132 (H) 05/20/2024    CALCIUM 10.3 (H) 08/29/2024    AST 12 08/21/2024    ALT 12 08/21/2024    ALKPHOS 24 (L)  08/21/2024    TP 4.8 (L) 08/21/2024    TBILI 0.2 08/21/2024    EGFR 42 08/29/2024     Lab Results   Component Value Date    HGBA1C 7.6 (H) 08/21/2024         Administrative Statements

## 2024-09-05 NOTE — ASSESSMENT & PLAN NOTE
We discussed the pathophysiology of diabetes and its associations with negative health outcomes.  I counseled the patient on various goals of diabetes management, including healthy lifestyle and behaviors, glycemic targets, preventative health care, and the role of pharmacotherapies. We discussed GLP and SGLT2i therapy in depth. Patient wishes to continue ozempic, which I advised may be considered for dose escalation as already planned to 0.5 mg weekly in about a month. We discussed how SGLT2i therapy can play a role in modifying renal outcomes, and is not unreasonable to consider its resumption. I advised lack of urgency at this time, however she may also consult with her nephrologist at their upcoming visit. We discussed strategies to incorporate scattered CBG testing to gain better understanding of glycemic patterns. Michelle wishes to resume her care with PCP, which I supported. She is welcome to contact me at any time in the future with any questions or concerns.

## 2024-09-06 ENCOUNTER — TELEPHONE (OUTPATIENT)
Dept: HEMATOLOGY ONCOLOGY | Facility: CLINIC | Age: 84
End: 2024-09-06

## 2024-09-06 ENCOUNTER — HOSPITAL ENCOUNTER (OUTPATIENT)
Dept: CT IMAGING | Facility: HOSPITAL | Age: 84
End: 2024-09-06
Payer: COMMERCIAL

## 2024-09-06 ENCOUNTER — HOSPITAL ENCOUNTER (OUTPATIENT)
Dept: NON INVASIVE DIAGNOSTICS | Facility: HOSPITAL | Age: 84
Discharge: HOME | End: 2024-09-06
Payer: COMMERCIAL

## 2024-09-06 DIAGNOSIS — I82.463 ACUTE EMBOLISM AND THROMBOSIS OF CALF MUSCULAR VEIN, BILATERAL (HCC): ICD-10-CM

## 2024-09-06 DIAGNOSIS — I82.403 DEEP VEIN THROMBOSIS (DVT) OF BOTH LOWER EXTREMITIES, UNSPECIFIED CHRONICITY, UNSPECIFIED VEIN (HCC): ICD-10-CM

## 2024-09-06 DIAGNOSIS — I82.4Y3 ACUTE DEEP VEIN THROMBOSIS (DVT) OF PROXIMAL VEIN OF BOTH LOWER EXTREMITIES (HCC): ICD-10-CM

## 2024-09-06 DIAGNOSIS — I82.403 DEEP VEIN THROMBOSIS (DVT) OF BOTH LOWER EXTREMITIES, UNSPECIFIED CHRONICITY, UNSPECIFIED VEIN (HCC): Primary | ICD-10-CM

## 2024-09-06 DIAGNOSIS — R06.02 SHORTNESS OF BREATH: ICD-10-CM

## 2024-09-06 PROCEDURE — 71275 CT ANGIOGRAPHY CHEST: CPT

## 2024-09-06 PROCEDURE — 93970 EXTREMITY STUDY: CPT | Performed by: SURGERY

## 2024-09-06 PROCEDURE — 93970 EXTREMITY STUDY: CPT

## 2024-09-06 RX ADMIN — IOHEXOL 85 ML: 350 INJECTION, SOLUTION INTRAVENOUS at 13:12

## 2024-09-06 NOTE — TELEPHONE ENCOUNTER
Called and spoke to taylor Galeano, she is relayed results of venous duplex that was done today made her aware that blood clot looks the same compared to prior study. CT did not show any blood clots in the chest. She is educated to continue eliquis twice a day. Granddaughter states that she is taking 5mg daily. When asked if she completed the Eliquis of 10mg bid x 7days the granddaughter states that she only took the 10mg twice a day for 1 day. Informed to start taking Eliquis 5mg twice a day and she verbalizes understanding.  She is made aware that Dorcas has placed an order for vascular surgery and will be receiving a call to coordinate this and our office will call to coordinate an appt in 1-2 weeks. Educated that any worsening symptoms she should go to ER.

## 2024-09-09 ENCOUNTER — TELEPHONE (OUTPATIENT)
Dept: FAMILY MEDICINE CLINIC | Facility: CLINIC | Age: 84
End: 2024-09-09

## 2024-09-09 ENCOUNTER — OFFICE VISIT (OUTPATIENT)
Dept: UROLOGY | Facility: CLINIC | Age: 84
End: 2024-09-09
Payer: COMMERCIAL

## 2024-09-09 VITALS
BODY MASS INDEX: 26.86 KG/M2 | WEIGHT: 136.8 LBS | OXYGEN SATURATION: 97 % | HEIGHT: 60 IN | SYSTOLIC BLOOD PRESSURE: 138 MMHG | TEMPERATURE: 97.8 F | RESPIRATION RATE: 18 BRPM | HEART RATE: 81 BPM | DIASTOLIC BLOOD PRESSURE: 74 MMHG

## 2024-09-09 DIAGNOSIS — N39.0 FREQUENT UTI: ICD-10-CM

## 2024-09-09 DIAGNOSIS — N32.89 BLADDER DISTENTION: ICD-10-CM

## 2024-09-09 DIAGNOSIS — R33.9 INCOMPLETE BLADDER EMPTYING: ICD-10-CM

## 2024-09-09 LAB
BACTERIA UR QL AUTO: ABNORMAL /HPF
BILIRUB UR QL STRIP: NEGATIVE
CLARITY UR: CLEAR
COLOR UR: COLORLESS
GLUCOSE UR STRIP-MCNC: NEGATIVE MG/DL
HGB UR QL STRIP.AUTO: NEGATIVE
KETONES UR STRIP-MCNC: NEGATIVE MG/DL
LEUKOCYTE ESTERASE UR QL STRIP: ABNORMAL
NITRITE UR QL STRIP: NEGATIVE
NON-SQ EPI CELLS URNS QL MICRO: ABNORMAL /HPF
PH UR STRIP.AUTO: 6.5 [PH]
POST-VOID RESIDUAL VOLUME, ML POC: 131 ML
PROT UR STRIP-MCNC: ABNORMAL MG/DL
RBC #/AREA URNS AUTO: ABNORMAL /HPF
SL AMB  POCT GLUCOSE, UA: NORMAL
SL AMB LEUKOCYTE ESTERASE,UA: NORMAL
SL AMB POCT BILIRUBIN,UA: NORMAL
SL AMB POCT BLOOD,UA: NORMAL
SL AMB POCT CLARITY,UA: NORMAL
SL AMB POCT COLOR,UA: YELLOW
SL AMB POCT KETONES,UA: 1.01
SL AMB POCT NITRITE,UA: NORMAL
SL AMB POCT PH,UA: 6
SL AMB POCT SPECIFIC GRAVITY,UA: NORMAL
SL AMB POCT URINE PROTEIN: NORMAL
SL AMB POCT UROBILINOGEN: 0.2
SP GR UR STRIP.AUTO: 1.01 (ref 1–1.03)
UROBILINOGEN UR STRIP-ACNC: <2 MG/DL
WBC #/AREA URNS AUTO: ABNORMAL /HPF

## 2024-09-09 PROCEDURE — 87147 CULTURE TYPE IMMUNOLOGIC: CPT | Performed by: PHYSICIAN ASSISTANT

## 2024-09-09 PROCEDURE — 87086 URINE CULTURE/COLONY COUNT: CPT | Performed by: PHYSICIAN ASSISTANT

## 2024-09-09 PROCEDURE — 81002 URINALYSIS NONAUTO W/O SCOPE: CPT | Performed by: PHYSICIAN ASSISTANT

## 2024-09-09 PROCEDURE — 99203 OFFICE O/P NEW LOW 30 MIN: CPT | Performed by: PHYSICIAN ASSISTANT

## 2024-09-09 PROCEDURE — 51798 US URINE CAPACITY MEASURE: CPT | Performed by: PHYSICIAN ASSISTANT

## 2024-09-09 PROCEDURE — 81001 URINALYSIS AUTO W/SCOPE: CPT | Performed by: PHYSICIAN ASSISTANT

## 2024-09-09 RX ORDER — ESTRADIOL 0.1 MG/G
CREAM VAGINAL
Qty: 42.5 G | Refills: 2 | Status: SHIPPED | OUTPATIENT
Start: 2024-09-09

## 2024-09-09 NOTE — PROGRESS NOTES
"9/9/2024      Chief Complaint   Patient presents with    Bladder Distention    New Patient Visit     Assessment and Plan    84 y.o. female new patient     Bladder distension  - Noted on recent imaging   - Urine dip today + leuk and blood. Negative nitrites. Sent out for urine culture and UA micro   - PVR today slightly elevated 131 mL   - Recommend timed voiding and double voiding as well as avoiding constipation.     2. History of recurrent UTI  - Slight incomplete bladder emptying likely contributory   - Recommend adequate hydration, cranberry supplementation and probiotics for UTI prevention  - Standing urine cultures  - Will additional start Estrace. The use of vaginal estrogen therapy causes minimal systemic absorption, and studies have identified serum estrogen levels that remain in the postmenopausal range after treatment with topical/local therapies.      Adverse events with vaginal estrogen are minimal, including headache (4%-13%), vulvovaginal pruritus (8%), genital candidiasis (6% to 8%), leukorrhea (7%), vaginitis (5%), vaginal discomfort (less than 5%), vaginal pain (less than 5%), bacterial vaginosis (4%; asymptomatic), vaginal hemorrhage (4%), and UTI (2%).     Treatment with vaginal estrogen therapy has not been found to be associated with the risk of development of endometrial carcinoma or increase in endometrial thickness in systematic reviews and long-duration observational studies. Similarly, in multiple systematic reviews and large observational studies, there was no identified risk of venous thromboembolism related to treatment with local estrogen therapy.     The American College of Gynecology and the [place] [employer] released a position statement in 2021, which reads, \"Women with a history of any type of cancer, including estrogen-receptor-positive cancer, should use vaginal estrogen if required and if beneficial; they should continue using this in the long term.\"    - Will " f/u in 8-12 weeks for reassessment.       History of Present Illness  Michelle Villatoro is a 84 y.o. female here for new patient evaluation referred by nephrology for concern for urinary retention. During recent hospitalization was incidentally noted on imaging to have bladder distension. No hydronephrosis. Denies any need for carr placement. Urine culture from 8/19/24 was positive for Enterococcus faecalis. She is accompanied by granddaughter who assists with translation. She reports her grandmother has 2-3 UTIs per year. Typical symptoms include dysuria. Her grandmother reports longstanding urinary hesitancy, otherwise denies any other symptoms today. She has dementia and is somewhat limited historian. Prior pertinent surgical history of hysterectomy. Denies any incontinence or prolapse. No episodes of hematuria.       Review of Systems   Constitutional:  Negative for chills and fever.   Respiratory:  Negative for shortness of breath.    Cardiovascular:  Negative for chest pain.   Gastrointestinal:  Negative for abdominal pain and constipation.   Genitourinary:  Negative for difficulty urinating, dysuria, flank pain, frequency, hematuria, pelvic pain and urgency.   Neurological:  Negative for dizziness.                  Past Medical History  Past Medical History:   Diagnosis Date    Aggression     Alzheimer's dementia (HCC)     Arthritis     Chronic kidney disease     Clotting disorder (HCC)     Dementia (HCC)     Diabetes insipidus (HCC)     Diabetes mellitus (HCC)     High cholesterol     Hypertension     Kidney stone     Memory loss     Migraine     Polyneuropathy     Rheumatoid arthritis (HCC)     Serum lipids high     Stomach problems     Thyroid trouble     Urinary tract infection        Past Social History  Past Surgical History:   Procedure Laterality Date    CATARACT EXTRACTION      CHOLECYSTECTOMY      GALLBLADDER SURGERY      HYSTERECTOMY      ID SURGICAL ARTHROSCOPY SHOULDER W/ROTATOR CUFF RPR Right  2019    Procedure: SHOULDER ARTHROSCOPIC ROTATOR CUFF REPAIR;  Surgeon: Betsy Conde MD;  Location: [facility];  Service: Orthopedics    ROTATOR CUFF REPAIR Right 2019     Social History     Tobacco Use   Smoking Status Former    Current packs/day: 0.00    Average packs/day: 0.3 packs/day for 30.0 years (7.5 ttl pk-yrs)    Types: Cigarettes    Start date:     Quit date: 1985    Years since quittin.7    Passive exposure: Past   Smokeless Tobacco Never       Past Family History  Family History   Problem Relation Age of Onset    Substance Abuse Mother         denied    Mental illness Mother         denied    Substance Abuse Father         denied    Mental illness Father         denied    Diabetes Brother     Blindness Brother     Arthritis Daughter     HIV Son        Past Social history  Social History     Socioeconomic History    Marital status:      Spouse name: Not on file    Number of children: Not on file    Years of education: Not on file    Highest education level: Not on file   Occupational History    Not on file   Tobacco Use    Smoking status: Former     Current packs/day: 0.00     Average packs/day: 0.3 packs/day for 30.0 years (7.5 ttl pk-yrs)     Types: Cigarettes     Start date:      Quit date: 1985     Years since quittin.7     Passive exposure: Past    Smokeless tobacco: Never   Vaping Use    Vaping status: Never Used   Substance and Sexual Activity    Alcohol use: Never    Drug use: No    Sexual activity: Not Currently     Partners: Male   Other Topics Concern    Not on file   Social History Narrative    Not on file     Social Determinants of Health     Financial Resource Strain: Low Risk  (2024)    Received from [facility]    Overall Financial Resource Strain (CARDIA)     Difficulty of Paying Living Expenses: Not hard at all   Food Insecurity: No Food Insecurity (2024)    Hunger Vital Sign     Worried About Running Out of Food in the  Last Year: Never true     Ran Out of Food in the Last Year: Never true   Transportation Needs: No Transportation Needs (8/27/2024)    PRAPARE - Transportation     Lack of Transportation (Medical): No     Lack of Transportation (Non-Medical): No   Physical Activity: Not on file   Stress: No Stress Concern Present (11/27/2019)    [employer] - Occupational Stress Questionnaire     Feeling of Stress : Only a little   Social Connections: Unknown (11/27/2019)    Social Connection and Isolation Panel [NHANES]     Frequency of Communication with Friends and Family: More than three times a week     Frequency of Social Gatherings with Friends and Family: More than three times a week     Attends [employer] Services: Not on file     Active Member of Clubs or Organizations: Not on file     Attends Club or Organization Meetings: Not on file     Marital Status:    Intimate Partner Violence: Not At Risk (8/19/2024)    Received from [facility]    Humiliation, Afraid, Rape, and Kick questionnaire     Fear of Current or Ex-Partner: No     Emotionally Abused: No     Physically Abused: No     Sexually Abused: No   Housing Stability: Low Risk  (8/27/2024)    Housing Stability Vital Sign     Unable to Pay for Housing in the Last Year: No     Number of Times Moved in the Last Year: 0     Homeless in the Last Year: No       Current Medications  Current Outpatient Medications   Medication Sig Dispense Refill    amLODIPine (NORVASC) 10 mg tablet Take 1 tablet (10 mg total) by mouth daily At night before bed 90 tablet 1    apixaban (Eliquis) 5 mg Take 2 tablets (10 mg total) by mouth 2 (two) times a day for 7 days, THEN 1 tablet (5 mg total) 2 (two) times a day for 23 days. 74 tablet 0    BD Pen Needle Elisabeth 2nd Gen 32G X 4 MM MISC USE DAILY AS DIRECTED 100 each 1    Blood Glucose Monitoring Suppl (OneTouch Verio Reflect) w/Device KIT USE 2 (TWO) TIMES A DAY 1 kit 0    Blood Pressure KIT Use  daily 1 kit 0    cholestyramine (QUESTRAN) 4 g packet Take 1 packet (4 g total) by mouth 2 (two) times a day with meals (Patient taking differently: Take 1 packet by mouth if needed) 180 packet 3    Continuous Blood Gluc  (FreeStyle Aruna 2 [place]) MEREDITH Use 1 Device 3 (three) times a day before meals 1 each 0    Continuous Blood Gluc Sensor (FreeStyle Aruna 2 Sensor) MISC Check blood sugars multiple times per day 6 each 0    cyanocobalamin (VITAMIN B-12) 1000 MCG tablet Take 1 tablet (1,000 mcg total) by mouth daily      fenofibrate (TRICOR) 54 MG tablet Take 1 tablet (54 mg total) by mouth daily 90 tablet 6    fluticasone (FLONASE) 50 mcg/act nasal spray SPRAY 2 SPRAYS INTO EACH NOSTRIL EVERY DAY 16 mL 1    gabapentin (Neurontin) 100 mg capsule Take 1 capsule (100 mg total) by mouth 3 (three) times a day (Patient taking differently: Take 100 mg by mouth if needed) 270 capsule 0    hydrALAZINE (APRESOLINE) 25 mg tablet Take 1 tablet (25 mg total) by mouth every 8 (eight) hours 90 tablet 0    hydroCHLOROthiazide 25 mg tablet Take 2 tablets (50 mg total) by mouth daily 90 tablet 3    Incontinence Supplies MISC Use 6 (six) times a day Wipes 200 each 6    Incontinence Supplies MISC Use 4 (four) times a day Comfort shield Adult diapers 200 each 6    Incontinence Supply Disposable MISC Use 6 (six) times a day Dispense disposable bed pad 200 each 6    levothyroxine 100 mcg tablet Take 1 tablet (100 mcg total) by mouth daily 90 tablet 1    lisinopril (ZESTRIL) 40 mg tablet Take 1 tablet (40 mg total) by mouth daily In the morning 90 tablet 1    memantine (NAMENDA) 10 mg tablet Take 1 tablet (10 mg total) by mouth 2 (two) times a day 200 tablet 1    montelukast (SINGULAIR) 10 mg tablet Take 1 tablet (10 mg total) by mouth daily at bedtime 30 tablet 5    omeprazole (PriLOSEC) 20 mg delayed release capsule Take 1 capsule (20 mg total) by mouth daily 90 capsule 3    OneTouch Verio test strip USE 1 EACH 2 (TWO) TIMES A  DAY TEST 100 strip 0    semaglutide, 0.25 or 0.5 mg/dose, (Ozempic, 0.25 or 0.5 MG/DOSE,) 2 mg/3 mL injection pen 0.25 mg under the skin every 7 days for 4 doses (28 days), THEN 0.5 mg under the skin every 7 days 9 mL 0    sertraline (ZOLOFT) 50 mg tablet Take 1 tablet (50 mg total) by mouth daily 90 tablet 1    Xiidra 5 % op solution INSTILL 1 DROP IN BOTH EYES 2 TIMES PER DAY       No current facility-administered medications for this visit.       Allergies  Allergies   Allergen Reactions    Penicillins Itching and Swelling    Pollen Extract Allergic Rhinitis         The following portions of the patient's history were reviewed and updated as appropriate: allergies, current medications, past medical history, past social history, past surgical history and problem list.      Vitals  Vitals:    09/09/24 1240   BP: 138/74   Pulse: 81   Resp: 18   Temp: 97.8 °F (36.6 °C)   TempSrc: Temporal   SpO2: 97%   Weight: 62.1 kg (136 lb 12.8 oz)   Height: 5' (1.524 m)           Physical Exam  Physical Exam  Constitutional:       Appearance: Normal appearance.   HENT:      Head: Normocephalic and atraumatic.      Right Ear: External ear normal.      Left Ear: External ear normal.      Nose: Nose normal.   Eyes:      General: No scleral icterus.     Conjunctiva/sclera: Conjunctivae normal.   Cardiovascular:      Pulses: Normal pulses.   Pulmonary:      Effort: Pulmonary effort is normal.   Musculoskeletal:         General: Normal range of motion.      Cervical back: Normal range of motion.   Neurological:      General: No focal deficit present.      Mental Status: She is alert and oriented to person, place, and time.   Psychiatric:         Mood and Affect: Mood normal.         Behavior: Behavior normal.         Thought Content: Thought content normal.         Judgment: Judgment normal.           Results  Recent Results (from the past 1 hour(s))   POCT Measure PVR    Collection Time: 09/09/24 12:54 PM   Result Value Ref Range     "POST-VOID RESIDUAL VOLUME, ML  mL   POCT urine dip    Collection Time: 09/09/24 12:54 PM   Result Value Ref Range    LEUKOCYTE ESTERASE,UA +     NITRITE,UA -     SL AMB POCT UROBILINOGEN 0.2     POCT URINE PROTEIN +      PH,UA 6.0     BLOOD,UA +     SPECIFIC GRAVITY,UA +     KETONES,UA 1.015     BILIRUBIN,UA +     GLUCOSE, UA -      COLOR,UA yellow     CLARITY,UA cclear    ]  No results found for: \"PSA\"  Lab Results   Component Value Date    GLUCOSE 116 08/01/2019    CALCIUM 10.3 (H) 08/29/2024    K 5.0 08/29/2024    CO2 27 08/29/2024     08/29/2024    BUN 28 (H) 08/29/2024    CREATININE 1.19 08/29/2024     Lab Results   Component Value Date    WBC 4.76 08/29/2024    HGB 10.4 (L) 08/29/2024    HCT 32.7 (L) 08/29/2024    MCV 90 08/29/2024     08/29/2024           Orders  Orders Placed This Encounter   Procedures    POCT Measure PVR    POCT urine dip     Candy Dorado    "

## 2024-09-09 NOTE — TELEPHONE ENCOUNTER
----- Message from Elver Lynne MD sent at 9/7/2024  2:11 PM EDT -----  CT scan shows slight heart enlargement likely related to hypertension otherwise normal CT.

## 2024-09-10 ENCOUNTER — OFFICE VISIT (OUTPATIENT)
Dept: CARDIOLOGY CLINIC | Facility: CLINIC | Age: 84
End: 2024-09-10
Payer: COMMERCIAL

## 2024-09-10 VITALS
SYSTOLIC BLOOD PRESSURE: 162 MMHG | HEART RATE: 76 BPM | WEIGHT: 136 LBS | HEIGHT: 60 IN | BODY MASS INDEX: 26.7 KG/M2 | DIASTOLIC BLOOD PRESSURE: 70 MMHG

## 2024-09-10 DIAGNOSIS — E78.2 MIXED HYPERLIPIDEMIA: Primary | Chronic | ICD-10-CM

## 2024-09-10 DIAGNOSIS — I10 ESSENTIAL HYPERTENSION: ICD-10-CM

## 2024-09-10 PROCEDURE — 99214 OFFICE O/P EST MOD 30 MIN: CPT | Performed by: PHYSICIAN ASSISTANT

## 2024-09-10 RX ORDER — LOSARTAN POTASSIUM 100 MG/1
100 TABLET ORAL DAILY
Qty: 30 TABLET | Refills: 3 | Status: SHIPPED | OUTPATIENT
Start: 2024-09-10

## 2024-09-10 NOTE — PATIENT INSTRUCTIONS
Stop Lisinopril   Start Losartan 100 mg daily     Keep hydrated and follow a low sodium diet     Return in one month     Call in the meantime if you have any concerns

## 2024-09-10 NOTE — ASSESSMENT & PLAN NOTE
Controlled on current medical regimen  Continue amlodipine 10 mg daily hydralazine 25 mg 3 times daily hydrochlorothiazide 25 mg daily and lisinopril 40 mg daily  Patient has a dry cough.  Will change lisinopril to losartan 100 mg daily  Reassess in 1 month

## 2024-09-10 NOTE — PROGRESS NOTES
[facility]   Outpatient Note  Michelle Villatoro  1940  [id number]  [facility] [address] [address] [phone]    Michelle Villatoro is a 84 y.o. female    Assessment and Plan:   Mixed hyperlipidemia  Not on statin therapy  And fenofibrate    Essential hypertension  Controlled on current medical regimen  Continue amlodipine 10 mg daily hydralazine 25 mg 3 times daily hydrochlorothiazide 25 mg daily and lisinopril 40 mg daily  Patient has a dry cough.  Will change lisinopril to losartan 100 mg daily  Reassess in 1 month      Additional Plan:   Medications as detailed above.    No testing is ordered today.    Available lab and test results are reviewed with the patient and any additional required labs are ordered as noted.     Return visit will be in one month or earlier if there are problems.     The patient is encouraged to call in the meantime if there are questions or concerns.      Subjective:   The patient is seen in the office today as a follow-up to a recent hospitalization for uncontrolled hypertension.  She is accompanied by her granddaughter who is translating for her.  Blood pressure is still elevated today in the office.  She is on multiple medications for blood pressure control including Norvasc hydralazine hydrochlorothiazide and lisinopril.  She is complaining of a dry cough since starting medications for blood pressure.  Lisinopril will be discontinued and losartan will be started.  She has recently started Eliquis for DVT.  She states that she has lower extremity pain and swelling.        Social History  Social History     Tobacco Use   Smoking Status Former    Current packs/day: 0.00    Average packs/day: 0.3 packs/day for 30.0 years (7.5 ttl pk-yrs)    Types: Cigarettes    Start date:     Quit date:     Years since quittin.7    Passive exposure: Past   Smokeless Tobacco  Never   ,   Social History     Substance and Sexual Activity   Alcohol Use Never   ,   Social History     Substance and Sexual Activity   Drug Use No     Family History   Problem Relation Age of Onset    Substance Abuse Mother         denied    Mental illness Mother         denied    Substance Abuse Father         denied    Mental illness Father         denied    Diabetes Brother     Blindness Brother     Arthritis Daughter     HIV Son        Medical and Surgical History  Past Medical History:   Diagnosis Date    Aggression     Alzheimer's dementia (HCC)     Arthritis     Chronic kidney disease     Clotting disorder (HCC)     Dementia (HCC)     Diabetes insipidus (HCC)     Diabetes mellitus (HCC)     High cholesterol     Hypertension     Kidney stone     Memory loss     Migraine     Polyneuropathy     Rheumatoid arthritis (HCC)     Serum lipids high     Stomach problems     Thyroid trouble     Urinary tract infection      Past Surgical History:   Procedure Laterality Date    CATARACT EXTRACTION      CHOLECYSTECTOMY      GALLBLADDER SURGERY      HYSTERECTOMY      VT SURGICAL ARTHROSCOPY SHOULDER W/ROTATOR CUFF RPR Right 8/1/2019    Procedure: SHOULDER ARTHROSCOPIC ROTATOR CUFF REPAIR;  Surgeon: Betsy Conde MD;  Location: [facility] OR;  Service: Orthopedics    ROTATOR CUFF REPAIR Right 08/01/2019         Current Outpatient Medications:     amLODIPine (NORVASC) 10 mg tablet, Take 1 tablet (10 mg total) by mouth daily At night before bed, Disp: 90 tablet, Rfl: 1    apixaban (Eliquis) 5 mg, Take 2 tablets (10 mg total) by mouth 2 (two) times a day for 7 days, THEN 1 tablet (5 mg total) 2 (two) times a day for 23 days., Disp: 74 tablet, Rfl: 0    BD Pen Needle Elisabeth 2nd Gen 32G X 4 MM MISC, USE DAILY AS DIRECTED, Disp: 100 each, Rfl: 1    Blood Glucose Monitoring Suppl (OneTouch Verio Reflect) w/Device KIT, USE 2 (TWO) TIMES A DAY, Disp: 1 kit, Rfl: 0    Blood Pressure KIT, Use daily, Disp: 1 kit, Rfl: 0    Continuous  Blood Gluc  (FreeStyle Aruna 2 [place]) MEREDITH, Use 1 Device 3 (three) times a day before meals, Disp: 1 each, Rfl: 0    Continuous Blood Gluc Sensor (FreeStyle Aruna 2 Sensor) MISC, Check blood sugars multiple times per day, Disp: 6 each, Rfl: 0    cyanocobalamin (VITAMIN B-12) 1000 MCG tablet, Take 1 tablet (1,000 mcg total) by mouth daily, Disp: , Rfl:     estradiol (ESTRACE VAGINAL) 0.1 mg/g vaginal cream, Apply pea sized amount around urethra and inside vaginal opening 3 times per week, Disp: 42.5 g, Rfl: 2    fenofibrate (TRICOR) 54 MG tablet, Take 1 tablet (54 mg total) by mouth daily, Disp: 90 tablet, Rfl: 6    fluticasone (FLONASE) 50 mcg/act nasal spray, SPRAY 2 SPRAYS INTO EACH NOSTRIL EVERY DAY, Disp: 16 mL, Rfl: 1    gabapentin (Neurontin) 100 mg capsule, Take 1 capsule (100 mg total) by mouth 3 (three) times a day (Patient taking differently: Take 100 mg by mouth if needed), Disp: 270 capsule, Rfl: 0    hydrALAZINE (APRESOLINE) 25 mg tablet, Take 1 tablet (25 mg total) by mouth every 8 (eight) hours, Disp: 90 tablet, Rfl: 0    hydroCHLOROthiazide 25 mg tablet, Take 2 tablets (50 mg total) by mouth daily, Disp: 90 tablet, Rfl: 3    Incontinence Supplies MISC, Use 6 (six) times a day Wipes, Disp: 200 each, Rfl: 6    Incontinence Supplies MISC, Use 4 (four) times a day Comfort shield Adult diapers, Disp: 200 each, Rfl: 6    Incontinence Supply Disposable MISC, Use 6 (six) times a day Dispense disposable bed pad, Disp: 200 each, Rfl: 6    levothyroxine 100 mcg tablet, Take 1 tablet (100 mcg total) by mouth daily, Disp: 90 tablet, Rfl: 1    losartan (COZAAR) 100 MG tablet, Take 1 tablet (100 mg total) by mouth daily, Disp: 30 tablet, Rfl: 3    memantine (NAMENDA) 10 mg tablet, Take 1 tablet (10 mg total) by mouth 2 (two) times a day, Disp: 200 tablet, Rfl: 1    montelukast (SINGULAIR) 10 mg tablet, Take 1 tablet (10 mg total) by mouth daily at bedtime, Disp: 30 tablet, Rfl: 5    omeprazole (PriLOSEC)  20 mg delayed release capsule, Take 1 capsule (20 mg total) by mouth daily, Disp: 90 capsule, Rfl: 3    OneTouch Verio test strip, USE 1 EACH 2 (TWO) TIMES A DAY TEST, Disp: 100 strip, Rfl: 0    semaglutide, 0.25 or 0.5 mg/dose, (Ozempic, 0.25 or 0.5 MG/DOSE,) 2 mg/3 mL injection pen, 0.25 mg under the skin every 7 days for 4 doses (28 days), THEN 0.5 mg under the skin every 7 days, Disp: 9 mL, Rfl: 0    sertraline (ZOLOFT) 50 mg tablet, Take 1 tablet (50 mg total) by mouth daily, Disp: 90 tablet, Rfl: 1    Xiidra 5 % op solution, INSTILL 1 DROP IN BOTH EYES 2 TIMES PER DAY, Disp: , Rfl:     cholestyramine (QUESTRAN) 4 g packet, Take 1 packet (4 g total) by mouth 2 (two) times a day with meals (Patient taking differently: Take 1 packet by mouth if needed), Disp: 180 packet, Rfl: 3  Allergies   Allergen Reactions    Penicillins Itching and Swelling    Pollen Extract Allergic Rhinitis       Review of Systems   Constitutional: Negative.   HENT: Negative.     Eyes: Negative.    Cardiovascular:  Positive for leg swelling (With lower extremity pain). Negative for chest pain, claudication, cyanosis, dyspnea on exertion, irregular heartbeat, near-syncope, orthopnea, palpitations, paroxysmal nocturnal dyspnea and syncope.   Respiratory: Negative.  Negative for cough, hemoptysis, shortness of breath, sleep disturbances due to breathing, snoring, sputum production and wheezing.    Endocrine: Negative.    Hematologic/Lymphatic: Negative.    Skin: Negative.    Musculoskeletal: Negative.    Gastrointestinal: Negative.    Genitourinary: Negative.    Neurological: Negative.    Psychiatric/Behavioral: Negative.     Allergic/Immunologic: Negative.        Objective:   /70   Pulse 76   Ht 5' (1.524 m)   Wt 61.7 kg (136 lb)   BMI 26.56 kg/m²   Physical Exam  Vitals and nursing note reviewed.   Constitutional:       Appearance: She is well-developed.   HENT:      Head: Normocephalic and atraumatic.      Mouth/Throat:       "Mouth: Mucous membranes are moist.   Eyes:      General: No scleral icterus.        Right eye: No discharge.         Left eye: No discharge.      Conjunctiva/sclera: Conjunctivae normal.   Neck:      Vascular: No JVD.      Trachea: No tracheal deviation.   Cardiovascular:      Rate and Rhythm: Normal rate and regular rhythm.      Pulses: Intact distal pulses.      Heart sounds: S1 normal and S2 normal. Murmur heard.      Systolic murmur is present with a grade of 2/6.      No friction rub. No gallop.   Pulmonary:      Effort: Pulmonary effort is normal. No respiratory distress.      Breath sounds: Normal breath sounds. No wheezing or rales.   Chest:      Chest wall: No tenderness.   Abdominal:      General: Bowel sounds are normal. There is no distension.      Palpations: Abdomen is soft.      Tenderness: There is no abdominal tenderness.      Comments: Aorta not palpable    Musculoskeletal:         General: No tenderness. Normal range of motion.      Cervical back: Normal range of motion and neck supple.      Right lower leg: Edema present.      Left lower leg: Edema present.   Skin:     General: Skin is warm and dry.      Coloration: Skin is not pale.      Findings: No erythema or rash.   Neurological:      General: No focal deficit present.      Mental Status: She is alert and oriented to person, place, and time.   Psychiatric:         Mood and Affect: Mood normal.         Behavior: Behavior normal.         Thought Content: Thought content normal.         Judgment: Judgment normal.         Lab Review:   No results found for: \"CHOL\"  Lab Results   Component Value Date    HDL 47 (L) 05/20/2024     Lab Results   Component Value Date    LDLCALC 75 05/20/2024     Lab Results   Component Value Date    TRIG 96 05/20/2024     Results Reviewed       None          Results Reviewed       None          Results Reviewed       None            Recent Cardiovascular Testing:   Echo 8/12/2024: Normal LVEF 60% mild LAE mild MAC " RVSP 35 mmHg    ECG Review:   8/14/2024 normal sinus rhythm LAHB moderate voltage criteria for LVH may be a normal variant

## 2024-09-11 ENCOUNTER — TELEPHONE (OUTPATIENT)
Dept: UROLOGY | Facility: CLINIC | Age: 84
End: 2024-09-11

## 2024-09-11 LAB — BACTERIA UR CULT: NORMAL

## 2024-09-11 NOTE — TELEPHONE ENCOUNTER
Spoke with pt relaying Candy SHARMA message,  Urine testing negative  Pt verbalized understanding.   ----- Message from Candy Dorado PA-C sent at 9/11/2024 11:16 AM EDT -----  Urine testing negative

## 2024-09-12 ENCOUNTER — APPOINTMENT (OUTPATIENT)
Dept: LAB | Facility: CLINIC | Age: 84
End: 2024-09-12
Payer: COMMERCIAL

## 2024-09-12 DIAGNOSIS — E11.9 TYPE 2 DIABETES MELLITUS TREATED WITH INSULIN (HCC): ICD-10-CM

## 2024-09-12 DIAGNOSIS — Z79.4 TYPE 2 DIABETES MELLITUS TREATED WITH INSULIN (HCC): ICD-10-CM

## 2024-09-12 DIAGNOSIS — I10 ESSENTIAL HYPERTENSION: ICD-10-CM

## 2024-09-12 LAB
ALBUMIN SERPL BCG-MCNC: 4 G/DL (ref 3.5–5)
ALP SERPL-CCNC: 36 U/L (ref 34–104)
ALT SERPL W P-5'-P-CCNC: 15 U/L (ref 7–52)
ANION GAP SERPL CALCULATED.3IONS-SCNC: 7 MMOL/L (ref 4–13)
AST SERPL W P-5'-P-CCNC: 17 U/L (ref 13–39)
BASOPHILS # BLD AUTO: 0.04 THOUSANDS/ΜL (ref 0–0.1)
BASOPHILS NFR BLD AUTO: 1 % (ref 0–1)
BILIRUB SERPL-MCNC: 0.51 MG/DL (ref 0.2–1)
BUN SERPL-MCNC: 29 MG/DL (ref 5–25)
CALCIUM SERPL-MCNC: 9.8 MG/DL (ref 8.4–10.2)
CHLORIDE SERPL-SCNC: 99 MMOL/L (ref 96–108)
CO2 SERPL-SCNC: 27 MMOL/L (ref 21–32)
CREAT SERPL-MCNC: 0.97 MG/DL (ref 0.6–1.3)
EOSINOPHIL # BLD AUTO: 0.14 THOUSAND/ΜL (ref 0–0.61)
EOSINOPHIL NFR BLD AUTO: 3 % (ref 0–6)
ERYTHROCYTE [DISTWIDTH] IN BLOOD BY AUTOMATED COUNT: 14 % (ref 11.6–15.1)
GFR SERPL CREATININE-BSD FRML MDRD: 54 ML/MIN/1.73SQ M
GLUCOSE P FAST SERPL-MCNC: 128 MG/DL (ref 65–99)
HCT VFR BLD AUTO: 30.4 % (ref 34.8–46.1)
HGB BLD-MCNC: 9.9 G/DL (ref 11.5–15.4)
IMM GRANULOCYTES # BLD AUTO: 0.01 THOUSAND/UL (ref 0–0.2)
IMM GRANULOCYTES NFR BLD AUTO: 0 % (ref 0–2)
LYMPHOCYTES # BLD AUTO: 0.89 THOUSANDS/ΜL (ref 0.6–4.47)
LYMPHOCYTES NFR BLD AUTO: 19 % (ref 14–44)
MCH RBC QN AUTO: 28.8 PG (ref 26.8–34.3)
MCHC RBC AUTO-ENTMCNC: 32.6 G/DL (ref 31.4–37.4)
MCV RBC AUTO: 88 FL (ref 82–98)
MONOCYTES # BLD AUTO: 0.26 THOUSAND/ΜL (ref 0.17–1.22)
MONOCYTES NFR BLD AUTO: 6 % (ref 4–12)
NEUTROPHILS # BLD AUTO: 3.34 THOUSANDS/ΜL (ref 1.85–7.62)
NEUTS SEG NFR BLD AUTO: 71 % (ref 43–75)
NRBC BLD AUTO-RTO: 0 /100 WBCS
PLATELET # BLD AUTO: 185 THOUSANDS/UL (ref 149–390)
PMV BLD AUTO: 13.5 FL (ref 8.9–12.7)
POTASSIUM SERPL-SCNC: 4.1 MMOL/L (ref 3.5–5.3)
PROT SERPL-MCNC: 6.3 G/DL (ref 6.4–8.4)
RBC # BLD AUTO: 3.44 MILLION/UL (ref 3.81–5.12)
SODIUM SERPL-SCNC: 133 MMOL/L (ref 135–147)
WBC # BLD AUTO: 4.68 THOUSAND/UL (ref 4.31–10.16)

## 2024-09-12 PROCEDURE — 85025 COMPLETE CBC W/AUTO DIFF WBC: CPT

## 2024-09-12 PROCEDURE — 80053 COMPREHEN METABOLIC PANEL: CPT

## 2024-09-12 PROCEDURE — 36415 COLL VENOUS BLD VENIPUNCTURE: CPT

## 2024-09-13 ENCOUNTER — TELEPHONE (OUTPATIENT)
Dept: FAMILY MEDICINE CLINIC | Facility: CLINIC | Age: 84
End: 2024-09-13

## 2024-09-13 NOTE — TELEPHONE ENCOUNTER
----- Message from Elver Lynne MD sent at 9/12/2024  9:57 PM EDT -----  Blood work shows evidence of chronic anemia, if she develops SOB recommend to get evaluated.

## 2024-09-16 ENCOUNTER — CONSULT (OUTPATIENT)
Dept: DIABETES SERVICES | Facility: CLINIC | Age: 84
End: 2024-09-16
Payer: COMMERCIAL

## 2024-09-16 VITALS — BODY MASS INDEX: 26.48 KG/M2 | WEIGHT: 135.6 LBS

## 2024-09-16 DIAGNOSIS — Z79.4 TYPE 2 DIABETES MELLITUS TREATED WITH INSULIN (HCC): Primary | ICD-10-CM

## 2024-09-16 DIAGNOSIS — E11.9 TYPE 2 DIABETES MELLITUS TREATED WITH INSULIN (HCC): Primary | ICD-10-CM

## 2024-09-16 PROCEDURE — 97802 MEDICAL NUTRITION INDIV IN: CPT

## 2024-09-16 NOTE — PROGRESS NOTES
Medical Nutrition Therapy        Assessment    Visit Type: Initial visit  Chief complaint/Medical Diagnosis/reason for visit E11.9, Z79.4    HPI Michelle was seen in person for the initial MNT appointment accompanied by her granddaughter, Waleska.  services offered and declined. BG levels are checked 3x/day. FBG in the morning is usually 130, before lunch is 170, before dinner is 230-240. Patient does take diabetes medication as prescribed.     Patient reported no current exercise regimen. Currently, Michelle was directed not to exercise due to blood clots.    Waleska shared that her grandmother has a tremendous appetite. By reviewing the food recall the patient was advised to reduce portions slowly and monitor the blood glucose levels. At meals, Michelle will sometimes have over 200 grams of carbohydrate.  Asked patient to eat more non-starchy vegetables and protein to help satiate with less blood glucose excursions. Provided patient with supplemental shakes to have if still hungry after a balanced meal.    Problems identified in food recall include inconsistent carbohydrate intake, high-fat dairy, limited non-starchy vegetables and whole grains, sugary beverages (juice). Consumption of carbohydrates ranges from 30 to >200 grams per meal. Explained basic pathophysiology of diabetes and impact of diet on blood glucose levels and disease complications. Explained how sodium influences volume retention, blood pressure, and complications for heart disease, stroke, and CKD.     Provided patient with a 1100 calorie meal plan to assist with consistency, balance and portion control.  Encouraged the consumption of regular meals at regular times.  Advised patient to keep carbohydrate intake to 30 grams per meal and 15 per snack to assist with glycemic control.  Suggested keeping protein intake to 5 ounces a day and fat to 3 servings daily to assist with lipid management and calorie control. Portion booklet and food labels were  used to teach basic carbohydrate counting. Patient agreed to keep daily food logs and return them in 2 months for assessment. RD will remain available for further dietary questions/concerns.     Ht Readings from Last 1 Encounters:   09/10/24 5' (1.524 m)     Wt Readings from Last 3 Encounters:   09/10/24 61.7 kg (136 lb)   09/09/24 62.1 kg (136 lb 12.8 oz)   09/05/24 61.2 kg (135 lb)     Weight Change: No    Barriers to Learning: language    Do you follow any special diet presently?: Yes - Low sodium (Meals on Wheels)  Who shops:  Waleska mendieta  Who cooks:  Waleska mendieta    Food Log: Completed via the method of food recall    Wakes up 9 am    Breakfast:9:30 am; (does not skip); coffee (regular, milk whole) with 1 toast (whole wheat; dry) and sometimes with eggs or sausage or cheese  with apple sauce (no sugar added individual cup with meds) OR bagel w/ whole wheat raisin (dry) and apple sauce OR English muffin (dry) with apple sauce  Morning Snack: 11 am; fruit- banana  Lunch: 12:30-1 pm; 4 c soup (food insecurity; potato or noodle, veg, meat) with 3 c rice and maybe some meat to the side with juice diluted with water and banana  Afternoon Snack: 4 pm; fruit cup or SF jello or coffee with 2 biscuits  Dinner:7:30 pm; same as lunch  Evening Snack: 10 pm; SF jello or baby food or slice of bread  Beverages: coffee, diluted juice, water (1 bottle), whole milk  Eating out/Take out:2x/ month; [employer] - filet of fish (w/ bun) with coffee  Exercise blood clots so not supposed to exercise right now;     Calorie needs 1100 kcals/day Carbs: 30 g/meal, 15 g/snack     Protein:5 ounces/day    Fat: 3 servings/day        Nutrition Diagnosis:  Food and nutrition related knowledge deficit  related to Lack of prior exposure to accurate nutrition related information as evidenced by Verbalizes inaccurate or incomplete information    Intervention: plate method, increased fiber intake, label reading, carbohydrate counting,  increased protein intake, increased plant based foods, meal timing, meal planning, monitoring portion control, and food diary     Treatment Goals: Patient understands education and recommendations, Patient will monitor food intake daily with tracker, Patient will consume 3 meals a day, Patient will monitor portion control, Patient will increase their intake of plant based foods, Patient will count carbohydrates, and Patient will monitor blood glucose    Monitoring and evaluation:    Term code indicator  FH 1.3.2 Food Intake Criteria: Eat 3 meals per day, 4-5 hours apart. No meal skipping. Eat your snack 2-3 hours away from your meals.   Term code indicator  FH 1.6.3 Carbohydrate Intake Criteria: Work towards eating 30 grams of carbohydrate per meal, and no more than 15 grams per snack.    Materials Provided: Greek portion book, 3-day food log, Ensures and Glucerna supplements    Patient’s Response to Instruction:  Comprehensiongood  Motivationgood  Expected Compliancegood    Begin Time: 10:44 am  End Time: 11:56 am  Referring Provider: Michael Alvarez PA-C    Thank you for coming to the [facility] for education today.  Please feel free to call with any questions or concerns.    Fang Avila, RD  [address] [place] [address]  [phone]

## 2024-09-16 NOTE — PATIENT INSTRUCTIONS
Eat 3 meals per day, 4-5 hours apart. No meal skipping. Eat your snack 2-3 hours away from your meals.     Work towards eating 30 grams of carbohydrate per meal, and no more than 15 grams per snack.    Complete 3-day food log and return completed log at the follow up appointment

## 2024-09-18 DIAGNOSIS — Z91.09 ENVIRONMENTAL ALLERGIES: ICD-10-CM

## 2024-09-18 RX ORDER — MONTELUKAST SODIUM 10 MG/1
10 TABLET ORAL
Qty: 90 TABLET | Refills: 1 | Status: SHIPPED | OUTPATIENT
Start: 2024-09-18

## 2024-09-19 ENCOUNTER — PATIENT OUTREACH (OUTPATIENT)
Dept: CASE MANAGEMENT | Facility: OTHER | Age: 84
End: 2024-09-19

## 2024-09-19 ENCOUNTER — OFFICE VISIT (OUTPATIENT)
Dept: FAMILY MEDICINE CLINIC | Facility: CLINIC | Age: 84
End: 2024-09-19
Payer: COMMERCIAL

## 2024-09-19 VITALS
TEMPERATURE: 97.9 F | HEART RATE: 79 BPM | SYSTOLIC BLOOD PRESSURE: 162 MMHG | DIASTOLIC BLOOD PRESSURE: 58 MMHG | OXYGEN SATURATION: 96 % | BODY MASS INDEX: 26.48 KG/M2 | HEIGHT: 60 IN

## 2024-09-19 DIAGNOSIS — Z79.4 TYPE 2 DIABETES MELLITUS WITH HYPERGLYCEMIA, WITH LONG-TERM CURRENT USE OF INSULIN (HCC): Chronic | ICD-10-CM

## 2024-09-19 DIAGNOSIS — E11.22 TYPE 2 DIABETES MELLITUS WITH STAGE 3B CHRONIC KIDNEY DISEASE, WITHOUT LONG-TERM CURRENT USE OF INSULIN (HCC): ICD-10-CM

## 2024-09-19 DIAGNOSIS — E11.65 TYPE 2 DIABETES MELLITUS WITH HYPERGLYCEMIA, WITH LONG-TERM CURRENT USE OF INSULIN (HCC): Chronic | ICD-10-CM

## 2024-09-19 DIAGNOSIS — I10 ESSENTIAL HYPERTENSION: Primary | ICD-10-CM

## 2024-09-19 DIAGNOSIS — N18.32 TYPE 2 DIABETES MELLITUS WITH STAGE 3B CHRONIC KIDNEY DISEASE, WITHOUT LONG-TERM CURRENT USE OF INSULIN (HCC): ICD-10-CM

## 2024-09-19 DIAGNOSIS — I82.403 LEG DVT (DEEP VENOUS THROMBOEMBOLISM), ACUTE, BILATERAL (HCC): ICD-10-CM

## 2024-09-19 PROCEDURE — 99214 OFFICE O/P EST MOD 30 MIN: CPT | Performed by: PHYSICIAN ASSISTANT

## 2024-09-19 PROCEDURE — G2211 COMPLEX E/M VISIT ADD ON: HCPCS | Performed by: PHYSICIAN ASSISTANT

## 2024-09-19 NOTE — ASSESSMENT & PLAN NOTE
Better controlled, not quite at goal, continue with cardiology. Follow up nephrology (scheduled)

## 2024-09-19 NOTE — PROGRESS NOTES
Ambulatory Visit  Name: Michelle Villatoro      : 1940      MRN: [id number]  Encounter Provider: Michael Alvarez PA-C  Encounter Date: 2024   Encounter department: [facility]    Assessment & Plan  Leg DVT (deep venous thromboembolism), acute, bilateral (HCC)  Continue eliquis, first VTE episode, minimum 3 months AC duration, though will defer to heme  Orders:    apixaban (ELIQUIS) 5 mg; Take 1 tablet (5 mg total) by mouth 2 (two) times a day    Essential hypertension  Better controlled, not quite at goal, continue with cardiology. Follow up nephrology (scheduled)       Type 2 diabetes mellitus with hyperglycemia, with long-term current use of insulin (HCC)  More stable glucose, continue to uptitrate ozempic off of insulins   Lab Results   Component Value Date    HGBA1C 7.6 (H) 2024            Type 2 diabetes mellitus with stage 3b chronic kidney disease, without long-term current use of insulin ([facility])  Closely monitor renal funciton  Lab Results   Component Value Date    HGBA1C 7.6 (H) 2024       Orders:    Albumin / creatinine urine ratio; Future    Comprehensive metabolic panel; Future    Hemoglobin A1C; Future    CBC and differential; Future    Lipid Panel with Direct LDL reflex; Future     Resume routine follow ups with PCP    History of Present Illness     Pt presents for follow up. In the interim her DVT study returned + for BLE soleal DVTs and she was started on eliquis. Her leg pain/swelling is improving. Unclear provoking factors other than chronic limited mobility. Has upcoming hematology appt. Blood counts stable  Last visit her insulin was stopped altogether due to severe hypo/hypoerglycemic episodes and she was started on ozempic. Sugar logs reading much more stable, between 100-250. She is on 0.25mg and plans to increase to 0.5mg in 2 weeks  Her BP is improved as well, she follows with cardiology. 162/58  No other acute concerns      -CBC and differential:    Collection Time: 09/12/24  9:56 AM       Result                      Value             Ref Range           WBC                         4.68              4.31 - 10.16*       RBC                         3.44 (L)          3.81 - 5.12 *       Hemoglobin                  9.9 (L)           11.5 - 15.4 *       Hematocrit                  30.4 (L)          34.8 - 46.1 %       MCV                         88                82 - 98 fL          MCH                         28.8              26.8 - 34.3 *       MCHC                        32.6              31.4 - 37.4 *       RDW                         14.0              11.6 - 15.1 %       MPV                         13.5 (H)          8.9 - 12.7 fL       Platelets                   185               149 - 390 Th*       nRBC                        0                 /100 WBCs           Segmented %                 71                43 - 75 %           Immature Grans %            0                 0 - 2 %             Lymphocytes %               19                14 - 44 %           Monocytes %                 6                 4 - 12 %            Eosinophils Relative        3                 0 - 6 %             Basophils Relative          1                 0 - 1 %             Absolute Neutrophils        3.34              1.85 - 7.62 *       Absolute Immature Grans     0.01              0.00 - 0.20 *       Absolute Lymphocytes        0.89              0.60 - 4.47 *       Absolute Monocytes          0.26              0.17 - 1.22 *       Eosinophils Absolute        0.14              0.00 - 0.61 *       Basophils Absolute          0.04              0.00 - 0.10 *  -Comprehensive metabolic panel:   Collection Time: 09/12/24  9:56 AM       Result                      Value             Ref Range           Sodium                      133 (L)           135 - 147 mm*       Potassium                   4.1               3.5 - 5.3 mm*       Chloride                    99                96 - 108  mmo*       CO2                         27                21 - 32 mmol*       ANION GAP                   7                 4 - 13 mmol/L       BUN                         29 (H)            5 - 25 mg/dL        Creatinine                  0.97              0.60 - 1.30 *       Glucose, Fasting            128 (H)           65 - 99 mg/dL       Calcium                     9.8               8.4 - 10.2 m*       AST                         17                13 - 39 U/L         ALT                         15                7 - 52 U/L          Alkaline Phosphatase        36                34 - 104 U/L        Total Protein               6.3 (L)           6.4 - 8.4 g/*       Albumin                     4.0               3.5 - 5.0 g/*       Total Bilirubin             0.51              0.20 - 1.00 *       eGFR                        54                ml/min/1.73s*        Review of Systems   Constitutional:  Negative for chills, fatigue and fever.   HENT:  Negative for congestion, ear pain, hearing loss, nosebleeds, postnasal drip, rhinorrhea, sinus pressure, sinus pain, sneezing and sore throat.    Eyes:  Negative for pain, discharge, itching and visual disturbance.   Respiratory:  Negative for cough, chest tightness, shortness of breath and wheezing.    Cardiovascular:  Positive for leg swelling (improving). Negative for chest pain and palpitations.   Gastrointestinal:  Negative for abdominal pain, blood in stool, constipation, diarrhea, nausea and vomiting.   Genitourinary:  Negative for frequency and urgency.   Neurological:  Negative for dizziness, light-headedness and numbness.     Past Medical History:   Diagnosis Date    Aggression     Alzheimer's dementia (HCC)     Arthritis     Chronic kidney disease     Clotting disorder (HCC)     Dementia (HCC)     Diabetes insipidus (HCC)     Diabetes mellitus (HCC)     High cholesterol     Hypertension     Kidney stone     Memory loss     Migraine     Polyneuropathy     Rheumatoid  arthritis (HCC)     Serum lipids high     Stomach problems     Thyroid trouble     Urinary tract infection      Past Surgical History:   Procedure Laterality Date    CATARACT EXTRACTION      CHOLECYSTECTOMY      GALLBLADDER SURGERY      HYSTERECTOMY      [place] SURGICAL ARTHROSCOPY SHOULDER W/ROTATOR CUFF RPR Right 2019    Procedure: SHOULDER ARTHROSCOPIC ROTATOR CUFF REPAIR;  Surgeon: Betsy Conde MD;  Location: [facility];  Service: Orthopedics    ROTATOR CUFF REPAIR Right 2019     Family History   Problem Relation Age of Onset    Substance Abuse Mother         denied    Mental illness Mother         denied    Substance Abuse Father         denied    Mental illness Father         denied    Diabetes Brother     Blindness Brother     Arthritis Daughter     HIV Son      Social History     Tobacco Use    Smoking status: Former     Current packs/day: 0.00     Average packs/day: 0.3 packs/day for 30.0 years (7.5 ttl pk-yrs)     Types: Cigarettes     Start date:      Quit date:      Years since quittin.7     Passive exposure: Past    Smokeless tobacco: Never   Vaping Use    Vaping status: Never Used   Substance and Sexual Activity    Alcohol use: Never    Drug use: No    Sexual activity: Not Currently     Partners: Male     Current Outpatient Medications on File Prior to Visit   Medication Sig    amLODIPine (NORVASC) 10 mg tablet Take 1 tablet (10 mg total) by mouth daily At night before bed    BD Pen Needle Elisabeth 2nd Gen 32G X 4 MM MISC USE DAILY AS DIRECTED    Blood Glucose Monitoring Suppl (OneTouch Verio Reflect) w/Device KIT USE 2 (TWO) TIMES A DAY    Blood Pressure KIT Use daily    cholestyramine (QUESTRAN) 4 g packet Take 1 packet (4 g total) by mouth 2 (two) times a day with meals (Patient taking differently: Take 1 packet by mouth if needed)    Continuous Blood Gluc  (FreeStyle Aruna 2 [place]) MEREDITH Use 1 Device 3 (three) times a day before meals (Patient not taking: Reported on  9/16/2024)    Continuous Blood Gluc Sensor (FreeStyle Aruna 2 Sensor) MISC Check blood sugars multiple times per day (Patient not taking: Reported on 9/16/2024)    cyanocobalamin (VITAMIN B-12) 1000 MCG tablet Take 1 tablet (1,000 mcg total) by mouth daily    estradiol (ESTRACE VAGINAL) 0.1 mg/g vaginal cream Apply pea sized amount around urethra and inside vaginal opening 3 times per week    fenofibrate (TRICOR) 54 MG tablet Take 1 tablet (54 mg total) by mouth daily    fluticasone (FLONASE) 50 mcg/act nasal spray SPRAY 2 SPRAYS INTO EACH NOSTRIL EVERY DAY    gabapentin (Neurontin) 100 mg capsule Take 1 capsule (100 mg total) by mouth 3 (three) times a day (Patient taking differently: Take 100 mg by mouth if needed)    hydrALAZINE (APRESOLINE) 25 mg tablet Take 1 tablet (25 mg total) by mouth every 8 (eight) hours    hydroCHLOROthiazide 25 mg tablet Take 2 tablets (50 mg total) by mouth daily    Incontinence Supplies MISC Use 6 (six) times a day Wipes    Incontinence Supplies MISC Use 4 (four) times a day Comfort shield Adult diapers    Incontinence Supply Disposable MISC Use 6 (six) times a day Dispense disposable bed pad    levothyroxine 100 mcg tablet Take 1 tablet (100 mcg total) by mouth daily    losartan (COZAAR) 100 MG tablet Take 1 tablet (100 mg total) by mouth daily    memantine (NAMENDA) 10 mg tablet Take 1 tablet (10 mg total) by mouth 2 (two) times a day    montelukast (SINGULAIR) 10 mg tablet TAKE 1 TABLET BY MOUTH DAILY AT BEDTIME    omeprazole (PriLOSEC) 20 mg delayed release capsule Take 1 capsule (20 mg total) by mouth daily    OneTouch Verio test strip USE 1 EACH 2 (TWO) TIMES A DAY TEST    semaglutide, 0.25 or 0.5 mg/dose, (Ozempic, 0.25 or 0.5 MG/DOSE,) 2 mg/3 mL injection pen 0.25 mg under the skin every 7 days for 4 doses (28 days), THEN 0.5 mg under the skin every 7 days    sertraline (ZOLOFT) 50 mg tablet Take 1 tablet (50 mg total) by mouth daily    Xiidra 5 % op solution INSTILL 1 DROP IN  BOTH EYES 2 TIMES PER DAY    [DISCONTINUED] apixaban (Eliquis) 5 mg Take 2 tablets (10 mg total) by mouth 2 (two) times a day for 7 days, THEN 1 tablet (5 mg total) 2 (two) times a day for 23 days.     Allergies   Allergen Reactions    Penicillins Itching and Swelling    Pollen Extract Allergic Rhinitis     Immunization History   Administered Date(s) Administered    COVID-19 PFIZER VACCINE 0.3 ML IM 06/04/2021, 07/02/2021    INFLUENZA 12/07/2016, 09/10/2018, 10/07/2021, 12/23/2022    Influenza, high dose seasonal 0.7 mL 09/10/2018, 11/13/2020, 12/23/2022, 11/21/2023    Influenza, recombinant, quadrivalent,injectable, preservative free 10/10/2019    Pneumococcal Conjugate 13-Valent 11/13/2020    Pneumococcal Conjugate Vaccine 20-valent (Pcv20), Polysace 04/15/2022    Tuberculin Skin Test-PPD Intradermal 06/11/2018     Objective     /58   Pulse 79   Temp 97.9 °F (36.6 °C)   Ht 5' (1.524 m)   SpO2 96%   BMI 26.48 kg/m²     Physical Exam  Vitals and nursing note reviewed.   Constitutional:       General: She is not in acute distress.     Appearance: She is well-developed.   HENT:      Head: Normocephalic and atraumatic.   Eyes:      Conjunctiva/sclera: Conjunctivae normal.   Cardiovascular:      Rate and Rhythm: Normal rate and regular rhythm.      Heart sounds: No murmur heard.  Pulmonary:      Effort: Pulmonary effort is normal. No respiratory distress.      Breath sounds: Normal breath sounds. No wheezing, rhonchi or rales.   Musculoskeletal:         General: No swelling.      Cervical back: Neck supple.      Right lower leg: Edema (improved) present.      Left lower leg: Edema (improved) present.   Skin:     General: Skin is warm and dry.      Capillary Refill: Capillary refill takes less than 2 seconds.   Neurological:      Mental Status: She is alert.   Psychiatric:         Mood and Affect: Mood normal.

## 2024-09-19 NOTE — ASSESSMENT & PLAN NOTE
More stable glucose, continue to uptitrate ozempic off of insulins   Lab Results   Component Value Date    HGBA1C 7.6 (H) 08/21/2024

## 2024-09-19 NOTE — ASSESSMENT & PLAN NOTE
Closely monitor renal funciton  Lab Results   Component Value Date    HGBA1C 7.6 (H) 08/21/2024       Orders:    Albumin / creatinine urine ratio; Future    Comprehensive metabolic panel; Future    Hemoglobin A1C; Future    CBC and differential; Future    Lipid Panel with Direct LDL reflex; Future

## 2024-09-24 ENCOUNTER — OFFICE VISIT (OUTPATIENT)
Dept: HEMATOLOGY ONCOLOGY | Facility: CLINIC | Age: 84
End: 2024-09-24
Payer: COMMERCIAL

## 2024-09-24 VITALS
WEIGHT: 137 LBS | DIASTOLIC BLOOD PRESSURE: 62 MMHG | HEART RATE: 80 BPM | OXYGEN SATURATION: 97 % | RESPIRATION RATE: 16 BRPM | BODY MASS INDEX: 26.9 KG/M2 | HEIGHT: 60 IN | TEMPERATURE: 97.8 F | SYSTOLIC BLOOD PRESSURE: 164 MMHG

## 2024-09-24 DIAGNOSIS — I82.491 ACUTE EMBOLISM AND THROMBOSIS OF OTHER SPECIFIED DEEP VEIN OF RIGHT LOWER EXTREMITY (HCC): ICD-10-CM

## 2024-09-24 DIAGNOSIS — N18.32 ANEMIA OF CHRONIC RENAL FAILURE, STAGE 3B  (HCC): Primary | ICD-10-CM

## 2024-09-24 DIAGNOSIS — D63.1 ANEMIA OF CHRONIC RENAL FAILURE, STAGE 3B  (HCC): Primary | ICD-10-CM

## 2024-09-24 DIAGNOSIS — D72.819 LEUKOPENIA, UNSPECIFIED TYPE: ICD-10-CM

## 2024-09-24 DIAGNOSIS — E53.8 B12 DEFICIENCY: ICD-10-CM

## 2024-09-24 PROCEDURE — 99214 OFFICE O/P EST MOD 30 MIN: CPT | Performed by: PHYSICIAN ASSISTANT

## 2024-09-24 NOTE — PROGRESS NOTES
[facility]ATOLOG[facility] [address] [address]  Hematology Ambulatory Follow-Up  Michelle Villatoro, 1940, [id number]  9/24/2024      Assessment and Plan   1. Anemia of chronic renal failure, stage 3b  (HCC)  2. B12 deficiency  3. Leukopenia, unspecified type  4. Acute embolism and thrombosis of other specified deep vein of right lower extremity (HCC)  -  VAS VENOUS DUPLEX - LOWER LIMB BILATERAL; Future  - CBC and differential; Future  - Vitamin B12; Future    84-year-old with history of dementia, CKD, RA and others as listed below who is seen in follow today for leukopenia, normocytic anemia.  On chart review she had a persistent mild leukopenia from 2021 until 05/2024.  Her most recent CBC shows normal WBC and differential.  She does have chronic normocytic anemia with inappropriately normal reticulocyte count which is likely due to myelosuppression in setting of chronic renal disease/rheumatoid arthritis.  Her hemoglobin baseline is around 10-11 since at least 10/2019.    Frequent hospitalizations throughout July and August for issues with diabetes and hypertension.  She began having bilateral leg pain and edema and was diagnosed as an outpatient with acute DVT lower extremity involving bilateral peroneal veins.  She subsequently was started on Eliquis, currently on 5 mg twice daily.  Per granddaughter, she had limited mobility and generalized weakness following hospitalization.  Has been using walker and wheelchair for ambulation, previously only using cane.  Her pain and swelling have improved since beginning anticoagulation.  No chest pain or shortness of breath today.  Per granddaughter, gait overall is steady.  Her last fall was over 1 year ago.    Discussion/plan  Overall her anemia is stable.  In favor of continued observation.  Defer LAMINE in setting of acute thrombosis.  This can be rediscussed if she has worsening anemia in the  future related to her chronic renal disease.  For distal, bilateral DVT.  This is likely provoked in setting of hospitalizations and limited mobility.  Do not feel that further hypercoagulable workup is necessary at this time as it likely will not change our management plan.  Continue Eliquis 5 mg p.o. twice daily for a total of 3 months as long as it is financially affordable and she is not a high bleeding risk.  We will repeat Doppler ultrasound following completion of therapy to reassess her DVT and readdress anticoagulation.  She was advised to avoid estrogen containing products.  Patient understands while being on anticoagulation that should she be in a significant accident such as a car accident and/or hit her head she should report to the ED to rule out fatal bleed.  She understands as well that it is rare to have a blood clot on top of anticoagulation.  However, should she develop significant chest pain, shortness of breath, leg swelling once again she should report to the ED.   CBC monthly while on ac   Continue oral b12 1,000mcg per day.  RTC 3m       Patient voiced agreement and understanding to the above.   Patient advised to call the Hematology/Oncology office with any questions and concerns regarding the above.    Barrier(s) to care: None  The patient is able to self care.    Dorcas Gutierrez PA-C   Medical Oncology/Hematology  [facility]    Subjective     Chief Complaint   Patient presents with    Follow-up       History of present illness:  84-year-old female with past medical history of HTN German speaking female with past medical history of HTN, hypothyroidism, type 2 diabetes melitis, peripheral neuropathy, Alzheimer's dementia, rheumatoid arthritis, CKD stage IIIb, depression on Zoloft for past 2 to 3 years, restless leg syndrome, HLD who has been referred by her PCP for evaluation of leukopenia.     On chart review, patient had leukopenia from 07/2021 until 05/2024  "with WBC ranging from 3.9-4.2   During this time her differential was largely unremarkable.  Her most recent blood work from 07/2024 shows resolution in leukopenia however her granddaughter does note that she was hospitalized during this time for uncontrolled hypertension and diabetes.  She was told during this hospitalization that she has kidney disease which was new information for her. Her labs also demonstrate a normocytic anemia that has been ongoing since 2019 with hemoglobin ranging from 10 to 11 g/dL.  MCV has been in the mid 80s.  Platelet count has been normal. Denies history of HIV, hepatitis.  She does have history of rheumatoid arthritis however is not on any treatment for this currently.  No personal history of malignancy.     Patient denies known history of anemia.  She has never been seen by her hematologist and has never required a blood transfusion.  Denies hematochezia, melena, hematuria or any vaginal bleeding.  She does have history of IBS diarrhea for the past 5 years. Patient denies chest pain, shortness of breath, lightheadedness or dizziness.  She is on PPI chronically.      Granddaughter assisted with translation and providing history of present illness due to patient's dementia.     06/10/2024:   B12 208  Folic acid 8.1   Iron panel iron sat 21%, TIBC normal, ferritin 81  Tsh normal       09/24/24 :  Lab Results   Component Value Date    IRON 84 08/14/2024    TIBC 424 08/14/2024    FERRITIN 122 08/14/2024     Lab Results   Component Value Date    WBC 4.68 09/12/2024    HGB 9.9 (L) 09/12/2024    HCT 30.4 (L) 09/12/2024    MCV 88 09/12/2024     09/12/2024       Interval history: complains of nasal congestion. Nose feels blocked and can't breath out of nose. No trouble breathing with mouth breathing. She has shortness of breath with ambulation-- when asked to further clarify she states \"I dont know I was just answering you.\" And when asked again she states \"no I dont have shortness " "ofbreath when walking.\"    She had bilateral leg pain x 2 weeks. Saw PCP and had US performed which showed acute non-occlusive deep vein thrombosis in the soleal vein on 09/06/2024. CT scan of chest was negative for PE. No personal hx of VTE or miscarriages. No family history of VTE. She was in the hospital 4 times in July- August for HTN, hypoglycemia, hyperglycemia. There has been no recent surgery or long distance travel.  Following hospitalization she had limited mobility.   Per granddaughter, she is still having pain in b/l LE which is making it difficulty for her to walk. She is now using walker and wheel chair for mobility. Previously using a cane.     She was started on eliquis as an outpatient. Taking medication twice a day, reports compliance.     Granddaughter offers translation. Pt refuses ipad trasnlator     ROS Limited due to dementia.  Granddaughter assisted in providing today's history.   Review of Systems   Constitutional:  Negative for fever and unexpected weight change.   Respiratory:  Negative for shortness of breath.    Cardiovascular:  Negative for chest pain.   Gastrointestinal:  Negative for blood in stool.   Genitourinary:  Negative for hematuria and vaginal bleeding.   Neurological:  Negative for dizziness and light-headedness.   All other systems reviewed and are negative.      Patient Active Problem List   Diagnosis    Dementia with behavioral disturbance (HCC)    Chronic left shoulder pain    Bilateral hearing loss    Rheumatoid arthritis 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 (HCC)    Memory loss    Alzheimer's dementia (HCC)    Type 2 diabetes mellitus with stage 3b chronic kidney disease, without long-term current use of insulin (HCC)    Anxiety    Hypothyroidism    Essential hypertension    Nontraumatic tear of right rotator cuff    Menopause ovarian failure    Peripheral neuropathy    Ambulatory dysfunction    Tear of right rotator cuff    Biceps tendon tear    " Unstable gait    Anemia in stage 3 chronic kidney disease  (HCC)    Restless leg syndrome    Mixed hyperlipidemia    Leg cramps, sleep related    Bright red blood per rectum    Coccyalgia    Ulcerated external hemorrhoids    Chronic kidney disease, stage 3b (HCC)    Herpes zoster without complication    Post herpetic neuralgia    Asymptomatic bilateral carotid artery stenosis    Weakness of both lower extremities    Chronic midline low back pain without sciatica    Dysphagia    Major depressive disorder, single episode, moderate (HCC)    Unintentional weight loss    Hypertensive kidney disease with chronic kidney disease    Type 2 diabetes mellitus with hyperglycemia (HCC)    Rheumatoid arthritis (HCC)    Chronic pain of left knee    Pancytopenia (HCC)    Leukopenia    Hypertensive emergency    Hypoglycemia     Past Medical History:   Diagnosis Date    Aggression     Alzheimer's dementia (HCC)     Arthritis     Chronic kidney disease     Clotting disorder (HCC)     Dementia (HCC)     Diabetes insipidus (HCC)     Diabetes mellitus (HCC)     High cholesterol     Hypertension     Kidney stone     Memory loss     Migraine     Polyneuropathy     Rheumatoid arthritis (HCC)     Serum lipids high     Stomach problems     Thyroid trouble     Urinary tract infection      Past Surgical History:   Procedure Laterality Date    CATARACT EXTRACTION      CHOLECYSTECTOMY      GALLBLADDER SURGERY      HYSTERECTOMY      RI SURGICAL ARTHROSCOPY SHOULDER W/ROTATOR CUFF RPR Right 8/1/2019    Procedure: SHOULDER ARTHROSCOPIC ROTATOR CUFF REPAIR;  Surgeon: Betsy Conde MD;  Location: [facility] ER;  Service: Orthopedics    ROTATOR CUFF REPAIR Right 08/01/2019     Family History   Problem Relation Age of Onset    Substance Abuse Mother         denied    Mental illness Mother         denied    Substance Abuse Father         denied    Mental illness Father         denied    Diabetes Brother     Blindness Brother     Arthritis Daughter      HIV Son      Social History     Socioeconomic History    Marital status:      Spouse name: None    Number of children: None    Years of education: None    Highest education level: None   Occupational History    None   Tobacco Use    Smoking status: Former     Current packs/day: 0.00     Average packs/day: 0.3 packs/day for 30.0 years (7.5 ttl pk-yrs)     Types: Cigarettes     Start date:      Quit date: 1985     Years since quittin.7     Passive exposure: Past    Smokeless tobacco: Never   Vaping Use    Vaping status: Never Used   Substance and Sexual Activity    Alcohol use: Never    Drug use: No    Sexual activity: Not Currently     Partners: Male   Other Topics Concern    None   Social History Narrative    None     Social Determinants of Health     Financial Resource Strain: Low Risk  (2024)    Received from [facility]    Overall Financial Resource Strain (CARDIA)     Difficulty of Paying Living Expenses: Not hard at all   Food Insecurity: No Food Insecurity (2024)    Hunger Vital Sign     Worried About Running Out of Food in the Last Year: Never true     Ran Out of Food in the Last Year: Never true   Transportation Needs: No Transportation Needs (2024)    PRAPARE - Transportation     Lack of Transportation (Medical): No     Lack of Transportation (Non-Medical): No   Physical Activity: Not on file   Stress: No Stress Concern Present (2019)    [place] [employer] - Occupational Stress Questionnaire     Feeling of Stress : Only a little   Social Connections: Unknown (2019)    Social Connection and Isolation Panel [NHANES]     Frequency of Communication with Friends and Family: More than three times a week     Frequency of Social Gatherings with Friends and Family: More than three times a week     Attends Restoration Services: Not on file     Active Member of Clubs or Organizations: Not on file     Attends Club or Organization Meetings:  Not on file     Marital Status:    Intimate Partner Violence: Not At Risk (8/19/2024)    Received from [facility]    Humiliation, Afraid, Rape, and Kick questionnaire     Fear of Current or Ex-Partner: No     Emotionally Abused: No     Physically Abused: No     Sexually Abused: No   Housing Stability: Low Risk  (8/27/2024)    Housing Stability Vital Sign     Unable to Pay for Housing in the Last Year: No     Number of Times Moved in the Last Year: 0     Homeless in the Last Year: No       Current Outpatient Medications:     amLODIPine (NORVASC) 10 mg tablet, Take 1 tablet (10 mg total) by mouth daily At night before bed, Disp: 90 tablet, Rfl: 1    apixaban (ELIQUIS) 5 mg, Take 1 tablet (5 mg total) by mouth 2 (two) times a day, Disp: 120 tablet, Rfl: 0    BD Pen Needle Elisabeth 2nd Gen 32G X 4 MM MISC, USE DAILY AS DIRECTED, Disp: 100 each, Rfl: 1    Blood Glucose Monitoring Suppl (OneTouch Verio Reflect) w/Device KIT, USE 2 (TWO) TIMES A DAY, Disp: 1 kit, Rfl: 0    Blood Pressure KIT, Use daily, Disp: 1 kit, Rfl: 0    cholestyramine (QUESTRAN) 4 g packet, Take 1 packet (4 g total) by mouth 2 (two) times a day with meals (Patient taking differently: Take 1 packet by mouth if needed), Disp: 180 packet, Rfl: 3    Continuous Blood Gluc  (FreeStyle Aruna 2 [place]) MEREDITH, Use 1 Device 3 (three) times a day before meals (Patient not taking: Reported on 9/16/2024), Disp: 1 each, Rfl: 0    Continuous Blood Gluc Sensor (FreeStyle Aruna 2 Sensor) MISC, Check blood sugars multiple times per day (Patient not taking: Reported on 9/16/2024), Disp: 6 each, Rfl: 0    cyanocobalamin (VITAMIN B-12) 1000 MCG tablet, Take 1 tablet (1,000 mcg total) by mouth daily, Disp: , Rfl:     estradiol (ESTRACE VAGINAL) 0.1 mg/g vaginal cream, Apply pea sized amount around urethra and inside vaginal opening 3 times per week, Disp: 42.5 g, Rfl: 2    fenofibrate (TRICOR) 54 MG tablet, Take 1 tablet (54 mg total) by mouth  daily, Disp: 90 tablet, Rfl: 6    fluticasone (FLONASE) 50 mcg/act nasal spray, SPRAY 2 SPRAYS INTO EACH NOSTRIL EVERY DAY, Disp: 16 mL, Rfl: 1    gabapentin (Neurontin) 100 mg capsule, Take 1 capsule (100 mg total) by mouth 3 (three) times a day (Patient taking differently: Take 100 mg by mouth if needed), Disp: 270 capsule, Rfl: 0    hydrALAZINE (APRESOLINE) 25 mg tablet, Take 1 tablet (25 mg total) by mouth every 8 (eight) hours, Disp: 90 tablet, Rfl: 0    hydroCHLOROthiazide 25 mg tablet, Take 2 tablets (50 mg total) by mouth daily, Disp: 90 tablet, Rfl: 3    Incontinence Supplies MISC, Use 6 (six) times a day Wipes, Disp: 200 each, Rfl: 6    Incontinence Supplies MISC, Use 4 (four) times a day Comfort shield Adult diapers, Disp: 200 each, Rfl: 6    Incontinence Supply Disposable MISC, Use 6 (six) times a day Dispense disposable bed pad, Disp: 200 each, Rfl: 6    levothyroxine 100 mcg tablet, Take 1 tablet (100 mcg total) by mouth daily, Disp: 90 tablet, Rfl: 1    losartan (COZAAR) 100 MG tablet, Take 1 tablet (100 mg total) by mouth daily, Disp: 30 tablet, Rfl: 3    memantine (NAMENDA) 10 mg tablet, Take 1 tablet (10 mg total) by mouth 2 (two) times a day, Disp: 200 tablet, Rfl: 1    montelukast (SINGULAIR) 10 mg tablet, TAKE 1 TABLET BY MOUTH DAILY AT BEDTIME, Disp: 90 tablet, Rfl: 1    omeprazole (PriLOSEC) 20 mg delayed release capsule, Take 1 capsule (20 mg total) by mouth daily, Disp: 90 capsule, Rfl: 3    OneTouch Verio test strip, USE 1 EACH 2 (TWO) TIMES A DAY TEST, Disp: 100 strip, Rfl: 0    semaglutide, 0.25 or 0.5 mg/dose, (Ozempic, 0.25 or 0.5 MG/DOSE,) 2 mg/3 mL injection pen, 0.25 mg under the skin every 7 days for 4 doses (28 days), THEN 0.5 mg under the skin every 7 days, Disp: 9 mL, Rfl: 0    sertraline (ZOLOFT) 50 mg tablet, Take 1 tablet (50 mg total) by mouth daily, Disp: 90 tablet, Rfl: 1    Xiidra 5 % op solution, INSTILL 1 DROP IN BOTH EYES 2 TIMES PER DAY, Disp: , Rfl:   Allergies    Allergen Reactions    Penicillins Itching and Swelling    Pollen Extract Allergic Rhinitis       Objective   /62 (BP Location: Left arm, Patient Position: Sitting, Cuff Size: Standard)   Pulse 80   Temp 97.8 °F (36.6 °C) (Temporal)   Resp 16   Ht 5' (1.524 m)   Wt 62.1 kg (137 lb)   SpO2 97%   BMI 26.76 kg/m²    Physical Exam  Vitals reviewed.   HENT:      Head: Normocephalic.   Cardiovascular:      Rate and Rhythm: Normal rate and regular rhythm.      Heart sounds: Murmur heard.   Pulmonary:      Effort: Pulmonary effort is normal.      Breath sounds: Normal breath sounds.   Abdominal:      Palpations: Abdomen is soft.      Tenderness: There is no abdominal tenderness.   Musculoskeletal:      Cervical back: Neck supple.   Lymphadenopathy:      Cervical: No cervical adenopathy.   Skin:     Findings: No rash.   Neurological:      Mental Status: She is alert.         Result Review  Labs:  Appointment on 09/12/2024   Component Date Value Ref Range Status    WBC 09/12/2024 4.68  4.31 - 10.16 Thousand/uL Final    RBC 09/12/2024 3.44 (L)  3.81 - 5.12 Million/uL Final    Hemoglobin 09/12/2024 9.9 (L)  11.5 - 15.4 g/dL Final    Hematocrit 09/12/2024 30.4 (L)  34.8 - 46.1 % Final    MCV 09/12/2024 88  82 - 98 fL Final    MCH 09/12/2024 28.8  26.8 - 34.3 pg Final    MCHC 09/12/2024 32.6  31.4 - 37.4 g/dL Final    RDW 09/12/2024 14.0  11.6 - 15.1 % Final    MPV 09/12/2024 13.5 (H)  8.9 - 12.7 fL Final    Platelets 09/12/2024 185  149 - 390 Thousands/uL Final    nRBC 09/12/2024 0  /100 WBCs Final    Segmented % 09/12/2024 71  43 - 75 % Final    Immature Grans % 09/12/2024 0  0 - 2 % Final    Lymphocytes % 09/12/2024 19  14 - 44 % Final    Monocytes % 09/12/2024 6  4 - 12 % Final    Eosinophils Relative 09/12/2024 3  0 - 6 % Final    Basophils Relative 09/12/2024 1  0 - 1 % Final    Absolute Neutrophils 09/12/2024 3.34  1.85 - 7.62 Thousands/µL Final    Absolute Immature Grans 09/12/2024 0.01  0.00 - 0.20  Thousand/uL Final    Absolute Lymphocytes 09/12/2024 0.89  0.60 - 4.47 Thousands/µL Final    Absolute Monocytes 09/12/2024 0.26  0.17 - 1.22 Thousand/µL Final    Eosinophils Absolute 09/12/2024 0.14  0.00 - 0.61 Thousand/µL Final    Basophils Absolute 09/12/2024 0.04  0.00 - 0.10 Thousands/µL Final    Sodium 09/12/2024 133 (L)  135 - 147 mmol/L Final    Potassium 09/12/2024 4.1  3.5 - 5.3 mmol/L Final    Chloride 09/12/2024 99  96 - 108 mmol/L Final    CO2 09/12/2024 27  21 - 32 mmol/L Final    ANION GAP 09/12/2024 7  4 - 13 mmol/L Final    BUN 09/12/2024 29 (H)  5 - 25 mg/dL Final    Creatinine 09/12/2024 0.97  0.60 - 1.30 mg/dL Final    Standardized to IDMS reference method    Glucose, Fasting 09/12/2024 128 (H)  65 - 99 mg/dL Final    Calcium 09/12/2024 9.8  8.4 - 10.2 mg/dL Final    AST 09/12/2024 17  13 - 39 U/L Final    ALT 09/12/2024 15  7 - 52 U/L Final    Specimen collection should occur prior to Sulfasalazine administration due to the potential for falsely depressed results.     Alkaline Phosphatase 09/12/2024 36  34 - 104 U/L Final    Total Protein 09/12/2024 6.3 (L)  6.4 - 8.4 g/dL Final    Albumin 09/12/2024 4.0  3.5 - 5.0 g/dL Final    Total Bilirubin 09/12/2024 0.51  0.20 - 1.00 mg/dL Final    Use of this assay is not recommended for patients undergoing treatment with eltrombopag due to the potential for falsely elevated results.  N-acetyl-p-benzoquinone imine (metabolite of Acetaminophen) will generate erroneously low results in samples for patients that have taken an overdose of Acetaminophen.    eGFR 09/12/2024 54  ml/min/1.73sq m Final   Office Visit on 09/09/2024   Component Date Value Ref Range Status    POST-VOID RESIDUAL VOLUME, ML POC 09/09/2024 131  mL Final    LEUKOCYTE ESTERASE,UA 09/09/2024 +   Final    NITRITE,UA 09/09/2024 -   Final    SL AMB POCT UROBILINOGEN 09/09/2024 0.2   Final    POCT URINE PROTEIN 09/09/2024 +   Final     PH,UA 09/09/2024 6.0   Final    BLOOD,UA 09/09/2024 +    Final    SPECIFIC GRAVITY,UA 09/09/2024 +   Final    KETONES,UA 09/09/2024 1.015   Final    BILIRUBIN,UA 09/09/2024 +   Final    GLUCOSE, UA 09/09/2024 -   Final     COLOR,UA 09/09/2024 yellow   Final    CLARITY,UA 09/09/2024 cclear   Final    Color, UA 09/09/2024 Colorless   Final    Clarity, UA 09/09/2024 Clear   Final    Specific Gravity, UA 09/09/2024 1.008  1.003 - 1.030 Final    pH, UA 09/09/2024 6.5  4.5, 5.0, 5.5, 6.0, 6.5, 7.0, 7.5, 8.0 Final    Leukocytes, UA 09/09/2024 Trace (A)  Negative Final    Nitrite, UA 09/09/2024 Negative  Negative Final    Protein, UA 09/09/2024 50 (1+) (A)  Negative mg/dl Final    Glucose, UA 09/09/2024 Negative  Negative mg/dl Final    Ketones, UA 09/09/2024 Negative  Negative mg/dl Final    Urobilinogen, UA 09/09/2024 <2.0  <2.0 mg/dl mg/dl Final    Bilirubin, UA 09/09/2024 Negative  Negative Final    Occult Blood, UA 09/09/2024 Negative  Negative Final    RBC, UA 09/09/2024 None Seen  None Seen, 1-2 /hpf Final    WBC, UA 09/09/2024 1-2  None Seen, 1-2 /hpf Final    Epithelial Cells 09/09/2024 Occasional  None Seen, Occasional /hpf Final    Bacteria, UA 09/09/2024 Occasional  None Seen, Occasional /hpf Final    Urine Culture 09/09/2024 20,000-29,000 cfu/ml   Final    Mixed Contaminants X3   Orders Only on 09/03/2024   Component Date Value Ref Range Status    Supplier Name 09/03/2024 [employer]   In process    Supplier Phone Number 09/03/2024 [phone]   In process    Order Status 09/03/2024 Contacting Patient   In process    Delivery Request Date 09/03/2024 09/03/2024   In process    Item Description 09/03/2024 Standard Wheelchair, 16 in x 16 in   In process    Qty: 1    Item Description 09/03/2024 Standard Seat Width, 16 in   In process    Qty: 1    Item Description 09/03/2024 Standard Seat Depth, 16 in   In process    Qty: 1    Item Description 09/03/2024 Wheelchair Anti Tippers   In process    Qty: 2    Item Description 09/03/2024 Wheelchair  Armrests, Adjustable Height, Desk Length   In process    Comment: Qty: 2  Side: both      Item Description 09/03/2024 Wheelchair Back Cushion, 16 in   In process    Qty: 1    Item Description 09/03/2024 Wheelchair Brake Extenders   In process    Qty: 2    Item Description 09/03/2024 Swing-Away Foot Rests   In process    Qty: 1    Item Description 09/03/2024 Wheelchair Seat Cushion, 16 in, General Use   In process    Qty: 1   Admission on 08/29/2024, Discharged on 08/29/2024   Component Date Value Ref Range Status    WBC 08/29/2024 4.76  4.31 - 10.16 Thousand/uL Final    RBC 08/29/2024 3.65 (L)  3.81 - 5.12 Million/uL Final    Hemoglobin 08/29/2024 10.4 (L)  11.5 - 15.4 g/dL Final    Hematocrit 08/29/2024 32.7 (L)  34.8 - 46.1 % Final    MCV 08/29/2024 90  82 - 98 fL Final    MCH 08/29/2024 28.5  26.8 - 34.3 pg Final    MCHC 08/29/2024 31.8  31.4 - 37.4 g/dL Final    RDW 08/29/2024 14.6  11.6 - 15.1 % Final    MPV 08/29/2024 12.5  8.9 - 12.7 fL Final    Platelets 08/29/2024 232  149 - 390 Thousands/uL Final    nRBC 08/29/2024 0  /100 WBCs Final    Segmented % 08/29/2024 65  43 - 75 % Final    Immature Grans % 08/29/2024 0  0 - 2 % Final    Lymphocytes % 08/29/2024 25  14 - 44 % Final    Monocytes % 08/29/2024 6  4 - 12 % Final    Eosinophils Relative 08/29/2024 3  0 - 6 % Final    Basophils Relative 08/29/2024 1  0 - 1 % Final    Absolute Neutrophils 08/29/2024 3.11  1.85 - 7.62 Thousands/µL Final    Absolute Immature Grans 08/29/2024 0.01  0.00 - 0.20 Thousand/uL Final    Absolute Lymphocytes 08/29/2024 1.21  0.60 - 4.47 Thousands/µL Final    Absolute Monocytes 08/29/2024 0.28  0.17 - 1.22 Thousand/µL Final    Eosinophils Absolute 08/29/2024 0.12  0.00 - 0.61 Thousand/µL Final    Basophils Absolute 08/29/2024 0.03  0.00 - 0.10 Thousands/µL Final    Sodium 08/29/2024 136  135 - 147 mmol/L Final    Potassium 08/29/2024 5.0  3.5 - 5.3 mmol/L Final    Chloride 08/29/2024 103  96 - 108 mmol/L Final    CO2 08/29/2024 27   21 - 32 mmol/L Final    ANION GAP 08/29/2024 6  4 - 13 mmol/L Final    BUN 08/29/2024 28 (H)  5 - 25 mg/dL Final    Creatinine 08/29/2024 1.19  0.60 - 1.30 mg/dL Final    Standardized to IDMS reference method    Glucose 08/29/2024 146 (H)  65 - 140 mg/dL Final    If the patient is fasting, the ADA then defines impaired fasting glucose as > 100 mg/dL and diabetes as > or equal to 123 mg/dL.    Calcium 08/29/2024 10.3 (H)  8.4 - 10.2 mg/dL Final    eGFR 08/29/2024 42  ml/min/1.73sq m Final    Protime 08/29/2024 12.7  12.3 - 15.0 seconds Final    INR 08/29/2024 0.90  0.85 - 1.19 Final    PTT 08/29/2024 23  23 - 34 seconds Final    Therapeutic Heparin Range =  60-90 seconds       Imaging:   I reviewed relevant imaging    Please note:  This report has been generated by a voice recognition software system. Therefore there may be syntax, spelling, and/or grammatical errors. Please call if you have any questions.

## 2024-09-26 ENCOUNTER — PATIENT OUTREACH (OUTPATIENT)
Dept: CASE MANAGEMENT | Facility: OTHER | Age: 84
End: 2024-09-26

## 2024-09-26 NOTE — PROGRESS NOTES
Contacted patient's granddaughter for f/u.  She reports the swelling to her grandmother's LE is improving but she still has difficulty with mobility.  She is on eliquis 5 mg twice daily.  Her BS have improved and are in the range of 110-230.  She eats consistently during the day and receives meals on wheels.  BP has been stable 160/70.  Patient does receive waiver services through the [facility] but services were temporarily placed on hold due to need for documentation.  Granddaughter provided documentation and is anticipating services to be resumed.  Patient's medications are managed by granddaughter and crushed in applesauce.  Patient is taking all as prescribed.  Follow up appointments scheduled.  Family transports to appointments.  She is still receiving home health services but will be discharged soon.  Granddaughter denies any needs at this time and declined further outreach. Will close to complex care management.

## 2024-09-27 ENCOUNTER — TELEPHONE (OUTPATIENT)
Age: 84
End: 2024-09-27

## 2024-09-27 NOTE — TELEPHONE ENCOUNTER
Sana from [facility] needs to know what last the patients last hemoglobin A1C was and faxed to [phone]  Can call if further info is needed at [phone]  Thank you

## 2024-09-30 LAB
LEFT EYE DIABETIC RETINOPATHY: NORMAL
RIGHT EYE DIABETIC RETINOPATHY: NORMAL

## 2024-10-01 ENCOUNTER — OFFICE VISIT (OUTPATIENT)
Dept: OBGYN CLINIC | Facility: CLINIC | Age: 84
End: 2024-10-01
Payer: COMMERCIAL

## 2024-10-01 VITALS
SYSTOLIC BLOOD PRESSURE: 170 MMHG | HEART RATE: 70 BPM | HEIGHT: 60 IN | WEIGHT: 137 LBS | DIASTOLIC BLOOD PRESSURE: 66 MMHG | BODY MASS INDEX: 26.9 KG/M2 | OXYGEN SATURATION: 99 %

## 2024-10-01 DIAGNOSIS — M17.0 PRIMARY OSTEOARTHRITIS OF BOTH KNEES: Primary | ICD-10-CM

## 2024-10-01 PROCEDURE — 99213 OFFICE O/P EST LOW 20 MIN: CPT | Performed by: FAMILY MEDICINE

## 2024-10-01 RX ORDER — LOTEPREDNOL ETABONATE 5 MG/ML
1 SUSPENSION/ DROPS OPHTHALMIC 4 TIMES DAILY
COMMUNITY
Start: 2024-09-30

## 2024-10-01 NOTE — PROGRESS NOTES
Assessment/Plan:  Assessment & Plan   Diagnoses and all orders for this visit:    Primary osteoarthritis of both knees  -     Diclofenac Sodium (VOLTAREN) 1 %; Apply 2 g topically 3 (three) times a day    Other orders  -     loteprednol etabonate (LOTEMAX) 0.5 % ophthalmic suspension; Administer 1 drop to both eyes 4 (four) times a day        84-year-old female with osteoarthritis of both knees with pain both knees several years duration.  Discussed with patient and accompanying granddaughter physical exam, impression, and plan.  Clinical exam and history suggest that she has symptoms from combination of degenerative changes and deconditioning.  Therapy is precluded due to current lower extremity DVT.  Pain level at most is 3 out of 10 so patient does not desire injection at this time.  I advised that steroid injections can be done at least 3 months apart.  She may apply topical diclofenac gel 3 times daily as needed.  Following treatment course of DVT she may contact the office and we we will place referral for home PT.  She will follow-up as needed.        Subjective:   Patient ID: Michelle Villatoro is a 84 y.o. female.  Chief Complaint   Patient presents with    Left Knee - Follow-up, Pain    Right Knee - Follow-up, Pain        84-year-old female with osteoarthritis of both knees is accompanied by granddaughter for follow-up of pain both knees several years duration.  She was last seen by me 2.5 months ago at which point she was given steroid injection to both knees.  She reports that following steroid injection symptoms have improved.  Since then she has had multiple hospital stays and has developed DVT in both lower extremities.  Pain in her knees described as generalized to the knees, none radiating, worse with bearing weight and ambulating, rated as 3 out of 10, and improved with resting.  Patient does fatigue very easily and has been using a wheelchair.  Sure he is able to stand/walk for few minutes at a  time.    Knee Pain  This is a chronic problem. The current episode started more than 1 year ago. The problem occurs daily. The problem has been waxing and waning. Associated symptoms include arthralgias, joint swelling and weakness. Pertinent negatives include no numbness. The symptoms are aggravated by standing and walking. She has tried rest, position changes and acetaminophen (Steroid injection) for the symptoms. The treatment provided moderate relief.               Review of Systems   Musculoskeletal:  Positive for arthralgias and joint swelling.   Neurological:  Positive for weakness. Negative for numbness.       Objective:  Vitals:    10/01/24 0926   BP: 170/66   Pulse: 70   SpO2: 99%   Weight: 62.1 kg (137 lb)   Height: 5' (1.524 m)      Right Knee Exam     Range of Motion   Extension:  -5   Flexion:  120       Left Knee Exam     Range of Motion   Extension:  -5   Flexion:  120       Right Hip Exam     Muscle Strength   Flexion: 4/5       Left Hip Exam     Muscle Strength   Flexion: 4/5       Back Exam     Muscle Strength   Right Quadriceps:  4/5   Left Quadriceps:  4/5             Physical Exam  Vitals and nursing note reviewed.   Constitutional:       Appearance: Normal appearance. She is well-developed. She is not ill-appearing or diaphoretic.   HENT:      Head: Normocephalic and atraumatic.      Right Ear: External ear normal.      Left Ear: External ear normal.   Eyes:      Conjunctiva/sclera: Conjunctivae normal.   Neck:      Trachea: No tracheal deviation.   Cardiovascular:      Rate and Rhythm: Normal rate.   Pulmonary:      Effort: Pulmonary effort is normal. No respiratory distress.   Abdominal:      General: There is no distension.   Skin:     General: Skin is warm and dry.      Coloration: Skin is not jaundiced or pale.   Neurological:      Mental Status: She is alert and oriented to person, place, and time.   Psychiatric:         Mood and Affect: Mood normal.         Behavior: Behavior normal.          Thought Content: Thought content normal.         Judgment: Judgment normal.

## 2024-10-02 DIAGNOSIS — I10 ESSENTIAL HYPERTENSION: ICD-10-CM

## 2024-10-02 RX ORDER — LOSARTAN POTASSIUM 100 MG/1
100 TABLET ORAL DAILY
Qty: 90 TABLET | Refills: 0 | Status: SHIPPED | OUTPATIENT
Start: 2024-10-02

## 2024-10-11 ENCOUNTER — TELEPHONE (OUTPATIENT)
Age: 84
End: 2024-10-11

## 2024-10-13 DIAGNOSIS — R05.9 COUGH, UNSPECIFIED TYPE: Primary | ICD-10-CM

## 2024-10-13 RX ORDER — BENZONATATE 200 MG/1
200 CAPSULE ORAL 2 TIMES DAILY PRN
Qty: 30 CAPSULE | Refills: 2 | Status: SHIPPED | OUTPATIENT
Start: 2024-10-13

## 2024-10-13 NOTE — TELEPHONE ENCOUNTER
Tessalon perles sent to her pharmacy  I would recommend an appt to discuss glucose levels does Orlando have any openings?

## 2024-10-14 NOTE — TELEPHONE ENCOUNTER
10/14 spoke with daughter. She will call back to set up appt.She didn't know her schedule as far as when she can bring her. paulo

## 2024-10-15 ENCOUNTER — OFFICE VISIT (OUTPATIENT)
Dept: FAMILY MEDICINE CLINIC | Facility: CLINIC | Age: 84
End: 2024-10-15
Payer: COMMERCIAL

## 2024-10-15 VITALS
HEIGHT: 60 IN | DIASTOLIC BLOOD PRESSURE: 78 MMHG | BODY MASS INDEX: 25.32 KG/M2 | WEIGHT: 129 LBS | SYSTOLIC BLOOD PRESSURE: 168 MMHG | OXYGEN SATURATION: 98 % | TEMPERATURE: 99.1 F | HEART RATE: 74 BPM

## 2024-10-15 DIAGNOSIS — N18.32 TYPE 2 DIABETES MELLITUS WITH STAGE 3B CHRONIC KIDNEY DISEASE, WITHOUT LONG-TERM CURRENT USE OF INSULIN (HCC): Primary | ICD-10-CM

## 2024-10-15 DIAGNOSIS — E11.22 TYPE 2 DIABETES MELLITUS WITH STAGE 3B CHRONIC KIDNEY DISEASE, WITHOUT LONG-TERM CURRENT USE OF INSULIN (HCC): Primary | ICD-10-CM

## 2024-10-15 PROCEDURE — 99213 OFFICE O/P EST LOW 20 MIN: CPT

## 2024-10-15 PROCEDURE — G2211 COMPLEX E/M VISIT ADD ON: HCPCS

## 2024-10-15 NOTE — ASSESSMENT & PLAN NOTE
Lab Results   Component Value Date    HGBA1C 7.6 (H) 08/21/2024   Has been taking ozempic 0.25 mg since 8/24 with good control prior  This October, evening sugars have been high ranging from 180-300  Denies any additional sx of hyperglycemia, does note some increased fatigue  Recently switched to Meals on Wheels diet, counseled on low carbohydrates  Shared decision making and recommendation to attempt to control with monotherapy versus adding insulin.Patient has history of hypoglycemic events and difficulty adhering to insulin regimens  Will increase Ozempic to 0.5 mg   Follow-up 1 month to assess response.  Advised to continue tracking sugars and bring to next appointment.  Will also assess weight.  Orders:    semaglutide, 0.25 or 0.5 mg/dose, (Ozempic, 0.25 or 0.5 MG/DOSE,) 2 mg/3 mL injection pen; Inject 0.75 mL (0.5 mg total) under the skin every 7 days 0.25 mg under the skin every 7 days for 4 doses (28 days), THEN 0.5 mg under the skin every 7 days

## 2024-10-15 NOTE — PROGRESS NOTES
Ambulatory Visit  Name: Michelle Villatoro      : 1940      MRN: [id number]  Encounter Provider: Orlando Patel PA-C  Encounter Date: 10/15/2024   Encounter department: [facility]    Assessment & Plan  Type 2 diabetes mellitus with stage 3b chronic kidney disease, without long-term current use of insulin ([facility])    Lab Results   Component Value Date    HGBA1C 7.6 (H) 2024   Has been taking ozempic 0.25 mg since  with good control prior  This October, evening sugars have been high ranging from 180-300  Denies any additional sx of hyperglycemia, does note some increased fatigue  Recently switched to Meals on Wheels diet, counseled on low carbohydrates  Shared decision making and recommendation to attempt to control with monotherapy versus adding insulin.Patient has history of hypoglycemic events and difficulty adhering to insulin regimens  Will increase Ozempic to 0.5 mg   Follow-up 1 month to assess response.  Advised to continue tracking sugars and bring to next appointment.  Will also assess weight.  Orders:    semaglutide, 0.25 or 0.5 mg/dose, (Ozempic, 0.25 or 0.5 MG/DOSE,) 2 mg/3 mL injection pen; Inject 0.75 mL (0.5 mg total) under the skin every 7 days 0.25 mg under the skin every 7 days for 4 doses (28 days), THEN 0.5 mg under the skin every 7 days       History of Present Illness     Michelle Villatoro is a 84 y.o. female with significant Hx of DMT2, CKD, poorly controlled HTN, Alzheimer's disease presenting for concerns with blood glucose.  She has had a history of poor glycemic control with a history of being on insulin.  She had a hospitalization with an episode of asymptomatic hyperglycemia of 600.  The decision was made to attempt control with Ozempic monotherapy which was working well. This month in October, patient has had good control of early day sugars ranging from 102-130 for the majority of the measurements.  However, evening sugars have risen with ranges from 148  to the highest single recorded of 379.  When asked about dietary changes, granddaughter does recount that patient recently started Meals on Wheels.  I did briefly  about the value of a low carbohydrate diet and diabetes. Granddaughter  voiced understanding.   Given that patient is tolerating Ozempic 0.25 dosing well, will increase Ozempic dose to 0.5 for 1 month with follow-up to reevaluate.  Consideration was given to additional diabetes medications, specifically short acting insulin to combat the isolated evening sugar rises, the patient has had a history of multiple hospitalizations and hypoglycemic events related to insulin usage.  Shared decision making and my recommendations to attempt glycemic control with Ozempic monotherapy before adding additional agents at this time. Granddaughter is in agreement.     Exam unremarkable today. Lungs CTA, heart sounds acutely unremarkable. Exam and ROS limited by language barrier, granddaughter offers no additional complaints for pt today  Follow up 1 month(s) and advised to continue checking sugars and bring a record of blood sugar to next visit      History obtained from : patient and patient granddaughter ()  Review of Systems   Reason unable to perform ROS: ROS limited due to language barrier.   Constitutional:  Negative for chills and fever.   HENT:  Negative for ear pain and sore throat.    Eyes:  Negative for pain and visual disturbance.   Respiratory:  Negative for cough and shortness of breath.    Cardiovascular:  Negative for chest pain and palpitations.   Gastrointestinal:  Negative for abdominal pain and vomiting.   Genitourinary:  Negative for dysuria and hematuria.   Musculoskeletal:  Negative for arthralgias and back pain.   Skin:  Negative for color change and rash.   Neurological:  Negative for seizures and syncope.   All other systems reviewed and are negative.    Pertinent Medical History     Medical History Reviewed by provider this  encounter:  Tobacco  Allergies  Meds  Problems  Med Hx  Surg Hx  Fam Hx       Past Medical History   Past Medical History:   Diagnosis Date    Aggression     Alzheimer's dementia (HCC)     Arthritis     Chronic kidney disease     Clotting disorder (HCC)     Dementia (HCC)     Diabetes insipidus (HCC)     Diabetes mellitus (HCC)     High cholesterol     Hypertension     Kidney stone     Memory loss     Migraine     Polyneuropathy     Rheumatoid arthritis (HCC)     Serum lipids high     Stomach problems     Thyroid trouble     Urinary tract infection      Past Surgical History:   Procedure Laterality Date    CATARACT EXTRACTION      CHOLECYSTECTOMY      GALLBLADDER SURGERY      HYSTERECTOMY      NC SURGICAL ARTHROSCOPY SHOULDER W/ROTATOR CUFF RPR Right 8/1/2019    Procedure: SHOULDER ARTHROSCOPIC ROTATOR CUFF REPAIR;  Surgeon: Betsy Conde MD;  Location: [facility];  Service: Orthopedics    ROTATOR CUFF REPAIR Right 08/01/2019     Family History   Problem Relation Age of Onset    Substance Abuse Mother         denied    Mental illness Mother         denied    Substance Abuse Father         denied    Mental illness Father         denied    Diabetes Brother     Blindness Brother     Arthritis Daughter     HIV Son      Current Outpatient Medications on File Prior to Visit   Medication Sig Dispense Refill    amLODIPine (NORVASC) 10 mg tablet Take 1 tablet (10 mg total) by mouth daily At night before bed 90 tablet 1    apixaban (ELIQUIS) 5 mg Take 1 tablet (5 mg total) by mouth 2 (two) times a day 120 tablet 0    BD Pen Needle Elisabeth 2nd Gen 32G X 4 MM MISC USE DAILY AS DIRECTED 100 each 1    benzonatate (TESSALON) 200 MG capsule Take 1 capsule (200 mg total) by mouth 2 (two) times a day as needed for cough 30 capsule 2    Blood Glucose Monitoring Suppl (OneTouch Verio Reflect) w/Device KIT USE 2 (TWO) TIMES A DAY 1 kit 0    Blood Pressure KIT Use daily 1 kit 0    cholestyramine (QUESTRAN) 4 g packet Take 1 packet  (4 g total) by mouth 2 (two) times a day with meals (Patient taking differently: Take 1 packet by mouth if needed) 180 packet 3    Continuous Blood Gluc  (FreeStyle Aruna 2 [place]) MEREDITH Use 1 Device 3 (three) times a day before meals (Patient not taking: Reported on 9/16/2024) 1 each 0    Continuous Blood Gluc Sensor (FreeStyle Aruna 2 Sensor) MISC Check blood sugars multiple times per day (Patient not taking: Reported on 9/16/2024) 6 each 0    cyanocobalamin (VITAMIN B-12) 1000 MCG tablet Take 1 tablet (1,000 mcg total) by mouth daily      Diclofenac Sodium (VOLTAREN) 1 % Apply 2 g topically 3 (three) times a day 150 g 2    fenofibrate (TRICOR) 54 MG tablet Take 1 tablet (54 mg total) by mouth daily 90 tablet 6    fluticasone (FLONASE) 50 mcg/act nasal spray SPRAY 2 SPRAYS INTO EACH NOSTRIL EVERY DAY 16 mL 1    gabapentin (Neurontin) 100 mg capsule Take 1 capsule (100 mg total) by mouth 3 (three) times a day (Patient taking differently: Take 100 mg by mouth if needed) 270 capsule 0    hydrALAZINE (APRESOLINE) 25 mg tablet Take 1 tablet (25 mg total) by mouth every 8 (eight) hours 90 tablet 0    hydroCHLOROthiazide 25 mg tablet Take 2 tablets (50 mg total) by mouth daily 90 tablet 3    Incontinence Supplies MISC Use 6 (six) times a day Wipes 200 each 6    Incontinence Supplies MISC Use 4 (four) times a day Comfort shield Adult diapers 200 each 6    Incontinence Supply Disposable MISC Use 6 (six) times a day Dispense disposable bed pad 200 each 6    levothyroxine 100 mcg tablet Take 1 tablet (100 mcg total) by mouth daily 90 tablet 1    losartan (COZAAR) 100 MG tablet TAKE 1 TABLET BY MOUTH EVERY DAY 90 tablet 0    loteprednol etabonate (LOTEMAX) 0.5 % ophthalmic suspension Administer 1 drop to both eyes 4 (four) times a day      memantine (NAMENDA) 10 mg tablet Take 1 tablet (10 mg total) by mouth 2 (two) times a day 200 tablet 1    montelukast (SINGULAIR) 10 mg tablet TAKE 1 TABLET BY MOUTH DAILY AT BEDTIME  90 tablet 1    omeprazole (PriLOSEC) 20 mg delayed release capsule Take 1 capsule (20 mg total) by mouth daily 90 capsule 3    OneTouch Verio test strip USE 1 EACH 2 (TWO) TIMES A DAY TEST 100 strip 0    sertraline (ZOLOFT) 50 mg tablet Take 1 tablet (50 mg total) by mouth daily 90 tablet 1    Xiidra 5 % op solution INSTILL 1 DROP IN BOTH EYES 2 TIMES PER DAY      [DISCONTINUED] semaglutide, 0.25 or 0.5 mg/dose, (Ozempic, 0.25 or 0.5 MG/DOSE,) 2 mg/3 mL injection pen 0.25 mg under the skin every 7 days for 4 doses (28 days), THEN 0.5 mg under the skin every 7 days 9 mL 0     No current facility-administered medications on file prior to visit.     Allergies   Allergen Reactions    Penicillins Itching and Swelling    Pollen Extract Allergic Rhinitis      Current Outpatient Medications on File Prior to Visit   Medication Sig Dispense Refill    amLODIPine (NORVASC) 10 mg tablet Take 1 tablet (10 mg total) by mouth daily At night before bed 90 tablet 1    apixaban (ELIQUIS) 5 mg Take 1 tablet (5 mg total) by mouth 2 (two) times a day 120 tablet 0    BD Pen Needle Elisabeth 2nd Gen 32G X 4 MM MISC USE DAILY AS DIRECTED 100 each 1    benzonatate (TESSALON) 200 MG capsule Take 1 capsule (200 mg total) by mouth 2 (two) times a day as needed for cough 30 capsule 2    Blood Glucose Monitoring Suppl (OneTouch Verio Reflect) w/Device KIT USE 2 (TWO) TIMES A DAY 1 kit 0    Blood Pressure KIT Use daily 1 kit 0    cholestyramine (QUESTRAN) 4 g packet Take 1 packet (4 g total) by mouth 2 (two) times a day with meals (Patient taking differently: Take 1 packet by mouth if needed) 180 packet 3    Continuous Blood Gluc  (FreeStyle Aruna 2 [place]) MEREDITH Use 1 Device 3 (three) times a day before meals (Patient not taking: Reported on 9/16/2024) 1 each 0    Continuous Blood Gluc Sensor (FreeStyle Aruna 2 Sensor) MISC Check blood sugars multiple times per day (Patient not taking: Reported on 9/16/2024) 6 each 0    cyanocobalamin (VITAMIN  B-12) 1000 MCG tablet Take 1 tablet (1,000 mcg total) by mouth daily      Diclofenac Sodium (VOLTAREN) 1 % Apply 2 g topically 3 (three) times a day 150 g 2    fenofibrate (TRICOR) 54 MG tablet Take 1 tablet (54 mg total) by mouth daily 90 tablet 6    fluticasone (FLONASE) 50 mcg/act nasal spray SPRAY 2 SPRAYS INTO EACH NOSTRIL EVERY DAY 16 mL 1    gabapentin (Neurontin) 100 mg capsule Take 1 capsule (100 mg total) by mouth 3 (three) times a day (Patient taking differently: Take 100 mg by mouth if needed) 270 capsule 0    hydrALAZINE (APRESOLINE) 25 mg tablet Take 1 tablet (25 mg total) by mouth every 8 (eight) hours 90 tablet 0    hydroCHLOROthiazide 25 mg tablet Take 2 tablets (50 mg total) by mouth daily 90 tablet 3    Incontinence Supplies MISC Use 6 (six) times a day Wipes 200 each 6    Incontinence Supplies MISC Use 4 (four) times a day Comfort shield Adult diapers 200 each 6    Incontinence Supply Disposable MISC Use 6 (six) times a day Dispense disposable bed pad 200 each 6    levothyroxine 100 mcg tablet Take 1 tablet (100 mcg total) by mouth daily 90 tablet 1    losartan (COZAAR) 100 MG tablet TAKE 1 TABLET BY MOUTH EVERY DAY 90 tablet 0    loteprednol etabonate (LOTEMAX) 0.5 % ophthalmic suspension Administer 1 drop to both eyes 4 (four) times a day      memantine (NAMENDA) 10 mg tablet Take 1 tablet (10 mg total) by mouth 2 (two) times a day 200 tablet 1    montelukast (SINGULAIR) 10 mg tablet TAKE 1 TABLET BY MOUTH DAILY AT BEDTIME 90 tablet 1    omeprazole (PriLOSEC) 20 mg delayed release capsule Take 1 capsule (20 mg total) by mouth daily 90 capsule 3    OneTouch Verio test strip USE 1 EACH 2 (TWO) TIMES A DAY TEST 100 strip 0    sertraline (ZOLOFT) 50 mg tablet Take 1 tablet (50 mg total) by mouth daily 90 tablet 1    Xiidra 5 % op solution INSTILL 1 DROP IN BOTH EYES 2 TIMES PER DAY      [DISCONTINUED] semaglutide, 0.25 or 0.5 mg/dose, (Ozempic, 0.25 or 0.5 MG/DOSE,) 2 mg/3 mL injection pen 0.25 mg  under the skin every 7 days for 4 doses (28 days), THEN 0.5 mg under the skin every 7 days 9 mL 0     No current facility-administered medications on file prior to visit.      Social History     Tobacco Use    Smoking status: Former     Current packs/day: 0.00     Average packs/day: 0.3 packs/day for 30.0 years (7.5 ttl pk-yrs)     Types: Cigarettes     Start date:      Quit date:      Years since quittin.8     Passive exposure: Past    Smokeless tobacco: Never   Vaping Use    Vaping status: Never Used   Substance and Sexual Activity    Alcohol use: Never    Drug use: No    Sexual activity: Not Currently     Partners: Male         Objective     /78   Pulse 74   Temp 99.1 °F (37.3 °C)   Ht 5' (1.524 m)   Wt 58.5 kg (129 lb)   SpO2 98%   BMI 25.19 kg/m²     Physical Exam  Vitals and nursing note reviewed.   Constitutional:       General: She is not in acute distress.     Appearance: She is well-developed.      Comments: Aspects of physical exam limited to language barrier   HENT:      Head: Normocephalic and atraumatic.   Eyes:      Conjunctiva/sclera: Conjunctivae normal.   Cardiovascular:      Rate and Rhythm: Normal rate and regular rhythm.      Heart sounds: No murmur heard.  Pulmonary:      Effort: Pulmonary effort is normal. No respiratory distress.      Breath sounds: Normal breath sounds.   Abdominal:      General: Abdomen is flat.   Musculoskeletal:         General: No swelling.      Cervical back: Neck supple.   Skin:     General: Skin is warm and dry.      Capillary Refill: Capillary refill takes less than 2 seconds.   Neurological:      Mental Status: She is alert. Mental status is at baseline.   Psychiatric:         Mood and Affect: Mood normal.         Behavior: Behavior is cooperative.       Administrative Statements   I have spent a total time of 20 minutes in caring for this patient on the day of the visit/encounter including Diagnostic results, Prognosis, Risks and benefits  of tx options, Instructions for management, Patient and family education, Importance of tx compliance, Risk factor reductions, Impressions, Counseling / Coordination of care, Documenting in the medical record, Reviewing / ordering tests, medicine, procedures  , Obtaining or reviewing history  , and Communicating with other healthcare professionals .

## 2024-10-21 ENCOUNTER — TELEPHONE (OUTPATIENT)
Dept: FAMILY MEDICINE CLINIC | Facility: CLINIC | Age: 84
End: 2024-10-21

## 2024-10-21 DIAGNOSIS — R09.81 NASAL CONGESTION: ICD-10-CM

## 2024-10-21 RX ORDER — FLUTICASONE PROPIONATE 50 MCG
2 SPRAY, SUSPENSION (ML) NASAL DAILY
Qty: 48 G | Refills: 1 | Status: SHIPPED | OUTPATIENT
Start: 2024-10-21

## 2024-10-21 NOTE — TELEPHONE ENCOUNTER
Reason for call:   [x] Refill   [] Prior Auth  [] Other:     Office:   [x] PCP/Provider - Elver Lynne  [] Specialty/Provider -     Medication: Fluticasone nasal spray     Dose/Frequency: 2 spray each nostril Daily     Quantity: 16 ml     Pharmacy: [facility] [place] [address]    Does the patient have enough for 3 days?   [] Yes   [x] No - Send as HP to POD

## 2024-10-21 NOTE — TELEPHONE ENCOUNTER
Patient called the RX Refill Line. Message is being forwarded to the office.     Patient would like to know for Hydralazine 25 mg Q 8 HRS will patient still taking, was given at discharge from [facility]. Will patient still be taking Hydrochlorothiazide 25 mg Daily     Please contact patient at [phone] if still taking please send refills to   [facility]/pharmacy #[id number] - [place], [place] - [address] [phone]

## 2024-10-21 NOTE — TELEPHONE ENCOUNTER
Please explain to her that hydrochlorothiazide is for blood pressure and hydroxyzine is for anxiety.

## 2024-10-22 DIAGNOSIS — I82.403 LEG DVT (DEEP VENOUS THROMBOEMBOLISM), ACUTE, BILATERAL (HCC): ICD-10-CM

## 2024-10-22 DIAGNOSIS — E11.22 TYPE 2 DIABETES MELLITUS WITH STAGE 3B CHRONIC KIDNEY DISEASE, WITHOUT LONG-TERM CURRENT USE OF INSULIN (HCC): ICD-10-CM

## 2024-10-22 DIAGNOSIS — N18.32 TYPE 2 DIABETES MELLITUS WITH STAGE 3B CHRONIC KIDNEY DISEASE, WITHOUT LONG-TERM CURRENT USE OF INSULIN (HCC): ICD-10-CM

## 2024-10-23 RX ORDER — APIXABAN 5 MG/1
5 TABLET, FILM COATED ORAL 2 TIMES DAILY
Qty: 120 TABLET | Refills: 0 | Status: SHIPPED | OUTPATIENT
Start: 2024-10-23

## 2024-10-23 RX ORDER — SEMAGLUTIDE 0.68 MG/ML
INJECTION, SOLUTION SUBCUTANEOUS
Qty: 9 ML | Refills: 0 | Status: SHIPPED | OUTPATIENT
Start: 2024-10-23

## 2024-10-29 ENCOUNTER — OFFICE VISIT (OUTPATIENT)
Dept: CARDIOLOGY CLINIC | Facility: CLINIC | Age: 84
End: 2024-10-29
Payer: COMMERCIAL

## 2024-10-29 VITALS
WEIGHT: 130.4 LBS | HEART RATE: 72 BPM | SYSTOLIC BLOOD PRESSURE: 148 MMHG | BODY MASS INDEX: 25.6 KG/M2 | HEIGHT: 60 IN | DIASTOLIC BLOOD PRESSURE: 68 MMHG

## 2024-10-29 DIAGNOSIS — I10 ESSENTIAL HYPERTENSION: ICD-10-CM

## 2024-10-29 DIAGNOSIS — I16.0 HYPERTENSIVE URGENCY: ICD-10-CM

## 2024-10-29 DIAGNOSIS — I10 PRIMARY HYPERTENSION: ICD-10-CM

## 2024-10-29 DIAGNOSIS — E78.2 MIXED HYPERLIPIDEMIA: Primary | Chronic | ICD-10-CM

## 2024-10-29 PROCEDURE — 99213 OFFICE O/P EST LOW 20 MIN: CPT | Performed by: PHYSICIAN ASSISTANT

## 2024-10-29 RX ORDER — LOSARTAN POTASSIUM 100 MG/1
100 TABLET ORAL DAILY
Qty: 90 TABLET | Refills: 3 | Status: SHIPPED | OUTPATIENT
Start: 2024-10-29

## 2024-10-29 RX ORDER — HYDROCHLOROTHIAZIDE 25 MG/1
50 TABLET ORAL DAILY
Qty: 90 TABLET | Refills: 3 | Status: SHIPPED | OUTPATIENT
Start: 2024-10-29

## 2024-10-29 RX ORDER — HYDRALAZINE HYDROCHLORIDE 25 MG/1
25 TABLET, FILM COATED ORAL EVERY 8 HOURS SCHEDULED
Qty: 270 TABLET | Refills: 3 | Status: SHIPPED | OUTPATIENT
Start: 2024-10-29

## 2024-10-29 NOTE — ASSESSMENT & PLAN NOTE
Controlled on current medical regimen  Continue amlodipine 10 mg daily hydralazine 25 mg 3 times daily hydrochlorothiazide 50 mg daily and losartan 100 mg daily  Patient did not tolerate lisinopril--dry cough

## 2024-10-29 NOTE — PROGRESS NOTES
[facility]   Outpatient Note  Michelle Villatoro  1940  [id number]  [facility] [address] [address] [phone]    Michelle Villatoro is a 84 y.o. female    Assessment and Plan:   Mixed hyperlipidemia  Not on statin therapy  And fenofibrate    Essential hypertension  Controlled on current medical regimen  Continue amlodipine 10 mg daily hydralazine 25 mg 3 times daily hydrochlorothiazide 50 mg daily and losartan 100 mg daily  Patient did not tolerate lisinopril--dry cough        Additional Plan:   No Medication changes made or testing ordered today.     Available lab and test results are reviewed with the patient as noted.    Return visit will be in six months or earlier if there are problems.     The patient is encouraged to call in the meantime if there are questions or concerns.       Subjective:   The patient is seen in the office today as a follow-up.   She is accompanied by her granddaughter who is translating for her.  Blood pressure is still elevated today in the office.  She is on multiple medications for blood pressure control including Norvasc hydralazine hydrochlorothiazide and losartan.  She did not tolerate lisinopril she was complaining of a dry cough.  This has resolved since being on losartan.  She has recently started Eliquis for DVT.  Edema has resolved.    She is complaining of blood pressure being slightly elevated at home however blood pressure is well-controlled in the office.  I have asked her granddaughter to bring her blood pressure cuff into the office for comparison.    Perspective she has no exertional chest pain shortness of breath or palpitations.  She denies any TIA or CVA symptoms.  She denies any orthopnea or PND.  She has no syncope or near syncope.        Social History  Social History     Tobacco Use   Smoking Status Former    Current packs/day: 0.00    Average  packs/day: 0.3 packs/day for 30.0 years (7.5 ttl pk-yrs)    Types: Cigarettes    Start date:     Quit date:     Years since quittin.8    Passive exposure: Past   Smokeless Tobacco Never   ,   Social History     Substance and Sexual Activity   Alcohol Use Never   ,   Social History     Substance and Sexual Activity   Drug Use No     Family History   Problem Relation Age of Onset    Substance Abuse Mother         denied    Mental illness Mother         denied    Substance Abuse Father         denied    Mental illness Father         denied    Diabetes Brother     Blindness Brother     Arthritis Daughter     HIV Son        Medical and Surgical History  Past Medical History:   Diagnosis Date    Aggression     Alzheimer's dementia (HCC)     Arthritis     Chronic kidney disease     Clotting disorder (HCC)     Dementia (HCC)     Diabetes insipidus (HCC)     Diabetes mellitus (HCC)     High cholesterol     Hyperlipidemia     Hypertension     Kidney stone     Memory loss     Migraine     Polyneuropathy     Rheumatoid arthritis (HCC)     Serum lipids high     Stomach problems     Thyroid trouble     Urinary tract infection      Past Surgical History:   Procedure Laterality Date    CATARACT EXTRACTION      CHOLECYSTECTOMY      GALLBLADDER SURGERY      HYSTERECTOMY      [place] SURGICAL ARTHROSCOPY SHOULDER W/ROTATOR CUFF RPR Right 2019    Procedure: SHOULDER ARTHROSCOPIC ROTATOR CUFF REPAIR;  Surgeon: Betsy Conde MD;  Location: [facility];  Service: Orthopedics    ROTATOR CUFF REPAIR Right 2019         Current Outpatient Medications:     amLODIPine (NORVASC) 10 mg tablet, Take 1 tablet (10 mg total) by mouth daily At night before bed, Disp: 90 tablet, Rfl: 1    apixaban (Eliquis) 5 mg, TAKE 1 TABLET BY MOUTH TWICE A DAY, Disp: 120 tablet, Rfl: 0    BD Pen Needle Elisabeth 2nd Gen 32G X 4 MM MISC, USE DAILY AS DIRECTED, Disp: 100 each, Rfl: 1    benzonatate (TESSALON) 200 MG capsule, Take 1 capsule (200 mg  total) by mouth 2 (two) times a day as needed for cough, Disp: 30 capsule, Rfl: 2    Blood Glucose Monitoring Suppl (OneTouch Verio Reflect) w/Device KIT, USE 2 (TWO) TIMES A DAY, Disp: 1 kit, Rfl: 0    Blood Pressure KIT, Use daily, Disp: 1 kit, Rfl: 0    cholestyramine (QUESTRAN) 4 g packet, Take 1 packet (4 g total) by mouth 2 (two) times a day with meals (Patient taking differently: Take 1 packet by mouth if needed), Disp: 180 packet, Rfl: 3    Continuous Blood Gluc  (FreeStyle Aruna 2 [place]) MEREDITH, Use 1 Device 3 (three) times a day before meals, Disp: 1 each, Rfl: 0    Continuous Blood Gluc Sensor (FreeStyle Aruna 2 Sensor) MISC, Check blood sugars multiple times per day, Disp: 6 each, Rfl: 0    cyanocobalamin (VITAMIN B-12) 1000 MCG tablet, Take 1 tablet (1,000 mcg total) by mouth daily, Disp: , Rfl:     Diclofenac Sodium (VOLTAREN) 1 %, Apply 2 g topically 3 (three) times a day, Disp: 150 g, Rfl: 2    fenofibrate (TRICOR) 54 MG tablet, Take 1 tablet (54 mg total) by mouth daily, Disp: 90 tablet, Rfl: 6    fluticasone (FLONASE) 50 mcg/act nasal spray, 2 sprays into each nostril daily, Disp: 48 g, Rfl: 1    gabapentin (Neurontin) 100 mg capsule, Take 1 capsule (100 mg total) by mouth 3 (three) times a day (Patient taking differently: Take 100 mg by mouth if needed), Disp: 270 capsule, Rfl: 0    hydrALAZINE (APRESOLINE) 25 mg tablet, Take 1 tablet (25 mg total) by mouth every 8 (eight) hours, Disp: 90 tablet, Rfl: 0    hydroCHLOROthiazide 25 mg tablet, Take 2 tablets (50 mg total) by mouth daily, Disp: 90 tablet, Rfl: 3    Incontinence Supplies MISC, Use 6 (six) times a day Wipes, Disp: 200 each, Rfl: 6    Incontinence Supplies MISC, Use 4 (four) times a day Comfort shield Adult diapers, Disp: 200 each, Rfl: 6    Incontinence Supply Disposable MISC, Use 6 (six) times a day Dispense disposable bed pad, Disp: 200 each, Rfl: 6    levothyroxine 100 mcg tablet, Take 1 tablet (100 mcg total) by mouth daily,  Disp: 90 tablet, Rfl: 1    losartan (COZAAR) 100 MG tablet, TAKE 1 TABLET BY MOUTH EVERY DAY, Disp: 90 tablet, Rfl: 0    loteprednol etabonate (LOTEMAX) 0.5 % ophthalmic suspension, Administer 1 drop to both eyes 4 (four) times a day, Disp: , Rfl:     memantine (NAMENDA) 10 mg tablet, Take 1 tablet (10 mg total) by mouth 2 (two) times a day, Disp: 200 tablet, Rfl: 1    montelukast (SINGULAIR) 10 mg tablet, TAKE 1 TABLET BY MOUTH DAILY AT BEDTIME, Disp: 90 tablet, Rfl: 1    omeprazole (PriLOSEC) 20 mg delayed release capsule, Take 1 capsule (20 mg total) by mouth daily, Disp: 90 capsule, Rfl: 3    OneTouch Verio test strip, USE 1 EACH 2 (TWO) TIMES A DAY TEST, Disp: 100 strip, Rfl: 0    semaglutide, 0.25 or 0.5 mg/dose, (Ozempic, 0.25 or 0.5 MG/DOSE,) 2 mg/3 mL injection pen, 0.25 MG UNDER THE SKIN EVERY 7 DAYS FOR 4 DOSES (28 DAYS), THEN 0.5 MG UNDER THE SKIN EVERY 7 DAYS, Disp: 9 mL, Rfl: 0    sertraline (ZOLOFT) 50 mg tablet, Take 1 tablet (50 mg total) by mouth daily, Disp: 90 tablet, Rfl: 1    Xiidra 5 % op solution, INSTILL 1 DROP IN BOTH EYES 2 TIMES PER DAY, Disp: , Rfl:   Allergies   Allergen Reactions    Penicillins Itching and Swelling    Pollen Extract Allergic Rhinitis       Review of Systems   Constitutional: Negative.   HENT: Negative.     Eyes: Negative.    Cardiovascular:  Negative for chest pain, claudication, cyanosis, dyspnea on exertion, irregular heartbeat, leg swelling (With lower extremity pain), near-syncope, orthopnea, palpitations, paroxysmal nocturnal dyspnea and syncope.   Respiratory: Negative.  Negative for cough, hemoptysis, shortness of breath, sleep disturbances due to breathing, snoring, sputum production and wheezing.    Endocrine: Negative.    Hematologic/Lymphatic: Negative.    Skin: Negative.    Musculoskeletal: Negative.    Gastrointestinal: Negative.    Genitourinary: Negative.    Neurological: Negative.    Psychiatric/Behavioral: Negative.     Allergic/Immunologic: Negative.   "      Objective:   There were no vitals taken for this visit.  Physical Exam  Vitals and nursing note reviewed.   Constitutional:       Appearance: She is well-developed.   HENT:      Head: Normocephalic and atraumatic.      Mouth/Throat:      Mouth: Mucous membranes are moist.   Eyes:      General: No scleral icterus.        Right eye: No discharge.         Left eye: No discharge.      Conjunctiva/sclera: Conjunctivae normal.   Neck:      Vascular: No JVD.      Trachea: No tracheal deviation.   Cardiovascular:      Rate and Rhythm: Normal rate and regular rhythm.      Pulses: Intact distal pulses.      Heart sounds: S1 normal and S2 normal. Murmur heard.      Systolic murmur is present with a grade of 2/6.      No friction rub. No gallop.   Pulmonary:      Effort: Pulmonary effort is normal. No respiratory distress.      Breath sounds: Normal breath sounds. No wheezing or rales.   Chest:      Chest wall: No tenderness.   Abdominal:      General: Bowel sounds are normal. There is no distension.      Palpations: Abdomen is soft.      Tenderness: There is no abdominal tenderness.      Comments: Aorta not palpable    Musculoskeletal:         General: No tenderness. Normal range of motion.      Cervical back: Normal range of motion and neck supple.      Right lower leg: No edema.      Left lower leg: No edema.   Skin:     General: Skin is warm and dry.      Coloration: Skin is not pale.      Findings: No erythema or rash.   Neurological:      General: No focal deficit present.      Mental Status: She is alert and oriented to person, place, and time.   Psychiatric:         Mood and Affect: Mood normal.         Behavior: Behavior normal.         Thought Content: Thought content normal.         Judgment: Judgment normal.         Lab Review:   No results found for: \"CHOL\"  Lab Results   Component Value Date    HDL 47 (L) 05/20/2024     Lab Results   Component Value Date    LDLCALC 75 05/20/2024     Lab Results   Component " Value Date    TRIG 96 05/20/2024     Results Reviewed       None          Results Reviewed       None          Results Reviewed       None            Recent Cardiovascular Testing:   Echo 8/12/2024: Normal LVEF 60% mild LAE mild MAC RVSP 35 mmHg    ECG Review:   8/14/2024 normal sinus rhythm LAHB moderate voltage criteria for LVH may be a normal variant

## 2024-10-29 NOTE — PATIENT INSTRUCTIONS
No changes in medication at this time     Return in two weeks with BP cuff to compare with our cuff       Return office visit in 6 months if all is ok     Call in the meantime if you have any concerns

## 2024-10-31 DIAGNOSIS — E11.22 TYPE 2 DIABETES MELLITUS WITH STAGE 3B CHRONIC KIDNEY DISEASE, WITHOUT LONG-TERM CURRENT USE OF INSULIN (HCC): Primary | ICD-10-CM

## 2024-10-31 DIAGNOSIS — N18.32 TYPE 2 DIABETES MELLITUS WITH STAGE 3B CHRONIC KIDNEY DISEASE, WITHOUT LONG-TERM CURRENT USE OF INSULIN (HCC): Primary | ICD-10-CM

## 2024-10-31 RX ORDER — BLOOD-GLUCOSE SENSOR
1 EACH MISCELLANEOUS
Qty: 6 EACH | Refills: 3 | Status: SHIPPED | OUTPATIENT
Start: 2024-10-31

## 2024-10-31 RX ORDER — KETOROLAC TROMETHAMINE 30 MG/ML
1 INJECTION, SOLUTION INTRAMUSCULAR; INTRAVENOUS ONCE
Qty: 1 EACH | Refills: 0 | Status: SHIPPED | OUTPATIENT
Start: 2024-10-31 | End: 2024-10-31

## 2024-11-05 ENCOUNTER — TELEPHONE (OUTPATIENT)
Dept: FAMILY MEDICINE CLINIC | Facility: CLINIC | Age: 84
End: 2024-11-05

## 2024-11-05 NOTE — TELEPHONE ENCOUNTER
Patient called requesting refill for hydrochlorothiazide. Patient made aware medication was refilled on 10/29/24 for 90 with 3 refills to [facility] pharmacy. Patient instructed to contact the pharmacy to obtain refills of medication. Patient verbalized understanding.

## 2024-11-12 ENCOUNTER — TELEPHONE (OUTPATIENT)
Age: 84
End: 2024-11-12

## 2024-11-12 NOTE — TELEPHONE ENCOUNTER
Noted thanks   Patient reported erythema, swelling, and pain of her b/l ankles and wrists three days ago, preceding this hospitalization.  All of these symptoms have resolved, unclear etiology.  She has no reported h/o inflammatory arthritis.  - Will c/w monitoring for now

## 2024-11-12 NOTE — TELEPHONE ENCOUNTER
Oksana from residential home care called in to advise PCP that patients daughter stated that patient has some ambulatory decline. Oksana is recommend adding physical therapy to their visit if PCP agrees. Oksana can be reach at [phone].

## 2024-11-12 NOTE — TELEPHONE ENCOUNTER
Oksana notified. She said the next orders they fax over will include orders for PT and they will let the patient and her family know

## 2024-12-06 ENCOUNTER — OFFICE VISIT (OUTPATIENT)
Dept: UROLOGY | Facility: CLINIC | Age: 84
End: 2024-12-06
Payer: COMMERCIAL

## 2024-12-06 VITALS
WEIGHT: 127.8 LBS | TEMPERATURE: 97.6 F | SYSTOLIC BLOOD PRESSURE: 102 MMHG | BODY MASS INDEX: 25.09 KG/M2 | RESPIRATION RATE: 16 BRPM | HEART RATE: 67 BPM | DIASTOLIC BLOOD PRESSURE: 60 MMHG | OXYGEN SATURATION: 94 % | HEIGHT: 60 IN

## 2024-12-06 DIAGNOSIS — Z87.440 HISTORY OF UTI: Primary | ICD-10-CM

## 2024-12-06 LAB
POST-VOID RESIDUAL VOLUME, ML POC: 105 ML
POST-VOID RESIDUAL VOLUME, ML POC: 188 ML
SL AMB  POCT GLUCOSE, UA: NORMAL
SL AMB LEUKOCYTE ESTERASE,UA: NORMAL
SL AMB POCT BILIRUBIN,UA: NORMAL
SL AMB POCT BLOOD,UA: NORMAL
SL AMB POCT CLARITY,UA: YELLOW
SL AMB POCT COLOR,UA: NORMAL
SL AMB POCT KETONES,UA: NORMAL
SL AMB POCT NITRITE,UA: NORMAL
SL AMB POCT PH,UA: 5
SL AMB POCT SPECIFIC GRAVITY,UA: 1
SL AMB POCT URINE PROTEIN: NORMAL
SL AMB POCT UROBILINOGEN: 0.2

## 2024-12-06 PROCEDURE — 51798 US URINE CAPACITY MEASURE: CPT | Performed by: PHYSICIAN ASSISTANT

## 2024-12-06 PROCEDURE — 99213 OFFICE O/P EST LOW 20 MIN: CPT | Performed by: PHYSICIAN ASSISTANT

## 2024-12-06 PROCEDURE — 81002 URINALYSIS NONAUTO W/O SCOPE: CPT | Performed by: PHYSICIAN ASSISTANT

## 2024-12-06 NOTE — PROGRESS NOTES
12/6/2024      Chief Complaint   Patient presents with    Follow-up     Pt is unable to give urine pt void at 1 pm          Assessment and Plan    Incomplete bladder emptying  - Distended bladder initially noted on CT in August of this year. No hydronephrosis  -  mL at initial visit  - UA micro and urine culture (9/9/24) negative  -  mL today   - Recommend timed voiding and double voiding as well as avoiding constipation.   - Will proceed with cystoscopy for further work-up.      2. History of recurrent UTI  - Slight incomplete bladder emptying likely contributory   - Recommend adequate hydration, cranberry supplementation, D mannose and probiotics for UTI prevention  - Urine dip today negative for UTI    History of Present Illness  Michelle Villatoro is a 84 y.o. female here for follow up evaluation of above. During hospitalization earlier this year was incidentally noted on imaging to have bladder distension. No hydronephrosis. Denies any need for carr placement. Urine culture from 8/19/24 was positive for Enterococcus faecalis. Reportedly gets UTIs 2-3 times per year. Was noted to have slight incomplete bladder emptying at last visit. Was started on Estrace for UTI prevention however this was discontinued by cardiology due to recent DVT. She denies any recent UTIs or UTI symptoms currently. She reports continued issues with urinary hesitancy and at times difficulty emptying her bladder.       Review of Systems   Constitutional:  Negative for chills and fever.   Respiratory:  Negative for shortness of breath.    Cardiovascular:  Negative for chest pain.   Gastrointestinal:  Negative for abdominal pain.   Genitourinary:  Positive for difficulty urinating and frequency. Negative for dysuria, flank pain, hematuria, pelvic pain and urgency.   Neurological:  Negative for dizziness.       Past Medical History  Past Medical History:   Diagnosis Date    Aggression     Alzheimer's dementia (HCC)     Arthritis      Chronic kidney disease     Clotting disorder (HCC)     Dementia (HCC)     Diabetes insipidus (HCC)     Diabetes mellitus (HCC)     High cholesterol     Hyperlipidemia     Hypertension     Kidney stone     Memory loss     Migraine     Polyneuropathy     Rheumatoid arthritis (HCC)     Serum lipids high     Stomach problems     Thyroid trouble     Urinary tract infection        Past Social History  Past Surgical History:   Procedure Laterality Date    CATARACT EXTRACTION      CHOLECYSTECTOMY      GALLBLADDER SURGERY      HYSTERECTOMY      [place] SURGICAL ARTHROSCOPY SHOULDER W/ROTATOR CUFF RPR Right 2019    Procedure: SHOULDER ARTHROSCOPIC ROTATOR CUFF REPAIR;  Surgeon: Betsy Conde MD;  Location: [facility];  Service: Orthopedics    ROTATOR CUFF REPAIR Right 2019     Social History     Tobacco Use   Smoking Status Former    Current packs/day: 0.00    Average packs/day: 0.3 packs/day for 30.0 years (7.5 ttl pk-yrs)    Types: Cigarettes    Start date:     Quit date:     Years since quittin.9    Passive exposure: Past   Smokeless Tobacco Never       Past Family History  Family History   Problem Relation Age of Onset    Substance Abuse Mother         denied    Mental illness Mother         denied    Substance Abuse Father         denied    Mental illness Father         denied    Diabetes Brother     Blindness Brother     Arthritis Daughter     HIV Son        Past Social history  Social History     Socioeconomic History    Marital status:      Spouse name: Not on file    Number of children: Not on file    Years of education: Not on file    Highest education level: Not on file   Occupational History    Not on file   Tobacco Use    Smoking status: Former     Current packs/day: 0.00     Average packs/day: 0.3 packs/day for 30.0 years (7.5 ttl pk-yrs)     Types: Cigarettes     Start date:      Quit date:      Years since quittin.9     Passive exposure: Past    Smokeless tobacco: Never    Vaping Use    Vaping status: Never Used   Substance and Sexual Activity    Alcohol use: Never    Drug use: No    Sexual activity: Not Currently     Partners: Male   Other Topics Concern    Not on file   Social History Narrative    Not on file     Social Drivers of Health     Financial Resource Strain: Low Risk  (8/19/2024)    Received from [facility]    Overall Financial Resource Strain (CARDIA)     Difficulty of Paying Living Expenses: Not hard at all   Food Insecurity: No Food Insecurity (8/27/2024)    Nursing - Inadequate Food Risk Classification     Worried About Running Out of Food in the Last Year: Never true     Ran Out of Food in the Last Year: Never true     Ran Out of Food in the Last Year: Not on file   Transportation Needs: No Transportation Needs (8/27/2024)    PRAPARE - Transportation     Lack of Transportation (Medical): No     Lack of Transportation (Non-Medical): No   Physical Activity: Not on file   Stress: No Stress Concern Present (11/27/2019)    [employer] [place] [employer] - Occupational Stress Questionnaire     Feeling of Stress : Only a little   Social Connections: Unknown (11/27/2019)    Social Connection and Isolation Panel [NHANES]     Frequency of Communication with Friends and Family: More than three times a week     Frequency of Social Gatherings with Friends and Family: More than three times a week     Attends Mosque Services: Not on file     Active Member of Clubs or Organizations: Not on file     Attends Club or Organization Meetings: Not on file     Marital Status:    Intimate Partner Violence: Not At Risk (8/19/2024)    Received from [facility]    Humiliation, Afraid, Rape, and Kick questionnaire     Fear of Current or Ex-Partner: No     Emotionally Abused: No     Physically Abused: No     Sexually Abused: No   Housing Stability: Low Risk  (8/27/2024)    Housing Stability Vital Sign     Unable to Pay for Housing in the  Last Year: No     Number of Times Moved in the Last Year: 0     Homeless in the Last Year: No       Current Medications  Current Outpatient Medications   Medication Sig Dispense Refill    amLODIPine (NORVASC) 10 mg tablet Take 1 tablet (10 mg total) by mouth daily At night before bed 90 tablet 1    apixaban (Eliquis) 5 mg TAKE 1 TABLET BY MOUTH TWICE A  tablet 0    BD Pen Needle Elisabeth 2nd Gen 32G X 4 MM MISC USE DAILY AS DIRECTED 100 each 1    benzonatate (TESSALON) 200 MG capsule Take 1 capsule (200 mg total) by mouth 2 (two) times a day as needed for cough 30 capsule 2    Blood Glucose Monitoring Suppl (OneTouch Verio Reflect) w/Device KIT USE 2 (TWO) TIMES A DAY 1 kit 0    Blood Pressure KIT Use daily 1 kit 0    cholestyramine (QUESTRAN) 4 g packet Take 1 packet (4 g total) by mouth 2 (two) times a day with meals 180 packet 3    Continuous Blood Gluc  (FreeStyle Aruna 2 [place]) MEREDITH Use 1 Device 3 (three) times a day before meals 1 each 0    cyanocobalamin (VITAMIN B-12) 1000 MCG tablet Take 1 tablet (1,000 mcg total) by mouth daily      Diclofenac Sodium (VOLTAREN) 1 % Apply 2 g topically 3 (three) times a day 150 g 2    fenofibrate (TRICOR) 54 MG tablet Take 1 tablet (54 mg total) by mouth daily 90 tablet 6    fluticasone (FLONASE) 50 mcg/act nasal spray 2 sprays into each nostril daily 48 g 1    gabapentin (Neurontin) 100 mg capsule Take 1 capsule (100 mg total) by mouth 3 (three) times a day 270 capsule 0    hydrALAZINE (APRESOLINE) 25 mg tablet Take 1 tablet (25 mg total) by mouth every 8 (eight) hours 270 tablet 3    hydroCHLOROthiazide 25 mg tablet Take 2 tablets (50 mg total) by mouth daily 90 tablet 3    Incontinence Supplies MISC Use 6 (six) times a day Wipes 200 each 6    Incontinence Supplies MISC Use 4 (four) times a day Comfort shield Adult diapers 200 each 6    Incontinence Supply Disposable MISC Use 6 (six) times a day Dispense disposable bed pad 200 each 6    levothyroxine 100 mcg  tablet Take 1 tablet (100 mcg total) by mouth daily 90 tablet 1    losartan (COZAAR) 100 MG tablet Take 1 tablet (100 mg total) by mouth daily 90 tablet 3    loteprednol etabonate (LOTEMAX) 0.5 % ophthalmic suspension Administer 1 drop to both eyes 4 (four) times a day      memantine (NAMENDA) 10 mg tablet Take 1 tablet (10 mg total) by mouth 2 (two) times a day 200 tablet 1    montelukast (SINGULAIR) 10 mg tablet TAKE 1 TABLET BY MOUTH DAILY AT BEDTIME 90 tablet 1    omeprazole (PriLOSEC) 20 mg delayed release capsule Take 1 capsule (20 mg total) by mouth daily 90 capsule 3    OneTouch Verio test strip USE 1 EACH 2 (TWO) TIMES A DAY TEST 100 strip 0    semaglutide, 0.25 or 0.5 mg/dose, (Ozempic, 0.25 or 0.5 MG/DOSE,) 2 mg/3 mL injection pen 0.25 MG UNDER THE SKIN EVERY 7 DAYS FOR 4 DOSES (28 DAYS), THEN 0.5 MG UNDER THE SKIN EVERY 7 DAYS 9 mL 0    sertraline (ZOLOFT) 50 mg tablet Take 1 tablet (50 mg total) by mouth daily 90 tablet 1    Xiidra 5 % op solution INSTILL 1 DROP IN BOTH EYES 2 TIMES PER DAY      Continuous Blood Gluc Sensor (FreeStyle Aruna 2 Sensor) MISC Check blood sugars multiple times per day (Patient not taking: Reported on 10/29/2024) 6 each 0    Continuous Glucose Sensor (FreeStyle Aruna 3 Sensor) [facility] Use 1 each every 14 (fourteen) days (Patient not taking: Reported on 12/6/2024) 6 each 3     No current facility-administered medications for this visit.       Allergies  Allergies   Allergen Reactions    Penicillins Itching and Swelling    Lisinopril Cough    Pollen Extract Allergic Rhinitis         The following portions of the patient's history were reviewed and updated as appropriate: allergies, current medications, past medical history, past social history, past surgical history and problem list.      Vitals  Vitals:    12/06/24 1402   BP: 102/60   BP Location: Left arm   Patient Position: Sitting   Cuff Size: Standard   Pulse: 67   Resp: 16   Temp: 97.6 °F (36.4 °C)   TempSrc: Temporal  "  SpO2: 94%   Weight: 58 kg (127 lb 12.8 oz)   Height: 5' (1.524 m)           Physical Exam  Physical Exam  Constitutional:       Appearance: Normal appearance.   HENT:      Head: Normocephalic and atraumatic.      Right Ear: External ear normal.      Left Ear: External ear normal.      Nose: Nose normal.   Eyes:      General: No scleral icterus.     Conjunctiva/sclera: Conjunctivae normal.   Cardiovascular:      Pulses: Normal pulses.   Pulmonary:      Effort: Pulmonary effort is normal.   Musculoskeletal:         General: Normal range of motion.      Cervical back: Normal range of motion.   Neurological:      General: No focal deficit present.      Mental Status: She is alert and oriented to person, place, and time.   Psychiatric:         Mood and Affect: Mood normal.         Behavior: Behavior normal.         Thought Content: Thought content normal.         Judgment: Judgment normal.           Results  Recent Results (from the past hour)   POCT Measure PVR    Collection Time: 12/06/24  2:11 PM   Result Value Ref Range    POST-VOID RESIDUAL VOLUME, ML  mL   ]  No results found for: \"PSA\"  Lab Results   Component Value Date    GLUCOSE 116 08/01/2019    CALCIUM 9.8 09/12/2024    K 4.1 09/12/2024    CO2 27 09/12/2024    CL 99 09/12/2024    BUN 29 (H) 09/12/2024    CREATININE 0.97 09/12/2024     Lab Results   Component Value Date    WBC 4.68 09/12/2024    HGB 9.9 (L) 09/12/2024    HCT 30.4 (L) 09/12/2024    MCV 88 09/12/2024     09/12/2024           Orders  Orders Placed This Encounter   Procedures    POCT Measure PVR       Candy Dorado      "

## 2024-12-10 ENCOUNTER — CONSULT (OUTPATIENT)
Dept: VASCULAR SURGERY | Facility: CLINIC | Age: 84
End: 2024-12-10
Payer: COMMERCIAL

## 2024-12-10 VITALS
OXYGEN SATURATION: 97 % | HEIGHT: 60 IN | DIASTOLIC BLOOD PRESSURE: 56 MMHG | RESPIRATION RATE: 16 BRPM | SYSTOLIC BLOOD PRESSURE: 118 MMHG | HEART RATE: 70 BPM | TEMPERATURE: 97.8 F | WEIGHT: 132 LBS | BODY MASS INDEX: 25.91 KG/M2

## 2024-12-10 DIAGNOSIS — G30.1 MILD LATE ONSET ALZHEIMER'S DEMENTIA WITHOUT BEHAVIORAL DISTURBANCE, PSYCHOTIC DISTURBANCE, MOOD DISTURBANCE, OR ANXIETY (HCC): ICD-10-CM

## 2024-12-10 DIAGNOSIS — G62.9 PERIPHERAL POLYNEUROPATHY: Chronic | ICD-10-CM

## 2024-12-10 DIAGNOSIS — I82.403 DEEP VEIN THROMBOSIS (DVT) OF BOTH LOWER EXTREMITIES, UNSPECIFIED CHRONICITY, UNSPECIFIED VEIN (HCC): Primary | ICD-10-CM

## 2024-12-10 DIAGNOSIS — E11.22 TYPE 2 DIABETES MELLITUS WITH STAGE 3B CHRONIC KIDNEY DISEASE, WITHOUT LONG-TERM CURRENT USE OF INSULIN (HCC): ICD-10-CM

## 2024-12-10 DIAGNOSIS — N18.32 TYPE 2 DIABETES MELLITUS WITH STAGE 3B CHRONIC KIDNEY DISEASE, WITHOUT LONG-TERM CURRENT USE OF INSULIN (HCC): ICD-10-CM

## 2024-12-10 DIAGNOSIS — F02.A0 MILD LATE ONSET ALZHEIMER'S DEMENTIA WITHOUT BEHAVIORAL DISTURBANCE, PSYCHOTIC DISTURBANCE, MOOD DISTURBANCE, OR ANXIETY (HCC): ICD-10-CM

## 2024-12-10 DIAGNOSIS — I82.463 ACUTE DEEP VEIN THROMBOSIS (DVT) OF CALF MUSCLE VEIN OF BOTH LOWER EXTREMITIES (HCC): ICD-10-CM

## 2024-12-10 PROBLEM — E78.2 MIXED HYPERLIPIDEMIA: Chronic | Status: RESOLVED | Noted: 2020-04-21 | Resolved: 2024-12-10

## 2024-12-10 PROCEDURE — 99203 OFFICE O/P NEW LOW 30 MIN: CPT | Performed by: SURGERY

## 2024-12-10 RX ORDER — ASPIRIN 81 MG/1
81 TABLET, CHEWABLE ORAL DAILY
Start: 2024-12-10

## 2024-12-10 NOTE — PROGRESS NOTES
Assessment/Plan:     Pt is an 83 yo F w/ HTN, hemorrhoids, carotid stenosis, DM, hypothyroidism, Alzheimers, RA, CKD, anxiety, MDD, back pain, HLD, peripheral neuropathy    Acute deep vein thrombosis (DVT) of calf muscle vein of both lower extremities (HCC)  -     Ambulatory Referral to Vascular Surgery  -     aspirin 81 mg chewable tablet; Chew 1 tablet (81 mg total) daily  -first episode, provoked (recent hospitalizations) BLE calf vein DVT  -reviewed LEV 8/29/24 which shows B acute DVT of the soleal veins  -reviewed LEV 9/6/24 which shows same  -per CHEST guidelines, first episode provoked DVT should be treated with 3-6 months therapeutic anticoagulation; given limited extent only in the soleal veins, I think that 3 months is sufficienc and she has completed this; discussed options at this point of low dose eliquis for ppx vs apsirin 81mg.  Given her high fall risk, I have recommended aspirin for this patient; she/family are in agreement  -d/c eliquis  -start aspirin 81mg once daily  -advised to wear compression, elevate legs, stay active to help with leg symptoms; patient also with some chronic swelling and other B leg pain prior to this DVT  -f/u PRN    Type 2 diabetes mellitus with stage 3b chronic kidney disease, without long-term current use of insulin (HCC)  -A1C: 7.6  -saw endocrine since hospitalization; per patient, f/u PRN    Mild late onset Alzheimer's dementia without behavioral disturbance, psychotic disturbance, mood disturbance, or anxiety (HCC)  -some short term memory loss; family provided hx    Peripheral polyneuropathy  -likely contributing to her BLE pain      Subjective:     Patient ID: Michelle Villatoro is a 84 y.o. female.    HPI:    Patient referred for evaluation of DVT.    Patient was diagnosed with B soleal vein DVT in later Aug '24.  She has been continued on eliquis since then without issue.  Prior to this, she had had multiple admissions for hypo/hyperglycemia, altered mental status, and  uncontrolled BP ([facility]).    Denies prior episodes of DVT    She complains of tightness and pain in the legs bilaterally. Does not wear compression currently.    She has some dementia 2/2 Alzheimers disease.  Family is translating and providing history per patient request.        Patient is here to establish care, referred by hospital. Patient has c/o b/l leg pain that worsens with walking. Patient is on Eliquis. Patient is a former smoker.    Review of Systems   Constitutional: Negative.    HENT: Negative.     Eyes: Negative.    Respiratory: Negative.     Cardiovascular: Negative.    Gastrointestinal: Negative.    Endocrine: Negative.    Genitourinary: Negative.    Musculoskeletal: Negative.    Skin: Negative.    Allergic/Immunologic: Negative.    Neurological: Negative.    Hematological: Negative.    Psychiatric/Behavioral: Negative.           Objective:     Physical Exam  Cardiovascular:      Rate and Rhythm: Normal rate and regular rhythm.      Pulses:           Radial pulses are 2+ on the right side and 2+ on the left side.        Dorsalis pedis pulses are 2+ on the right side and 2+ on the left side.        Posterior tibial pulses are 2+ on the right side and 2+ on the left side.      Heart sounds: No murmur heard.  Pulmonary:      Effort: No respiratory distress.      Breath sounds: No wheezing or rales.   Musculoskeletal:      Right lower le+ Edema present.      Left lower le+ Edema present.   Skin:     Comments: Few spiders and retics B lower legs and ankles; swelling mainly at the ankles; no wounds; no stasis changes           I have reviewed and made appropriate changes to the review of systems input by the medical assistant.    Vitals:    12/10/24 1035   BP: 118/56   BP Location: Right arm   Patient Position: Sitting   Cuff Size: Adult   Pulse: 70   Resp: 16   Temp: 97.8 °F (36.6 °C)   TempSrc: Temporal   SpO2: 97%   Weight: 59.9 kg (132 lb)   Height: 5' (1.524 m)       Patient Active Problem List    Diagnosis   • Dementia with behavioral disturbance ([facility])   • Chronic left shoulder pain   • Bilateral hearing loss   • Rheumatoid arthritis 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 ([facility])   • Memory loss   • Alzheimer's dementia ([facility])   • Type 2 diabetes mellitus with stage 3b chronic kidney disease, without long-term current use of insulin ([facility])   • Anxiety   • Hypothyroidism   • Essential hypertension   • Nontraumatic tear of right rotator cuff   • Menopause ovarian failure   • Peripheral neuropathy   • Ambulatory dysfunction   • Tear of right rotator cuff   • Biceps tendon tear   • Unstable gait   • Anemia in stage 3 chronic kidney disease  ([facility])   • Restless leg syndrome   • Leg cramps, sleep related   • Bright red blood per rectum   • Coccyalgia   • Ulcerated external hemorrhoids   • Chronic kidney disease, stage 3b ([facility])   • Herpes zoster without complication   • Post herpetic neuralgia   • Asymptomatic bilateral carotid artery stenosis   • Weakness of both lower extremities   • Chronic midline low back pain without sciatica   • Dysphagia   • Major depressive disorder, single episode, moderate ([facility])   • Unintentional weight loss   • Hypertensive kidney disease with chronic kidney disease   • Type 2 diabetes mellitus with hyperglycemia ([facility])   • Rheumatoid arthritis ([facility])   • Chronic pain of left knee   • Pancytopenia ([facility])   • Leukopenia   • Hypertensive emergency   • Hypoglycemia   • Acute deep vein thrombosis (DVT) of calf muscle vein of both lower extremities ([facility])       Past Surgical History:   Procedure Laterality Date   • CATARACT EXTRACTION     • CHOLECYSTECTOMY     • GALLBLADDER SURGERY     • HYSTERECTOMY     • CO SURGICAL ARTHROSCOPY SHOULDER W/ROTATOR CUFF RPR Right 8/1/2019    Procedure: SHOULDER ARTHROSCOPIC ROTATOR CUFF REPAIR;  Surgeon: Betsy Conde MD;  Location: [facility] OR;  Service: Orthopedics   • ROTATOR CUFF REPAIR Right 08/01/2019       Family History   Problem Relation  Age of Onset   • Substance Abuse Mother         denied   • Mental illness Mother         denied   • Substance Abuse Father         denied   • Mental illness Father         denied   • Diabetes Brother    • Blindness Brother    • Arthritis Daughter    • HIV Son        Social History     Socioeconomic History   • Marital status:      Spouse name: Not on file   • Number of children: Not on file   • Years of education: Not on file   • Highest education level: Not on file   Occupational History   • Not on file   Tobacco Use   • Smoking status: Former     Current packs/day: 0.00     Average packs/day: 0.3 packs/day for 30.0 years (7.5 ttl pk-yrs)     Types: Cigarettes     Start date:      Quit date:      Years since quittin.9     Passive exposure: Past   • Smokeless tobacco: Never   Vaping Use   • Vaping status: Never Used   Substance and Sexual Activity   • Alcohol use: Never   • Drug use: No   • Sexual activity: Not Currently     Partners: Male   Other Topics Concern   • Not on file   Social History Narrative   • Not on file     Social Drivers of Health     Financial Resource Strain: Low Risk  (2024)    Received from [facility]    Overall Financial Resource Strain (CARDIA)    • Difficulty of Paying Living Expenses: Not hard at all   Food Insecurity: No Food Insecurity (2024)    Nursing - Inadequate Food Risk Classification    • Worried About Running Out of Food in the Last Year: Never true    • Ran Out of Food in the Last Year: Never true    • Ran Out of Food in the Last Year: Not on file   Transportation Needs: No Transportation Needs (2024)    PRAPARE - Transportation    • Lack of Transportation (Medical): No    • Lack of Transportation (Non-Medical): No   Physical Activity: Not on file   Stress: No Stress Concern Present (2019)    [employer] [place] [employer] - Occupational Stress Questionnaire    • Feeling of Stress : Only a little   Social  Connections: Unknown (11/27/2019)    Social Connection and Isolation Panel [NHANES]    • Frequency of Communication with Friends and Family: More than three times a week    • Frequency of Social Gatherings with Friends and Family: More than three times a week    • Attends Worship Services: Not on file    • Active Member of Clubs or Organizations: Not on file    • Attends Club or Organization Meetings: Not on file    • Marital Status:    Intimate Partner Violence: Not At Risk (8/19/2024)    Received from [facility]    Humiliation, Afraid, Rape, and Kick questionnaire    • Fear of Current or Ex-Partner: No    • Emotionally Abused: No    • Physically Abused: No    • Sexually Abused: No   Housing Stability: Low Risk  (8/27/2024)    Housing Stability Vital Sign    • Unable to Pay for Housing in the Last Year: No    • Number of Times Moved in the Last Year: 0    • Homeless in the Last Year: No       Allergies   Allergen Reactions   • Penicillins Itching and Swelling   • Lisinopril Cough   • Pollen Extract Allergic Rhinitis         Current Outpatient Medications:   •  amLODIPine (NORVASC) 10 mg tablet, Take 1 tablet (10 mg total) by mouth daily At night before bed, Disp: 90 tablet, Rfl: 1  •  aspirin 81 mg chewable tablet, Chew 1 tablet (81 mg total) daily, Disp: , Rfl:   •  BD Pen Needle Elisabeth 2nd Gen 32G X 4 MM MISC, USE DAILY AS DIRECTED, Disp: 100 each, Rfl: 1  •  benzonatate (TESSALON) 200 MG capsule, Take 1 capsule (200 mg total) by mouth 2 (two) times a day as needed for cough, Disp: 30 capsule, Rfl: 2  •  Blood Glucose Monitoring Suppl (OneTouch Verio Reflect) w/Device KIT, USE 2 (TWO) TIMES A DAY, Disp: 1 kit, Rfl: 0  •  Blood Pressure KIT, Use daily, Disp: 1 kit, Rfl: 0  •  cholestyramine (QUESTRAN) 4 g packet, Take 1 packet (4 g total) by mouth 2 (two) times a day with meals, Disp: 180 packet, Rfl: 3  •  Continuous Blood Gluc  (FreeStyle Aruna 2 [place]) MEREDITH, Use 1 Device 3  (three) times a day before meals, Disp: 1 each, Rfl: 0  •  cyanocobalamin (VITAMIN B-12) 1000 MCG tablet, Take 1 tablet (1,000 mcg total) by mouth daily, Disp: , Rfl:   •  Diclofenac Sodium (VOLTAREN) 1 %, Apply 2 g topically 3 (three) times a day, Disp: 150 g, Rfl: 2  •  fenofibrate (TRICOR) 54 MG tablet, Take 1 tablet (54 mg total) by mouth daily, Disp: 90 tablet, Rfl: 6  •  fluticasone (FLONASE) 50 mcg/act nasal spray, 2 sprays into each nostril daily, Disp: 48 g, Rfl: 1  •  hydrALAZINE (APRESOLINE) 25 mg tablet, Take 1 tablet (25 mg total) by mouth every 8 (eight) hours, Disp: 270 tablet, Rfl: 3  •  hydroCHLOROthiazide 25 mg tablet, Take 2 tablets (50 mg total) by mouth daily, Disp: 90 tablet, Rfl: 3  •  Incontinence Supplies MISC, Use 6 (six) times a day Wipes, Disp: 200 each, Rfl: 6  •  Incontinence Supplies MISC, Use 4 (four) times a day Comfort shield Adult diapers, Disp: 200 each, Rfl: 6  •  Incontinence Supply Disposable MISC, Use 6 (six) times a day Dispense disposable bed pad, Disp: 200 each, Rfl: 6  •  levothyroxine 100 mcg tablet, Take 1 tablet (100 mcg total) by mouth daily, Disp: 90 tablet, Rfl: 1  •  losartan (COZAAR) 100 MG tablet, Take 1 tablet (100 mg total) by mouth daily, Disp: 90 tablet, Rfl: 3  •  loteprednol etabonate (LOTEMAX) 0.5 % ophthalmic suspension, Administer 1 drop to both eyes 4 (four) times a day, Disp: , Rfl:   •  memantine (NAMENDA) 10 mg tablet, Take 1 tablet (10 mg total) by mouth 2 (two) times a day, Disp: 200 tablet, Rfl: 1  •  montelukast (SINGULAIR) 10 mg tablet, TAKE 1 TABLET BY MOUTH DAILY AT BEDTIME, Disp: 90 tablet, Rfl: 1  •  omeprazole (PriLOSEC) 20 mg delayed release capsule, Take 1 capsule (20 mg total) by mouth daily, Disp: 90 capsule, Rfl: 3  •  OneTouch Verio test strip, USE 1 EACH 2 (TWO) TIMES A DAY TEST, Disp: 100 strip, Rfl: 0  •  semaglutide, 0.25 or 0.5 mg/dose, (Ozempic, 0.25 or 0.5 MG/DOSE,) 2 mg/3 mL injection pen, 0.25 MG UNDER THE SKIN EVERY 7 DAYS  FOR 4 DOSES (28 DAYS), THEN 0.5 MG UNDER THE SKIN EVERY 7 DAYS, Disp: 9 mL, Rfl: 0  •  sertraline (ZOLOFT) 50 mg tablet, Take 1 tablet (50 mg total) by mouth daily, Disp: 90 tablet, Rfl: 1  •  Xiidra 5 % op solution, INSTILL 1 DROP IN BOTH EYES 2 TIMES PER DAY, Disp: , Rfl:   •  Continuous Blood Gluc Sensor (FreeStyle Aruna 2 Sensor) MISC, Check blood sugars multiple times per day (Patient not taking: Reported on 10/29/2024), Disp: 6 each, Rfl: 0  •  Continuous Glucose Sensor (FreeStyle Aruna 3 Sensor) MISC, Use 1 each every 14 (fourteen) days (Patient not taking: Reported on 12/6/2024), Disp: 6 each, Rfl: 3  •  gabapentin (Neurontin) 100 mg capsule, Take 1 capsule (100 mg total) by mouth 3 (three) times a day, Disp: 270 capsule, Rfl: 0

## 2024-12-10 NOTE — PATIENT INSTRUCTIONS
"For compression, look for knee high, 15-20mmHg. Make sure to measure your leg to get the correct size  Try \"discountsurgical.com\" or \"amazon.com\" for some good options  "

## 2024-12-11 ENCOUNTER — TELEPHONE (OUTPATIENT)
Age: 84
End: 2024-12-11

## 2024-12-11 DIAGNOSIS — N18.32 TYPE 2 DIABETES MELLITUS WITH STAGE 3B CHRONIC KIDNEY DISEASE, WITHOUT LONG-TERM CURRENT USE OF INSULIN (HCC): Primary | ICD-10-CM

## 2024-12-11 DIAGNOSIS — R53.83 OTHER FATIGUE: ICD-10-CM

## 2024-12-11 DIAGNOSIS — E78.1 HYPERTRIGLYCERIDEMIA: ICD-10-CM

## 2024-12-11 DIAGNOSIS — E11.22 TYPE 2 DIABETES MELLITUS WITH STAGE 3B CHRONIC KIDNEY DISEASE, WITHOUT LONG-TERM CURRENT USE OF INSULIN (HCC): Primary | ICD-10-CM

## 2024-12-11 NOTE — TELEPHONE ENCOUNTER
Granddaughter called, she wanted to double check if any labs were needed for the patients upcoming appt on 12/20?     Granddaughter would like a call back, please advise.    Thank you

## 2024-12-13 ENCOUNTER — APPOINTMENT (OUTPATIENT)
Dept: LAB | Facility: CLINIC | Age: 84
End: 2024-12-13
Payer: COMMERCIAL

## 2024-12-13 DIAGNOSIS — N39.0 FREQUENT UTI: ICD-10-CM

## 2024-12-13 DIAGNOSIS — N18.32 TYPE 2 DIABETES MELLITUS WITH STAGE 3B CHRONIC KIDNEY DISEASE, WITHOUT LONG-TERM CURRENT USE OF INSULIN (HCC): ICD-10-CM

## 2024-12-13 DIAGNOSIS — D63.1 ANEMIA OF CHRONIC RENAL FAILURE, STAGE 3B  (HCC): ICD-10-CM

## 2024-12-13 DIAGNOSIS — R53.83 OTHER FATIGUE: ICD-10-CM

## 2024-12-13 DIAGNOSIS — N18.32 ANEMIA OF CHRONIC RENAL FAILURE, STAGE 3B  (HCC): ICD-10-CM

## 2024-12-13 DIAGNOSIS — N18.32 CKD STAGE 3B, GFR 30-44 ML/MIN (HCC): ICD-10-CM

## 2024-12-13 DIAGNOSIS — E87.1 HYPONATREMIA: ICD-10-CM

## 2024-12-13 DIAGNOSIS — D72.819 LEUKOPENIA, UNSPECIFIED TYPE: ICD-10-CM

## 2024-12-13 DIAGNOSIS — D64.9 NORMOCYTIC ANEMIA: ICD-10-CM

## 2024-12-13 DIAGNOSIS — E53.8 B12 DEFICIENCY: ICD-10-CM

## 2024-12-13 DIAGNOSIS — E78.1 HYPERTRIGLYCERIDEMIA: ICD-10-CM

## 2024-12-13 DIAGNOSIS — E11.22 TYPE 2 DIABETES MELLITUS WITH STAGE 3B CHRONIC KIDNEY DISEASE, WITHOUT LONG-TERM CURRENT USE OF INSULIN (HCC): ICD-10-CM

## 2024-12-13 LAB
25(OH)D3 SERPL-MCNC: 16.7 NG/ML (ref 30–100)
ALBUMIN SERPL BCG-MCNC: 4 G/DL (ref 3.5–5)
ALP SERPL-CCNC: 39 U/L (ref 34–104)
ALT SERPL W P-5'-P-CCNC: 11 U/L (ref 7–52)
ANION GAP SERPL CALCULATED.3IONS-SCNC: 5 MMOL/L (ref 4–13)
AST SERPL W P-5'-P-CCNC: 14 U/L (ref 13–39)
BASOPHILS # BLD AUTO: 0.03 THOUSANDS/ÂΜL (ref 0–0.1)
BASOPHILS NFR BLD AUTO: 1 % (ref 0–1)
BILIRUB SERPL-MCNC: 0.41 MG/DL (ref 0.2–1)
BUN SERPL-MCNC: 27 MG/DL (ref 5–25)
CALCIUM SERPL-MCNC: 10.1 MG/DL (ref 8.4–10.2)
CHLORIDE SERPL-SCNC: 107 MMOL/L (ref 96–108)
CHOLEST SERPL-MCNC: 171 MG/DL (ref ?–200)
CO2 SERPL-SCNC: 29 MMOL/L (ref 21–32)
CREAT SERPL-MCNC: 1.16 MG/DL (ref 0.6–1.3)
EOSINOPHIL # BLD AUTO: 0.21 THOUSAND/ÂΜL (ref 0–0.61)
EOSINOPHIL NFR BLD AUTO: 5 % (ref 0–6)
ERYTHROCYTE [DISTWIDTH] IN BLOOD BY AUTOMATED COUNT: 12.7 % (ref 11.6–15.1)
EST. AVERAGE GLUCOSE BLD GHB EST-MCNC: 203 MG/DL
GFR SERPL CREATININE-BSD FRML MDRD: 43 ML/MIN/1.73SQ M
GLUCOSE P FAST SERPL-MCNC: 140 MG/DL (ref 65–99)
HBA1C MFR BLD: 8.7 %
HCT VFR BLD AUTO: 33.8 % (ref 34.8–46.1)
HDLC SERPL-MCNC: 51 MG/DL
HGB BLD-MCNC: 10.8 G/DL (ref 11.5–15.4)
IMM GRANULOCYTES # BLD AUTO: 0 THOUSAND/UL (ref 0–0.2)
IMM GRANULOCYTES NFR BLD AUTO: 0 % (ref 0–2)
LDLC SERPL CALC-MCNC: 96 MG/DL (ref 0–100)
LYMPHOCYTES # BLD AUTO: 0.95 THOUSANDS/ÂΜL (ref 0.6–4.47)
LYMPHOCYTES NFR BLD AUTO: 21 % (ref 14–44)
MCH RBC QN AUTO: 28.1 PG (ref 26.8–34.3)
MCHC RBC AUTO-ENTMCNC: 32 G/DL (ref 31.4–37.4)
MCV RBC AUTO: 88 FL (ref 82–98)
MONOCYTES # BLD AUTO: 0.26 THOUSAND/ÂΜL (ref 0.17–1.22)
MONOCYTES NFR BLD AUTO: 6 % (ref 4–12)
NEUTROPHILS # BLD AUTO: 3 THOUSANDS/ÂΜL (ref 1.85–7.62)
NEUTS SEG NFR BLD AUTO: 67 % (ref 43–75)
NRBC BLD AUTO-RTO: 0 /100 WBCS
PHOSPHATE SERPL-MCNC: 2.9 MG/DL (ref 2.3–4.1)
PLATELET # BLD AUTO: 170 THOUSANDS/UL (ref 149–390)
PMV BLD AUTO: 13.5 FL (ref 8.9–12.7)
POTASSIUM SERPL-SCNC: 4.6 MMOL/L (ref 3.5–5.3)
PROT SERPL-MCNC: 6.3 G/DL (ref 6.4–8.4)
PTH-INTACT SERPL-MCNC: 25.2 PG/ML (ref 12–88)
RBC # BLD AUTO: 3.85 MILLION/UL (ref 3.81–5.12)
SODIUM SERPL-SCNC: 141 MMOL/L (ref 135–147)
TRIGL SERPL-MCNC: 119 MG/DL (ref ?–150)
VIT B12 SERPL-MCNC: 522 PG/ML (ref 180–914)
WBC # BLD AUTO: 4.45 THOUSAND/UL (ref 4.31–10.16)

## 2024-12-13 PROCEDURE — 82607 VITAMIN B-12: CPT

## 2024-12-13 PROCEDURE — 84100 ASSAY OF PHOSPHORUS: CPT

## 2024-12-13 PROCEDURE — 80053 COMPREHEN METABOLIC PANEL: CPT

## 2024-12-13 PROCEDURE — 85025 COMPLETE CBC W/AUTO DIFF WBC: CPT

## 2024-12-13 PROCEDURE — 83970 ASSAY OF PARATHORMONE: CPT

## 2024-12-13 PROCEDURE — 82306 VITAMIN D 25 HYDROXY: CPT

## 2024-12-13 PROCEDURE — 80061 LIPID PANEL: CPT

## 2024-12-13 PROCEDURE — 83036 HEMOGLOBIN GLYCOSYLATED A1C: CPT

## 2024-12-13 PROCEDURE — 36415 COLL VENOUS BLD VENIPUNCTURE: CPT

## 2024-12-14 ENCOUNTER — RESULTS FOLLOW-UP (OUTPATIENT)
Dept: FAMILY MEDICINE CLINIC | Facility: CLINIC | Age: 84
End: 2024-12-14

## 2024-12-20 ENCOUNTER — OFFICE VISIT (OUTPATIENT)
Dept: FAMILY MEDICINE CLINIC | Facility: CLINIC | Age: 84
End: 2024-12-20
Payer: COMMERCIAL

## 2024-12-20 ENCOUNTER — APPOINTMENT (OUTPATIENT)
Dept: LAB | Facility: CLINIC | Age: 84
End: 2024-12-20
Payer: COMMERCIAL

## 2024-12-20 VITALS
DIASTOLIC BLOOD PRESSURE: 60 MMHG | BODY MASS INDEX: 25.52 KG/M2 | WEIGHT: 130 LBS | SYSTOLIC BLOOD PRESSURE: 142 MMHG | OXYGEN SATURATION: 97 % | HEART RATE: 76 BPM | TEMPERATURE: 97.3 F | HEIGHT: 60 IN

## 2024-12-20 DIAGNOSIS — E11.22 TYPE 2 DIABETES MELLITUS WITH STAGE 3B CHRONIC KIDNEY DISEASE, WITHOUT LONG-TERM CURRENT USE OF INSULIN (HCC): Primary | ICD-10-CM

## 2024-12-20 DIAGNOSIS — Z23 NEEDS FLU SHOT: ICD-10-CM

## 2024-12-20 DIAGNOSIS — E55.9 VITAMIN D DEFICIENCY: ICD-10-CM

## 2024-12-20 DIAGNOSIS — N18.32 TYPE 2 DIABETES MELLITUS WITH STAGE 3B CHRONIC KIDNEY DISEASE, WITHOUT LONG-TERM CURRENT USE OF INSULIN (HCC): Primary | ICD-10-CM

## 2024-12-20 PROBLEM — E11.65 TYPE 2 DIABETES MELLITUS WITH HYPERGLYCEMIA (HCC): Chronic | Status: RESOLVED | Noted: 2024-05-24 | Resolved: 2024-12-20

## 2024-12-20 PROBLEM — R63.4 UNINTENTIONAL WEIGHT LOSS: Status: RESOLVED | Noted: 2023-07-11 | Resolved: 2024-12-20

## 2024-12-20 PROBLEM — K62.5 BRIGHT RED BLOOD PER RECTUM: Status: RESOLVED | Noted: 2021-01-25 | Resolved: 2024-12-20

## 2024-12-20 PROBLEM — E16.2 HYPOGLYCEMIA: Status: RESOLVED | Noted: 2024-08-14 | Resolved: 2024-12-20

## 2024-12-20 PROBLEM — I16.1 HYPERTENSIVE EMERGENCY: Status: RESOLVED | Noted: 2024-07-26 | Resolved: 2024-12-20

## 2024-12-20 LAB
CREAT UR-MCNC: 55.7 MG/DL
MICROALBUMIN UR-MCNC: 150 MG/L
MICROALBUMIN/CREAT 24H UR: 269 MG/G CREATININE (ref 0–30)

## 2024-12-20 PROCEDURE — 82043 UR ALBUMIN QUANTITATIVE: CPT

## 2024-12-20 PROCEDURE — 90662 IIV NO PRSV INCREASED AG IM: CPT

## 2024-12-20 PROCEDURE — 82570 ASSAY OF URINE CREATININE: CPT

## 2024-12-20 PROCEDURE — 99214 OFFICE O/P EST MOD 30 MIN: CPT | Performed by: FAMILY MEDICINE

## 2024-12-20 PROCEDURE — 87086 URINE CULTURE/COLONY COUNT: CPT

## 2024-12-20 PROCEDURE — G0008 ADMIN INFLUENZA VIRUS VAC: HCPCS

## 2024-12-20 NOTE — ASSESSMENT & PLAN NOTE
Lab Results   Component Value Date    HGBA1C 8.7 (H) 12/13/2024     Slightly above target  After discussing risks and benefits of medication along with side effects recommend to increase ozempic to 0.5 mg weekly  Orders:  •  semaglutide, 0.25 or 0.5 mg/dose, (Ozempic, 0.25 or 0.5 MG/DOSE,) 2 mg/3 mL injection pen; Inject 0.75 mL (0.5 mg total) under the skin every 7 days

## 2024-12-20 NOTE — ASSESSMENT & PLAN NOTE
Orders:  •  cholecalciferol (VITAMIN D3) 250 MCG (49309 UT) capsule; Take 1 capsule (10,000 Units total) by mouth daily

## 2024-12-20 NOTE — PROGRESS NOTES
Name: Michelle Villatoro      : 1940      MRN: [id number]  Encounter Provider: Elver Lynne MD  Encounter Date: 2024   Encounter department: [facility]  :  Assessment & Plan  Type 2 diabetes mellitus with stage 3b chronic kidney disease, without long-term current use of insulin ([facility])    Lab Results   Component Value Date    HGBA1C 8.7 (H) 2024     Slightly above target  After discussing risks and benefits of medication along with side effects recommend to increase ozempic to 0.5 mg weekly  Orders:  •  semaglutide, 0.25 or 0.5 mg/dose, (Ozempic, 0.25 or 0.5 MG/DOSE,) 2 mg/3 mL injection pen; Inject 0.75 mL (0.5 mg total) under the skin every 7 days    Vitamin D deficiency    Orders:  •  cholecalciferol (VITAMIN D3) 250 MCG (43247 UT) capsule; Take 1 capsule (10,000 Units total) by mouth daily    Needs flu shot    Orders:  •  Flu Vaccine High Dose Split Preservative Free IM    Follow up in 3 months       History of Present Illness     Patient is here to follow up for Type 2 DM. She denies any symptoms related to her diabetes. Her niece accompanying her today brought her fasting am glucose levels which have been ranging from 120-180 mg/dl in the morning currently on ozempic 0.25 mg weekly.      Review of Systems   Constitutional:  Negative for activity change, appetite change, fatigue and fever.   HENT:  Negative for congestion and ear discharge.    Respiratory:  Negative for cough and shortness of breath.    Cardiovascular:  Negative for chest pain and palpitations.   Gastrointestinal:  Negative for diarrhea and nausea.   Musculoskeletal:  Negative for arthralgias and back pain.   Skin:  Negative for color change and rash.   Neurological:  Negative for dizziness and headaches.   Psychiatric/Behavioral:  Negative for agitation and behavioral problems.        Objective   /60   Pulse 76   Temp (!) 97.3 °F (36.3 °C)   Ht 5' (1.524 m)   Wt 59 kg (130 lb)   SpO2 97%   BMI  25.39 kg/m²      Physical Exam  Vitals and nursing note reviewed.   Constitutional:       General: She is not in acute distress.     Appearance: She is well-developed.   HENT:      Head: Normocephalic and atraumatic.   Eyes:      Conjunctiva/sclera: Conjunctivae normal.   Cardiovascular:      Rate and Rhythm: Normal rate and regular rhythm.      Heart sounds: No murmur heard.  Pulmonary:      Effort: Pulmonary effort is normal. No respiratory distress.      Breath sounds: Normal breath sounds.   Abdominal:      Palpations: Abdomen is soft.      Tenderness: There is no abdominal tenderness.   Musculoskeletal:         General: No swelling.      Cervical back: Neck supple.   Skin:     General: Skin is warm and dry.      Capillary Refill: Capillary refill takes less than 2 seconds.   Neurological:      Mental Status: She is alert.   Psychiatric:         Mood and Affect: Mood normal.

## 2024-12-21 LAB — BACTERIA UR CULT: NORMAL

## 2024-12-24 DIAGNOSIS — I10 PRIMARY HYPERTENSION: ICD-10-CM

## 2024-12-24 RX ORDER — HYDROCHLOROTHIAZIDE 25 MG/1
50 TABLET ORAL DAILY
Qty: 180 TABLET | Refills: 1 | Status: SHIPPED | OUTPATIENT
Start: 2024-12-24

## 2024-12-29 DIAGNOSIS — E11.9 TYPE 2 DIABETES MELLITUS TREATED WITH INSULIN (HCC): ICD-10-CM

## 2024-12-29 DIAGNOSIS — Z79.4 TYPE 2 DIABETES MELLITUS TREATED WITH INSULIN (HCC): ICD-10-CM

## 2024-12-29 DIAGNOSIS — E03.9 HYPOTHYROIDISM, UNSPECIFIED TYPE: ICD-10-CM

## 2024-12-29 DIAGNOSIS — I10 ESSENTIAL HYPERTENSION: ICD-10-CM

## 2024-12-29 DIAGNOSIS — F41.9 ANXIETY: ICD-10-CM

## 2024-12-30 RX ORDER — LEVOTHYROXINE SODIUM 100 UG/1
100 TABLET ORAL DAILY
Qty: 90 TABLET | Refills: 1 | Status: SHIPPED | OUTPATIENT
Start: 2024-12-30

## 2024-12-30 RX ORDER — AMLODIPINE BESYLATE 10 MG/1
10 TABLET ORAL DAILY
Qty: 90 TABLET | Refills: 1 | Status: SHIPPED | OUTPATIENT
Start: 2024-12-30 | End: 2025-06-28

## 2024-12-30 RX ORDER — GABAPENTIN 100 MG/1
100 CAPSULE ORAL 3 TIMES DAILY
Qty: 270 CAPSULE | Refills: 0 | Status: SHIPPED | OUTPATIENT
Start: 2024-12-30

## 2025-01-12 DIAGNOSIS — I82.403 LEG DVT (DEEP VENOUS THROMBOEMBOLISM), ACUTE, BILATERAL (HCC): ICD-10-CM

## 2025-01-14 RX ORDER — APIXABAN 5 MG/1
5 TABLET, FILM COATED ORAL 2 TIMES DAILY
Qty: 120 TABLET | Refills: 0 | OUTPATIENT
Start: 2025-01-14

## 2025-01-24 ENCOUNTER — TELEPHONE (OUTPATIENT)
Dept: CARDIOLOGY CLINIC | Facility: CLINIC | Age: 85
End: 2025-01-24

## 2025-01-30 ENCOUNTER — TELEPHONE (OUTPATIENT)
Dept: NEPHROLOGY | Facility: CLINIC | Age: 85
End: 2025-01-30

## 2025-01-30 ENCOUNTER — TELEPHONE (OUTPATIENT)
Age: 85
End: 2025-01-30

## 2025-01-30 ENCOUNTER — OFFICE VISIT (OUTPATIENT)
Dept: NEPHROLOGY | Facility: CLINIC | Age: 85
End: 2025-01-30
Payer: COMMERCIAL

## 2025-01-30 ENCOUNTER — TELEPHONE (OUTPATIENT)
Dept: UROLOGY | Facility: CLINIC | Age: 85
End: 2025-01-30

## 2025-01-30 VITALS
RESPIRATION RATE: 16 BRPM | DIASTOLIC BLOOD PRESSURE: 66 MMHG | TEMPERATURE: 97.1 F | OXYGEN SATURATION: 97 % | HEIGHT: 60 IN | HEART RATE: 76 BPM | BODY MASS INDEX: 25.52 KG/M2 | WEIGHT: 130 LBS | SYSTOLIC BLOOD PRESSURE: 160 MMHG

## 2025-01-30 DIAGNOSIS — N18.32 TYPE 2 DIABETES MELLITUS WITH STAGE 3B CHRONIC KIDNEY DISEASE, WITHOUT LONG-TERM CURRENT USE OF INSULIN (HCC): ICD-10-CM

## 2025-01-30 DIAGNOSIS — I82.463 ACUTE DEEP VEIN THROMBOSIS (DVT) OF CALF MUSCLE VEIN OF BOTH LOWER EXTREMITIES (HCC): ICD-10-CM

## 2025-01-30 DIAGNOSIS — R80.1 PERSISTENT PROTEINURIA: ICD-10-CM

## 2025-01-30 DIAGNOSIS — N18.31 STAGE 3A CHRONIC KIDNEY DISEASE (HCC): ICD-10-CM

## 2025-01-30 DIAGNOSIS — I10 ESSENTIAL HYPERTENSION: ICD-10-CM

## 2025-01-30 DIAGNOSIS — I10 PRIMARY HYPERTENSION: Primary | ICD-10-CM

## 2025-01-30 DIAGNOSIS — E11.22 TYPE 2 DIABETES MELLITUS WITH STAGE 3B CHRONIC KIDNEY DISEASE, WITHOUT LONG-TERM CURRENT USE OF INSULIN (HCC): ICD-10-CM

## 2025-01-30 DIAGNOSIS — F02.A0 MILD LATE ONSET ALZHEIMER'S DEMENTIA WITHOUT BEHAVIORAL DISTURBANCE, PSYCHOTIC DISTURBANCE, MOOD DISTURBANCE, OR ANXIETY (HCC): ICD-10-CM

## 2025-01-30 DIAGNOSIS — G30.1 MILD LATE ONSET ALZHEIMER'S DEMENTIA WITHOUT BEHAVIORAL DISTURBANCE, PSYCHOTIC DISTURBANCE, MOOD DISTURBANCE, OR ANXIETY (HCC): ICD-10-CM

## 2025-01-30 DIAGNOSIS — D63.1 ANEMIA IN STAGE 3 CHRONIC KIDNEY DISEASE, UNSPECIFIED WHETHER STAGE 3A OR 3B CKD  (HCC): ICD-10-CM

## 2025-01-30 DIAGNOSIS — N18.30 ANEMIA IN STAGE 3 CHRONIC KIDNEY DISEASE, UNSPECIFIED WHETHER STAGE 3A OR 3B CKD  (HCC): ICD-10-CM

## 2025-01-30 PROCEDURE — G2211 COMPLEX E/M VISIT ADD ON: HCPCS | Performed by: INTERNAL MEDICINE

## 2025-01-30 PROCEDURE — 99214 OFFICE O/P EST MOD 30 MIN: CPT | Performed by: INTERNAL MEDICINE

## 2025-01-30 RX ORDER — HYDRALAZINE HYDROCHLORIDE 25 MG/1
TABLET, FILM COATED ORAL
Qty: 270 TABLET | Refills: 5 | Status: SHIPPED | OUTPATIENT
Start: 2025-01-30

## 2025-01-30 NOTE — TELEPHONE ENCOUNTER
Grand daughter speaks english confirm appt for 7/8/25 at 9 am to see Mattie aware of the cysto verbally understood no further questions ask.

## 2025-01-30 NOTE — TELEPHONE ENCOUNTER
Patients granddaughter called in stating that at her visit in December Dr Lynne agreed to write of letter stating patient needs 24 hour care. Patient currently receives home care from  [facility] and is in need of more hours. No note in chart. Please advise.

## 2025-01-30 NOTE — ASSESSMENT & PLAN NOTE
Lab Results   Component Value Date    HGBA1C 8.7 (H) 12/13/2024     Noted worsening glycemic index with hemoglobin A1c of 8.7 and worse.  Ozempic dose was increased to 0.5 by her PCP.  History glycemic control necessary to prevent progression of CKD.

## 2025-01-30 NOTE — ASSESSMENT & PLAN NOTE
Lab Results   Component Value Date    EGFR 43 12/13/2024    EGFR 54 09/12/2024    EGFR 42 08/29/2024    CREATININE 1.16 12/13/2024    CREATININE 0.97 09/12/2024    CREATININE 1.19 08/29/2024     Noted improvement in hemoglobin to be greater than 10 g/dL and improved from prior.

## 2025-01-30 NOTE — ASSESSMENT & PLAN NOTE
Noted significant improvement in urine microalbumin creatinine ratio to 265 mg/g compared to last reading.  She remains on high-dose losartan.

## 2025-01-30 NOTE — PROGRESS NOTES
Name: Michelle Villatoro      : 1940      MRN: [id number]  Encounter Provider: Yanely Davis MD  Encounter Date: 2025   Encounter department: [facility]  :  Assessment & Plan  Primary hypertension    Orders:    hydrALAZINE (APRESOLINE) 25 mg tablet; Take 2 tablets in AM, 1 tablet in mid day and 2 tablets with dinner    Patient has resistant hypertension being on 3 or more blood pressure medication including a diuretic.  Repeat blood pressure checked in the office setting is 155/70 and elevated.  Will prescribe hydralazine 50 mg in a.m., 25 mg during midday and 50 mg in p.m.  Recommended she checks her blood pressure twice daily.  Type 2 diabetes mellitus with stage 3b chronic kidney disease, without long-term current use of insulin ([facility])    Lab Results   Component Value Date    HGBA1C 8.7 (H) 2024     Noted worsening glycemic index with hemoglobin A1c of 8.7 and worse.  Ozempic dose was increased to 0.5 by her PCP.  History glycemic control necessary to prevent progression of CKD.       Stage 3a chronic kidney disease ([facility])  Lab Results   Component Value Date    EGFR 43 2024    EGFR 54 2024    EGFR 42 2024    CREATININE 1.16 2024    CREATININE 0.97 2024    CREATININE 1.19 2024       Orders:    Albumin; Standing    Albumin / creatinine urine ratio; Standing    Calcium; Standing    CBC and differential; Standing    Comprehensive metabolic panel; Standing    Phosphorus; Standing    PTH, intact; Standing    Urinalysis with microscopic; Future    Vitamin D 25 hydroxy; Standing    Serum creatinine is 1.16 with EGFR of 43.  eGFR had ranged between 42-54 over the past 1 year.  Etiology is hypertensive kidney disease, diabetic nephropathy and age-related nephron loss.  Acute deep vein thrombosis (DVT) of calf muscle vein of both lower extremities ([facility])  Diagnosed with DVT in the year .  Completed course of anticoagulation.       Mild  late onset Alzheimer's dementia without behavioral disturbance, psychotic disturbance, mood disturbance, or anxiety (HCC)  Patient has Alzheimer's dementia.       Anemia in stage 3 chronic kidney disease, unspecified whether stage 3a or 3b CKD  ([facility])  Lab Results   Component Value Date    EGFR 43 12/13/2024    EGFR 54 09/12/2024    EGFR 42 08/29/2024    CREATININE 1.16 12/13/2024    CREATININE 0.97 09/12/2024    CREATININE 1.19 08/29/2024     Noted improvement in hemoglobin to be greater than 10 g/dL and improved from prior.       Persistent proteinuria  Noted significant improvement in urine microalbumin creatinine ratio to 265 mg/g compared to last reading.  She remains on high-dose losartan.           History of Present Illness   HPI  Michelle Villatoro is a 84 y.o. female who presents to renal office for management of CKD and resistant hypertension.  Patient was seen by me last year and noted to have lower extremity edema.  Patient was sent for lower extremity Doppler which revealed bilateral DVT.  Patient appeared to have completed course of anticoagulation.  Her granddaughter states that patient blood pressure appears to be rising this month after having normalized in the month of December.  She admits to be giving her antihypertensive medication crushed which includes amlodipine, losartan, hydralazine 25 mg p.o. 3 times daily and hydrochlorothiazide 2 tablets daily.  States that patient takes hydrochlorothiazide with dinner that often result in patient urinating excessively throughout nighttime.  History obtained from: patient and patient's POA    Review of Systems   Constitutional: Negative.    HENT: Negative.     Eyes: Negative.    Respiratory: Negative.     Cardiovascular: Negative.    Gastrointestinal: Negative.    Endocrine: Negative.    Genitourinary: Negative.    Musculoskeletal: Negative.    Skin: Negative.    Allergic/Immunologic: Negative.    Neurological: Negative.    Hematological: Negative.    All  other systems reviewed and are negative.    Medical History Reviewed by provider this encounter:     .     Objective   /66 (Patient Position: Sitting, Cuff Size: Standard)   Pulse 76   Temp (!) 97.1 °F (36.2 °C) (Temporal)   Resp 16   Ht 5' (1.524 m)   Wt 59 kg (130 lb)   SpO2 97%   BMI 25.39 kg/m²      Physical Exam  Constitutional:       Appearance: She is well-developed.   HENT:      Head: Normocephalic and atraumatic.   Eyes:      Pupils: Pupils are equal, round, and reactive to light.   Cardiovascular:      Rate and Rhythm: Normal rate and regular rhythm.      Heart sounds: Normal heart sounds.   Pulmonary:      Effort: Pulmonary effort is normal.   Abdominal:      General: Bowel sounds are normal.      Palpations: Abdomen is soft.   Musculoskeletal:         General: Normal range of motion.      Cervical back: Neck supple.   Skin:     General: Skin is warm.   Neurological:      Mental Status: She is alert and oriented to person, place, and time.

## 2025-02-04 ENCOUNTER — TELEPHONE (OUTPATIENT)
Dept: FAMILY MEDICINE CLINIC | Facility: CLINIC | Age: 85
End: 2025-02-04

## 2025-02-04 NOTE — TELEPHONE ENCOUNTER
[employer] called back, asked if this could be faxed for them. Fax #[phone]  Lexie from [employer] should be the recipient of the fax. Please advise and notify once completed.

## 2025-02-04 NOTE — TELEPHONE ENCOUNTER
PA for Ozempic, 0.25 or 0.5 MG/DOSE, SUBMITTED to     via    []CMM-KEY:   []Surescripts-Case ID #   []Availity-Auth ID # NDC #   []Faxed to plan   [x]Other website PromptPA [employer] - ID [id number]  []Phone call Case ID #     [x]PA sent as URGENT    All office notes, labs and other pertaining documents and studies sent. Clinical questions answered. Awaiting determination from insurance company.     Turnaround time for your insurance to make a decision on your Prior Authorization can take 7-21 business days.

## 2025-02-04 NOTE — TELEPHONE ENCOUNTER
Sending HP to PA team as pt is out and will need next inj on Monday. Please submit as urgent    Reason for call:   [] Refill   [x] Prior Auth  [] Other:     Office:   [x] PCP/Provider - [facility]   [] Specialty/Provider -     Medication:   ~ semaglutide, 0.25 or 0.5 mg/dose, (Ozempic, 0.25 or 0.5 MG/DOSE,) 2 mg/3 mL injection pen - Inject 0.75 mL (0.5 mg total) under the skin every 7 days     Pharmacy:   [id number] - [place], [place] - [id number]     Does the patient have enough for 3 days?   [x] Yes - sending HP to PA team though as pt is out and will need next inj on Monday  [] No - Send as HP to POD

## 2025-02-06 NOTE — TELEPHONE ENCOUNTER
PA for PA for Ozempic, 0.25 or 0.5 MG/DOSE  APPROVED     Date(s) approved 02/04/2025        Patient advised by          []ELERTShart Message  []Phone call   [x]LMOM  []L/M to call office as no active Communication consent on file  []Unable to leave detailed message as VM not approved on Communication consent       Pharmacy advised by    [x]Fax  []Phone call    Approval letter scanned into Media Yes

## 2025-02-12 DIAGNOSIS — E11.22 TYPE 2 DIABETES MELLITUS WITH STAGE 3B CHRONIC KIDNEY DISEASE, WITHOUT LONG-TERM CURRENT USE OF INSULIN (HCC): ICD-10-CM

## 2025-02-12 DIAGNOSIS — N18.32 TYPE 2 DIABETES MELLITUS WITH STAGE 3B CHRONIC KIDNEY DISEASE, WITHOUT LONG-TERM CURRENT USE OF INSULIN (HCC): ICD-10-CM

## 2025-03-04 ENCOUNTER — TELEPHONE (OUTPATIENT)
Dept: FAMILY MEDICINE CLINIC | Facility: CLINIC | Age: 85
End: 2025-03-04

## 2025-03-04 DIAGNOSIS — Z79.4 TYPE 2 DIABETES MELLITUS TREATED WITH INSULIN (HCC): ICD-10-CM

## 2025-03-04 DIAGNOSIS — Z79.4 TYPE 2 DIABETES MELLITUS TREATED WITH INSULIN (HCC): Primary | ICD-10-CM

## 2025-03-04 DIAGNOSIS — M17.0 PRIMARY OSTEOARTHRITIS OF BOTH KNEES: ICD-10-CM

## 2025-03-04 DIAGNOSIS — E11.9 TYPE 2 DIABETES MELLITUS TREATED WITH INSULIN (HCC): Primary | ICD-10-CM

## 2025-03-04 DIAGNOSIS — E11.9 TYPE 2 DIABETES MELLITUS TREATED WITH INSULIN (HCC): ICD-10-CM

## 2025-03-04 DIAGNOSIS — Z91.09 ENVIRONMENTAL ALLERGIES: ICD-10-CM

## 2025-03-04 DIAGNOSIS — F03.918 DEMENTIA WITH BEHAVIORAL DISTURBANCE (HCC): ICD-10-CM

## 2025-03-04 RX ORDER — LANCETS
EACH MISCELLANEOUS 2 TIMES DAILY
Qty: 100 EACH | Refills: 2 | Status: SHIPPED | OUTPATIENT
Start: 2025-03-04 | End: 2025-03-06

## 2025-03-04 NOTE — TELEPHONE ENCOUNTER
Patient is requesting a refill on Lancets (OneTouch Delica Plus [id number]) [facility] [place]    [id number] - [place], [place] - [address]

## 2025-03-05 RX ORDER — MEMANTINE HYDROCHLORIDE 10 MG/1
10 TABLET ORAL 2 TIMES DAILY
Qty: 200 TABLET | Refills: 1 | Status: SHIPPED | OUTPATIENT
Start: 2025-03-05

## 2025-03-05 RX ORDER — GABAPENTIN 100 MG/1
100 CAPSULE ORAL 3 TIMES DAILY
Qty: 270 CAPSULE | Refills: 1 | Status: SHIPPED | OUTPATIENT
Start: 2025-03-05

## 2025-03-05 RX ORDER — MONTELUKAST SODIUM 10 MG/1
10 TABLET ORAL
Qty: 90 TABLET | Refills: 1 | Status: SHIPPED | OUTPATIENT
Start: 2025-03-05

## 2025-03-06 RX ORDER — LANCETS 30 GAUGE
EACH MISCELLANEOUS
Qty: 100 EACH | Refills: 2 | Status: SHIPPED | OUTPATIENT
Start: 2025-03-06 | End: 2025-03-06 | Stop reason: SDUPTHER

## 2025-03-06 RX ORDER — LANCETS 30 GAUGE
EACH MISCELLANEOUS
Qty: 100 EACH | Refills: 2 | Status: SHIPPED | OUTPATIENT
Start: 2025-03-06

## 2025-03-13 ENCOUNTER — TELEPHONE (OUTPATIENT)
Age: 85
End: 2025-03-13

## 2025-03-14 DIAGNOSIS — R60.0 BILATERAL LOWER EXTREMITY EDEMA: ICD-10-CM

## 2025-03-14 DIAGNOSIS — R26.2 AMBULATORY DYSFUNCTION: ICD-10-CM

## 2025-03-14 DIAGNOSIS — F03.918 DEMENTIA WITH BEHAVIORAL DISTURBANCE (HCC): Primary | ICD-10-CM

## 2025-03-16 DIAGNOSIS — E13.9 DIABETES 1.5, MANAGED AS TYPE 1 (HCC): ICD-10-CM

## 2025-03-16 RX ORDER — BLOOD SUGAR DIAGNOSTIC
1 STRIP MISCELLANEOUS 2 TIMES DAILY
Qty: 100 STRIP | Refills: 0 | Status: SHIPPED | OUTPATIENT
Start: 2025-03-16

## 2025-04-21 DIAGNOSIS — R09.81 NASAL CONGESTION: ICD-10-CM

## 2025-04-21 RX ORDER — FLUTICASONE PROPIONATE 50 MCG
SPRAY, SUSPENSION (ML) NASAL
Qty: 48 ML | Refills: 1 | Status: SHIPPED | OUTPATIENT
Start: 2025-04-21

## 2025-04-29 ENCOUNTER — OFFICE VISIT (OUTPATIENT)
Dept: CARDIOLOGY CLINIC | Facility: CLINIC | Age: 85
End: 2025-04-29
Payer: COMMERCIAL

## 2025-04-29 VITALS
HEART RATE: 72 BPM | DIASTOLIC BLOOD PRESSURE: 60 MMHG | BODY MASS INDEX: 25.91 KG/M2 | HEIGHT: 60 IN | SYSTOLIC BLOOD PRESSURE: 130 MMHG | WEIGHT: 132 LBS

## 2025-04-29 DIAGNOSIS — I10 ESSENTIAL HYPERTENSION: Primary | ICD-10-CM

## 2025-04-29 DIAGNOSIS — R60.0 LOCALIZED EDEMA: ICD-10-CM

## 2025-04-29 DIAGNOSIS — E78.2 MIXED HYPERLIPIDEMIA: ICD-10-CM

## 2025-04-29 DIAGNOSIS — I82.563 CHRONIC DEEP VEIN THROMBOSIS (DVT) OF CALF MUSCLE VEIN OF BOTH LOWER EXTREMITIES (HCC): ICD-10-CM

## 2025-04-29 PROCEDURE — 99214 OFFICE O/P EST MOD 30 MIN: CPT | Performed by: PHYSICIAN ASSISTANT

## 2025-04-29 RX ORDER — FUROSEMIDE 20 MG/1
20 TABLET ORAL 2 TIMES DAILY
Qty: 30 TABLET | Refills: 3 | Status: SHIPPED | OUTPATIENT
Start: 2025-04-29

## 2025-04-29 NOTE — ASSESSMENT & PLAN NOTE
Mild edema in bilateral LE  Will check venous doppler for recurrent DVT   In the meantime will start lasix 20 mg daily   Check BMP in 5 days

## 2025-04-29 NOTE — PATIENT INSTRUCTIONS
Will check a venous doppler to look for clots in the legs     Start water pill (Lasix/ furosemide 20 mg daily)    Check blood work in 5 days     Return in 3 months     Bring blood pressure cuff with you to your next visit     Call if you have concerns

## 2025-04-29 NOTE — PROGRESS NOTES
[facility]   Outpatient Note  Michelle Villatoro  1940  [id number]  [facility] [address] [address] [phone]    Michelle Villatoro is a 84 y.o. female    Assessment and Plan:   Essential hypertension  Controlled on current medical regimen  Continue amlodipine 10 mg daily hydralazine 25 mg 3 times daily hydrochlorothiazide 50 mg daily and losartan 100 mg daily  Patient did not tolerate lisinopril--dry cough      Mixed hyperlipidemia  Not on statin therapy  On fenofibrate    Chronic deep vein thrombosis (DVT) of calf muscle vein of both lower extremities (HCC)  Was on Eliquis  Now on ASA    Localized edema  Mild edema in bilateral LE  Will check venous doppler for recurrent DVT   In the meantime will start lasix 20 mg daily   Check BMP in 5 days         Additional Plan:   No Medication changes made or testing ordered today.     Available lab and test results are reviewed with the patient as noted.    Return visit will be in three months or earlier if there are problems.     The patient is encouraged to call in the meantime if there are questions or concerns.       Subjective:   The patient is seen in the office today as a follow-up.   She is accompanied by her granddaughter who is translating for her.  Blood pressure is still elevated today in the office.  She is on multiple medications for blood pressure control including Norvasc hydralazine hydrochlorothiazide and losartan.  She did not tolerate lisinopril she was complaining of a dry cough.  This has resolved since being on losartan.  She has started Eliquis for DVT.  Now she is on ASA. She is complaining of edema that has now recurred and is concerned that DVT has recurred since she is sedentary.     She is complaining of blood pressure being slightly elevated at home however blood pressure is well-controlled in the office.  I have asked her  granddaughter to bring her blood pressure cuff into the office for comparison.    Perspective she has no exertional chest pain shortness of breath or palpitations.  She denies any TIA or CVA symptoms.  She denies any orthopnea or PND.  She has no syncope or near syncope.        Social History  Social History     Tobacco Use   Smoking Status Former    Current packs/day: 0.00    Average packs/day: 0.3 packs/day for 30.0 years (7.5 ttl pk-yrs)    Types: Cigarettes    Start date:     Quit date:     Years since quittin.3    Passive exposure: Past   Smokeless Tobacco Never   ,   Social History     Substance and Sexual Activity   Alcohol Use Never   ,   Social History     Substance and Sexual Activity   Drug Use No     Family History   Problem Relation Age of Onset    Substance Abuse Mother         denied    Mental illness Mother         denied    Substance Abuse Father         denied    Mental illness Father         denied    Diabetes Brother     Blindness Brother     Arthritis Daughter     HIV Son        Medical and Surgical History  Past Medical History:   Diagnosis Date    Aggression     Alzheimer's dementia (HCC)     Arthritis     Chronic kidney disease     Clotting disorder (HCC)     Dementia (HCC)     Diabetes insipidus (HCC)     Diabetes mellitus (HCC)     High cholesterol     Hyperlipidemia     Hypertension     Hypertensive urgency     Kidney stone     Memory loss     Migraine     Polyneuropathy     Rheumatoid arthritis (HCC)     Serum lipids high     Stomach problems     Thyroid trouble     Urinary tract infection      Past Surgical History:   Procedure Laterality Date    CATARACT EXTRACTION      CHOLECYSTECTOMY      GALLBLADDER SURGERY      HYSTERECTOMY      IN SURGICAL ARTHROSCOPY SHOULDER W/ROTATOR CUFF RPR Right 2019    Procedure: SHOULDER ARTHROSCOPIC ROTATOR CUFF REPAIR;  Surgeon: Betsy Conde MD;  Location: [facility];  Service: Orthopedics    ROTATOR CUFF REPAIR Right 2019          Current Outpatient Medications:     amLODIPine (NORVASC) 10 mg tablet, TAKE 1 TABLET (10 MG TOTAL) BY MOUTH DAILY AT NIGHT BEFORE BED, Disp: 90 tablet, Rfl: 1    aspirin 81 mg chewable tablet, Chew 1 tablet (81 mg total) daily, Disp: , Rfl:     BD Pen Needle Elisabeth 2nd Gen 32G X 4 MM MISC, USE DAILY AS DIRECTED, Disp: 100 each, Rfl: 1    benzonatate (TESSALON) 200 MG capsule, Take 1 capsule (200 mg total) by mouth 2 (two) times a day as needed for cough, Disp: 30 capsule, Rfl: 2    Blood Glucose Monitoring Suppl (OneTouch Verio Reflect) w/Device KIT, USE 2 (TWO) TIMES A DAY, Disp: 1 kit, Rfl: 0    Blood Pressure KIT, Use daily, Disp: 1 kit, Rfl: 0    cholecalciferol (VITAMIN D3) 250 MCG (87562 UT) capsule, Take 1 capsule (10,000 Units total) by mouth daily, Disp: 90 capsule, Rfl: 3    cholestyramine (QUESTRAN) 4 g packet, Take 1 packet (4 g total) by mouth 2 (two) times a day with meals, Disp: 180 packet, Rfl: 3    Continuous Blood Gluc  (FreeStyle Aruna 2 Grinnell) MEREDITH, Use 1 Device 3 (three) times a day before meals, Disp: 1 each, Rfl: 0    Continuous Blood Gluc Sensor (FreeStyle Aruna 2 Sensor) MISC, Check blood sugars multiple times per day, Disp: 6 each, Rfl: 0    cyanocobalamin (VITAMIN B-12) 1000 MCG tablet, Take 1 tablet (1,000 mcg total) by mouth daily, Disp: , Rfl:     Diclofenac Sodium (VOLTAREN) 1 %, Apply 2 g topically 3 (three) times a day, Disp: 150 g, Rfl: 0    fenofibrate (TRICOR) 54 MG tablet, Take 1 tablet (54 mg total) by mouth daily, Disp: 90 tablet, Rfl: 6    fluticasone (FLONASE) 50 mcg/act nasal spray, SPRAY 2 SPRAYS INTO EACH NOSTRIL EVERY DAY, Disp: 48 mL, Rfl: 1    furosemide (LASIX) 20 mg tablet, Take 1 tablet (20 mg total) by mouth 2 (two) times a day, Disp: 30 tablet, Rfl: 3    gabapentin (NEURONTIN) 100 mg capsule, TAKE 1 CAPSULE BY MOUTH THREE TIMES A DAY, Disp: 270 capsule, Rfl: 1    glucose blood (OneTouch Verio) test strip, Use 1 each 2 (two) times a day Test, Disp:  100 strip, Rfl: 0    hydrALAZINE (APRESOLINE) 25 mg tablet, Take 2 tablets in AM, 1 tablet in mid day and 2 tablets with dinner, Disp: 270 tablet, Rfl: 5    hydroCHLOROthiazide 25 mg tablet, TAKE 2 TABLETS BY MOUTH EVERY DAY, Disp: 180 tablet, Rfl: 1    Incontinence Supplies MISC, Use 6 (six) times a day Wipes, Disp: 200 each, Rfl: 6    Incontinence Supplies MISC, Use 4 (four) times a day Comfort shield Adult diapers, Disp: 200 each, Rfl: 6    Incontinence Supply Disposable MISC, Use 6 (six) times a day Dispense disposable bed pad, Disp: 200 each, Rfl: 6    levothyroxine 100 mcg tablet, TAKE 1 TABLET BY MOUTH EVERY DAY, Disp: 90 tablet, Rfl: 1    losartan (COZAAR) 100 MG tablet, Take 1 tablet (100 mg total) by mouth daily, Disp: 90 tablet, Rfl: 3    loteprednol etabonate (LOTEMAX) 0.5 % ophthalmic suspension, Administer 1 drop to both eyes 4 (four) times a day, Disp: , Rfl:     memantine (NAMENDA) 10 mg tablet, TAKE 1 TABLET BY MOUTH TWICE A DAY, Disp: 200 tablet, Rfl: 1    montelukast (SINGULAIR) 10 mg tablet, TAKE 1 TABLET BY MOUTH EVERYDAY AT BEDTIME, Disp: 90 tablet, Rfl: 1    omeprazole (PriLOSEC) 20 mg delayed release capsule, Take 1 capsule (20 mg total) by mouth daily, Disp: 90 capsule, Rfl: 3    OneTouch UltraSoft 2 Lancets MISC, USE 2 (TWO) TIMES A DAY, Disp: 100 each, Rfl: 2    semaglutide, 0.25 or 0.5 mg/dose, (Ozempic, 0.25 or 0.5 MG/DOSE,) 2 mg/3 mL injection pen, Inject 0.75 mL (0.5 mg total) under the skin every 7 days, Disp: 3 mL, Rfl: 3    sertraline (ZOLOFT) 50 mg tablet, TAKE 1 TABLET BY MOUTH EVERY DAY, Disp: 90 tablet, Rfl: 1    Xiidra 5 % op solution, INSTILL 1 DROP IN BOTH EYES 2 TIMES PER DAY, Disp: , Rfl:   Allergies   Allergen Reactions    Penicillins Itching and Swelling    Lisinopril Cough    Pollen Extract Allergic Rhinitis       Review of Systems   Constitutional: Negative.   HENT: Negative.     Eyes: Negative.    Cardiovascular:  Negative for chest pain, claudication, cyanosis,  dyspnea on exertion, irregular heartbeat, leg swelling (With lower extremity pain), near-syncope, orthopnea, palpitations, paroxysmal nocturnal dyspnea and syncope.   Respiratory: Negative.  Negative for cough, hemoptysis, shortness of breath, sleep disturbances due to breathing, snoring, sputum production and wheezing.    Endocrine: Negative.    Hematologic/Lymphatic: Negative.    Skin: Negative.    Musculoskeletal: Negative.    Gastrointestinal: Negative.    Genitourinary: Negative.    Neurological: Negative.    Psychiatric/Behavioral: Negative.     Allergic/Immunologic: Negative.        Objective:   /60   Pulse 72   Ht 5' (1.524 m)   Wt 59.9 kg (132 lb)   BMI 25.78 kg/m²   Physical Exam  Vitals and nursing note reviewed.   Constitutional:       Appearance: She is well-developed.   HENT:      Head: Normocephalic and atraumatic.      Mouth/Throat:      Mouth: Mucous membranes are moist.   Eyes:      General: No scleral icterus.        Right eye: No discharge.         Left eye: No discharge.      Conjunctiva/sclera: Conjunctivae normal.   Neck:      Vascular: No JVD.      Trachea: No tracheal deviation.   Cardiovascular:      Rate and Rhythm: Normal rate and regular rhythm.      Pulses: Intact distal pulses.      Heart sounds: S1 normal and S2 normal. Murmur heard.      Systolic murmur is present with a grade of 2/6.      No friction rub. No gallop.   Pulmonary:      [place]: Pulmonary effort is normal. No respiratory distress.      Breath sounds: Normal breath sounds. No wheezing or rales.   Chest:      Chest wall: No tenderness.   Abdominal:      General: Bowel sounds are normal. There is no distension.      Palpations: Abdomen is soft.      Tenderness: There is no abdominal tenderness.      Comments: Aorta not palpable    Musculoskeletal:         General: No tenderness. Normal range of motion.      Cervical back: Normal range of motion and neck supple.      Right lower leg: Edema (mild) present.       "Left lower leg: Edema (mild) present.   Skin:     General: Skin is warm and dry.      Coloration: Skin is not pale.      Findings: No erythema or rash.   Neurological:      General: No focal deficit present.      Mental Status: She is alert and oriented to person, place, and time.   Psychiatric:         Mood and Affect: Mood normal.         Behavior: Behavior normal.         Thought Content: Thought content normal.         Judgment: Judgment normal.         Lab Review:   No results found for: \"CHOL\"  Lab Results   Component Value Date    HDL 51 12/13/2024     Lab Results   Component Value Date    LDLCALC 96 12/13/2024     Lab Results   Component Value Date    TRIG 119 12/13/2024     Results Reviewed       None          Results Reviewed       None          Results Reviewed       None            Recent Cardiovascular Testing:   Echo 8/12/2024: Normal LVEF 60% mild LAE mild MAC RVSP 35 mmHg    ECG Review:   8/14/2024 normal sinus rhythm LAHB moderate voltage criteria for LVH may be a normal variant          "

## 2025-05-06 ENCOUNTER — RA CDI HCC (OUTPATIENT)
Dept: OTHER | Facility: HOSPITAL | Age: 85
End: 2025-05-06

## 2025-05-07 ENCOUNTER — APPOINTMENT (OUTPATIENT)
Dept: LAB | Facility: HOSPITAL | Age: 85
End: 2025-05-07
Attending: INTERNAL MEDICINE
Payer: COMMERCIAL

## 2025-05-07 DIAGNOSIS — R60.0 LOCALIZED EDEMA: ICD-10-CM

## 2025-05-07 DIAGNOSIS — N18.31 STAGE 3A CHRONIC KIDNEY DISEASE (HCC): ICD-10-CM

## 2025-05-07 DIAGNOSIS — I82.563 CHRONIC DEEP VEIN THROMBOSIS (DVT) OF CALF MUSCLE VEIN OF BOTH LOWER EXTREMITIES (HCC): ICD-10-CM

## 2025-05-07 LAB
25(OH)D3 SERPL-MCNC: 13 NG/ML (ref 30–100)
ALBUMIN SERPL BCG-MCNC: 3.9 G/DL (ref 3.5–5)
ALP SERPL-CCNC: 41 U/L (ref 34–104)
ALT SERPL W P-5'-P-CCNC: 9 U/L (ref 7–52)
ANION GAP SERPL CALCULATED.3IONS-SCNC: 5 MMOL/L (ref 4–13)
AST SERPL W P-5'-P-CCNC: 13 U/L (ref 13–39)
BACTERIA UR QL AUTO: ABNORMAL /HPF
BASOPHILS # BLD AUTO: 0.04 THOUSANDS/ÂΜL (ref 0–0.1)
BASOPHILS NFR BLD AUTO: 1 % (ref 0–1)
BILIRUB SERPL-MCNC: 0.44 MG/DL (ref 0.2–1)
BILIRUB UR QL STRIP: NEGATIVE
BUN SERPL-MCNC: 33 MG/DL (ref 5–25)
CALCIUM SERPL-MCNC: 10.1 MG/DL (ref 8.4–10.2)
CHLORIDE SERPL-SCNC: 104 MMOL/L (ref 96–108)
CLARITY UR: CLEAR
CO2 SERPL-SCNC: 31 MMOL/L (ref 21–32)
COLOR UR: COLORLESS
CREAT SERPL-MCNC: 1.26 MG/DL (ref 0.6–1.3)
CREAT UR-MCNC: 38.9 MG/DL
EOSINOPHIL # BLD AUTO: 0.24 THOUSAND/ÂΜL (ref 0–0.61)
EOSINOPHIL NFR BLD AUTO: 6 % (ref 0–6)
ERYTHROCYTE [DISTWIDTH] IN BLOOD BY AUTOMATED COUNT: 12.9 % (ref 11.6–15.1)
GFR SERPL CREATININE-BSD FRML MDRD: 39 ML/MIN/1.73SQ M
GLUCOSE P FAST SERPL-MCNC: 179 MG/DL (ref 65–99)
GLUCOSE UR STRIP-MCNC: NEGATIVE MG/DL
HCT VFR BLD AUTO: 32.1 % (ref 34.8–46.1)
HGB BLD-MCNC: 10 G/DL (ref 11.5–15.4)
HGB UR QL STRIP.AUTO: NEGATIVE
IMM GRANULOCYTES # BLD AUTO: 0.01 THOUSAND/UL (ref 0–0.2)
IMM GRANULOCYTES NFR BLD AUTO: 0 % (ref 0–2)
KETONES UR STRIP-MCNC: NEGATIVE MG/DL
LEUKOCYTE ESTERASE UR QL STRIP: NEGATIVE
LYMPHOCYTES # BLD AUTO: 1 THOUSANDS/ÂΜL (ref 0.6–4.47)
LYMPHOCYTES NFR BLD AUTO: 23 % (ref 14–44)
MCH RBC QN AUTO: 27 PG (ref 26.8–34.3)
MCHC RBC AUTO-ENTMCNC: 31.2 G/DL (ref 31.4–37.4)
MCV RBC AUTO: 87 FL (ref 82–98)
MICROALBUMIN UR-MCNC: 199.2 MG/L
MICROALBUMIN/CREAT 24H UR: 512 MG/G CREATININE (ref 0–30)
MONOCYTES # BLD AUTO: 0.27 THOUSAND/ÂΜL (ref 0.17–1.22)
MONOCYTES NFR BLD AUTO: 6 % (ref 4–12)
NEUTROPHILS # BLD AUTO: 2.73 THOUSANDS/ÂΜL (ref 1.85–7.62)
NEUTS SEG NFR BLD AUTO: 64 % (ref 43–75)
NITRITE UR QL STRIP: NEGATIVE
NON-SQ EPI CELLS URNS QL MICRO: ABNORMAL /HPF
NRBC BLD AUTO-RTO: 0 /100 WBCS
PH UR STRIP.AUTO: 6 [PH]
PHOSPHATE SERPL-MCNC: 3.8 MG/DL (ref 2.3–4.1)
PLATELET # BLD AUTO: 181 THOUSANDS/UL (ref 149–390)
PMV BLD AUTO: 12.3 FL (ref 8.9–12.7)
POTASSIUM SERPL-SCNC: 4.4 MMOL/L (ref 3.5–5.3)
PROT SERPL-MCNC: 6.3 G/DL (ref 6.4–8.4)
PROT UR STRIP-MCNC: ABNORMAL MG/DL
PTH-INTACT SERPL-MCNC: 42.1 PG/ML (ref 12–88)
RBC # BLD AUTO: 3.7 MILLION/UL (ref 3.81–5.12)
RBC #/AREA URNS AUTO: ABNORMAL /HPF
SODIUM SERPL-SCNC: 140 MMOL/L (ref 135–147)
SP GR UR STRIP.AUTO: 1.01 (ref 1–1.03)
UROBILINOGEN UR STRIP-ACNC: <2 MG/DL
WBC # BLD AUTO: 4.29 THOUSAND/UL (ref 4.31–10.16)
WBC #/AREA URNS AUTO: ABNORMAL /HPF

## 2025-05-07 PROCEDURE — 82043 UR ALBUMIN QUANTITATIVE: CPT

## 2025-05-07 PROCEDURE — 83970 ASSAY OF PARATHORMONE: CPT

## 2025-05-07 PROCEDURE — 85025 COMPLETE CBC W/AUTO DIFF WBC: CPT

## 2025-05-07 PROCEDURE — 82570 ASSAY OF URINE CREATININE: CPT

## 2025-05-07 PROCEDURE — 82306 VITAMIN D 25 HYDROXY: CPT

## 2025-05-07 PROCEDURE — 80053 COMPREHEN METABOLIC PANEL: CPT

## 2025-05-07 PROCEDURE — 84100 ASSAY OF PHOSPHORUS: CPT

## 2025-05-07 PROCEDURE — 36415 COLL VENOUS BLD VENIPUNCTURE: CPT

## 2025-05-07 PROCEDURE — 81001 URINALYSIS AUTO W/SCOPE: CPT

## 2025-05-07 NOTE — PROGRESS NOTES
HCC coding opportunities    E11.65  I12.9 for [facility]  GR     Chart Reviewed number of suggestions sent to Provider: 2     Patients Insurance     Medicare Insurance: [employer] Advantage

## 2025-05-08 ENCOUNTER — RESULTS FOLLOW-UP (OUTPATIENT)
Dept: CARDIOLOGY CLINIC | Facility: CLINIC | Age: 85
End: 2025-05-08

## 2025-05-08 DIAGNOSIS — E78.2 MIXED HYPERLIPIDEMIA: ICD-10-CM

## 2025-05-08 DIAGNOSIS — R60.0 LOCALIZED EDEMA: Primary | ICD-10-CM

## 2025-05-08 DIAGNOSIS — I82.563 CHRONIC DEEP VEIN THROMBOSIS (DVT) OF CALF MUSCLE VEIN OF BOTH LOWER EXTREMITIES (HCC): ICD-10-CM

## 2025-05-08 DIAGNOSIS — I10 ESSENTIAL HYPERTENSION: ICD-10-CM

## 2025-05-13 ENCOUNTER — OFFICE VISIT (OUTPATIENT)
Dept: FAMILY MEDICINE CLINIC | Facility: CLINIC | Age: 85
End: 2025-05-13
Payer: COMMERCIAL

## 2025-05-13 ENCOUNTER — TELEPHONE (OUTPATIENT)
Dept: ADMINISTRATIVE | Facility: OTHER | Age: 85
End: 2025-05-13

## 2025-05-13 ENCOUNTER — APPOINTMENT (OUTPATIENT)
Dept: RADIOLOGY | Facility: CLINIC | Age: 85
End: 2025-05-13
Payer: COMMERCIAL

## 2025-05-13 VITALS
SYSTOLIC BLOOD PRESSURE: 136 MMHG | BODY MASS INDEX: 26.31 KG/M2 | OXYGEN SATURATION: 96 % | DIASTOLIC BLOOD PRESSURE: 84 MMHG | HEIGHT: 60 IN | TEMPERATURE: 97.8 F | HEART RATE: 80 BPM | WEIGHT: 134 LBS

## 2025-05-13 DIAGNOSIS — F32.1 MAJOR DEPRESSIVE DISORDER, SINGLE EPISODE, MODERATE (HCC): ICD-10-CM

## 2025-05-13 DIAGNOSIS — Z78.9 DECREASED INDEPENDENCE WITH ACTIVITIES OF DAILY LIVING: ICD-10-CM

## 2025-05-13 DIAGNOSIS — N18.32 TYPE 2 DIABETES MELLITUS WITH STAGE 3B CHRONIC KIDNEY DISEASE, WITHOUT LONG-TERM CURRENT USE OF INSULIN (HCC): Primary | ICD-10-CM

## 2025-05-13 DIAGNOSIS — D61.818 OTHER PANCYTOPENIA (HCC): ICD-10-CM

## 2025-05-13 DIAGNOSIS — M17.11 PRIMARY OSTEOARTHRITIS OF RIGHT KNEE: ICD-10-CM

## 2025-05-13 DIAGNOSIS — E11.22 TYPE 2 DIABETES MELLITUS WITH STAGE 3B CHRONIC KIDNEY DISEASE, WITHOUT LONG-TERM CURRENT USE OF INSULIN (HCC): Primary | ICD-10-CM

## 2025-05-13 DIAGNOSIS — R29.898 WEAKNESS OF BOTH LOWER EXTREMITIES: ICD-10-CM

## 2025-05-13 DIAGNOSIS — E13.9 DIABETES 1.5, MANAGED AS TYPE 1 (HCC): ICD-10-CM

## 2025-05-13 DIAGNOSIS — F03.918 DEMENTIA WITH BEHAVIORAL DISTURBANCE (HCC): ICD-10-CM

## 2025-05-13 DIAGNOSIS — E11.65 TYPE 2 DIABETES MELLITUS WITH HYPERGLYCEMIA, UNSPECIFIED WHETHER LONG TERM INSULIN USE (HCC): ICD-10-CM

## 2025-05-13 DIAGNOSIS — G89.29 CHRONIC LOW BACK PAIN, UNSPECIFIED BACK PAIN LATERALITY, UNSPECIFIED WHETHER SCIATICA PRESENT: ICD-10-CM

## 2025-05-13 DIAGNOSIS — E03.9 HYPOTHYROIDISM, UNSPECIFIED TYPE: Chronic | ICD-10-CM

## 2025-05-13 DIAGNOSIS — R26.2 AMBULATORY DYSFUNCTION: ICD-10-CM

## 2025-05-13 DIAGNOSIS — J30.2 SEASONAL ALLERGIES: ICD-10-CM

## 2025-05-13 DIAGNOSIS — M54.50 CHRONIC LOW BACK PAIN, UNSPECIFIED BACK PAIN LATERALITY, UNSPECIFIED WHETHER SCIATICA PRESENT: ICD-10-CM

## 2025-05-13 DIAGNOSIS — M06.9 RHEUMATOID ARTHRITIS, INVOLVING UNSPECIFIED SITE, UNSPECIFIED WHETHER RHEUMATOID FACTOR PRESENT (HCC): ICD-10-CM

## 2025-05-13 PROBLEM — M05.742 RHEUMATOID ARTHRITIS INVOLVING BOTH HANDS WITH POSITIVE RHEUMATOID FACTOR (HCC): Status: RESOLVED | Noted: 2018-02-22 | Resolved: 2025-05-13

## 2025-05-13 PROBLEM — S46.219A BICEPS TENDON TEAR: Status: RESOLVED | Noted: 2019-07-23 | Resolved: 2025-05-13

## 2025-05-13 PROBLEM — B02.9 HERPES ZOSTER WITHOUT COMPLICATION: Status: RESOLVED | Noted: 2021-08-10 | Resolved: 2025-05-13

## 2025-05-13 PROBLEM — Z23 NEEDS FLU SHOT: Status: RESOLVED | Noted: 2024-12-20 | Resolved: 2025-05-13

## 2025-05-13 PROBLEM — M05.741 RHEUMATOID ARTHRITIS INVOLVING BOTH HANDS WITH POSITIVE RHEUMATOID FACTOR (HCC): Status: RESOLVED | Noted: 2018-02-22 | Resolved: 2025-05-13

## 2025-05-13 LAB — SL AMB POCT HEMOGLOBIN AIC: 9.4 (ref ?–6.5)

## 2025-05-13 PROCEDURE — 73560 X-RAY EXAM OF KNEE 1 OR 2: CPT

## 2025-05-13 PROCEDURE — G2211 COMPLEX E/M VISIT ADD ON: HCPCS | Performed by: FAMILY MEDICINE

## 2025-05-13 PROCEDURE — 99214 OFFICE O/P EST MOD 30 MIN: CPT | Performed by: FAMILY MEDICINE

## 2025-05-13 PROCEDURE — 72110 X-RAY EXAM L-2 SPINE 4/>VWS: CPT

## 2025-05-13 PROCEDURE — 83036 HEMOGLOBIN GLYCOSYLATED A1C: CPT | Performed by: FAMILY MEDICINE

## 2025-05-13 RX ORDER — BLOOD SUGAR DIAGNOSTIC
1 STRIP MISCELLANEOUS 2 TIMES DAILY
Qty: 100 STRIP | Refills: 2 | Status: SHIPPED | OUTPATIENT
Start: 2025-05-13

## 2025-05-13 RX ORDER — LEVOCETIRIZINE DIHYDROCHLORIDE 5 MG/1
5 TABLET, FILM COATED ORAL EVERY EVENING
Qty: 30 TABLET | Refills: 2 | Status: SHIPPED | OUTPATIENT
Start: 2025-05-13

## 2025-05-13 RX ORDER — ACYCLOVIR 400 MG/1
1 TABLET ORAL
Qty: 9 EACH | Refills: 3 | Status: SHIPPED | OUTPATIENT
Start: 2025-05-13

## 2025-05-13 RX ORDER — ACYCLOVIR 400 MG/1
1 TABLET ORAL ONCE
Qty: 1 EACH | Refills: 0 | Status: SHIPPED | OUTPATIENT
Start: 2025-05-13 | End: 2025-05-13

## 2025-05-13 NOTE — TELEPHONE ENCOUNTER
Upon review of the In Basket request we were able to locate, review, and update the patient chart as requested for Diabetic Eye Exam.    Any additional questions or concerns should be emailed to the Practice Liaisons via the appropriate education email address, please do not reply via In Basket.    Thank you  Sabino Rangel MA   [facility]

## 2025-05-13 NOTE — PROGRESS NOTES
Name: Michelle Villatoro      : 1940      MRN: [id number]  Encounter Provider: Elver Lynne MD  Encounter Date: 2025   Encounter department: [facility]  :  Assessment & Plan  Type 2 diabetes mellitus with stage 3b chronic kidney disease, without long-term current use of insulin (HCC)    Lab Results   Component Value Date    HGBA1C 9.4 (A) 2025     Uncontrolled  After discussing risks and benefits of medication along with side effects recommend to increase ozempic to 1 mg weekly  Orders:  •  POCT hemoglobin A1c  •  semaglutide, 1 mg/dose, (Ozempic) 4 mg/3 mL injection pen; Inject 0.75 mL (1 mg total) under the skin once a week  •  Basic metabolic panel; Future    Type 2 diabetes mellitus with hyperglycemia, unspecified whether long term insulin use (HCC)    Lab Results   Component Value Date    HGBA1C 9.4 (A) 2025       Orders:  •  Continuous Glucose Sensor (Dexcom G7 Sensor); Use 1 Device every 10 days  •  Continuous Glucose  (Dexcom G7 ) MEREDITH; Use 1 each 1 (one) time for 1 dose    Other pancytopenia (HCC)  F/U with Heme/Onc           Rheumatoid arthritis, involving unspecified site, unspecified whether rheumatoid factor present (HCC)  F/U with rheumatology       Major depressive disorder, single episode, moderate (HCC)  Continue current medications         Dementia with behavioral disturbance (HCC)  F/U with neurology       Decreased independence with activities of daily living    Orders:  •  Ambulatory Referral to Social Work Care Management Program; Future    Diabetes 1.5, managed as type 1 (HCC)    Lab Results   Component Value Date    HGBA1C 9.4 (A) 2025       Orders:  •  glucose blood (OneTouch Verio) test strip; Use 1 each 2 (two) times a day Test    Primary osteoarthritis of right knee  After discussing risks and benefits of medication along with side effects will start the following:   Orders:  •  XR knee 1 or 2 vw right; Future  •   traMADol-acetaminophen (ULTRACET) 37.5-325 mg per tablet; Take 1 tablet by mouth every 6 (six) hours as needed for moderate pain    Chronic low back pain, unspecified back pain laterality, unspecified whether sciatica present    Orders:  •  XR spine lumbar minimum 4 views non injury; Future    Hypothyroidism, unspecified type    Orders:  •  TSH, 3rd generation with Free T4 reflex; Future    Seasonal allergies    Orders:  •  levocetirizine (XYZAL) 5 MG tablet; Take 1 tablet (5 mg total) by mouth every evening    Ambulatory dysfunction  Wheelchair ordered       Weakness of both lower extremities  Wheelchair    Follow up in 3 months              History of Present Illness   Patient is here accompanied by her granddaughter Waleska. She has several complaints today.  She has a hx of type 2 DM. Currently on ozempic 0.5 mg weekly. Her glucose levels in the morning have been above 140 mg/dl.  Also has pancytopenia and anemia.  Has a Hx of RA and has multiple joint pain.  Also has right knee pain daily symptoms. Also has difficulty ambulating unstable on her feet, bilateral leg weakness. Granddaughter is requesting a walker for ambulating at home and also a commode.  Also has seasonal allergies nasal congestion and cough. Singulair not helping.        Review of Systems   Constitutional:  Negative for activity change, appetite change, fatigue and fever.   HENT:  Negative for congestion and ear discharge.    Respiratory:  Negative for cough and shortness of breath.    Cardiovascular:  Negative for chest pain and palpitations.   Gastrointestinal:  Negative for diarrhea and nausea.   Musculoskeletal:  Positive for gait problem. Negative for arthralgias and back pain.   Skin:  Negative for color change and rash.   Neurological:  Positive for weakness. Negative for dizziness and headaches.   Psychiatric/Behavioral:  Positive for decreased concentration. Negative for agitation and behavioral problems.        Objective   /84 (BP  Location: Left arm, Patient Position: Sitting)   Pulse 80   Temp 97.8 °F (36.6 °C) (Temporal)   Ht 5' (1.524 m)   Wt 60.8 kg (134 lb)   SpO2 96%   BMI 26.17 kg/m²      Physical Exam  Vitals and nursing note reviewed.   Constitutional:       General: She is not in acute distress.     Appearance: She is well-developed.   HENT:      Head: Normocephalic and atraumatic.   Eyes:      Conjunctiva/sclera: Conjunctivae normal.   Cardiovascular:      Rate and Rhythm: Normal rate and regular rhythm.      Pulses: no weak pulses.           Dorsalis pedis pulses are 2+ on the right side and 2+ on the left side.        Posterior tibial pulses are 2+ on the right side and 2+ on the left side.      Heart sounds: No murmur heard.  Pulmonary:      [place]: Pulmonary effort is normal. No respiratory distress.      Breath sounds: Normal breath sounds.   Abdominal:      Palpations: Abdomen is soft.      Tenderness: There is no abdominal tenderness.   Musculoskeletal:         General: No swelling.      Cervical back: Neck supple.   Feet:      Right foot:      Skin integrity: No ulcer, skin breakdown, erythema, warmth, callus or dry skin.      Left foot:      Skin integrity: No ulcer, skin breakdown, erythema, warmth, callus or dry skin.   Skin:     General: Skin is warm and dry.      Capillary Refill: Capillary refill takes less than 2 seconds.   Neurological:      Mental Status: She is alert.      Motor: Weakness present.      Gait: Gait abnormal.   Psychiatric:         Mood and Affect: Mood normal.     Diabetic Foot Exam    Patient's shoes and socks removed.    Right Foot/Ankle   Right Foot Inspection  Skin Exam: skin normal and skin intact. No dry skin, no warmth, no callus, no erythema, no maceration, no abnormal color, no pre-ulcer, no ulcer and no callus.     Toe Exam: ROM and strength within normal limits.     Sensory   Vibration: diminished  Proprioception: diminished  Monofilament testing: diminished    Vascular  The right  DP pulse is 2+. The right PT pulse is 2+.     Left Foot/Ankle  Left Foot Inspection  Skin Exam: skin normal and skin intact. No dry skin, no warmth, no erythema, no maceration, normal color, no pre-ulcer, no ulcer and no callus.     Toe Exam: ROM and strength within normal limits.     Sensory   Vibration: diminished  Proprioception: diminished  Monofilament testing: diminished    Vascular  The left DP pulse is 2+. The left PT pulse is 2+.     Assign Risk Category  No deformity present  Loss of protective sensation  No weak pulses  Risk: 1

## 2025-05-13 NOTE — ASSESSMENT & PLAN NOTE
Lab Results   Component Value Date    HGBA1C 9.4 (A) 05/13/2025     Uncontrolled  After discussing risks and benefits of medication along with side effects recommend to increase ozempic to 1 mg weekly  Orders:  •  POCT hemoglobin A1c  •  semaglutide, 1 mg/dose, (Ozempic) 4 mg/3 mL injection pen; Inject 0.75 mL (1 mg total) under the skin once a week  •  Basic metabolic panel; Future

## 2025-05-13 NOTE — ASSESSMENT & PLAN NOTE
Lab Results   Component Value Date    HGBA1C 9.4 (A) 05/13/2025       Orders:  •  Continuous Glucose Sensor (Dexcom G7 Sensor); Use 1 Device every 10 days  •  Continuous Glucose  (Dexcom G7 ) MEREDITH; Use 1 each 1 (one) time for 1 dose

## 2025-05-14 ENCOUNTER — APPOINTMENT (OUTPATIENT)
Dept: LAB | Facility: CLINIC | Age: 85
End: 2025-05-14
Payer: COMMERCIAL

## 2025-05-14 ENCOUNTER — TELEPHONE (OUTPATIENT)
Age: 85
End: 2025-05-14

## 2025-05-14 DIAGNOSIS — E11.22 TYPE 2 DIABETES MELLITUS WITH STAGE 3B CHRONIC KIDNEY DISEASE, WITHOUT LONG-TERM CURRENT USE OF INSULIN (HCC): ICD-10-CM

## 2025-05-14 DIAGNOSIS — N18.32 TYPE 2 DIABETES MELLITUS WITH STAGE 3B CHRONIC KIDNEY DISEASE, WITHOUT LONG-TERM CURRENT USE OF INSULIN (HCC): ICD-10-CM

## 2025-05-14 DIAGNOSIS — E03.9 HYPOTHYROIDISM, UNSPECIFIED TYPE: ICD-10-CM

## 2025-05-14 LAB
ANION GAP SERPL CALCULATED.3IONS-SCNC: 7 MMOL/L (ref 4–13)
BUN SERPL-MCNC: 46 MG/DL (ref 5–25)
CALCIUM SERPL-MCNC: 10.4 MG/DL (ref 8.4–10.2)
CHLORIDE SERPL-SCNC: 100 MMOL/L (ref 96–108)
CO2 SERPL-SCNC: 30 MMOL/L (ref 21–32)
CREAT SERPL-MCNC: 1.66 MG/DL (ref 0.6–1.3)
GFR SERPL CREATININE-BSD FRML MDRD: 28 ML/MIN/1.73SQ M
GLUCOSE P FAST SERPL-MCNC: 185 MG/DL (ref 65–99)
POTASSIUM SERPL-SCNC: 4.2 MMOL/L (ref 3.5–5.3)
SODIUM SERPL-SCNC: 137 MMOL/L (ref 135–147)
T4 FREE SERPL-MCNC: 1.23 NG/DL (ref 0.61–1.12)
TSH SERPL DL<=0.05 MIU/L-ACNC: 6.16 UIU/ML (ref 0.45–4.5)

## 2025-05-14 PROCEDURE — 36415 COLL VENOUS BLD VENIPUNCTURE: CPT

## 2025-05-14 PROCEDURE — 84439 ASSAY OF FREE THYROXINE: CPT

## 2025-05-14 PROCEDURE — 84443 ASSAY THYROID STIM HORMONE: CPT

## 2025-05-14 PROCEDURE — 80048 BASIC METABOLIC PNL TOTAL CA: CPT

## 2025-05-15 ENCOUNTER — TELEPHONE (OUTPATIENT)
Age: 85
End: 2025-05-15

## 2025-05-15 ENCOUNTER — RESULTS FOLLOW-UP (OUTPATIENT)
Dept: FAMILY MEDICINE CLINIC | Facility: CLINIC | Age: 85
End: 2025-05-15

## 2025-05-15 DIAGNOSIS — Z79.4 TYPE 2 DIABETES MELLITUS TREATED WITH INSULIN (HCC): Primary | ICD-10-CM

## 2025-05-15 DIAGNOSIS — E03.9 HYPOTHYROIDISM, UNSPECIFIED TYPE: Primary | ICD-10-CM

## 2025-05-15 DIAGNOSIS — E11.9 TYPE 2 DIABETES MELLITUS TREATED WITH INSULIN (HCC): Primary | ICD-10-CM

## 2025-05-15 RX ORDER — KETOROLAC TROMETHAMINE 30 MG/ML
1 INJECTION, SOLUTION INTRAMUSCULAR; INTRAVENOUS ONCE
Qty: 1 EACH | Refills: 0 | Status: SHIPPED | OUTPATIENT
Start: 2025-05-15 | End: 2025-05-15

## 2025-05-15 RX ORDER — LEVOTHYROXINE SODIUM 112 UG/1
112 TABLET ORAL
Qty: 30 TABLET | Refills: 2 | Status: SHIPPED | OUTPATIENT
Start: 2025-05-15

## 2025-05-15 RX ORDER — ACYCLOVIR 800 MG/1
1 TABLET ORAL
Qty: 6 EACH | Refills: 3 | Status: SHIPPED | OUTPATIENT
Start: 2025-05-15

## 2025-05-15 NOTE — TELEPHONE ENCOUNTER
The patient's Continuous Glucose Sensor (Dexcom G7 Sensor) is not covered by the Patient's insurance and she is asking if something different could be ordered?

## 2025-05-15 NOTE — TELEPHONE ENCOUNTER
Pts granddaughter called, she received a message that the order for her grandmothers commode and light weigh wheelchair. She is asking for an updated and wanted to know why they were canceled.     She also wanted to mention the patient's Continuous Glucose Sensor (Dexcom G7 Sensor) is not covered by the Patient's insurance and she is asking if something different could be ordered?     The patient's granddaughter is asking for a call back.     Please advise, thank you

## 2025-05-15 NOTE — TELEPHONE ENCOUNTER
Sandra Burch  [facility]  5/13 ? 5:31PM  @Elver Lynne   we cannot offer the commode as insurance paid for a commode in 2024, they will only cover one every five years.          Sandra Marcin  [facility]  5/13 ? 5:29PM  @Elver Segovia, unfortunately we are unable to process this order due to the patient’s [employer] insurance. [employer] has partnered with [employer] to streamline the process of their orders. All Mover orders will need to be submitted through [employer]’s platform. [employer] will not process any claims outside of this platform. There are three options to submit your order through their platform, you may access their platform online by going to [email or web address], by phone at [phone] or by fax at [phone]. We are sorry for the inconvenience. Have a great day.

## 2025-05-15 NOTE — TELEPHONE ENCOUNTER
[facility] I have sent these orders to [facility] for the commode and the wheelchair

## 2025-05-20 ENCOUNTER — PATIENT OUTREACH (OUTPATIENT)
Dept: CASE MANAGEMENT | Facility: OTHER | Age: 85
End: 2025-05-20

## 2025-05-20 ENCOUNTER — HOSPITAL ENCOUNTER (OUTPATIENT)
Dept: NON INVASIVE DIAGNOSTICS | Facility: HOSPITAL | Age: 85
Discharge: HOME | End: 2025-05-20
Attending: PHYSICIAN ASSISTANT
Payer: COMMERCIAL

## 2025-05-20 DIAGNOSIS — I82.563 CHRONIC DEEP VEIN THROMBOSIS (DVT) OF CALF MUSCLE VEIN OF BOTH LOWER EXTREMITIES (HCC): ICD-10-CM

## 2025-05-20 DIAGNOSIS — R60.0 LOCALIZED EDEMA: ICD-10-CM

## 2025-05-20 PROCEDURE — 93970 EXTREMITY STUDY: CPT

## 2025-05-20 PROCEDURE — 93970 EXTREMITY STUDY: CPT | Performed by: SURGERY

## 2025-05-20 NOTE — PROGRESS NOTES
NASIM STEVENS received a referral from patient's PCP regarding decreased independence with ADLs. NASIM STEVENS contacted patient's granddaughter, Waleska, at this time to follow up. Explained reason for referral and inquired if they were interested in seeing if pt would qualify for a home health aide. Waleska denies this being the concern and states pt was ordered a lightweight wheelchair and commode and she received an email that the order was cancelled. NASIM STEVENS is unable to assist with DME but encouraged pt to contact the DME supplier to inquire reason for the cancellation. Waleska also expressed patient also being denied for CGM several times. NASIM STEVENS encouraged Waleska to contact insurance member services or customer support number to inquire reason for denial. No psychosocial concerns at this time. NASIM STEVENS will close out referral. Will be available if needed, via new order.

## 2025-05-21 ENCOUNTER — RESULTS FOLLOW-UP (OUTPATIENT)
Dept: CARDIOLOGY CLINIC | Facility: CLINIC | Age: 85
End: 2025-05-21

## 2025-05-22 NOTE — TELEPHONE ENCOUNTER
----- Message from Anjana CLAY sent at 5/22/2025  8:56 AM EDT -----    ----- Message -----  From: Halina Rivera PA-C  Sent: 5/21/2025   2:44 PM EDT  To:  [facility]    There is blood clots in both legs, but unchanged from previous studies  Continue taking the aspirin   ----- Message -----  From: Cyrus, Radiology Results In  Sent: 5/20/2025   9:19 PM EDT  To: Halina Rivera PA-C

## 2025-05-23 NOTE — TELEPHONE ENCOUNTER
Spoke with patient's taylor Galeano, just has questions regarding the results of Michelle's latest lower extremity US.    If the clots are still present, why is she only on aspirin? Advised clots were stable & non occlusive.    Waleska states Michelle does have ongoing swelling and discomfort in her feet, also states her feet feel numb.    Please advise.    Waleska is requesting to speak or leave a message in ENGLISH please.

## 2025-05-30 DIAGNOSIS — I82.403 LEG DVT (DEEP VENOUS THROMBOEMBOLISM), ACUTE, BILATERAL (HCC): ICD-10-CM

## 2025-05-30 DIAGNOSIS — F41.9 ANXIETY: ICD-10-CM

## 2025-05-30 DIAGNOSIS — I10 PRIMARY HYPERTENSION: ICD-10-CM

## 2025-06-01 RX ORDER — HYDROCHLOROTHIAZIDE 25 MG/1
50 TABLET ORAL DAILY
Qty: 180 TABLET | Refills: 1 | Status: SHIPPED | OUTPATIENT
Start: 2025-06-01

## 2025-06-02 RX ORDER — APIXABAN 5 MG/1
5 TABLET, FILM COATED ORAL 2 TIMES DAILY
Qty: 120 TABLET | Refills: 3 | Status: SHIPPED | OUTPATIENT
Start: 2025-06-02

## 2025-06-04 DIAGNOSIS — H57.89 OCULAR INFLAMMATION: Primary | ICD-10-CM

## 2025-06-05 RX ORDER — LOTEPREDNOL ETABONATE 5 MG/ML
SUSPENSION/ DROPS OPHTHALMIC
Qty: 15 ML | Refills: 1 | Status: SHIPPED | OUTPATIENT
Start: 2025-06-05

## 2025-06-06 DIAGNOSIS — R60.0 LOCALIZED EDEMA: ICD-10-CM

## 2025-06-06 DIAGNOSIS — J30.2 SEASONAL ALLERGIES: ICD-10-CM

## 2025-06-06 RX ORDER — FUROSEMIDE 20 MG/1
20 TABLET ORAL 2 TIMES DAILY
Qty: 180 TABLET | Refills: 1 | Status: SHIPPED | OUTPATIENT
Start: 2025-06-06

## 2025-06-07 RX ORDER — LEVOCETIRIZINE DIHYDROCHLORIDE 5 MG/1
5 TABLET, FILM COATED ORAL EVERY EVENING
Qty: 90 TABLET | Refills: 1 | Status: SHIPPED | OUTPATIENT
Start: 2025-06-07

## 2025-07-03 DIAGNOSIS — N18.32 TYPE 2 DIABETES MELLITUS WITH STAGE 3B CHRONIC KIDNEY DISEASE, WITHOUT LONG-TERM CURRENT USE OF INSULIN (HCC): ICD-10-CM

## 2025-07-03 DIAGNOSIS — E11.22 TYPE 2 DIABETES MELLITUS WITH STAGE 3B CHRONIC KIDNEY DISEASE, WITHOUT LONG-TERM CURRENT USE OF INSULIN (HCC): ICD-10-CM

## 2025-07-03 RX ORDER — SEMAGLUTIDE 0.68 MG/ML
INJECTION, SOLUTION SUBCUTANEOUS
OUTPATIENT
Start: 2025-07-03

## 2025-07-03 NOTE — PROGRESS NOTES
"85-year-old female with recurrent UTI, incomplete bladder emptying    Some baseline dementia, DM (HbA1c 8.7 on 12/13/24)    Anticoagulation: Eliquis, aspirin 81 mg     cc on 9/9/2024    Seen in urology office in 2024 with reported history of UTIs.  Urine testing on 9/9/2024 and 12/20/2024 were negative for infection.    Creatinine 1.66 on 5/14/2025    Patient on vaginal estrogen cream, cranberry supplement, d-mannose, probiotics    Random bladder scan 7/8/2025: 487 cc    Office 7/8/25: sudden urge to go to bathroom then only gets out a few drops. Daily BM. No symptoms of UTI, no fevers or chills.    Granddaughter Waleska presented and assisted with history.         Cystoscopy     Date/Time  7/8/2025 9:00 AM     Performed by  Elder Phelps DO   Authorized by  Elder Phelps DO       Universal Protocol:  procedure performed by consultantConsent: Verbal consent obtained. Written consent obtained  Risks and benefits: risks, benefits and alternatives were discussed  Consent given by: patient  Time out: Immediately prior to procedure a \"time out\" was called to verify the correct patient, procedure, equipment, support staff and site/side marked as required.  Timeout called at: 7/8/2025 10:08 AM.  Patient understanding: patient states understanding of the procedure being performed  Patient consent: the patient's understanding of the procedure matches consent given  Procedure consent: procedure consent matches procedure scheduled  Relevant documents: relevant documents present and verified  Required items: required blood products, implants, devices, and special equipment available  Patient identity confirmed: verbally with patient      Procedure Details:  Procedure type: cystoscopy    Patient tolerance: Patient tolerated the procedure well with no immediate complications    Additional Procedure Details: Office Cystoscopy Procedure Note    Indication:     urinary retention     Informed consent   The " risks, benefits, complications, treatment options, and expected outcomes were discussed with the patient. The patient concurred with the proposed plan and provided informed consent.    Anesthesia  Lidocaine jelly 2%    Antibiotic prophylaxis   None    Procedure  In the presence of a female nurse, the patient was placed in the supine frog-legged position, was prepped and draped in the usual manner using sterile technique, and 2% lidocaine jelly instilled into the urethra.     Pelvic exam with female chaperone   Narrow vaginal introitus  Mild atrophic vaginitis  Urethra somewhat retracted    Findings:  Urethra:  Normal  Bladder:  Moderate trabeculations, no lesions, no stones  Ureteral orifices:  Normal  Other findings:  None, retroflexed view confirms    Specimens: Urine culture                 Complications:    None; patient tolerated the procedure well           Disposition: To home            Condition: Stable            PLAN  -3 days of Macrobid sent for ppx. Will fu ucx and adjust abx if need be.  - Explained that patient's voiding symptoms are most likely due to incomplete emptying given her elevated PVRs.  Reviewed options of CIC versus Allen catheter placement versus suprapubic tube.  Given patient's dementia, explained that she would likely need family members to catheterize her.  Explained that likely the easiest option initially would be Allen catheter with changes every 4 weeks and then reassessment to see if she would prefer suprapubic tube in the future.  Her granddaughter will thoroughly discussed this with the patient's family.  - We will tentatively plan on patient coming in for nursing visit within the next few days for Allen catheter placement.  She will then have catheter changes every 4 weeks moving forward.  We will also plan her having an AP visit in about 1 to 2 months for symptom check to see if she would want to continue this Allen catheter changes versus moving onto suprapubic catheter.  -  Will have her continue vaginal estrogen cream as well as the d-mannose, probiotic supplements, cranberry supplementation

## 2025-07-07 RX ORDER — SEMAGLUTIDE 0.68 MG/ML
INJECTION, SOLUTION SUBCUTANEOUS
COMMUNITY
Start: 2025-06-07

## 2025-07-08 ENCOUNTER — PROCEDURE VISIT (OUTPATIENT)
Dept: UROLOGY | Facility: CLINIC | Age: 85
End: 2025-07-08
Payer: COMMERCIAL

## 2025-07-08 VITALS
OXYGEN SATURATION: 97 % | HEIGHT: 60 IN | TEMPERATURE: 97.8 F | BODY MASS INDEX: 25.13 KG/M2 | HEART RATE: 68 BPM | SYSTOLIC BLOOD PRESSURE: 122 MMHG | WEIGHT: 128 LBS | DIASTOLIC BLOOD PRESSURE: 54 MMHG

## 2025-07-08 DIAGNOSIS — R33.9 URINARY RETENTION: Primary | ICD-10-CM

## 2025-07-08 DIAGNOSIS — N39.0 FREQUENT UTI: ICD-10-CM

## 2025-07-08 DIAGNOSIS — Z87.440 HISTORY OF UTI: ICD-10-CM

## 2025-07-08 LAB — POST-VOID RESIDUAL VOLUME, ML POC: 487 ML

## 2025-07-08 PROCEDURE — 87086 URINE CULTURE/COLONY COUNT: CPT | Performed by: UROLOGY

## 2025-07-08 PROCEDURE — 51798 US URINE CAPACITY MEASURE: CPT | Performed by: UROLOGY

## 2025-07-08 PROCEDURE — 52000 CYSTOURETHROSCOPY: CPT | Performed by: UROLOGY

## 2025-07-08 RX ORDER — NITROFURANTOIN 25; 75 MG/1; MG/1
100 CAPSULE ORAL 2 TIMES DAILY
Qty: 6 CAPSULE | Refills: 0 | Status: SHIPPED | OUTPATIENT
Start: 2025-07-08 | End: 2025-07-11

## 2025-07-08 NOTE — PATIENT INSTRUCTIONS
You had cystoscopy done in the office today. This means that we looked inside your urethra and bladder with a camera.    You may see some blood in your urine for the next few days. This is normal. Please drink plenty of fluids. Call the office if you are passing large blood clots in your urine or if you are not able to urinate.    It may burn when you urinate for the next few days. This is normal.    Please call the office if you have fevers or chills in the next few days.    An antibiotic called Macrobid was sent to your pharmacy. Please pick this up and take medication as prescribed.    You will return to clinic in a few days for Allen placement and education.

## 2025-07-09 ENCOUNTER — TELEPHONE (OUTPATIENT)
Age: 85
End: 2025-07-09

## 2025-07-09 ENCOUNTER — APPOINTMENT (OUTPATIENT)
Dept: LAB | Facility: CLINIC | Age: 85
End: 2025-07-09
Payer: COMMERCIAL

## 2025-07-09 DIAGNOSIS — N18.31 STAGE 3A CHRONIC KIDNEY DISEASE (HCC): ICD-10-CM

## 2025-07-09 DIAGNOSIS — E03.9 HYPOTHYROIDISM, UNSPECIFIED TYPE: ICD-10-CM

## 2025-07-09 DIAGNOSIS — N39.0 FREQUENT UTI: ICD-10-CM

## 2025-07-09 DIAGNOSIS — N18.32 CKD STAGE 3B, GFR 30-44 ML/MIN (HCC): ICD-10-CM

## 2025-07-09 LAB
25(OH)D3 SERPL-MCNC: 19.8 NG/ML (ref 30–100)
ALBUMIN SERPL BCG-MCNC: 4.3 G/DL (ref 3.5–5)
ALP SERPL-CCNC: 37 U/L (ref 34–104)
ALT SERPL W P-5'-P-CCNC: 16 U/L (ref 7–52)
ANION GAP SERPL CALCULATED.3IONS-SCNC: 9 MMOL/L (ref 4–13)
AST SERPL W P-5'-P-CCNC: 22 U/L (ref 13–39)
BACTERIA UR CULT: NORMAL
BASOPHILS # BLD AUTO: 0.03 THOUSANDS/ÂΜL (ref 0–0.1)
BASOPHILS NFR BLD AUTO: 1 % (ref 0–1)
BILIRUB SERPL-MCNC: 0.47 MG/DL (ref 0.2–1)
BUN SERPL-MCNC: 45 MG/DL (ref 5–25)
CALCIUM SERPL-MCNC: 10.1 MG/DL (ref 8.4–10.2)
CHLORIDE SERPL-SCNC: 103 MMOL/L (ref 96–108)
CO2 SERPL-SCNC: 28 MMOL/L (ref 21–32)
CREAT SERPL-MCNC: 1.56 MG/DL (ref 0.6–1.3)
CREAT UR-MCNC: 34 MG/DL
EOSINOPHIL # BLD AUTO: 0.17 THOUSAND/ÂΜL (ref 0–0.61)
EOSINOPHIL NFR BLD AUTO: 5 % (ref 0–6)
ERYTHROCYTE [DISTWIDTH] IN BLOOD BY AUTOMATED COUNT: 12.4 % (ref 11.6–15.1)
GFR SERPL CREATININE-BSD FRML MDRD: 30 ML/MIN/1.73SQ M
GLUCOSE P FAST SERPL-MCNC: 130 MG/DL (ref 65–99)
HCT VFR BLD AUTO: 29.8 % (ref 34.8–46.1)
HGB BLD-MCNC: 9.7 G/DL (ref 11.5–15.4)
IMM GRANULOCYTES # BLD AUTO: 0.01 THOUSAND/UL (ref 0–0.2)
IMM GRANULOCYTES NFR BLD AUTO: 0 % (ref 0–2)
LYMPHOCYTES # BLD AUTO: 0.95 THOUSANDS/ÂΜL (ref 0.6–4.47)
LYMPHOCYTES NFR BLD AUTO: 27 % (ref 14–44)
MCH RBC QN AUTO: 27.3 PG (ref 26.8–34.3)
MCHC RBC AUTO-ENTMCNC: 32.6 G/DL (ref 31.4–37.4)
MCV RBC AUTO: 84 FL (ref 82–98)
MICROALBUMIN UR-MCNC: 41.4 MG/L
MICROALBUMIN/CREAT 24H UR: 122 MG/G CREATININE (ref 0–30)
MONOCYTES # BLD AUTO: 0.28 THOUSAND/ÂΜL (ref 0.17–1.22)
MONOCYTES NFR BLD AUTO: 8 % (ref 4–12)
NEUTROPHILS # BLD AUTO: 2.06 THOUSANDS/ÂΜL (ref 1.85–7.62)
NEUTS SEG NFR BLD AUTO: 59 % (ref 43–75)
NRBC BLD AUTO-RTO: 0 /100 WBCS
PHOSPHATE SERPL-MCNC: 3.7 MG/DL (ref 2.3–4.1)
PLATELET # BLD AUTO: 165 THOUSANDS/UL (ref 149–390)
PMV BLD AUTO: 13.6 FL (ref 8.9–12.7)
POTASSIUM SERPL-SCNC: 4.1 MMOL/L (ref 3.5–5.3)
PROT SERPL-MCNC: 6.5 G/DL (ref 6.4–8.4)
PTH-INTACT SERPL-MCNC: 29 PG/ML (ref 12–88)
RBC # BLD AUTO: 3.55 MILLION/UL (ref 3.81–5.12)
SODIUM SERPL-SCNC: 140 MMOL/L (ref 135–147)
T4 FREE SERPL-MCNC: 1.76 NG/DL (ref 0.61–1.12)
TSH SERPL DL<=0.05 MIU/L-ACNC: 0.03 UIU/ML (ref 0.45–4.5)
WBC # BLD AUTO: 3.5 THOUSAND/UL (ref 4.31–10.16)

## 2025-07-09 PROCEDURE — 84100 ASSAY OF PHOSPHORUS: CPT

## 2025-07-09 PROCEDURE — 84439 ASSAY OF FREE THYROXINE: CPT

## 2025-07-09 PROCEDURE — 87186 SC STD MICRODIL/AGAR DIL: CPT

## 2025-07-09 PROCEDURE — 85025 COMPLETE CBC W/AUTO DIFF WBC: CPT

## 2025-07-09 PROCEDURE — 87086 URINE CULTURE/COLONY COUNT: CPT

## 2025-07-09 PROCEDURE — 82306 VITAMIN D 25 HYDROXY: CPT

## 2025-07-09 PROCEDURE — 82570 ASSAY OF URINE CREATININE: CPT

## 2025-07-09 PROCEDURE — 87077 CULTURE AEROBIC IDENTIFY: CPT

## 2025-07-09 PROCEDURE — 83970 ASSAY OF PARATHORMONE: CPT

## 2025-07-09 PROCEDURE — 80053 COMPREHEN METABOLIC PANEL: CPT

## 2025-07-09 PROCEDURE — 84443 ASSAY THYROID STIM HORMONE: CPT

## 2025-07-09 PROCEDURE — 82043 UR ALBUMIN QUANTITATIVE: CPT

## 2025-07-09 PROCEDURE — 36415 COLL VENOUS BLD VENIPUNCTURE: CPT

## 2025-07-09 NOTE — TELEPHONE ENCOUNTER
Called and left a VM on Granddaughters cell that the pt has been R/S to 7/15/25 8:30 am - asked they call back and confirm the appt - provided a call back number

## 2025-07-09 NOTE — TELEPHONE ENCOUNTER
Patient has been r/s for initial carr cath insertion on 7/15/25 8:30 am. This is the next available appt we have at this time.

## 2025-07-09 NOTE — TELEPHONE ENCOUNTER
Pt granddaughter calling to reschedule nurse visit that is on 7/11/25.  Requested to come on Monday however there is nothing available and the only appt available next week are for voiding trials    Please call Waleska back at [phone]

## 2025-07-11 LAB — BACTERIA UR CULT: ABNORMAL

## 2025-07-12 DIAGNOSIS — E03.9 HYPOTHYROIDISM, UNSPECIFIED TYPE: ICD-10-CM

## 2025-07-12 RX ORDER — LEVOTHYROXINE SODIUM 100 UG/1
100 TABLET ORAL
Qty: 30 TABLET | Refills: 3 | Status: SHIPPED | OUTPATIENT
Start: 2025-07-12

## 2025-07-15 ENCOUNTER — PROCEDURE VISIT (OUTPATIENT)
Dept: UROLOGY | Facility: CLINIC | Age: 85
End: 2025-07-15
Payer: COMMERCIAL

## 2025-07-15 VITALS
HEART RATE: 69 BPM | SYSTOLIC BLOOD PRESSURE: 142 MMHG | TEMPERATURE: 97.8 F | WEIGHT: 125.9 LBS | DIASTOLIC BLOOD PRESSURE: 76 MMHG | OXYGEN SATURATION: 98 % | HEIGHT: 60 IN | BODY MASS INDEX: 24.72 KG/M2

## 2025-07-15 DIAGNOSIS — R33.9 URINARY RETENTION: Primary | ICD-10-CM

## 2025-07-15 LAB — POST-VOID RESIDUAL VOLUME, ML POC: 191 ML

## 2025-07-15 PROCEDURE — 87086 URINE CULTURE/COLONY COUNT: CPT | Performed by: PHYSICIAN ASSISTANT

## 2025-07-15 PROCEDURE — 51798 US URINE CAPACITY MEASURE: CPT

## 2025-07-16 ENCOUNTER — RESULTS FOLLOW-UP (OUTPATIENT)
Dept: UROLOGY | Facility: CLINIC | Age: 85
End: 2025-07-16

## 2025-07-16 LAB — BACTERIA UR CULT: NORMAL

## 2025-07-22 ENCOUNTER — OFFICE VISIT (OUTPATIENT)
Dept: CARDIOLOGY CLINIC | Facility: CLINIC | Age: 85
End: 2025-07-22
Payer: COMMERCIAL

## 2025-07-22 VITALS
HEIGHT: 60 IN | BODY MASS INDEX: 24.84 KG/M2 | HEART RATE: 64 BPM | SYSTOLIC BLOOD PRESSURE: 146 MMHG | DIASTOLIC BLOOD PRESSURE: 60 MMHG | WEIGHT: 126.5 LBS

## 2025-07-22 DIAGNOSIS — R60.0 LOCALIZED EDEMA: ICD-10-CM

## 2025-07-22 DIAGNOSIS — E78.2 MIXED HYPERLIPIDEMIA: Primary | Chronic | ICD-10-CM

## 2025-07-22 DIAGNOSIS — I10 ESSENTIAL HYPERTENSION: ICD-10-CM

## 2025-07-22 DIAGNOSIS — I82.563 CHRONIC DEEP VEIN THROMBOSIS (DVT) OF CALF MUSCLE VEIN OF BOTH LOWER EXTREMITIES (HCC): ICD-10-CM

## 2025-07-22 DIAGNOSIS — R01.1 CARDIAC MURMUR: ICD-10-CM

## 2025-07-22 DIAGNOSIS — I70.202 OCCLUSION OF LEFT TIBIAL ARTERY (HCC): ICD-10-CM

## 2025-07-22 PROCEDURE — 99214 OFFICE O/P EST MOD 30 MIN: CPT | Performed by: PHYSICIAN ASSISTANT

## 2025-07-22 RX ORDER — CARBOXYMETHYLCELLULOSE SODIUM, GLYCERIN 5; 9 MG/ML; MG/ML
1 SOLUTION/ DROPS OPHTHALMIC
COMMUNITY
Start: 2025-03-06

## 2025-07-22 NOTE — PATIENT INSTRUCTIONS
Referral to vascular     Continue the same medication     Check an echo before the next year     Return in 1 year     Call in the meantime if you have any concerns

## 2025-07-22 NOTE — ASSESSMENT & PLAN NOTE
Mild edema in bilateral LE    On lasix 20 mg daily   Check BMP in 5 days     Venous doppler showed chronic DVT and occluded Tibial artery

## 2025-07-22 NOTE — PROGRESS NOTES
[facility]   Outpatient Note  Michelle Vilaltoro  1940  [id number]  [facility] [address] [address] [phone]    Michelle Villatoro is a 85 y.o. female    Assessment and Plan:   Mixed hyperlipidemia  Not on statin therapy  On fenofibrate    Chronic deep vein thrombosis (DVT) of calf muscle vein of both lower extremities (HCC)  Was on Eliquis  Now on ASA    Essential hypertension  Controlled on current medical regimen  Continue amlodipine 10 mg daily hydralazine 25 mg 3 times daily hydrochlorothiazide 50 mg daily and losartan 100 mg daily  Patient did not tolerate lisinopril--dry cough    Blood pressure readings at home are erroneously elevated when compared to blood pressure reading in the office.      Localized edema  Mild edema in bilateral LE    On lasix 20 mg daily   Check BMP in 5 days     Venous doppler showed chronic DVT and occluded Tibial artery     Occlusion of left tibial artery (HCC)  Found on venous doppler   Will refer to vascular     Cardiac murmur  At most moderate MR per exam   Will check echo to assess           Additional Plan:   No Medication changes made today.    Testing as ordered above.     Available lab and test results are reviewed with the patient as noted.    Return visit will be in one year  or earlier if there are problems.     The patient is encouraged to call in the meantime if there are questions or concerns.       Subjective:   The patient is seen in the office today as a follow-up.   She is accompanied by her granddaughter who is translating for her.  Blood pressure is improved today.  She is on multiple medications for blood pressure control including Norvasc hydralazine hydrochlorothiazide and losartan.  She did not tolerate lisinopril she was complaining of a dry cough.  This has resolved since being on losartan.  She was started Eliquis for DVT.  Now she is on  ASA. She was complaining of edema that had recurred and was concerned that DVT had recurred since she is sedentary. Doppler study shows chronic bilateral DVT and Occluded Left Tibial artery.  Edema has since resolved.    She is complaining of blood pressure being slightly elevated at home however blood pressure is well-controlled in the office.  Comparing blood pressure obtained with the manual sphygmomanometer in the office compared to her blood pressure cuff from home it is apparent that her blood pressure cuff reads abnormally high.  Blood pressure obtained in the office is 146/60 while her blood pressure cuff read 202/77.  She is advised to get a new blood pressure cuff.    From a heart perspective she has no exertional chest pain shortness of breath or palpitations.  She denies any TIA or CVA symptoms.  She denies any orthopnea or PND.  She has no syncope or near syncope.        Social History  Social History     Tobacco Use   Smoking Status Former    Current packs/day: 0.00    Average packs/day: 0.3 packs/day for 30.0 years (7.5 ttl pk-yrs)    Types: Cigarettes    Start date:     Quit date:     Years since quittin.5    Passive exposure: Past   Smokeless Tobacco Never   ,   Social History     Substance and Sexual Activity   Alcohol Use Never   ,   Social History     Substance and Sexual Activity   Drug Use No     Family History   Problem Relation Name Age of Onset    Substance Abuse Mother          denied    Mental illness Mother          denied    Substance Abuse Father          denied    Mental illness Father          denied    Diabetes Brother      Blindness Brother      Arthritis Daughter      HIV Son         Medical and Surgical History  Past Medical History:   Diagnosis Date    Aggression     Alzheimer's dementia (HCC)     Arthritis     Chronic kidney disease     Clotting disorder (HCC)     Dementia (HCC)     Diabetes insipidus (HCC)     Diabetes mellitus (HCC)     High cholesterol      Hyperlipidemia     Hypertension     Hypertensive urgency     Kidney stone     Memory loss     Migraine     Polyneuropathy     Rheumatoid arthritis (HCC)     Serum lipids high     Stomach problems     Thyroid trouble     Urinary tract infection      Past Surgical History:   Procedure Laterality Date    CATARACT EXTRACTION      CHOLECYSTECTOMY      GALLBLADDER SURGERY      HYSTERECTOMY      SC SURGICAL ARTHROSCOPY SHOULDER W/ROTATOR CUFF RPR Right 8/1/2019    Procedure: SHOULDER ARTHROSCOPIC ROTATOR CUFF REPAIR;  Surgeon: Betsy Conde MD;  Location: [facility] OR;  Service: Orthopedics    ROTATOR CUFF REPAIR Right 08/01/2019         Current Outpatient Medications:     amLODIPine (NORVASC) 10 mg tablet, TAKE 1 TABLET (10 MG TOTAL) BY MOUTH DAILY AT NIGHT BEFORE BED, Disp: 90 tablet, Rfl: 1    apixaban (Eliquis) 5 mg, TAKE 1 TABLET BY MOUTH TWICE A DAY, Disp: 120 tablet, Rfl: 3    aspirin 81 mg chewable tablet, Chew 1 tablet (81 mg total) daily, Disp: , Rfl:     BD Pen Needle Elisabeth 2nd Gen 32G X 4 MM MISC, USE DAILY AS DIRECTED, Disp: 100 each, Rfl: 1    benzonatate (TESSALON) 200 MG capsule, Take 1 capsule (200 mg total) by mouth 2 (two) times a day as needed for cough, Disp: 30 capsule, Rfl: 2    Blood Glucose Monitoring Suppl (OneTouch Verio Reflect) w/Device KIT, USE 2 (TWO) TIMES A DAY, Disp: 1 kit, Rfl: 0    Blood Pressure KIT, Use daily, Disp: 1 kit, Rfl: 0    Carboxymethylcellul-Glycerin (Refresh Relieva) 0.5-0.9 % SOLN, Apply 1 drop to eye, Disp: , Rfl:     cholecalciferol (VITAMIN D3) 250 MCG (92768 UT) capsule, Take 1 capsule (10,000 Units total) by mouth daily, Disp: 90 capsule, Rfl: 3    cholestyramine (QUESTRAN) 4 g packet, Take 1 packet (4 g total) by mouth 2 (two) times a day with meals, Disp: 180 packet, Rfl: 3    cyanocobalamin (VITAMIN B-12) 1000 MCG tablet, Take 1 tablet (1,000 mcg total) by mouth daily, Disp: , Rfl:     Diclofenac Sodium (VOLTAREN) 1 %, Apply 2 g topically 3 (three) times a day,  Disp: 150 g, Rfl: 0    fenofibrate (TRICOR) 54 MG tablet, Take 1 tablet (54 mg total) by mouth daily, Disp: 90 tablet, Rfl: 6    fluticasone (FLONASE) 50 mcg/act nasal spray, SPRAY 2 SPRAYS INTO EACH NOSTRIL EVERY DAY, Disp: 48 mL, Rfl: 1    furosemide (LASIX) 20 mg tablet, TAKE 1 TABLET BY MOUTH TWICE A DAY, Disp: 180 tablet, Rfl: 1    gabapentin (NEURONTIN) 100 mg capsule, TAKE 1 CAPSULE BY MOUTH THREE TIMES A DAY, Disp: 270 capsule, Rfl: 1    glucose blood (OneTouch Verio) test strip, Use 1 each 2 (two) times a day Test, Disp: 100 strip, Rfl: 2    hydrALAZINE (APRESOLINE) 25 mg tablet, Take 2 tablets in AM, 1 tablet in mid day and 2 tablets with dinner, Disp: 270 tablet, Rfl: 5    hydroCHLOROthiazide 25 mg tablet, TAKE 2 TABLETS BY MOUTH EVERY DAY, Disp: 180 tablet, Rfl: 1    Incontinence Supplies MISC, Use 6 (six) times a day Wipes, Disp: 200 each, Rfl: 6    Incontinence Supplies MISC, Use 4 (four) times a day Comfort shield Adult diapers, Disp: 200 each, Rfl: 6    Incontinence Supply Disposable MISC, Use 6 (six) times a day Dispense disposable bed pad, Disp: 200 each, Rfl: 6    levocetirizine (XYZAL) 5 MG tablet, TAKE 1 TABLET BY MOUTH EVERY DAY IN THE EVENING, Disp: 90 tablet, Rfl: 1    levothyroxine 100 mcg tablet, Take 1 tablet (100 mcg total) by mouth daily in the early morning, Disp: 30 tablet, Rfl: 3    losartan (COZAAR) 100 MG tablet, Take 1 tablet (100 mg total) by mouth daily, Disp: 90 tablet, Rfl: 3    loteprednol etabonate (LOTEMAX) 0.5 % ophthalmic suspension, INSTILL 1 DROP INTO BOTH EYES TWICE A DAY, Disp: 15 mL, Rfl: 1    memantine (NAMENDA) 10 mg tablet, TAKE 1 TABLET BY MOUTH TWICE A DAY, Disp: 200 tablet, Rfl: 1    montelukast (SINGULAIR) 10 mg tablet, TAKE 1 TABLET BY MOUTH EVERYDAY AT BEDTIME, Disp: 90 tablet, Rfl: 1    omeprazole (PriLOSEC) 20 mg delayed release capsule, Take 1 capsule (20 mg total) by mouth daily, Disp: 90 capsule, Rfl: 3    OneTouch UltraSoft 2 Lancets MISC, USE 2 (TWO)  TIMES A DAY, Disp: 100 each, Rfl: 2    Ozempic, 0.25 or 0.5 MG/DOSE, 2 MG/3ML injection pen, PLEASE SEE ATTACHED FOR DETAILED DIRECTIONS, Disp: , Rfl:     semaglutide, 1 mg/dose, (Ozempic) 4 mg/3 mL injection pen, Inject 0.75 mL (1 mg total) under the skin once a week, Disp: 9 mL, Rfl: 3    sertraline (ZOLOFT) 50 mg tablet, TAKE 1 TABLET BY MOUTH EVERY DAY, Disp: 90 tablet, Rfl: 0    traMADol-acetaminophen (ULTRACET) 37.5-325 mg per tablet, Take 1 tablet by mouth every 6 (six) hours as needed for moderate pain, Disp: 30 tablet, Rfl: 0    Xiidra 5 % op solution, , Disp: , Rfl:     Continuous Glucose Sensor (Dexcom G7 Sensor), Use 1 Device every 10 days (Patient not taking: Reported on 7/22/2025), Disp: 9 each, Rfl: 3    Continuous Glucose Sensor (FreeStyle Aruna 3 Sensor) MISC, Use 1 each every 14 (fourteen) days (Patient not taking: Reported on 7/22/2025), Disp: 6 each, Rfl: 3  Allergies   Allergen Reactions    Penicillins Itching and Swelling    Lisinopril Cough    Pollen Extract Allergic Rhinitis       Review of Systems   Constitutional: Negative.   HENT: Negative.     Eyes: Negative.    Cardiovascular:  Negative for chest pain, claudication, cyanosis, dyspnea on exertion, irregular heartbeat, leg swelling (With lower extremity pain), near-syncope, orthopnea, palpitations, paroxysmal nocturnal dyspnea and syncope.   Respiratory: Negative.  Negative for cough, hemoptysis, shortness of breath, sleep disturbances due to breathing, snoring, sputum production and wheezing.    Endocrine: Negative.    Hematologic/Lymphatic: Negative.    Skin: Negative.    Musculoskeletal: Negative.    Gastrointestinal: Negative.    Genitourinary: Negative.    Neurological: Negative.    Psychiatric/Behavioral: Negative.     Allergic/Immunologic: Negative.        Objective:   /60   Pulse 64   Ht 5' (1.524 m)   Wt 57.4 kg (126 lb 8 oz)   BMI 24.71 kg/m²   Physical Exam  Vitals and nursing note reviewed.   Constitutional:        "Appearance: She is well-developed.   HENT:      Head: Normocephalic and atraumatic.      Mouth/Throat:      Mouth: Mucous membranes are moist.     Eyes:      General: No scleral icterus.        Right eye: No discharge.         Left eye: No discharge.      Conjunctiva/sclera: Conjunctivae normal.     Neck:      Vascular: No JVD.      Trachea: No tracheal deviation.     Cardiovascular:      Rate and Rhythm: Normal rate and regular rhythm.      Pulses: Intact distal pulses.      Heart sounds: S1 normal and S2 normal. Murmur heard.      Systolic murmur is present with a grade of 2/6.      No friction rub. No gallop.   Pulmonary:      Effort: Pulmonary effort is normal. No respiratory distress.      Breath sounds: Normal breath sounds. No wheezing or rales.   Chest:      Chest wall: No tenderness.   Abdominal:      General: Bowel sounds are normal. There is no distension.      Palpations: Abdomen is soft.      Tenderness: There is no abdominal tenderness.      Comments: Aorta not palpable      Musculoskeletal:         General: No tenderness. Normal range of motion.      Cervical back: Normal range of motion and neck supple.      Right lower leg: Edema (mild) present.      Left lower leg: Edema (mild) present.     Skin:     General: Skin is warm and dry.      Coloration: Skin is not pale.      Findings: No erythema or rash.     Neurological:      General: No focal deficit present.      Mental Status: She is alert and oriented to person, place, and time.     Psychiatric:         Mood and Affect: Mood normal.         Behavior: Behavior normal.         Thought Content: Thought content normal.         Judgment: Judgment normal.         Lab Review:   No results found for: \"CHOL\"  Lab Results   Component Value Date    HDL 51 12/13/2024     Lab Results   Component Value Date    LDLCALC 96 12/13/2024     Lab Results   Component Value Date    TRIG 119 12/13/2024     Results Reviewed       None          Results Reviewed       None "          Results Reviewed       None            Recent Cardiovascular Testing:   Echo 8/12/2024: Normal LVEF 60% mild LAE mild MAC RVSP 35 mmHg    ECG Review:   8/14/2024 normal sinus rhythm LAHB moderate voltage criteria for LVH may be a normal variant

## 2025-07-22 NOTE — ASSESSMENT & PLAN NOTE
Controlled on current medical regimen  Continue amlodipine 10 mg daily hydralazine 25 mg 3 times daily hydrochlorothiazide 50 mg daily and losartan 100 mg daily  Patient did not tolerate lisinopril--dry cough    Blood pressure readings at home are erroneously elevated when compared to blood pressure reading in the office.

## 2025-07-24 ENCOUNTER — TELEPHONE (OUTPATIENT)
Dept: UROLOGY | Facility: AMBULATORY SURGERY CENTER | Age: 85
End: 2025-07-24

## 2025-07-24 DIAGNOSIS — I10 ESSENTIAL HYPERTENSION: ICD-10-CM

## 2025-07-25 RX ORDER — AMLODIPINE BESYLATE 10 MG/1
10 TABLET ORAL DAILY
Qty: 90 TABLET | Refills: 1 | Status: SHIPPED | OUTPATIENT
Start: 2025-07-25 | End: 2026-01-21

## 2025-07-31 ENCOUNTER — PROCEDURE VISIT (OUTPATIENT)
Dept: UROLOGY | Facility: CLINIC | Age: 85
End: 2025-07-31
Payer: COMMERCIAL

## 2025-07-31 VITALS
HEART RATE: 68 BPM | DIASTOLIC BLOOD PRESSURE: 62 MMHG | SYSTOLIC BLOOD PRESSURE: 142 MMHG | HEIGHT: 60 IN | TEMPERATURE: 97.8 F | OXYGEN SATURATION: 98 % | BODY MASS INDEX: 24.74 KG/M2 | WEIGHT: 126 LBS

## 2025-07-31 DIAGNOSIS — R33.9 INCOMPLETE BLADDER EMPTYING: Primary | ICD-10-CM

## 2025-07-31 LAB — POST-VOID RESIDUAL VOLUME, ML POC: 151 ML

## 2025-07-31 PROCEDURE — 51798 US URINE CAPACITY MEASURE: CPT

## 2025-08-22 ENCOUNTER — RA CDI HCC (OUTPATIENT)
Dept: OTHER | Facility: HOSPITAL | Age: 85
End: 2025-08-22

## (undated) DEVICE — 3M™ IOBAN™ 2 ANTIMICROBIAL INCISE DRAPE 6640EZ: Brand: IOBAN™ 2

## (undated) DEVICE — GAUZE SPONGES,16 PLY: Brand: CURITY

## (undated) DEVICE — TUBING ARTHROSCOPIC WAVE  MAIN PUMP

## (undated) DEVICE — BIO-COMP SWVLK C, CLD 4.75X19.1MM
Type: IMPLANTABLE DEVICE | Site: SHOULDER | Status: NON-FUNCTIONAL
Brand: ARTHREX®

## (undated) DEVICE — U-DRAPE: Brand: CONVERTORS

## (undated) DEVICE — SHOULDER STABILIZATION KIT,                                    DISPOSABLE 12 PER BOX

## (undated) DEVICE — FIBERTAPE 2MM X 7IN AR-7237-7

## (undated) DEVICE — ASTOUND SURGICAL GOWN, XXX LARGE, X-LONG: Brand: CONVERTORS

## (undated) DEVICE — BETHLEHEM UNIVERSAL  ARTHRO PK: Brand: CARDINAL HEALTH

## (undated) DEVICE — DRESSING MEPILEX AG BORDER 4 X 8 IN

## (undated) DEVICE — ABDOMINAL PAD: Brand: DERMACEA

## (undated) DEVICE — PENCIL ELECTROSURG E-Z CLEAN -0035H

## (undated) DEVICE — LIGHT HANDLE COVER SLEEVE DISP BLUE STELLAR

## (undated) DEVICE — MEDI-VAC YANKAUER SUCTION HANDLE W/STRAIGHT TIP & CONTROL VENT: Brand: CARDINAL HEALTH

## (undated) DEVICE — GLOVE SRG BIOGEL 9

## (undated) DEVICE — CANNULA 7 X70MM THRD SEAL SIDE PORT

## (undated) DEVICE — OCCLUSIVE GAUZE STRIP,3% BISMUTH TRIBROMOPHENATE IN PETROLATUM BLEND: Brand: XEROFORM

## (undated) DEVICE — BLADE SHAVER TORPEDO 4MM 13CM  COOLCUT

## (undated) DEVICE — INTENDED FOR TISSUE SEPARATION, AND OTHER PROCEDURES THAT REQUIRE A SHARP SURGICAL BLADE TO PUNCTURE OR CUT.: Brand: BARD-PARKER ® CARBON RIB-BACK BLADES

## (undated) DEVICE — PROBE ABLATION  APOLLO RF 90 DEG MULTI PORT

## (undated) DEVICE — TIBURON BEACH CHAIR SHOULDER DRAPE: Brand: CONVERTORS

## (undated) DEVICE — DRAPE SHEET THREE QUARTER

## (undated) DEVICE — GLOVE INDICATOR PI UNDERGLOVE SZ 9 BLUE

## (undated) DEVICE — CHLORAPREP HI-LITE 26ML ORANGE

## (undated) DEVICE — IMPERVIOUS STOCKINETTE: Brand: DEROYAL

## (undated) DEVICE — Device

## (undated) DEVICE — SUT ETHILON 3-0 PS-1 18 IN 1663H

## (undated) DEVICE — ARTHROSCOPY FLOOR MAT

## (undated) DEVICE — SCD SEQUENTIAL COMPRESSION COMFORT SLEEVE MEDIUM KNEE LENGTH: Brand: KENDALL SCD

## (undated) DEVICE — TUBING SUCTION 5MM X 12 FT

## (undated) DEVICE — DRAPE EQUIPMENT RF WAND

## (undated) DEVICE — BLADE SHAVER CUTTER BN 4MM 13CM COOLCUT